# Patient Record
Sex: FEMALE | Race: WHITE | NOT HISPANIC OR LATINO | Employment: OTHER | URBAN - METROPOLITAN AREA
[De-identification: names, ages, dates, MRNs, and addresses within clinical notes are randomized per-mention and may not be internally consistent; named-entity substitution may affect disease eponyms.]

---

## 2017-01-07 ENCOUNTER — APPOINTMENT (EMERGENCY)
Dept: RADIOLOGY | Facility: HOSPITAL | Age: 72
DRG: 149 | End: 2017-01-07
Payer: MEDICARE

## 2017-01-07 ENCOUNTER — HOSPITAL ENCOUNTER (INPATIENT)
Facility: HOSPITAL | Age: 72
LOS: 2 days | Discharge: HOME | DRG: 149 | End: 2017-01-10
Attending: EMERGENCY MEDICINE | Admitting: FAMILY MEDICINE
Payer: MEDICARE

## 2017-01-07 DIAGNOSIS — G43.909 MIGRAINE HEADACHE: ICD-10-CM

## 2017-01-07 DIAGNOSIS — R42 VERTIGO: ICD-10-CM

## 2017-01-07 DIAGNOSIS — R11.0 NAUSEA: ICD-10-CM

## 2017-01-07 DIAGNOSIS — R42 DIZZINESS: Primary | ICD-10-CM

## 2017-01-07 LAB
ALBUMIN SERPL BCP-MCNC: 4 G/DL (ref 3.5–5)
ALP SERPL-CCNC: 82 U/L (ref 46–116)
ALT SERPL W P-5'-P-CCNC: 20 U/L (ref 12–78)
ANION GAP SERPL CALCULATED.3IONS-SCNC: 13 MMOL/L (ref 4–13)
APTT PPP: 28 SECONDS (ref 24–33)
AST SERPL W P-5'-P-CCNC: 16 U/L (ref 5–45)
BACTERIA UR QL AUTO: ABNORMAL /HPF
BASOPHILS # BLD AUTO: 0 THOUSANDS/ΜL (ref 0–0.1)
BASOPHILS NFR BLD AUTO: 1 % (ref 0–1)
BILIRUB SERPL-MCNC: 0.7 MG/DL (ref 0.2–1)
BILIRUB UR QL STRIP: NEGATIVE
BUN SERPL-MCNC: 16 MG/DL (ref 5–25)
CALCIUM SERPL-MCNC: 9.3 MG/DL (ref 8.3–10.1)
CHLORIDE SERPL-SCNC: 105 MMOL/L (ref 100–108)
CK SERPL-CCNC: 101 U/L (ref 26–192)
CLARITY UR: CLEAR
CO2 SERPL-SCNC: 25 MMOL/L (ref 21–32)
COLOR UR: ABNORMAL
CREAT SERPL-MCNC: 1.02 MG/DL (ref 0.6–1.3)
EOSINOPHIL # BLD AUTO: 0 THOUSAND/ΜL (ref 0–0.61)
EOSINOPHIL NFR BLD AUTO: 1 % (ref 0–6)
ERYTHROCYTE [DISTWIDTH] IN BLOOD BY AUTOMATED COUNT: 12.9 % (ref 11.6–15.1)
GFR SERPL CREATININE-BSD FRML MDRD: 53.1 ML/MIN/1.73SQ M
GLUCOSE SERPL-MCNC: 89 MG/DL (ref 65–140)
GLUCOSE SERPL-MCNC: 91 MG/DL (ref 65–140)
GLUCOSE UR STRIP-MCNC: NEGATIVE MG/DL
HCT VFR BLD AUTO: 43.5 % (ref 37–47)
HGB BLD-MCNC: 14.8 G/DL (ref 12–16)
HGB UR QL STRIP.AUTO: ABNORMAL
INR PPP: 1.02 (ref 0.86–1.16)
KETONES UR STRIP-MCNC: ABNORMAL MG/DL
LEUKOCYTE ESTERASE UR QL STRIP: ABNORMAL
LYMPHOCYTES # BLD AUTO: 1.7 THOUSANDS/ΜL (ref 0.6–4.47)
LYMPHOCYTES NFR BLD AUTO: 34 % (ref 14–44)
MCH RBC QN AUTO: 30.2 PG (ref 27–31)
MCHC RBC AUTO-ENTMCNC: 34 G/DL (ref 31.4–37.4)
MCV RBC AUTO: 89 FL (ref 82–98)
MONOCYTES # BLD AUTO: 0.3 THOUSAND/ΜL (ref 0.17–1.22)
MONOCYTES NFR BLD AUTO: 6 % (ref 4–12)
NEUTROPHILS # BLD AUTO: 3 THOUSANDS/ΜL (ref 1.85–7.62)
NEUTS SEG NFR BLD AUTO: 59 % (ref 43–75)
NITRITE UR QL STRIP: NEGATIVE
NON-SQ EPI CELLS URNS QL MICRO: ABNORMAL /HPF
NRBC BLD AUTO-RTO: 0 /100 WBCS
PH UR STRIP.AUTO: 6 [PH] (ref 5–9)
PLATELET # BLD AUTO: 235 THOUSANDS/UL (ref 130–400)
PMV BLD AUTO: 6.9 FL (ref 8.9–12.7)
POTASSIUM SERPL-SCNC: 4 MMOL/L (ref 3.5–5.3)
PROT SERPL-MCNC: 7.5 G/DL (ref 6.4–8.2)
PROT UR STRIP-MCNC: NEGATIVE MG/DL
PROTHROMBIN TIME: 10.7 SECONDS (ref 9.4–11.7)
RBC # BLD AUTO: 4.9 MILLION/UL (ref 4.2–5.4)
RBC #/AREA URNS AUTO: ABNORMAL /HPF
SODIUM SERPL-SCNC: 143 MMOL/L (ref 136–145)
SP GR UR STRIP.AUTO: 1.01 (ref 1–1.03)
TROPONIN I SERPL-MCNC: <0.02 NG/ML
UROBILINOGEN UR QL STRIP.AUTO: 0.2 E.U./DL
WBC # BLD AUTO: 5.2 THOUSAND/UL (ref 4.8–10.8)
WBC #/AREA URNS AUTO: ABNORMAL /HPF

## 2017-01-07 PROCEDURE — 85025 COMPLETE CBC W/AUTO DIFF WBC: CPT | Performed by: EMERGENCY MEDICINE

## 2017-01-07 PROCEDURE — 85610 PROTHROMBIN TIME: CPT | Performed by: EMERGENCY MEDICINE

## 2017-01-07 PROCEDURE — 96361 HYDRATE IV INFUSION ADD-ON: CPT

## 2017-01-07 PROCEDURE — 80053 COMPREHEN METABOLIC PANEL: CPT | Performed by: EMERGENCY MEDICINE

## 2017-01-07 PROCEDURE — 82948 REAGENT STRIP/BLOOD GLUCOSE: CPT

## 2017-01-07 PROCEDURE — 96375 TX/PRO/DX INJ NEW DRUG ADDON: CPT

## 2017-01-07 PROCEDURE — 93005 ELECTROCARDIOGRAM TRACING: CPT | Performed by: EMERGENCY MEDICINE

## 2017-01-07 PROCEDURE — 96374 THER/PROPH/DIAG INJ IV PUSH: CPT

## 2017-01-07 PROCEDURE — 71010 HB CHEST X-RAY 1 VIEW FRONTAL: CPT

## 2017-01-07 PROCEDURE — 99285 EMERGENCY DEPT VISIT HI MDM: CPT

## 2017-01-07 PROCEDURE — 84484 ASSAY OF TROPONIN QUANT: CPT | Performed by: EMERGENCY MEDICINE

## 2017-01-07 PROCEDURE — 87086 URINE CULTURE/COLONY COUNT: CPT | Performed by: EMERGENCY MEDICINE

## 2017-01-07 PROCEDURE — 82550 ASSAY OF CK (CPK): CPT | Performed by: EMERGENCY MEDICINE

## 2017-01-07 PROCEDURE — 36415 COLL VENOUS BLD VENIPUNCTURE: CPT | Performed by: EMERGENCY MEDICINE

## 2017-01-07 PROCEDURE — 70450 CT HEAD/BRAIN W/O DYE: CPT

## 2017-01-07 PROCEDURE — 81001 URINALYSIS AUTO W/SCOPE: CPT | Performed by: EMERGENCY MEDICINE

## 2017-01-07 PROCEDURE — 85730 THROMBOPLASTIN TIME PARTIAL: CPT | Performed by: EMERGENCY MEDICINE

## 2017-01-07 RX ORDER — ONDANSETRON 2 MG/ML
4 INJECTION INTRAMUSCULAR; INTRAVENOUS ONCE
Status: COMPLETED | OUTPATIENT
Start: 2017-01-07 | End: 2017-01-07

## 2017-01-07 RX ORDER — ACETAMINOPHEN 325 MG/1
975 TABLET ORAL ONCE
Status: COMPLETED | OUTPATIENT
Start: 2017-01-07 | End: 2017-01-07

## 2017-01-07 RX ORDER — MECLIZINE HYDROCHLORIDE 25 MG/1
50 TABLET ORAL ONCE
Status: COMPLETED | OUTPATIENT
Start: 2017-01-07 | End: 2017-01-07

## 2017-01-07 RX ADMIN — SODIUM CHLORIDE 1000 ML: 0.9 INJECTION, SOLUTION INTRAVENOUS at 16:36

## 2017-01-07 RX ADMIN — MECLIZINE HYDROCHLORIDE 50 MG: 25 TABLET ORAL at 18:42

## 2017-01-07 RX ADMIN — ACETAMINOPHEN 975 MG: 325 TABLET, FILM COATED ORAL at 18:42

## 2017-01-07 RX ADMIN — ONDANSETRON 4 MG: 2 INJECTION INTRAMUSCULAR; INTRAVENOUS at 16:35

## 2017-01-07 RX ADMIN — FAMOTIDINE 20 MG: 10 INJECTION, SOLUTION INTRAVENOUS at 16:35

## 2017-01-07 RX ADMIN — ONDANSETRON 4 MG: 2 INJECTION INTRAMUSCULAR; INTRAVENOUS at 20:25

## 2017-01-08 PROBLEM — I10 HYPERTENSION: Status: ACTIVE | Noted: 2017-01-08

## 2017-01-08 PROBLEM — R42 VERTIGO: Status: ACTIVE | Noted: 2017-01-08

## 2017-01-08 PROBLEM — R11.10 DRY HEAVES: Status: ACTIVE | Noted: 2017-01-08

## 2017-01-08 LAB — TROPONIN I SERPL-MCNC: <0.02 NG/ML

## 2017-01-08 PROCEDURE — 84484 ASSAY OF TROPONIN QUANT: CPT | Performed by: NURSE PRACTITIONER

## 2017-01-08 PROCEDURE — 87081 CULTURE SCREEN ONLY: CPT | Performed by: INTERNAL MEDICINE

## 2017-01-08 RX ORDER — MAGNESIUM HYDROXIDE/ALUMINUM HYDROXICE/SIMETHICONE 120; 1200; 1200 MG/30ML; MG/30ML; MG/30ML
30 SUSPENSION ORAL EVERY 6 HOURS PRN
Status: DISCONTINUED | OUTPATIENT
Start: 2017-01-08 | End: 2017-01-10 | Stop reason: HOSPADM

## 2017-01-08 RX ORDER — ACETAMINOPHEN 325 MG/1
650 TABLET ORAL EVERY 6 HOURS PRN
Status: DISCONTINUED | OUTPATIENT
Start: 2017-01-08 | End: 2017-01-10 | Stop reason: HOSPADM

## 2017-01-08 RX ORDER — ONDANSETRON 2 MG/ML
4 INJECTION INTRAMUSCULAR; INTRAVENOUS EVERY 6 HOURS PRN
Status: DISCONTINUED | OUTPATIENT
Start: 2017-01-08 | End: 2017-01-10 | Stop reason: HOSPADM

## 2017-01-08 RX ORDER — FAMOTIDINE 40 MG/5ML
20 POWDER, FOR SUSPENSION ORAL 2 TIMES DAILY
Status: DISCONTINUED | OUTPATIENT
Start: 2017-01-08 | End: 2017-01-08 | Stop reason: DRUGHIGH

## 2017-01-08 RX ORDER — ALPRAZOLAM 0.25 MG/1
0.25 TABLET ORAL
Status: DISCONTINUED | OUTPATIENT
Start: 2017-01-08 | End: 2017-01-10 | Stop reason: HOSPADM

## 2017-01-08 RX ORDER — FAMOTIDINE 20 MG/1
20 TABLET, FILM COATED ORAL DAILY
Status: DISCONTINUED | OUTPATIENT
Start: 2017-01-08 | End: 2017-01-10 | Stop reason: HOSPADM

## 2017-01-08 RX ORDER — HYDRALAZINE HYDROCHLORIDE 20 MG/ML
5 INJECTION INTRAMUSCULAR; INTRAVENOUS EVERY 6 HOURS PRN
Status: DISCONTINUED | OUTPATIENT
Start: 2017-01-08 | End: 2017-01-09

## 2017-01-08 RX ORDER — SODIUM CHLORIDE 9 MG/ML
100 INJECTION, SOLUTION INTRAVENOUS CONTINUOUS
Status: DISCONTINUED | OUTPATIENT
Start: 2017-01-08 | End: 2017-01-08

## 2017-01-08 RX ADMIN — ALPRAZOLAM 0.25 MG: 0.25 TABLET ORAL at 22:49

## 2017-01-08 RX ADMIN — ENOXAPARIN SODIUM 40 MG: 40 INJECTION SUBCUTANEOUS at 08:44

## 2017-01-08 RX ADMIN — FAMOTIDINE 20 MG: 20 TABLET ORAL at 08:44

## 2017-01-08 RX ADMIN — SODIUM CHLORIDE 100 ML/HR: 0.9 INJECTION, SOLUTION INTRAVENOUS at 03:04

## 2017-01-08 RX ADMIN — ONDANSETRON 4 MG: 2 INJECTION INTRAMUSCULAR; INTRAVENOUS at 22:10

## 2017-01-09 ENCOUNTER — APPOINTMENT (INPATIENT)
Dept: RADIOLOGY | Facility: HOSPITAL | Age: 72
DRG: 149 | End: 2017-01-09
Payer: MEDICARE

## 2017-01-09 ENCOUNTER — APPOINTMENT (INPATIENT)
Dept: NON INVASIVE DIAGNOSTICS | Facility: HOSPITAL | Age: 72
DRG: 149 | End: 2017-01-09
Payer: MEDICARE

## 2017-01-09 LAB
ATRIAL RATE: 80 BPM
BACTERIA UR CULT: NORMAL
MRSA NOSE QL CULT: NORMAL
P AXIS: 49 DEGREES
PR INTERVAL: 162 MS
QRS AXIS: 4 DEGREES
QRSD INTERVAL: 78 MS
QT INTERVAL: 390 MS
QTC INTERVAL: 449 MS
T WAVE AXIS: 55 DEGREES
VENTRICULAR RATE: 80 BPM

## 2017-01-09 PROCEDURE — G8988 SELF CARE GOAL STATUS: HCPCS

## 2017-01-09 PROCEDURE — G8978 MOBILITY CURRENT STATUS: HCPCS

## 2017-01-09 PROCEDURE — 97162 PT EVAL MOD COMPLEX 30 MIN: CPT

## 2017-01-09 PROCEDURE — 70551 MRI BRAIN STEM W/O DYE: CPT

## 2017-01-09 PROCEDURE — 90686 IIV4 VACC NO PRSV 0.5 ML IM: CPT | Performed by: INTERNAL MEDICINE

## 2017-01-09 PROCEDURE — G8987 SELF CARE CURRENT STATUS: HCPCS

## 2017-01-09 PROCEDURE — G0008 ADMIN INFLUENZA VIRUS VAC: HCPCS | Performed by: INTERNAL MEDICINE

## 2017-01-09 PROCEDURE — 97165 OT EVAL LOW COMPLEX 30 MIN: CPT

## 2017-01-09 PROCEDURE — 93306 TTE W/DOPPLER COMPLETE: CPT

## 2017-01-09 PROCEDURE — G8979 MOBILITY GOAL STATUS: HCPCS

## 2017-01-09 RX ADMIN — ACETAMINOPHEN 650 MG: 325 TABLET, FILM COATED ORAL at 21:51

## 2017-01-09 RX ADMIN — INFLUENZA VIRUS VACCINE 0.5 ML: 15; 15; 15; 15 SUSPENSION INTRAMUSCULAR at 21:52

## 2017-01-09 RX ADMIN — FAMOTIDINE 20 MG: 20 TABLET ORAL at 09:54

## 2017-01-09 RX ADMIN — ENOXAPARIN SODIUM 40 MG: 40 INJECTION SUBCUTANEOUS at 09:54

## 2017-01-10 ENCOUNTER — APPOINTMENT (INPATIENT)
Dept: RADIOLOGY | Facility: HOSPITAL | Age: 72
DRG: 149 | End: 2017-01-10
Payer: MEDICARE

## 2017-01-10 VITALS
TEMPERATURE: 96.7 F | OXYGEN SATURATION: 99 % | SYSTOLIC BLOOD PRESSURE: 171 MMHG | WEIGHT: 157.85 LBS | RESPIRATION RATE: 18 BRPM | DIASTOLIC BLOOD PRESSURE: 77 MMHG | BODY MASS INDEX: 30.99 KG/M2 | HEIGHT: 60 IN | HEART RATE: 85 BPM

## 2017-01-10 PROBLEM — J01.90 ACUTE SINUSITIS: Status: ACTIVE | Noted: 2017-01-10

## 2017-01-10 PROBLEM — I34.0 MODERATE MITRAL REGURGITATION: Chronic | Status: ACTIVE | Noted: 2017-01-10

## 2017-01-10 LAB
ANION GAP SERPL CALCULATED.3IONS-SCNC: 8 MMOL/L (ref 4–13)
BUN SERPL-MCNC: 18 MG/DL (ref 5–25)
CALCIUM SERPL-MCNC: 8.7 MG/DL (ref 8.3–10.1)
CHLORIDE SERPL-SCNC: 108 MMOL/L (ref 100–108)
CO2 SERPL-SCNC: 27 MMOL/L (ref 21–32)
CREAT SERPL-MCNC: 0.97 MG/DL (ref 0.6–1.3)
GFR SERPL CREATININE-BSD FRML MDRD: 56.3 ML/MIN/1.73SQ M
GLUCOSE SERPL-MCNC: 87 MG/DL (ref 65–140)
POTASSIUM SERPL-SCNC: 4 MMOL/L (ref 3.5–5.3)
SODIUM SERPL-SCNC: 143 MMOL/L (ref 136–145)

## 2017-01-10 PROCEDURE — 80048 BASIC METABOLIC PNL TOTAL CA: CPT | Performed by: INTERNAL MEDICINE

## 2017-01-10 PROCEDURE — 93880 EXTRACRANIAL BILAT STUDY: CPT

## 2017-01-10 PROCEDURE — 97535 SELF CARE MNGMENT TRAINING: CPT

## 2017-01-10 RX ORDER — AMOXICILLIN AND CLAVULANATE POTASSIUM 875; 125 MG/1; MG/1
1 TABLET, FILM COATED ORAL EVERY 12 HOURS SCHEDULED
Qty: 20 TABLET | Refills: 0 | Status: SHIPPED | OUTPATIENT
Start: 2017-01-10 | End: 2017-01-20

## 2017-01-10 RX ORDER — AMOXICILLIN AND CLAVULANATE POTASSIUM 875; 125 MG/1; MG/1
1 TABLET, FILM COATED ORAL EVERY 12 HOURS SCHEDULED
Status: DISCONTINUED | OUTPATIENT
Start: 2017-01-10 | End: 2017-01-10 | Stop reason: HOSPADM

## 2017-01-10 RX ORDER — FAMOTIDINE 20 MG/1
20 TABLET, FILM COATED ORAL DAILY
Qty: 30 TABLET | Refills: 0 | Status: SHIPPED | OUTPATIENT
Start: 2017-01-10 | End: 2018-09-19

## 2017-01-10 RX ADMIN — ENOXAPARIN SODIUM 40 MG: 40 INJECTION SUBCUTANEOUS at 08:00

## 2017-01-10 RX ADMIN — FAMOTIDINE 20 MG: 20 TABLET ORAL at 08:00

## 2017-01-20 ENCOUNTER — ALLSCRIPTS OFFICE VISIT (OUTPATIENT)
Dept: OTHER | Facility: OTHER | Age: 72
End: 2017-01-20

## 2017-01-20 DIAGNOSIS — Z12.31 ENCOUNTER FOR SCREENING MAMMOGRAM FOR MALIGNANT NEOPLASM OF BREAST: ICD-10-CM

## 2017-01-20 DIAGNOSIS — H81.10 BENIGN PAROXYSMAL VERTIGO: ICD-10-CM

## 2017-01-20 DIAGNOSIS — Z13.820 ENCOUNTER FOR SCREENING FOR OSTEOPOROSIS: ICD-10-CM

## 2017-01-24 ENCOUNTER — APPOINTMENT (OUTPATIENT)
Dept: PHYSICAL THERAPY | Facility: CLINIC | Age: 72
End: 2017-01-24
Payer: MEDICARE

## 2017-01-24 PROCEDURE — 95992 CANALITH REPOSITIONING PROC: CPT

## 2017-01-24 PROCEDURE — G8978 MOBILITY CURRENT STATUS: HCPCS

## 2017-01-24 PROCEDURE — G8979 MOBILITY GOAL STATUS: HCPCS

## 2017-01-24 PROCEDURE — 97162 PT EVAL MOD COMPLEX 30 MIN: CPT

## 2017-01-27 ENCOUNTER — APPOINTMENT (OUTPATIENT)
Dept: PHYSICAL THERAPY | Facility: CLINIC | Age: 72
End: 2017-01-27
Payer: MEDICARE

## 2017-01-27 PROCEDURE — 95992 CANALITH REPOSITIONING PROC: CPT

## 2017-01-27 PROCEDURE — 97112 NEUROMUSCULAR REEDUCATION: CPT

## 2017-01-31 ENCOUNTER — APPOINTMENT (OUTPATIENT)
Dept: PHYSICAL THERAPY | Facility: CLINIC | Age: 72
End: 2017-01-31
Payer: MEDICARE

## 2017-01-31 PROCEDURE — 97110 THERAPEUTIC EXERCISES: CPT

## 2017-01-31 PROCEDURE — 97112 NEUROMUSCULAR REEDUCATION: CPT

## 2017-02-02 ENCOUNTER — APPOINTMENT (OUTPATIENT)
Dept: PHYSICAL THERAPY | Facility: CLINIC | Age: 72
End: 2017-02-02
Payer: MEDICARE

## 2017-02-02 PROCEDURE — 97112 NEUROMUSCULAR REEDUCATION: CPT

## 2017-02-07 ENCOUNTER — APPOINTMENT (OUTPATIENT)
Dept: PHYSICAL THERAPY | Facility: CLINIC | Age: 72
End: 2017-02-07
Payer: MEDICARE

## 2017-02-07 ENCOUNTER — GENERIC CONVERSION - ENCOUNTER (OUTPATIENT)
Dept: OTHER | Facility: OTHER | Age: 72
End: 2017-02-07

## 2017-02-07 PROCEDURE — G8980 MOBILITY D/C STATUS: HCPCS

## 2017-02-07 PROCEDURE — 97112 NEUROMUSCULAR REEDUCATION: CPT

## 2017-02-07 PROCEDURE — G8979 MOBILITY GOAL STATUS: HCPCS

## 2017-02-10 ENCOUNTER — APPOINTMENT (OUTPATIENT)
Dept: PHYSICAL THERAPY | Facility: CLINIC | Age: 72
End: 2017-02-10
Payer: MEDICARE

## 2017-02-10 ENCOUNTER — APPOINTMENT (OUTPATIENT)
Dept: AUDIOLOGY | Facility: CLINIC | Age: 72
End: 2017-02-10
Payer: MEDICARE

## 2017-02-10 PROCEDURE — 92557 COMPREHENSIVE HEARING TEST: CPT | Performed by: AUDIOLOGIST

## 2017-02-10 PROCEDURE — 92567 TYMPANOMETRY: CPT | Performed by: AUDIOLOGIST

## 2017-02-13 ENCOUNTER — APPOINTMENT (OUTPATIENT)
Dept: PHYSICAL THERAPY | Facility: CLINIC | Age: 72
End: 2017-02-13
Payer: MEDICARE

## 2017-02-15 ENCOUNTER — APPOINTMENT (OUTPATIENT)
Dept: PHYSICAL THERAPY | Facility: CLINIC | Age: 72
End: 2017-02-15
Payer: MEDICARE

## 2017-02-20 ENCOUNTER — APPOINTMENT (OUTPATIENT)
Dept: PHYSICAL THERAPY | Facility: CLINIC | Age: 72
End: 2017-02-20
Payer: MEDICARE

## 2017-02-22 ENCOUNTER — APPOINTMENT (OUTPATIENT)
Dept: PHYSICAL THERAPY | Facility: CLINIC | Age: 72
End: 2017-02-22
Payer: MEDICARE

## 2017-02-27 ENCOUNTER — APPOINTMENT (OUTPATIENT)
Dept: PHYSICAL THERAPY | Facility: CLINIC | Age: 72
End: 2017-02-27
Payer: MEDICARE

## 2017-03-01 ENCOUNTER — OFFICE VISIT (OUTPATIENT)
Dept: PHYSICAL THERAPY | Facility: CLINIC | Age: 72
End: 2017-03-01
Payer: MEDICARE

## 2018-01-11 NOTE — RESULT NOTES
Verified Results  US GALLBLADDER [id number] 09:21AM Lorraine Pettit Order Number: [id number]     Test Name Result Flag Reference   US GALLBLADDER (Report)     RIGHT UPPER QUADRANT ULTRASOUND     INDICATION: Gallstone  COMPARISON: 10/09/2013     TECHNIQUE:  Real-time ultrasound of the right upper quadrant was performed with a curvilinear transducer with both volumetric sweeps and still imaging techniques  FINDINGS:     PANCREAS: Visualized portions of the pancreas are within normal limits  AORTA AND IVC: Visualized portions are normal for patient age  LIVER:   Size: Within normal range  The liver measures 11 4 cm in the midclavicular line  Contour: Surface contour is smooth  Parenchyma: Echogenicity and echotexture are within normal limits  No evidence of suspicious mass  The main portal vein is patent and hepatopetal       BILIARY:   The gallbladder is normal in caliber  No wall thickening or pericholecystic fluid  1 4 cm shadowing gallstone in the gallbladder  No sonographic Ignacio's sign  No intrahepatic biliary dilatation  CBD measures 5 mm  No choledocholithiasis  KIDNEY:    Right kidney measures 10 2 x 4 0 cm  The renal cortex measures 1 1 cm  Within normal limits  ASCITES:  None  IMPRESSION:     Cholelithiasis         Workstation performed: [id number]     Signed by:   Mahesh Mendez MD   6/15/16

## 2018-01-15 NOTE — MISCELLANEOUS
Assessment    1  Former smoker (V15 82) (J07 904)   2  Allergic rhinitis (477 9) (J30 9)   3  Benign paroxysmal positional vertigo, unspecified laterality (386 11) (H81 10)   4  GERD without esophagitis (530 81) (K21 9)    Plan  Allergic rhinitis    · Fluticasone Propionate 50 MCG/ACT Nasal Suspension; USE 2 SPRAYS IN EACH  NOSTRIL ONCE DAILY   Rx By: Severo Garcia; Dispense: 0 Days ; #:1 X 16 GM Bottle; Refill: 2; For: Allergic rhinitis; POWER = N; Verified Transmission to [place] [address] #[id number]; Last Updated By: [employer]; 1/20/2017 12:00:22 PM  Benign paroxysmal positional vertigo, unspecified laterality    · *[address] [place] [address] Physician Referral  Consult  Status: Active   Requested for: 20Jan2017   Ordered; For: Benign paroxysmal positional vertigo, unspecified laterality; Ordered By: Severo Garcia Performed:  Due: 99QSU1411  Care Summary provided  : Yes  Encounter for screening mammogram for breast cancer    · * MAMMO SCREENING BILATERAL W CAD; Status:Hold For - Scheduling,Retrospective  Authorization; Requested GIL:[id number];    Perform:[facility]; MPJ:[id number]; Last Updated [facility]; 1/20/2017 11:29:21 AM;Ordered;  For:Encounter for screening mammogram for breast cancer; Ordered By:Dima Baptiste;  Screening for osteoporosis    · * DXA BONE DENSITY SPINE HIP AND PELVIS; Status:Hold For -  Scheduling,Retrospective Authorization; Requested [facility]:[id number];    Perform:[facility]; ZYX:[id number]; Last Updated [facility]; 1/20/2017 11:29:59 AM;Ordered; For:Screening for osteoporosis; Ordered By:Dima Baptiste;    Discussion/Summary  Discussion Summary:   In summary then this is a 75-year-old woman recently admitted for ataxia and dizziness felt to be most likely related to benign positional vertigo  She's made an appointment for next week to begin canalith repositioning at the [facility]   She is asked to finish Augmentin and start Flonase for allergic rhinitis  She is asked to follow up as needed and to call should her symptoms progress or if unresolved after therapy is complete  She agrees  History of Present Illness  TCM Communication [facility]: The patient is being contacted for follow-up after hospitalization  Hospital records were reviewed  She was hospitalized at [address] [place] [address]  The date of admission: 1/7/17, date of discharge: 1/10/17  Diagnosis: Vertigo, felt to be multifactorial  Neurology suggested that sinusitis may be contributing and she was d/c'ed on Augnentin  She was discharged to home  Medications reviewed and updated today  She scheduled a follow up appointment  Symptoms: dizziness and fatigue, but no fever, no headache, no cough, no anorexia, no nausea and no vomiting  Dizziness much improved  Poor appetite which is much improved  Recurrent by history, last episode in June  Counseling was provided to the patient  Topics counseled included importance of compliance with treatment  Communication performed and completed by Lynette Eric   HPI: The patient is a 69-year-old woman with a history of recurrent vertigo who developed acute symptoms on January 7 of a feeling of being off balance and dizziness  She was admitted to [facility]  She underwent CT scanning of the head as well as MRI of the head  MRI revealed no acute infarction hemorrhage or mass effect  There was trace chronic microangiopathy reported  She had an echocardiogram which revealed ejection fraction of 50-55% no regional wall abnormalities  She did have some mild diastolic dysfunction as well as moderate mitral regurg  No significant aortic or tricuspid valve disease  She had a chest x-ray which was essentially normal  CT scan of the head also revealed microangiopathic changes without other acute process bilateral carotid examination revealed 149% occlusion   She was seen by neurology who suggested that this was probably benign positional vertigo though she does have a history of migraines and he could not rule out sinusitis as a contributing factor and recommended a course of Augmentin which the patient is just finiishing  He also recommended referral to the [facility] for canalith repositioning  He did make this appointment to begin next week      Review of Systems  Complete-Female:   Constitutional: recent 5 lb weight gain, but no recent weight loss  ENT: hearing loss and nasal discharge, but no earache, no nosebleeds and no sore throat  Cardiovascular: no chest pain, no palpitations and no lower extremity edema  Respiratory: no shortness of breath, no cough, no orthopnea, no wheezing, no shortness of breath during exertion and no PND  Neurological: dizziness, but no headache, no numbness, no tingling, no limb weakness, no fainting and no difficulty walking  Active Problems    1  Abdominal pain (789 00) (R10 9)   2  Abnormal CT of the chest (793 2) (R93 8)   3  Asthma exacerbation (493 92) (J45 901)   4  Encounter for screening colonoscopy (V76 51) (Z12 11)   5  Fatigue (780 79) (R53 83)   6  Gallbladder calculus without cholecystitis and no obstruction (574 20) (K80 20)   7  GERD without esophagitis (530 81) (K21 9)   8  Lower back pain (724 2) (M54 5)   9  Pulmonary nodule seen on imaging study (793 11) (R91 1)    Past Medical History    1  History of nausea and vomiting (V12 79) (Z87 898)   2  History of urinary tract infection (V13 02) (Z87 440)    Surgical History    1  Denied: History Of Prior Surgery    Family History  Son    1  No pertinent family history  Family History    2  Denied: Family history of colon cancer   3  Denied: Family history of Crohn's disease   4  Denied: Family history of liver cancer    Social History    · Denied: History of Alcohol use   · Former smoker (V15 82) (E65 418)    Current Meds   1  Famotidine TABS; Therapy: (Recorded:20Jan2017) to Recorded   2   ProAir  (90 Base) MCG/ACT Inhalation Aerosol Solution; USE 2 PUFFS EVERY 6   HOURS AS DIRECTED; Therapy: (Recorded:07Jun2016) to Recorded    Allergies    1  No Known Drug Allergies    Vitals  Signs   Recorded: 20Jan2017 11:20AM   Temperature: 06 0 F  Systolic: 671, LUE, Sitting  Diastolic: 60, LUE, Sitting  Height: 5 ft 1 in  Weight: 156 lb 14 56 oz  BMI Calculated: 29 65  BSA Calculated: 1 7    Physical Exam    Constitutional   General appearance: Abnormal   well developed, overweight and appearance reflects stated age  Eyes   Conjunctiva and lids: No swelling, erythema or discharge  Pupils and irises: Equal, round and reactive to light  Pupils are equal and reactive the light and extraocular movements are full  There is no pathologic nystagmus noted and she reports no diplopia  Ears, Nose, Mouth, and Throat   Otoscopic examination: Tympanic membranes translucent with normal light reflex  Canals patent without erythema  Nasal mucosa, septum, and turbinates: Abnormal   She does have boggy blue nasal turbinates with some watery discharge  Oropharynx: Normal with no erythema, edema, exudate or lesions  No oral pharyngeal lesion  Ears are grossly within normal limits  Pulmonary   Respiratory effort: No increased work of breathing or signs of respiratory distress  Auscultation of lungs: Clear to auscultation  Clear to auscultation  Cardiovascular   Auscultation of heart: Normal rate and rhythm, normal S1 and S2, without murmurs  The heart rate was normal  The rhythm was regular  Heart sounds: no gallop heard  Examination of extremities for edema and/or varicosities: Normal     Carotid pulses: Normal   Normal carotid upstroke and no bruit noted  Musculoskeletal   Gait and station: Normal   Gait is normal though she was initially briefly off balance when beginning ambulation  Digits and nails: Normal without clubbing or cyanosis  Neurologic   Cranial nerves: Cranial nerves 2-12 intact    Neurologic examination of her rapid alternating movements DTRs etc  is all nonfocal    Reflexes: 2+ and symmetric  Sensation: No sensory loss  Psychiatric   Orientation to person, place, and time: Normal     Mood and affect: Normal          Results/Data  PHQ-2 Adult Depression Screening 20Jan2017 11:28AM User, [employer]     Test Name Result Flag Reference   PHQ-2 Adult Depression Score 0     Over the last two weeks, how often have you been bothered by any of the following problems?   Little interest or pleasure in doing things: Not at all - 0  Feeling down, depressed, or hopeless: Not at all - 0   PHQ-2 Adult Depression Screening Negative       Falls Risk Assessment (Dx Z13 89 Screen for Neurologic Disorder) [id number] 11:28AM User, [employer]     Test Name Result Flag Reference   Falls Risk      No falls in the past year       Signatures   Electronically signed by : MICHELE Sr ; Jan 20 2017 12:48PM EST                       (Author)

## 2018-01-16 NOTE — RESULT NOTES
Verified Results  * CT CHEST WO CONTRAST 20Jun2016 09:28AM Ann Richardson Order Number: [id number]     Test Name Result Flag Reference   CT CHEST WO CONTRAST (Report)     CT CHEST WITHOUT IV CONTRAST     INDICATION: Follow-up from prior CT     COMPARISON: 06/12/2016     TECHNIQUE: CT examination of the chest was performed without intravenous contrast  Axial, sagittal and coronal reformatted images were submitted for interpretation  Coronal thick section MIP (maximal intensity projection) images were also created  This examination, like all CT scans performed in the [facility], was performed utilizing techniques to minimize radiation dose exposure, including the use of iterative reconstruction and automated exposure control  FINDINGS:     LUNGS: Vague 8 x 4 mm parenchymal density in the right upper lobe  PLEURA: Unremarkable  HEART/GREAT VESSELS: Unremarkable for patient's age  MEDIASTINUM AND SULTANA: Unremarkable  CHEST WALL AND LOWER NECK: Unremarkable  VISUALIZED STRUCTURES IN THE UPPER ABDOMEN: Small hiatal hernia  OSSEOUS STRUCTURES: No acute fracture  No destructive osseous lesion  IMPRESSION:     Vague 8 x 4 mm residual parenchymal density in the right upper lobe  This could represent residual inflammatory process  Follow-up study in 3 months is recommended         ##sigslh##sigslh     ##fuslh3##fuslh3       Workstation performed: [id number]     Signed by:   Darleen Stubbs MD   6/21/16       Plan  Pulmonary nodule seen on imaging study    · * CT CHEST WO CONTRAST; Status:Hold For - Scheduling; Requested for:21Sep2016;

## 2018-01-18 NOTE — RESULT NOTES
PFT Results v2:     Spirometry: Forced vital capacity: 2 33L and 91% Predicted Values  Forced expiratory volume in one second: 1 87L and 97% Predicted Value  Post Bronchodilator Spirometry:   Lung Volumes:   DLCO:    PFT Interpretation:   Done 08/05/2016 by [facility]  [facility] Appointments    Date/Time Provider Specialty Site   08/11/2016 03:20 PM MICHELE Murrieta  Gastroenterology Adult [facility] GASTROENTEROLOGY [address] [place] [address]      Electronically signed by : Zandra Jordan, ; Aug  5 2016  1:23PM EST                       (Author)    Electronically signed by : Gregary Schaumann, APN;  Aug  8 2016 12:37PM EST                       (Author)    Electronically signed by : RAINA Lin ; Aug  9 2016  4:15PM EST                       (Author)

## 2018-01-22 VITALS
WEIGHT: 156.91 LBS | SYSTOLIC BLOOD PRESSURE: 119 MMHG | TEMPERATURE: 97.6 F | DIASTOLIC BLOOD PRESSURE: 60 MMHG | BODY MASS INDEX: 29.62 KG/M2 | HEIGHT: 61 IN

## 2018-09-19 ENCOUNTER — HOSPITAL ENCOUNTER (EMERGENCY)
Facility: HOSPITAL | Age: 73
Discharge: HOME | End: 2018-09-19
Attending: EMERGENCY MEDICINE | Admitting: EMERGENCY MEDICINE
Payer: MEDICARE

## 2018-09-19 ENCOUNTER — APPOINTMENT (EMERGENCY)
Dept: RADIOLOGY | Facility: HOSPITAL | Age: 73
End: 2018-09-19
Payer: MEDICARE

## 2018-09-19 VITALS
HEART RATE: 99 BPM | TEMPERATURE: 98.4 F | BODY MASS INDEX: 34.01 KG/M2 | SYSTOLIC BLOOD PRESSURE: 181 MMHG | WEIGHT: 180 LBS | OXYGEN SATURATION: 98 % | DIASTOLIC BLOOD PRESSURE: 99 MMHG | RESPIRATION RATE: 20 BRPM

## 2018-09-19 DIAGNOSIS — R10.9 FLANK PAIN: ICD-10-CM

## 2018-09-19 DIAGNOSIS — N39.0 UTI (URINARY TRACT INFECTION): Primary | ICD-10-CM

## 2018-09-19 LAB
ALBUMIN SERPL BCP-MCNC: 3.6 G/DL (ref 3.5–5)
ALP SERPL-CCNC: 64 U/L (ref 46–116)
ALT SERPL W P-5'-P-CCNC: 16 U/L (ref 12–78)
ANION GAP SERPL CALCULATED.3IONS-SCNC: 10 MMOL/L (ref 4–13)
APTT PPP: 28 SECONDS (ref 24–33)
AST SERPL W P-5'-P-CCNC: 13 U/L (ref 5–45)
BACTERIA UR QL AUTO: ABNORMAL /HPF
BASOPHILS # BLD AUTO: 0.06 THOUSANDS/ΜL (ref 0–0.1)
BASOPHILS NFR BLD AUTO: 1 % (ref 0–1)
BILIRUB SERPL-MCNC: 0.3 MG/DL (ref 0.2–1)
BILIRUB UR QL STRIP: NEGATIVE
BUN SERPL-MCNC: 15 MG/DL (ref 5–25)
CALCIUM SERPL-MCNC: 9.2 MG/DL (ref 8.3–10.1)
CHLORIDE SERPL-SCNC: 104 MMOL/L (ref 100–108)
CLARITY UR: CLEAR
CO2 SERPL-SCNC: 24 MMOL/L (ref 21–32)
COLOR UR: YELLOW
CREAT SERPL-MCNC: 1.1 MG/DL (ref 0.6–1.3)
EOSINOPHIL # BLD AUTO: 0.09 THOUSAND/ΜL (ref 0–0.61)
EOSINOPHIL NFR BLD AUTO: 2 % (ref 0–6)
ERYTHROCYTE [DISTWIDTH] IN BLOOD BY AUTOMATED COUNT: 12.8 % (ref 11.6–15.1)
GFR SERPL CREATININE-BSD FRML MDRD: 50 ML/MIN/1.73SQ M
GLUCOSE SERPL-MCNC: 108 MG/DL (ref 65–140)
GLUCOSE UR STRIP-MCNC: NEGATIVE MG/DL
HCT VFR BLD AUTO: 42.7 % (ref 34.8–46.1)
HGB BLD-MCNC: 14.1 G/DL (ref 11.5–15.4)
HGB UR QL STRIP.AUTO: ABNORMAL
IMM GRANULOCYTES # BLD AUTO: 0.02 THOUSAND/UL (ref 0–0.2)
IMM GRANULOCYTES NFR BLD AUTO: 0 % (ref 0–2)
INR PPP: 0.95 (ref 0.86–1.16)
KETONES UR STRIP-MCNC: NEGATIVE MG/DL
LEUKOCYTE ESTERASE UR QL STRIP: ABNORMAL
LYMPHOCYTES # BLD AUTO: 1.65 THOUSANDS/ΜL (ref 0.6–4.47)
LYMPHOCYTES NFR BLD AUTO: 30 % (ref 14–44)
MCH RBC QN AUTO: 30.5 PG (ref 26.8–34.3)
MCHC RBC AUTO-ENTMCNC: 33 G/DL (ref 31.4–37.4)
MCV RBC AUTO: 92 FL (ref 82–98)
MONOCYTES # BLD AUTO: 0.41 THOUSAND/ΜL (ref 0.17–1.22)
MONOCYTES NFR BLD AUTO: 7 % (ref 4–12)
NEUTROPHILS # BLD AUTO: 3.36 THOUSANDS/ΜL (ref 1.85–7.62)
NEUTS SEG NFR BLD AUTO: 60 % (ref 43–75)
NITRITE UR QL STRIP: POSITIVE
NON-SQ EPI CELLS URNS QL MICRO: ABNORMAL /HPF
NRBC BLD AUTO-RTO: 0 /100 WBCS
PH UR STRIP.AUTO: 6 [PH] (ref 5–9)
PLATELET # BLD AUTO: 189 THOUSANDS/UL (ref 149–390)
PMV BLD AUTO: 9.6 FL (ref 8.9–12.7)
POTASSIUM SERPL-SCNC: 4.4 MMOL/L (ref 3.5–5.3)
PROT SERPL-MCNC: 7.1 G/DL (ref 6.4–8.2)
PROT UR STRIP-MCNC: NEGATIVE MG/DL
PROTHROMBIN TIME: 10 SECONDS (ref 9.4–11.7)
RBC # BLD AUTO: 4.63 MILLION/UL (ref 3.81–5.12)
RBC #/AREA URNS AUTO: ABNORMAL /HPF
SODIUM SERPL-SCNC: 138 MMOL/L (ref 136–145)
SP GR UR STRIP.AUTO: 1.02 (ref 1–1.03)
UROBILINOGEN UR QL STRIP.AUTO: 0.2 E.U./DL
WBC # BLD AUTO: 5.59 THOUSAND/UL (ref 4.31–10.16)
WBC #/AREA URNS AUTO: ABNORMAL /HPF

## 2018-09-19 PROCEDURE — 85025 COMPLETE CBC W/AUTO DIFF WBC: CPT | Performed by: EMERGENCY MEDICINE

## 2018-09-19 PROCEDURE — 36415 COLL VENOUS BLD VENIPUNCTURE: CPT | Performed by: EMERGENCY MEDICINE

## 2018-09-19 PROCEDURE — 96374 THER/PROPH/DIAG INJ IV PUSH: CPT

## 2018-09-19 PROCEDURE — 80053 COMPREHEN METABOLIC PANEL: CPT | Performed by: EMERGENCY MEDICINE

## 2018-09-19 PROCEDURE — 81001 URINALYSIS AUTO W/SCOPE: CPT | Performed by: EMERGENCY MEDICINE

## 2018-09-19 PROCEDURE — 99284 EMERGENCY DEPT VISIT MOD MDM: CPT

## 2018-09-19 PROCEDURE — 85610 PROTHROMBIN TIME: CPT | Performed by: EMERGENCY MEDICINE

## 2018-09-19 PROCEDURE — 74176 CT ABD & PELVIS W/O CONTRAST: CPT

## 2018-09-19 PROCEDURE — 85730 THROMBOPLASTIN TIME PARTIAL: CPT | Performed by: EMERGENCY MEDICINE

## 2018-09-19 PROCEDURE — 96361 HYDRATE IV INFUSION ADD-ON: CPT

## 2018-09-19 RX ORDER — PHENAZOPYRIDINE HYDROCHLORIDE 100 MG/1
100 TABLET, FILM COATED ORAL ONCE
Status: COMPLETED | OUTPATIENT
Start: 2018-09-19 | End: 2018-09-19

## 2018-09-19 RX ORDER — ONDANSETRON 2 MG/ML
4 INJECTION INTRAMUSCULAR; INTRAVENOUS ONCE
Status: COMPLETED | OUTPATIENT
Start: 2018-09-19 | End: 2018-09-19

## 2018-09-19 RX ORDER — CEPHALEXIN 500 MG/1
500 CAPSULE ORAL ONCE
Status: COMPLETED | OUTPATIENT
Start: 2018-09-19 | End: 2018-09-19

## 2018-09-19 RX ORDER — CEPHALEXIN 500 MG/1
500 CAPSULE ORAL EVERY 6 HOURS SCHEDULED
Qty: 40 CAPSULE | Refills: 0 | Status: SHIPPED | OUTPATIENT
Start: 2018-09-19 | End: 2018-09-29

## 2018-09-19 RX ORDER — PHENAZOPYRIDINE HYDROCHLORIDE 200 MG/1
200 TABLET, FILM COATED ORAL 3 TIMES DAILY
Qty: 6 TABLET | Refills: 0 | Status: SHIPPED | OUTPATIENT
Start: 2018-09-19 | End: 2019-01-29 | Stop reason: ALTCHOICE

## 2018-09-19 RX ADMIN — ONDANSETRON 4 MG: 2 INJECTION INTRAMUSCULAR; INTRAVENOUS at 13:49

## 2018-09-19 RX ADMIN — SODIUM CHLORIDE 1000 ML: 0.9 INJECTION, SOLUTION INTRAVENOUS at 13:48

## 2018-09-19 RX ADMIN — CEPHALEXIN 500 MG: 500 CAPSULE ORAL at 15:56

## 2018-09-19 RX ADMIN — PHENAZOPYRIDINE HYDROCHLORIDE 100 MG: 100 TABLET ORAL at 15:56

## 2018-09-19 NOTE — DISCHARGE INSTRUCTIONS

## 2018-09-19 NOTE — ED PROVIDER NOTES
History  Chief Complaint   Patient presents with    Abdominal Pain     RLQ pain since about 10 am shoots around to back with nausea, no diarrhea     Patient states she was well in her normal state health until about 10 30 this morning when she got sudden onset of right flank pain radiating to the right lower quadrant, associated with nausea  Patient had no fever no vomiting, she states that her urine looks a little dark  She had a loose bowel movement this morning prior to the pain starting  Patient arrives awake and alert, with pain and nausea  I offered her pain medication which she politely refused            None       Past Medical History:   Diagnosis Date    Asthma     Cholelithiasis     GERD (gastroesophageal reflux disease)     Migraine headache     Pneumonia        History reviewed  No pertinent surgical history  Family History   Problem Relation Age of Onset    Heart disease Mother     No Known Problems Father      I have reviewed and agree with the history as documented  Social History   Substance Use Topics    Smoking status: Former Smoker    Smokeless tobacco: Never Used    Alcohol use No        Review of Systems   Constitutional: Negative for chills and fever  HENT: Negative for sore throat  Respiratory: Negative for cough and shortness of breath  Cardiovascular: Negative for chest pain  Gastrointestinal: Positive for abdominal pain and nausea  Negative for vomiting  Genitourinary: Positive for flank pain  Negative for dysuria  Dark urine, possible hematuria   Musculoskeletal: Positive for arthralgias  Skin: Negative for rash and wound  Neurological: Negative for syncope and weakness  Psychiatric/Behavioral: Negative for confusion  All other systems reviewed and are negative  Physical Exam  Physical Exam   Constitutional: She is oriented to person, place, and time  She appears well-developed and well-nourished     HENT:   Head: Normocephalic and atraumatic  Mouth/Throat: Oropharynx is clear and moist    Eyes: Conjunctivae are normal    Neck: Normal range of motion  Neck supple  Cardiovascular: Normal rate, regular rhythm and normal heart sounds  Pulmonary/Chest: Effort normal and breath sounds normal    Abdominal: Soft  Bowel sounds are normal  There is no tenderness  Patient feels the pain in the right lower quadrant but states there is no pain on palpation   Musculoskeletal: Normal range of motion  She exhibits tenderness  There is mild right-sided CVA tenderness   Neurological: She is alert and oriented to person, place, and time  Skin: Skin is warm and dry  Psychiatric: She has a normal mood and affect  Her behavior is normal    Nursing note and vitals reviewed        Vital Signs  ED Triage Vitals [09/19/18 1306]   Temperature Pulse Respirations Blood Pressure SpO2   98 °F (36 7 °C) 97 (!) 24 (!) 187/96 98 %      Temp Source Heart Rate Source Patient Position - Orthostatic VS BP Location FiO2 (%)   Oral Monitor Lying Right arm --      Pain Score       8           Vitals:    09/19/18 1306   BP: (!) 187/96   Pulse: 97   Patient Position - Orthostatic VS: Lying       Visual Acuity      ED Medications  Medications   cephalexin (KEFLEX) capsule 500 mg (not administered)   phenazopyridine (PYRIDIUM) tablet 100 mg (not administered)   sodium chloride 0 9 % bolus 1,000 mL (0 mL Intravenous Stopped 9/19/18 1452)   ondansetron (ZOFRAN) injection 4 mg (4 mg Intravenous Given 9/19/18 1349)       Diagnostic Studies  Results Reviewed     Procedure Component Value Units Date/Time    Comprehensive metabolic panel [[id number]] Collected:  09/19/18 1344    Lab Status:  Final result Specimen:  Blood from Arm, Left Updated:  09/19/18 1431     Sodium 138 mmol/L      Potassium 4 4 mmol/L      Chloride 104 mmol/L      CO2 24 mmol/L      ANION GAP 10 mmol/L      BUN 15 mg/dL      Creatinine 1 10 mg/dL      Glucose 108 mg/dL      Calcium 9 2 mg/dL      AST 13 U/L ALT 16 U/L      Alkaline Phosphatase 64 U/L      Total Protein 7 1 g/dL      Albumin 3 6 g/dL      Total Bilirubin 0 30 mg/dL      eGFR 50 ml/min/1 73sq m     Narrative:         National Kidney Disease Education Program recommendations are as follows:  GFR calculation is accurate only with a steady state creatinine  Chronic Kidney disease less than 60 ml/min/1 73 sq  meters  Kidney failure less than 15 ml/min/1 73 sq  meters      Urine Microscopic [[id number]]  (Abnormal) Collected:  09/19/18 1345    Lab Status:  Final result Specimen:  Urine from Urine, Clean Catch Updated:  09/19/18 1417     RBC, UA 2-4 (A) /hpf      WBC, UA 10-20 (A) /hpf      Epithelial Cells Occasional /hpf      Bacteria, UA Moderate (A) /hpf     Protime-INR [[id number]]  (Normal) Collected:  09/19/18 1344    Lab Status:  Final result Specimen:  Blood from Arm, Left Updated:  09/19/18 1412     Protime 10 0 seconds      INR 0 95    APTT [[id number]]  (Normal) Collected:  09/19/18 1344    Lab Status:  Final result Specimen:  Blood from Arm, Left Updated:  09/19/18 1412     PTT 28 seconds     UA w Reflex to Microscopic [[id number]]  (Abnormal) Collected:  09/19/18 1345    Lab Status:  Final result Specimen:  Urine from Urine, Clean Catch Updated:  09/19/18 1357     Color, UA Yellow     Clarity, UA Clear     Specific [place], UA 1 020     pH, UA 6 0     Leukocytes, UA Moderate (A)     Nitrite, UA Positive (A)     Protein, UA Negative mg/dl      Glucose, UA Negative mg/dl      Ketones, UA Negative mg/dl      Urobilinogen, UA 0 2 E U /dl      Bilirubin, UA Negative     Blood, UA Trace-Intact (A)    CBC and differential [[id number]] Collected:  09/19/18 1344    Lab Status:  Final result Specimen:  Blood from Arm, Left Updated:  09/19/18 1351     WBC 5 59 Thousand/uL      RBC 4 63 Million/uL      Hemoglobin 14 1 g/dL      Hematocrit 42 7 %      MCV 92 fL      MCH 30 5 pg      MCHC 33 0 g/dL      RDW 12 8 %      MPV 9 6 fL      Platelets 980 Thousands/uL nRBC 0 /100 WBCs      Neutrophils Relative 60 %      Immat GRANS % 0 %      Lymphocytes Relative 30 %      Monocytes Relative 7 %      Eosinophils Relative 2 %      Basophils Relative 1 %      Neutrophils Absolute 3 36 Thousands/µL      Immature Grans Absolute 0 02 Thousand/uL      Lymphocytes Absolute 1 65 Thousands/µL      Monocytes Absolute 0 41 Thousand/µL      Eosinophils Absolute 0 09 Thousand/µL      Basophils Absolute 0 06 Thousands/µL                  CT renal stone study abdomen pelvis without contrast   Final Result by Miguelito Monroe MD (09/19 1448)      No urinary calculi identified  Cholelithiasis  Workstation performed: [id number]                    Procedures  Procedures       Phone Contacts  ED Phone Contact    ED Course                               MDM  Number of Diagnoses or Management Options  Diagnosis management comments: Patient states she has a history of gallstones and kidney stones  I suspect this is a kidney stone  Will hydrate, medicate and CT scan    CritCare Time    Disposition  Final diagnoses:   UTI (urinary tract infection)   Flank pain     Time reflects when diagnosis was documented in both MDM as applicable and the Disposition within this note     Time User Action Codes Description Comment    9/19/2018  3:45 PM Joe FRANCES Add [N39 0] UTI (urinary tract infection)     9/19/2018  3:45 PM Viral Leslie Add [R10 9] Flank pain       ED Disposition     ED Disposition Condition Comment    Discharge  Nicky Lagos discharge to home/self care      Condition at discharge: Stable        Follow-up Information     Follow up With Specialties Details Why Contact Info    Eladio Figueroa MD Family Medicine Schedule an appointment as soon as possible for a visit in 1 day  [facility]  [phone]            Patient's Medications   Discharge Prescriptions    CEPHALEXIN (KEFLEX) 500 MG CAPSULE    Take 1 capsule (500 mg total) by mouth every 6 (six) hours for 10 days       Start Date: 9/19/2018 End Date: 9/29/2018       Order Dose: 500 mg       Quantity: 40 capsule    Refills: 0    PHENAZOPYRIDINE (PYRIDIUM) 200 MG TABLET    Take 1 tablet (200 mg total) by mouth 3 (three) times a day       Start Date: 9/19/2018 End Date: --       Order Dose: 200 mg       Quantity: 6 tablet    Refills: 0     No discharge procedures on file      ED Provider  Electronically Signed by           Stefany Perry MD  09/19/18 9599

## 2018-10-04 ENCOUNTER — OFFICE VISIT (OUTPATIENT)
Dept: FAMILY MEDICINE CLINIC | Facility: CLINIC | Age: 73
End: 2018-10-04
Payer: MEDICARE

## 2018-10-04 VITALS
HEART RATE: 89 BPM | BODY MASS INDEX: 28.34 KG/M2 | OXYGEN SATURATION: 98 % | DIASTOLIC BLOOD PRESSURE: 80 MMHG | TEMPERATURE: 98 F | WEIGHT: 150 LBS | SYSTOLIC BLOOD PRESSURE: 124 MMHG

## 2018-10-04 DIAGNOSIS — R39.9 SYMPTOMS OF URINARY TRACT INFECTION: Primary | ICD-10-CM

## 2018-10-04 DIAGNOSIS — IMO0001 RADICULAR PAIN OF RIGHT LOWER BACK: ICD-10-CM

## 2018-10-04 PROBLEM — J30.9 ALLERGIC RHINITIS: Status: ACTIVE | Noted: 2017-01-20

## 2018-10-04 PROBLEM — H81.10 BENIGN PAROXYSMAL POSITIONAL VERTIGO: Status: ACTIVE | Noted: 2017-01-20

## 2018-10-04 LAB
SL AMB  POCT GLUCOSE, UA: ABNORMAL
SL AMB LEUKOCYTE ESTERASE,UA: ABNORMAL
SL AMB POCT BILIRUBIN,UA: ABNORMAL
SL AMB POCT BLOOD,UA: ABNORMAL
SL AMB POCT CLARITY,UA: CLEAR
SL AMB POCT COLOR,UA: YELLOW
SL AMB POCT KETONES,UA: ABNORMAL
SL AMB POCT NITRITE,UA: ABNORMAL
SL AMB POCT PH,UA: 6.5
SL AMB POCT SPECIFIC GRAVITY,UA: 1.02
SL AMB POCT URINE PROTEIN: ABNORMAL
SL AMB POCT UROBILINOGEN: 0.2

## 2018-10-04 PROCEDURE — 99214 OFFICE O/P EST MOD 30 MIN: CPT | Performed by: FAMILY MEDICINE

## 2018-10-04 PROCEDURE — 81002 URINALYSIS NONAUTO W/O SCOPE: CPT | Performed by: FAMILY MEDICINE

## 2018-10-04 NOTE — PROGRESS NOTES
Assessment/Plan:  No problem-specific Assessment & Plan notes found for this encounter  The patient presents for follow-up of urinary tract infection diagnosed in the emergency room recently  Urinalysis appeared consistent with UTI though culture was not performed  She finished a course of Keflex and within 3 days her symptoms resolved  Urinalysis/dip is negative today  We do note that she had gallstones noted on her CT scan and her symptoms were right-sided but by history I do not believe this represents biliary colic  We are going to continue to observe  Secondly today she complains of low back pain which radiates to her right knee and lower extremity  She states that at night her heels burn  She does feel like her lower extremities are somewhat weak  She has longstanding incontinence of urine but not her bowels  On examination today she has a positive straight leg raise test on the right  She has right sacroiliac tenderness  We are going to send her for x-rays of her LS spine did have some concerns that she may have spinal stenosis  Will follow up with her when x-ray results are available in consideration of possible MRI  She agrees  She will call sooner if she has any significant progression of her symptoms  Diagnoses and all orders for this visit:    Symptoms of urinary tract infection  -     POCT urine dip    Radicular pain of right lower back  -     XR spine lumbar minimum 4 views non injury; Future          Subjective:   Chief Complaint   Patient presents with    Follow-up     pt here for a follow up to the ER from a uti  last colon 07/14  Patient ID: Krissy Clement is a 74 y o  female  I had lower R sided abdominal pain which was colicky  Then developed R flank pain  No N/V  No frequency, dysuria  Symptoms resolved after 3 days on cephalexin  C/o R knee burning and radiates to R anterior LE  Heels burn at night  LE weakness   Incontinence of urine but not bowels  + SLR R  HPI  The patient is 49-year-old female recently seen in the emergency room  Her urinalysis  Consistent with urinary tract infection  She was started on 7 states that her symptoms essentially resolved within 3 days  She has no dysuria frequency flank pain nausea vomiting fever X cetera  She had an additional complaint of her right knee burning which seems to radiate down to her right lower extremity anteriorly  She states that both of her heels burn at night  She feels that her lower extremities are weak though she has had no falls and no incontinence of bowel  She does have incontinence of urine which is relatively longstanding  The following portions of the patient's history were reviewed and updated as appropriate: allergies, current medications, past medical history, past social history, past surgical history and problem list     ROS    Limited review of systems as per HPI  Objective:    Physical Exam   Constitutional: She is oriented to person, place, and time  She appears well-developed  Cardiovascular: Normal rate and regular rhythm  Pulmonary/Chest: Effort normal and breath sounds normal    Abdominal: Soft  Bowel sounds are normal  She exhibits no mass  There is no tenderness  No CVA tenderness   Musculoskeletal: She exhibits no edema  Some tenderness of her lumbosacral region  No step-off or deformity  Neurological: She is alert and oriented to person, place, and time  She displays abnormal reflex  Coordination normal    She does have somewhat diminished DTRs her ankles bilaterally  Left seems worse than right  She has a straight leg raise test is positive on the left  Skin: No rash noted  Psychiatric:   Depressed affect  Nursing note and vitals reviewed

## 2018-10-17 ENCOUNTER — HOSPITAL ENCOUNTER (OUTPATIENT)
Dept: RADIOLOGY | Facility: HOSPITAL | Age: 73
Discharge: HOME | End: 2018-10-17
Payer: MEDICARE

## 2018-10-17 ENCOUNTER — TRANSCRIBE ORDERS (OUTPATIENT)
Dept: ADMINISTRATIVE | Facility: HOSPITAL | Age: 73
End: 2018-10-17

## 2018-10-17 DIAGNOSIS — IMO0001 RADICULAR PAIN OF RIGHT LOWER BACK: ICD-10-CM

## 2018-10-17 PROCEDURE — 72110 X-RAY EXAM L-2 SPINE 4/>VWS: CPT

## 2018-10-22 DIAGNOSIS — M54.16 LUMBAR RADICULOPATHY: Primary | ICD-10-CM

## 2018-11-05 ENCOUNTER — HOSPITAL ENCOUNTER (OUTPATIENT)
Dept: RADIOLOGY | Facility: HOSPITAL | Age: 73
Discharge: HOME | End: 2018-11-05
Payer: MEDICARE

## 2018-11-05 DIAGNOSIS — M54.16 LUMBAR RADICULOPATHY: ICD-10-CM

## 2018-11-05 PROCEDURE — 72148 MRI LUMBAR SPINE W/O DYE: CPT

## 2019-01-29 ENCOUNTER — OFFICE VISIT (OUTPATIENT)
Dept: FAMILY MEDICINE CLINIC | Facility: CLINIC | Age: 74
End: 2019-01-29
Payer: MEDICARE

## 2019-01-29 VITALS
TEMPERATURE: 100.4 F | HEART RATE: 115 BPM | DIASTOLIC BLOOD PRESSURE: 80 MMHG | BODY MASS INDEX: 28.72 KG/M2 | OXYGEN SATURATION: 97 % | SYSTOLIC BLOOD PRESSURE: 130 MMHG | WEIGHT: 152 LBS

## 2019-01-29 DIAGNOSIS — Z23 ENCOUNTER FOR IMMUNIZATION: ICD-10-CM

## 2019-01-29 DIAGNOSIS — J11.1 INFLUENZA: Primary | ICD-10-CM

## 2019-01-29 PROBLEM — R11.10 DRY HEAVES: Status: RESOLVED | Noted: 2017-01-08 | Resolved: 2019-01-29

## 2019-01-29 PROBLEM — R39.9 SYMPTOMS OF URINARY TRACT INFECTION: Status: RESOLVED | Noted: 2018-10-04 | Resolved: 2019-01-29

## 2019-01-29 PROBLEM — J01.90 ACUTE SINUSITIS: Status: RESOLVED | Noted: 2017-01-10 | Resolved: 2019-01-29

## 2019-01-29 PROCEDURE — 99214 OFFICE O/P EST MOD 30 MIN: CPT | Performed by: FAMILY MEDICINE

## 2019-01-29 RX ORDER — DEXTROMETHORPHAN HYDROBROMIDE AND PROMETHAZINE HYDROCHLORIDE 15; 6.25 MG/5ML; MG/5ML
5 SYRUP ORAL 4 TIMES DAILY PRN
Qty: 118 ML | Refills: 0 | Status: SHIPPED | OUTPATIENT
Start: 2019-01-29 | End: 2020-01-20 | Stop reason: ALTCHOICE

## 2019-01-29 RX ORDER — CHLORAL HYDRATE 500 MG
1000 CAPSULE ORAL 2 TIMES DAILY
COMMUNITY
End: 2021-02-05 | Stop reason: ALTCHOICE

## 2019-01-29 RX ORDER — OSELTAMIVIR PHOSPHATE 75 MG/1
75 CAPSULE ORAL EVERY 12 HOURS SCHEDULED
Qty: 10 CAPSULE | Refills: 0 | Status: SHIPPED | OUTPATIENT
Start: 2019-01-29 | End: 2019-02-03

## 2019-01-29 NOTE — ASSESSMENT & PLAN NOTE
Clinically the patient appears to have influenza  Her  is currently hospitalized with influenza a  We discussed possibly testing for influenza but given the circumstances we are going to treat her with Tamiflu 75 b i d  For 5 days  Will give her some promethazine DM for symptom relief  She is going to push fluids and rest   She is asked to call 48 hours with report of her condition  She will call sooner or seek more urgent medical attention as needed  She agrees

## 2019-01-29 NOTE — PROGRESS NOTES
Assessment/Plan:  Abnormal CT of the chest  Noted to be resolved on follow-up exam    Influenza  Clinically the patient appears to have influenza  Her  is currently hospitalized with influenza a  We discussed possibly testing for influenza but given the circumstances we are going to treat her with Tamiflu 75 b i d  For 5 days  Will give her some promethazine DM for symptom relief  She is going to push fluids and rest   She is asked to call 48 hours with report of her condition  She will call sooner or seek more urgent medical attention as needed  She agrees  Hypertension  Blood pressure acceptable today  Diagnoses and all orders for this visit:    Influenza  -     promethazine-dextromethorphan (PHENERGAN-DM) 6 25-15 mg/5 mL oral syrup; Take 5 mL by mouth 4 (four) times a day as needed for cough  -     oseltamivir (TAMIFLU) 75 mg capsule; Take 1 capsule (75 mg total) by mouth every 12 (twelve) hours for 5 days    Encounter for immunization  -     PREFERRED: influenza vaccine, [id number], high-dose, PF 0 5 mL, for patients 65 yr+ (FLUZONE HIGH-DOSE)    Other orders  -     CRANBERRY PO; Take by mouth 2 (two) times a day  -     Omega-3 Fatty Acids (FISH OIL) 1,000 mg; Take 1,000 mg by mouth 2 (two) times a day          Subjective:   Chief Complaint   Patient presents with    Headache     pressure on forehead and face  cough that is productive and clear with some chest tightness  pt declined the flu shot  ( last night started with HA and nausea  Myalgias and cough  Non productive  SOB but no wheeze  I couldn't sleep all night  Last night Tylenol  This am Advil did not helped  Urination OK  Drinking fluids but no appetite  Patient ID: Yolande Kim is a 74 y o  female  HPI  Patient is a 68-year-old female with a history of hypertension, mitral regurgitation, BPV and migraine headaches who states that she began last night approximately 9:00 p m  With a headache and nausea    She had some dry heaves  She also had a fairly intense headache and has developed a dry cough  She has had some mild shortness of breath but no wheezing  She states she could not sleep all night due to her multiple symptoms  She took some Tylenol and Advil which did not help  She has had no dysuria or frequency  She has had no change in her bowels  She has been drinking plenty of fluids and urinating but has no appetite for solid food  Her  is currently hospitalized with influenza A  She did not receive an influenza vaccine  The following portions of the patient's history were reviewed and updated as appropriate: allergies, current medications, past family history, past medical history, past social history, past surgical history and problem list     Review of Systems   Constitution: Positive for decreased appetite and malaise/fatigue  Negative for chills and fever  Respiratory: Positive for cough and shortness of breath  Negative for hemoptysis, sputum production and wheezing  Skin: Negative for rash  Musculoskeletal: Positive for myalgias  Negative for stiffness  Gastrointestinal: Positive for nausea  Negative for constipation, diarrhea and vomiting  Genitourinary: Positive for bladder incontinence  Negative for frequency and urgency  Neurological: Positive for headaches  Negative for dizziness  Objective:    Physical Exam   Constitutional: She is oriented to person, place, and time  She appears well-developed and well-nourished  Appears mildly acutely ill  She is in no distress and she is alert and oriented x3   HENT:   Mouth/Throat: No oropharyngeal exudate  Mucosa of oral cavity oropharynx is moist without erythema and without ulcer exudate or lesion   Eyes: Conjunctivae are normal  No scleral icterus  Neck: Neck supple  No JVD present  No thyromegaly present  Cardiovascular: Regular rhythm  Murmur heard    She is mildly tachycardic   Pulmonary/Chest: Effort normal  No respiratory distress  She has no wheezes  She has no rales  Abdominal: Soft  Bowel sounds are normal  She exhibits no mass  There is no tenderness  Musculoskeletal: She exhibits no edema  Lymphadenopathy:     She has no cervical adenopathy  Neurological: She is alert and oriented to person, place, and time  Skin: No rash noted  No erythema  Psychiatric: Her behavior is normal  Thought content normal    Somewhat dysphoric appearance   Nursing note and vitals reviewed

## 2020-01-20 ENCOUNTER — OFFICE VISIT (OUTPATIENT)
Dept: FAMILY MEDICINE CLINIC | Facility: CLINIC | Age: 75
End: 2020-01-20
Payer: MEDICARE

## 2020-01-20 VITALS
SYSTOLIC BLOOD PRESSURE: 118 MMHG | HEIGHT: 61 IN | HEART RATE: 102 BPM | WEIGHT: 147 LBS | TEMPERATURE: 99.6 F | BODY MASS INDEX: 27.75 KG/M2 | DIASTOLIC BLOOD PRESSURE: 70 MMHG | OXYGEN SATURATION: 98 %

## 2020-01-20 DIAGNOSIS — B34.9 VIRAL SYNDROME: Primary | ICD-10-CM

## 2020-01-20 PROBLEM — J11.1 INFLUENZA: Status: RESOLVED | Noted: 2019-01-29 | Resolved: 2020-01-20

## 2020-01-20 PROCEDURE — 99214 OFFICE O/P EST MOD 30 MIN: CPT | Performed by: FAMILY MEDICINE

## 2020-01-20 RX ORDER — OSELTAMIVIR PHOSPHATE 75 MG/1
75 CAPSULE ORAL EVERY 12 HOURS SCHEDULED
Qty: 10 CAPSULE | Refills: 0 | Status: SHIPPED | OUTPATIENT
Start: 2020-01-20 | End: 2020-01-25

## 2020-01-20 RX ORDER — CEPHALEXIN 500 MG/1
500 CAPSULE ORAL EVERY 8 HOURS SCHEDULED
Qty: 21 CAPSULE | Refills: 0 | Status: SHIPPED | OUTPATIENT
Start: 2020-01-20 | End: 2020-01-27

## 2020-01-20 NOTE — ASSESSMENT & PLAN NOTE
The patient has acute respiratory illness  This may represent influenza  She is on immunized  We are going to obtain an influenza and RSV nasopharyngeal PCR  She will push fluids and rest   She will use ibuprofen  We are going to treat her with Tamiflu 75 b i d  For 5 days  Will also give her cephalexin 500 t i d  For 1 week  She is asked to call for report of her results in 2 days  She is asked to call sooner seek more urgent medical attention should her condition deteriorate  She is also asked to return when she has been feeling well for a few days for influenza and pneumococcal vaccinations  Previously she has been somewhat resistant  She agrees

## 2020-01-20 NOTE — PROGRESS NOTES
BMI Counseling: Body mass index is 27 78 kg/m²  The BMI is above normal  Nutrition recommendations include decreasing portion sizes, encouraging healthy choices of fruits and vegetables and moderation in carbohydrate intake  Exercise recommendations include moderate physical activity 150 minutes/week and exercising 3-5 times per week  No pharmacotherapy was ordered  Assessment/Plan:  Viral syndrome  The patient has acute respiratory illness  This may represent influenza  She is on immunized  We are going to obtain an influenza and RSV nasopharyngeal PCR  She will push fluids and rest   She will use ibuprofen  We are going to treat her with Tamiflu 75 b i d  For 5 days  Will also give her cephalexin 500 t i d  For 1 week  She is asked to call for report of her results in 2 days  She is asked to call sooner seek more urgent medical attention should her condition deteriorate  She is also asked to return when she has been feeling well for a few days for influenza and pneumococcal vaccinations  Previously she has been somewhat resistant  She agrees  Diagnoses and all orders for this visit:    Viral syndrome  -     oseltamivir (TAMIFLU) 75 mg capsule; Take 1 capsule (75 mg total) by mouth every 12 (twelve) hours for 5 days  -     cephalexin (KEFLEX) 500 mg capsule; Take 1 capsule (500 mg total) by mouth every 8 (eight) hours for 7 days          Subjective:   Chief Complaint   Patient presents with    Sinus Problem     runny nose and chest congestion        Patient ID: Iver Shone is a 66 y o  female  HPI  The patient is a 20-year-old female who states that beginning late and a Saturday she developed a severe headache nasal congestion and cough  She now feels she has chest congestion  She has no exertional chest pain  She has no significant shortness of breath  No PND orthopnea edema  She has had myalgias of her back and shoulders  She has had no nausea vomiting diarrhea  No rash    She has felt fevers but no documented fever  No wheezing  's currently hospitalized with respiratory illness  The following portions of the patient's history were reviewed and updated as appropriate: allergies, current medications, past medical history, past social history, past surgical history and problem list     Review of Systems   Constitution: Positive for chills and malaise/fatigue  Negative for decreased appetite, night sweats, weight gain and weight loss  HENT: Positive for congestion  Negative for sore throat  Cardiovascular: Negative for chest pain, dyspnea on exertion, irregular heartbeat, leg swelling, orthopnea and paroxysmal nocturnal dyspnea  Respiratory: Positive for cough  Negative for shortness of breath, sputum production and wheezing  Endocrine: Negative for polydipsia, polyphagia and polyuria  Hematologic/Lymphatic: Negative for adenopathy and bleeding problem  Does not bruise/bleed easily  Objective:    Physical Exam   Constitutional: She is oriented to person, place, and time  She appears well-developed and well-nourished  No distress  HENT:   Mouth/Throat: No oropharyngeal exudate  Neck: Neck supple  No JVD present  No thyromegaly present  Cardiovascular: Normal rate, regular rhythm and normal heart sounds  Pulmonary/Chest: Effort normal and breath sounds normal  No respiratory distress  She has no wheezes  She has no rales  Normal respiratory rate  Occasional nonproductive sounding cough   Lymphadenopathy:     She has no cervical adenopathy  Neurological: She is alert and oriented to person, place, and time  Hard of hearing   Skin: No rash noted  No erythema  Psychiatric: She has a normal mood and affect  Thought content normal    Nursing note and vitals reviewed

## 2020-01-21 LAB
FLUAV RNA SPEC QL NAA+PROBE: NOT DETECTED
FLUBV RNA SPEC QL NAA+PROBE: NOT DETECTED
RSV RNA SPEC QL NAA+PROBE: NOT DETECTED

## 2020-01-27 ENCOUNTER — CLINICAL SUPPORT (OUTPATIENT)
Dept: FAMILY MEDICINE CLINIC | Facility: CLINIC | Age: 75
End: 2020-01-27
Payer: MEDICARE

## 2020-01-27 DIAGNOSIS — Z23 ENCOUNTER FOR IMMUNIZATION: Primary | ICD-10-CM

## 2020-01-27 PROCEDURE — 90662 IIV NO PRSV INCREASED AG IM: CPT

## 2020-01-27 PROCEDURE — G0009 ADMIN PNEUMOCOCCAL VACCINE: HCPCS

## 2020-01-27 PROCEDURE — 90670 PCV13 VACCINE IM: CPT

## 2020-01-27 PROCEDURE — G0008 ADMIN INFLUENZA VIRUS VAC: HCPCS

## 2020-02-17 ENCOUNTER — OFFICE VISIT (OUTPATIENT)
Dept: FAMILY MEDICINE CLINIC | Facility: CLINIC | Age: 75
End: 2020-02-17
Payer: MEDICARE

## 2020-02-17 VITALS
WEIGHT: 149 LBS | SYSTOLIC BLOOD PRESSURE: 115 MMHG | DIASTOLIC BLOOD PRESSURE: 78 MMHG | BODY MASS INDEX: 28.13 KG/M2 | HEART RATE: 90 BPM | OXYGEN SATURATION: 97 % | HEIGHT: 61 IN | TEMPERATURE: 98.6 F

## 2020-02-17 DIAGNOSIS — I10 HYPERTENSION, UNSPECIFIED TYPE: ICD-10-CM

## 2020-02-17 DIAGNOSIS — Z00.00 MEDICARE ANNUAL WELLNESS VISIT, INITIAL: ICD-10-CM

## 2020-02-17 DIAGNOSIS — R29.6 RECURRENT FALLS: ICD-10-CM

## 2020-02-17 DIAGNOSIS — Z78.0 ASYMPTOMATIC POSTMENOPAUSAL STATE: ICD-10-CM

## 2020-02-17 DIAGNOSIS — Z12.31 ENCOUNTER FOR SCREENING MAMMOGRAM FOR BREAST CANCER: ICD-10-CM

## 2020-02-17 DIAGNOSIS — Z13.0 SCREENING FOR IRON DEFICIENCY ANEMIA: ICD-10-CM

## 2020-02-17 DIAGNOSIS — R26.9 GAIT DISTURBANCE: ICD-10-CM

## 2020-02-17 DIAGNOSIS — Z13.220 SCREENING FOR LIPID DISORDERS: Primary | ICD-10-CM

## 2020-02-17 PROCEDURE — 3078F DIAST BP <80 MM HG: CPT | Performed by: FAMILY MEDICINE

## 2020-02-17 PROCEDURE — 3074F SYST BP LT 130 MM HG: CPT | Performed by: FAMILY MEDICINE

## 2020-02-17 PROCEDURE — 1036F TOBACCO NON-USER: CPT | Performed by: FAMILY MEDICINE

## 2020-02-17 PROCEDURE — 36415 COLL VENOUS BLD VENIPUNCTURE: CPT | Performed by: FAMILY MEDICINE

## 2020-02-17 PROCEDURE — 4040F PNEUMOC VAC/ADMIN/RCVD: CPT | Performed by: FAMILY MEDICINE

## 2020-02-17 PROCEDURE — 1160F RVW MEDS BY RX/DR IN RCRD: CPT | Performed by: FAMILY MEDICINE

## 2020-02-17 PROCEDURE — 1123F ACP DISCUSS/DSCN MKR DOCD: CPT | Performed by: FAMILY MEDICINE

## 2020-02-17 PROCEDURE — 1170F FXNL STATUS ASSESSED: CPT | Performed by: FAMILY MEDICINE

## 2020-02-17 PROCEDURE — G0438 PPPS, INITIAL VISIT: HCPCS | Performed by: FAMILY MEDICINE

## 2020-02-17 PROCEDURE — 1125F AMNT PAIN NOTED PAIN PRSNT: CPT | Performed by: FAMILY MEDICINE

## 2020-02-17 PROCEDURE — 3008F BODY MASS INDEX DOCD: CPT | Performed by: FAMILY MEDICINE

## 2020-02-17 NOTE — ASSESSMENT & PLAN NOTE
The patient presents today for Medicare initial annual wellness visit  All components of the examination were addressed in completed  We covered 5 wishes in detail and she appears motivated to complete this  Her immunizations are current  We provided anticipatory guidance  She has had no recent lipid profile nor blood sugar and we are going to obtain blood work today  She has been fasting for several hours  Additionally she has had 3 falls in the past year  It is unclear as well to whether this is due to tripping or balance disorder  She does have some mild weakness of her lower extremities which is not really focal   DTRs are normal   Romberg is negative  We are going to send her to physical therapy for strength training as well as balance therapy  She is in agreement this plan  Additionally she has not had mammography nor DEXA scan recently and we are going to have her have these performed as well  She is in agreement with this plan

## 2020-02-17 NOTE — PATIENT INSTRUCTIONS
Medicare Preventive Visit Patient Instructions  Thank you for completing your Welcome to Medicare Visit or Medicare Annual Wellness Visit today  Your next wellness visit will be due in one year (2/17/2021)  The screening/preventive services that you may require over the next 5-10 years are detailed below  Some tests may not apply to you based off risk factors and/or age  Screening tests ordered at today's visit but not completed yet may show as past due  Also, please note that scanned in results may not display below  Preventive Screenings:  Service Recommendations Previous Testing/Comments   Colorectal Cancer Screening  * Colonoscopy    * Fecal Occult Blood Test (FOBT)/Fecal Immunochemical Test (FIT)  * Fecal DNA/Cologuard Test  * Flexible Sigmoidoscopy Age: 54-65 years old   Colonoscopy: every 10 years (may be performed more frequently if at higher risk)  OR  FOBT/FIT: every 1 year  OR  Cologuard: every 3 years  OR  Sigmoidoscopy: every 5 years  Screening may be recommended earlier than age 48 if at higher risk for colorectal cancer  Also, an individualized decision between you and your healthcare provider will decide whether screening between the ages of 74-80 would be appropriate  Colonoscopy: 07/18/2014  FOBT/FIT: Not on file  Cologuard: Not on file  Sigmoidoscopy: Not on file    Screening Current     Breast Cancer Screening Age: 36 years old  Frequency: every 1-2 years  Not required if history of left and right mastectomy Mammogram: Not on file       Cervical Cancer Screening Between the ages of 21-29, pap smear recommended once every 3 years  Between the ages of 33-67, can perform pap smear with HPV co-testing every 5 years     Recommendations may differ for women with a history of total hysterectomy, cervical cancer, or abnormal pap smears in past  Pap Smear: Not on file    Screening Not Indicated   Hepatitis C Screening Once for adults born between 1945 and 1965  More frequently in patients at high risk for Hepatitis C Hep C Antibody: Not on file       Diabetes Screening 1-2 times per year if you're at risk for diabetes or have pre-diabetes Fasting glucose: No results in last 5 years   A1C: No results in last 5 years       Cholesterol Screening Once every 5 years if you don't have a lipid disorder  May order more often based on risk factors  Lipid panel: Not on file         Other Preventive Screenings Covered by Medicare:  1  Abdominal Aortic Aneurysm (AAA) Screening: covered once if your at risk  You're considered to be at risk if you have a family history of AAA  2  Lung Cancer Screening: covers low dose CT scan once per year if you meet all of the following conditions: (1) Age 50-69; (2) No signs or symptoms of lung cancer; (3) Current smoker or have quit smoking within the last 15 years; (4) You have a tobacco smoking history of at least 30 pack years (packs per day multiplied by number of years you smoked); (5) You get a written order from a healthcare provider  3  Glaucoma Screening: covered annually if you're considered high risk: (1) You have diabetes OR (2) Family history of glaucoma OR (3)  aged 48 and older OR (3)  American aged 72 and older  3  Osteoporosis Screening: covered every 2 years if you meet one of the following conditions: (1) You're estrogen deficient and at risk for osteoporosis based off medical history and other findings; (2) Have a vertebral abnormality; (3) On glucocorticoid therapy for more than 3 months; (4) Have primary hyperparathyroidism; (5) On osteoporosis medications and need to assess response to drug therapy  · Last bone density test (DXA Scan): Not on file  5  HIV Screening: covered annually if you're between the age of 12-76  Also covered annually if you are younger than 13 and older than 72 with risk factors for HIV infection  For pregnant patients, it is covered up to 3 times per pregnancy      Immunizations:  Immunization Recommendations Influenza Vaccine Annual influenza vaccination during flu season is recommended for all persons aged >= 6 months who do not have contraindications   Pneumococcal Vaccine (Prevnar and Pneumovax)  * Prevnar = PCV13  * Pneumovax = PPSV23   Adults 25-60 years old: 1-3 doses may be recommended based on certain risk factors  Adults 72 years old: Prevnar (PCV13) vaccine recommended followed by Pneumovax (PPSV23) vaccine  If already received PPSV23 since turning 65, then PCV13 recommended at least one year after PPSV23 dose  Hepatitis B Vaccine 3 dose series if at intermediate or high risk (ex: diabetes, end stage renal disease, liver disease)   Tetanus (Td) Vaccine - COST NOT COVERED BY MEDICARE PART B Following completion of primary series, a booster dose should be given every 10 years to maintain immunity against tetanus  Td may also be given as tetanus wound prophylaxis  Tdap Vaccine - COST NOT COVERED BY MEDICARE PART B Recommended at least once for all adults  For pregnant patients, recommended with each pregnancy  Shingles Vaccine (Shingrix) - COST NOT COVERED BY MEDICARE PART B  2 shot series recommended in those aged 48 and above     Health Maintenance Due:      Topic Date Due    CRC Screening: Colonoscopy  07/18/2024     Immunizations Due:      Topic Date Due    DTaP,Tdap,and Td Vaccines (1 - Tdap) 11/01/1956     Advance Directives   What are advance directives? Advance directives are legal documents that state your wishes and plans for medical care  These plans are made ahead of time in case you lose your ability to make decisions for yourself  Advance directives can apply to any medical decision, such as the treatments you want, and if you want to donate organs  What are the types of advance directives? There are many types of advance directives, and each state has rules about how to use them  You may choose a combination of any of the following:  · Living will:   This is a written record of the treatment you want  You can also choose which treatments you do not want, which to limit, and which to stop at a certain time  This includes surgery, medicine, IV fluid, and tube feedings  · Durable power of  for healthcare [place] [facility] [facility]): This is a written record that states who you want to make healthcare choices for you when you are unable to make them for yourself  This person, called a proxy, is usually a family member or a friend  You may choose more than 1 proxy  · Do not resuscitate (DNR) order:  A DNR order is used in case your heart stops beating or you stop breathing  It is a request not to have certain forms of treatment, such as CPR  A DNR order may be included in other types of advance directives  · Medical directive: This covers the care that you want if you are in a coma, near death, or unable to make decisions for yourself  You can list the treatments you want for each condition  Treatment may include pain medicine, surgery, blood transfusions, dialysis, IV or tube feedings, and a ventilator (breathing machine)  · Values history: This document has questions about your views, beliefs, and how you feel and think about life  This information can help others choose the care that you would choose  Why are advance directives important? An advance directive helps you control your care  Although spoken wishes may be used, it is better to have your wishes written down  Spoken wishes can be misunderstood, or not followed  Treatments may be given even if you do not want them  An advance directive may make it easier for your family to make difficult choices about your care  Fall Prevention    Fall prevention  includes ways to make your home and other areas safer  It also includes ways you can move more carefully to prevent a fall  Health conditions that cause changes in your blood pressure, vision, or muscle strength and coordination may increase your risk for falls   Medicines may also increase your risk for falls if they make you dizzy, weak, or sleepy  Fall prevention tips:   · Stand or sit up slowly  · Use assistive devices as directed  · Wear shoes that fit well and have soles that   · Wear a personal alarm  · Stay active  · Manage your medical conditions  Home Safety Tips:  · Add items to prevent falls in the bathroom  · Keep paths clear  · Install bright lights in your home  · Keep items you use often on shelves within reach  · Paint or place reflective tape on the edges of your stairs  Urinary Incontinence   Urinary incontinence (UI)  is when you lose control of your bladder  UI develops because your bladder cannot store or empty urine properly  The 3 most common types of UI are stress incontinence, urge incontinence, or both  Medicines:   · May be given to help strengthen your bladder control  Report any side effects of medication to your healthcare provider  Do pelvic muscle exercises often:  Your pelvic muscles help you stop urinating  Squeeze these muscles tight for 5 seconds, then relax for 5 seconds  Gradually work up to squeezing for 10 seconds  Do 3 sets of 15 repetitions a day, or as directed  This will help strengthen your pelvic muscles and improve bladder control  Train your bladder:  Go to the bathroom at set times, such as every 2 hours, even if you do not feel the urge to go  You can also try to hold your urine when you feel the urge to go  For example, hold your urine for 5 minutes when you feel the urge to go  As that becomes easier, hold your urine for 10 minutes  Self-care:   · Keep a UI record  Write down how often you leak urine and how much you leak  Make a note of what you were doing when you leaked urine  · Drink liquids as directed  You may need to limit the amount of liquid you drink to help control your urine leakage  Do not drink any liquid right before you go to bed   Limit or do not have drinks that contain caffeine or alcohol  · Prevent constipation  Eat a variety of high-fiber foods  Good examples are high-fiber cereals, beans, vegetables, and whole-grain breads  Walking is the best way to trigger your intestines to have a bowel movement  · Exercise regularly and maintain a healthy weight  Weight loss and exercise will decrease pressure on your bladder and help you control your leakage  · Use a catheter as directed  to help empty your bladder  A catheter is a tiny, plastic tube that is put into your bladder to drain your urine  · Go to behavior therapy as directed  Behavior therapy may be used to help you learn to control your urge to urinate  Weight Management   Why it is important to manage your weight:  Being overweight increases your risk of health conditions such as heart disease, high blood pressure, type 2 diabetes, and certain types of cancer  It can also increase your risk for osteoarthritis, sleep apnea, and other respiratory problems  Aim for a slow, steady weight loss  Even a small amount of weight loss can lower your risk of health problems  How to lose weight safely:  A safe and healthy way to lose weight is to eat fewer calories and get regular exercise  You can lose up about 1 pound a week by decreasing the number of calories you eat by 500 calories each day  Healthy meal plan for weight management:  A healthy meal plan includes a variety of foods, contains fewer calories, and helps you stay healthy  A healthy meal plan includes the following:  · Eat whole-grain foods more often  A healthy meal plan should contain fiber  Fiber is the part of grains, fruits, and vegetables that is not broken down by your body  Whole-grain foods are healthy and provide extra fiber in your diet  Some examples of whole-grain foods are whole-wheat breads and pastas, oatmeal, brown rice, and bulgur  · Eat a variety of vegetables every day    Include dark, leafy greens such as spinach, kale, eh greens, and mustard greens  Eat yellow and orange vegetables such as carrots, sweet potatoes, and winter squash  · Eat a variety of fruits every day  Choose fresh or canned fruit (canned in its own juice or light syrup) instead of juice  Fruit juice has very little or no fiber  · Eat low-fat dairy foods  Drink fat-free (skim) milk or 1% milk  Eat fat-free yogurt and low-fat cottage cheese  Try low-fat cheeses such as mozzarella and other reduced-fat cheeses  · Choose meat and other protein foods that are low in fat  Choose beans or other legumes such as split peas or lentils  Choose fish, skinless poultry (chicken or turkey), or lean cuts of red meat (beef or pork)  Before you cook meat or poultry, cut off any visible fat  · Use less fat and oil  Try baking foods instead of frying them  Add less fat, such as margarine, sour cream, regular salad dressing and mayonnaise to foods  Eat fewer high-fat foods  Some examples of high-fat foods include french fries, doughnuts, ice cream, and cakes  · Eat fewer sweets  Limit foods and drinks that are high in sugar  This includes candy, cookies, regular soda, and sweetened drinks  Exercise:  Exercise at least 30 minutes per day on most days of the week  Some examples of exercise include walking, biking, dancing, and swimming  You can also fit in more physical activity by taking the stairs instead of the elevator or parking farther away from stores  Ask your healthcare provider about the best exercise plan for you  © Copyright [employer] 2018 Information is for End User's use only and may not be sold, redistributed or otherwise used for commercial purposes  All illustrations and images included in CareNotes® are the copyrighted property of [employer] , [employer]  or [facility] Preventive Visit Patient Instructions  Thank you for completing your Welcome to Medicare Visit or Medicare Annual Wellness Visit today   Your next wellness visit will be due in one year (2/17/2021)  The screening/preventive services that you may require over the next 5-10 years are detailed below  Some tests may not apply to you based off risk factors and/or age  Screening tests ordered at today's visit but not completed yet may show as past due  Also, please note that scanned in results may not display below  Preventive Screenings:  Service Recommendations Previous Testing/Comments   Colorectal Cancer Screening  * Colonoscopy    * Fecal Occult Blood Test (FOBT)/Fecal Immunochemical Test (FIT)  * Fecal DNA/Cologuard Test  * Flexible Sigmoidoscopy Age: 54-65 years old   Colonoscopy: every 10 years (may be performed more frequently if at higher risk)  OR  FOBT/FIT: every 1 year  OR  Cologuard: every 3 years  OR  Sigmoidoscopy: every 5 years  Screening may be recommended earlier than age 48 if at higher risk for colorectal cancer  Also, an individualized decision between you and your healthcare provider will decide whether screening between the ages of 74-80 would be appropriate  Colonoscopy: 07/18/2014  FOBT/FIT: Not on file  Cologuard: Not on file  Sigmoidoscopy: Not on file    Screening Current     Breast Cancer Screening Age: 36 years old  Frequency: every 1-2 years  Not required if history of left and right mastectomy Mammogram: Not on file       Cervical Cancer Screening Between the ages of 21-29, pap smear recommended once every 3 years  Between the ages of 33-67, can perform pap smear with HPV co-testing every 5 years     Recommendations may differ for women with a history of total hysterectomy, cervical cancer, or abnormal pap smears in past  Pap Smear: Not on file    Screening Not Indicated   Hepatitis C Screening Once for adults born between 1945 and 1965  More frequently in patients at high risk for Hepatitis C Hep C Antibody: Not on file       Diabetes Screening 1-2 times per year if you're at risk for diabetes or have pre-diabetes Fasting glucose: No results in last 5 years   A1C: No results in last 5 years       Cholesterol Screening Once every 5 years if you don't have a lipid disorder  May order more often based on risk factors  Lipid panel: Not on file         Other Preventive Screenings Covered by Medicare:  6  Abdominal Aortic Aneurysm (AAA) Screening: covered once if your at risk  You're considered to be at risk if you have a family history of AAA  7  Lung Cancer Screening: covers low dose CT scan once per year if you meet all of the following conditions: (1) Age 50-69; (2) No signs or symptoms of lung cancer; (3) Current smoker or have quit smoking within the last 15 years; (4) You have a tobacco smoking history of at least 30 pack years (packs per day multiplied by number of years you smoked); (5) You get a written order from a healthcare provider  8  Glaucoma Screening: covered annually if you're considered high risk: (1) You have diabetes OR (2) Family history of glaucoma OR (3)  aged 48 and older OR (3)  American aged 72 and older  5  Osteoporosis Screening: covered every 2 years if you meet one of the following conditions: (1) You're estrogen deficient and at risk for osteoporosis based off medical history and other findings; (2) Have a vertebral abnormality; (3) On glucocorticoid therapy for more than 3 months; (4) Have primary hyperparathyroidism; (5) On osteoporosis medications and need to assess response to drug therapy  · Last bone density test (DXA Scan): Not on file  10  HIV Screening: covered annually if you're between the age of 12-76  Also covered annually if you are younger than 13 and older than 72 with risk factors for HIV infection  For pregnant patients, it is covered up to 3 times per pregnancy      Immunizations:  Immunization Recommendations   Influenza Vaccine Annual influenza vaccination during flu season is recommended for all persons aged >= 6 months who do not have contraindications   Pneumococcal Vaccine (Prevnar and Pneumovax)  * Prevnar = PCV13  * Pneumovax = PPSV23   Adults 25-60 years old: 1-3 doses may be recommended based on certain risk factors  Adults 72 years old: Prevnar (PCV13) vaccine recommended followed by Pneumovax (PPSV23) vaccine  If already received PPSV23 since turning 65, then PCV13 recommended at least one year after PPSV23 dose  Hepatitis B Vaccine 3 dose series if at intermediate or high risk (ex: diabetes, end stage renal disease, liver disease)   Tetanus (Td) Vaccine - COST NOT COVERED BY MEDICARE PART B Following completion of primary series, a booster dose should be given every 10 years to maintain immunity against tetanus  Td may also be given as tetanus wound prophylaxis  Tdap Vaccine - COST NOT COVERED BY MEDICARE PART B Recommended at least once for all adults  For pregnant patients, recommended with each pregnancy  Shingles Vaccine (Shingrix) - COST NOT COVERED BY MEDICARE PART B  2 shot series recommended in those aged 48 and above     Health Maintenance Due:      Topic Date Due    CRC Screening: Colonoscopy  07/18/2024     Immunizations Due:      Topic Date Due    DTaP,Tdap,and Td Vaccines (1 - Tdap) 11/01/1956     Advance Directives   What are advance directives? Advance directives are legal documents that state your wishes and plans for medical care  These plans are made ahead of time in case you lose your ability to make decisions for yourself  Advance directives can apply to any medical decision, such as the treatments you want, and if you want to donate organs  What are the types of advance directives? There are many types of advance directives, and each state has rules about how to use them  You may choose a combination of any of the following:  · Living will: This is a written record of the treatment you want  You can also choose which treatments you do not want, which to limit, and which to stop at a certain time  This includes surgery, medicine, IV fluid, and tube feedings     · Durable power of  for healthcare [place] [facility]): This is a written record that states who you want to make healthcare choices for you when you are unable to make them for yourself  This person, called a proxy, is usually a family member or a friend  You may choose more than 1 proxy  · Do not resuscitate (DNR) order:  A DNR order is used in case your heart stops beating or you stop breathing  It is a request not to have certain forms of treatment, such as CPR  A DNR order may be included in other types of advance directives  · Medical directive: This covers the care that you want if you are in a coma, near death, or unable to make decisions for yourself  You can list the treatments you want for each condition  Treatment may include pain medicine, surgery, blood transfusions, dialysis, IV or tube feedings, and a ventilator (breathing machine)  · Values history: This document has questions about your views, beliefs, and how you feel and think about life  This information can help others choose the care that you would choose  Why are advance directives important? An advance directive helps you control your care  Although spoken wishes may be used, it is better to have your wishes written down  Spoken wishes can be misunderstood, or not followed  Treatments may be given even if you do not want them  An advance directive may make it easier for your family to make difficult choices about your care  Fall Prevention    Fall prevention  includes ways to make your home and other areas safer  It also includes ways you can move more carefully to prevent a fall  Health conditions that cause changes in your blood pressure, vision, or muscle strength and coordination may increase your risk for falls  Medicines may also increase your risk for falls if they make you dizzy, weak, or sleepy  Fall prevention tips:   · Stand or sit up slowly  · Use assistive devices as directed      · Wear shoes that fit well and have soles that      · Wear a personal alarm  · Stay active  · Manage your medical conditions  Home Safety Tips:  · Add items to prevent falls in the bathroom  · Keep paths clear  · Install bright lights in your home  · Keep items you use often on shelves within reach  · Paint or place reflective tape on the edges of your stairs  Urinary Incontinence   Urinary incontinence (UI)  is when you lose control of your bladder  UI develops because your bladder cannot store or empty urine properly  The 3 most common types of UI are stress incontinence, urge incontinence, or both  Medicines:   · May be given to help strengthen your bladder control  Report any side effects of medication to your healthcare provider  Do pelvic muscle exercises often:  Your pelvic muscles help you stop urinating  Squeeze these muscles tight for 5 seconds, then relax for 5 seconds  Gradually work up to squeezing for 10 seconds  Do 3 sets of 15 repetitions a day, or as directed  This will help strengthen your pelvic muscles and improve bladder control  Train your bladder:  Go to the bathroom at set times, such as every 2 hours, even if you do not feel the urge to go  You can also try to hold your urine when you feel the urge to go  For example, hold your urine for 5 minutes when you feel the urge to go  As that becomes easier, hold your urine for 10 minutes  Self-care:   · Keep a UI record  Write down how often you leak urine and how much you leak  Make a note of what you were doing when you leaked urine  · Drink liquids as directed  You may need to limit the amount of liquid you drink to help control your urine leakage  Do not drink any liquid right before you go to bed  Limit or do not have drinks that contain caffeine or alcohol  · Prevent constipation  Eat a variety of high-fiber foods  Good examples are high-fiber cereals, beans, vegetables, and whole-grain breads   Walking is the best way to trigger your intestines to have a bowel movement  · Exercise regularly and maintain a healthy weight  Weight loss and exercise will decrease pressure on your bladder and help you control your leakage  · Use a catheter as directed  to help empty your bladder  A catheter is a tiny, plastic tube that is put into your bladder to drain your urine  · Go to behavior therapy as directed  Behavior therapy may be used to help you learn to control your urge to urinate  Weight Management   Why it is important to manage your weight:  Being overweight increases your risk of health conditions such as heart disease, high blood pressure, type 2 diabetes, and certain types of cancer  It can also increase your risk for osteoarthritis, sleep apnea, and other respiratory problems  Aim for a slow, steady weight loss  Even a small amount of weight loss can lower your risk of health problems  How to lose weight safely:  A safe and healthy way to lose weight is to eat fewer calories and get regular exercise  You can lose up about 1 pound a week by decreasing the number of calories you eat by 500 calories each day  Healthy meal plan for weight management:  A healthy meal plan includes a variety of foods, contains fewer calories, and helps you stay healthy  A healthy meal plan includes the following:  · Eat whole-grain foods more often  A healthy meal plan should contain fiber  Fiber is the part of grains, fruits, and vegetables that is not broken down by your body  Whole-grain foods are healthy and provide extra fiber in your diet  Some examples of whole-grain foods are whole-wheat breads and pastas, oatmeal, brown rice, and bulgur  · Eat a variety of vegetables every day  Include dark, leafy greens such as spinach, kale, eh greens, and mustard greens  Eat yellow and orange vegetables such as carrots, sweet potatoes, and winter squash  · Eat a variety of fruits every day    Choose fresh or canned fruit (canned in its own juice or light syrup) instead of juice  Fruit juice has very little or no fiber  · Eat low-fat dairy foods  Drink fat-free (skim) milk or 1% milk  Eat fat-free yogurt and low-fat cottage cheese  Try low-fat cheeses such as mozzarella and other reduced-fat cheeses  · Choose meat and other protein foods that are low in fat  Choose beans or other legumes such as split peas or lentils  Choose fish, skinless poultry (chicken or turkey), or lean cuts of red meat (beef or pork)  Before you cook meat or poultry, cut off any visible fat  · Use less fat and oil  Try baking foods instead of frying them  Add less fat, such as margarine, sour cream, regular salad dressing and mayonnaise to foods  Eat fewer high-fat foods  Some examples of high-fat foods include french fries, doughnuts, ice cream, and cakes  · Eat fewer sweets  Limit foods and drinks that are high in sugar  This includes candy, cookies, regular soda, and sweetened drinks  Exercise:  Exercise at least 30 minutes per day on most days of the week  Some examples of exercise include walking, biking, dancing, and swimming  You can also fit in more physical activity by taking the stairs instead of the elevator or parking farther away from stores  Ask your healthcare provider about the best exercise plan for you  © Copyright [employer] 2018 Information is for End User's use only and may not be sold, redistributed or otherwise used for commercial purposes  All illustrations and images included in CareNotes® are the copyrighted property of [employer]  or [facility] Prevention   AMBULATORY CARE:   Fall prevention  includes ways to make your home and other areas safer  It also includes ways you can move more carefully to prevent a fall  Health conditions that cause changes in your blood pressure, vision, or muscle strength and coordination may increase your risk for falls   Medicines may also increase your risk for falls if they make you dizzy, weak, or sleepy  Call 911 or have someone else call if:   · You have fallen and are unconscious  · You have fallen and cannot move part of your body  Contact your healthcare provider if:   · You have fallen and have pain or a headache  · You have questions or concerns about your condition or care  Fall prevention tips:   · Stand or sit up slowly  This may help you keep your balance and prevent falls  · Use assistive devices as directed  Your healthcare provider may suggest that you use a cane or walker to help you keep your balance  You may need to have grab bars put in your bathroom near the toilet or in the shower  · Wear shoes that fit well and have soles that   Wear shoes both inside and outside  Use slippers with good   Do not wear shoes with high heels  · Wear a personal alarm  This is a device that allows you to call 911 if you fall and need help  Ask your healthcare provider for more information  · Stay active  Exercise can help strengthen your muscles and improve your balance  Your healthcare provider may recommend water aerobics or walking  He or she may also recommend physical therapy to improve your coordination  Never start an exercise program without talking to your healthcare provider first      · Manage your medical conditions  Keep all appointments with your healthcare providers  Visit your eye doctor as directed  Home safety tips:   · Add items to prevent falls in the bathroom  Put nonslip strips on your bath or shower floor to prevent you from slipping  Use a bath mat if you do not have carpet in the bathroom  This will prevent you from falling when you step out of the bath or shower  Use a shower seat so you do not need to stand while you shower  Sit on the toilet or a chair in your bathroom to dry yourself and put on clothing  This will prevent you from losing your balance from drying or dressing yourself while you are standing  · Keep paths clear    Remove books, shoes, and other objects from walkways and stairs  Place cords for telephones and lamps out of the way so that you do not need to walk over them  Tape them down if you cannot move them  Remove small rugs  If you cannot remove a rug, secure it with double-sided tape  This will prevent you from tripping  · Install bright lights in your home  Use night lights to help light paths to the bathroom or kitchen  Always turn on the light before you start walking  · Keep items you use often on shelves within reach  Do not use a step stool to help you reach an item  · Paint or place reflective tape on the edges of your stairs  This will help you see the stairs better  Follow up with your healthcare provider as directed:  Write down your questions so you remember to ask them during your visits  © 2017 [employer] Information is for End User's use only and may not be sold, redistributed or otherwise used for commercial purposes  All illustrations and images included in CareNotes® are the copyrighted property of [employer]  or [employer]  The above information is an  only  It is not intended as medical advice for individual conditions or treatments  Talk to your doctor, nurse or pharmacist before following any medical regimen to see if it is safe and effective for you

## 2020-02-18 LAB
ALBUMIN SERPL-MCNC: 4.4 G/DL (ref 3.6–5.1)
ALBUMIN/GLOB SERPL: 1.6 (CALC) (ref 1–2.5)
ALP SERPL-CCNC: 73 U/L (ref 37–153)
ALT SERPL-CCNC: 17 U/L (ref 6–29)
AST SERPL-CCNC: 18 U/L (ref 10–35)
BILIRUB SERPL-MCNC: 0.4 MG/DL (ref 0.2–1.2)
BUN SERPL-MCNC: 17 MG/DL (ref 7–25)
BUN/CREAT SERPL: 15 (CALC) (ref 6–22)
CALCIUM SERPL-MCNC: 9.6 MG/DL (ref 8.6–10.4)
CHLORIDE SERPL-SCNC: 108 MMOL/L (ref 98–110)
CHOLEST SERPL-MCNC: 284 MG/DL
CHOLEST/HDLC SERPL: 4.3 (CALC)
CO2 SERPL-SCNC: 25 MMOL/L (ref 20–32)
CREAT SERPL-MCNC: 1.16 MG/DL (ref 0.6–0.93)
ERYTHROCYTE [DISTWIDTH] IN BLOOD BY AUTOMATED COUNT: 12.7 % (ref 11–15)
GLOBULIN SER CALC-MCNC: 2.8 G/DL (CALC) (ref 1.9–3.7)
GLUCOSE SERPL-MCNC: 90 MG/DL (ref 65–99)
HCT VFR BLD AUTO: 40.4 % (ref 35–45)
HDLC SERPL-MCNC: 66 MG/DL
HGB BLD-MCNC: 13.6 G/DL (ref 11.7–15.5)
LDLC SERPL CALC-MCNC: 188 MG/DL (CALC)
MCH RBC QN AUTO: 30.6 PG (ref 27–33)
MCHC RBC AUTO-ENTMCNC: 33.7 G/DL (ref 32–36)
MCV RBC AUTO: 90.8 FL (ref 80–100)
NONHDLC SERPL-MCNC: 218 MG/DL (CALC)
PLATELET # BLD AUTO: 177 THOUSAND/UL (ref 140–400)
PMV BLD REES-ECKER: 10.1 FL (ref 7.5–12.5)
POTASSIUM SERPL-SCNC: 4.7 MMOL/L (ref 3.5–5.3)
PROT SERPL-MCNC: 7.2 G/DL (ref 6.1–8.1)
RBC # BLD AUTO: 4.45 MILLION/UL (ref 3.8–5.1)
SL AMB EGFR AFRICAN AMERICAN: 53 ML/MIN/1.73M2
SL AMB EGFR NON AFRICAN AMERICAN: 46 ML/MIN/1.73M2
SODIUM SERPL-SCNC: 140 MMOL/L (ref 135–146)
TRIGL SERPL-MCNC: 153 MG/DL
WBC # BLD AUTO: 4.2 THOUSAND/UL (ref 3.8–10.8)

## 2020-02-24 ENCOUNTER — EVALUATION (OUTPATIENT)
Dept: PHYSICAL THERAPY | Facility: CLINIC | Age: 75
End: 2020-02-24
Payer: MEDICARE

## 2020-02-24 DIAGNOSIS — R29.6 RECURRENT FALLS: ICD-10-CM

## 2020-02-24 DIAGNOSIS — R26.9 GAIT DISTURBANCE: Primary | ICD-10-CM

## 2020-02-24 PROCEDURE — 97162 PT EVAL MOD COMPLEX 30 MIN: CPT

## 2020-02-24 PROCEDURE — 97112 NEUROMUSCULAR REEDUCATION: CPT

## 2020-02-24 NOTE — PROGRESS NOTES
PT Evaluation     Today's date: 2020  Patient name: Ramin Brewer  : 1945  MRN: [id number]  Referring provider: Edwige Hanson MD  Dx:   Encounter Diagnosis     ICD-10-CM    1  Gait disturbance R26 9 Ambulatory referral to Physical Therapy (*VB)   2  Recurrent falls R29 6 Ambulatory referral to Physical Therapy (*VB)       Start Time: 1750  Stop Time: 1840  Total time in clinic (min): 50 minutes    Assessment  Assessment details: Patient presents to skilled PT for balance impairment  Patient reports near falls occurring 3 times a month  Patient displays decreasedmuscle strength with overall grade of 4-/5  Patient balance scores are as follows: 33/56 GONZALES, 14 34 seconds TUG without Assistive Device, 10 Meter walk test 1 23 ft/sec without Assistive Device with overall results noting high risk for falls  Patient endurance scores are as follows; 300 feet with  6 minute walk test without AD, 16 seconds with 5 x sit to stand test with overall results noting decrease functional endurance and cardio capacity per   Patient displays overall reduction in somatosensory/ proprioception awareness secondary to increased LOB associated with mCTSIB  Patient will benefit from skilled PT to address noted impairments and functional limitations they are causing with overall goal to return patient to highest level possible with reduced risk for falls  Please contact me if you have any questions or recommendations  Thank you for the referral and the opportunity to share in Leah's care      Patient verbalized understanding of POC              Impairments: abnormal coordination, abnormal gait, impaired balance, impaired physical strength, lacks appropriate home exercise program and poor posture   Understanding of Dx/Px/POC: good   Prognosis: good    Goals  Goals  STG 30 days    Patient will improve static balance with feet together EC to 20 sec indicating reduction in fall risk  Patient will achieve 190 feet improvement with overall distance achieved of 490 feet with 6 minute walk test which is Minimal Detectable Change pre current research standards with endurance to demonstrate enhance functional capacity   Patient will display 2 3  second improvement with overall score of 12 04 seconds  with TUG test or lower with noted improvement being Minimal Detectable Change pre current research standards with fall risk     Patient will achieve 33/56 GONZALES score with minimal improve by 6 points or more demonstrating Minimal Detectable change per current research standards for this objective test which assess fall risk  Patient will achieve   59 ft/sec improvement with score of  1 82 ft/sec with 10 Meter Walk test, demonstrating Minimal Detectable change per current research standards for this objective test which assess fall risk     Patient will perform  5 x sit to stand test with overall reduction by seconds to 13 score indicating improvement with functional endurance    LT days   Patient will score low risk for falls with 3/4 fall risk measures   Patient will be able to ambulate 1000 feet without AD during 6 minute walk test   Patient will be able to perform floor transfer without physical assistance   Patient will be able to carry objects without loss of balance  Patient will be able to ambulate outdoors without any loss of balance    Cut off score   All date taken from [facility] or Rehab Measures    GONZALES test: 46/56                                              5 x STS Test:  MDC: 6 points                                                  MDC: 2 3 seconds   age norms                                                                 Age Norms   61-76 year old = M: 54, F: 54                        61-76 year old: 11 4 seconds   66-77 year old = M 47,  F: 48                       66-77 year old: 12 6 seconds    80-80 year old = M46,   F: 48                       80-80 year old: 14 8 seconds     TUG test: 10 Meter Walk Test:  MDC: 4 14 seconds       MDC:  59 ft/sec  Cut off score for Falls                                                  Age Norms  > 13 5 seconds community dwelling adults                20-29; M: 4 56 ft/sec F: 4 62 ft/sec  > 32 2 Frail Elderly                                                     30-39: M 4 76 ft/sec  F: 4 68 ft/sec          40-49: M: 4 79 ft/sec  F: 4 62 ft/sec  6 Minute Walk Test      50-59: M: 4 76 ft/sec  F: 4 56 ft/sec  MDC: 190 feet       60-69: M: 4 56 ft/sec  F: 4 26 ft/sec  Age Norms       70-+    M: 4 36 ft/sec  F: 4 16 ft/sec  60-69:    M: 1876 F: 6601  32-85:    M: 1729 F: 1545  80-89 +: M: 80 F; 1286     Plan  Patient would benefit from: PT eval and skilled physical therapy  Planned modality interventions: biofeedback  Planned therapy interventions: manual therapy, motor coordination training, neuromuscular re-education, patient education, postural training, sensory integrative techniques, strengthening, stretching, therapeutic activities, therapeutic exercise, gait training, home exercise program, coordination, balance, abdominal trunk stabilization, flexibility, functional ROM exercises, graded motor and graded exercise  Frequency: 2x week  Duration in weeks: 12  Plan of Care beginning date: 2/24/2020  Plan of Care expiration date: 5/18/2020  Treatment plan discussed with: patient        Subjective Evaluation    History of Present Illness  Mechanism of injury: Patient reports that she has had no dizziness recently but has a history of vertigo  Biggest complaint is feeling "off balance" and has experienced many "near fall" occurances and has to catch herself  Last fall was 2 months ago when she fell in rain and bruised of R UE and head     Pain  No pain reported    Social Support  Steps to enter house: yes  Stairs in house: yes   Lives in: multiple-level home  Lives with: spouse and adult children    Treatments  Current treatment: physical therapy  Patient Goals  Patient goals for therapy: increased strength, independence with ADLs/IADLs, improved balance and increased motion          Objective     Static Posture     Comments  Static Balance Testing:    MCTSIB:  Feet together Eyes open Firm surface: 25 seconds   Feet together Eyes closed Firm surface: 10 seconds     Feet together Eyes open Foam surface: 20 seconds  Feet together Eyes closed Foam surface: unable     GONZALES/56     Strength/Myotome Testing     Left Hip   Planes of Motion   Flexion: 4  Abduction: 4-    Right Hip   Planes of Motion   Flexion: 4-  Abduction: 4-    Left Knee   Flexion: 4  Extension: 4    Right Knee   Flexion: 4-  Extension: 4-    Left Ankle/Foot   Dorsiflexion: 4  Plantar flexion: 4    Right Ankle/Foot   Dorsiflexion: 4-  Plantar flexion: 4-    Additional Strength Details  Sensation:   normal      Coordination  Heel to Shin Test: normal  Alt foot tapping: normal    Ambulation     Comments   Dynamic Endurance testing    6 minute walk test:  2 15 min; 300 ft-patient requested to stop     Dynamic Balance/Fall risk testing    TUG: 10 feet down and back   14 34 seconds    10 meter walk test:  1 23 ft/sec     Gait Deviations: forward trunk lean, tactile cue by grabbing onto wall with hands, reduced push off, reduced dorsiflexion BL, no AD    Functional Assessment        Comments  Endurance   5 time sit to stand test: 16 sec with UE support  30 sec STS: 7 x's with UE support               Precautions:   Diagnosis Date Comment Source   Asthma   Provider   Cholelithiasis   Provider   GERD (gastroesophageal reflux disease)   Provider   Influenza 2019  Provider   Migraine headache   Provider   Pneumonia   Provider         Vital Signs:  After 5x STS: 151/84 mmHg; HR= 92 bpm   Post tx: 147/84 mmHg ; HR = 88 bpm         Visit # 1             20                         Exercise Diary                           TE's NM:             Neuro Testing 36'                                                                                                                                                                            Modalities

## 2020-02-27 ENCOUNTER — OFFICE VISIT (OUTPATIENT)
Dept: PHYSICAL THERAPY | Facility: CLINIC | Age: 75
End: 2020-02-27
Payer: MEDICARE

## 2020-02-27 DIAGNOSIS — R29.6 RECURRENT FALLS: ICD-10-CM

## 2020-02-27 DIAGNOSIS — R26.9 GAIT DISTURBANCE: Primary | ICD-10-CM

## 2020-02-27 PROCEDURE — 97112 NEUROMUSCULAR REEDUCATION: CPT

## 2020-02-27 NOTE — PROGRESS NOTES
Daily Note     Today's date: 2020  Patient name: Sowmya Meehan  : 1945  MRN: [id number]  Referring provider: Kevin Lynn MD  Dx:   Encounter Diagnosis     ICD-10-CM    1  Gait disturbance R26 9    2  Recurrent falls R29 6        Start Time: 1025  Stop Time: 1105  Total time in clinic (min): 40 minutes    Subjective:reports that she was not sore after last session  Objective: See treatment diary below      Assessment: Tolerated treatment well  Patient would benefit from continued PT      Plan: Continue per plan of care        Precautions:   Diagnosis Date Comment Source   Asthma   Provider   Cholelithiasis   Provider   GERD (gastroesophageal reflux disease)   Provider   Influenza 2019  Provider   Migraine headache   Provider   Pneumonia   Provider         Vital Signs:  Post tx: 140/84 mmHg ; HR = 80 bpm , SPO2: 98%        Visit # 1 2            20                        Exercise Diary                           TE's             HR's/TR's  2 x 10 3"            STS's  1 2" foam pad 2 x 10 no UE                                     NM:             Neuro Testing 40'            Seated hip abduction  Green TB 2  X 10           Seated ball squeezes  2  10 5"            Step taps  4" 2 x 10 R/L forward & lateral           BB side stepping:   10' x 6            Tandem walking  10' x 6 1 UE support           Side stepping cone taps  10' x 6 1 UE support                                                                                             Modalities

## 2020-03-02 ENCOUNTER — OFFICE VISIT (OUTPATIENT)
Dept: PHYSICAL THERAPY | Facility: CLINIC | Age: 75
End: 2020-03-02
Payer: MEDICARE

## 2020-03-02 DIAGNOSIS — R26.9 GAIT DISTURBANCE: Primary | ICD-10-CM

## 2020-03-02 DIAGNOSIS — R29.6 RECURRENT FALLS: ICD-10-CM

## 2020-03-02 PROCEDURE — 97112 NEUROMUSCULAR REEDUCATION: CPT

## 2020-03-02 PROCEDURE — 97110 THERAPEUTIC EXERCISES: CPT

## 2020-03-02 NOTE — PROGRESS NOTES
Daily Note     Today's date: 3/2/2020  Patient name: Dewanda Bosworth  : 1945  MRN: [id number]  Referring provider: Heide Hawk MD  Dx:   Encounter Diagnosis     ICD-10-CM    1  Gait disturbance R26 9    2  Recurrent falls R29 6        Start Time: 1730  Stop Time: 181  Total time in clinic (min): 45 minutes    Subjective: states that she was not sore after last session and felt good  Objective: See treatment diary below      Assessment: Tolerated treatment well  Patient demonstrated fatigue post treatment      Plan: Continue per plan of care        Precautions:   Diagnosis Date Comment Source   Asthma   Provider   Cholelithiasis   Provider   GERD (gastroesophageal reflux disease)   Provider   Influenza 2019  Provider   Migraine headache   Provider   Pneumonia   Provider         Vital Signs:  Post tx: 130/78 mmHg ; HR =96 bpm , SPO2: 98%        Visit # 1 2 3           2/24/20 2/27/20 3/2/20                       Exercise Diary                           TE's             HR's/TR's  2 x 10 3"            NuStep   L1 5'          SLR   Flex 1 x 10 R/L          LAQ   1 x 10 R/L          STS's  1 2" foam pad 2 x 10 no UE 1 2" foam pad 1 x 10 no UE; 1 x 10 no foam                                     NM:             Neuro Testing 40'            Seated hip abduction  Green TB 2  X 10           Seated ball squeezes  2  10 5"            Step taps  4" 2 x 10 R/L forward & lateral Step Ups: no UE 4" 1 x 10 R/L          BB side stepping:   10' x 6            Tandem walking  10' x 6 1 UE support 10' x 6 1 UE support          Side stepping cone taps  10' x 6 1 UE support 10' x 6 1 UE support          Side stepping   10' x 6           Standing hip 3 way   2# flex/abd/ext 1 x 10 R/L                                                                  Modalities

## 2020-03-04 PROBLEM — E78.2 MIXED HYPERLIPIDEMIA: Status: ACTIVE | Noted: 2020-03-04

## 2020-03-05 ENCOUNTER — OFFICE VISIT (OUTPATIENT)
Dept: PHYSICAL THERAPY | Facility: CLINIC | Age: 75
End: 2020-03-05
Payer: MEDICARE

## 2020-03-05 DIAGNOSIS — R29.6 RECURRENT FALLS: ICD-10-CM

## 2020-03-05 DIAGNOSIS — R26.9 GAIT DISTURBANCE: Primary | ICD-10-CM

## 2020-03-05 PROCEDURE — 97110 THERAPEUTIC EXERCISES: CPT

## 2020-03-05 PROCEDURE — 97112 NEUROMUSCULAR REEDUCATION: CPT

## 2020-03-05 NOTE — PROGRESS NOTES
Daily Note     Today's date: 3/5/2020  Patient name: Selma Brumfield  : 1945  MRN: [id number]  Referring provider: Esdras Cuevas MD  Dx:   Encounter Diagnosis     ICD-10-CM    1  Gait disturbance R26 9    2  Recurrent falls R29 6        Start Time: 1115  Stop Time: 1200  Total time in clinic (min): 45 minutes    Subjective: patient reports that she was doing exercises at home despite treating PT not advising this  No soreness or pain after last session  Objective: See treatment diary below      Assessment: Tolerated treatment well  Patient would benefit from continued PT      Plan: Continue per plan of care        Precautions:   Diagnosis Date Comment Source   Asthma   Provider   Cholelithiasis   Provider   GERD (gastroesophageal reflux disease)   Provider   Influenza 2019  Provider   Migraine headache   Provider   Pneumonia   Provider         Vital Signs:  Post tx: 122/74 mmHg ; HR =96 bpm , SPO2: 98%        Visit # 1 2 3 4          2/24/20 2/27/20 3/2/20 3/5/20                      Exercise Diary                           TE's             HR's/TR's  2 x 10 3"            NuStep   L1 5' L2 6'         SLR   Flex 1 x 10 R/L          LAQ   1 x 10 R/L 1 x 10 R/L         STS's  1 2" foam pad 2 x 10 no UE 1 2" foam pad 1 x 10 no UE; 1 x 10 no foam           Seated Hip flexion    2 x 10- DC to HEP                       NM:             Neuro Testing 40'            Seated hip abduction  Green TB 2  X 10  Red  TB 2  X 10 DC to HEP         Seated ball squeezes  2  10 5"   2  10 5" DC to HEP          Step taps  4" 2 x 10 R/L forward & lateral Step Ups: no UE 4" 1 x 10 R/L Step Ups: no UE 4" 1 x 10 R/L         BB side stepping:   10' x 6   20' x 4         Tandem walking  10' x 6 1 UE support 10' x 6 1 UE support 10' x 6 1 UE support         Side stepping cone taps  10' x 6 1 UE support 10' x 6 1 UE support          Side stepping   10' x 6           Standing hip 3 way   2# flex/abd/ext 1 x 10 R/L          Lateral step ups    4" 2 x 10 R/L 1 UE support                                                     Modalities

## 2020-03-09 ENCOUNTER — OFFICE VISIT (OUTPATIENT)
Dept: PHYSICAL THERAPY | Facility: CLINIC | Age: 75
End: 2020-03-09
Payer: MEDICARE

## 2020-03-09 DIAGNOSIS — R29.6 RECURRENT FALLS: ICD-10-CM

## 2020-03-09 DIAGNOSIS — R26.9 GAIT DISTURBANCE: Primary | ICD-10-CM

## 2020-03-09 PROCEDURE — 97110 THERAPEUTIC EXERCISES: CPT

## 2020-03-09 PROCEDURE — 97112 NEUROMUSCULAR REEDUCATION: CPT

## 2020-03-09 NOTE — PROGRESS NOTES
Daily Note     Today's date: 3/9/2020  Patient name: Ramin Brewer  : 1945  MRN: [id number]  Referring provider: Edwige Hanson MD  Dx:   Encounter Diagnosis     ICD-10-CM    1  Gait disturbance R26 9    2  Recurrent falls R29 6        Start Time:   Stop Time:   Total time in clinic (min): 40 minutes    Subjective: reports no changes since last visit  Objective: See treatment diary below      Assessment: Tolerated treatment well  Patient would benefit from continued PT      Plan: Continue per plan of care        Precautions:   Diagnosis Date Comment Source   Asthma   Provider   Cholelithiasis   Provider   GERD (gastroesophageal reflux disease)   Provider   Influenza 2019  Provider   Migraine headache   Provider   Pneumonia   Provider         Vital Signs:  Post tx: 140/80 mmHg ; HR =90 bpm , SPO2: 99%        Visit # 1 2 3 4 5         2/24/20 2/27/20 3/2/20 3/5/20 3/9/20             FOTO        Exercise Diary                           TE's             FOTO review     With PT 5'        HR's/TR's  2 x 10 3"            NuStep   L1 5' L2 6' L3 4'/ L2 4'        SLR   Flex 1 x 10 R/L          LAQ   1 x 10 R/L 1 x 10 R/L         STS's  1 2" foam pad 2 x 10 no UE 1 2" foam pad 1 x 10 no UE; 1 x 10 no foam   3 x 10 no UE no foam        Seated Hip flexion    2 x 10- DC to HEP                       NM:             Neuro Testing 40'            Seated hip abduction  Green TB 2  X 10  Red  TB 2  X 10 DC to HEP         Seated ball squeezes  2  10 5"   2  10 5" DC to HEP          Step taps  4" 2 x 10 R/L forward & lateral Step Ups: no UE 4" 1 x 10 R/L Step Ups: no UE 4" 1 x 10 R/L Step ups ascending/descenidng 1 UE support 2 x 10 R/L 6"        BB side stepping:   10' x 6   20' x 4         Tandem walking  10' x 6 1 UE support 10' x 6 1 UE support 10' x 6 1 UE support         Side stepping cone taps  10' x 6 1 UE support 10' x 6 1 UE support          Side stepping   10' x 6   With TB red 20' x 2         Standing hip 3 way   2# flex/abd/ext 1 x 10 R/L          Lateral step ups    4" 2 x 10 R/L 1 UE support          TB Monster walks     Red 1 UE support 20' x 4         Tandem Balance     3 x 15" on foam R; unable on L 4 x 10" firm R/L                          Modalities

## 2020-03-10 DIAGNOSIS — E78.2 MIXED HYPERLIPIDEMIA: Primary | ICD-10-CM

## 2020-03-11 RX ORDER — ATORVASTATIN CALCIUM 20 MG/1
20 TABLET, FILM COATED ORAL DAILY
Qty: 30 TABLET | Refills: 1 | Status: SHIPPED | OUTPATIENT
Start: 2020-03-11 | End: 2020-05-05

## 2020-03-12 ENCOUNTER — OFFICE VISIT (OUTPATIENT)
Dept: PHYSICAL THERAPY | Facility: CLINIC | Age: 75
End: 2020-03-12
Payer: MEDICARE

## 2020-03-12 DIAGNOSIS — R29.6 RECURRENT FALLS: ICD-10-CM

## 2020-03-12 DIAGNOSIS — R26.9 GAIT DISTURBANCE: Primary | ICD-10-CM

## 2020-03-12 PROCEDURE — 97110 THERAPEUTIC EXERCISES: CPT

## 2020-03-12 PROCEDURE — 97112 NEUROMUSCULAR REEDUCATION: CPT

## 2020-03-12 NOTE — PROGRESS NOTES
Daily Note     Today's date: 3/12/2020  Patient name: Qing Yates  : 1945  MRN: [id number]  Referring provider: Ana Jin MD  Dx:   Encounter Diagnosis     ICD-10-CM    1  Gait disturbance R26 9    2  Recurrent falls R29 6        Start Time: 1130  Stop Time: 1215  Total time in clinic (min): 45 minutes    Subjective: reports her low back has been bothering her since last week after she quickly changed position and felt it pull  Has R sided low back pain from low back to R buttock  Objective: See treatment diary below      Assessment: Tolerated treatment well  Added low back exercises to address low back pain affecting her ability to complete function balance tasks and LE strengthening for stabilization  Increase in low back pain with R step ups  Plan: Continue per plan of care        Precautions:   Diagnosis Date Comment Source   Asthma   Provider   Cholelithiasis   Provider   GERD (gastroesophageal reflux disease)   Provider   Influenza 2019  Provider   Migraine headache   Provider   Pneumonia   Provider         Vital Signs:  Post tx: 122/81 mmHg ; HR =70 bpm , SPO2: 99%        Visit # 1 2 3 4 5 6        2/24/20 2/27/20 3/2/20 3/5/20 3/9/20 3/12/20            FOTO        Exercise Diary                           TE's             FOTO review     With PT 5'        HR's/TR's  2 x 10 3"     2 x 10 each       NuStep   L1 5' L2 6' L3 4'/ L2 4' L1 8' no resistance due to c/o back and R knee pain        SLR   Flex 1 x 10 R/L          LAQ   1 x 10 R/L 1 x 10 R/L  2#  2 x 8 R/L no pain       STS's  1 2" foam pad 2 x 10 no UE 1 2" foam pad 1 x 10 no UE; 1 x 10 no foam   3 x 10 no UE no foam        Seated Hip flexion    2 x 10- DC to HEP                       NM:             Neuro Testing 40'            Seated hip abduction  Green TB 2  X 10  Red  TB 2  X 10 DC to HEP         Seated ball squeezes  2  10 5"   2  10 5" DC to HEP          Step taps  4" 2 x 10 R/L forward & lateral Step Ups: no UE 4" 1 x 10 R/L Step Ups: no UE 4" 1 x 10 R/L Step ups ascending/descenidng 1 UE support 2 x 10 R/L 6" Step Ups: no UE 6" 2 x 10 L; 1 x8 on R stopped due to R knee pian        BB side stepping:   10' x 6   20' x 4         Tandem walking  10' x 6 1 UE support 10' x 6 1 UE support 10' x 6 1 UE support         Side stepping cone taps  10' x 6 1 UE support 10' x 6 1 UE support          Side stepping   10' x 6   With TB red 20' x 2  With TB red 20' x 2        Standing hip 3 way   2# flex/abd/ext 1 x 10 R/L   2# 2 x10 each        Lateral step ups    4" 2 x 10 R/L 1 UE support          TB Monster walks     Red 1 UE support 20' x 4  Red 1 UE support 20' x 4        Tandem Balance     3 x 15" on foam R; unable on L 4 x 10" firm R/L         Clamshells:      1 x 15 R/L pain free           Modalities

## 2020-03-16 ENCOUNTER — APPOINTMENT (OUTPATIENT)
Dept: PHYSICAL THERAPY | Facility: CLINIC | Age: 75
End: 2020-03-16
Payer: MEDICARE

## 2020-03-16 ENCOUNTER — TELEPHONE (OUTPATIENT)
Dept: PHYSICAL THERAPY | Facility: CLINIC | Age: 75
End: 2020-03-16

## 2020-03-18 ENCOUNTER — TELEPHONE (OUTPATIENT)
Dept: PHYSICAL THERAPY | Facility: CLINIC | Age: 75
End: 2020-03-18

## 2020-03-19 ENCOUNTER — APPOINTMENT (OUTPATIENT)
Dept: PHYSICAL THERAPY | Facility: CLINIC | Age: 75
End: 2020-03-19
Payer: MEDICARE

## 2020-03-23 ENCOUNTER — APPOINTMENT (OUTPATIENT)
Dept: PHYSICAL THERAPY | Facility: CLINIC | Age: 75
End: 2020-03-23
Payer: MEDICARE

## 2020-03-25 ENCOUNTER — APPOINTMENT (OUTPATIENT)
Dept: PHYSICAL THERAPY | Facility: CLINIC | Age: 75
End: 2020-03-25
Payer: MEDICARE

## 2020-03-26 ENCOUNTER — APPOINTMENT (OUTPATIENT)
Dept: PHYSICAL THERAPY | Facility: CLINIC | Age: 75
End: 2020-03-26
Payer: MEDICARE

## 2020-03-30 ENCOUNTER — APPOINTMENT (OUTPATIENT)
Dept: PHYSICAL THERAPY | Facility: CLINIC | Age: 75
End: 2020-03-30
Payer: MEDICARE

## 2020-04-27 ENCOUNTER — TELEPHONE (OUTPATIENT)
Dept: PHYSICAL THERAPY | Facility: CLINIC | Age: 75
End: 2020-04-27

## 2020-05-05 DIAGNOSIS — E78.2 MIXED HYPERLIPIDEMIA: ICD-10-CM

## 2020-05-05 RX ORDER — ATORVASTATIN CALCIUM 20 MG/1
TABLET, FILM COATED ORAL
Qty: 30 TABLET | Refills: 1 | Status: SHIPPED | OUTPATIENT
Start: 2020-05-05 | End: 2020-05-13 | Stop reason: SDUPTHER

## 2020-05-11 ENCOUNTER — TELEPHONE (OUTPATIENT)
Dept: PHYSICAL THERAPY | Facility: CLINIC | Age: 75
End: 2020-05-11

## 2020-05-11 NOTE — PROGRESS NOTES
5/11/20: patient does not feel comfortable coming to PT due to fear of COVID 19  Does not want to do virtual visits  Considered self DC at this time

## 2020-05-13 DIAGNOSIS — E78.2 MIXED HYPERLIPIDEMIA: ICD-10-CM

## 2020-05-13 RX ORDER — ATORVASTATIN CALCIUM 20 MG/1
20 TABLET, FILM COATED ORAL DAILY
Qty: 30 TABLET | Refills: 1 | Status: SHIPPED | OUTPATIENT
Start: 2020-05-13 | End: 2020-07-13 | Stop reason: SDUPTHER

## 2020-05-19 ENCOUNTER — OFFICE VISIT (OUTPATIENT)
Dept: FAMILY MEDICINE CLINIC | Facility: CLINIC | Age: 75
End: 2020-05-19
Payer: MEDICARE

## 2020-05-19 VITALS
HEART RATE: 98 BPM | OXYGEN SATURATION: 99 % | TEMPERATURE: 99 F | DIASTOLIC BLOOD PRESSURE: 80 MMHG | SYSTOLIC BLOOD PRESSURE: 118 MMHG | BODY MASS INDEX: 28.13 KG/M2 | HEIGHT: 61 IN | WEIGHT: 149 LBS

## 2020-05-19 DIAGNOSIS — N39.0 URINARY TRACT INFECTION WITHOUT HEMATURIA, SITE UNSPECIFIED: ICD-10-CM

## 2020-05-19 DIAGNOSIS — N39.0 URINARY TRACT INFECTION WITH HEMATURIA, SITE UNSPECIFIED: Primary | ICD-10-CM

## 2020-05-19 DIAGNOSIS — K21.9 GASTROESOPHAGEAL REFLUX DISEASE, ESOPHAGITIS PRESENCE NOT SPECIFIED: ICD-10-CM

## 2020-05-19 DIAGNOSIS — K80.20 CALCULUS OF GALLBLADDER WITHOUT CHOLECYSTITIS WITHOUT OBSTRUCTION: ICD-10-CM

## 2020-05-19 DIAGNOSIS — R31.9 URINARY TRACT INFECTION WITH HEMATURIA, SITE UNSPECIFIED: Primary | ICD-10-CM

## 2020-05-19 LAB
SL AMB  POCT GLUCOSE, UA: ABNORMAL
SL AMB LEUKOCYTE ESTERASE,UA: ABNORMAL
SL AMB POCT BILIRUBIN,UA: ABNORMAL
SL AMB POCT BLOOD,UA: ABNORMAL
SL AMB POCT KETONES,UA: ABNORMAL
SL AMB POCT NITRITE,UA: POSITIVE
SL AMB POCT PH,UA: 5
SL AMB POCT SPECIFIC GRAVITY,UA: 1.02
SL AMB POCT URINE PROTEIN: ABNORMAL
SL AMB POCT UROBILINOGEN: 0.2

## 2020-05-19 PROCEDURE — 1036F TOBACCO NON-USER: CPT | Performed by: FAMILY MEDICINE

## 2020-05-19 PROCEDURE — 99214 OFFICE O/P EST MOD 30 MIN: CPT | Performed by: FAMILY MEDICINE

## 2020-05-19 PROCEDURE — 4040F PNEUMOC VAC/ADMIN/RCVD: CPT | Performed by: FAMILY MEDICINE

## 2020-05-19 PROCEDURE — 3079F DIAST BP 80-89 MM HG: CPT | Performed by: FAMILY MEDICINE

## 2020-05-19 PROCEDURE — 81002 URINALYSIS NONAUTO W/O SCOPE: CPT | Performed by: FAMILY MEDICINE

## 2020-05-19 PROCEDURE — 1160F RVW MEDS BY RX/DR IN RCRD: CPT | Performed by: FAMILY MEDICINE

## 2020-05-19 PROCEDURE — 3008F BODY MASS INDEX DOCD: CPT | Performed by: FAMILY MEDICINE

## 2020-05-19 PROCEDURE — 3074F SYST BP LT 130 MM HG: CPT | Performed by: FAMILY MEDICINE

## 2020-05-19 RX ORDER — SULFAMETHOXAZOLE AND TRIMETHOPRIM 800; 160 MG/1; MG/1
1 TABLET ORAL EVERY 12 HOURS SCHEDULED
Qty: 20 TABLET | Refills: 0 | Status: SHIPPED | OUTPATIENT
Start: 2020-05-19 | End: 2020-05-29

## 2020-05-21 DIAGNOSIS — N39.0 URINARY TRACT INFECTION WITHOUT HEMATURIA, SITE UNSPECIFIED: Primary | ICD-10-CM

## 2020-05-21 RX ORDER — NITROFURANTOIN 25; 75 MG/1; MG/1
100 CAPSULE ORAL 2 TIMES DAILY
Qty: 20 CAPSULE | Refills: 0 | Status: SHIPPED | OUTPATIENT
Start: 2020-05-21 | End: 2020-06-01

## 2020-06-01 ENCOUNTER — OFFICE VISIT (OUTPATIENT)
Dept: URGENT CARE | Facility: CLINIC | Age: 75
End: 2020-06-01
Payer: MEDICARE

## 2020-06-01 ENCOUNTER — APPOINTMENT (EMERGENCY)
Dept: RADIOLOGY | Facility: HOSPITAL | Age: 75
DRG: 204 | End: 2020-06-01
Payer: MEDICARE

## 2020-06-01 ENCOUNTER — HOSPITAL ENCOUNTER (INPATIENT)
Facility: HOSPITAL | Age: 75
LOS: 2 days | Discharge: HOME | DRG: 204 | End: 2020-06-05
Attending: EMERGENCY MEDICINE | Admitting: INTERNAL MEDICINE
Payer: MEDICARE

## 2020-06-01 VITALS
SYSTOLIC BLOOD PRESSURE: 120 MMHG | OXYGEN SATURATION: 100 % | BODY MASS INDEX: 28.13 KG/M2 | RESPIRATION RATE: 22 BRPM | TEMPERATURE: 99.6 F | HEART RATE: 120 BPM | HEIGHT: 61 IN | WEIGHT: 149 LBS | DIASTOLIC BLOOD PRESSURE: 70 MMHG

## 2020-06-01 DIAGNOSIS — R07.89 CHEST DISCOMFORT: ICD-10-CM

## 2020-06-01 DIAGNOSIS — R79.89 ELEVATED SERUM CREATININE: ICD-10-CM

## 2020-06-01 DIAGNOSIS — R07.9 CHEST PAIN: ICD-10-CM

## 2020-06-01 DIAGNOSIS — K59.00 CONSTIPATION: ICD-10-CM

## 2020-06-01 DIAGNOSIS — H65.193 ACUTE EFFUSION OF BOTH MIDDLE EARS: ICD-10-CM

## 2020-06-01 DIAGNOSIS — R42 LIGHTHEADEDNESS: ICD-10-CM

## 2020-06-01 DIAGNOSIS — I95.1 ORTHOSTATIC HYPOTENSION: Primary | ICD-10-CM

## 2020-06-01 DIAGNOSIS — R42 DIZZINESS: ICD-10-CM

## 2020-06-01 DIAGNOSIS — R79.89 ELEVATED BRAIN NATRIURETIC PEPTIDE (BNP) LEVEL: ICD-10-CM

## 2020-06-01 DIAGNOSIS — K21.9 GASTROESOPHAGEAL REFLUX DISEASE, ESOPHAGITIS PRESENCE NOT SPECIFIED: ICD-10-CM

## 2020-06-01 DIAGNOSIS — R53.1 GENERALIZED WEAKNESS: ICD-10-CM

## 2020-06-01 DIAGNOSIS — R53.83 FATIGUE, UNSPECIFIED TYPE: ICD-10-CM

## 2020-06-01 DIAGNOSIS — R06.02 SHORTNESS OF BREATH: ICD-10-CM

## 2020-06-01 DIAGNOSIS — E86.0 DEHYDRATION: ICD-10-CM

## 2020-06-01 DIAGNOSIS — L25.9 CONTACT DERMATITIS, UNSPECIFIED CONTACT DERMATITIS TYPE, UNSPECIFIED TRIGGER: Primary | ICD-10-CM

## 2020-06-01 DIAGNOSIS — R26.9 GAIT DISTURBANCE: ICD-10-CM

## 2020-06-01 DIAGNOSIS — N39.0 ACUTE UTI: ICD-10-CM

## 2020-06-01 DIAGNOSIS — D69.6 THROMBOCYTOPENIA (HCC): ICD-10-CM

## 2020-06-01 PROBLEM — B34.9 VIRAL SYNDROME: Status: RESOLVED | Noted: 2020-01-20 | Resolved: 2020-06-01

## 2020-06-01 PROBLEM — R06.09 DYSPNEA ON EXERTION: Status: ACTIVE | Noted: 2020-06-01

## 2020-06-01 PROBLEM — R06.00 DYSPNEA ON EXERTION: Status: ACTIVE | Noted: 2020-06-01

## 2020-06-01 PROBLEM — N17.9 AKI (ACUTE KIDNEY INJURY) (HCC): Status: ACTIVE | Noted: 2020-06-01

## 2020-06-01 LAB
ALBUMIN SERPL BCP-MCNC: 3.6 G/DL (ref 3.5–5)
ALP SERPL-CCNC: 93 U/L (ref 46–116)
ALT SERPL W P-5'-P-CCNC: 36 U/L (ref 12–78)
ANION GAP SERPL CALCULATED.3IONS-SCNC: 12 MMOL/L (ref 4–13)
APTT PPP: 29 SECONDS (ref 23–37)
AST SERPL W P-5'-P-CCNC: 25 U/L (ref 5–45)
BACTERIA UR QL AUTO: ABNORMAL /HPF
BASOPHILS # BLD MANUAL: 0 THOUSAND/UL (ref 0–0.1)
BASOPHILS NFR MAR MANUAL: 0 % (ref 0–1)
BILIRUB SERPL-MCNC: 0.5 MG/DL (ref 0.2–1)
BILIRUB UR QL STRIP: ABNORMAL
BUN SERPL-MCNC: 20 MG/DL (ref 5–25)
CALCIUM SERPL-MCNC: 9.3 MG/DL (ref 8.3–10.1)
CHLORIDE SERPL-SCNC: 102 MMOL/L (ref 100–108)
CLARITY UR: ABNORMAL
CO2 SERPL-SCNC: 22 MMOL/L (ref 21–32)
COLOR UR: YELLOW
CREAT SERPL-MCNC: 1.31 MG/DL (ref 0.6–1.3)
EOSINOPHIL # BLD MANUAL: 0.26 THOUSAND/UL (ref 0–0.4)
EOSINOPHIL NFR BLD MANUAL: 4 % (ref 0–6)
ERYTHROCYTE [DISTWIDTH] IN BLOOD BY AUTOMATED COUNT: 13 % (ref 11.6–15.1)
GFR SERPL CREATININE-BSD FRML MDRD: 40 ML/MIN/1.73SQ M
GLUCOSE SERPL-MCNC: 118 MG/DL (ref 65–140)
GLUCOSE UR STRIP-MCNC: NEGATIVE MG/DL
HCT VFR BLD AUTO: 43.5 % (ref 34.8–46.1)
HGB BLD-MCNC: 15 G/DL (ref 11.5–15.4)
HGB UR QL STRIP.AUTO: NEGATIVE
HYALINE CASTS #/AREA URNS LPF: ABNORMAL /LPF
INR PPP: 0.93 (ref 0.84–1.19)
KETONES UR STRIP-MCNC: ABNORMAL MG/DL
LACTATE SERPL-SCNC: 1.7 MMOL/L (ref 0.5–2)
LEUKOCYTE ESTERASE UR QL STRIP: NEGATIVE
LYMPHOCYTES # BLD AUTO: 1.04 THOUSAND/UL (ref 0.6–4.47)
LYMPHOCYTES # BLD AUTO: 16 % (ref 14–44)
MCH RBC QN AUTO: 31.1 PG (ref 26.8–34.3)
MCHC RBC AUTO-ENTMCNC: 34.5 G/DL (ref 31.4–37.4)
MCV RBC AUTO: 90 FL (ref 82–98)
MONOCYTES # BLD AUTO: 0.45 THOUSAND/UL (ref 0–1.22)
MONOCYTES NFR BLD: 7 % (ref 4–12)
MUCOUS THREADS UR QL AUTO: ABNORMAL
MYELOCYTES NFR BLD MANUAL: 1 % (ref 0–1)
NEUTROPHILS # BLD MANUAL: 4.46 THOUSAND/UL (ref 1.85–7.62)
NEUTS BAND NFR BLD MANUAL: 12 % (ref 0–8)
NEUTS SEG NFR BLD AUTO: 57 % (ref 43–75)
NITRITE UR QL STRIP: NEGATIVE
NON-SQ EPI CELLS URNS QL MICRO: ABNORMAL /HPF
NT-PROBNP SERPL-MCNC: 1615 PG/ML
PH UR STRIP.AUTO: 6 [PH]
PLATELET # BLD AUTO: 127 THOUSANDS/UL (ref 149–390)
PLATELET BLD QL SMEAR: ABNORMAL
PMV BLD AUTO: 9.5 FL (ref 8.9–12.7)
POLYCHROMASIA BLD QL SMEAR: PRESENT
POTASSIUM SERPL-SCNC: 3.9 MMOL/L (ref 3.5–5.3)
PROCALCITONIN SERPL-MCNC: 0.17 NG/ML
PROT SERPL-MCNC: 7.4 G/DL (ref 6.4–8.2)
PROT UR STRIP-MCNC: ABNORMAL MG/DL
PROTHROMBIN TIME: 12.7 SECONDS (ref 11.6–14.5)
RBC # BLD AUTO: 4.83 MILLION/UL (ref 3.81–5.12)
RBC #/AREA URNS AUTO: ABNORMAL /HPF
RBC MORPH BLD: PRESENT
SARS-COV-2 RNA RESP QL NAA+PROBE: NEGATIVE
SODIUM SERPL-SCNC: 136 MMOL/L (ref 136–145)
SP GR UR STRIP.AUTO: <=1.005 (ref 1–1.03)
TOTAL CELLS COUNTED SPEC: 100
TROPONIN I SERPL-MCNC: <0.02 NG/ML
UROBILINOGEN UR QL STRIP.AUTO: 0.2 E.U./DL
VARIANT LYMPHS # BLD AUTO: 3 %
WBC # BLD AUTO: 6.47 THOUSAND/UL (ref 4.31–10.16)
WBC #/AREA URNS AUTO: ABNORMAL /HPF

## 2020-06-01 PROCEDURE — 81001 URINALYSIS AUTO W/SCOPE: CPT | Performed by: PHYSICIAN ASSISTANT

## 2020-06-01 PROCEDURE — 71275 CT ANGIOGRAPHY CHEST: CPT

## 2020-06-01 PROCEDURE — 85730 THROMBOPLASTIN TIME PARTIAL: CPT | Performed by: PHYSICIAN ASSISTANT

## 2020-06-01 PROCEDURE — 74177 CT ABD & PELVIS W/CONTRAST: CPT

## 2020-06-01 PROCEDURE — 36415 COLL VENOUS BLD VENIPUNCTURE: CPT | Performed by: PHYSICIAN ASSISTANT

## 2020-06-01 PROCEDURE — 85027 COMPLETE CBC AUTOMATED: CPT | Performed by: PHYSICIAN ASSISTANT

## 2020-06-01 PROCEDURE — 4040F PNEUMOC VAC/ADMIN/RCVD: CPT | Performed by: PHYSICIAN ASSISTANT

## 2020-06-01 PROCEDURE — 85610 PROTHROMBIN TIME: CPT | Performed by: PHYSICIAN ASSISTANT

## 2020-06-01 PROCEDURE — 99285 EMERGENCY DEPT VISIT HI MDM: CPT | Performed by: PHYSICIAN ASSISTANT

## 2020-06-01 PROCEDURE — 96360 HYDRATION IV INFUSION INIT: CPT

## 2020-06-01 PROCEDURE — 83605 ASSAY OF LACTIC ACID: CPT | Performed by: PHYSICIAN ASSISTANT

## 2020-06-01 PROCEDURE — 99285 EMERGENCY DEPT VISIT HI MDM: CPT

## 2020-06-01 PROCEDURE — 83880 ASSAY OF NATRIURETIC PEPTIDE: CPT | Performed by: PHYSICIAN ASSISTANT

## 2020-06-01 PROCEDURE — 87086 URINE CULTURE/COLONY COUNT: CPT | Performed by: PHYSICIAN ASSISTANT

## 2020-06-01 PROCEDURE — 70450 CT HEAD/BRAIN W/O DYE: CPT

## 2020-06-01 PROCEDURE — 80053 COMPREHEN METABOLIC PANEL: CPT | Performed by: PHYSICIAN ASSISTANT

## 2020-06-01 PROCEDURE — 1160F RVW MEDS BY RX/DR IN RCRD: CPT | Performed by: PHYSICIAN ASSISTANT

## 2020-06-01 PROCEDURE — 3077F SYST BP >= 140 MM HG: CPT | Performed by: PHYSICIAN ASSISTANT

## 2020-06-01 PROCEDURE — 87635 SARS-COV-2 COVID-19 AMP PRB: CPT | Performed by: PHYSICIAN ASSISTANT

## 2020-06-01 PROCEDURE — 3079F DIAST BP 80-89 MM HG: CPT | Performed by: PHYSICIAN ASSISTANT

## 2020-06-01 PROCEDURE — 84145 PROCALCITONIN (PCT): CPT | Performed by: PHYSICIAN ASSISTANT

## 2020-06-01 PROCEDURE — 1036F TOBACCO NON-USER: CPT | Performed by: PHYSICIAN ASSISTANT

## 2020-06-01 PROCEDURE — 99214 OFFICE O/P EST MOD 30 MIN: CPT | Performed by: PHYSICIAN ASSISTANT

## 2020-06-01 PROCEDURE — 84484 ASSAY OF TROPONIN QUANT: CPT | Performed by: PHYSICIAN ASSISTANT

## 2020-06-01 PROCEDURE — 87040 BLOOD CULTURE FOR BACTERIA: CPT | Performed by: PHYSICIAN ASSISTANT

## 2020-06-01 PROCEDURE — 93005 ELECTROCARDIOGRAM TRACING: CPT

## 2020-06-01 PROCEDURE — 85007 BL SMEAR W/DIFF WBC COUNT: CPT | Performed by: PHYSICIAN ASSISTANT

## 2020-06-01 RX ORDER — ACETAMINOPHEN 325 MG/1
975 TABLET ORAL EVERY 8 HOURS SCHEDULED
Status: DISCONTINUED | OUTPATIENT
Start: 2020-06-01 | End: 2020-06-03

## 2020-06-01 RX ORDER — ATORVASTATIN CALCIUM 20 MG/1
20 TABLET, FILM COATED ORAL DAILY
Status: DISCONTINUED | OUTPATIENT
Start: 2020-06-02 | End: 2020-06-05 | Stop reason: HOSPADM

## 2020-06-01 RX ORDER — SODIUM CHLORIDE 9 MG/ML
75 INJECTION, SOLUTION INTRAVENOUS CONTINUOUS
Status: DISCONTINUED | OUTPATIENT
Start: 2020-06-01 | End: 2020-06-02

## 2020-06-01 RX ORDER — TRIAMCINOLONE ACETONIDE 1 MG/G
CREAM TOPICAL 2 TIMES DAILY
Qty: 30 G | Refills: 0 | Status: SHIPPED | OUTPATIENT
Start: 2020-06-01 | End: 2020-10-13 | Stop reason: ALTCHOICE

## 2020-06-01 RX ORDER — MECLIZINE HCL 12.5 MG/1
12.5 TABLET ORAL EVERY 8 HOURS PRN
Status: DISCONTINUED | OUTPATIENT
Start: 2020-06-01 | End: 2020-06-03

## 2020-06-01 RX ORDER — ONDANSETRON 2 MG/ML
4 INJECTION INTRAMUSCULAR; INTRAVENOUS EVERY 6 HOURS PRN
Status: DISCONTINUED | OUTPATIENT
Start: 2020-06-01 | End: 2020-06-05 | Stop reason: HOSPADM

## 2020-06-01 RX ORDER — HEPARIN SODIUM 5000 [USP'U]/ML
5000 INJECTION, SOLUTION INTRAVENOUS; SUBCUTANEOUS EVERY 8 HOURS SCHEDULED
Status: DISCONTINUED | OUTPATIENT
Start: 2020-06-01 | End: 2020-06-05 | Stop reason: HOSPADM

## 2020-06-01 RX ADMIN — ACETAMINOPHEN 975 MG: 325 TABLET ORAL at 22:16

## 2020-06-01 RX ADMIN — SODIUM CHLORIDE 75 ML/HR: 0.9 INJECTION, SOLUTION INTRAVENOUS at 22:13

## 2020-06-01 RX ADMIN — IOHEXOL 100 ML: 350 INJECTION, SOLUTION INTRAVENOUS at 15:03

## 2020-06-01 RX ADMIN — HEPARIN SODIUM 5000 UNITS: 5000 INJECTION INTRAVENOUS; SUBCUTANEOUS at 22:16

## 2020-06-01 RX ADMIN — SODIUM CHLORIDE 500 ML: 0.9 INJECTION, SOLUTION INTRAVENOUS at 15:22

## 2020-06-02 ENCOUNTER — APPOINTMENT (OUTPATIENT)
Dept: RADIOLOGY | Facility: HOSPITAL | Age: 75
DRG: 204 | End: 2020-06-02
Payer: MEDICARE

## 2020-06-02 ENCOUNTER — APPOINTMENT (OUTPATIENT)
Dept: NON INVASIVE DIAGNOSTICS | Facility: HOSPITAL | Age: 75
DRG: 204 | End: 2020-06-02
Payer: MEDICARE

## 2020-06-02 PROBLEM — N17.9 AKI (ACUTE KIDNEY INJURY) (HCC): Status: RESOLVED | Noted: 2020-06-01 | Resolved: 2020-06-02

## 2020-06-02 LAB
ANION GAP SERPL CALCULATED.3IONS-SCNC: 8 MMOL/L (ref 4–13)
ATRIAL RATE: 110 BPM
ATRIAL RATE: 111 BPM
ATRIAL RATE: 121 BPM
BACTERIA UR CULT: NORMAL
BUN SERPL-MCNC: 22 MG/DL (ref 5–25)
CALCIUM SERPL-MCNC: 8.4 MG/DL (ref 8.3–10.1)
CHLORIDE SERPL-SCNC: 105 MMOL/L (ref 100–108)
CO2 SERPL-SCNC: 24 MMOL/L (ref 21–32)
CREAT SERPL-MCNC: 1.26 MG/DL (ref 0.6–1.3)
ERYTHROCYTE [DISTWIDTH] IN BLOOD BY AUTOMATED COUNT: 12.6 % (ref 11.6–15.1)
GFR SERPL CREATININE-BSD FRML MDRD: 41 ML/MIN/1.73SQ M
GLUCOSE SERPL-MCNC: 94 MG/DL (ref 65–140)
HCT VFR BLD AUTO: 37.8 % (ref 34.8–46.1)
HGB BLD-MCNC: 12.6 G/DL (ref 11.5–15.4)
MCH RBC QN AUTO: 31.2 PG (ref 26.8–34.3)
MCHC RBC AUTO-ENTMCNC: 33.3 G/DL (ref 31.4–37.4)
MCV RBC AUTO: 94 FL (ref 82–98)
P AXIS: 71 DEGREES
P AXIS: 79 DEGREES
P AXIS: 79 DEGREES
PLATELET # BLD AUTO: 100 THOUSANDS/UL (ref 149–390)
PMV BLD AUTO: 9.8 FL (ref 8.9–12.7)
POTASSIUM SERPL-SCNC: 3.5 MMOL/L (ref 3.5–5.3)
PR INTERVAL: 146 MS
PR INTERVAL: 154 MS
PR INTERVAL: 194 MS
QRS AXIS: -25 DEGREES
QRS AXIS: -26 DEGREES
QRS AXIS: -6 DEGREES
QRSD INTERVAL: 70 MS
QRSD INTERVAL: 76 MS
QRSD INTERVAL: 78 MS
QT INTERVAL: 316 MS
QT INTERVAL: 318 MS
QT INTERVAL: 330 MS
QTC INTERVAL: 432 MS
QTC INTERVAL: 446 MS
QTC INTERVAL: 448 MS
RBC # BLD AUTO: 4.04 MILLION/UL (ref 3.81–5.12)
SODIUM SERPL-SCNC: 137 MMOL/L (ref 136–145)
T WAVE AXIS: 55 DEGREES
T WAVE AXIS: 60 DEGREES
T WAVE AXIS: 61 DEGREES
VENTRICULAR RATE: 110 BPM
VENTRICULAR RATE: 111 BPM
VENTRICULAR RATE: 121 BPM
WBC # BLD AUTO: 4.17 THOUSAND/UL (ref 4.31–10.16)

## 2020-06-02 PROCEDURE — 97163 PT EVAL HIGH COMPLEX 45 MIN: CPT

## 2020-06-02 PROCEDURE — 99225 PR SBSQ OBSERVATION CARE/DAY 25 MINUTES: CPT | Performed by: STUDENT IN AN ORGANIZED HEALTH CARE EDUCATION/TRAINING PROGRAM

## 2020-06-02 PROCEDURE — 97167 OT EVAL HIGH COMPLEX 60 MIN: CPT

## 2020-06-02 PROCEDURE — 85027 COMPLETE CBC AUTOMATED: CPT | Performed by: STUDENT IN AN ORGANIZED HEALTH CARE EDUCATION/TRAINING PROGRAM

## 2020-06-02 PROCEDURE — 93306 TTE W/DOPPLER COMPLETE: CPT | Performed by: INTERNAL MEDICINE

## 2020-06-02 PROCEDURE — 94664 DEMO&/EVAL PT USE INHALER: CPT

## 2020-06-02 PROCEDURE — 80048 BASIC METABOLIC PNL TOTAL CA: CPT | Performed by: STUDENT IN AN ORGANIZED HEALTH CARE EDUCATION/TRAINING PROGRAM

## 2020-06-02 PROCEDURE — 97110 THERAPEUTIC EXERCISES: CPT

## 2020-06-02 PROCEDURE — 93010 ELECTROCARDIOGRAM REPORT: CPT | Performed by: PHYSICIAN ASSISTANT

## 2020-06-02 PROCEDURE — 94640 AIRWAY INHALATION TREATMENT: CPT

## 2020-06-02 PROCEDURE — 94760 N-INVAS EAR/PLS OXIMETRY 1: CPT

## 2020-06-02 PROCEDURE — 93010 ELECTROCARDIOGRAM REPORT: CPT | Performed by: INTERNAL MEDICINE

## 2020-06-02 PROCEDURE — 93306 TTE W/DOPPLER COMPLETE: CPT

## 2020-06-02 PROCEDURE — 70551 MRI BRAIN STEM W/O DYE: CPT

## 2020-06-02 PROCEDURE — 99220 PR INITIAL OBSERVATION CARE/DAY 70 MINUTES: CPT | Performed by: STUDENT IN AN ORGANIZED HEALTH CARE EDUCATION/TRAINING PROGRAM

## 2020-06-02 RX ORDER — ALBUTEROL SULFATE 2.5 MG/3ML
2.5 SOLUTION RESPIRATORY (INHALATION)
Status: DISCONTINUED | OUTPATIENT
Start: 2020-06-02 | End: 2020-06-04

## 2020-06-02 RX ADMIN — ONDANSETRON 4 MG: 2 INJECTION INTRAMUSCULAR; INTRAVENOUS at 17:04

## 2020-06-02 RX ADMIN — HEPARIN SODIUM 5000 UNITS: 5000 INJECTION INTRAVENOUS; SUBCUTANEOUS at 06:15

## 2020-06-02 RX ADMIN — HEPARIN SODIUM 5000 UNITS: 5000 INJECTION INTRAVENOUS; SUBCUTANEOUS at 21:52

## 2020-06-02 RX ADMIN — ACETAMINOPHEN 975 MG: 325 TABLET ORAL at 21:52

## 2020-06-02 RX ADMIN — ALBUTEROL SULFATE 2.5 MG: 2.5 SOLUTION RESPIRATORY (INHALATION) at 19:57

## 2020-06-02 RX ADMIN — ACETAMINOPHEN 975 MG: 325 TABLET ORAL at 06:15

## 2020-06-02 RX ADMIN — MECLIZINE HYDROCHLORIDE 12.5 MG: 12.5 TABLET, FILM COATED ORAL at 20:32

## 2020-06-02 RX ADMIN — ONDANSETRON 4 MG: 2 INJECTION INTRAMUSCULAR; INTRAVENOUS at 06:20

## 2020-06-02 RX ADMIN — ACETAMINOPHEN 975 MG: 325 TABLET ORAL at 14:10

## 2020-06-02 RX ADMIN — ATORVASTATIN CALCIUM 20 MG: 20 TABLET, FILM COATED ORAL at 09:15

## 2020-06-02 RX ADMIN — HEPARIN SODIUM 5000 UNITS: 5000 INJECTION INTRAVENOUS; SUBCUTANEOUS at 14:10

## 2020-06-02 RX ADMIN — SODIUM CHLORIDE 75 ML/HR: 0.9 INJECTION, SOLUTION INTRAVENOUS at 11:54

## 2020-06-02 RX ADMIN — ALBUTEROL SULFATE 2.5 MG: 2.5 SOLUTION RESPIRATORY (INHALATION) at 11:07

## 2020-06-02 RX ADMIN — MECLIZINE HYDROCHLORIDE 12.5 MG: 12.5 TABLET, FILM COATED ORAL at 11:14

## 2020-06-02 RX ADMIN — ALBUTEROL SULFATE 2.5 MG: 2.5 SOLUTION RESPIRATORY (INHALATION) at 15:11

## 2020-06-03 ENCOUNTER — APPOINTMENT (INPATIENT)
Dept: NON INVASIVE DIAGNOSTICS | Facility: HOSPITAL | Age: 75
DRG: 204 | End: 2020-06-03
Payer: MEDICARE

## 2020-06-03 ENCOUNTER — APPOINTMENT (INPATIENT)
Dept: RADIOLOGY | Facility: HOSPITAL | Age: 75
DRG: 204 | End: 2020-06-03
Payer: MEDICARE

## 2020-06-03 ENCOUNTER — APPOINTMENT (INPATIENT)
Dept: PULMONOLOGY | Facility: HOSPITAL | Age: 75
DRG: 204 | End: 2020-06-03
Attending: INTERNAL MEDICINE
Payer: MEDICARE

## 2020-06-03 PROBLEM — K59.09 OTHER CONSTIPATION: Status: ACTIVE | Noted: 2020-06-03

## 2020-06-03 LAB
ATRIAL RATE: 113 BPM
CHEST PAIN STATEMENT: NORMAL
MAX DIASTOLIC BP: 79 MMHG
MAX HEART RATE: 125 BPM
MAX PREDICTED HEART RATE: 144 BPM
MAX. SYSTOLIC BP: 166 MMHG
PROTOCOL NAME: NORMAL
QRS AXIS: -5 DEGREES
QRSD INTERVAL: 82 MS
QT INTERVAL: 460 MS
QTC INTERVAL: 630 MS
REASON FOR TERMINATION: NORMAL
T WAVE AXIS: 60 DEGREES
TARGET HR FORMULA: NORMAL
TEST INDICATION: NORMAL
TIME IN EXERCISE PHASE: NORMAL
VENTRICULAR RATE: 113 BPM

## 2020-06-03 PROCEDURE — 94760 N-INVAS EAR/PLS OXIMETRY 1: CPT

## 2020-06-03 PROCEDURE — 94060 EVALUATION OF WHEEZING: CPT

## 2020-06-03 PROCEDURE — 93016 CV STRESS TEST SUPVJ ONLY: CPT | Performed by: INTERNAL MEDICINE

## 2020-06-03 PROCEDURE — 93017 CV STRESS TEST TRACING ONLY: CPT

## 2020-06-03 PROCEDURE — 93005 ELECTROCARDIOGRAM TRACING: CPT

## 2020-06-03 PROCEDURE — 93010 ELECTROCARDIOGRAM REPORT: CPT | Performed by: INTERNAL MEDICINE

## 2020-06-03 PROCEDURE — 97110 THERAPEUTIC EXERCISES: CPT

## 2020-06-03 PROCEDURE — 78452 HT MUSCLE IMAGE SPECT MULT: CPT

## 2020-06-03 PROCEDURE — A9502 TC99M TETROFOSMIN: HCPCS

## 2020-06-03 PROCEDURE — 78452 HT MUSCLE IMAGE SPECT MULT: CPT | Performed by: INTERNAL MEDICINE

## 2020-06-03 PROCEDURE — 94060 EVALUATION OF WHEEZING: CPT | Performed by: INTERNAL MEDICINE

## 2020-06-03 PROCEDURE — 99232 SBSQ HOSP IP/OBS MODERATE 35: CPT | Performed by: INTERNAL MEDICINE

## 2020-06-03 PROCEDURE — 94640 AIRWAY INHALATION TREATMENT: CPT

## 2020-06-03 PROCEDURE — 93018 CV STRESS TEST I&R ONLY: CPT | Performed by: INTERNAL MEDICINE

## 2020-06-03 RX ORDER — ACETAMINOPHEN 325 MG/1
650 TABLET ORAL EVERY 6 HOURS PRN
Status: DISCONTINUED | OUTPATIENT
Start: 2020-06-03 | End: 2020-06-05 | Stop reason: HOSPADM

## 2020-06-03 RX ORDER — DOCUSATE SODIUM 100 MG/1
100 CAPSULE, LIQUID FILLED ORAL 2 TIMES DAILY
Status: DISCONTINUED | OUTPATIENT
Start: 2020-06-03 | End: 2020-06-05 | Stop reason: HOSPADM

## 2020-06-03 RX ORDER — PANTOPRAZOLE SODIUM 40 MG/1
40 TABLET, DELAYED RELEASE ORAL
Status: DISCONTINUED | OUTPATIENT
Start: 2020-06-04 | End: 2020-06-05 | Stop reason: HOSPADM

## 2020-06-03 RX ORDER — MECLIZINE HCL 12.5 MG/1
12.5 TABLET ORAL EVERY 8 HOURS SCHEDULED
Status: DISCONTINUED | OUTPATIENT
Start: 2020-06-03 | End: 2020-06-05 | Stop reason: HOSPADM

## 2020-06-03 RX ADMIN — ALBUTEROL SULFATE 2.5 MG: 2.5 SOLUTION RESPIRATORY (INHALATION) at 11:21

## 2020-06-03 RX ADMIN — ACETAMINOPHEN 975 MG: 325 TABLET ORAL at 21:30

## 2020-06-03 RX ADMIN — MECLIZINE HYDROCHLORIDE 12.5 MG: 12.5 TABLET, FILM COATED ORAL at 21:30

## 2020-06-03 RX ADMIN — DOCUSATE SODIUM 100 MG: 100 CAPSULE, LIQUID FILLED ORAL at 17:22

## 2020-06-03 RX ADMIN — ALBUTEROL SULFATE 2.5 MG: 2.5 SOLUTION RESPIRATORY (INHALATION) at 20:56

## 2020-06-03 RX ADMIN — ONDANSETRON 4 MG: 2 INJECTION INTRAMUSCULAR; INTRAVENOUS at 02:23

## 2020-06-03 RX ADMIN — ALBUTEROL SULFATE 2.5 MG: 2.5 SOLUTION RESPIRATORY (INHALATION) at 07:34

## 2020-06-03 RX ADMIN — ATORVASTATIN CALCIUM 20 MG: 20 TABLET, FILM COATED ORAL at 10:39

## 2020-06-03 RX ADMIN — HEPARIN SODIUM 5000 UNITS: 5000 INJECTION INTRAVENOUS; SUBCUTANEOUS at 14:07

## 2020-06-03 RX ADMIN — ACETAMINOPHEN 975 MG: 325 TABLET ORAL at 05:22

## 2020-06-03 RX ADMIN — HEPARIN SODIUM 5000 UNITS: 5000 INJECTION INTRAVENOUS; SUBCUTANEOUS at 05:22

## 2020-06-03 RX ADMIN — REGADENOSON 0.4 MG: 0.08 INJECTION, SOLUTION INTRAVENOUS at 09:09

## 2020-06-03 RX ADMIN — ACETAMINOPHEN 975 MG: 325 TABLET ORAL at 14:07

## 2020-06-03 RX ADMIN — HEPARIN SODIUM 5000 UNITS: 5000 INJECTION INTRAVENOUS; SUBCUTANEOUS at 21:30

## 2020-06-03 RX ADMIN — ALBUTEROL SULFATE 2.5 MG: 2.5 SOLUTION RESPIRATORY (INHALATION) at 15:24

## 2020-06-04 ENCOUNTER — APPOINTMENT (INPATIENT)
Dept: RADIOLOGY | Facility: HOSPITAL | Age: 75
DRG: 204 | End: 2020-06-04
Payer: MEDICARE

## 2020-06-04 LAB
ALBUMIN SERPL BCP-MCNC: 2.8 G/DL (ref 3.5–5)
ALP SERPL-CCNC: 85 U/L (ref 46–116)
ALT SERPL W P-5'-P-CCNC: 164 U/L (ref 12–78)
ANION GAP SERPL CALCULATED.3IONS-SCNC: 9 MMOL/L (ref 4–13)
AST SERPL W P-5'-P-CCNC: 72 U/L (ref 5–45)
ATRIAL RATE: 112 BPM
BASOPHILS # BLD AUTO: 0.05 THOUSANDS/ΜL (ref 0–0.1)
BASOPHILS NFR BLD AUTO: 1 % (ref 0–1)
BILIRUB SERPL-MCNC: 0.4 MG/DL (ref 0.2–1)
BUN SERPL-MCNC: 12 MG/DL (ref 5–25)
CALCIUM SERPL-MCNC: 8.8 MG/DL (ref 8.3–10.1)
CHLORIDE SERPL-SCNC: 110 MMOL/L (ref 100–108)
CHOLEST SERPL-MCNC: 106 MG/DL (ref 50–200)
CO2 SERPL-SCNC: 24 MMOL/L (ref 21–32)
CREAT SERPL-MCNC: 1.1 MG/DL (ref 0.6–1.3)
EOSINOPHIL # BLD AUTO: 0.43 THOUSAND/ΜL (ref 0–0.61)
EOSINOPHIL NFR BLD AUTO: 11 % (ref 0–6)
ERYTHROCYTE [DISTWIDTH] IN BLOOD BY AUTOMATED COUNT: 13.2 % (ref 11.6–15.1)
GFR SERPL CREATININE-BSD FRML MDRD: 49 ML/MIN/1.73SQ M
GLUCOSE SERPL-MCNC: 91 MG/DL (ref 65–140)
HCT VFR BLD AUTO: 33.9 % (ref 34.8–46.1)
HDLC SERPL-MCNC: 41 MG/DL
HGB BLD-MCNC: 11.3 G/DL (ref 11.5–15.4)
IMM GRANULOCYTES # BLD AUTO: 0.01 THOUSAND/UL (ref 0–0.2)
IMM GRANULOCYTES NFR BLD AUTO: 0 % (ref 0–2)
LDLC SERPL CALC-MCNC: 48 MG/DL (ref 0–100)
LYMPHOCYTES # BLD AUTO: 1.54 THOUSANDS/ΜL (ref 0.6–4.47)
LYMPHOCYTES NFR BLD AUTO: 38 % (ref 14–44)
MAGNESIUM SERPL-MCNC: 2.2 MG/DL (ref 1.6–2.6)
MCH RBC QN AUTO: 31 PG (ref 26.8–34.3)
MCHC RBC AUTO-ENTMCNC: 33.3 G/DL (ref 31.4–37.4)
MCV RBC AUTO: 93 FL (ref 82–98)
MONOCYTES # BLD AUTO: 0.46 THOUSAND/ΜL (ref 0.17–1.22)
MONOCYTES NFR BLD AUTO: 11 % (ref 4–12)
NEUTROPHILS # BLD AUTO: 1.56 THOUSANDS/ΜL (ref 1.85–7.62)
NEUTS SEG NFR BLD AUTO: 39 % (ref 43–75)
NRBC BLD AUTO-RTO: 0 /100 WBCS
P AXIS: 89 DEGREES
PLATELET # BLD AUTO: 122 THOUSANDS/UL (ref 149–390)
PMV BLD AUTO: 9.7 FL (ref 8.9–12.7)
POTASSIUM SERPL-SCNC: 3.6 MMOL/L (ref 3.5–5.3)
PR INTERVAL: 154 MS
PROT SERPL-MCNC: 5.8 G/DL (ref 6.4–8.2)
QRS AXIS: -10 DEGREES
QRSD INTERVAL: 76 MS
QT INTERVAL: 336 MS
QTC INTERVAL: 458 MS
RBC # BLD AUTO: 3.65 MILLION/UL (ref 3.81–5.12)
SODIUM SERPL-SCNC: 143 MMOL/L (ref 136–145)
T WAVE AXIS: 64 DEGREES
TRIGL SERPL-MCNC: 87 MG/DL
TSH SERPL DL<=0.05 MIU/L-ACNC: 2.24 UIU/ML (ref 0.36–3.74)
VENTRICULAR RATE: 112 BPM
VIT B12 SERPL-MCNC: 272 PG/ML (ref 100–900)
WBC # BLD AUTO: 4.05 THOUSAND/UL (ref 4.31–10.16)

## 2020-06-04 PROCEDURE — 93010 ELECTROCARDIOGRAM REPORT: CPT | Performed by: INTERNAL MEDICINE

## 2020-06-04 PROCEDURE — 97110 THERAPEUTIC EXERCISES: CPT

## 2020-06-04 PROCEDURE — 85025 COMPLETE CBC W/AUTO DIFF WBC: CPT | Performed by: INTERNAL MEDICINE

## 2020-06-04 PROCEDURE — 84443 ASSAY THYROID STIM HORMONE: CPT | Performed by: INTERNAL MEDICINE

## 2020-06-04 PROCEDURE — 99232 SBSQ HOSP IP/OBS MODERATE 35: CPT | Performed by: INTERNAL MEDICINE

## 2020-06-04 PROCEDURE — 83735 ASSAY OF MAGNESIUM: CPT | Performed by: INTERNAL MEDICINE

## 2020-06-04 PROCEDURE — 80053 COMPREHEN METABOLIC PANEL: CPT | Performed by: INTERNAL MEDICINE

## 2020-06-04 PROCEDURE — 94760 N-INVAS EAR/PLS OXIMETRY 1: CPT

## 2020-06-04 PROCEDURE — 94640 AIRWAY INHALATION TREATMENT: CPT

## 2020-06-04 PROCEDURE — 97535 SELF CARE MNGMENT TRAINING: CPT

## 2020-06-04 PROCEDURE — 74022 RADEX COMPL AQT ABD SERIES: CPT

## 2020-06-04 PROCEDURE — 80061 LIPID PANEL: CPT | Performed by: INTERNAL MEDICINE

## 2020-06-04 PROCEDURE — 82607 VITAMIN B-12: CPT | Performed by: INTERNAL MEDICINE

## 2020-06-04 RX ORDER — BISACODYL 10 MG
10 SUPPOSITORY, RECTAL RECTAL ONCE
Status: COMPLETED | OUTPATIENT
Start: 2020-06-04 | End: 2020-06-04

## 2020-06-04 RX ORDER — POLYETHYLENE GLYCOL 3350 17 G/17G
17 POWDER, FOR SOLUTION ORAL DAILY
Status: DISCONTINUED | OUTPATIENT
Start: 2020-06-04 | End: 2020-06-05 | Stop reason: HOSPADM

## 2020-06-04 RX ORDER — POLYETHYLENE GLYCOL 3350 17 G/17G
17 POWDER, FOR SOLUTION ORAL DAILY PRN
Status: DISCONTINUED | OUTPATIENT
Start: 2020-06-04 | End: 2020-06-04

## 2020-06-04 RX ORDER — SODIUM CHLORIDE, SODIUM GLUCONATE, SODIUM ACETATE, POTASSIUM CHLORIDE, MAGNESIUM CHLORIDE, SODIUM PHOSPHATE, DIBASIC, AND POTASSIUM PHOSPHATE .53; .5; .37; .037; .03; .012; .00082 G/100ML; G/100ML; G/100ML; G/100ML; G/100ML; G/100ML; G/100ML
75 INJECTION, SOLUTION INTRAVENOUS CONTINUOUS
Status: DISCONTINUED | OUTPATIENT
Start: 2020-06-04 | End: 2020-06-05

## 2020-06-04 RX ORDER — ALBUTEROL SULFATE 2.5 MG/3ML
2.5 SOLUTION RESPIRATORY (INHALATION)
Status: DISCONTINUED | OUTPATIENT
Start: 2020-06-04 | End: 2020-06-04

## 2020-06-04 RX ADMIN — DOCUSATE SODIUM 100 MG: 100 CAPSULE, LIQUID FILLED ORAL at 10:10

## 2020-06-04 RX ADMIN — ATORVASTATIN CALCIUM 20 MG: 20 TABLET, FILM COATED ORAL at 10:10

## 2020-06-04 RX ADMIN — MECLIZINE HYDROCHLORIDE 12.5 MG: 12.5 TABLET, FILM COATED ORAL at 06:10

## 2020-06-04 RX ADMIN — ALBUTEROL SULFATE 2.5 MG: 2.5 SOLUTION RESPIRATORY (INHALATION) at 07:58

## 2020-06-04 RX ADMIN — POLYETHYLENE GLYCOL 3350 17 G: 17 POWDER, FOR SOLUTION ORAL at 16:27

## 2020-06-04 RX ADMIN — BISACODYL 10 MG: 10 SUPPOSITORY RECTAL at 16:27

## 2020-06-04 RX ADMIN — HEPARIN SODIUM 5000 UNITS: 5000 INJECTION INTRAVENOUS; SUBCUTANEOUS at 06:10

## 2020-06-04 RX ADMIN — MECLIZINE HYDROCHLORIDE 12.5 MG: 12.5 TABLET, FILM COATED ORAL at 21:37

## 2020-06-04 RX ADMIN — DOCUSATE SODIUM 100 MG: 100 CAPSULE, LIQUID FILLED ORAL at 17:57

## 2020-06-04 RX ADMIN — HEPARIN SODIUM 5000 UNITS: 5000 INJECTION INTRAVENOUS; SUBCUTANEOUS at 21:37

## 2020-06-04 RX ADMIN — MECLIZINE HYDROCHLORIDE 12.5 MG: 12.5 TABLET, FILM COATED ORAL at 13:13

## 2020-06-04 RX ADMIN — HEPARIN SODIUM 5000 UNITS: 5000 INJECTION INTRAVENOUS; SUBCUTANEOUS at 13:13

## 2020-06-04 RX ADMIN — SODIUM CHLORIDE, SODIUM GLUCONATE, SODIUM ACETATE, POTASSIUM CHLORIDE, MAGNESIUM CHLORIDE, SODIUM PHOSPHATE, DIBASIC, AND POTASSIUM PHOSPHATE 75 ML/HR: .53; .5; .37; .037; .03; .012; .00082 INJECTION, SOLUTION INTRAVENOUS at 16:27

## 2020-06-04 RX ADMIN — PANTOPRAZOLE SODIUM 40 MG: 40 TABLET, DELAYED RELEASE ORAL at 06:10

## 2020-06-04 RX ADMIN — ACETAMINOPHEN 650 MG: 325 TABLET, FILM COATED ORAL at 10:10

## 2020-06-05 VITALS
BODY MASS INDEX: 28.13 KG/M2 | DIASTOLIC BLOOD PRESSURE: 84 MMHG | SYSTOLIC BLOOD PRESSURE: 155 MMHG | HEART RATE: 90 BPM | TEMPERATURE: 98 F | HEIGHT: 61 IN | RESPIRATION RATE: 20 BRPM | WEIGHT: 149 LBS | OXYGEN SATURATION: 99 %

## 2020-06-05 PROBLEM — K59.09 OTHER CONSTIPATION: Status: RESOLVED | Noted: 2020-06-03 | Resolved: 2020-06-05

## 2020-06-05 PROBLEM — F41.9 ANXIETY: Status: ACTIVE | Noted: 2020-06-05

## 2020-06-05 PROBLEM — R42 DIZZINESS: Status: RESOLVED | Noted: 2017-01-08 | Resolved: 2020-06-05

## 2020-06-05 PROBLEM — R79.89 ABNORMAL LFTS: Status: ACTIVE | Noted: 2020-06-05

## 2020-06-05 PROBLEM — R06.09 DYSPNEA ON EXERTION: Status: RESOLVED | Noted: 2020-06-01 | Resolved: 2020-06-05

## 2020-06-05 PROBLEM — R06.00 DYSPNEA ON EXERTION: Status: RESOLVED | Noted: 2020-06-01 | Resolved: 2020-06-05

## 2020-06-05 LAB
ALBUMIN SERPL BCP-MCNC: 2.7 G/DL (ref 3.5–5)
ALP SERPL-CCNC: 104 U/L (ref 46–116)
ALT SERPL W P-5'-P-CCNC: 162 U/L (ref 12–78)
ANION GAP SERPL CALCULATED.3IONS-SCNC: 8 MMOL/L (ref 4–13)
AST SERPL W P-5'-P-CCNC: 73 U/L (ref 5–45)
BASOPHILS # BLD AUTO: 0.05 THOUSANDS/ΜL (ref 0–0.1)
BASOPHILS NFR BLD AUTO: 1 % (ref 0–1)
BILIRUB SERPL-MCNC: 0.4 MG/DL (ref 0.2–1)
BUN SERPL-MCNC: 13 MG/DL (ref 5–25)
CALCIUM SERPL-MCNC: 8.7 MG/DL (ref 8.3–10.1)
CHLORIDE SERPL-SCNC: 110 MMOL/L (ref 100–108)
CO2 SERPL-SCNC: 24 MMOL/L (ref 21–32)
CREAT SERPL-MCNC: 0.84 MG/DL (ref 0.6–1.3)
EOSINOPHIL # BLD AUTO: 0.44 THOUSAND/ΜL (ref 0–0.61)
EOSINOPHIL NFR BLD AUTO: 10 % (ref 0–6)
ERYTHROCYTE [DISTWIDTH] IN BLOOD BY AUTOMATED COUNT: 13.3 % (ref 11.6–15.1)
GFR SERPL CREATININE-BSD FRML MDRD: 68 ML/MIN/1.73SQ M
GLUCOSE SERPL-MCNC: 90 MG/DL (ref 65–140)
HCT VFR BLD AUTO: 35.2 % (ref 34.8–46.1)
HGB BLD-MCNC: 11.7 G/DL (ref 11.5–15.4)
IMM GRANULOCYTES # BLD AUTO: 0.01 THOUSAND/UL (ref 0–0.2)
IMM GRANULOCYTES NFR BLD AUTO: 0 % (ref 0–2)
LYMPHOCYTES # BLD AUTO: 1.84 THOUSANDS/ΜL (ref 0.6–4.47)
LYMPHOCYTES NFR BLD AUTO: 41 % (ref 14–44)
MCH RBC QN AUTO: 30.9 PG (ref 26.8–34.3)
MCHC RBC AUTO-ENTMCNC: 33.2 G/DL (ref 31.4–37.4)
MCV RBC AUTO: 93 FL (ref 82–98)
MONOCYTES # BLD AUTO: 0.44 THOUSAND/ΜL (ref 0.17–1.22)
MONOCYTES NFR BLD AUTO: 10 % (ref 4–12)
NEUTROPHILS # BLD AUTO: 1.73 THOUSANDS/ΜL (ref 1.85–7.62)
NEUTS SEG NFR BLD AUTO: 38 % (ref 43–75)
NRBC BLD AUTO-RTO: 0 /100 WBCS
PLATELET # BLD AUTO: 128 THOUSANDS/UL (ref 149–390)
PMV BLD AUTO: 9.4 FL (ref 8.9–12.7)
POTASSIUM SERPL-SCNC: 3.6 MMOL/L (ref 3.5–5.3)
PROT SERPL-MCNC: 5.8 G/DL (ref 6.4–8.2)
RBC # BLD AUTO: 3.79 MILLION/UL (ref 3.81–5.12)
SODIUM SERPL-SCNC: 142 MMOL/L (ref 136–145)
WBC # BLD AUTO: 4.51 THOUSAND/UL (ref 4.31–10.16)

## 2020-06-05 PROCEDURE — 97535 SELF CARE MNGMENT TRAINING: CPT

## 2020-06-05 PROCEDURE — 99239 HOSP IP/OBS DSCHRG MGMT >30: CPT | Performed by: INTERNAL MEDICINE

## 2020-06-05 PROCEDURE — 80053 COMPREHEN METABOLIC PANEL: CPT | Performed by: INTERNAL MEDICINE

## 2020-06-05 PROCEDURE — 85025 COMPLETE CBC W/AUTO DIFF WBC: CPT | Performed by: INTERNAL MEDICINE

## 2020-06-05 RX ORDER — VIT B COMP NO.3/FOLIC/C/BIOTIN 1 MG-60 MG
1 TABLET ORAL
Qty: 30 TABLET | Refills: 0 | Status: SHIPPED | OUTPATIENT
Start: 2020-06-05 | End: 2020-07-13 | Stop reason: SDUPTHER

## 2020-06-05 RX ORDER — POLYETHYLENE GLYCOL 3350 17 G/17G
17 POWDER, FOR SOLUTION ORAL DAILY PRN
Qty: 14 EACH | Refills: 0 | Status: SHIPPED | OUTPATIENT
Start: 2020-06-05 | End: 2021-02-05 | Stop reason: ALTCHOICE

## 2020-06-05 RX ORDER — DOCUSATE SODIUM 100 MG/1
100 CAPSULE, LIQUID FILLED ORAL 2 TIMES DAILY
Qty: 10 CAPSULE | Refills: 0 | Status: ON HOLD | OUTPATIENT
Start: 2020-06-05 | End: 2021-08-12 | Stop reason: CLARIF

## 2020-06-05 RX ORDER — PANTOPRAZOLE SODIUM 40 MG/1
40 TABLET, DELAYED RELEASE ORAL
Qty: 14 TABLET | Refills: 0 | Status: SHIPPED | OUTPATIENT
Start: 2020-06-06 | End: 2020-06-18 | Stop reason: SDUPTHER

## 2020-06-05 RX ORDER — MECLIZINE HCL 12.5 MG/1
12.5 TABLET ORAL EVERY 8 HOURS SCHEDULED
Qty: 30 TABLET | Refills: 0 | Status: SHIPPED | OUTPATIENT
Start: 2020-06-05 | End: 2020-06-15 | Stop reason: SDUPTHER

## 2020-06-05 RX ORDER — CYANOCOBALAMIN 1000 UG/ML
1000 INJECTION INTRAMUSCULAR; SUBCUTANEOUS DAILY
Status: DISCONTINUED | OUTPATIENT
Start: 2020-06-05 | End: 2020-06-05 | Stop reason: HOSPADM

## 2020-06-05 RX ADMIN — PANTOPRAZOLE SODIUM 40 MG: 40 TABLET, DELAYED RELEASE ORAL at 05:19

## 2020-06-05 RX ADMIN — CYANOCOBALAMIN 1000 MCG: 1000 INJECTION, SOLUTION INTRAMUSCULAR; SUBCUTANEOUS at 09:56

## 2020-06-05 RX ADMIN — HEPARIN SODIUM 5000 UNITS: 5000 INJECTION INTRAVENOUS; SUBCUTANEOUS at 05:20

## 2020-06-05 RX ADMIN — HEPARIN SODIUM 5000 UNITS: 5000 INJECTION INTRAVENOUS; SUBCUTANEOUS at 14:33

## 2020-06-05 RX ADMIN — ATORVASTATIN CALCIUM 20 MG: 20 TABLET, FILM COATED ORAL at 09:56

## 2020-06-05 RX ADMIN — DOCUSATE SODIUM 100 MG: 100 CAPSULE, LIQUID FILLED ORAL at 09:56

## 2020-06-05 RX ADMIN — MECLIZINE HYDROCHLORIDE 12.5 MG: 12.5 TABLET, FILM COATED ORAL at 05:20

## 2020-06-05 RX ADMIN — MECLIZINE HYDROCHLORIDE 12.5 MG: 12.5 TABLET, FILM COATED ORAL at 14:33

## 2020-06-06 LAB
BACTERIA BLD CULT: NORMAL
BACTERIA BLD CULT: NORMAL

## 2020-06-08 ENCOUNTER — TRANSITIONAL CARE MANAGEMENT (OUTPATIENT)
Dept: FAMILY MEDICINE CLINIC | Facility: CLINIC | Age: 75
End: 2020-06-08

## 2020-06-08 ENCOUNTER — TELEPHONE (OUTPATIENT)
Dept: FAMILY MEDICINE CLINIC | Facility: CLINIC | Age: 75
End: 2020-06-08

## 2020-06-15 ENCOUNTER — OFFICE VISIT (OUTPATIENT)
Dept: FAMILY MEDICINE CLINIC | Facility: CLINIC | Age: 75
End: 2020-06-15
Payer: MEDICARE

## 2020-06-15 VITALS
OXYGEN SATURATION: 95 % | HEIGHT: 61 IN | HEART RATE: 93 BPM | TEMPERATURE: 98.4 F | DIASTOLIC BLOOD PRESSURE: 80 MMHG | SYSTOLIC BLOOD PRESSURE: 124 MMHG | BODY MASS INDEX: 26.62 KG/M2 | WEIGHT: 141 LBS

## 2020-06-15 DIAGNOSIS — I10 HYPERTENSION, UNSPECIFIED TYPE: ICD-10-CM

## 2020-06-15 DIAGNOSIS — R42 DIZZINESS: ICD-10-CM

## 2020-06-15 DIAGNOSIS — R79.89 ABNORMAL LFTS: Primary | ICD-10-CM

## 2020-06-15 DIAGNOSIS — K21.9 GASTROESOPHAGEAL REFLUX DISEASE, ESOPHAGITIS PRESENCE NOT SPECIFIED: ICD-10-CM

## 2020-06-15 PROCEDURE — 99495 TRANSJ CARE MGMT MOD F2F 14D: CPT | Performed by: FAMILY MEDICINE

## 2020-06-15 RX ORDER — MECLIZINE HCL 12.5 MG/1
12.5 TABLET ORAL EVERY 8 HOURS SCHEDULED
Qty: 60 TABLET | Refills: 0 | Status: SHIPPED | OUTPATIENT
Start: 2020-06-15 | End: 2020-07-06 | Stop reason: SDUPTHER

## 2020-06-16 LAB
ALBUMIN SERPL-MCNC: 4.2 G/DL (ref 3.6–5.1)
ALBUMIN/GLOB SERPL: 1.5 (CALC) (ref 1–2.5)
ALP SERPL-CCNC: 94 U/L (ref 37–153)
ALT SERPL-CCNC: 36 U/L (ref 6–29)
AST SERPL-CCNC: 24 U/L (ref 10–35)
BILIRUB SERPL-MCNC: 0.6 MG/DL (ref 0.2–1.2)
BUN SERPL-MCNC: 16 MG/DL (ref 7–25)
BUN/CREAT SERPL: 16 (CALC) (ref 6–22)
CALCIUM SERPL-MCNC: 9.8 MG/DL (ref 8.6–10.4)
CHLORIDE SERPL-SCNC: 109 MMOL/L (ref 98–110)
CO2 SERPL-SCNC: 26 MMOL/L (ref 20–32)
CREAT SERPL-MCNC: 1.01 MG/DL (ref 0.6–0.93)
GLOBULIN SER CALC-MCNC: 2.8 G/DL (CALC) (ref 1.9–3.7)
GLUCOSE SERPL-MCNC: 95 MG/DL (ref 65–99)
POTASSIUM SERPL-SCNC: 4.5 MMOL/L (ref 3.5–5.3)
PROT SERPL-MCNC: 7 G/DL (ref 6.1–8.1)
SL AMB EGFR AFRICAN AMERICAN: 63 ML/MIN/1.73M2
SL AMB EGFR NON AFRICAN AMERICAN: 54 ML/MIN/1.73M2
SODIUM SERPL-SCNC: 143 MMOL/L (ref 135–146)

## 2020-06-18 DIAGNOSIS — K21.9 GASTROESOPHAGEAL REFLUX DISEASE, ESOPHAGITIS PRESENCE NOT SPECIFIED: ICD-10-CM

## 2020-06-18 RX ORDER — PANTOPRAZOLE SODIUM 40 MG/1
40 TABLET, DELAYED RELEASE ORAL
Qty: 30 TABLET | Refills: 2 | Status: SHIPPED | OUTPATIENT
Start: 2020-06-18 | End: 2020-09-10

## 2020-07-06 DIAGNOSIS — R42 DIZZINESS: ICD-10-CM

## 2020-07-06 RX ORDER — MECLIZINE HCL 12.5 MG/1
12.5 TABLET ORAL EVERY 8 HOURS PRN
Qty: 90 TABLET | Refills: 0 | Status: SHIPPED | OUTPATIENT
Start: 2020-07-06 | End: 2020-10-21 | Stop reason: SDUPTHER

## 2020-07-09 ENCOUNTER — HOSPITAL ENCOUNTER (OUTPATIENT)
Dept: RADIOLOGY | Facility: HOSPITAL | Age: 75
Discharge: HOME | End: 2020-07-09
Payer: MEDICARE

## 2020-07-09 VITALS — WEIGHT: 141 LBS | BODY MASS INDEX: 26.62 KG/M2 | HEIGHT: 61 IN

## 2020-07-09 DIAGNOSIS — K80.20 CALCULUS OF GALLBLADDER WITHOUT CHOLECYSTITIS WITHOUT OBSTRUCTION: ICD-10-CM

## 2020-07-09 DIAGNOSIS — Z78.0 ASYMPTOMATIC POSTMENOPAUSAL STATE: ICD-10-CM

## 2020-07-09 DIAGNOSIS — Z12.31 ENCOUNTER FOR SCREENING MAMMOGRAM FOR BREAST CANCER: ICD-10-CM

## 2020-07-09 PROCEDURE — 77067 SCR MAMMO BI INCL CAD: CPT

## 2020-07-09 PROCEDURE — 77063 BREAST TOMOSYNTHESIS BI: CPT

## 2020-07-09 PROCEDURE — 76705 ECHO EXAM OF ABDOMEN: CPT

## 2020-07-09 PROCEDURE — 77080 DXA BONE DENSITY AXIAL: CPT

## 2020-07-13 DIAGNOSIS — R53.1 GENERALIZED WEAKNESS: ICD-10-CM

## 2020-07-13 DIAGNOSIS — E78.2 MIXED HYPERLIPIDEMIA: ICD-10-CM

## 2020-07-13 RX ORDER — VIT B COMP NO.3/FOLIC/C/BIOTIN 1 MG-60 MG
1 TABLET ORAL
Qty: 30 TABLET | Refills: 2 | Status: SHIPPED | OUTPATIENT
Start: 2020-07-13 | End: 2020-10-12

## 2020-07-13 RX ORDER — ATORVASTATIN CALCIUM 20 MG/1
20 TABLET, FILM COATED ORAL DAILY
Qty: 30 TABLET | Refills: 2 | Status: SHIPPED | OUTPATIENT
Start: 2020-07-13 | End: 2020-10-12

## 2020-07-20 ENCOUNTER — OFFICE VISIT (OUTPATIENT)
Dept: URGENT CARE | Facility: CLINIC | Age: 75
End: 2020-07-20
Payer: MEDICARE

## 2020-07-20 VITALS
HEIGHT: 61 IN | SYSTOLIC BLOOD PRESSURE: 130 MMHG | WEIGHT: 141.4 LBS | BODY MASS INDEX: 26.7 KG/M2 | OXYGEN SATURATION: 100 % | TEMPERATURE: 98.2 F | RESPIRATION RATE: 18 BRPM | DIASTOLIC BLOOD PRESSURE: 80 MMHG | HEART RATE: 104 BPM

## 2020-07-20 DIAGNOSIS — R10.30 LOWER ABDOMINAL PAIN: Primary | ICD-10-CM

## 2020-07-20 LAB
SL AMB  POCT GLUCOSE, UA: NEGATIVE
SL AMB LEUKOCYTE ESTERASE,UA: ABNORMAL
SL AMB POCT BILIRUBIN,UA: NEGATIVE
SL AMB POCT BLOOD,UA: NEGATIVE
SL AMB POCT CLARITY,UA: ABNORMAL
SL AMB POCT COLOR,UA: YELLOW
SL AMB POCT KETONES,UA: NEGATIVE
SL AMB POCT NITRITE,UA: NEGATIVE
SL AMB POCT PH,UA: 6.5
SL AMB POCT SPECIFIC GRAVITY,UA: 1
SL AMB POCT URINE PROTEIN: NEGATIVE
SL AMB POCT UROBILINOGEN: 0.2

## 2020-07-20 PROCEDURE — 4040F PNEUMOC VAC/ADMIN/RCVD: CPT | Performed by: PREVENTIVE MEDICINE

## 2020-07-20 PROCEDURE — 3075F SYST BP GE 130 - 139MM HG: CPT | Performed by: PREVENTIVE MEDICINE

## 2020-07-20 PROCEDURE — 87086 URINE CULTURE/COLONY COUNT: CPT | Performed by: PREVENTIVE MEDICINE

## 2020-07-20 PROCEDURE — 87077 CULTURE AEROBIC IDENTIFY: CPT | Performed by: PREVENTIVE MEDICINE

## 2020-07-20 PROCEDURE — 81002 URINALYSIS NONAUTO W/O SCOPE: CPT | Performed by: PREVENTIVE MEDICINE

## 2020-07-20 PROCEDURE — 99213 OFFICE O/P EST LOW 20 MIN: CPT | Performed by: PREVENTIVE MEDICINE

## 2020-07-20 PROCEDURE — 1111F DSCHRG MED/CURRENT MED MERGE: CPT | Performed by: PREVENTIVE MEDICINE

## 2020-07-20 PROCEDURE — 1036F TOBACCO NON-USER: CPT | Performed by: PREVENTIVE MEDICINE

## 2020-07-20 PROCEDURE — 1160F RVW MEDS BY RX/DR IN RCRD: CPT | Performed by: PREVENTIVE MEDICINE

## 2020-07-20 PROCEDURE — 87186 SC STD MICRODIL/AGAR DIL: CPT | Performed by: PREVENTIVE MEDICINE

## 2020-07-20 PROCEDURE — 3079F DIAST BP 80-89 MM HG: CPT | Performed by: PREVENTIVE MEDICINE

## 2020-07-20 RX ORDER — CIPROFLOXACIN 500 MG/1
500 TABLET, FILM COATED ORAL EVERY 12 HOURS SCHEDULED
Qty: 10 TABLET | Refills: 0 | Status: SHIPPED | OUTPATIENT
Start: 2020-07-20 | End: 2020-07-25

## 2020-07-20 RX ORDER — SULFAMETHOXAZOLE AND TRIMETHOPRIM 800; 160 MG/1; MG/1
1 TABLET ORAL EVERY 12 HOURS SCHEDULED
Qty: 10 TABLET | Refills: 0 | Status: SHIPPED | OUTPATIENT
Start: 2020-07-20 | End: 2020-07-20 | Stop reason: CLARIF

## 2020-07-20 NOTE — PATIENT INSTRUCTIONS
Urinary Traction Infection in Older Adults   WHAT YOU NEED TO KNOW:   A urinary tract infection (UTI) is caused by bacteria that get inside your urinary tract  Your urinary tract includes your kidneys, ureters, bladder, and urethra  Urine is made in your kidneys, and it flows from the ureters to the bladder  Urine leaves the bladder through the urethra  A UTI is more common in your lower urinary tract, which includes your bladder and urethra  DISCHARGE INSTRUCTIONS:   Return to the emergency department if:   · You are urinating very little or not at all  · You are vomiting  · You have a high fever with shaking chills  · You have side or back pain that gets worse  Contact your healthcare provider if:   · You have a fever  · You are a woman and you have increased white or yellow discharge from your vagina  · You do not feel better after 2 days of taking antibiotics  · You have questions or concerns about your condition or care  Medicines:   · Medicines  help treat the bacterial infection or decrease pain and burning when you urinate  You may also need medicines to decrease the urge to urinate often  Your healthcare provider may recommend cranberry juice or cranberry supplements to help decrease your symptoms  · Take your medicine as directed  Contact your healthcare provider if you think your medicine is not helping or if you have side effects  Tell him or her if you are allergic to any medicine  Keep a list of the medicines, vitamins, and herbs you take  Include the amounts, and when and why you take them  Bring the list or the pill bottles to follow-up visits  Carry your medicine list with you in case of an emergency  Self-care:   · Urinate when you feel the urge  Do not hold your urine because bacteria can grow in the bladder if urine stays in the bladder too long  It may be helpful to urinate at least every 3 to 4 hours  · Drink liquids as directed    Liquids can help flush bacteria from your urinary tract  Ask how much liquid to drink each day and which liquids are best for you  You may need to drink more liquids than usual to help flush out the bacteria  Do not drink alcohol, caffeine, and citrus juices  These can irritate your bladder and increase your symptoms  · Apply heat  on your abdomen for 20 to 30 minutes every 2 hours for as many days as directed  Heat helps decrease discomfort and pressure in your bladder  Prevent a UTI:   · Women should wipe front to back  after urinating or having a bowel movement  This may prevent germs from getting into the urinary tract  · Urinate after you have sex  to flush away bacteria that can enter your urinary tract during sex  · Wear cotton underwear and clothes that fit loose  Tight pants and nylon underwear can trap moisture and cause bacteria to grow  Follow up with your healthcare provider as directed:  Write down your questions so you remember to ask them during your visits  © 2017 [employer] Information is for End User's use only and may not be sold, redistributed or otherwise used for commercial purposes  All illustrations and images included in CareNotes® are the copyrighted property of A D [employer] M [employer]  or [employer]  The above information is an  only  It is not intended as medical advice for individual conditions or treatments  Talk to your doctor, nurse or pharmacist before following any medical regimen to see if it is safe and effective for you

## 2020-07-21 ENCOUNTER — TELEPHONE (OUTPATIENT)
Dept: URGENT CARE | Facility: CLINIC | Age: 75
End: 2020-07-21

## 2020-07-21 NOTE — TELEPHONE ENCOUNTER
Daughter, Shayy Rangel called with medication question  States they received 2 antibiotics from [facility] today - Cipro 550 mg BID and Bactrim DS BID  Wondering why both medications were ordered  Per SHANIA Jordan - appears that Bactrim was cancelled in Epic medication history and Cipro ordered instead  Shayy Rangel notified of such - to start Cipro (take one dose now and second later before bed - 10 pm then space ir q 12 hours starting tomorrow  Advised to discard Bactrim - pharmacy will not accept return once medication leaves the store  Shayy Rangel verbalizes understanding and in agreement with plan

## 2020-07-22 ENCOUNTER — TELEPHONE (OUTPATIENT)
Dept: URGENT CARE | Facility: CLINIC | Age: 75
End: 2020-07-22

## 2020-07-22 LAB
BACTERIA UR CULT: ABNORMAL
BACTERIA UR CULT: ABNORMAL

## 2020-07-22 NOTE — PROGRESS NOTES
[facility]        NAME: Abel Pollard is a 66 y o  female  : 1945    MRN: [id number]  DATE: 2020  TIME: 4:16 PM    Assessment and Plan   Lower abdominal pain [R10 30]  1  Lower abdominal pain  POCT urine dip    Urine culture    ciprofloxacin (CIPRO) 500 mg tablet    DISCONTINUED: sulfamethoxazole-trimethoprim (BACTRIM DS) 800-160 mg per tablet         Patient Instructions       Follow up with PCP in 3-5 days  Proceed to  ER if symptoms worsen  Chief Complaint     Chief Complaint   Patient presents with    Possible UTI     Started last night with RLQ abdominal pain that radiates to L side and to fR flank area  Having urinary frequency and urgency  Had chills  Is occ  incontinent  Took Tylenol at 9:30 am           History of Present Illness       Urinary Tract Infection    This is a new problem  The current episode started yesterday  The problem occurs every urination  The problem has been unchanged  The quality of the pain is described as aching  The pain is at a severity of 4/10  The pain is moderate  There has been no fever  The fever has been present for less than 1 day  She is not sexually active  There is no history of pyelonephritis  Associated symptoms include chills, nausea and urgency  She has tried nothing for the symptoms  The treatment provided no relief  Review of Systems   Review of Systems   Constitutional: Positive for chills  Respiratory: Negative  Cardiovascular: Negative  Gastrointestinal: Positive for nausea  Genitourinary: Positive for urgency  All other systems reviewed and are negative          Current Medications       Current Outpatient Medications:     atorvastatin (LIPITOR) 20 mg tablet, Take 1 tablet (20 mg total) by mouth daily, Disp: 30 tablet, Rfl: 2    B Complex-C-Folic Acid (B COMPLEX-VITAMIN C-FOLIC ACID) 1 MG tablet, Take 1 tablet by mouth daily with breakfast, Disp: 30 tablet, Rfl: 2    CRANBERRY PO, Take by mouth 2 (two) times a day, Disp: , Rfl:     docusate sodium (COLACE) 100 mg capsule, Take 1 capsule (100 mg total) by mouth 2 (two) times a day, Disp: 10 capsule, Rfl: 0    meclizine (ANTIVERT) 12 5 MG tablet, Take 1 tablet (12 5 mg total) by mouth every 8 (eight) hours as needed for dizziness, Disp: 90 tablet, Rfl: 0    Omega-3 Fatty Acids (FISH OIL) 1,000 mg, Take 1,000 mg by mouth 2 (two) times a day, Disp: , Rfl:     pantoprazole (PROTONIX) 40 mg tablet, Take 1 tablet (40 mg total) by mouth daily in the early morning, Disp: 30 tablet, Rfl: 2    polyethylene glycol (MIRALAX) 17 g packet, Take 17 g by mouth daily as needed (Constipation), Disp: 14 each, Rfl: 0    triamcinolone (KENALOG) 0 1 % cream, Apply topically 2 (two) times a day, Disp: 30 g, Rfl: 0    ciprofloxacin (CIPRO) 500 mg tablet, Take 1 tablet (500 mg total) by mouth every 12 (twelve) hours for 5 days, Disp: 10 tablet, Rfl: 0    Current Allergies     Allergies as of 07/20/2020 - Reviewed 07/20/2020   Allergen Reaction Noted    Bee venom Anaphylaxis 05/31/2016            The following portions of the patient's history were reviewed and updated as appropriate: allergies, current medications, past family history, past medical history, past social history, past surgical history and problem list      Past Medical History:   Diagnosis Date    Asthma     Cholelithiasis     GERD (gastroesophageal reflux disease)     Influenza 1/29/2019    Migraine headache     Pneumonia     Urinary tract infection        Past Surgical History:   Procedure Laterality Date    NO PAST SURGERIES         Family History   Problem Relation Age of Onset    Heart disease Mother     No Known Problems Father     No Known Problems Son          Medications have been verified          Objective   /80   Pulse 104   Temp 98 2 °F (36 8 °C)   Resp 18   Ht 5' 1" (1 549 m)   Wt 64 1 kg (141 lb 6 4 oz)   SpO2 100%   BMI 26 72 kg/m²        Physical Exam     Physical Exam Constitutional: She appears well-developed and well-nourished  HENT:   Head: Normocephalic  Eyes: Pupils are equal, round, and reactive to light  Cardiovascular: Normal rate, regular rhythm and normal heart sounds  Pulmonary/Chest: Effort normal and breath sounds normal    Abdominal: Soft  Normal appearance and bowel sounds are normal  There is tenderness in the suprapubic area  Nursing note and vitals reviewed

## 2020-07-28 DIAGNOSIS — R26.9 GAIT DISTURBANCE: Primary | ICD-10-CM

## 2020-08-06 ENCOUNTER — EVALUATION (OUTPATIENT)
Dept: PHYSICAL THERAPY | Facility: CLINIC | Age: 75
End: 2020-08-06
Payer: MEDICARE

## 2020-08-06 DIAGNOSIS — R26.89 BALANCE DISORDER: Primary | ICD-10-CM

## 2020-08-06 DIAGNOSIS — R26.9 GAIT ABNORMALITY: ICD-10-CM

## 2020-08-06 DIAGNOSIS — M62.81 GENERALIZED MUSCLE WEAKNESS: ICD-10-CM

## 2020-08-06 PROCEDURE — 97162 PT EVAL MOD COMPLEX 30 MIN: CPT

## 2020-08-06 NOTE — PROGRESS NOTES
PT Evaluation     Today's date: 2020  Patient name: Joie Pryor  : 1945  MRN: [id number]  Referring provider: Evelin Urbano MD  Dx:   Encounter Diagnosis     ICD-10-CM    1  Balance disorder  R26 89    2  Gait abnormality  R26 9    3  Generalized muscle weakness  M62 81        Start Time: 1115  Stop Time: 1215  Total time in clinic (min): 60 minutes    Assessment  Assessment details: Patient presents to skilled PT for balance impairment and chronic LBP  Patient reports near falls occurring atleast 3 times a month especially while descending stairs  Patient displays decreasedmuscle strength with overall grade of 4-/5 but severe weakness of hip flexors/abductors with 3-/5  Patient balance scores are as follows: 30/56 GONZALES, 14 seconds TUG without Assistive Device, 6 sec FTEC on foam with overall results noting high risk for falls  Patient endurance scores are as follows; 200 feet with  6 minute walk test without AD and request to stop at 2:30 sec with overall results noting decrease functional endurance and cardio capacit  Patient went over gait training with treating PT on how to safely use a SPC, because her R LE was weaker she was educated to use in L UE which she demonstrated correctly and safely  Patient displays overall reduction in somatosensory/ proprioception awareness secondary to increased LOB associated with mCTSIB  Patient will benefit from skilled PT to address noted impairments and functional limitations they are causing with overall goal to return patient to highest level possible with reduced risk for falls  Please contact me if you have any questions or recommendations  Thank you for the referral and the opportunity to share in [facility]'s care      Patient verbalized understanding of POC              Impairments: abnormal coordination, abnormal gait, impaired balance, impaired physical strength, lacks appropriate home exercise program and poor posture   Understanding of Dx/Px/POC: good   Prognosis: good    Goals  Goals  STG 30 days    Patient will improve static balance with feet together EC on foam to 10 sec indicating reduction in fall risk  Patient will achieve 190 feet improvement with overall distance achieved of 490 feet with 6 minute walk test which is Minimal Detectable Change pre current research standards with endurance to demonstrate enhance functional capacity   Patient will display 2 3  second improvement with overall score of  TUG test or lower with noted improvement being Minimal Detectable Change pre current research standards with fall risk     Patient will achieve 36/56 GONZALES score with minimal improve by 6 points or more demonstrating Minimal Detectable change per current research standards for this objective test which assess fall risk  Patient will achieve   59 ft/sec improvement of 10 Meter Walk test, demonstrating Minimal Detectable change per current research standards for this objective test which assess fall risk     Patient will perform  5 x sit to stand test with overall reduction by seconds to 13 score indicating improvement with functional endurance    LT days   Patient will score low risk for falls with 3/4 fall risk measures   Patient will be able to ambulate 1000 feet without AD during 6 minute walk test   Patient will be able to perform floor transfer without physical assistance   Patient will be able to carry objects without loss of balance  Patient will be able to ambulate outdoors without any loss of balance    Cut off score   All date taken from [facility] Neuro Section or Rehab Measures    GONZALES test: 46/56                                              5 x STS Test:  MDC: 6 points                                                  MDC: 2 3 seconds   age norms                                                                 Age Norms   61-76 year old = M: 54, F: 54                        61-76 year old: 11 4 seconds   66-77 year old = M 47,  F: 48 66-77 year old: 12 6 seconds    80-80 year old = M46,   F: 53                       80-80 year old: 14 8 seconds     TUG test:                                                                     10 Meter Walk Test:  MDC: 4 14 seconds       MDC:  59 ft/sec  Cut off score for Falls                                                  Age Norms  > 13 5 seconds community dwelling adults                20-29; M: 4 56 ft/sec F: 4 62 ft/sec  > 32 2 Frail Elderly                                                     30-39: M 4 76 ft/sec  F: 4 68 ft/sec          40-49: M: 4 79 ft/sec  F: 4 62 ft/sec  6 Minute Walk Test      50-59: M: 4 76 ft/sec  F: 4 56 ft/sec  MDC: 190 feet       60-69: M: 4 56 ft/sec  F: 4 26 ft/sec  Age Norms       70-+    M: 4 36 ft/sec  F: 4 16 ft/sec  60-69:    M: 1876 F: 9374  41-98:    M: 1729 F: 8111  06-80 +: M: 80 F; 1286     Plan  Patient would benefit from: PT eval and skilled physical therapy  Planned modality interventions: biofeedback  Planned therapy interventions: manual therapy, motor coordination training, neuromuscular re-education, patient education, postural training, sensory integrative techniques, strengthening, stretching, therapeutic activities, therapeutic exercise, gait training, home exercise program, coordination, balance, abdominal trunk stabilization, flexibility, functional ROM exercises, graded motor and graded exercise  Frequency: 2x week  Duration in weeks: 12  Plan of Care beginning date: 8/6/2020  Plan of Care expiration date: 10/29/2020  Treatment plan discussed with: patient        Subjective Evaluation    History of Present Illness  Mechanism of injury: 8/6/20  Patient reports that in June she was admitted to hospital for vertigo and dehydration  After a few days she was released and went to stay at her daughters where she had PT and OT   She ambulates with a SPC which she needs for long distances , but her son reports that she can ambulate shorter distances with no AD  She reports that she gets more tired easier since being hospitalized  Ambulates stairs with BL UE's and completes "step to" pattern  Issues with balance while ambulating on uneven terrain  Reports back pain that is 8/10 at worst while standing upright for long durations  States that this is chronic pain and located on L side of back  Pain  Current pain ratin  At best pain ratin  At worst pain ratin  Location: L low back   Quality: dull ache  Relieving factors: relaxation and rest  Aggravating factors: standing    Social Support  Steps to enter house: yes  Stairs in house: yes   Lives in: multiple-level home  Lives with: spouse and adult children    Treatments  Previous treatment: physical therapy and home therapy  Current treatment: physical therapy  Patient Goals  Patient goals for therapy: increased strength, independence with ADLs/IADLs, improved balance and increased motion          Objective     Static Posture     Comments  Static Balance Testing:    MCTSIB:  Feet together Eyes open Firm surface: 30 seconds   Feet together Eyes closed Firm surface: 8seconds     Feet together Eyes open Foam surface: 11 seconds  Feet together Eyes closed Foam surface: 6 sec       GONZALES/56     Strength/Myotome Testing     Left Hip   Planes of Motion   Flexion: 4-  Abduction: 3-  External rotation: 4-  Internal rotation: 4-    Right Hip   Planes of Motion   Flexion: 3-  Abduction: 3-  External rotation: 4-  Internal rotation: 4-    Left Knee   Flexion: 4-  Extension: 4-    Right Knee   Flexion: 4-  Extension: 4-    Left Ankle/Foot   Dorsiflexion: 4-  Plantar flexion: 4-    Right Ankle/Foot   Dorsiflexion: 4-  Plantar flexion: 4-    Additional Strength Details  Sensation:   normal      Coordination  Heel to Shin Test: normal  Alt foot tapping: normal    Ambulation     Comments   Dynamic Endurance testing    6 minute walk test:  2 5 min; 200 ft-patient requested to stop     Dynamic Balance/Fall risk testing    TUG: 10 feet down and back   Around R side: 14 sec  Around L side: 11 sec knocked down cone    10 meter walk test: PLAN     Gait Deviations: forward trunk lean, use of SPC , reduced push off, reduced dorsiflexion BL, no AD    Functional Assessment        Comments  Endurance   5 time sit to stand test: 14sec with UE support  30 sec STS: 11 x's with UE     Vital Signs:    BP: 130/77 mmHg  SpO2: 99%  HR: 81 bpm        Flowsheet Rows      Most Recent Value   PT/OT G-Codes   Current Score  37   Projected Score  48             Precautions:   Diagnosis Date Comment Source   Asthma   Provider   Cholelithiasis   Provider   GERD (gastroesophageal reflux disease)   Provider   Influenza 1/29/2019  Provider   Migraine headache   Provider   Pneumonia   Provider         Vital Signs:  After 5x STS: 151/84 mmHg; HR= 92 bpm   Post tx: 147/84 mmHg ; HR = 88 bpm         Visit # 1             8/6/20                         Exercise Diary                           TE's                                                                 NM:             Neuro Testing 40'                                      Gait Training 20'                                                                                                                                     Modalities

## 2020-08-11 ENCOUNTER — OFFICE VISIT (OUTPATIENT)
Dept: PHYSICAL THERAPY | Facility: CLINIC | Age: 75
End: 2020-08-11
Payer: MEDICARE

## 2020-08-11 DIAGNOSIS — R26.9 GAIT ABNORMALITY: ICD-10-CM

## 2020-08-11 DIAGNOSIS — M62.81 GENERALIZED MUSCLE WEAKNESS: ICD-10-CM

## 2020-08-11 DIAGNOSIS — R26.89 BALANCE DISORDER: Primary | ICD-10-CM

## 2020-08-11 PROCEDURE — 97116 GAIT TRAINING THERAPY: CPT

## 2020-08-11 PROCEDURE — 97110 THERAPEUTIC EXERCISES: CPT

## 2020-08-11 NOTE — PROGRESS NOTES
Daily Note     Today's date: 2020  Patient name: Kelli Vega  : 1945  MRN: [id number]  Referring provider: Miranda Langley MD  Dx:   Encounter Diagnosis     ICD-10-CM    1  Balance disorder  R26 89    2  Gait abnormality  R26 9    3  Generalized muscle weakness  M62 81        Start Time: 935  Stop Time: 477  Total time in clinic (min): 40 minutes    Subjective:  Patient reports that her low back bothers her across her back  Prior to coming into session she was 5' late because she said she couldn't get bathroom door open  Was educated to inform PT or nearby staff to assist her  Objective: See treatment diary below      Assessment: Tolerated treatment well  To prevent LBP from limiting ability to complete TE's for balance and LE strength added seated flexion which improved pain from 7/10 in low back to 4/10 in low back  Increase in LBP while laying supine which reduced with PB DKTC  In order to address endurance impairments added ambulation with PT which patient requiring frequent rest breaks and demonstrates hip ER when becoming fatigued  Patient would benefit from continued PT      Plan: Continue per plan of care  Precautions:   Diagnosis Date Comment Source   Asthma   Provider   Cholelithiasis   Provider   GERD (gastroesophageal reflux disease)   Provider   Influenza 2019  Provider   Migraine headache   Provider   Pneumonia   Provider                 Visit # 1 2            20                        Exercise Diary                           TE's             Seated lumbar flex  2 x 10 6" hold reduce pain           Seated ball roll outs  2 x 10 6" hold reduce pain           SLR flex  2 x 6 R/L           DKTC with SOS on PB   2 x 10 5" reduce pain           LAQ  2# 2  X 6 R/L           Rec   Bike   6' L2-L3                                     NM:             Neuro Testing 36'                                      Gait Training 20'            Ambulation with PT  200'; 100' ; 100' requiring cane when fatigued                                                                                                                        Modalities

## 2020-08-13 ENCOUNTER — OFFICE VISIT (OUTPATIENT)
Dept: PHYSICAL THERAPY | Facility: CLINIC | Age: 75
End: 2020-08-13
Payer: MEDICARE

## 2020-08-13 DIAGNOSIS — R26.9 GAIT ABNORMALITY: ICD-10-CM

## 2020-08-13 DIAGNOSIS — R26.89 BALANCE DISORDER: Primary | ICD-10-CM

## 2020-08-13 DIAGNOSIS — M62.81 GENERALIZED MUSCLE WEAKNESS: ICD-10-CM

## 2020-08-13 PROCEDURE — 97110 THERAPEUTIC EXERCISES: CPT

## 2020-08-13 PROCEDURE — 97116 GAIT TRAINING THERAPY: CPT

## 2020-08-13 PROCEDURE — 97112 NEUROMUSCULAR REEDUCATION: CPT

## 2020-08-13 NOTE — PROGRESS NOTES
Daily Note     Today's date: 2020  Patient name: Rob Viera  : 1945  MRN: [id number]  Referring provider: Ngozi Macario MD  Dx:   Encounter Diagnosis     ICD-10-CM    1  Balance disorder  R26 89    2  Gait abnormality  R26 9    3  Generalized muscle weakness  M62 81        Start Time: 1100  Stop Time: 1145  Total time in clinic (min): 45 minutes    Subjective: patient reports improvement in LBP since last visit that is now 1/10 upon arrival        Objective: See treatment diary below    Vital Signs:   BP: 127/75 mmHg  HR: 85 bpm  SpO2: 99 %  Assessment: Tolerated treatment well  Increased duration on rec  Bike to address endurance deficits  Improvements in endurance indicated by patient ability to ambulate 100' more feet today  Patient would benefit from continued PT      Plan: Continue per plan of care  Precautions:   Diagnosis Date Comment Source   Asthma   Provider   Cholelithiasis   Provider   GERD (gastroesophageal reflux disease)   Provider   Influenza 2019  Provider   Migraine headache   Provider   Pneumonia   Provider                 Visit # 1 2 3           20                       Exercise Diary                           TE's             Seated lumbar flex  2 x 10 6" hold reduce pain 2 x 10 6" hold reduce pain          Seated ball roll outs  2 x 10 6" hold reduce pain 2 x 10 6" hold reduce pain          SLR flex  2 x 6 R/L 2 x 8 R/L           DKTC with SOS on PB   2 x 10 5" reduce pain 2 x 10 5" reduce pain          LAQ  2# 2  X 6 R/L           Rec   Bike   6' L2-L3 10' L2                                    NM:             Neuro Testing 36'            glute sets   2 x 10 3"          Supine hip abduction   On board with towel 1 x 10 R/L            20'            Ambulation with PT  200'; 100' ; 100' requiring cane when fatigued  300':  3:35 min  requiring cane when fatigued Modalities

## 2020-08-18 ENCOUNTER — OFFICE VISIT (OUTPATIENT)
Dept: PHYSICAL THERAPY | Facility: CLINIC | Age: 75
End: 2020-08-18
Payer: MEDICARE

## 2020-08-18 DIAGNOSIS — R26.9 GAIT ABNORMALITY: ICD-10-CM

## 2020-08-18 DIAGNOSIS — R26.89 BALANCE DISORDER: Primary | ICD-10-CM

## 2020-08-18 DIAGNOSIS — M62.81 GENERALIZED MUSCLE WEAKNESS: ICD-10-CM

## 2020-08-18 PROCEDURE — 97110 THERAPEUTIC EXERCISES: CPT

## 2020-08-18 PROCEDURE — 97112 NEUROMUSCULAR REEDUCATION: CPT

## 2020-08-18 PROCEDURE — 97116 GAIT TRAINING THERAPY: CPT
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
## 2020-08-19 PROBLEM — M81.0 AGE RELATED OSTEOPOROSIS: Status: ACTIVE | Noted: 2020-08-19

## 2020-08-20 ENCOUNTER — OFFICE VISIT (OUTPATIENT)
Dept: PHYSICAL THERAPY | Facility: CLINIC | Age: 75
End: 2020-08-20
Payer: MEDICARE

## 2020-08-20 DIAGNOSIS — M62.81 GENERALIZED MUSCLE WEAKNESS: ICD-10-CM

## 2020-08-20 DIAGNOSIS — R26.9 GAIT ABNORMALITY: ICD-10-CM

## 2020-08-20 DIAGNOSIS — R26.89 BALANCE DISORDER: Primary | ICD-10-CM

## 2020-08-20 PROCEDURE — 97112 NEUROMUSCULAR REEDUCATION: CPT

## 2020-08-20 PROCEDURE — 97110 THERAPEUTIC EXERCISES: CPT

## 2020-08-20 NOTE — PROGRESS NOTES
Daily Note     Today's date: 2020  Patient name: Shahbaz Miller  : 1945  MRN: [id number]  Referring provider: Sally Reynolds MD  Dx:   Encounter Diagnosis     ICD-10-CM    1  Balance disorder  R26 89    2  Gait abnormality  R26 9    3  Generalized muscle weakness  M62 81                   Subjective: Patient arrives with [address] and noted increased LBP today  No new falls or complaints reported  Objective: See treatment diary below      Assessment: Patient able to tolerate treatment session well today  Initial LBP reduced with forward flexion and PBall rollouts  Mild increase in LBP with step taps, however reduced with seated rest break  Discussed performing steps at home leading with the L when going up and leading with the R when coming down and she demonstrated good proficiency and verbal understanding  Challenged with exercises on foam resulting in increased postural sway  She will continue to benefit from skilled outpatient PT in order to maximize her function and reduce her risk for falls  Plan: Continue per plan of care        Precautions:   Diagnosis Date Comment Source   Asthma   Provider   Cholelithiasis   Provider   GERD (gastroesophageal reflux disease)   Provider   Influenza 2019  Provider   Migraine headache   Provider   Pneumonia   Provider       Vital Signs:   BP: 136/78 mmHg  HR: 86 bpm   SpO2:99 %        Visit # 1 2 3 4 5         20                     Exercise Diary                           TE's             Seated lumbar flex  2 x 10 6" hold reduce pain 2 x 10 6" hold reduce pain 2 x 10 6" hold reduce pain 3 x 10 6" hold reduce pain        Seated ball roll outs  2 x 10 6" hold reduce pain 2 x 10 6" hold reduce pain 2 x 10 6" hold reduce pain 2 x 10 6" hold reduce pain        SLR flex  2 x 6 R/L 2 x 8 R/L  2#-unable  1#-unable  2 x 8 R/L          DKTC with SOS on PB   2 x 10 5" reduce pain 2 x 10 5" reduce pain          LAQ  2# 2  X 6 R/L  2# 2 x 10 each         Rec   Bike   6' L2-L3 10' L2 10' L2 10' L2        Hooklying hip marches    2 x 10 R/L 3" hold                      NM:             Step taps     Fwd, 8" 20 reps B/L, 0 UE        Step ups     Fwd, 8" 20 reps B/L, 1 UE        Step up/over     10 reps, up w/ L down w/ R, 1 UE        FAEO on foam     4 reps, 30 sec        FTEO on firm     2 reps, 30 sec                                               Neuro Testing 40'            glute sets   2 x 10 3" 2 x 19 3" hold 2 x 20 3" hold        Supine hip abduction   On board with towel 1 x 10 R/L            20'            Ambulation with PT  200'; 100' ; 100' requiring cane when fatigued  300':  3:35 min  requiring cane when fatigued  300'requiring cane when fatigued         bridges    1  X 8 - stopped due to poor muscle activation                                                                                                        Modalities

## 2020-08-25 ENCOUNTER — APPOINTMENT (OUTPATIENT)
Dept: PHYSICAL THERAPY | Facility: CLINIC | Age: 75
End: 2020-08-25
Payer: MEDICARE

## 2020-08-25 DIAGNOSIS — F41.9 ANXIETY: Primary | ICD-10-CM

## 2020-08-25 RX ORDER — ALPRAZOLAM 0.25 MG/1
0.25 TABLET ORAL DAILY PRN
Qty: 30 TABLET | Refills: 0 | Status: SHIPPED | OUTPATIENT
Start: 2020-08-25 | End: 2021-02-04 | Stop reason: SDUPTHER

## 2020-08-26 ENCOUNTER — APPOINTMENT (OUTPATIENT)
Dept: PHYSICAL THERAPY | Facility: CLINIC | Age: 75
End: 2020-08-26
Payer: MEDICARE

## 2020-08-27 ENCOUNTER — APPOINTMENT (OUTPATIENT)
Dept: PHYSICAL THERAPY | Facility: CLINIC | Age: 75
End: 2020-08-27
Payer: MEDICARE

## 2020-08-28 ENCOUNTER — APPOINTMENT (OUTPATIENT)
Dept: PHYSICAL THERAPY | Facility: CLINIC | Age: 75
End: 2020-08-28
Payer: MEDICARE

## 2020-08-31 ENCOUNTER — APPOINTMENT (OUTPATIENT)
Dept: PHYSICAL THERAPY | Facility: CLINIC | Age: 75
End: 2020-08-31
Payer: MEDICARE

## 2020-09-01 ENCOUNTER — APPOINTMENT (OUTPATIENT)
Dept: PHYSICAL THERAPY | Facility: CLINIC | Age: 75
End: 2020-09-01
Payer: MEDICARE

## 2020-09-02 ENCOUNTER — APPOINTMENT (OUTPATIENT)
Dept: PHYSICAL THERAPY | Facility: CLINIC | Age: 75
End: 2020-09-02
Payer: MEDICARE

## 2020-09-03 ENCOUNTER — APPOINTMENT (OUTPATIENT)
Dept: PHYSICAL THERAPY | Facility: CLINIC | Age: 75
End: 2020-09-03
Payer: MEDICARE

## 2020-09-08 ENCOUNTER — APPOINTMENT (OUTPATIENT)
Dept: PHYSICAL THERAPY | Facility: CLINIC | Age: 75
End: 2020-09-08
Payer: MEDICARE

## 2020-09-10 ENCOUNTER — APPOINTMENT (OUTPATIENT)
Dept: PHYSICAL THERAPY | Facility: CLINIC | Age: 75
End: 2020-09-10
Payer: MEDICARE

## 2020-09-10 DIAGNOSIS — K21.9 GASTROESOPHAGEAL REFLUX DISEASE, ESOPHAGITIS PRESENCE NOT SPECIFIED: ICD-10-CM

## 2020-09-10 RX ORDER — PANTOPRAZOLE SODIUM 40 MG/1
TABLET, DELAYED RELEASE ORAL
Qty: 30 TABLET | Refills: 2 | Status: SHIPPED | OUTPATIENT
Start: 2020-09-10 | End: 2020-12-12

## 2020-09-11 ENCOUNTER — APPOINTMENT (OUTPATIENT)
Dept: PHYSICAL THERAPY | Facility: CLINIC | Age: 75
End: 2020-09-11
Payer: MEDICARE

## 2020-09-14 ENCOUNTER — APPOINTMENT (OUTPATIENT)
Dept: PHYSICAL THERAPY | Facility: CLINIC | Age: 75
End: 2020-09-14
Payer: MEDICARE

## 2020-09-15 ENCOUNTER — APPOINTMENT (OUTPATIENT)
Dept: PHYSICAL THERAPY | Facility: CLINIC | Age: 75
End: 2020-09-15
Payer: MEDICARE

## 2020-09-16 ENCOUNTER — OFFICE VISIT (OUTPATIENT)
Dept: PHYSICAL THERAPY | Facility: CLINIC | Age: 75
End: 2020-09-16
Payer: MEDICARE

## 2020-09-16 DIAGNOSIS — R26.9 GAIT ABNORMALITY: ICD-10-CM

## 2020-09-16 DIAGNOSIS — M62.81 GENERALIZED MUSCLE WEAKNESS: ICD-10-CM

## 2020-09-16 DIAGNOSIS — R26.89 BALANCE DISORDER: Primary | ICD-10-CM

## 2020-09-16 PROCEDURE — 97112 NEUROMUSCULAR REEDUCATION: CPT | Performed by: PHYSICAL THERAPIST

## 2020-09-16 PROCEDURE — 97530 THERAPEUTIC ACTIVITIES: CPT | Performed by: PHYSICAL THERAPIST
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
## 2020-09-17 ENCOUNTER — APPOINTMENT (OUTPATIENT)
Dept: PHYSICAL THERAPY | Facility: CLINIC | Age: 75
End: 2020-09-17
Payer: MEDICARE

## 2020-09-21 ENCOUNTER — APPOINTMENT (OUTPATIENT)
Dept: PHYSICAL THERAPY | Facility: CLINIC | Age: 75
End: 2020-09-21
Payer: MEDICARE

## 2020-09-22 ENCOUNTER — OFFICE VISIT (OUTPATIENT)
Dept: PHYSICAL THERAPY | Facility: CLINIC | Age: 75
End: 2020-09-22
Payer: MEDICARE

## 2020-09-22 ENCOUNTER — APPOINTMENT (OUTPATIENT)
Dept: PHYSICAL THERAPY | Facility: CLINIC | Age: 75
End: 2020-09-22
Payer: MEDICARE

## 2020-09-22 DIAGNOSIS — R26.89 BALANCE DISORDER: Primary | ICD-10-CM

## 2020-09-22 DIAGNOSIS — M62.81 GENERALIZED MUSCLE WEAKNESS: ICD-10-CM

## 2020-09-22 DIAGNOSIS — R26.9 GAIT ABNORMALITY: ICD-10-CM

## 2020-09-22 PROCEDURE — 97112 NEUROMUSCULAR REEDUCATION: CPT

## 2020-09-22 PROCEDURE — 97110 THERAPEUTIC EXERCISES: CPT

## 2020-09-22 NOTE — PROGRESS NOTES
Daily Note  Last PN 2020 (POC: 10-)    Today's date: 2020  Patient name: Manolo Pitt  : 1945  MRN: [id number]  Referring provider: Olive Fernandez MD  Dx:   Encounter Diagnosis     ICD-10-CM    1  Balance disorder  R26 89    2  Generalized muscle weakness  M62 81    3  Gait abnormality  R26 9                   Subjective: Patient arrives with [facility] and noted slight LBP today  No new falls or complaints noted  Objective: See treatment diary below    TE:  - PBall Rollouts: 20 reps, 5 sec holds  - Seated lumbar flexion: 20 reps  - Glute sets: 25 reps, 3 sec holds    NMR:  - Step taps: 8", 20 reps, 0 UE  - Step ups: 8", 10 reps fwd/lat, 1 UE  - FTEO foam: 4 reps, 30 sec, increased postural sway  - 90 degree turns on foam (at HR): 10 reps B/L, PT CG    Assessment: Patient able to tolerate treatment session well today  She reported brief lightheadedness during step taps that resolved with seated rest/water break with no recurrence throughout session  Challenged with exercises on foam today and demonstrated increased postural sway  Most difficulty with 90 degree turns on foam requiring CG and verbal cues to increase step height and slow movement down with fair carryover  She will continue to benefit from skilled outpatient PT in order to maximize her function and reduce her risk for falls  Plan: Continue per plan of care        Precautions:   Diagnosis Date Comment Source   Asthma   Provider   Cholelithiasis   Provider   GERD (gastroesophageal reflux disease)   Provider   Influenza 2019  Provider   Migraine headache   Provider   Pneumonia   Provider       Vital Signs:   BP: 136/78 mmHg  HR: 86 bpm   SpO2:99 %        Visit # 1 2 3 4 5         20                     Exercise Diary                           TE's             Seated lumbar flex  2 x 10 6" hold reduce pain 2 x 10 6" hold reduce pain 2 x 10 6" hold reduce pain 3 x 10 6" hold reduce pain Seated ball roll outs  2 x 10 6" hold reduce pain 2 x 10 6" hold reduce pain 2 x 10 6" hold reduce pain 2 x 10 6" hold reduce pain        SLR flex  2 x 6 R/L 2 x 8 R/L  2#-unable  1#-unable  2 x 8 R/L          DKTC with SOS on PB   2 x 10 5" reduce pain 2 x 10 5" reduce pain          LAQ  2# 2  X 6 R/L  2# 2 x 10 each         Rec   Bike   6' L2-L3 10' L2 10' L2 10' L2        Hooklying hip marches    2 x 10 R/L 3" hold                      NM:             Step taps     Fwd, 8" 20 reps B/L, 0 UE        Step ups     Fwd, 8" 20 reps B/L, 1 UE        Step up/over     10 reps, up w/ L down w/ R, 1 UE        FAEO on foam     4 reps, 30 sec        FTEO on firm     2 reps, 30 sec                                               Neuro Testing 40'            glute sets   2 x 10 3" 2 x 19 3" hold 2 x 20 3" hold        Supine hip abduction   On board with towel 1 x 10 R/L            20'            Ambulation with PT  200'; 100' ; 100' requiring cane when fatigued  300':  3:35 min  requiring cane when fatigued  300'requiring cane when fatigued         bridges    1  X 8 - stopped due to poor muscle activation                                                                                                        Modalities

## 2020-09-23 ENCOUNTER — OFFICE VISIT (OUTPATIENT)
Dept: PHYSICAL THERAPY | Facility: CLINIC | Age: 75
End: 2020-09-23
Payer: MEDICARE

## 2020-09-23 DIAGNOSIS — M62.81 GENERALIZED MUSCLE WEAKNESS: ICD-10-CM

## 2020-09-23 DIAGNOSIS — R26.89 BALANCE DISORDER: Primary | ICD-10-CM

## 2020-09-23 PROCEDURE — 97110 THERAPEUTIC EXERCISES: CPT

## 2020-09-23 PROCEDURE — 97112 NEUROMUSCULAR REEDUCATION: CPT

## 2020-09-23 NOTE — PROGRESS NOTES
Daily Note  Last PN 2020 (POC: 10-)    Today's date: 2020  Patient name: Veronica Norton  : 1945  MRN: [id number]  Referring provider: Korin Buenrostro MD  Dx:   Encounter Diagnosis     ICD-10-CM    1  Balance disorder  R26 89    2  Generalized muscle weakness  M62 81                   Subjective: Patient reports 5/10 LBP with weakness in her R LE  Objective: See treatment diary below    TE:  -- PBall Rollouts: 20 reps, 5 sec holds  -- Seated lumbar flexion: 20 reps  - Glute sets: 25 reps, 3 sec holds    NMR:  - Step taps: 8", 20 reps, 0 UE  - Step ups: 8", 10 reps fwd/lat, 1 UE  - FTEO foam: 4 reps, 30 sec, increased postural sway  - 90 degree turns on foam (at HR): 15 reps B/L, PT CG  Hurdles FW no hands x 4/ Lateral 1 hand hold X 4    Assessment: Patient able to tolerate treatment session well today  Tended to show anteriorly LOB with activities  May benefit with extension activities  Ambulates with SPC incorrectly sometimes dragging the device sometimes not placing on ground  She will continue to benefit from skilled outpatient PT in order to maximize her function and reduce her risk for falls  Plan: Continue per plan of care        Precautions:   Diagnosis Date Comment Source   Asthma   Provider   Cholelithiasis   Provider   GERD (gastroesophageal reflux disease)   Provider   Influenza 2019  Provider   Migraine headache   Provider   Pneumonia   Provider       Vital Signs:   BP: 136/78 mmHg  HR: 86 bpm   SpO2:99 %        Visit # 1 2 3 4 5         20                     Exercise Diary                           TE's             Seated lumbar flex  2 x 10 6" hold reduce pain 2 x 10 6" hold reduce pain 2 x 10 6" hold reduce pain 3 x 10 6" hold reduce pain        Seated ball roll outs  2 x 10 6" hold reduce pain 2 x 10 6" hold reduce pain 2 x 10 6" hold reduce pain 2 x 10 6" hold reduce pain        SLR flex  2 x 6 R/L 2 x 8 R/L 2#-unable  1#-unable  2 x 8 R/L          DKTC with SOS on PB   2 x 10 5" reduce pain 2 x 10 5" reduce pain          LAQ  2# 2  X 6 R/L  2# 2 x 10 each         Rec   Bike   6' L2-L3 10' L2 10' L2 10' L2        Hooklying hip marches    2 x 10 R/L 3" hold                      NM:             Step taps     Fwd, 8" 20 reps B/L, 0 UE        Step ups     Fwd, 8" 20 reps B/L, 1 UE        Step up/over     10 reps, up w/ L down w/ R, 1 UE        FAEO on foam     4 reps, 30 sec        FTEO on firm     2 reps, 30 sec                                               Neuro Testing 40'            glute sets   2 x 10 3" 2 x 19 3" hold 2 x 20 3" hold        Supine hip abduction   On board with towel 1 x 10 R/L            20'            Ambulation with PT  200'; 100' ; 100' requiring cane when fatigued  300':  3:35 min  requiring cane when fatigued  300'requiring cane when fatigued         bridges    1  X 8 - stopped due to poor muscle activation                                                                                                        Modalities

## 2020-09-24 ENCOUNTER — APPOINTMENT (OUTPATIENT)
Dept: PHYSICAL THERAPY | Facility: CLINIC | Age: 75
End: 2020-09-24
Payer: MEDICARE

## 2020-09-28 ENCOUNTER — OFFICE VISIT (OUTPATIENT)
Dept: PHYSICAL THERAPY | Facility: CLINIC | Age: 75
End: 2020-09-28
Payer: MEDICARE

## 2020-09-28 DIAGNOSIS — R26.9 GAIT ABNORMALITY: ICD-10-CM

## 2020-09-28 DIAGNOSIS — M62.81 GENERALIZED MUSCLE WEAKNESS: ICD-10-CM

## 2020-09-28 DIAGNOSIS — R26.89 BALANCE DISORDER: Primary | ICD-10-CM

## 2020-09-28 PROCEDURE — 97110 THERAPEUTIC EXERCISES: CPT | Performed by: PHYSICAL THERAPIST

## 2020-09-28 PROCEDURE — 97112 NEUROMUSCULAR REEDUCATION: CPT | Performed by: PHYSICAL THERAPIST

## 2020-09-28 NOTE — PROGRESS NOTES
Daily Note  Last PN 2020  Visit 3/10     Today's date: 2020  Patient name: Chlely Nunez  : 1945  MRN: [id number]  Referring provider: Severo Garcia MD  Dx:   Encounter Diagnosis     ICD-10-CM    1  Balance disorder  R26 89    2  Generalized muscle weakness  M62 81    3  Gait abnormality  R26 9                   Subjective: Patient reports she feels tired, but back pain is ok today  Objective: See treatment diary below    TE:  -- Standing hip 3 way, 2# ankle wts: 20 reps, 3 sec hold  -- Step ups,2# ankle wts: 6", 10 reps fwd/lat, 1 UE    NMR:  - Step taps: 6", 20 reps fwd, 0 UE  - FTEO foam: 30 sec x 3  - Hurdles: 6", 1 UE support: fwd and lateral 6x each  - Sidestepping, no UE support: 6x      Assessment: Patient able to tolerate treatment session well today  Increased LBP with beatriz negotiation, otherwise tolerates session painfree  Forward flexed posture noted during standing TE  She will continue to benefit from skilled outpatient PT in order to maximize her function and reduce her risk for falls  Plan: Continue per plan of care        Precautions:   Diagnosis Date Comment Source   Asthma   Provider   Cholelithiasis   Provider   GERD (gastroesophageal reflux disease)   Provider   Influenza 2019  Provider   Migraine headache   Provider   Pneumonia   Provider

## 2020-09-29 ENCOUNTER — APPOINTMENT (OUTPATIENT)
Dept: PHYSICAL THERAPY | Facility: CLINIC | Age: 75
End: 2020-09-29
Payer: MEDICARE

## 2020-09-30 ENCOUNTER — OFFICE VISIT (OUTPATIENT)
Dept: PHYSICAL THERAPY | Facility: CLINIC | Age: 75
End: 2020-09-30
Payer: MEDICARE

## 2020-09-30 DIAGNOSIS — R26.9 GAIT ABNORMALITY: ICD-10-CM

## 2020-09-30 DIAGNOSIS — M62.81 GENERALIZED MUSCLE WEAKNESS: ICD-10-CM

## 2020-09-30 DIAGNOSIS — R26.89 BALANCE DISORDER: Primary | ICD-10-CM

## 2020-09-30 PROCEDURE — 97112 NEUROMUSCULAR REEDUCATION: CPT | Performed by: PHYSICAL THERAPIST

## 2020-09-30 PROCEDURE — 97110 THERAPEUTIC EXERCISES: CPT | Performed by: PHYSICAL THERAPIST

## 2020-09-30 NOTE — PROGRESS NOTES
Daily Note  Last PN 2020  Visit 4/10     Today's date: 2020  Patient name: Ester Mcdermott  : 1945  MRN: [id number]  Referring provider: Layo Orourke MD  Dx:   Encounter Diagnosis     ICD-10-CM    1  Balance disorder  R26 89    2  Generalized muscle weakness  M62 81    3  Gait abnormality  R26 9                   Subjective: Patient reports "my back is a lot better today "     Objective: See treatment diary below    TE:  -- Standing hip 3 way, 2# ankle wts: 20 reps, 3 sec hold  -- Step ups,2# ankle wts: 6", 20 reps fwd/lat, 1 UE  -- STS no UE support 2 x 12 reps    NMR:  - Step taps: 6", 20 reps fwd, 2# ankle wts 0 UE  - FTEO foam: 60 sec x 2   - Hurdles: 6", 0 UE support: fwd and lateral 6x each    Assessment: Patient able to tolerate treatment session well today  Progressed to no UE support for beatriz negotiation, only stepping on 1 beatriz  No c/o back pain today  She will continue to benefit from skilled outpatient PT in order to maximize her function and reduce her risk for falls  Plan: Continue per plan of care        Precautions:   Diagnosis Date Comment Source   Asthma   Provider   Cholelithiasis   Provider   GERD (gastroesophageal reflux disease)   Provider   Influenza 2019  Provider   Migraine headache   Provider   Pneumonia   Provider

## 2020-10-01 ENCOUNTER — APPOINTMENT (OUTPATIENT)
Dept: PHYSICAL THERAPY | Facility: CLINIC | Age: 75
End: 2020-10-01
Payer: MEDICARE

## 2020-10-05 ENCOUNTER — OFFICE VISIT (OUTPATIENT)
Dept: PHYSICAL THERAPY | Facility: CLINIC | Age: 75
End: 2020-10-05
Payer: MEDICARE

## 2020-10-05 DIAGNOSIS — R26.89 BALANCE DISORDER: Primary | ICD-10-CM

## 2020-10-05 DIAGNOSIS — R26.9 GAIT ABNORMALITY: ICD-10-CM

## 2020-10-05 DIAGNOSIS — M62.81 GENERALIZED MUSCLE WEAKNESS: ICD-10-CM

## 2020-10-05 PROCEDURE — 97110 THERAPEUTIC EXERCISES: CPT | Performed by: PHYSICAL THERAPIST

## 2020-10-05 PROCEDURE — 97112 NEUROMUSCULAR REEDUCATION: CPT | Performed by: PHYSICAL THERAPIST

## 2020-10-07 ENCOUNTER — OFFICE VISIT (OUTPATIENT)
Dept: PHYSICAL THERAPY | Facility: CLINIC | Age: 75
End: 2020-10-07
Payer: MEDICARE

## 2020-10-07 DIAGNOSIS — R26.89 BALANCE DISORDER: Primary | ICD-10-CM

## 2020-10-07 DIAGNOSIS — M62.81 GENERALIZED MUSCLE WEAKNESS: ICD-10-CM

## 2020-10-07 PROCEDURE — 97112 NEUROMUSCULAR REEDUCATION: CPT

## 2020-10-07 PROCEDURE — 97110 THERAPEUTIC EXERCISES: CPT

## 2020-10-12 ENCOUNTER — OFFICE VISIT (OUTPATIENT)
Dept: PHYSICAL THERAPY | Facility: CLINIC | Age: 75
End: 2020-10-12
Payer: MEDICARE

## 2020-10-12 DIAGNOSIS — E78.2 MIXED HYPERLIPIDEMIA: ICD-10-CM

## 2020-10-12 DIAGNOSIS — R26.9 GAIT ABNORMALITY: ICD-10-CM

## 2020-10-12 DIAGNOSIS — M62.81 GENERALIZED MUSCLE WEAKNESS: ICD-10-CM

## 2020-10-12 DIAGNOSIS — R53.1 GENERALIZED WEAKNESS: ICD-10-CM

## 2020-10-12 DIAGNOSIS — R26.89 BALANCE DISORDER: Primary | ICD-10-CM

## 2020-10-12 PROCEDURE — 97112 NEUROMUSCULAR REEDUCATION: CPT

## 2020-10-12 PROCEDURE — 97110 THERAPEUTIC EXERCISES: CPT

## 2020-10-12 RX ORDER — ATORVASTATIN CALCIUM 20 MG/1
TABLET, FILM COATED ORAL
Qty: 30 TABLET | Refills: 2 | Status: SHIPPED | OUTPATIENT
Start: 2020-10-12 | End: 2020-10-15

## 2020-10-12 RX ORDER — VIT B COMP NO.3/FOLIC/C/BIOTIN 1 MG-60 MG
TABLET ORAL
Qty: 30 TABLET | Refills: 2 | Status: SHIPPED | OUTPATIENT
Start: 2020-10-12 | End: 2020-10-15

## 2020-10-13 ENCOUNTER — OFFICE VISIT (OUTPATIENT)
Dept: FAMILY MEDICINE CLINIC | Facility: CLINIC | Age: 75
End: 2020-10-13
Payer: MEDICARE

## 2020-10-13 VITALS
BODY MASS INDEX: 27 KG/M2 | HEART RATE: 90 BPM | DIASTOLIC BLOOD PRESSURE: 80 MMHG | TEMPERATURE: 97.3 F | HEIGHT: 61 IN | SYSTOLIC BLOOD PRESSURE: 138 MMHG | WEIGHT: 143 LBS | OXYGEN SATURATION: 98 %

## 2020-10-13 DIAGNOSIS — R79.89 ABNORMAL LFTS: ICD-10-CM

## 2020-10-13 DIAGNOSIS — Z13.0 SCREENING FOR IRON DEFICIENCY ANEMIA: ICD-10-CM

## 2020-10-13 DIAGNOSIS — R29.6 RECURRENT FALLS: ICD-10-CM

## 2020-10-13 DIAGNOSIS — Z23 ENCOUNTER FOR IMMUNIZATION: Primary | ICD-10-CM

## 2020-10-13 DIAGNOSIS — K80.20 CALCULUS OF GALLBLADDER WITHOUT CHOLECYSTITIS WITHOUT OBSTRUCTION: ICD-10-CM

## 2020-10-13 DIAGNOSIS — F41.9 ANXIETY: ICD-10-CM

## 2020-10-13 DIAGNOSIS — K21.9 GASTROESOPHAGEAL REFLUX DISEASE, UNSPECIFIED WHETHER ESOPHAGITIS PRESENT: ICD-10-CM

## 2020-10-13 PROBLEM — IMO0001 RADICULAR PAIN OF RIGHT LOWER BACK: Status: RESOLVED | Noted: 2018-10-04 | Resolved: 2020-10-13

## 2020-10-13 PROCEDURE — 90662 IIV NO PRSV INCREASED AG IM: CPT

## 2020-10-13 PROCEDURE — 99214 OFFICE O/P EST MOD 30 MIN: CPT | Performed by: FAMILY MEDICINE

## 2020-10-13 PROCEDURE — G0008 ADMIN INFLUENZA VIRUS VAC: HCPCS

## 2020-10-13 RX ORDER — ONDANSETRON 4 MG/1
4 TABLET, FILM COATED ORAL EVERY 8 HOURS PRN
Qty: 30 TABLET | Refills: 1 | Status: ON HOLD | OUTPATIENT
Start: 2020-10-13 | End: 2021-08-12 | Stop reason: CLARIF

## 2020-10-14 ENCOUNTER — OFFICE VISIT (OUTPATIENT)
Dept: PHYSICAL THERAPY | Facility: CLINIC | Age: 75
End: 2020-10-14
Payer: MEDICARE

## 2020-10-14 DIAGNOSIS — R26.89 BALANCE DISORDER: Primary | ICD-10-CM

## 2020-10-14 DIAGNOSIS — R26.9 GAIT ABNORMALITY: ICD-10-CM

## 2020-10-14 LAB
ALBUMIN SERPL-MCNC: 4.3 G/DL (ref 3.6–5.1)
ALBUMIN/GLOB SERPL: 1.5 (CALC) (ref 1–2.5)
ALP SERPL-CCNC: 82 U/L (ref 37–153)
ALT SERPL-CCNC: 21 U/L (ref 6–29)
AST SERPL-CCNC: 23 U/L (ref 10–35)
BILIRUB SERPL-MCNC: 0.7 MG/DL (ref 0.2–1.2)
BUN SERPL-MCNC: 18 MG/DL (ref 7–25)
BUN/CREAT SERPL: 17 (CALC) (ref 6–22)
CALCIUM SERPL-MCNC: 9.7 MG/DL (ref 8.6–10.4)
CHLORIDE SERPL-SCNC: 108 MMOL/L (ref 98–110)
CO2 SERPL-SCNC: 23 MMOL/L (ref 20–32)
CREAT SERPL-MCNC: 1.07 MG/DL (ref 0.6–0.93)
ERYTHROCYTE [DISTWIDTH] IN BLOOD BY AUTOMATED COUNT: 12.5 % (ref 11–15)
GLOBULIN SER CALC-MCNC: 2.8 G/DL (CALC) (ref 1.9–3.7)
GLUCOSE SERPL-MCNC: 88 MG/DL (ref 65–99)
HCT VFR BLD AUTO: 42 % (ref 35–45)
HGB BLD-MCNC: 13.8 G/DL (ref 11.7–15.5)
MCH RBC QN AUTO: 30.7 PG (ref 27–33)
MCHC RBC AUTO-ENTMCNC: 32.9 G/DL (ref 32–36)
MCV RBC AUTO: 93.5 FL (ref 80–100)
PLATELET # BLD AUTO: 200 THOUSAND/UL (ref 140–400)
PMV BLD REES-ECKER: 9.9 FL (ref 7.5–12.5)
POTASSIUM SERPL-SCNC: 4.2 MMOL/L (ref 3.5–5.3)
PROT SERPL-MCNC: 7.1 G/DL (ref 6.1–8.1)
RBC # BLD AUTO: 4.49 MILLION/UL (ref 3.8–5.1)
SL AMB EGFR AFRICAN AMERICAN: 58 ML/MIN/1.73M2
SL AMB EGFR NON AFRICAN AMERICAN: 50 ML/MIN/1.73M2
SODIUM SERPL-SCNC: 141 MMOL/L (ref 135–146)
WBC # BLD AUTO: 4.6 THOUSAND/UL (ref 3.8–10.8)

## 2020-10-14 PROCEDURE — 97110 THERAPEUTIC EXERCISES: CPT

## 2020-10-15 DIAGNOSIS — R53.1 GENERALIZED WEAKNESS: ICD-10-CM

## 2020-10-15 DIAGNOSIS — E78.2 MIXED HYPERLIPIDEMIA: ICD-10-CM

## 2020-10-15 RX ORDER — VIT B COMP NO.3/FOLIC/C/BIOTIN 1 MG-60 MG
TABLET ORAL
Qty: 30 TABLET | Refills: 2 | Status: SHIPPED | OUTPATIENT
Start: 2020-10-15 | End: 2021-02-05 | Stop reason: ALTCHOICE

## 2020-10-15 RX ORDER — ATORVASTATIN CALCIUM 20 MG/1
TABLET, FILM COATED ORAL
Qty: 30 TABLET | Refills: 2 | Status: SHIPPED | OUTPATIENT
Start: 2020-10-15 | End: 2021-05-03 | Stop reason: SDUPTHER

## 2020-10-19 ENCOUNTER — OFFICE VISIT (OUTPATIENT)
Dept: PHYSICAL THERAPY | Facility: CLINIC | Age: 75
End: 2020-10-19
Payer: MEDICARE

## 2020-10-19 DIAGNOSIS — R26.9 GAIT ABNORMALITY: ICD-10-CM

## 2020-10-19 DIAGNOSIS — R26.89 BALANCE DISORDER: Primary | ICD-10-CM

## 2020-10-19 DIAGNOSIS — M62.81 GENERALIZED MUSCLE WEAKNESS: ICD-10-CM

## 2020-10-19 PROCEDURE — 97112 NEUROMUSCULAR REEDUCATION: CPT

## 2020-10-19 PROCEDURE — 97110 THERAPEUTIC EXERCISES: CPT

## 2020-10-21 ENCOUNTER — OFFICE VISIT (OUTPATIENT)
Dept: PHYSICAL THERAPY | Facility: CLINIC | Age: 75
End: 2020-10-21
Payer: MEDICARE

## 2020-10-21 DIAGNOSIS — R26.9 GAIT ABNORMALITY: ICD-10-CM

## 2020-10-21 DIAGNOSIS — R42 DIZZINESS: ICD-10-CM

## 2020-10-21 DIAGNOSIS — R26.89 BALANCE DISORDER: Primary | ICD-10-CM

## 2020-10-21 DIAGNOSIS — M62.81 GENERALIZED MUSCLE WEAKNESS: ICD-10-CM

## 2020-10-21 PROCEDURE — 97112 NEUROMUSCULAR REEDUCATION: CPT | Performed by: PHYSICAL THERAPIST

## 2020-10-21 PROCEDURE — 97164 PT RE-EVAL EST PLAN CARE: CPT | Performed by: PHYSICAL THERAPIST

## 2020-10-21 RX ORDER — MECLIZINE HCL 12.5 MG/1
12.5 TABLET ORAL EVERY 8 HOURS PRN
Qty: 90 TABLET | Refills: 0 | Status: ON HOLD | OUTPATIENT
Start: 2020-10-21 | End: 2021-08-12 | Stop reason: CLARIF

## 2020-10-26 ENCOUNTER — OFFICE VISIT (OUTPATIENT)
Dept: PHYSICAL THERAPY | Facility: CLINIC | Age: 75
End: 2020-10-26
Payer: MEDICARE

## 2020-10-26 DIAGNOSIS — M62.81 GENERALIZED MUSCLE WEAKNESS: ICD-10-CM

## 2020-10-26 DIAGNOSIS — R26.9 GAIT ABNORMALITY: ICD-10-CM

## 2020-10-26 DIAGNOSIS — R26.89 BALANCE DISORDER: Primary | ICD-10-CM

## 2020-10-26 PROCEDURE — 97112 NEUROMUSCULAR REEDUCATION: CPT

## 2020-10-28 ENCOUNTER — OFFICE VISIT (OUTPATIENT)
Dept: PHYSICAL THERAPY | Facility: CLINIC | Age: 75
End: 2020-10-28
Payer: MEDICARE

## 2020-10-28 DIAGNOSIS — R26.9 GAIT ABNORMALITY: ICD-10-CM

## 2020-10-28 DIAGNOSIS — M62.81 GENERALIZED MUSCLE WEAKNESS: ICD-10-CM

## 2020-10-28 DIAGNOSIS — R26.89 BALANCE DISORDER: Primary | ICD-10-CM

## 2020-10-28 PROCEDURE — 97530 THERAPEUTIC ACTIVITIES: CPT

## 2020-12-04 ENCOUNTER — CLINICAL SUPPORT (OUTPATIENT)
Dept: FAMILY MEDICINE CLINIC | Facility: CLINIC | Age: 75
End: 2020-12-04
Payer: MEDICARE

## 2020-12-04 DIAGNOSIS — N39.0 URINARY TRACT INFECTION WITH HEMATURIA, SITE UNSPECIFIED: Primary | ICD-10-CM

## 2020-12-04 DIAGNOSIS — N39.0 ACUTE UTI: Primary | ICD-10-CM

## 2020-12-04 DIAGNOSIS — R31.9 URINARY TRACT INFECTION WITH HEMATURIA, SITE UNSPECIFIED: Primary | ICD-10-CM

## 2020-12-04 LAB
SL AMB  POCT GLUCOSE, UA: ABNORMAL
SL AMB LEUKOCYTE ESTERASE,UA: ABNORMAL
SL AMB POCT BILIRUBIN,UA: ABNORMAL
SL AMB POCT BLOOD,UA: ABNORMAL
SL AMB POCT KETONES,UA: ABNORMAL
SL AMB POCT NITRITE,UA: ABNORMAL
SL AMB POCT PH,UA: 6
SL AMB POCT SPECIFIC GRAVITY,UA: 1.02
SL AMB POCT URINE PROTEIN: ABNORMAL
SL AMB POCT UROBILINOGEN: 0.2

## 2020-12-04 PROCEDURE — 81002 URINALYSIS NONAUTO W/O SCOPE: CPT

## 2020-12-04 RX ORDER — CIPROFLOXACIN 250 MG/1
250 TABLET, FILM COATED ORAL EVERY 12 HOURS SCHEDULED
Qty: 14 TABLET | Refills: 0 | Status: SHIPPED | OUTPATIENT
Start: 2020-12-04 | End: 2020-12-11

## 2020-12-12 DIAGNOSIS — K21.9 GASTROESOPHAGEAL REFLUX DISEASE: ICD-10-CM

## 2020-12-12 RX ORDER — PANTOPRAZOLE SODIUM 40 MG/1
TABLET, DELAYED RELEASE ORAL
Qty: 30 TABLET | Refills: 2 | Status: SHIPPED | OUTPATIENT
Start: 2020-12-12 | End: 2021-03-16 | Stop reason: SDUPTHER

## 2021-01-04 ENCOUNTER — CLINICAL SUPPORT (OUTPATIENT)
Dept: FAMILY MEDICINE CLINIC | Facility: CLINIC | Age: 76
End: 2021-01-04
Payer: MEDICARE

## 2021-01-04 DIAGNOSIS — R39.9 SYMPTOMS OF URINARY TRACT INFECTION: Primary | ICD-10-CM

## 2021-01-04 LAB
SL AMB  POCT GLUCOSE, UA: ABNORMAL
SL AMB LEUKOCYTE ESTERASE,UA: ABNORMAL
SL AMB POCT BILIRUBIN,UA: ABNORMAL
SL AMB POCT BLOOD,UA: ABNORMAL
SL AMB POCT CLARITY,UA: CLEAR
SL AMB POCT COLOR,UA: YELLOW
SL AMB POCT KETONES,UA: ABNORMAL
SL AMB POCT NITRITE,UA: ABNORMAL
SL AMB POCT PH,UA: 7
SL AMB POCT SPECIFIC GRAVITY,UA: 1.02
SL AMB POCT URINE PROTEIN: ABNORMAL
SL AMB POCT UROBILINOGEN: 0.2

## 2021-01-04 PROCEDURE — 81003 URINALYSIS AUTO W/O SCOPE: CPT

## 2021-01-06 DIAGNOSIS — R31.9 URINARY TRACT INFECTION WITH HEMATURIA, SITE UNSPECIFIED: Primary | ICD-10-CM

## 2021-01-06 DIAGNOSIS — N39.0 URINARY TRACT INFECTION WITH HEMATURIA, SITE UNSPECIFIED: Primary | ICD-10-CM

## 2021-01-06 RX ORDER — CIPROFLOXACIN 250 MG/1
250 TABLET, FILM COATED ORAL EVERY 12 HOURS SCHEDULED
Qty: 14 TABLET | Refills: 0 | Status: SHIPPED | OUTPATIENT
Start: 2021-01-06 | End: 2021-01-13

## 2021-01-11 DIAGNOSIS — N39.0 RECURRENT UTI: Primary | ICD-10-CM

## 2021-01-27 ENCOUNTER — TELEMEDICINE (OUTPATIENT)
Dept: FAMILY MEDICINE CLINIC | Facility: CLINIC | Age: 76
End: 2021-01-27
Payer: MEDICARE

## 2021-01-27 DIAGNOSIS — J01.00 ACUTE MAXILLARY SINUSITIS, RECURRENCE NOT SPECIFIED: ICD-10-CM

## 2021-01-27 DIAGNOSIS — B34.9 VIRAL ILLNESS: Primary | ICD-10-CM

## 2021-01-27 PROCEDURE — 99214 OFFICE O/P EST MOD 30 MIN: CPT | Performed by: FAMILY MEDICINE

## 2021-01-27 RX ORDER — AZITHROMYCIN 250 MG/1
TABLET, FILM COATED ORAL
Qty: 6 TABLET | Refills: 0 | Status: SHIPPED | OUTPATIENT
Start: 2021-01-27 | End: 2021-01-31

## 2021-01-27 NOTE — PROGRESS NOTES
COVID-19 Virtual Visit     Assessment/Plan:    Problem List Items Addressed This Visit        Respiratory    Acute maxillary sinusitis    Relevant Medications    azithromycin (ZITHROMAX) 250 mg tablet       Other    Viral illness - Primary     The patient is a 64-year-old female with less than 24 hours of headache, fatigue, upper respiratory symptoms  She does have some left maxillary pressure and this may represent a left maxillary sinusitis  Her daughter visits daily and works in a hotel setting  We could not rule out COVID-19 illness  Will have her push fluids rest and remain in isolation  We are going to have her go to 1 of the ambulatory testing sites as soon as possible  We are going to start her on some azithromycin for the possibility that this represents bacterial infection  Will follow up with her when results are available  She is asked call or seek more urgent medical attention should she develop progressive shortness of breath, chest pain, overall worsening of her condition  She and daughter agree  Relevant Orders    Novel Coronavirus (Covid-19),PCR [facility] - Collected at Mobile [facility] or [facility]         Disposition:     I have spent 15 minutes directly with the patient  Greater than 50% of this time was spent in counseling/coordination of care regarding: risks and benefits of treatment options, instructions for management and impressions  Encounter provider Dorma Mohs, MD    Provider located at [address]  [address] [facility] [id number]    Recent Visits  No visits were found meeting these conditions  Showing recent visits within past 7 days and meeting all other requirements     Today's Visits  Date Type Provider Dept   01/27/21 Telemedicine Dorma Mohs, MD [facility]   Showing today's visits and meeting all other requirements     Future Appointments  No visits were found meeting these conditions     Showing future appointments within next 150 days and meeting all other requirements      This virtual check-in was done via [employer] and patient was informed that this is not a secure, HIPAA-compliant platform  She agrees to proceed  Patient agrees to participate in a virtual check in via telephone or video visit instead of presenting to the office to address urgent/immediate medical needs  Patient is aware this is a billable service  After connecting through [facility], the patient was identified by name and date of birth  Nathalia Maldonado was informed that this was a telemedicine visit and that the exam was being conducted confidentially over secure lines  My office door was closed  No one else was in the room  Nathalia Maldonado acknowledged consent and understanding of privacy and security of the telemedicine visit  I informed the patient that I have reviewed her record in Epic and presented the opportunity for her to ask any questions regarding the visit today  The patient agreed to participate  Subjective:   Nathalia Maldonado is a 66 y o  female who is concerned about COVID-19  Patient's symptoms include fatigue, nasal congestion, shortness of breath and headache  Patient denies fever, chills, rhinorrhea, sore throat, anosmia, loss of taste, cough, chest tightness, nausea, vomiting, diarrhea and myalgias       Exposure:   Contact with a person who is under investigation (PUI) for or who is positive for COVID-19 within the last 14 days?: No    Hospitalized recently for fever and/or lower respiratory symptoms?: No      Currently a healthcare worker that is involved in direct patient care?: No      Works in a special setting where the risk of COVID-19 transmission may be high? (this may include long-term care, correctional and intermediate facilities; homeless shelters; assisted-living facilities and group homes ): No      Resident in a special setting where the risk of COVID-19 transmission may be high? (this may include long-term care, correctional and [facility] facilities; homeless shelters; assisted-living facilities and group homes ): No      HPI,  The patient is a 55-year-old female who states I am miserable she states Thursday head feels twice the size and she indicates left maxillary pressure  She has also had an earache and some eustachian tube dysfunction symptoms  She has had some dizziness  She has not been out how since Christmas but her daughter Kelly Childress works at a [employer] and sees her daily  Daughter has not been ill but with last tested around Christmas  The patient has some mild shortness of breath though no chest tightness  She has no fevers chills or cough  She has some nasal congestion but no sore throat  She has no nausea vomiting or diarrhea  She does have a headache but she has no muscle aches or fatigue  She has chronic anosmia      Lab Results   Component Value Date    SARSCOV2 Negative 06/01/2020     Past Medical History:   Diagnosis Date    Asthma     Cholelithiasis     GERD (gastroesophageal reflux disease)     Influenza 1/29/2019    Migraine headache     Pneumonia     Urinary tract infection      Past Surgical History:   Procedure Laterality Date    NO PAST SURGERIES       Current Outpatient Medications   Medication Sig Dispense Refill    ALPRAZolam (XANAX) 0 25 mg tablet Take 1 tablet (0 25 mg total) by mouth daily as needed for anxiety 30 tablet 0    atorvastatin (LIPITOR) 20 mg tablet TAKE ONE TABLET BY MOUTH EVERY DAY 30 tablet 2    azithromycin (ZITHROMAX) 250 mg tablet 2 stat and then 1 QD 6 tablet 0    B Complex-C-Folic Acid (B complex-vitamin C-folic acid) 1 MG tablet TAKE ONE TABLET BY MOUTH EVERY DAY WITH BREAKFAST 30 tablet 2    CRANBERRY PO Take by mouth 2 (two) times a day      docusate sodium (COLACE) 100 mg capsule Take 1 capsule (100 mg total) by mouth 2 (two) times a day 10 capsule 0    meclizine (ANTIVERT) 12 5 MG tablet Take 1 tablet (12 5 mg total) by mouth every 8 (eight) hours as needed for dizziness 90 tablet 0    Omega-3 Fatty Acids (FISH OIL) 1,000 mg Take 1,000 mg by mouth 2 (two) times a day      ondansetron (ZOFRAN) 4 mg tablet Take 1 tablet (4 mg total) by mouth every 8 (eight) hours as needed for nausea or vomiting 30 tablet 1    pantoprazole (PROTONIX) 40 mg tablet TAKE ONE TABLET BY MOUTH EVERY DAY IN THE MORNING 30 tablet 2    polyethylene glycol (MIRALAX) 17 g packet Take 17 g by mouth daily as needed (Constipation) (Patient not taking: Reported on 10/13/2020) 14 each 0     No current facility-administered medications for this visit  Allergies   Allergen Reactions    Bee Venom Anaphylaxis       Review of Systems   Constitutional: Positive for fatigue  Negative for chills and fever  HENT: Positive for congestion  Negative for rhinorrhea and sore throat  Respiratory: Positive for shortness of breath  Negative for cough and chest tightness  Gastrointestinal: Negative for diarrhea, nausea and vomiting  Musculoskeletal: Negative for myalgias  Neurological: Positive for headaches  Objective: There were no vitals filed for this visit  Physical Exam  Constitutional:       General: She is not in acute distress  Appearance: She is ill-appearing  She is not toxic-appearing  Eyes:      Conjunctiva/sclera: Conjunctivae normal    Pulmonary:      Effort: Pulmonary effort is normal  No respiratory distress  Neurological:      Mental Status: She is alert and oriented to person, place, and time  Psychiatric:         Thought Content: Thought content normal          Judgment: Judgment normal        VIRTUAL VISIT DISCLAIMER    Ramin Brewer acknowledges that she has consented to an online visit or consultation   She understands that the online visit is based solely on information provided by her, and that, in the absence of a face-to-face physical evaluation by the physician, the diagnosis she receives is both limited and provisional in terms of accuracy and completeness  This is not intended to replace a full medical face-to-face evaluation by the physician  Amy Taylor understands and accepts these terms

## 2021-01-27 NOTE — ASSESSMENT & PLAN NOTE
The patient is a 69-year-old female with less than 24 hours of headache, fatigue, upper respiratory symptoms  She does have some left maxillary pressure and this may represent a left maxillary sinusitis  Her daughter visits daily and works in a hotel setting  We could not rule out COVID-19 illness  Will have her push fluids rest and remain in isolation  We are going to have her go to 1 of the ambulatory testing sites as soon as possible  We are going to start her on some azithromycin for the possibility that this represents bacterial infection  Will follow up with her when results are available  She is asked call or seek more urgent medical attention should she develop progressive shortness of breath, chest pain, overall worsening of her condition  She and daughter agree

## 2021-02-04 ENCOUNTER — CLINICAL SUPPORT (OUTPATIENT)
Dept: FAMILY MEDICINE CLINIC | Facility: CLINIC | Age: 76
End: 2021-02-04
Payer: MEDICARE

## 2021-02-04 DIAGNOSIS — N39.0 RECURRENT UTI: Primary | ICD-10-CM

## 2021-02-04 DIAGNOSIS — F41.9 ANXIETY: ICD-10-CM

## 2021-02-04 LAB
SL AMB  POCT GLUCOSE, UA: ABNORMAL
SL AMB LEUKOCYTE ESTERASE,UA: ABNORMAL
SL AMB POCT BILIRUBIN,UA: ABNORMAL
SL AMB POCT BLOOD,UA: ABNORMAL
SL AMB POCT KETONES,UA: ABNORMAL
SL AMB POCT NITRITE,UA: ABNORMAL
SL AMB POCT PH,UA: 7
SL AMB POCT SPECIFIC GRAVITY,UA: 1.02
SL AMB POCT URINE PROTEIN: ABNORMAL
SL AMB POCT UROBILINOGEN: 0.2

## 2021-02-04 PROCEDURE — 81002 URINALYSIS NONAUTO W/O SCOPE: CPT

## 2021-02-04 RX ORDER — ALPRAZOLAM 0.25 MG/1
0.25 TABLET ORAL DAILY PRN
Qty: 30 TABLET | Refills: 0 | Status: SHIPPED | OUTPATIENT
Start: 2021-02-04 | End: 2021-03-08

## 2021-02-05 ENCOUNTER — TELEMEDICINE (OUTPATIENT)
Dept: FAMILY MEDICINE CLINIC | Facility: CLINIC | Age: 76
End: 2021-02-05
Payer: MEDICARE

## 2021-02-05 VITALS — TEMPERATURE: 97.4 F | WEIGHT: 150 LBS | BODY MASS INDEX: 28.32 KG/M2 | HEIGHT: 61 IN

## 2021-02-05 DIAGNOSIS — J00 ACUTE RHINITIS: Primary | ICD-10-CM

## 2021-02-05 DIAGNOSIS — F41.9 ANXIETY: ICD-10-CM

## 2021-02-05 PROCEDURE — 99214 OFFICE O/P EST MOD 30 MIN: CPT | Performed by: FAMILY MEDICINE

## 2021-02-05 RX ORDER — FLUTICASONE PROPIONATE 50 MCG
1 SPRAY, SUSPENSION (ML) NASAL DAILY
Qty: 1 BOTTLE | Refills: 2 | Status: ON HOLD | OUTPATIENT
Start: 2021-02-05 | End: 2021-08-12 | Stop reason: CLARIF

## 2021-02-05 RX ORDER — LORATADINE 10 MG/1
10 TABLET ORAL DAILY
Qty: 30 TABLET | Refills: 2 | Status: SHIPPED | OUTPATIENT
Start: 2021-02-05 | End: 2021-05-03 | Stop reason: SDUPTHER

## 2021-02-05 NOTE — ASSESSMENT & PLAN NOTE
We refilled her alprazolam yesterday  She is going to use this sparingly though presently she appears to needed  She also needs to avoid decongestants going forward which we discussed

## 2021-02-05 NOTE — PROGRESS NOTES
Virtual Regular Visit      Assessment/Plan:    Problem List Items Addressed This Visit        Respiratory    Acute rhinitis - Primary       The patient has some persistent nasal congestion/ rhinitis like symptoms  Overnight she appeared to have a reaction to loratadine with decongestant  I advised the daughter and the patient that she should avoid decongestants going forward  This probably  early in the tremulousness, anxiety insomnia she had  It was a 12 hour preparation  We are going to have her try some plain loratadine as well as some Flonase  She is asked push fluids and rest   She is asked call should she develop fever any cardiopulmonary symptomatology X cetera  In the meantime will follow up with her tomorrow and she is asked call with report of her condition  We are also waiting her urine culture from yesterday though she does not have any irritative symptoms presently  Relevant Medications    fluticasone (FLONASE) 50 mcg/act nasal spray    loratadine (CLARITIN) 10 mg tablet       Other    Anxiety      We refilled her alprazolam yesterday  She is going to use this sparingly though presently she appears to needed  She also needs to avoid decongestants going forward which we discussed  Reason for visit is   Chief Complaint   Patient presents with    Earache     L ear and scratchy throat   Virtual Regular Visit        Encounter provider Mortimer Boards, MD    Provider located at [address]  [address] [place] [id number]      Recent Visits  No visits were found meeting these conditions  Showing recent visits within past 7 days and meeting all other requirements     Today's Visits  Date Type Provider Dept   21 Telemedicine Mortimer Boards, MD [facility]   Showing today's visits and meeting all other requirements     Future Appointments  No visits were found meeting these conditions     Showing future appointments within next 150 days and meeting all other requirements        The patient was identified by name and date of birth  Selma Brumfield was informed that this is a telemedicine visit and that the visit is being conducted through [facility] and patient was informed that this is a secure, HIPAA-compliant platform  She agrees to proceed     My office door was closed  No one else was in the room  She acknowledged consent and understanding of privacy and security of the video platform  The patient has agreed to participate and understands they can discontinue the visit at any time  Patient is aware this is a billable service  Antonieta Moore is a 66 y o  female   Who presents today with concerns over congestion   Last night daughter got her some Claritin-D and she had some issues  She was up much of the night  She felt jittery and agitated  Today she feels somewhat better  She states that she felt like she was tingling all over from 1:00 a m  to 7:00 a m  Amos Pac She has had no fever  She has had no chest pain or shortness of breath  HPI     Daughter states that the azithromycin that was given to her recently seemed to help her respiratory symptoms but they began to return over the last day or 2  That is why she tried the Claritin-D  The patient has also had recurrent UTI recently  She has a culture pending presently  She does not complain of dysuria frequency incontinence X cetera  She has an appointment scheduled with Dr Jose M Valverde on February 17th  She has no GI complaint presently  She has no headache or diplopia  This is a continuation from subjective    Past Medical History:   Diagnosis Date    Asthma     Cholelithiasis     GERD (gastroesophageal reflux disease)     Influenza 1/29/2019    Migraine headache     Pneumonia     Urinary tract infection        Past Surgical History:   Procedure Laterality Date    NO PAST SURGERIES         Current Outpatient Medications Medication Sig Dispense Refill    ALPRAZolam (XANAX) 0 25 mg tablet Take 1 tablet (0 25 mg total) by mouth daily as needed for anxiety 30 tablet 0    atorvastatin (LIPITOR) 20 mg tablet TAKE ONE TABLET BY MOUTH EVERY DAY 30 tablet 2    CRANBERRY PO Take by mouth 2 (two) times a day      docusate sodium (COLACE) 100 mg capsule Take 1 capsule (100 mg total) by mouth 2 (two) times a day 10 capsule 0    meclizine (ANTIVERT) 12 5 MG tablet Take 1 tablet (12 5 mg total) by mouth every 8 (eight) hours as needed for dizziness 90 tablet 0    ondansetron (ZOFRAN) 4 mg tablet Take 1 tablet (4 mg total) by mouth every 8 (eight) hours as needed for nausea or vomiting 30 tablet 1    pantoprazole (PROTONIX) 40 mg tablet TAKE ONE TABLET BY MOUTH EVERY DAY IN THE MORNING 30 tablet 2    fluticasone (FLONASE) 50 mcg/act nasal spray 1 spray into each nostril daily 1 Bottle 2    loratadine (CLARITIN) 10 mg tablet Take 1 tablet (10 mg total) by mouth daily 30 tablet 2     No current facility-administered medications for this visit  Allergies   Allergen Reactions    Bee Venom Anaphylaxis       Review of Systems   Constitutional: Positive for fatigue  Negative for appetite change and fever  HENT: Positive for congestion and postnasal drip  Negative for rhinorrhea  Respiratory: Negative for cough, chest tightness, shortness of breath and wheezing  Cardiovascular: Negative for chest pain, palpitations and leg swelling  Genitourinary: Negative  Neurological:        Tremulousness   Psychiatric/Behavioral: Positive for sleep disturbance  Negative for decreased concentration  The patient is nervous/anxious  Video Exam    Vitals:    02/05/21 1555   Temp: (!) 97 4 °F (36 3 °C)   Weight: 68 kg (150 lb)   Height: 5' 1" (1 549 m)       Physical Exam  Vitals signs reviewed  Constitutional:       Comments: Elderly female, appearing mildly anxious but in no distress  HENT:      Nose: Congestion present  Comments: Sounds mildly congested though this is chronic  Eyes:      General:         Right eye: No discharge  Left eye: No discharge  Pulmonary:      Effort: Pulmonary effort is normal  No respiratory distress  Neurological:      Mental Status: She is oriented to person, place, and time  Psychiatric:         Thought Content: Thought content normal          Judgment: Judgment normal           I spent 20 minutes directly with the patient during this visit      [address]orga acknowledges that she has consented to an online visit or consultation  She understands that the online visit is based solely on information provided by her, and that, in the absence of a face-to-face physical evaluation by the physician, the diagnosis she receives is both limited and provisional in terms of accuracy and completeness  This is not intended to replace a full medical face-to-face evaluation by the physician  Rachelle Cifuentes understands and accepts these terms

## 2021-02-05 NOTE — ASSESSMENT & PLAN NOTE
The patient has some persistent nasal congestion/ rhinitis like symptoms  Overnight she appeared to have a reaction to loratadine with decongestant  I advised the daughter and the patient that she should avoid decongestants going forward  This probably  early in the tremulousness, anxiety insomnia she had  It was a 12 hour preparation  We are going to have her try some plain loratadine as well as some Flonase  She is asked push fluids and rest   She is asked call should she develop fever any cardiopulmonary symptomatology X cetera  In the meantime will follow up with her tomorrow and she is asked call with report of her condition  We are also waiting her urine culture from yesterday though she does not have any irritative symptoms presently

## 2021-02-13 ENCOUNTER — IMMUNIZATIONS (OUTPATIENT)
Dept: FAMILY MEDICINE CLINIC | Facility: HOSPITAL | Age: 76
End: 2021-02-13

## 2021-02-13 DIAGNOSIS — Z23 ENCOUNTER FOR IMMUNIZATION: Primary | ICD-10-CM

## 2021-02-13 PROCEDURE — 91300 SARS-COV-2 / COVID-19 MRNA VACCINE (PFIZER-BIONTECH) 30 MCG: CPT

## 2021-02-13 PROCEDURE — 0001A SARS-COV-2 / COVID-19 MRNA VACCINE (PFIZER-BIONTECH) 30 MCG: CPT

## 2021-02-18 ENCOUNTER — TRANSCRIBE ORDERS (OUTPATIENT)
Dept: ADMINISTRATIVE | Facility: HOSPITAL | Age: 76
End: 2021-02-18

## 2021-02-18 DIAGNOSIS — N30.20 BLADDER INFECTION, CHRONIC: Primary | ICD-10-CM

## 2021-02-22 DIAGNOSIS — F41.9 ANXIETY: ICD-10-CM

## 2021-02-22 RX ORDER — ALPRAZOLAM 0.25 MG/1
TABLET ORAL
Qty: 30 TABLET | Refills: 0 | OUTPATIENT
Start: 2021-02-22

## 2021-02-24 ENCOUNTER — HOSPITAL ENCOUNTER (OUTPATIENT)
Dept: RADIOLOGY | Facility: HOSPITAL | Age: 76
Discharge: HOME | End: 2021-02-24
Attending: SPECIALIST
Payer: MEDICARE

## 2021-02-24 DIAGNOSIS — N30.20 BLADDER INFECTION, CHRONIC: ICD-10-CM

## 2021-02-24 PROCEDURE — 76770 US EXAM ABDO BACK WALL COMP: CPT

## 2021-03-02 ENCOUNTER — OFFICE VISIT (OUTPATIENT)
Dept: FAMILY MEDICINE CLINIC | Facility: CLINIC | Age: 76
End: 2021-03-02
Payer: MEDICARE

## 2021-03-02 VITALS
HEIGHT: 61 IN | BODY MASS INDEX: 26.06 KG/M2 | SYSTOLIC BLOOD PRESSURE: 122 MMHG | DIASTOLIC BLOOD PRESSURE: 80 MMHG | WEIGHT: 138 LBS | HEART RATE: 101 BPM | TEMPERATURE: 97.3 F | OXYGEN SATURATION: 98 %

## 2021-03-02 DIAGNOSIS — J00 ACUTE RHINITIS: ICD-10-CM

## 2021-03-02 DIAGNOSIS — D69.6 THROMBOCYTOPENIA (HCC): ICD-10-CM

## 2021-03-02 DIAGNOSIS — R42 DIZZINESS: ICD-10-CM

## 2021-03-02 DIAGNOSIS — H10.13 ALLERGIC CONJUNCTIVITIS OF BOTH EYES: Primary | ICD-10-CM

## 2021-03-02 DIAGNOSIS — N39.0 URINARY TRACT INFECTION WITHOUT HEMATURIA, SITE UNSPECIFIED: ICD-10-CM

## 2021-03-02 PROBLEM — J01.00 ACUTE MAXILLARY SINUSITIS: Status: RESOLVED | Noted: 2017-01-10 | Resolved: 2021-03-02

## 2021-03-02 PROCEDURE — 99214 OFFICE O/P EST MOD 30 MIN: CPT | Performed by: FAMILY MEDICINE

## 2021-03-02 RX ORDER — OLOPATADINE HYDROCHLORIDE 1 MG/ML
1 SOLUTION/ DROPS OPHTHALMIC 2 TIMES DAILY
Qty: 5 ML | Refills: 1 | Status: ON HOLD | OUTPATIENT
Start: 2021-03-02 | End: 2021-08-12 | Stop reason: CLARIF

## 2021-03-02 NOTE — ASSESSMENT & PLAN NOTE
The patient has acute conjunctivitis  I believe this is allergic in nature  We are going to add Patanol drops and she is asked to resume taking loratadine as well as Nasonex

## 2021-03-02 NOTE — ASSESSMENT & PLAN NOTE
Resume loratadine  Continue intra nasal corticosteroid  I do not believe she has an acute sinusitis presently but if her symptoms not improve over the next several days she and daughter are asked to call for further evaluation  They agree with this plan

## 2021-03-02 NOTE — PROGRESS NOTES
Assessment/Plan:  Allergic conjunctivitis of both eyes    The patient has acute conjunctivitis  I believe this is allergic in nature  We are going to add Patanol drops and she is asked to resume taking loratadine as well as Nasonex  Acute rhinitis    Resume loratadine  Continue intra nasal corticosteroid  I do not believe she has an acute sinusitis presently but if her symptoms not improve over the next several days she and daughter are asked to call for further evaluation  They agree with this plan  Urinary tract infection without hematuria    We reviewed her recent ultrasound which shows no urinary tract abnormality  She is going to follow-up with urology next week  Diagnoses and all orders for this visit:    Allergic conjunctivitis of both eyes  -     olopatadine (PATANOL) 0 1 % ophthalmic solution; Administer 1 drop to both eyes 2 (two) times a day    Acute rhinitis    Thrombocytopenia (HCC)    Dizziness    Urinary tract infection without hematuria, site unspecified          Subjective:   Chief Complaint   Patient presents with    Eye Problem     blurry, itchy and irritated        Patient ID: Dewanda Bosworth is a 66 y o  female  HPI    Patient is a 17-year-old female who presents today with concerns over itchy watery eyes which developed yesterday  She has had some nasal congestion and left-sided otalgia  She has had some left-sided facial pressure as well  She has had no fevers or chills  No chest pain or shortness of breath  No coughing or wheezing  We started her on Flonase and loratadine for acute rhinitis about 4 weeks ago  She continues with the Flonase but she has finished the loratadine in this may have coincided with her worsening symptoms    The following portions of the patient's history were reviewed and updated as appropriate: allergies, current medications, past family history, past medical history, past social history, past surgical history and problem list     Review of Systems   Constitution: Negative  HENT: Positive for congestion and ear pain  Negative for ear discharge and sore throat  Eyes: Positive for redness  Negative for blurred vision and discharge  Cardiovascular: Negative  Respiratory: Negative  Skin: Negative for itching and rash  Musculoskeletal: Negative  Gastrointestinal: Negative  Genitourinary:        Recurrent UTI  We reviewed her ultrasound which was normal today she has follow-up with Urology scheduled for next week  Neurological: Positive for headaches  Objective:    Physical Exam   Constitutional: She is oriented to person, place, and time  She appears well-developed and well-nourished  No distress  Eyes: Right eye exhibits no discharge  Left eye exhibits no discharge  She has bilateral conjunctival injection which is bulbar as well as conjunctival   There is no drainage  There is no blepharitis  Neck: No thyromegaly present  Cardiovascular: Normal rate and regular rhythm  Pulmonary/Chest: Effort normal and breath sounds normal  No respiratory distress  She has no wheezes  Musculoskeletal:         General: No edema  Lymphadenopathy:     She has no cervical adenopathy  Neurological: She is alert and oriented to person, place, and time  Skin: No rash noted  No erythema  Psychiatric:   Depressed affect which is chronic   Nursing note and vitals reviewed

## 2021-03-02 NOTE — ASSESSMENT & PLAN NOTE
We reviewed her recent ultrasound which shows no urinary tract abnormality  She is going to follow-up with urology next week

## 2021-03-06 ENCOUNTER — IMMUNIZATIONS (OUTPATIENT)
Dept: FAMILY MEDICINE CLINIC | Facility: HOSPITAL | Age: 76
End: 2021-03-06

## 2021-03-06 DIAGNOSIS — Z23 ENCOUNTER FOR IMMUNIZATION: Primary | ICD-10-CM

## 2021-03-06 PROCEDURE — 91300 SARS-COV-2 / COVID-19 MRNA VACCINE (PFIZER-BIONTECH) 30 MCG: CPT

## 2021-03-06 PROCEDURE — 0002A SARS-COV-2 / COVID-19 MRNA VACCINE (PFIZER-BIONTECH) 30 MCG: CPT

## 2021-03-08 DIAGNOSIS — F41.9 ANXIETY: ICD-10-CM

## 2021-03-08 DIAGNOSIS — M81.0 AGE RELATED OSTEOPOROSIS, UNSPECIFIED PATHOLOGICAL FRACTURE PRESENCE: Primary | ICD-10-CM

## 2021-03-08 RX ORDER — VIT B COMP NO.3/FOLIC/C/BIOTIN 1 MG-60 MG
TABLET ORAL
Qty: 30 EACH | Refills: 0 | Status: SHIPPED | OUTPATIENT
Start: 2021-03-08 | End: 2021-04-05 | Stop reason: SDUPTHER

## 2021-03-08 RX ORDER — ALPRAZOLAM 0.25 MG/1
TABLET ORAL
Qty: 30 TABLET | Refills: 0 | Status: SHIPPED | OUTPATIENT
Start: 2021-03-08 | End: 2021-05-24 | Stop reason: SDUPTHER

## 2021-03-15 ENCOUNTER — TELEPHONE (OUTPATIENT)
Dept: UROLOGY | Facility: AMBULATORY SURGERY CENTER | Age: 76
End: 2021-03-15

## 2021-03-16 ENCOUNTER — OFFICE VISIT (OUTPATIENT)
Dept: FAMILY MEDICINE CLINIC | Facility: CLINIC | Age: 76
End: 2021-03-16
Payer: MEDICARE

## 2021-03-16 VITALS
HEIGHT: 61 IN | WEIGHT: 140 LBS | DIASTOLIC BLOOD PRESSURE: 76 MMHG | TEMPERATURE: 97.7 F | HEART RATE: 104 BPM | BODY MASS INDEX: 26.43 KG/M2 | SYSTOLIC BLOOD PRESSURE: 122 MMHG | OXYGEN SATURATION: 98 %

## 2021-03-16 DIAGNOSIS — K21.9 GASTROESOPHAGEAL REFLUX DISEASE: ICD-10-CM

## 2021-03-16 DIAGNOSIS — Z00.00 MEDICARE ANNUAL WELLNESS VISIT, SUBSEQUENT: Primary | ICD-10-CM

## 2021-03-16 PROCEDURE — G0439 PPPS, SUBSEQ VISIT: HCPCS | Performed by: FAMILY MEDICINE

## 2021-03-16 PROCEDURE — 1123F ACP DISCUSS/DSCN MKR DOCD: CPT | Performed by: FAMILY MEDICINE

## 2021-03-16 RX ORDER — FLUCONAZOLE 200 MG/1
200 TABLET ORAL DAILY
Status: ON HOLD | COMMUNITY
Start: 2021-03-12 | End: 2021-08-12 | Stop reason: CLARIF

## 2021-03-16 RX ORDER — PANTOPRAZOLE SODIUM 40 MG/1
40 TABLET, DELAYED RELEASE ORAL EVERY MORNING
Qty: 30 TABLET | Refills: 5 | Status: SHIPPED | OUTPATIENT
Start: 2021-03-16 | End: 2021-10-04

## 2021-03-16 RX ORDER — CEPHALEXIN 250 MG/1
250 CAPSULE ORAL DAILY
Status: ON HOLD | COMMUNITY
Start: 2021-03-12 | End: 2021-08-12 | Stop reason: CLARIF

## 2021-03-16 NOTE — PROGRESS NOTES
Assessment and Plan:     Problem List Items Addressed This Visit        Other    Medicare annual wellness visit, subsequent - Primary      The patient is a 68-year-old female who presents today for Medicare subsequent wellness visit  All components of the visit were addressed with the patient  She has no gaps in care presently other than dpt vaccine and we are going to hold off on this presently  Reviewed that her colonoscopy was performed in 2014 and showed no polyp or other lesion  She has had her COVID-19 vaccination  Other vaccinations are current  Anticipatory guidance provided  Finally she is going to get a [place] handicap placard application and return it  Will follow up with her when available  Other Visit Diagnoses     Gastroesophageal reflux disease        Relevant Medications    pantoprazole (PROTONIX) 40 mg tablet        BMI Counseling: Body mass index is 26 45 kg/m²  The BMI is above normal  Nutrition recommendations include decreasing portion sizes, encouraging healthy choices of fruits and vegetables, consuming healthier snacks, limiting drinks that contain sugar and moderation in carbohydrate intake  Exercise recommendations include moderate physical activity 150 minutes/week and exercising 3-5 times per week  No pharmacotherapy was ordered  Preventive health issues were discussed with patient, and age appropriate screening tests were ordered as noted in patient's After Visit Summary  Personalized health advice and appropriate referrals for health education or preventive services given if needed, as noted in patient's After Visit Summary       History of Present Illness:     Patient presents for Medicare Annual Wellness visit    Patient Care Team:  Karo Wood MD as PCP - General  MD Aria Jeronimo DO     Problem List:     Patient Active Problem List   Diagnosis    Acid reflux    History of asthma    Migraine headache    Hypertension    Cholelithiasis    Moderate mitral regurgitation    Acute rhinitis    Benign paroxysmal positional vertigo    Viral illness    Medicare annual wellness visit, subsequent    Recurrent falls    Gait disturbance    Mixed hyperlipidemia    Generalized weakness    Abnormal LFTs    Anxiety    Age related osteoporosis    Thrombocytopenia (HCC)    Allergic conjunctivitis of both eyes      Past Medical and Surgical History:     Past Medical History:   Diagnosis Date    Asthma     Cholelithiasis     GERD (gastroesophageal reflux disease)     Influenza 1/29/2019    Migraine headache     Pneumonia     Urinary tract infection      Past Surgical History:   Procedure Laterality Date    NO PAST SURGERIES        Family History:     Family History   Problem Relation Age of Onset    Heart disease Mother     No Known Problems Father     No Known Problems Son       Social History:        Social History     Socioeconomic History    Marital status: /Civil Union     Spouse name: None    Number of children: None    Years of education: None    Highest education level: None   Occupational History    None   Social Needs    Financial resource strain: None    Food insecurity     Worry: None     Inability: None    Transportation needs     Medical: None     Non-medical: None   Tobacco Use    Smoking status: Former Smoker     Packs/day: 1 50     Years: 10 00     Pack years: 15 00     Types: Cigarettes    Smokeless tobacco: Never Used    Tobacco comment: quit age 20   Substance and Sexual Activity    Alcohol use: Never     Alcohol/week: 0 0 standard drinks    Drug use: Never    Sexual activity: None   Lifestyle    Physical activity     Days per week: None     Minutes per session: None    Stress: None   Relationships    Social connections     Talks on phone: None     Gets together: None     Attends Worship service: None     Active member of club or organization: None     Attends meetings of clubs or organizations: None     Relationship status: None    Intimate partner violence     Fear of current or ex partner: None     Emotionally abused: None     Physically abused: None     Forced sexual activity: None   Other Topics Concern    None   Social History Narrative    Lives with       Medications and Allergies:     Current Outpatient Medications   Medication Sig Dispense Refill    ALPRAZolam (XANAX) 0 25 mg tablet TAKE ONE TABLET BY MOUTH EVERY DAY AS NEEDED FOR ANXIETY 30 tablet 0    atorvastatin (LIPITOR) 20 mg tablet TAKE ONE TABLET BY MOUTH EVERY DAY 30 tablet 2    B Complex-C-Folic Acid (Ayanna-Dylan Rx) 1 MG TABS TAKE ONE TABLET BY MOUTH EVERY DAY WITH BREAKFAST 30 each 0    cephalexin (KEFLEX) 250 mg capsule Take 250 mg by mouth daily      CRANBERRY PO Take by mouth 2 (two) times a day      docusate sodium (COLACE) 100 mg capsule Take 1 capsule (100 mg total) by mouth 2 (two) times a day 10 capsule 0    fluconazole (DIFLUCAN) 200 mg tablet Take 200 mg by mouth daily      fluticasone (FLONASE) 50 mcg/act nasal spray 1 spray into each nostril daily 1 Bottle 2    loratadine (CLARITIN) 10 mg tablet Take 1 tablet (10 mg total) by mouth daily 30 tablet 2    meclizine (ANTIVERT) 12 5 MG tablet Take 1 tablet (12 5 mg total) by mouth every 8 (eight) hours as needed for dizziness 90 tablet 0    olopatadine (PATANOL) 0 1 % ophthalmic solution Administer 1 drop to both eyes 2 (two) times a day 5 mL 1    ondansetron (ZOFRAN) 4 mg tablet Take 1 tablet (4 mg total) by mouth every 8 (eight) hours as needed for nausea or vomiting 30 tablet 1    pantoprazole (PROTONIX) 40 mg tablet Take 1 tablet (40 mg total) by mouth every morning 30 tablet 5     No current facility-administered medications for this visit        Allergies   Allergen Reactions    Bee Venom Anaphylaxis      Immunizations:     Immunization History   Administered Date(s) Administered    Influenza, Quadrivalent (nasal) 01/09/2017    Influenza, high dose seasonal 0 7 mL 01/27/2020, 10/13/2020    Influenza, seasonal, injectable 01/10/2017    Pneumococcal Conjugate 13-Valent 01/27/2020    Pneumococcal Polysaccharide PPV23 06/01/2016    SARS-CoV-2 / COVID-19 mRNA IM ([facility]) 02/13/2021, 03/06/2021      Health Maintenance: There are no preventive care reminders to display for this patient  Topic Date Due    DTaP,Tdap,and Td Vaccines (1 - Tdap) 11/01/1966      Medicare Health Risk Assessment:     /76   Pulse 104   Temp 97 7 °F (36 5 °C)   Ht 5' 1" (1 549 m)   Wt 63 5 kg (140 lb)   SpO2 98%   BMI 26 45 kg/m²      Ashley Deleon is here for her Subsequent Wellness visit  Health Risk Assessment:   Patient rates overall health as fair  Patient feels that their physical health rating is slightly worse  Patient is very satisfied with their life  Eyesight was rated as slightly worse  Hearing was rated as slightly worse  Patient feels that their emotional and mental health rating is same  Patients states they are never, rarely angry  Patient states they are never, rarely unusually tired/fatigued  Pain experienced in the last 7 days has been some  Patient's pain rating has been 5/10  Patient states that she has experienced weight loss or gain in last 6 months  Lumbago  No LE weakness or incontinence  Depression Screening:   PHQ-2 Score: 1      Fall Risk Screening: In the past year, patient has experienced: no history of falling in past year      Urinary Incontinence Screening:   Patient has not leaked urine accidently in the last six months  Home Safety:  Patient has trouble with stairs inside or outside of their home  Patient has working smoke alarms and has working carbon monoxide detector  Home safety hazards include: none  Nutrition:   Current diet is Regular  Medications:   Patient is currently taking over-the-counter supplements   OTC medications include: see medication list  Patient is able to manage medications  Activities of Daily Living (ADLs)/Instrumental Activities of Daily Living (IADLs):   Walk and transfer into and out of bed and chair?: Yes  Dress and groom yourself?: Yes    Bathe or shower yourself?: Yes    Feed yourself? Yes  Do your laundry/housekeeping?: Yes  Manage your money, pay your bills and track your expenses?: No  Make your own meals?: Yes    Do your own shopping?: Yes    Durable Medical Equipment Suppliers  None    Previous Hospitalizations:   Any hospitalizations or ED visits within the last 12 months?: No      Advance Care Planning:   Living will: Yes    Durable POA for healthcare:  Yes    Advanced directive: Yes      Comments: Has an active living will    Cognitive Screening:   Provider or family/friend/caregiver concerned regarding cognition?: No    PREVENTIVE SCREENINGS      Cardiovascular Screening:    General: Screening Not Indicated and History Lipid Disorder      Diabetes Screening:     General: Screening Current      Colorectal Cancer Screening:     General: Screening Current      Breast Cancer Screening:     General: Screening Current      Cervical Cancer Screening:    General: Screening Not Indicated      Osteoporosis Screening:    General: Screening Not Indicated and History Osteoporosis      Abdominal Aortic Aneurysm (AAA) Screening:        General: Screening Not Indicated      Lung Cancer Screening:     General: Screening Not Indicated      Hepatitis C Screening:    General: Screening Not Indicated    Screening, Brief Intervention, and Referral to Treatment (SBIRT)    Screening  Typical number of drinks in a day: 0    Single Item Drug Screening:  How often have you used an illegal drug (including marijuana) or a prescription medication for non-medical reasons in the past year? never    Single Item Drug Screen Score: 0  Interpretation: Negative screen for possible drug use disorder    Wt Readings from Last 12 Encounters:   03/16/21 63 5 kg (140 lb)   03/02/21 62 6 kg (138 lb)   02/05/21 68 kg (150 lb)   10/13/20 64 9 kg (143 lb)   07/20/20 64 1 kg (141 lb 6 4 oz)   07/09/20 64 kg (141 lb)   06/15/20 64 kg (141 lb)   06/01/20 67 6 kg (149 lb)   06/01/20 67 6 kg (149 lb)   05/19/20 67 6 kg (149 lb)   02/17/20 67 6 kg (149 lb)   01/20/20 66 7 kg (147 lb)   ]    Emma Jones MD

## 2021-03-16 NOTE — ASSESSMENT & PLAN NOTE
The patient is a 75-year-old female who presents today for Medicare subsequent wellness visit  All components of the visit were addressed with the patient  She has no gaps in care presently other than dpt vaccine and we are going to hold off on this presently  Reviewed that her colonoscopy was performed in 2014 and showed no polyp or other lesion  She has had her COVID-19 vaccination  Other vaccinations are current  Anticipatory guidance provided  Finally she is going to get a [place] handicap placard application and return it  Will follow up with her when available

## 2021-03-16 NOTE — PATIENT INSTRUCTIONS
Medicare Preventive Visit Patient Instructions  Thank you for completing your Welcome to Medicare Visit or Medicare Annual Wellness Visit today  Your next wellness visit will be due in one year (3/17/2022)  The screening/preventive services that you may require over the next 5-10 years are detailed below  Some tests may not apply to you based off risk factors and/or age  Screening tests ordered at today's visit but not completed yet may show as past due  Also, please note that scanned in results may not display below  Preventive Screenings:  Service Recommendations Previous Testing/Comments   Colorectal Cancer Screening  * Colonoscopy    * Fecal Occult Blood Test (FOBT)/Fecal Immunochemical Test (FIT)  * Fecal DNA/Cologuard Test  * Flexible Sigmoidoscopy Age: 54-65 years old   Colonoscopy: every 10 years (may be performed more frequently if at higher risk)  OR  FOBT/FIT: every 1 year  OR  Cologuard: every 3 years  OR  Sigmoidoscopy: every 5 years  Screening may be recommended earlier than age 48 if at higher risk for colorectal cancer  Also, an individualized decision between you and your healthcare provider will decide whether screening between the ages of 74-80 would be appropriate  Colonoscopy: 07/18/2014  FOBT/FIT: Not on file  Cologuard: Not on file  Sigmoidoscopy: Not on file          Breast Cancer Screening Age: 36 years old  Frequency: every 1-2 years  Not required if history of left and right mastectomy Mammogram: 07/09/2020    Screening Current   Cervical Cancer Screening Between the ages of 21-29, pap smear recommended once every 3 years  Between the ages of 33-67, can perform pap smear with HPV co-testing every 5 years     Recommendations may differ for women with a history of total hysterectomy, cervical cancer, or abnormal pap smears in past  Pap Smear: Not on file    Screening Not Indicated   Hepatitis C Screening Once for adults born between 1945 and 1965  More frequently in patients at high risk for Hepatitis C Hep C Antibody: Not on file        Diabetes Screening 1-2 times per year if you're at risk for diabetes or have pre-diabetes Fasting glucose: No results in last 5 years   A1C: No results in last 5 years    Screening Current   Cholesterol Screening Once every 5 years if you don't have a lipid disorder  May order more often based on risk factors  Lipid panel: 06/04/2020    Screening Not Indicated  History Lipid Disorder     Other Preventive Screenings Covered by Medicare:  1  Abdominal Aortic Aneurysm (AAA) Screening: covered once if your at risk  You're considered to be at risk if you have a family history of AAA  2  Lung Cancer Screening: covers low dose CT scan once per year if you meet all of the following conditions: (1) Age 50-69; (2) No signs or symptoms of lung cancer; (3) Current smoker or have quit smoking within the last 15 years; (4) You have a tobacco smoking history of at least 30 pack years (packs per day multiplied by number of years you smoked); (5) You get a written order from a healthcare provider  3  Glaucoma Screening: covered annually if you're considered high risk: (1) You have diabetes OR (2) Family history of glaucoma OR (3)  aged 48 and older OR (3)  American aged 72 and older  3  Osteoporosis Screening: covered every 2 years if you meet one of the following conditions: (1) You're estrogen deficient and at risk for osteoporosis based off medical history and other findings; (2) Have a vertebral abnormality; (3) On glucocorticoid therapy for more than 3 months; (4) Have primary hyperparathyroidism; (5) On osteoporosis medications and need to assess response to drug therapy  · Last bone density test (DXA Scan): 07/09/2020   5  HIV Screening: covered annually if you're between the age of 15-65  Also covered annually if you are younger than 13 and older than 72 with risk factors for HIV infection   For pregnant patients, it is covered up to 3 times per pregnancy  Immunizations:  Immunization Recommendations   Influenza Vaccine Annual influenza vaccination during flu season is recommended for all persons aged >= 6 months who do not have contraindications   Pneumococcal Vaccine (Prevnar and Pneumovax)  * Prevnar = PCV13  * Pneumovax = PPSV23   Adults 25-60 years old: 1-3 doses may be recommended based on certain risk factors  Adults 72 years old: Prevnar (PCV13) vaccine recommended followed by Pneumovax (PPSV23) vaccine  If already received PPSV23 since turning 65, then PCV13 recommended at least one year after PPSV23 dose  Hepatitis B Vaccine 3 dose series if at intermediate or high risk (ex: diabetes, end stage renal disease, liver disease)   Tetanus (Td) Vaccine - COST NOT COVERED BY MEDICARE PART B Following completion of primary series, a booster dose should be given every 10 years to maintain immunity against tetanus  Td may also be given as tetanus wound prophylaxis  Tdap Vaccine - COST NOT COVERED BY MEDICARE PART B Recommended at least once for all adults  For pregnant patients, recommended with each pregnancy  Shingles Vaccine (Shingrix) - COST NOT COVERED BY MEDICARE PART B  2 shot series recommended in those aged 48 and above     Health Maintenance Due:  There are no preventive care reminders to display for this patient  Immunizations Due:      Topic Date Due    DTaP,Tdap,and Td Vaccines (1 - Tdap) 11/01/1966     Advance Directives   What are advance directives? Advance directives are legal documents that state your wishes and plans for medical care  These plans are made ahead of time in case you lose your ability to make decisions for yourself  Advance directives can apply to any medical decision, such as the treatments you want, and if you want to donate organs  What are the types of advance directives? There are many types of advance directives, and each state has rules about how to use them   You may choose a combination of any of the following:  · Living will: This is a written record of the treatment you want  You can also choose which treatments you do not want, which to limit, and which to stop at a certain time  This includes surgery, medicine, IV fluid, and tube feedings  · Durable power of  for healthcare [place]): This is a written record that states who you want to make healthcare choices for you when you are unable to make them for yourself  This person, called a proxy, is usually a family member or a friend  You may choose more than 1 proxy  · Do not resuscitate (DNR) order:  A DNR order is used in case your heart stops beating or you stop breathing  It is a request not to have certain forms of treatment, such as CPR  A DNR order may be included in other types of advance directives  · Medical directive: This covers the care that you want if you are in a coma, near death, or unable to make decisions for yourself  You can list the treatments you want for each condition  Treatment may include pain medicine, surgery, blood transfusions, dialysis, IV or tube feedings, and a ventilator (breathing machine)  · Values history: This document has questions about your views, beliefs, and how you feel and think about life  This information can help others choose the care that you would choose  Why are advance directives important? An advance directive helps you control your care  Although spoken wishes may be used, it is better to have your wishes written down  Spoken wishes can be misunderstood, or not followed  Treatments may be given even if you do not want them  An advance directive may make it easier for your family to make difficult choices about your care  Weight Management   Why it is important to manage your weight:  Being overweight increases your risk of health conditions such as heart disease, high blood pressure, type 2 diabetes, and certain types of cancer   It can also increase your risk for osteoarthritis, sleep apnea, and other respiratory problems  Aim for a slow, steady weight loss  Even a small amount of weight loss can lower your risk of health problems  How to lose weight safely:  A safe and healthy way to lose weight is to eat fewer calories and get regular exercise  You can lose up about 1 pound a week by decreasing the number of calories you eat by 500 calories each day  Healthy meal plan for weight management:  A healthy meal plan includes a variety of foods, contains fewer calories, and helps you stay healthy  A healthy meal plan includes the following:  · Eat whole-grain foods more often  A healthy meal plan should contain fiber  Fiber is the part of grains, fruits, and vegetables that is not broken down by your body  Whole-grain foods are healthy and provide extra fiber in your diet  Some examples of whole-grain foods are whole-wheat breads and pastas, oatmeal, brown rice, and bulgur  · Eat a variety of vegetables every day  Include dark, leafy greens such as spinach, kale, eh greens, and mustard greens  Eat yellow and orange vegetables such as carrots, sweet potatoes, and winter squash  · Eat a variety of fruits every day  Choose fresh or canned fruit (canned in its own juice or light syrup) instead of juice  Fruit juice has very little or no fiber  · Eat low-fat dairy foods  Drink fat-free (skim) milk or 1% milk  Eat fat-free yogurt and low-fat cottage cheese  Try low-fat cheeses such as mozzarella and other reduced-fat cheeses  · Choose meat and other protein foods that are low in fat  Choose beans or other legumes such as split peas or lentils  Choose fish, skinless poultry (chicken or turkey), or lean cuts of red meat (beef or pork)  Before you cook meat or poultry, cut off any visible fat  · Use less fat and oil  Try baking foods instead of frying them  Add less fat, such as margarine, sour cream, regular salad dressing and mayonnaise to foods  Eat fewer high-fat foods   Some examples of high-fat foods include french fries, doughnuts, ice cream, and cakes  · Eat fewer sweets  Limit foods and drinks that are high in sugar  This includes candy, cookies, regular soda, and sweetened drinks  Exercise:  Exercise at least 30 minutes per day on most days of the week  Some examples of exercise include walking, biking, dancing, and swimming  You can also fit in more physical activity by taking the stairs instead of the elevator or parking farther away from stores  Ask your healthcare provider about the best exercise plan for you  © Copyright [employer] 2018 Information is for End User's use only and may not be sold, redistributed or otherwise used for commercial purposes  All illustrations and images included in CareNotes® are the copyrighted property of [employer]  or [facility] Preventive Visit Patient Instructions  Thank you for completing your Welcome to Medicare Visit or Medicare Annual Wellness Visit today  Your next wellness visit will be due in one year (3/17/2022)  The screening/preventive services that you may require over the next 5-10 years are detailed below  Some tests may not apply to you based off risk factors and/or age  Screening tests ordered at today's visit but not completed yet may show as past due  Also, please note that scanned in results may not display below  Preventive Screenings:  Service Recommendations Previous Testing/Comments   Colorectal Cancer Screening  * Colonoscopy    * Fecal Occult Blood Test (FOBT)/Fecal Immunochemical Test (FIT)  * Fecal DNA/Cologuard Test  * Flexible Sigmoidoscopy Age: 54-65 years old   Colonoscopy: every 10 years (may be performed more frequently if at higher risk)  OR  FOBT/FIT: every 1 year  OR  Cologuard: every 3 years  OR  Sigmoidoscopy: every 5 years  Screening may be recommended earlier than age 48 if at higher risk for colorectal cancer   Also, an individualized decision between you and your healthcare provider will decide whether screening between the ages of 74-80 would be appropriate  Colonoscopy: 07/18/2014  FOBT/FIT: Not on file  Cologuard: Not on file  Sigmoidoscopy: Not on file    Screening Current     Breast Cancer Screening Age: 36 years old  Frequency: every 1-2 years  Not required if history of left and right mastectomy Mammogram: 07/09/2020    Screening Current   Cervical Cancer Screening Between the ages of 21-29, pap smear recommended once every 3 years  Between the ages of 33-67, can perform pap smear with HPV co-testing every 5 years  Recommendations may differ for women with a history of total hysterectomy, cervical cancer, or abnormal pap smears in past  Pap Smear: Not on file    Screening Not Indicated   Hepatitis C Screening Once for adults born between 1945 and 1965  More frequently in patients at high risk for Hepatitis C Hep C Antibody: Not on file    Screening Not Indicated   Diabetes Screening 1-2 times per year if you're at risk for diabetes or have pre-diabetes Fasting glucose: No results in last 5 years   A1C: No results in last 5 years    Screening Current   Cholesterol Screening Once every 5 years if you don't have a lipid disorder  May order more often based on risk factors  Lipid panel: 06/04/2020    Screening Not Indicated  History Lipid Disorder     Other Preventive Screenings Covered by Medicare:  6  Abdominal Aortic Aneurysm (AAA) Screening: covered once if your at risk  You're considered to be at risk if you have a family history of AAA  7  Lung Cancer Screening: covers low dose CT scan once per year if you meet all of the following conditions: (1) Age 50-69; (2) No signs or symptoms of lung cancer; (3) Current smoker or have quit smoking within the last 15 years; (4) You have a tobacco smoking history of at least 30 pack years (packs per day multiplied by number of years you smoked); (5) You get a written order from a healthcare provider    8  Glaucoma Screening: covered annually if you're considered high risk: (1) You have diabetes OR (2) Family history of glaucoma OR (3)  aged 48 and older OR (3)  American aged 72 and older  5  Osteoporosis Screening: covered every 2 years if you meet one of the following conditions: (1) You're estrogen deficient and at risk for osteoporosis based off medical history and other findings; (2) Have a vertebral abnormality; (3) On glucocorticoid therapy for more than 3 months; (4) Have primary hyperparathyroidism; (5) On osteoporosis medications and need to assess response to drug therapy  · Last bone density test (DXA Scan): 07/09/2020  10  HIV Screening: covered annually if you're between the age of 12-76  Also covered annually if you are younger than 13 and older than 72 with risk factors for HIV infection  For pregnant patients, it is covered up to 3 times per pregnancy  Immunizations:  Immunization Recommendations   Influenza Vaccine Annual influenza vaccination during flu season is recommended for all persons aged >= 6 months who do not have contraindications   Pneumococcal Vaccine (Prevnar and Pneumovax)  * Prevnar = PCV13  * Pneumovax = PPSV23   Adults 25-60 years old: 1-3 doses may be recommended based on certain risk factors  Adults 72 years old: Prevnar (PCV13) vaccine recommended followed by Pneumovax (PPSV23) vaccine  If already received PPSV23 since turning 65, then PCV13 recommended at least one year after PPSV23 dose  Hepatitis B Vaccine 3 dose series if at intermediate or high risk (ex: diabetes, end stage renal disease, liver disease)   Tetanus (Td) Vaccine - COST NOT COVERED BY MEDICARE PART B Following completion of primary series, a booster dose should be given every 10 years to maintain immunity against tetanus  Td may also be given as tetanus wound prophylaxis  Tdap Vaccine - COST NOT COVERED BY MEDICARE PART B Recommended at least once for all adults   For pregnant patients, recommended with each pregnancy  Shingles Vaccine (Shingrix) - COST NOT COVERED BY MEDICARE PART B  2 shot series recommended in those aged 48 and above     Health Maintenance Due:  There are no preventive care reminders to display for this patient  Immunizations Due:      Topic Date Due    DTaP,Tdap,and Td Vaccines (1 - Tdap) 11/01/1966     Advance Directives   What are advance directives? Advance directives are legal documents that state your wishes and plans for medical care  These plans are made ahead of time in case you lose your ability to make decisions for yourself  Advance directives can apply to any medical decision, such as the treatments you want, and if you want to donate organs  What are the types of advance directives? There are many types of advance directives, and each state has rules about how to use them  You may choose a combination of any of the following:  · Living will: This is a written record of the treatment you want  You can also choose which treatments you do not want, which to limit, and which to stop at a certain time  This includes surgery, medicine, IV fluid, and tube feedings  · Durable power of  for healthcare [place] [facility]): This is a written record that states who you want to make healthcare choices for you when you are unable to make them for yourself  This person, called a proxy, is usually a family member or a friend  You may choose more than 1 proxy  · Do not resuscitate (DNR) order:  A DNR order is used in case your heart stops beating or you stop breathing  It is a request not to have certain forms of treatment, such as CPR  A DNR order may be included in other types of advance directives  · Medical directive: This covers the care that you want if you are in a coma, near death, or unable to make decisions for yourself  You can list the treatments you want for each condition   Treatment may include pain medicine, surgery, blood transfusions, dialysis, IV or tube feedings, and a ventilator (breathing machine)  · Values history: This document has questions about your views, beliefs, and how you feel and think about life  This information can help others choose the care that you would choose  Why are advance directives important? An advance directive helps you control your care  Although spoken wishes may be used, it is better to have your wishes written down  Spoken wishes can be misunderstood, or not followed  Treatments may be given even if you do not want them  An advance directive may make it easier for your family to make difficult choices about your care  Weight Management   Why it is important to manage your weight:  Being overweight increases your risk of health conditions such as heart disease, high blood pressure, type 2 diabetes, and certain types of cancer  It can also increase your risk for osteoarthritis, sleep apnea, and other respiratory problems  Aim for a slow, steady weight loss  Even a small amount of weight loss can lower your risk of health problems  How to lose weight safely:  A safe and healthy way to lose weight is to eat fewer calories and get regular exercise  You can lose up about 1 pound a week by decreasing the number of calories you eat by 500 calories each day  Healthy meal plan for weight management:  A healthy meal plan includes a variety of foods, contains fewer calories, and helps you stay healthy  A healthy meal plan includes the following:  · Eat whole-grain foods more often  A healthy meal plan should contain fiber  Fiber is the part of grains, fruits, and vegetables that is not broken down by your body  Whole-grain foods are healthy and provide extra fiber in your diet  Some examples of whole-grain foods are whole-wheat breads and pastas, oatmeal, brown rice, and bulgur  · Eat a variety of vegetables every day  Include dark, leafy greens such as spinach, kale, eh greens, and mustard greens   Eat yellow and orange vegetables such as carrots, sweet potatoes, and winter squash  · Eat a variety of fruits every day  Choose fresh or canned fruit (canned in its own juice or light syrup) instead of juice  Fruit juice has very little or no fiber  · Eat low-fat dairy foods  Drink fat-free (skim) milk or 1% milk  Eat fat-free yogurt and low-fat cottage cheese  Try low-fat cheeses such as mozzarella and other reduced-fat cheeses  · Choose meat and other protein foods that are low in fat  Choose beans or other legumes such as split peas or lentils  Choose fish, skinless poultry (chicken or turkey), or lean cuts of red meat (beef or pork)  Before you cook meat or poultry, cut off any visible fat  · Use less fat and oil  Try baking foods instead of frying them  Add less fat, such as margarine, sour cream, regular salad dressing and mayonnaise to foods  Eat fewer high-fat foods  Some examples of high-fat foods include french fries, doughnuts, ice cream, and cakes  · Eat fewer sweets  Limit foods and drinks that are high in sugar  This includes candy, cookies, regular soda, and sweetened drinks  Exercise:  Exercise at least 30 minutes per day on most days of the week  Some examples of exercise include walking, biking, dancing, and swimming  You can also fit in more physical activity by taking the stairs instead of the elevator or parking farther away from stores  Ask your healthcare provider about the best exercise plan for you  © Copyright [employer] 2018 Information is for End User's use only and may not be sold, redistributed or otherwise used for commercial purposes   All illustrations and images included in CareNotes® are the copyrighted property of [employer]  or [address] [place] [address] [facility] [place]

## 2021-04-05 DIAGNOSIS — M81.0 AGE RELATED OSTEOPOROSIS, UNSPECIFIED PATHOLOGICAL FRACTURE PRESENCE: ICD-10-CM

## 2021-04-05 RX ORDER — VIT B COMP NO.3/FOLIC/C/BIOTIN 1 MG-60 MG
1 TABLET ORAL
Qty: 30 EACH | Refills: 2 | Status: ON HOLD | OUTPATIENT
Start: 2021-04-05 | End: 2021-08-12 | Stop reason: CLARIF

## 2021-05-03 DIAGNOSIS — J00 ACUTE RHINITIS: ICD-10-CM

## 2021-05-03 DIAGNOSIS — E78.2 MIXED HYPERLIPIDEMIA: ICD-10-CM

## 2021-05-03 RX ORDER — ATORVASTATIN CALCIUM 20 MG/1
20 TABLET, FILM COATED ORAL DAILY
Qty: 30 TABLET | Refills: 2 | Status: ON HOLD | OUTPATIENT
Start: 2021-05-03 | End: 2021-08-12 | Stop reason: CLARIF

## 2021-05-03 RX ORDER — LORATADINE 10 MG/1
10 TABLET ORAL DAILY
Qty: 30 TABLET | Refills: 2 | Status: ON HOLD | OUTPATIENT
Start: 2021-05-03 | End: 2021-08-12 | Stop reason: CLARIF

## 2021-05-24 DIAGNOSIS — F41.9 ANXIETY: ICD-10-CM

## 2021-05-24 RX ORDER — ALPRAZOLAM 0.25 MG/1
0.25 TABLET ORAL
Qty: 30 TABLET | Refills: 0 | Status: ON HOLD | OUTPATIENT
Start: 2021-05-24 | End: 2021-08-12 | Stop reason: CLARIF

## 2021-07-18 ENCOUNTER — APPOINTMENT (EMERGENCY)
Dept: RADIOLOGY | Facility: HOSPITAL | Age: 76
End: 2021-07-18
Payer: MEDICARE

## 2021-07-18 ENCOUNTER — APPOINTMENT (INPATIENT)
Dept: RADIOLOGY | Facility: HOSPITAL | Age: 76
DRG: 029 | End: 2021-07-18
Payer: MEDICARE

## 2021-07-18 ENCOUNTER — HOSPITAL ENCOUNTER (INPATIENT)
Facility: HOSPITAL | Age: 76
LOS: 8 days | DRG: 029 | End: 2021-07-26
Attending: SURGERY | Admitting: SURGERY
Payer: MEDICARE

## 2021-07-18 ENCOUNTER — HOSPITAL ENCOUNTER (EMERGENCY)
Facility: HOSPITAL | Age: 76
End: 2021-07-18
Attending: EMERGENCY MEDICINE | Admitting: EMERGENCY MEDICINE
Payer: MEDICARE

## 2021-07-18 VITALS
BODY MASS INDEX: 27.87 KG/M2 | HEART RATE: 84 BPM | TEMPERATURE: 97.4 F | WEIGHT: 147.49 LBS | DIASTOLIC BLOOD PRESSURE: 70 MMHG | RESPIRATION RATE: 20 BRPM | SYSTOLIC BLOOD PRESSURE: 154 MMHG | OXYGEN SATURATION: 99 %

## 2021-07-18 DIAGNOSIS — S14.129D CENTRAL CORD SYNDROME, SUBSEQUENT ENCOUNTER (HCC): ICD-10-CM

## 2021-07-18 DIAGNOSIS — S19.9XXA NECK INJURY, INITIAL ENCOUNTER: Primary | ICD-10-CM

## 2021-07-18 DIAGNOSIS — S14.129A CENTRAL CORD SYNDROME, INITIAL ENCOUNTER (HCC): Primary | ICD-10-CM

## 2021-07-18 DIAGNOSIS — R29.6 RECURRENT FALLS: ICD-10-CM

## 2021-07-18 DIAGNOSIS — H10.13 ALLERGIC CONJUNCTIVITIS OF BOTH EYES: ICD-10-CM

## 2021-07-18 DIAGNOSIS — R20.0 ARM NUMBNESS: ICD-10-CM
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
## 2021-07-18 PROBLEM — M54.2 NECK PAIN: Status: ACTIVE | Noted: 2021-07-18

## 2021-07-18 PROBLEM — R29.898 BILATERAL ARM WEAKNESS: Status: ACTIVE | Noted: 2021-07-18

## 2021-07-18 LAB
ALBUMIN SERPL BCP-MCNC: 3.4 G/DL (ref 3.5–5)
ALP SERPL-CCNC: 73 U/L (ref 46–116)
ALT SERPL W P-5'-P-CCNC: 26 U/L (ref 12–78)
ANION GAP SERPL CALCULATED.3IONS-SCNC: 13 MMOL/L (ref 4–13)
APTT PPP: 29 SECONDS (ref 23–37)
AST SERPL W P-5'-P-CCNC: 22 U/L (ref 5–45)
BASOPHILS # BLD AUTO: 0.03 THOUSANDS/ΜL (ref 0–0.1)
BASOPHILS NFR BLD AUTO: 0 % (ref 0–1)
BILIRUB SERPL-MCNC: 0.5 MG/DL (ref 0.2–1)
BUN SERPL-MCNC: 13 MG/DL (ref 5–25)
CALCIUM ALBUM COR SERPL-MCNC: 9.2 MG/DL (ref 8.3–10.1)
CALCIUM SERPL-MCNC: 8.7 MG/DL (ref 8.3–10.1)
CHLORIDE SERPL-SCNC: 105 MMOL/L (ref 100–108)
CO2 SERPL-SCNC: 24 MMOL/L (ref 21–32)
CREAT SERPL-MCNC: 0.87 MG/DL (ref 0.6–1.3)
EOSINOPHIL # BLD AUTO: 0.05 THOUSAND/ΜL (ref 0–0.61)
EOSINOPHIL NFR BLD AUTO: 1 % (ref 0–6)
ERYTHROCYTE [DISTWIDTH] IN BLOOD BY AUTOMATED COUNT: 12.7 % (ref 11.6–15.1)
GFR SERPL CREATININE-BSD FRML MDRD: 64 ML/MIN/1.73SQ M
GLUCOSE SERPL-MCNC: 94 MG/DL (ref 65–140)
HCT VFR BLD AUTO: 37.5 % (ref 34.8–46.1)
HGB BLD-MCNC: 12.7 G/DL (ref 11.5–15.4)
IMM GRANULOCYTES # BLD AUTO: 0.03 THOUSAND/UL (ref 0–0.2)
IMM GRANULOCYTES NFR BLD AUTO: 0 % (ref 0–2)
INR PPP: 1 (ref 0.84–1.19)
LYMPHOCYTES # BLD AUTO: 1.14 THOUSANDS/ΜL (ref 0.6–4.47)
LYMPHOCYTES NFR BLD AUTO: 13 % (ref 14–44)
MCH RBC QN AUTO: 31.6 PG (ref 26.8–34.3)
MCHC RBC AUTO-ENTMCNC: 33.9 G/DL (ref 31.4–37.4)
MCV RBC AUTO: 93 FL (ref 82–98)
MONOCYTES # BLD AUTO: 0.47 THOUSAND/ΜL (ref 0.17–1.22)
MONOCYTES NFR BLD AUTO: 6 % (ref 4–12)
NEUTROPHILS # BLD AUTO: 6.86 THOUSANDS/ΜL (ref 1.85–7.62)
NEUTS SEG NFR BLD AUTO: 80 % (ref 43–75)
NRBC BLD AUTO-RTO: 0 /100 WBCS
PLATELET # BLD AUTO: 128 THOUSANDS/UL (ref 149–390)
PMV BLD AUTO: 8.9 FL (ref 8.9–12.7)
POTASSIUM SERPL-SCNC: 3.6 MMOL/L (ref 3.5–5.3)
PROT SERPL-MCNC: 6.7 G/DL (ref 6.4–8.2)
PROTHROMBIN TIME: 13.1 SECONDS (ref 11.6–14.5)
RBC # BLD AUTO: 4.02 MILLION/UL (ref 3.81–5.12)
SODIUM SERPL-SCNC: 142 MMOL/L (ref 136–145)
WBC # BLD AUTO: 8.58 THOUSAND/UL (ref 4.31–10.16)

## 2021-07-18 PROCEDURE — 72141 MRI NECK SPINE W/O DYE: CPT

## 2021-07-18 PROCEDURE — 36415 COLL VENOUS BLD VENIPUNCTURE: CPT | Performed by: EMERGENCY MEDICINE

## 2021-07-18 PROCEDURE — NC001 PR NO CHARGE: Performed by: SURGERY

## 2021-07-18 PROCEDURE — G1004 CDSM NDSC: HCPCS

## 2021-07-18 PROCEDURE — 80053 COMPREHEN METABOLIC PANEL: CPT | Performed by: EMERGENCY MEDICINE

## 2021-07-18 PROCEDURE — 72125 CT NECK SPINE W/O DYE: CPT

## 2021-07-18 PROCEDURE — 71045 X-RAY EXAM CHEST 1 VIEW: CPT

## 2021-07-18 PROCEDURE — 70486 CT MAXILLOFACIAL W/O DYE: CPT

## 2021-07-18 PROCEDURE — 99285 EMERGENCY DEPT VISIT HI MDM: CPT | Performed by: EMERGENCY MEDICINE

## 2021-07-18 PROCEDURE — 70450 CT HEAD/BRAIN W/O DYE: CPT

## 2021-07-18 PROCEDURE — 90715 TDAP VACCINE 7 YRS/> IM: CPT | Performed by: SURGERY

## 2021-07-18 PROCEDURE — 85025 COMPLETE CBC W/AUTO DIFF WBC: CPT | Performed by: EMERGENCY MEDICINE

## 2021-07-18 PROCEDURE — 85610 PROTHROMBIN TIME: CPT | Performed by: EMERGENCY MEDICINE

## 2021-07-18 PROCEDURE — 99285 EMERGENCY DEPT VISIT HI MDM: CPT

## 2021-07-18 PROCEDURE — 99222 1ST HOSP IP/OBS MODERATE 55: CPT | Performed by: SURGERY

## 2021-07-18 PROCEDURE — 85730 THROMBOPLASTIN TIME PARTIAL: CPT | Performed by: EMERGENCY MEDICINE

## 2021-07-18 PROCEDURE — 72170 X-RAY EXAM OF PELVIS: CPT

## 2021-07-18 PROCEDURE — NC001 PR NO CHARGE: Performed by: NEUROLOGICAL SURGERY

## 2021-07-18 RX ORDER — ALPRAZOLAM 0.25 MG/1
0.25 TABLET ORAL
Status: DISCONTINUED | OUTPATIENT
Start: 2021-07-18 | End: 2021-07-26 | Stop reason: HOSPADM

## 2021-07-18 RX ORDER — OXYCODONE HYDROCHLORIDE 5 MG/1
2.5 TABLET ORAL EVERY 4 HOURS PRN
Status: DISCONTINUED | OUTPATIENT
Start: 2021-07-18 | End: 2021-07-26 | Stop reason: HOSPADM

## 2021-07-18 RX ORDER — CEPHALEXIN 250 MG/1
250 CAPSULE ORAL EVERY 24 HOURS
Status: DISCONTINUED | OUTPATIENT
Start: 2021-07-18 | End: 2021-07-18

## 2021-07-18 RX ORDER — DOCUSATE SODIUM 100 MG/1
100 CAPSULE, LIQUID FILLED ORAL 2 TIMES DAILY
Status: DISCONTINUED | OUTPATIENT
Start: 2021-07-18 | End: 2021-07-26 | Stop reason: HOSPADM

## 2021-07-18 RX ORDER — ATORVASTATIN CALCIUM 20 MG/1
20 TABLET, FILM COATED ORAL DAILY
Status: DISCONTINUED | OUTPATIENT
Start: 2021-07-18 | End: 2021-07-26 | Stop reason: HOSPADM

## 2021-07-18 RX ORDER — MECLIZINE HCL 12.5 MG/1
12.5 TABLET ORAL EVERY 8 HOURS PRN
Status: DISCONTINUED | OUTPATIENT
Start: 2021-07-18 | End: 2021-07-26 | Stop reason: HOSPADM

## 2021-07-18 RX ORDER — LABETALOL 20 MG/4 ML (5 MG/ML) INTRAVENOUS SYRINGE
10 EVERY 4 HOURS PRN
Status: DISCONTINUED | OUTPATIENT
Start: 2021-07-18 | End: 2021-07-18

## 2021-07-18 RX ORDER — POTASSIUM CHLORIDE 20 MEQ/1
40 TABLET, EXTENDED RELEASE ORAL ONCE
Status: COMPLETED | OUTPATIENT
Start: 2021-07-18 | End: 2021-07-18

## 2021-07-18 RX ORDER — ACETAMINOPHEN 325 MG/1
975 TABLET ORAL EVERY 8 HOURS SCHEDULED
Status: DISCONTINUED | OUTPATIENT
Start: 2021-07-18 | End: 2021-07-26 | Stop reason: HOSPADM

## 2021-07-18 RX ORDER — LORAZEPAM 2 MG/ML
0.5 INJECTION INTRAMUSCULAR EVERY 2 HOUR PRN
Status: COMPLETED | OUTPATIENT
Start: 2021-07-18 | End: 2021-07-18

## 2021-07-18 RX ORDER — ONDANSETRON 2 MG/ML
4 INJECTION INTRAMUSCULAR; INTRAVENOUS EVERY 6 HOURS PRN
Status: DISCONTINUED | OUTPATIENT
Start: 2021-07-18 | End: 2021-07-26 | Stop reason: HOSPADM

## 2021-07-18 RX ORDER — OXYCODONE HYDROCHLORIDE 5 MG/1
5 TABLET ORAL EVERY 4 HOURS PRN
Status: DISCONTINUED | OUTPATIENT
Start: 2021-07-18 | End: 2021-07-26 | Stop reason: HOSPADM

## 2021-07-18 RX ORDER — HYDROMORPHONE HCL IN WATER/PF 6 MG/30 ML
0.2 PATIENT CONTROLLED ANALGESIA SYRINGE INTRAVENOUS EVERY 4 HOURS PRN
Status: DISCONTINUED | OUTPATIENT
Start: 2021-07-18 | End: 2021-07-26 | Stop reason: HOSPADM

## 2021-07-18 RX ORDER — LORATADINE 10 MG/1
10 TABLET ORAL DAILY
Status: DISCONTINUED | OUTPATIENT
Start: 2021-07-18 | End: 2021-07-26 | Stop reason: HOSPADM

## 2021-07-18 RX ORDER — PANTOPRAZOLE SODIUM 40 MG/1
40 TABLET, DELAYED RELEASE ORAL EVERY MORNING
Status: DISCONTINUED | OUTPATIENT
Start: 2021-07-19 | End: 2021-07-26 | Stop reason: HOSPADM

## 2021-07-18 RX ORDER — SODIUM CHLORIDE, SODIUM GLUCONATE, SODIUM ACETATE, POTASSIUM CHLORIDE, MAGNESIUM CHLORIDE, SODIUM PHOSPHATE, DIBASIC, AND POTASSIUM PHOSPHATE .53; .5; .37; .037; .03; .012; .00082 G/100ML; G/100ML; G/100ML; G/100ML; G/100ML; G/100ML; G/100ML
75 INJECTION, SOLUTION INTRAVENOUS CONTINUOUS
Status: DISCONTINUED | OUTPATIENT
Start: 2021-07-18 | End: 2021-07-19

## 2021-07-18 RX ORDER — LORAZEPAM 2 MG/ML
0.5 INJECTION INTRAMUSCULAR
Status: DISCONTINUED | OUTPATIENT
Start: 2021-07-18 | End: 2021-07-18

## 2021-07-18 RX ADMIN — OXYCODONE HYDROCHLORIDE 5 MG: 5 TABLET ORAL at 18:38

## 2021-07-18 RX ADMIN — ALPRAZOLAM 0.25 MG: 0.5 TABLET ORAL at 20:59

## 2021-07-18 RX ADMIN — DOCUSATE SODIUM 100 MG: 100 CAPSULE ORAL at 18:06

## 2021-07-18 RX ADMIN — ATORVASTATIN CALCIUM 20 MG: 20 TABLET, FILM COATED ORAL at 18:06

## 2021-07-18 RX ADMIN — TETANUS TOXOID, REDUCED DIPHTHERIA TOXOID AND ACELLULAR PERTUSSIS VACCINE, ADSORBED 0.5 ML: 5; 2.5; 8; 8; 2.5 SUSPENSION INTRAMUSCULAR at 15:39

## 2021-07-18 RX ADMIN — SODIUM CHLORIDE 3 G: 9 INJECTION, SOLUTION INTRAVENOUS at 23:32

## 2021-07-18 RX ADMIN — LORAZEPAM 0.5 MG: 2 INJECTION INTRAMUSCULAR; INTRAVENOUS at 16:18

## 2021-07-18 RX ADMIN — POTASSIUM CHLORIDE 40 MEQ: 1500 TABLET, EXTENDED RELEASE ORAL at 20:59

## 2021-07-18 RX ADMIN — ACETAMINOPHEN 975 MG: 325 TABLET, FILM COATED ORAL at 21:04

## 2021-07-18 RX ADMIN — SODIUM CHLORIDE 3 G: 9 INJECTION, SOLUTION INTRAVENOUS at 18:28

## 2021-07-18 RX ADMIN — SODIUM CHLORIDE, SODIUM GLUCONATE, SODIUM ACETATE, POTASSIUM CHLORIDE, MAGNESIUM CHLORIDE, SODIUM PHOSPHATE, DIBASIC, AND POTASSIUM PHOSPHATE 75 ML/HR: .53; .5; .37; .037; .03; .012; .00082 INJECTION, SOLUTION INTRAVENOUS at 18:22

## 2021-07-18 RX ADMIN — ACETAMINOPHEN 975 MG: 325 TABLET, FILM COATED ORAL at 18:06

## 2021-07-18 NOTE — EMTALA/ACUTE CARE TRANSFER
[address] [address]  Dept: [phone]      EMTALA TRANSFER CONSENT    NAME Dulce Ball                                         1945                              MRN [id number]    I have been informed of my rights regarding examination, treatment, and transfer   by Dr Elida Gallegos DO    Benefits: Specialized equipment and/or services available at the receiving facility (Include comment)________________________    Risks: Potential deterioration of medical condition      Consent for Transfer:  I acknowledge that my medical condition has been evaluated and explained to me by the emergency department physician or other qualified medical person and/or my attending physician, who has recommended that I be transferred to the service of  Accepting Physician: Velasquez Ledbetter at [address] Name, [address] : Fritz  The above potential benefits of such transfer, the potential risks associated with such transfer, and the probable risks of not being transferred have been explained to me, and I fully understand them  The doctor has explained that, in my case, the benefits of transfer outweigh the risks  I agree to be transferred  I authorize the performance of emergency medical procedures and treatments upon me in both transit and upon arrival at the receiving facility  Additionally, I authorize the release of any and all medical records to the receiving facility and request they be transported with me, if possible  I understand that the safest mode of transportation during a medical emergency is an ambulance and that the Hospital advocates the use of this mode of transport  Risks of traveling to the receiving facility by car, including absence of medical control, life sustaining equipment, such as oxygen, and medical personnel has been explained to me and I fully understand them      (NICOLÁS CORRECT BOX BELOW)  [  ]  I consent to the stated transfer and to be transported by ambulance/helicopter  [  ]  I consent to the stated transfer, but refuse transportation by ambulance and accept full responsibility for my transportation by car  I understand the risks of non-ambulance transfers and I exonerate the Hospital and its staff from any deterioration in my condition that results from this refusal     X___________________________________________    DATE  21  TIME________  Signature of patient or legally responsible individual signing on patient behalf           RELATIONSHIP TO PATIENT_________________________          Provider Certification    NAME Nani Law                                         1945                              MRN [id number]    A medical screening exam was performed on the above named patient  Based on the examination:    Condition Necessitating Transfer The primary encounter diagnosis was Neck injury, initial encounter  A diagnosis of Arm numbness was also pertinent to this visit  Patient Condition: The patient has been stabilized such that within reasonable medical probability, no material deterioration of the patient condition or the condition of the unborn child(miesha) is likely to result from the transfer    Reason for Transfer: Level of Care needed not available at this facility    Transfer Requirements: Facility [facility] available and qualified personnel available for treatment as acknowledged by    · Agreed to accept transfer and to provide appropriate medical treatment as acknowledged by       Janiya Powers  · Appropriate medical records of the examination and treatment of the patient are provided at the time of transfer   [address] [facility], [address] _______  · Transfer will be performed by qualified personnel from    and appropriate transfer equipment as required, including the use of necessary and appropriate life support measures      Provider Certification: I have examined the patient and explained the following risks and benefits of being transferred/refusing transfer to the patient/family:  General risk, such as traffic hazards, adverse weather conditions, rough terrain or turbulence, possible failure of equipment (including vehicle or aircraft), or consequences of actions of persons outside the control of the transport personnel      Based on these reasonable risks and benefits to the patient and/or the unborn child(miesha), and based upon the information available at the time of the patients examination, I certify that the medical benefits reasonably to be expected from the provision of appropriate medical treatments at another medical facility outweigh the increasing risks, if any, to the individuals medical condition, and in the case of labor to the unborn child, from effecting the transfer      X____________________________________________ DATE 07/18/21        TIME_______      ORIGINAL - SEND TO MEDICAL RECORDS   COPY - SEND WITH PATIENT DURING TRANSFER

## 2021-07-18 NOTE — H&P
H&P Exam - Trauma   Nathan Bernstein 66 y o  female MRN: [id number]  Unit/Bed#: ED 17 Encounter: [id number]    Assessment/Plan   Trauma Alert: Evaluation  Model of Arrival: Ambulance  Trauma Team: Attending Dr Omayra Vaughn and [facility]  Consultants: Neurosurgery: yes  Returned call: Yes   and Oral Maxillofacial: yes  Returned call: Yes   Trauma Active Problems:   Fall with head strike, no LOC  Acute displaced b/l nasal bone fractures  Acute b/l upper extremity weakness and paresthesias    Trauma Plan:   Trauma surgery admission  MRI- cspine stat  Neuro surgery consult- MAP goals > 85 and SBP less than 160  SICU consult for BP management  OMS consult  NPO    Chief Complaint: b/l upper extremity weakness/ paresthesias    History of Present Illness   HPI:  Nathan Bernstein is a 66 y o  female medical history of asthma, GERD, UTI, who presents after a fall striking her head on the coffee table complicated by nasal bone fractures, and bilateral upper extremity weakness and paresthesias  Initial CT brain and CT C-spine were negative for traumatic injury however C-spine was not cleared this patient has persistent midline C-spine tenderness  Patient explained that she has numbness and tingling worse in her hands but extending from her shoulders to her hands bilaterally  Also associated with tremors  Patient is not on any anticoagulation or anti-platelet  Denied any current chest pain shortness of breath  Mechanism:Fall    Review of Systems   Constitutional: Negative for chills and fever  Eyes: Negative  Respiratory: Negative  Cardiovascular: Negative  Gastrointestinal: Negative  Endocrine: Negative  Genitourinary: Negative  Musculoskeletal: Positive for neck pain  Skin: Positive for wound  Skin abrasion right wrist   Allergic/Immunologic: Negative  Neurological: Positive for numbness  B/l arms   Hematological: Negative  Psychiatric/Behavioral: Negative          12-point, complete review of systems was reviewed and negative except as stated above  Historical Information   History is unobtainable from the patient due to none  Efforts to obtain history included the following sources: family member    Past Medical History:   Diagnosis Date    Asthma     Cholelithiasis     GERD (gastroesophageal reflux disease)     Influenza 1/29/2019    Migraine headache     Pneumonia     Urinary tract infection      Past Surgical History:   Procedure Laterality Date    NO PAST SURGERIES       Social History   Social History     Substance and Sexual Activity   Alcohol Use Never    Alcohol/week: 0 0 standard drinks     Social History     Substance and Sexual Activity   Drug Use Never     Social History     Tobacco Use   Smoking Status Former Smoker    Packs/day: 1 50    Years: 10 00    Pack years: 15 00    Types: Cigarettes   Smokeless Tobacco Never Used   Tobacco Comment    quit age 20     E-Cigarette/Vaping   Vena Rahel E-Cigarette Use Never User      E-Cigarette/Vaping Substances     Immunization History   Administered Date(s) Administered    Influenza, Quadrivalent (nasal) 01/09/2017    Influenza, high dose seasonal 0 7 mL 01/27/2020, 10/13/2020    Influenza, seasonal, injectable 01/10/2017    Pneumococcal Conjugate 13-Valent 01/27/2020    Pneumococcal Polysaccharide PPV23 06/01/2016    SARS-CoV-2 / COVID-19 mRNA IM ([employer]) 02/13/2021, 03/06/2021     Last Tetanus: today  Family History: Non-contributory  Unable to obtain/limited by none      Meds/Allergies   all current active meds have been reviewed    Allergies   Allergen Reactions    Bee Venom Anaphylaxis         PHYSICAL EXAM    PE limited by: none    Objective   Vitals:   First set: Pulse: 92 (07/18/21 1414)  Respirations: 18 (07/18/21 1414)  Blood Pressure: 168/77 (07/18/21 1414)    Primary Survey:   (A) Airway: intact  (B) Breathing: equal breath sounds b/l     (C) Circulation: Pulses:   normal  (D) Disabliity: GCS Total:  15  (E) Expose:  Completed    Secondary Survey: (Click on Physical Exam tab above)  Physical Exam  Vitals reviewed  Constitutional:       Appearance: Normal appearance  HENT:      Head: Normocephalic  Nose: Nose normal       Mouth/Throat:      Mouth: Mucous membranes are moist    Eyes:      Pupils: Pupils are equal, round, and reactive to light  Neck:      Comments: Cervical spine tenderness  Cardiovascular:      Rate and Rhythm: Normal rate  Pulses: Normal pulses  Pulmonary:      Effort: Pulmonary effort is normal    Abdominal:      General: There is no distension  Palpations: Abdomen is soft  Tenderness: There is no abdominal tenderness  Genitourinary:     Comments: deferred  Musculoskeletal:         General: Tenderness present  Cervical back: Normal range of motion  Tenderness present  Comments: c spine tenderness  Paresthesias and weakness and tremor of b/l upper extremities  Skin:     General: Skin is warm  Capillary Refill: Capillary refill takes 2 to 3 seconds  Neurological:      General: No focal deficit present  Mental Status: She is alert     Psychiatric:         Mood and Affect: Mood normal          Invasive Devices     Peripheral Intravenous Line            Peripheral IV 07/18/21 Distal;Left;Ventral (anterior) Wrist <1 day                Lab Results:   Results: I have personally reviewed pertinent reports   , BMP/CMP:   Lab Results   Component Value Date    SODIUM 142 07/18/2021    K 3 6 07/18/2021     07/18/2021    CO2 24 07/18/2021    BUN 13 07/18/2021    CREATININE 0 87 07/18/2021    CALCIUM 8 7 07/18/2021    AST 22 07/18/2021    ALT 26 07/18/2021    ALKPHOS 73 07/18/2021    EGFR 64 07/18/2021   , CBC:   Lab Results   Component Value Date    WBC 8 58 07/18/2021    HGB 12 7 07/18/2021    HCT 37 5 07/18/2021    MCV 93 07/18/2021     (L) 07/18/2021    MCH 31 6 07/18/2021    MCHC 33 9 07/18/2021    RDW 12 7 07/18/2021    MPV 8 9 07/18/2021    NRBC 0 07/18/2021    and Coagulation:   Lab Results   Component Value Date    INR 1 00 07/18/2021     Imaging/EKG Studies: Results: I have personally reviewed pertinent reports  Other Studies:   7/18 CTH: negative  7/18: CT cspine: negative  7/18 CT face: Mildly displaced acute bilateral nasal bone fractures   Small amount of left maxillary sinus hemorrhage    Code Status: Prior  Advance Directive and Living Will:      Power of :    POLST:

## 2021-07-18 NOTE — ED NOTES
Report called to [facility] @ [facility]  PT and family aware of transfer to trauma        Lavon Benitez RN  07/18/21 [id number]

## 2021-07-18 NOTE — ASSESSMENT & PLAN NOTE
· S/p ACDF C4-5 and PCDF C2-T2  · Completed 5 days of MAP pushes  · Neurosurgery recommending additional day, patient maintaining MAP>85 without supplementation  · Frequent neurologic and vital checks

## 2021-07-18 NOTE — PLAN OF CARE
Problem: Potential for Falls  Goal: Patient will remain free of falls  Description: INTERVENTIONS:  - Educate patient/family on patient safety including physical limitations  - Instruct patient to call for assistance with activity   - Consult OT/PT to assist with strengthening/mobility   - Keep Call bell within reach  - Keep bed low and locked with side rails adjusted as appropriate  - Keep care items and personal belongings within reach  - Initiate and maintain comfort rounds  - Make Fall Risk Sign visible to staff  - Offer Toileting every 2Hours, in advance of need  - Initiate/Maintain alarm  - Obtain necessary fall risk management equipment:   - Apply yellow socks and bracelet for high fall risk patients  - Consider moving patient to room near nurses station  Outcome: Progressing

## 2021-07-18 NOTE — ED PROVIDER NOTES
History  Chief Complaint   Patient presents with    Fall     Evaluated by Dr Rafy Dimas on arrival who manages neck immobilization to stretcher   Patient tangled in blankets and fell with face striking edge of coffee table  Epistaxis stopped, no LOC  Pain neck , radha collar in place  C/O tingling and burning both arms and thumbs ache  72yoF lives home with family, got tangled in her blanket when standing up, fell forward and hit face on coffee table  No LOC  C/o neck pain and burning pain in both arms / thumbs  No weakness  Prior to Admission Medications   Prescriptions Last Dose Informant Patient Reported? Taking?    ALPRAZolam (XANAX) 0 25 mg tablet 2021 at 1000  No Yes   Sig: Take 1 tablet (0 25 mg total) by mouth daily at bedtime as needed for anxiety   B Complex-C-Folic Acid ([facility]) 1 MG TABS 2021 at Unknown time  No Yes   Sig: Take 1 tablet (1 mg total) by mouth daily with breakfast   CRANBERRY PO 2021 at Unknown time  Yes Yes   Sig: Take by mouth 2 (two) times a day   atorvastatin (LIPITOR) 20 mg tablet 2021 at Unknown time  No Yes   Sig: Take 1 tablet (20 mg total) by mouth daily   cephalexin (KEFLEX) 250 mg capsule 2021 at Unknown time  Yes Yes   Sig: Take 250 mg by mouth daily   docusate sodium (COLACE) 100 mg capsule 2021 at Unknown time  No Yes   Sig: Take 1 capsule (100 mg total) by mouth 2 (two) times a day   fluconazole (DIFLUCAN) 200 mg tablet Unknown at Unknown time  Yes No   Sig: Take 200 mg by mouth daily   fluticasone (FLONASE) 50 mcg/act nasal spray Unknown at Unknown time  No No   Si spray into each nostril daily   loratadine (CLARITIN) 10 mg tablet 2021 at Unknown time  No Yes   Sig: Take 1 tablet (10 mg total) by mouth daily   meclizine (ANTIVERT) 12 5 MG tablet Unknown at Unknown time  No No   Sig: Take 1 tablet (12 5 mg total) by mouth every 8 (eight) hours as needed for dizziness   olopatadine (PATANOL) 0 1 % ophthalmic solution Unknown at Unknown time  No No   Sig: Administer 1 drop to both eyes 2 (two) times a day   ondansetron (ZOFRAN) 4 mg tablet Unknown at Unknown time  No No   Sig: Take 1 tablet (4 mg total) by mouth every 8 (eight) hours as needed for nausea or vomiting   pantoprazole (PROTONIX) 40 mg tablet 7/17/2021 at Unknown time  No Yes   Sig: Take 1 tablet (40 mg total) by mouth every morning      Facility-Administered Medications: None       Past Medical History:   Diagnosis Date    Asthma     Cholelithiasis     GERD (gastroesophageal reflux disease)     Influenza 1/29/2019    Migraine headache     Pneumonia     Urinary tract infection        Past Surgical History:   Procedure Laterality Date    NO PAST SURGERIES         Family History   Problem Relation Age of Onset    Heart disease Mother     No Known Problems Father     No Known Problems Son      I have reviewed and agree with the history as documented  E-Cigarette/Vaping    E-Cigarette Use Never User      E-Cigarette/Vaping Substances     Social History     Tobacco Use    Smoking status: Former Smoker     Packs/day: 1 50     Years: 10 00     Pack years: 15 00     Types: Cigarettes    Smokeless tobacco: Never Used    Tobacco comment: quit age 20   Vaping Use    Vaping Use: Never used   Substance Use Topics    Alcohol use: Never     Alcohol/week: 0 0 standard drinks    Drug use: Never       Review of Systems   Cardiovascular: Negative for chest pain  Musculoskeletal: Negative for back pain  Neurological: Positive for numbness  Negative for weakness and headaches  Hematological: Does not bruise/bleed easily  All other systems reviewed and are negative  Physical Exam  Physical Exam  Vitals reviewed  Constitutional:       Appearance: She is well-developed  HENT:      Head: Normocephalic and atraumatic  Right Ear: External ear normal       Left Ear: External ear normal       Nose: Nose normal  No rhinorrhea        Mouth/Throat: Mouth: Mucous membranes are moist    Eyes:      Conjunctiva/sclera: Conjunctivae normal    Cardiovascular:      Rate and Rhythm: Normal rate and regular rhythm  Pulmonary:      Effort: Pulmonary effort is normal       Breath sounds: Normal breath sounds  No wheezing or rales  Abdominal:      Palpations: Abdomen is soft  Tenderness: There is no abdominal tenderness  Musculoskeletal:      Cervical back: Neck supple  Right lower leg: No edema  Left lower leg: No edema  Skin:     General: Skin is warm and dry  Neurological:      Mental Status: She is alert and oriented to person, place, and time  Cranial Nerves: No cranial nerve deficit  Sensory: Sensory deficit (+hyperaesthesia to bilat thumbs / dorsal hands) present  Motor: No weakness     Psychiatric:         Mood and Affect: Mood normal          Vital Signs  ED Triage Vitals [07/18/21 1056]   Temperature Pulse Respirations Blood Pressure SpO2   (!) 97 4 °F (36 3 °C) 93 18 (!) 188/84 98 %      Temp Source Heart Rate Source Patient Position - Orthostatic VS BP Location FiO2 (%)   Tympanic Monitor Lying Right arm --      Pain Score       8           Vitals:    07/18/21 1056 07/18/21 1212   BP: (!) 188/84 158/75   Pulse: 93 90   Patient Position - Orthostatic VS: Lying          Visual Acuity      ED Medications  Medications - No data to display    Diagnostic Studies  Results Reviewed     Procedure Component Value Units Date/Time    Comprehensive metabolic panel [[id number]]  (Abnormal) Collected: 07/18/21 1111    Lab Status: Final result Specimen: Blood from Arm, Left Updated: 07/18/21 1133     Sodium 142 mmol/L      Potassium 3 6 mmol/L      Chloride 105 mmol/L      CO2 24 mmol/L      ANION GAP 13 mmol/L      BUN 13 mg/dL      Creatinine 0 87 mg/dL      Glucose 94 mg/dL      Calcium 8 7 mg/dL      Corrected Calcium 9 2 mg/dL      AST 22 U/L      ALT 26 U/L      Alkaline Phosphatase 73 U/L      Total Protein 6 7 g/dL      Albumin 3 4 g/dL      Total Bilirubin 0 50 mg/dL      eGFR 64 ml/min/1 73sq m     Narrative:      [place] guidelines for Chronic Kidney Disease (CKD):     Stage 1 with normal or high GFR (GFR > 90 mL/min/1 73 square meters)    Stage 2 Mild CKD (GFR = 60-89 mL/min/1 73 square meters)    Stage 3A Moderate CKD (GFR = 45-59 mL/min/1 73 square meters)    Stage 3B Moderate CKD (GFR = 30-44 mL/min/1 73 square meters)    Stage 4 Severe CKD (GFR = 15-29 mL/min/1 73 square meters)    Stage 5 End Stage CKD (GFR <15 mL/min/1 73 square meters)  Note: GFR calculation is accurate only with a steady state creatinine    Protime-INR [[id number]]  (Normal) Collected: 07/18/21 1111    Lab Status: Final result Specimen: Blood from Arm, Left Updated: 07/18/21 1129     Protime 13 1 seconds      INR 1 00    APTT [[id number]]  (Normal) Collected: 07/18/21 1111    Lab Status: Final result Specimen: Blood from Arm, Left Updated: 07/18/21 1129     PTT 29 seconds     CBC and differential [[id number]]  (Abnormal) Collected: 07/18/21 1111    Lab Status: Final result Specimen: Blood from Arm, Left Updated: 07/18/21 1116     WBC 8 58 Thousand/uL      RBC 4 02 Million/uL      Hemoglobin 12 7 g/dL      Hematocrit 37 5 %      MCV 93 fL      MCH 31 6 pg      MCHC 33 9 g/dL      RDW 12 7 %      MPV 8 9 fL      Platelets 626 Thousands/uL      nRBC 0 /100 WBCs      Neutrophils Relative 80 %      Immat GRANS % 0 %      Lymphocytes Relative 13 %      Monocytes Relative 6 %      Eosinophils Relative 1 %      Basophils Relative 0 %      Neutrophils Absolute 6 86 Thousands/µL      Immature Grans Absolute 0 03 Thousand/uL      Lymphocytes Absolute 1 14 Thousands/µL      Monocytes Absolute 0 47 Thousand/µL      Eosinophils Absolute 0 05 Thousand/µL      Basophils Absolute 0 03 Thousands/µL                  CT head without contrast   Final Result by Remy Baumann MD (07/18 1137)      No acute intracranial hemorrhage        Small amount of left maxillary sinus fluid  Workstation performed: [id number]         CT spine cervical without contrast   Final Result by Lg Campos MD (07/18 1139)      No cervical spine fracture or traumatic malalignment  Workstation performed: [id number]         CT facial bones without contrast   Final Result by Lg Campos MD (07/18 1141)      Mildly displaced acute bilateral nasal bone fractures  Small amount of left maxillary sinus hemorrhage  The study was marked in [facility] for immediate notification  Workstation performed: [id number]         XR chest 1 view portable    (Results Pending)   XR pelvis ap only 1 or 2 vw    (Results Pending)              Procedures  Procedures         ED Course                             SBIRT 22yo+      Most Recent Value   SBIRT (24 yo +)   In order to provide better care to our patients, we are screening all of our patients for alcohol and drug use  Would it be okay to ask you these screening questions? Yes Filed at: 07/18/2021 1211   Initial Alcohol Screen: US AUDIT-C    1  How often do you have a drink containing alcohol?  0 Filed at: 07/18/2021 1211   2  How many drinks containing alcohol do you have on a typical day you are drinking? 0 Filed at: 07/18/2021 1211   3a  Male UNDER 65: How often do you have five or more drinks on one occasion? 0 Filed at: 07/18/2021 1211   3b  FEMALE Any Age, or MALE 65+: How often do you have 4 or more drinks on one occassion? 0 Filed at: 07/18/2021 1211   Audit-C Score  0 Filed at: 07/18/2021 1211   RAJAT: How many times in the past year have you    Used an illegal drug or used a prescription medication for non-medical reasons? Never Filed at: 07/18/2021 1211                    MDM  Number of Diagnoses or Management Options  Arm numbness  Neck injury, initial encounter  Diagnosis management comments: CT head/neck negative for fx  Pt has persistent hyperaesthesia to both arms  +numbness  No weakness  Accepted to [facility] trauma service for MRI  Disposition  Final diagnoses:   Neck injury, initial encounter   Arm numbness     Time reflects when diagnosis was documented in both MDM as applicable and the Disposition within this note     Time User Action Codes Description Comment    7/18/2021 12:34 PM Pearletha Lob Add [S19  9XXA] Neck injury, initial encounter     7/18/2021 12:35 PM Pearletha Lob Add [R20 0] Arm numbness       ED Disposition     ED Disposition Condition Date/Time Comment    Transfer to Another Facility-In Network  [place] Jul 18, 2021 12:34 PM Vidhya George should be transferred out to [facility]  MD Documentation      Most Recent Value   Patient Condition  The patient has been stabilized such that within reasonable medical probability, no material deterioration of the patient condition or the condition of the unborn child(miesha) is likely to result from the transfer   Reason for Transfer  Level of Care needed not available at this facility   Benefits of Transfer  Specialized equipment and/or services available at the receiving facility (Include comment)________________________   Risks of Transfer  Potential deterioration of medical condition   Accepting Physician  [address]  Name, [address] [facility] [address]   Sending MD Leticia Ashraf   Provider Certification  General risk, such as traffic hazards, adverse weather conditions, rough terrain or turbulence, possible failure of equipment (including vehicle or aircraft), or consequences of actions of persons outside the control of the transport personnel      RN Documentation      Most [facility] Name, [address] [facility]   Transport Mode  Ambulance      Follow-up Information    None         Patient's Medications   Discharge Prescriptions    No medications on file     No discharge procedures on file      PDMP Review       Value Time User    PDMP Reviewed  Yes 5/24/2021 3:52 PM Mihir Wang MD          ED Provider  Electronically Signed by           Jer Fajardo DO  07/18/21 7459

## 2021-07-18 NOTE — CONSULTS
[address] [place] [address] 66 y o  female MRN: [id number]  Unit/Bed#: DENIS Encounter: [id number]      -------------------------------------------------------------------------------------------------------------  Chief Complaint: Neck and arm pain    History of Present Illness     Sarthak Burgess is a 66 y o  female PMHx of asthma, GERD, anxiety on ativan, chronic UTIs on keflex presented today after a fall at home  Patient reports she was standing up when her foot got caught in her blanket causing her to fall forward and hit her face on the end of the coffee table  She is complaining of neck pain, b/l UE paraesthesias and weakness, and right hand pain  Initial evaluation of CT head and c-spine were negative, CT facial bones with mildly displaced acute b/l nasal bone fx with small left maxillary sinus hemorrhage  Neurosurgery consulted by trauma team where they recommended STAT MRI of c-spine and MAP goals of >85  Patient to be admitted to critical care for strict MAP goals  History obtained from chart review and the patient   -------------------------------------------------------------------------------------------------------------  Assessment and Plan:    Neuro:    Diagnosis: B/l UE paraesthesias and weakness  o Plan:   - CT c spine negative  - Neurosurgery consultation  - Recommendation for STAT MRI of c-spine  - Maintain MAP goals >85  - Monitor neurovascular exam  - May require a-line and trini to maintain goal MAPs  Patient MAPs currently >90   Diagnosis: Analgesia  o Plan:   - Tylenol 975 q8 scheduled  - Oxy 2 5/5 mg q4 prn for mod/severe pain  - Dilaudid 0 2mg q4 IV prn   Diagnosis:  Anxiety  o Plan  - Continue home ativan 0 25mg prn at bedtime   Diagnosis: Dizziness  o Plan  - Continue home meclizine      CV:    Diagnosis: Permissive HTN in the setting of potential spinal cord injury  o Plan:   - Maintain MAP goals >85  - Consider trini and a-line if having trouble meeting MAP goals   - Closely monitor hemodynamics  - Tele   Diagnosis: HLD  o Plan:  - Continue home lipitor      Pulm:   Diagnosis: No acute issues  o Plan:   - Maintain spo2>92%  - PRN NC  - Encourage deep breathing and coughing      GI:    Diagnosis: GERD  o Plan:   - Continue home protonix   Diagnosis:  o Plan:  - Bowel regimen with colace      :    Diagnosis: No active issues  o Plan:   - Monitor Urine output  - I/Os      F/E/N:    Plan:   o F:None  o E:Replete electrolytes PRN K>4, Mg>2, P>3  o N:NPO until clarified non op per Neurosurgery      Heme/Onc:    Diagnosis: No active issues      Endo:    Diagnosis: No active issues  o Plan:   - Monitor blood glucose per ICU protocol    ID:    Diagnosis: Chronic UTIs  o Plan:   - Hold keflex since being treated with Unasyn    MSK/Skin:    Diagnosis: Nasal bone fx  o CT facial bones: mildly displace acute b/l nasal bone fx  Small amount of left maxillary sinus hemorrhage  o Plan:   - OMFS consultation-appreciate recs  - Continue Unasyn, eventual transition to Augmentin  - Nonoperative management  - Sinus precautions for 4 weeks:  no nose blowing, avoid putting pressure on sinus area, avoid strenuous activity/straining, try to sneeze with mouth open  Use OTC Afrin BID 2 sprays/nostril 3 days maximum as needed, OTC decongestant (e g  Sudafed) or Antihistamine (e g  Claritin-D) as needed, and saline nasal spray as needed  - Head of bed elevated  o PT/OT  o CM    Disposition: Admit to Critical Care   Code Status: Level 1 - Full Code  --------------------------------------------------------------------------------------------------------------  Review of Systems   Genitourinary:        Chronic UTIs   Musculoskeletal: Positive for neck pain  Neurological: Positive for weakness  B/l parasethesias    All other systems reviewed and are negative        A 12-point, complete review of systems was reviewed and negative except as stated above     Physical Exam  Vitals and nursing note reviewed  Constitutional:       General: She is not in acute distress  Appearance: She is not ill-appearing  HENT:      Head: Normocephalic  Right Ear: External ear normal       Left Ear: External ear normal       Nose:      Comments: Abrasion noted over nose  Mild nasal bone tenderness     Mouth/Throat:      Mouth: Mucous membranes are moist    Eyes:      Extraocular Movements: Extraocular movements intact  Pupils: Pupils are equal, round, and reactive to light  Cardiovascular:      Rate and Rhythm: Normal rate and regular rhythm  Pulses: Normal pulses  Pulmonary:      Effort: Pulmonary effort is normal       Breath sounds: Normal breath sounds  Abdominal:      General: There is no distension  Palpations: Abdomen is soft  Tenderness: There is no abdominal tenderness  There is no guarding or rebound  Genitourinary:     Comments: deferred  Musculoskeletal:      Cervical back: Neck supple  Tenderness (Midline tenderness around C5) present  Right lower leg: Edema present  Left lower leg: Edema present  Skin:     General: Skin is warm and dry  Neurological:      General: No focal deficit present  Mental Status: She is alert and oriented to person, place, and time  Cranial Nerves: No cranial nerve deficit  Sensory: Sensory deficit (b/l UE worse over C5 distrubtion) present  Motor: Weakness (Weakness in b/l UE) present  Comments: 3/5 weakness in b/l UE extremities  Sensory deficit in the UE and b/l thumbs  CN2-12 intact   Motor/sensory intact in lower extremities  +Tremors   Psychiatric:      Comments: Anxious         --------------------------------------------------------------------------------------------------------------  Vitals:   Vitals:    07/18/21 1414 07/18/21 1538 07/18/21 1545 07/18/21 1601   BP: 168/77 160/74 150/68    BP Location:  Right arm     Pulse: 92 94 86    Resp: 18 18     Temp:    98 6 °F (37 °C)   TempSrc:    Tympanic SpO2: 99% 99% 98%      Temp  Min: 97 4 °F (36 3 °C)  Max: 98 6 °F (37 °C)        There is no height or weight on file to calculate BMI    N/A    Laboratory and Diagnostics:  Results from last 7 days   Lab Units 07/18/21  1111   WBC Thousand/uL 8 58   HEMOGLOBIN g/dL 12 7   HEMATOCRIT % 37 5   PLATELETS Thousands/uL 128*   NEUTROS PCT % 80*   MONOS PCT % 6     Results from last 7 days   Lab Units 07/18/21  1111   SODIUM mmol/L 142   POTASSIUM mmol/L 3 6   CHLORIDE mmol/L 105   CO2 mmol/L 24   ANION GAP mmol/L 13   BUN mg/dL 13   CREATININE mg/dL 0 87   CALCIUM mg/dL 8 7   GLUCOSE RANDOM mg/dL 94   ALT U/L 26   AST U/L 22   ALK PHOS U/L 73   ALBUMIN g/dL 3 4*   TOTAL BILIRUBIN mg/dL 0 50          Results from last 7 days   Lab Units 07/18/21  1111   INR  1 00   PTT seconds 29              ABG:    VBG:          Micro:        EKG:   Imaging: I have personally reviewed pertinent films in PACS    Historical Information   Past Medical History:   Diagnosis Date    Asthma     Cholelithiasis     GERD (gastroesophageal reflux disease)     Influenza 1/29/2019    Migraine headache     Pneumonia     Urinary tract infection      Past Surgical History:   Procedure Laterality Date    NO PAST SURGERIES       Social History   Social History     Substance and Sexual Activity   Alcohol Use Never    Alcohol/week: 0 0 standard drinks     Social History     Substance and Sexual Activity   Drug Use Never     Social History     Tobacco Use   Smoking Status Former Smoker    Packs/day: 1 50    Years: 10 00    Pack years: 15 00    Types: Cigarettes   Smokeless Tobacco Never Used   Tobacco Comment    quit age 20       Family History:   Family History   Problem Relation Age of Onset    Heart disease Mother     No Known Problems Father     No Known Problems Son      I have reviewed this patient's family history and commented on sigificant items within the HPI      Medications:  Current Facility-Administered Medications Medication Dose Route Frequency    ALPRAZolam (XANAX) tablet 0 25 mg  0 25 mg Oral HS PRN    ampicillin-sulbactam (UNASYN) 3 g in sodium chloride 0 9 % 100 mL IVPB  3 g Intravenous Q6H    atorvastatin (LIPITOR) tablet 20 mg  20 mg Oral Daily    docusate sodium (COLACE) capsule 100 mg  100 mg Oral BID    loratadine (CLARITIN) tablet 10 mg  10 mg Oral Daily    meclizine (ANTIVERT) tablet 12 5 mg  12 5 mg Oral Q8H PRN    [START ON 2021] pantoprazole (PROTONIX) EC tablet 40 mg  40 mg Oral QAM     Home medications:  Prior to Admission Medications   Prescriptions Last Dose Informant Patient Reported? Taking?    ALPRAZolam (XANAX) 0 25 mg tablet   No No   Sig: Take 1 tablet (0 25 mg total) by mouth daily at bedtime as needed for anxiety   B Complex-C-Folic Acid ([facility]anna[facility] Rx) 1 MG TABS   No No   Sig: Take 1 tablet (1 mg total) by mouth daily with breakfast   CRANBERRY PO   Yes No   Sig: Take by mouth 2 (two) times a day   atorvastatin (LIPITOR) 20 mg tablet   No No   Sig: Take 1 tablet (20 mg total) by mouth daily   cephalexin (KEFLEX) 250 mg capsule   Yes No   Sig: Take 250 mg by mouth daily   docusate sodium (COLACE) 100 mg capsule   No No   Sig: Take 1 capsule (100 mg total) by mouth 2 (two) times a day   fluconazole (DIFLUCAN) 200 mg tablet   Yes No   Sig: Take 200 mg by mouth daily   fluticasone (FLONASE) 50 mcg/act nasal spray   No No   Si spray into each nostril daily   loratadine (CLARITIN) 10 mg tablet   No No   Sig: Take 1 tablet (10 mg total) by mouth daily   meclizine (ANTIVERT) 12 5 MG tablet   No No   Sig: Take 1 tablet (12 5 mg total) by mouth every 8 (eight) hours as needed for dizziness   olopatadine (PATANOL) 0 1 % ophthalmic solution   No No   Sig: Administer 1 drop to both eyes 2 (two) times a day   ondansetron (ZOFRAN) 4 mg tablet   No No   Sig: Take 1 tablet (4 mg total) by mouth every 8 (eight) hours as needed for nausea or vomiting   pantoprazole (PROTONIX) 40 mg tablet   No No Sig: Take 1 tablet (40 mg total) by mouth every morning      Facility-Administered Medications: None     Allergies: Allergies   Allergen Reactions    Bee Venom Anaphylaxis     ------------------------------------------------------------------------------------------------------------  Advance Directive and Living Will:      Power of :    POLST:    ------------------------------------------------------------------------------------------------------------  Anticipated Length of Stay is > 2 midnights    Care Time Delivered:         Caryl Enamorado,         Portions of the record may have been created with voice recognition software  Occasional wrong word or "sound a like" substitutions may have occurred due to the inherent limitations of voice recognition software    Read the chart carefully and recognize, using context, where substitutions have occurred

## 2021-07-18 NOTE — ED NOTES
Per surgery/critical care level of care will be determined after MRI  No RN staff in ER to support taking patient to MRI, charge RN notified  House critical care coordinator ban to assume care of patient in MRI  Pt transport to MRI on monitor with this RN  Report given to ban benitez at bedside        Keri Mendez RN  07/18/21 7472

## 2021-07-18 NOTE — CONSULTS
Oral and Maxillofacial Surgery Consult    Pt seen 07/18/21 4:14 PM     HPI: 66 y o  female female medical history of asthma, GERD, UTI, who presents after a fall striking her head on the coffee table complicated by nasal bone fractures, and bilateral upper extremity weakness and paresthesias  Consult requested by ED for nasal bone fracture managment  PMH:   Past Medical History:   Diagnosis Date    Asthma     Cholelithiasis     GERD (gastroesophageal reflux disease)     Influenza 1/29/2019    Migraine headache     Pneumonia     Urinary tract infection         Allergies:    Allergies   Allergen Reactions    Bee Venom Anaphylaxis       Meds:     Current Facility-Administered Medications:     ALPRAZolam (XANAX) tablet 0 25 mg, 0 25 mg, Oral, HS PRN, Desirae Medel MD    ampicillin-sulbactam (UNASYN) 3 g in sodium chloride 0 9 % 100 mL IVPB, 3 g, Intravenous, Q6H, Desirae Medel MD    atorvastatin (LIPITOR) tablet 20 mg, 20 mg, Oral, Daily, Desirae Medel MD    docusate sodium (COLACE) capsule 100 mg, 100 mg, Oral, BID, Desirae Medel MD    loratadine (CLARITIN) tablet 10 mg, 10 mg, Oral, Daily, Desirae Medel MD    LORazepam (ATIVAN) injection 0 5 mg, 0 5 mg, Intravenous, Q2H PRN, Desirae Medel MD    meclizine (ANTIVERT) tablet 12 5 mg, 12 5 mg, Oral, Q8H PRN, Desirae Medel MD  [facility] ON 7/19/2021] pantoprazole (PROTONIX) EC tablet 40 mg, 40 mg, Oral, QAM, Desirae Medel MD    Current Outpatient Medications:     ALPRAZolam (XANAX) 0 25 mg tablet, Take 1 tablet (0 25 mg total) by mouth daily at bedtime as needed for anxiety, Disp: 30 tablet, Rfl: 0    atorvastatin (LIPITOR) 20 mg tablet, Take 1 tablet (20 mg total) by mouth daily, Disp: 30 tablet, Rfl: 2    B Complex-C-Folic Acid (Ayanna-Dylan Rx) 1 MG TABS, Take 1 tablet (1 mg total) by mouth daily with breakfast, Disp: 30 each, Rfl: 2    cephalexin (KEFLEX) 250 mg capsule, Take 250 mg by mouth daily, Disp: , Rfl:     CRANBERRY PO, Take by mouth 2 (two) times a day, Disp: , Rfl:     docusate sodium (COLACE) 100 mg capsule, Take 1 capsule (100 mg total) by mouth 2 (two) times a day, Disp: 10 capsule, Rfl: 0    fluconazole (DIFLUCAN) 200 mg tablet, Take 200 mg by mouth daily, Disp: , Rfl:     fluticasone (FLONASE) 50 mcg/act nasal spray, 1 spray into each nostril daily, Disp: 1 Bottle, Rfl: 2    loratadine (CLARITIN) 10 mg tablet, Take 1 tablet (10 mg total) by mouth daily, Disp: 30 tablet, Rfl: 2    meclizine (ANTIVERT) 12 5 MG tablet, Take 1 tablet (12 5 mg total) by mouth every 8 (eight) hours as needed for dizziness, Disp: 90 tablet, Rfl: 0    olopatadine (PATANOL) 0 1 % ophthalmic solution, Administer 1 drop to both eyes 2 (two) times a day, Disp: 5 mL, Rfl: 1    ondansetron (ZOFRAN) 4 mg tablet, Take 1 tablet (4 mg total) by mouth every 8 (eight) hours as needed for nausea or vomiting, Disp: 30 tablet, Rfl: 1    pantoprazole (PROTONIX) 40 mg tablet, Take 1 tablet (40 mg total) by mouth every morning, Disp: 30 tablet, Rfl: 5    PSH:   Past Surgical History:   Procedure Laterality Date    NO PAST SURGERIES        Family History   Problem Relation Age of Onset    Heart disease Mother     No Known Problems Father     No Known Problems Son         Review of Systems   Constitutional: Negative  HENT: Positive for facial swelling (nasal bridge), mouth sores (cut to upper lip) and nosebleeds  Negative for dental problem, ear discharge, rhinorrhea, sore throat and trouble swallowing  Eyes: Negative  Negative for photophobia and visual disturbance  Respiratory: Negative  Negative for shortness of breath  Cardiovascular: Negative  Musculoskeletal: Positive for neck pain  Skin: Negative  Neurological: Negative for dizziness, syncope, facial asymmetry, light-headedness and headaches  Psychiatric/Behavioral: Negative  All other systems reviewed and are negative         Temp:  [97 4 °F (36 3 °C)-98 6 °F (37 °C)] 98 6 °F (37 °C)  HR:  [84-94] 86  Resp:  [18-20] 18  BP: (150-188)/(66-84) 150/68  SpO2:  [97 %-99 %] 98 %     No intake or output data in the 24 hours ending 07/18/21 1614     Physical Exam:  Gen: AAOx3  NAD  Resp: Unlabored on RA  Neuro:  B/l CN V2-V3 intact  b/l CN VII grossly intact  HEENT:   Eye: EOMI w/ no signs of muscle entrapment  PERRLA  no subconjunctival hemorrhage  no periorbital ecchymosis/edema  No changes to vision, diplopia, exophthalmos, enophthalmos  Ears: no blood visualized in the EAC  no changes in hearing  Nose: no nasal dorsum deviation  no septal hematoma  Extraoral:  mild facial swelling associated with nasal bridge and consistent with the injury  minor ecchymosis  no bony step-off palpated  no laceration present  Minimally TTP over bridge of nose  No LAD  noTrismus  No Guarding  Inferior border of the mandible is palpable  Intraoral: NILO normal; c spine in way  Patient fully edentulous, reports that only wears dentures when going out  1cm laceration present near midline of upper lip  no  Ecchymosis in vestibule/FOM  TTP along upper lip near laceartion  No vestibular swelling, erythema, purulence, or draining fistula  FOM soft, non-elevated, non-tender  Uvula midline  Imaging: I have personally reviewed pertinent reports  and I have personally reviewed pertinent films in PACS    Assessment  66 y o  female  evaluated for nasal bone fractures  No acute surgical intervention needed  Follow up outpatient  Plan:  - Antibiotics (unasyn 3g IV q6h then transition to augmentin 500-125mg BID PO when able)  - Analgesia as per ED  - Head of bed elevated  - Ice to face as needed  - sinus precautions: 4 weeks: no nose blowing, avoid putting pressure on sinus area, avoid strenuous activity/straining, try to sneeze with mouth open   Use OTC Afrin BID 2 sprays/nostril 3 days maximum as needed, OTC decongestant (e g  Sudafed) or Antihistamine (e g  Claritin-D) as needed, and saline nasal spray as needed  D/w OMFS attg on call    Consults     Counseling / Coordination of Care  Total floor / unit time spent today 30 minutes  Greater than 50% of total time was spent with the patient and / or family counseling and / or coordination of care  A description of the counseling / coordination of care: description of injury with proposed plan of care  Discussed risks, benefits, and alternatives to treatment plan  All questions were answered  Patient in agreement with plan

## 2021-07-19 LAB
ANION GAP SERPL CALCULATED.3IONS-SCNC: 6 MMOL/L (ref 4–13)
BUN SERPL-MCNC: 11 MG/DL (ref 5–25)
CALCIUM SERPL-MCNC: 9.2 MG/DL (ref 8.3–10.1)
CHLORIDE SERPL-SCNC: 108 MMOL/L (ref 100–108)
CO2 SERPL-SCNC: 25 MMOL/L (ref 21–32)
CREAT SERPL-MCNC: 0.73 MG/DL (ref 0.6–1.3)
ERYTHROCYTE [DISTWIDTH] IN BLOOD BY AUTOMATED COUNT: 12.8 % (ref 11.6–15.1)
GFR SERPL CREATININE-BSD FRML MDRD: 80 ML/MIN/1.73SQ M
GLUCOSE SERPL-MCNC: 80 MG/DL (ref 65–140)
HCT VFR BLD AUTO: 37.2 % (ref 34.8–46.1)
HGB BLD-MCNC: 12.5 G/DL (ref 11.5–15.4)
MAGNESIUM SERPL-MCNC: 2.5 MG/DL (ref 1.6–2.6)
MCH RBC QN AUTO: 31.6 PG (ref 26.8–34.3)
MCHC RBC AUTO-ENTMCNC: 33.6 G/DL (ref 31.4–37.4)
MCV RBC AUTO: 94 FL (ref 82–98)
PHOSPHATE SERPL-MCNC: 3.1 MG/DL (ref 2.3–4.1)
PLATELET # BLD AUTO: 145 THOUSANDS/UL (ref 149–390)
PMV BLD AUTO: 10.1 FL (ref 8.9–12.7)
POTASSIUM SERPL-SCNC: 4.4 MMOL/L (ref 3.5–5.3)
RBC # BLD AUTO: 3.96 MILLION/UL (ref 3.81–5.12)
SODIUM SERPL-SCNC: 139 MMOL/L (ref 136–145)
WBC # BLD AUTO: 6.06 THOUSAND/UL (ref 4.31–10.16)

## 2021-07-19 PROCEDURE — 83735 ASSAY OF MAGNESIUM: CPT | Performed by: SURGERY

## 2021-07-19 PROCEDURE — 99222 1ST HOSP IP/OBS MODERATE 55: CPT | Performed by: NURSE PRACTITIONER

## 2021-07-19 PROCEDURE — 84100 ASSAY OF PHOSPHORUS: CPT | Performed by: SURGERY

## 2021-07-19 PROCEDURE — 85027 COMPLETE CBC AUTOMATED: CPT | Performed by: SURGERY

## 2021-07-19 PROCEDURE — 80048 BASIC METABOLIC PNL TOTAL CA: CPT | Performed by: SURGERY

## 2021-07-19 PROCEDURE — 99291 CRITICAL CARE FIRST HOUR: CPT | Performed by: EMERGENCY MEDICINE

## 2021-07-19 RX ORDER — CHLORHEXIDINE GLUCONATE 0.12 MG/ML
15 RINSE ORAL ONCE
Status: COMPLETED | OUTPATIENT
Start: 2021-07-19 | End: 2021-07-20

## 2021-07-19 RX ORDER — CEFAZOLIN SODIUM 2 G/50ML
2000 SOLUTION INTRAVENOUS ONCE
Status: DISCONTINUED | OUTPATIENT
Start: 2021-07-19 | End: 2021-07-20 | Stop reason: HOSPADM

## 2021-07-19 RX ORDER — GABAPENTIN 100 MG/1
100 CAPSULE ORAL 3 TIMES DAILY
Status: DISCONTINUED | OUTPATIENT
Start: 2021-07-19 | End: 2021-07-26 | Stop reason: HOSPADM

## 2021-07-19 RX ORDER — LANOLIN ALCOHOL/MO/W.PET/CERES
3 CREAM (GRAM) TOPICAL
Status: DISCONTINUED | OUTPATIENT
Start: 2021-07-19 | End: 2021-07-26 | Stop reason: HOSPADM

## 2021-07-19 RX ORDER — CEPHALEXIN 250 MG/1
250 CAPSULE ORAL DAILY
Status: DISCONTINUED | OUTPATIENT
Start: 2021-07-19 | End: 2021-07-26 | Stop reason: HOSPADM

## 2021-07-19 RX ADMIN — ATORVASTATIN CALCIUM 20 MG: 20 TABLET, FILM COATED ORAL at 09:48

## 2021-07-19 RX ADMIN — ENOXAPARIN SODIUM 30 MG: 30 INJECTION, SOLUTION INTRAVENOUS; SUBCUTANEOUS at 11:27

## 2021-07-19 RX ADMIN — ONDANSETRON 4 MG: 2 INJECTION INTRAMUSCULAR; INTRAVENOUS at 08:13

## 2021-07-19 RX ADMIN — PANTOPRAZOLE SODIUM 40 MG: 40 TABLET, DELAYED RELEASE ORAL at 09:49

## 2021-07-19 RX ADMIN — SODIUM CHLORIDE, SODIUM GLUCONATE, SODIUM ACETATE, POTASSIUM CHLORIDE, MAGNESIUM CHLORIDE, SODIUM PHOSPHATE, DIBASIC, AND POTASSIUM PHOSPHATE 75 ML/HR: .53; .5; .37; .037; .03; .012; .00082 INJECTION, SOLUTION INTRAVENOUS at 06:02

## 2021-07-19 RX ADMIN — OXYCODONE HYDROCHLORIDE 5 MG: 5 TABLET ORAL at 08:13

## 2021-07-19 RX ADMIN — DOCUSATE SODIUM 100 MG: 100 CAPSULE ORAL at 18:31

## 2021-07-19 RX ADMIN — GABAPENTIN 100 MG: 100 CAPSULE ORAL at 15:39

## 2021-07-19 RX ADMIN — GABAPENTIN 100 MG: 100 CAPSULE ORAL at 21:47

## 2021-07-19 RX ADMIN — SODIUM CHLORIDE 3 G: 9 INJECTION, SOLUTION INTRAVENOUS at 05:07

## 2021-07-19 RX ADMIN — CEPHALEXIN 250 MG: 250 CAPSULE ORAL at 11:40

## 2021-07-19 RX ADMIN — ALPRAZOLAM 0.25 MG: 0.5 TABLET ORAL at 21:28

## 2021-07-19 RX ADMIN — GABAPENTIN 100 MG: 100 CAPSULE ORAL at 11:27

## 2021-07-19 RX ADMIN — ACETAMINOPHEN 975 MG: 325 TABLET, FILM COATED ORAL at 13:19

## 2021-07-19 RX ADMIN — ONDANSETRON 4 MG: 2 INJECTION INTRAMUSCULAR; INTRAVENOUS at 21:28

## 2021-07-19 RX ADMIN — MELATONIN TAB 3 MG 3 MG: 3 TAB at 21:47

## 2021-07-19 RX ADMIN — ACETAMINOPHEN 975 MG: 325 TABLET, FILM COATED ORAL at 05:07

## 2021-07-19 RX ADMIN — ENOXAPARIN SODIUM 30 MG: 30 INJECTION, SOLUTION INTRAVENOUS; SUBCUTANEOUS at 21:47

## 2021-07-19 RX ADMIN — ACETAMINOPHEN 975 MG: 325 TABLET, FILM COATED ORAL at 21:47

## 2021-07-19 RX ADMIN — OXYCODONE HYDROCHLORIDE 5 MG: 5 TABLET ORAL at 00:53

## 2021-07-19 RX ADMIN — DOCUSATE SODIUM 100 MG: 100 CAPSULE ORAL at 09:48

## 2021-07-19 RX ADMIN — LORATADINE 10 MG: 10 TABLET ORAL at 09:48

## 2021-07-19 RX ADMIN — OXYCODONE HYDROCHLORIDE 5 MG: 5 TABLET ORAL at 13:20

## 2021-07-19 NOTE — PHYSICAL THERAPY NOTE
Physical Therapy Cancellation Note    PT orders received chart review completed  Pt is currently pending OR with Nsx and not appropriate to participate in skilled PT at this time   PT will follow and eval as medically appropriate      07/19/21 1300   PT Last Visit   PT Visit Date 07/19/21   Note Type   Cancel Reasons Patient to operating room     Ganesh Gibson, PT

## 2021-07-19 NOTE — CONSULTS
Alexi Puckett 1945, 66 y o  female MRN: [id number]  Unit/Bed#: ICU 10 Encounter: [id number]  Primary Care Provider: Satnam Valdez MD   Date and time admitted to hospital: 7/18/2021  2:04 PM    Inpatient consult to Neurosurgery  Consult performed by: ROYA Vela  Consult ordered by: Red Miranda MD          Bilateral arm weakness  Assessment & Plan  See above  Neck pain  Assessment & Plan  See above  * Central cord syndrome [facility])  Assessment & Plan  Degenerative spinal stenosis with cord signal change C4/5 s/p fall forwards onto face  · Presented to [place] [id number] 7/18/21 with complaints of neck pain and bilateral arm pain/numbness  · Also sustained facial bone fractures  · Current exam with midline cervical tenderness  RUE deltoid 4/5, biceps +3/5, triceps 4/5, wrists/IO 4/5  LUE 4/5 throughout  Dysesthetic pain/burning bilateral UE from mid biceps to wrists  Bilateral Varela's and hyper-reflexia distal UE  Imaging:  · MRI cervical spine 7/18/21: Multilevel degenerative changes causing multilevel neural foraminal and spinal canal stenosis most severe at C4-C5 where there could be small amount of left-sided cord edema  Edema within the left C2-C3 facet could be posttraumatic contusion without fracture  · CT cervical spine 7/18/21: No cervical spine fracture or traumatic malalignment  Plan:  · Frequent neuro checks  · VISTA cervical brace at all times  Rachele collar for showers  · Maintain on bed rest currently  · Maintain MAPs > 85 mmHg x 5 days, currently auto-mapping  · Hold on PT/OT evaluation for now in setting of spinal cord injury  · OR tomorrow 7/20   · DVT ppx: SCDs, Lovenox  Plan of care discussed with Amee Gibson RN  Neurosurgery will continue to follow, please call with any questions or concerns      History of Present Illness     HPI: Yara Valadez is a 66 y o  female with PMH including with past medical history of GERD, migraine, chronic UTI, who tripped on a blanket at home and fell forwards hitting her nose/face against the corner of a coffee table  She had trouble getting up from the floor  She was initially evaluated at [address] [place] [address] and began complaining of neck pain and bilateral UE weakness/numbness and pain  She was transferred to [facility] for further management  An MRI of her cervical spine was significant for central cord syndrome at C4/5  She continues to endorse some neck pain as well as burning and numbness in her upper extremities, worse on the right  She denies any bowel or bladder incontinence  She denies any ambulatory dysfunction or lower extremity weakness  Review of Systems   Constitutional: Positive for activity change  HENT: Positive for facial swelling  Respiratory: Negative  Cardiovascular: Negative  Gastrointestinal: Negative  Musculoskeletal: Positive for back pain, neck pain and neck stiffness  Neurological: Positive for weakness and numbness  Negative for dizziness, tremors, seizures, syncope, facial asymmetry, speech difficulty, light-headedness and headaches         Historical Information   Past Medical History:   Diagnosis Date    Cholelithiasis     GERD (gastroesophageal reflux disease)     Influenza 1/29/2019    Migraine headache     Pneumonia     Urinary tract infection      Past Surgical History:   Procedure Laterality Date    NO PAST SURGERIES       Social History     Substance and Sexual Activity   Alcohol Use Never    Alcohol/week: 0 0 standard drinks     Social History     Substance and Sexual Activity   Drug Use Never     Social History     Tobacco Use   Smoking Status Former Smoker    Packs/day: 1 50    Years: 10 00    Pack years: 15 00    Types: Cigarettes   Smokeless Tobacco Never Used   Tobacco Comment    quit age 20     Family History   Problem Relation Age of Onset    Heart disease Mother     No Known Problems Father     No Known Problems Son        Meds/Allergies   all current active meds have been reviewed and current meds:   Current Facility-Administered Medications   Medication Dose Route Frequency    acetaminophen (TYLENOL) tablet 975 mg  975 mg Oral Q8H [facility]    ALPRAZolam (XANAX) tablet 0 25 mg  0 25 mg Oral HS PRN    atorvastatin (LIPITOR) tablet 20 mg  20 mg Oral Daily    cephalexin (KEFLEX) capsule 250 mg  250 mg Oral Daily    docusate sodium (COLACE) capsule 100 mg  100 mg Oral BID    enoxaparin (LOVENOX) subcutaneous injection 30 mg  30 mg Subcutaneous Q12H [facility]    gabapentin (NEURONTIN) capsule 100 mg  100 mg Oral TID    HYDROmorphone HCl (DILAUDID) injection 0 2 mg  0 2 mg Intravenous Q4H PRN    loratadine (CLARITIN) tablet 10 mg  10 mg Oral Daily    meclizine (ANTIVERT) tablet 12 5 mg  12 5 mg Oral Q8H PRN    ondansetron (ZOFRAN) injection 4 mg  4 mg Intravenous Q6H PRN    oxyCODONE (ROXICODONE) IR tablet 2 5 mg  2 5 mg Oral Q4H PRN    oxyCODONE (ROXICODONE) IR tablet 5 mg  5 mg Oral Q4H PRN    pantoprazole (PROTONIX) EC tablet 40 mg  40 mg Oral QAM     Allergies   Allergen Reactions    Bee Venom Anaphylaxis       Objective   I/O       07/17 0701 - 07/18 0700 07/18 0701 - 07/19 0700 07/19 0701 - 07/20 0700    P  O   360     I V  (mL/kg)  872 5 (13 6) 288 8 (4 5)    IV Piggyback  300     Total Intake(mL/kg)  1532 5 (23 9) 288 8 (4 5)    Urine (mL/kg/hr)  720     Total Output  720     Net  +812 5 +288 8                 Physical Exam  Constitutional:       Appearance: She is well-developed  HENT:      Head: Normocephalic and atraumatic  Eyes:      Extraocular Movements: EOM normal       Pupils: Pupils are equal, round, and reactive to light  Cardiovascular:      Rate and Rhythm: Normal rate and regular rhythm  Pulmonary:      Effort: Pulmonary effort is normal    Abdominal:      Palpations: Abdomen is soft  Musculoskeletal:         General: Tenderness present   Normal range of motion  Cervical back: Normal range of motion and neck supple  Skin:     General: Skin is warm and dry  Neurological:      Mental Status: She is alert and oriented to person, place, and time  Mental status is at baseline  Cranial Nerves: No cranial nerve deficit  Sensory: Sensory deficit present  Motor: Weakness present  Coordination: Coordination abnormal  Finger-Nose-Finger Test normal       Deep Tendon Reflexes: Reflexes abnormal       Reflex Scores:       Tricep reflexes are 2+ on the right side and 2+ on the left side  Bicep reflexes are 2+ on the right side and 2+ on the left side  Brachioradialis reflexes are 3+ on the right side and 3+ on the left side  Patellar reflexes are 1+ on the right side and 1+ on the left side  Achilles reflexes are 1+ on the right side and 1+ on the left side  Psychiatric:         Speech: Speech normal        Neurologic Exam     Mental Status   Oriented to person, place, and time  Oriented to person  Oriented to place  Oriented to time  Oriented to year, month and date  Registration: recalls 3 of 3 objects  Attention: normal  Concentration: normal    Speech: speech is normal   Level of consciousness: alert  Knowledge: good and consistent with education  Able to name object  Cranial Nerves     CN III, IV, VI   Pupils are equal, round, and reactive to light  Extraocular motions are normal    Right pupil: Size: 3 mm  Shape: regular  Reactivity: brisk  Consensual response: intact  Accommodation: intact  Left pupil: Size: 3 mm  Shape: regular  Reactivity: brisk  Consensual response: intact  Accommodation: intact  Nystagmus: none   Diplopia: none  Conjugate gaze: present    CN V   Right facial sensation deficit: none  Left facial sensation deficit: none    CN VII   Facial expression full, symmetric       CN VIII   Hearing: intact    CN IX, X   Palate: symmetric    CN XI   Right sternocleidomastoid strength: normal  Left sternocleidomastoid strength: normal  Right trapezius strength: normal  Left trapezius strength: normal    CN XII   Tongue: not atrophic  Fasciculations: absent  Tongue deviation: none    Motor Exam   Muscle bulk: normal  Overall muscle tone: normal  Right arm pronator drift: absent  Left arm pronator drift: absent    Strength   Right deltoid: 4/5  Left deltoid: 4/5  Right biceps: 3/5  Left biceps: 4/5  Right triceps: 4/5  Left triceps: 4/5  Right wrist flexion: 4/5  Left wrist flexion: 4/5  Right wrist extension: 4/5  Left wrist extension: 4/5  Right interossei: 4/5  Left interossei: 4/5  Right quadriceps: 5/5  Left quadriceps: 5/5  Right hamstrin/5  Left hamstrin/5  Right anterior tibial: 5/5  Left anterior tibial: 5/5  Right posterior tibial: 5/5  Left posterior tibial: 5/5  Right peroneal: 5/5  Left peroneal: 5/5  Right gastroc: 5/5  Left gastroc: 5/5    Sensory Exam   Light touch normal    Proprioception normal    Dysesthetic pain bilateral UE with associated numbness from mid-biceps to wrists  Gait, Coordination, and Reflexes     Coordination   Finger to nose coordination: normal    Tremor   Resting tremor: absent  Intention tremor: absent  Action tremor: absent    Reflexes   Right brachioradialis: 3+  Left brachioradialis: 3+  Right biceps: 2+  Left biceps: 2+  Right triceps: 2+  Left triceps: 2+  Right patellar: 1+  Left patellar: 1+  Right achilles: 1+  Left achilles: 1+  Right : 2+  Left : 2+  Right Varela: present  Left Varela: present  Right ankle clonus: absent  Left ankle clonus: present      Vitals:Blood pressure 166/71, pulse 70, temperature 97 9 °F (36 6 °C), temperature source Oral, resp  rate 13, height 5' 2" (1 575 m), weight 64 1 kg (141 lb 5 oz), SpO2 99 %, not currently breastfeeding  ,Body mass index is 25 85 kg/m²       Lab Results:   Results from last 7 days   Lab Units 21  0503 21  1111   WBC Thousand/uL 6 06 8 58   HEMOGLOBIN g/dL 12 5 12 7 HEMATOCRIT % 37 2 37 5   PLATELETS Thousands/uL 145* 128*   NEUTROS PCT %  --  80*   MONOS PCT %  --  6     Results from last 7 days   Lab Units 07/19/21  0503 07/18/21  1111   POTASSIUM mmol/L 4 4 3 6   CHLORIDE mmol/L 108 105   CO2 mmol/L 25 24   BUN mg/dL 11 13   CREATININE mg/dL 0 73 0 87   CALCIUM mg/dL 9 2 8 7   ALK PHOS U/L  --  73   ALT U/L  --  26   AST U/L  --  22     Results from last 7 days   Lab Units 07/19/21  0503   MAGNESIUM mg/dL 2 5     Results from last 7 days   Lab Units 07/19/21  0503   PHOSPHORUS mg/dL 3 1     Results from last 7 days   Lab Units 07/18/21  1111   INR  1 00   PTT seconds 29     No results found for: TROPONINT  ABG:No results found for: PHART, LXH2OFE, PO2ART, DKD8KIL, B3DHYBQZ, BEART, SOURCE    Imaging Studies: I have personally reviewed pertinent reports  and I have personally reviewed pertinent films in PACS    XR chest 1 view portable    Result Date: 7/19/2021  Impression: No acute cardiopulmonary disease  Workstation performed: [id number]     CT head without contrast    Result Date: 7/18/2021  Impression: No acute intracranial hemorrhage  Small amount of left maxillary sinus fluid  Workstation performed: [id number]     CT facial bones without contrast    Result Date: 7/18/2021  Impression: Mildly displaced acute bilateral nasal bone fractures  Small amount of left maxillary sinus hemorrhage  The study was marked in [facility] for immediate notification  Workstation performed: [id number]     CT spine cervical without contrast    Result Date: 7/18/2021  Impression: No cervical spine fracture or traumatic malalignment  Workstation performed: [id number]     MRI cervical spine wo contrast    Result Date: 7/18/2021  Impression: Multilevel degenerative changes causing multilevel neural foraminal and spinal canal stenosis most severe at C4-C5 where there could be small amount of left-sided cord edema  Edema within the left C2-C3 facet could be posttraumatic contusion without fracture  Workstation performed: [id number]     XR pelvis ap only 1 or 2 vw    Result Date: 7/19/2021  Impression: No acute osseous abnormality  Workstation performed: [id number]       EKG, Pathology, and Other Studies: I have personally reviewed pertinent reports  and I have personally reviewed pertinent films in PACS    VTE Prophylaxis: Sequential compression device (Venodyne)  and Enoxaparin (Lovenox)    Code Status: Level 1 - Full Code  Advance Directive and Living Will:      Power of :    POLST:      Counseling / Coordination of Care  I spent 20 minutes with the patient

## 2021-07-19 NOTE — OCCUPATIONAL THERAPY NOTE
Occupational Therapy Cancel Note        Patient Name: Rodolfo Huntley  [id number] Date: 7/19/2021 07/19/21 1645   OT Last Visit   OT Visit Date 07/19/21   Note Type   Cancel Reasons Patient to operating room       ORDERS RECEIVED  CHART REVIEW COMPLETED  PT PLANNED FOR SURGICAL INTERVENTION WITH NERUOSX TOMORROW  WILL FOLLOW POST-OP       Cna Pimentel, RAYMOND, OTR/L

## 2021-07-19 NOTE — PROGRESS NOTES
66 yr old fell struck nose no LOC    Acute bilate UE paresis and numbness    CT suspicious for spinal stenosis    MR demonstratesd degenterative spinal stenosis with cord signal change at C4/5      REC  MAP greater than 85  euvolemia  Elective surgery unless exam worsens  frequenr neuro checks

## 2021-07-19 NOTE — PROGRESS NOTES
Daily Progress Note - Critical Care   Sarthak Burgess 66 y o  female MRN: [id number]  Unit/Bed#: ICU 10 Encounter: [id number]        ----------------------------------------------------------------------------------------  HPI/24hr events: 66 y o  female PMHx of asthma, GERD, anxiety on ativan, chronic UTIs on keflex presented today after a fall at home  Patient reports she was standing up when her foot got caught in her blanket causing her to fall forward and hit her face on the end of the coffee table  She is complaining of neck pain, b/l UE paraesthesias and weakness, and right hand pain  Initial evaluation of CT head and c-spine were negative, CT facial bones with mildly displaced acute b/l nasal bone fx with small left maxillary sinus hemorrhage  MRI of the cervical spine was completed and showed multilevel degenerative changes causing multilevel neural foraminal and spinal canal stenosis most severe at C4-C5 where there could be small amount of left-sided cord edema  There was also edema within the left C2-C3 facet could be posttraumatic contusion without fracture  NS consulted and recommended MAP >85       ---------------------------------------------------------------------------------------  SUBJECTIVE  Patient complains of pain in BL UE  She states that weakness in her UE is slightly worse compared to yesterday     Review of Systems  Review of systems was reviewed and negative unless stated above in HPI/24-hour events   ---------------------------------------------------------------------------------------  Assessment and Plan:    Neuro:      Diagnosis: Traumatic SCI secondary to multilevel degenerative changes causing multilevel neural foraminal and spinal canal stenosis most severe at C4-C5 where there could be small amount of left-sided cord edema  Edema within the left C2-C3 facet could be posttraumatic contusion without fracture    o Plan: NS following   o Goal MAP>85- Patient has been maintain goal without intervention  o May need surgical decompression   o Monitor neuro checks    Diagnosis: Post traumatic pain   o Plan: Continue tylenol, oxycodone and dilaudid    Diagnosis: Chronic anxiety   o Plan: Continue home ativan at HS     CV:    Diagnosis: Permissive HTN  o Plan: Goal MAP >85  o Continue on telemetry while NPO   Diagnosis: HLD  o Plan: Continue home lipitor       Pulm:   Diagnosis: No acute issues  o Plan: Encourage IS and OOB      GI:    Diagnosis: GERD  o Plan: Continue home PPI     :    Diagnosis: Chronic UTI   o Plan: Patient was on Keflex as an outpatient   o Currently holding home Keflex while on Unasyn but can likely DC Unasyn and restart home Keflex   o Trend I/O- monitor for urinary retention       F/E/N:    Plan: NPO for possible surgery today- will discuss with NS   Continue Isolyte at 75cc/hr for now- can DC when starting diet    Trend electrolytes and replete prn       Heme/Onc:    Diagnosis: No acute issues      Endo:    Diagnosis: No acute issues      ID:    Diagnosis: Chronic UTI  o Plan: Patient on Keflex as an outpatient for prophylaxis  Can restart if DC Unasyn     MSK/Skin:    Diagnosis: Nasal bone fracture with minimal deviation   o Plan: OMS consulted- No surgical intervention   o Stop Unasyn    Diagnosis: Fall  o Plan: PT/OT      Disposition: Continue Critical Care   Code Status: Level 1 - Full Code  ---------------------------------------------------------------------------------------  ICU CORE MEASURES    Prophylaxis   VTE Pharmacologic Prophylaxis: Currently holding secondary to possibility of going to the OR with NS  Will discuss timing of restarting with NS  VTE Mechanical Prophylaxis: sequential compression device  Stress Ulcer Prophylaxis: Pantoprazole PO    ABCDE Protocol (if indicated)  Plan to perform spontaneous awakening trial today? Not applicable  Plan to perform spontaneous breathing trial today? Not applicable  Obvious barriers to extubation? Not applicable  CAM-ICU: Negative    Invasive Devices Review  Invasive Devices     Peripheral Intravenous Line            Peripheral IV 21 Dorsal (posterior); Right Forearm <1 day    Peripheral IV 21 Right Antecubital <1 day          Drain            External Urinary Catheter <1 day              Can any invasive devices be discontinued today? No  ---------------------------------------------------------------------------------------  OBJECTIVE    Vitals   Vitals:    21 0300 21 0400 21 0500 21 0600   BP: 127/68 148/81 160/77 147/77   Pulse: 66 72 82 76   Resp: (!) 10 14 15 14   Temp:  97 9 °F (36 6 °C)     TempSrc:  Oral     SpO2: 99% 99% 100% 99%   Weight:       Height:         Temp (24hrs), Av 2 °F (36 8 °C), Min:97 4 °F (36 3 °C), Max:98 8 °F (37 1 °C)  Current: Temperature: 97 9 °F (36 6 °C)    Respiratory:  SpO2: SpO2: 99 %, SpO2 Activity: SpO2 Activity: At Rest       Invasive/non-invasive ventilation settings   Respiratory    Lab Data (Last 4 hours)    None         O2/Vent Data (Last 4 hours)    None                Physical Exam  Vitals reviewed  Constitutional:       General: She is in acute distress  HENT:      Head:      Comments: Dried blood in BL nares  Minimal facial ecchymosis     Mouth/Throat:      Mouth: Mucous membranes are moist    Eyes:      Pupils: Pupils are equal, round, and reactive to light  Neck:      Comments: Cervical collar in place  Cardiovascular:      Rate and Rhythm: Normal rate  Pulmonary:      Effort: Pulmonary effort is normal    Abdominal:      Palpations: Abdomen is soft  Musculoskeletal:         General: Normal range of motion  Skin:     General: Skin is warm and dry  Capillary Refill: Capillary refill takes less than 2 seconds     Neurological:      Comments: Patient awake and alert  She is able to elevate BL UE  4/5 strength in BL shoulder  5/5 with flexion and extension at the elbow- L slightly weaker   strength intact BL UE paresthesia    Psychiatric:         Mood and Affect: Mood normal          Laboratory and Diagnostics:  Results from last 7 days   Lab Units 07/19/21  0503 07/18/21  1111   WBC Thousand/uL 6 06 8 58   HEMOGLOBIN g/dL 12 5 12 7   HEMATOCRIT % 37 2 37 5   PLATELETS Thousands/uL 145* 128*   NEUTROS PCT %  --  80*   MONOS PCT %  --  6     Results from last 7 days   Lab Units 07/19/21  0503 07/18/21  1111   SODIUM mmol/L 139 142   POTASSIUM mmol/L 4 4 3 6   CHLORIDE mmol/L 108 105   CO2 mmol/L 25 24   ANION GAP mmol/L 6 13   BUN mg/dL 11 13   CREATININE mg/dL 0 73 0 87   CALCIUM mg/dL 9 2 8 7   GLUCOSE RANDOM mg/dL 80 94   ALT U/L  --  26   AST U/L  --  22   ALK PHOS U/L  --  73   ALBUMIN g/dL  --  3 4*   TOTAL BILIRUBIN mg/dL  --  0 50     Results from last 7 days   Lab Units 07/19/21  0503   MAGNESIUM mg/dL 2 5   PHOSPHORUS mg/dL 3 1      Results from last 7 days   Lab Units 07/18/21  1111   INR  1 00   PTT seconds 29              ABG:    VBG:          Micro        EKG: SR  Imaging:  I have personally reviewed pertinent reports  and I have personally reviewed pertinent films in PACS    Intake and Output  I/O       07/17 0701 - 07/18 0700 07/18 0701 - 07/19 0700    P  O   360    I V  (mL/kg)  872 5 (13 6)    IV Piggyback  300    Total Intake(mL/kg)  1532 5 (23 9)    Urine (mL/kg/hr)  720    Total Output  720    Net  +812 5                Height and Weights   Height: 5' 2" (157 5 cm)  IBW (Ideal Body Weight): 50 1 kg  Body mass index is 25 85 kg/m²  Weight (last 2 days)     Date/Time   Weight    07/18/21 1729   64 1 (141 32)                Nutrition       Diet Orders   (From admission, onward)             Start     Ordered    07/18/21 1551  Diet NPO; Sips with meds  Diet effective now     Question Answer Comment   Diet Type NPO    NPO Except: Sips with meds    RD to adjust diet per protocol?  No        07/18/21 1550                Active Medications  Scheduled Meds:  Current Facility-Administered Medications Medication Dose Route Frequency Provider Last Rate    acetaminophen  975 mg Oral Q8H [address] Helga Guerrero,       ALPRAZolam  0 25 mg Oral HS PRN Yeimi Mullen MD      ampicillin-sulbactam  3 g Intravenous Q6H Helga Guerrero DO 3 g (07/19/21 0507)    atorvastatin  20 mg Oral Daily [facility]      docusate sodium  100 mg Oral BID [facility]      HYDROmorphone  0 2 mg Intravenous Q4H PRN Lieutenant Simpers, DO      loratadine  10 mg Oral Daily [facility]      meclizine  12 5 mg Oral Q8H PRN Yeimi Mullen, MD      multi-electrolyte  75 mL/hr Intravenous Continuous Lieutenant Simpers, DO 75 mL/hr (07/19/21 0602)    ondansetron  4 mg Intravenous Q6H PRN Lieutenant Simpers, DO      oxyCODONE  2 5 mg Oral Q4H PRN Lieutenant Simpers, DO      oxyCODONE  5 mg Oral Q4H PRN Lieutenant Simpers, DO      pantoprazole  40 mg Oral QAM Yeimi Mullen MD       Continuous Infusions:  multi-electrolyte, 75 mL/hr, Last Rate: 75 mL/hr (07/19/21 0602)      PRN Meds:   ALPRAZolam, 0 25 mg, HS PRN  HYDROmorphone, 0 2 mg, Q4H PRN  meclizine, 12 5 mg, Q8H PRN  ondansetron, 4 mg, Q6H PRN  oxyCODONE, 2 5 mg, Q4H PRN  oxyCODONE, 5 mg, Q4H PRN        Allergies   Allergies   Allergen Reactions    Bee Venom Anaphylaxis     ---------------------------------------------------------------------------------------  Advance Directive and Living Will:      Power of :    POLST:    ---------------------------------------------------------------------------------------  Care Time Delivered:   No Critical Care time spent     Alisha York PA-C      Portions of the record may have been created with voice recognition software  Occasional wrong word or "sound a like" substitutions may have occurred due to the inherent limitations of voice recognition software    Read the chart carefully and recognize, using context, where substitutions have occurred

## 2021-07-19 NOTE — ASSESSMENT & PLAN NOTE
Degenerative spinal stenosis with cord signal change C4/5 s/p fall forwards onto face  · Presented to [facility] 7/18/21 with complaints of neck pain and bilateral arm pain/numbness  · Also sustained facial bone fractures  · Current exam with midline cervical tenderness  RUE deltoid 4/5, biceps +3/5, triceps 4/5, wrists/IO 4/5  LUE 4/5 throughout  Dysesthetic pain/burning bilateral UE from mid biceps to wrists  Bilateral Varela's and hyper-reflexia distal UE  Imaging:  · MRI cervical spine 7/18/21: Multilevel degenerative changes causing multilevel neural foraminal and spinal canal stenosis most severe at C4-C5 where there could be small amount of left-sided cord edema  Edema within the left C2-C3 facet could be posttraumatic contusion without fracture  · CT cervical spine 7/18/21: No cervical spine fracture or traumatic malalignment  Plan:  · Frequent neuro checks  · VISTA cervical brace at all times  Rachele collar for showers  · Maintain on bed rest currently  · Maintain MAPs > 85 mmHg x 5 days, currently auto-mapping  · Hold on PT/OT evaluation for now in setting of spinal cord injury  · OR tomorrow 7/20   · DVT ppx: SCDs, Lovenox  Plan of care discussed with Jenelle Jiménez RN  Neurosurgery will continue to follow, please call with any questions or concerns

## 2021-07-19 NOTE — CASE MANAGEMENT
Pt is NOT A 30 Day Readmission  Pt is NOT A Bundle  CM met with pt and her family (pt's POA Marc Noyola and dtr Jarne Wilson) to discuss the role of CM   Pt lives with Jarenjesus Marhector and Chelsey's son, in an "[address] [place] [address]" which has 3STE to the porch, and then another 10 Steps to get to their living space (all on one level)  Pt's bathroom is a tub/shower with grab bars and a standard toilet  Pt also has a 1/2 bath (standard toilet)  Pt doesn't drive, is a retired , and reports being independent for all her ADL/IADLs PTA  Pt reports 2 falls in the last 6 months  Pt enjoys watching tv, listening to music, reading, and crossword puzzles  Pt owns, and is MOD/I for all ambulation with, a 3 point cane and rolling walker  Pt has a living will  Pt's pharmacy is [facility]  Pt has no hx of mental health, substance abuse, or IP rehab  Pt's used [facility] in the past bu unsure of agency  CM will appreciate therapy recommendations when appropriate  CM reviewed d/c planning process including the following: identifying help at home, patient preference for d/c planning needs, Discharge Lounge, [facility] Meds to Bed program, availability of treatment team to discuss questions or concerns patient and/or family may have regarding understanding medications and recognizing signs and symptoms once discharged  CM also encouraged patient to follow up with all recommended appointments after discharge  Patient advised of importance for patient and family to participate in managing patients medical well being

## 2021-07-20 ENCOUNTER — ANESTHESIA (INPATIENT)
Dept: PERIOP | Facility: HOSPITAL | Age: 76
DRG: 029 | End: 2021-07-20
Payer: MEDICARE

## 2021-07-20 ENCOUNTER — ANESTHESIA EVENT (INPATIENT)
Dept: PERIOP | Facility: HOSPITAL | Age: 76
DRG: 029 | End: 2021-07-20
Payer: MEDICARE

## 2021-07-20 ENCOUNTER — APPOINTMENT (INPATIENT)
Dept: RADIOLOGY | Facility: HOSPITAL | Age: 76
DRG: 029 | End: 2021-07-20
Payer: MEDICARE

## 2021-07-20 LAB
ABO GROUP BLD: NORMAL
ABO GROUP BLD: NORMAL
ANION GAP SERPL CALCULATED.3IONS-SCNC: 3 MMOL/L (ref 4–13)
ANION GAP SERPL CALCULATED.3IONS-SCNC: 7 MMOL/L (ref 4–13)
ANISOCYTOSIS BLD QL SMEAR: PRESENT
BASE EXCESS BLDA CALC-SCNC: -2 MMOL/L (ref -2–3)
BASE EXCESS BLDA CALC-SCNC: -4 MMOL/L (ref -2–3)
BASE EXCESS BLDA CALC-SCNC: -4 MMOL/L (ref -2–3)
BASE EXCESS BLDA CALC-SCNC: -5 MMOL/L (ref -2–3)
BASOPHILS # BLD AUTO: 0.02 THOUSANDS/ΜL (ref 0–0.1)
BASOPHILS # BLD MANUAL: 0 THOUSAND/UL (ref 0–0.1)
BASOPHILS NFR BLD AUTO: 0 % (ref 0–1)
BASOPHILS NFR MAR MANUAL: 0 % (ref 0–1)
BLD GP AB SCN SERPL QL: NEGATIVE
BUN SERPL-MCNC: 11 MG/DL (ref 5–25)
BUN SERPL-MCNC: 13 MG/DL (ref 5–25)
CA-I BLD-SCNC: 1.16 MMOL/L (ref 1.12–1.32)
CA-I BLD-SCNC: 1.2 MMOL/L (ref 1.12–1.32)
CA-I BLD-SCNC: 1.22 MMOL/L (ref 1.12–1.32)
CA-I BLD-SCNC: 1.25 MMOL/L (ref 1.12–1.32)
CA-I BLD-SCNC: 1.26 MMOL/L (ref 1.12–1.32)
CALCIUM SERPL-MCNC: 8.4 MG/DL (ref 8.3–10.1)
CALCIUM SERPL-MCNC: 8.8 MG/DL (ref 8.3–10.1)
CHLORIDE SERPL-SCNC: 111 MMOL/L (ref 100–108)
CHLORIDE SERPL-SCNC: 114 MMOL/L (ref 100–108)
CO2 SERPL-SCNC: 21 MMOL/L (ref 21–32)
CO2 SERPL-SCNC: 24 MMOL/L (ref 21–32)
CREAT SERPL-MCNC: 0.89 MG/DL (ref 0.6–1.3)
CREAT SERPL-MCNC: 0.9 MG/DL (ref 0.6–1.3)
EOSINOPHIL # BLD AUTO: 0.13 THOUSAND/ΜL (ref 0–0.61)
EOSINOPHIL # BLD MANUAL: 0 THOUSAND/UL (ref 0–0.4)
EOSINOPHIL NFR BLD AUTO: 3 % (ref 0–6)
EOSINOPHIL NFR BLD MANUAL: 0 % (ref 0–6)
ERYTHROCYTE [DISTWIDTH] IN BLOOD BY AUTOMATED COUNT: 12.9 % (ref 11.6–15.1)
ERYTHROCYTE [DISTWIDTH] IN BLOOD BY AUTOMATED COUNT: 13.1 % (ref 11.6–15.1)
FIO2 GAS DIL.REBREATH: 0.3 L
FIO2 GAS DIL.REBREATH: 0.3 L
FIO2 GAS DIL.REBREATH: 0.35 L
GFR SERPL CREATININE-BSD FRML MDRD: 62 ML/MIN/1.73SQ M
GFR SERPL CREATININE-BSD FRML MDRD: 63 ML/MIN/1.73SQ M
GLUCOSE SERPL-MCNC: 118 MG/DL (ref 65–140)
GLUCOSE SERPL-MCNC: 136 MG/DL (ref 65–140)
GLUCOSE SERPL-MCNC: 140 MG/DL (ref 65–140)
GLUCOSE SERPL-MCNC: 145 MG/DL (ref 65–140)
GLUCOSE SERPL-MCNC: 96 MG/DL (ref 65–140)
GLUCOSE SERPL-MCNC: 96 MG/DL (ref 65–140)
HCO3 BLDA-SCNC: 20.5 MMOL/L (ref 22–28)
HCO3 BLDA-SCNC: 20.8 MMOL/L (ref 22–28)
HCO3 BLDA-SCNC: 21 MMOL/L (ref 22–28)
HCO3 BLDA-SCNC: 22.5 MMOL/L (ref 22–28)
HCT VFR BLD AUTO: 28.1 % (ref 34.8–46.1)
HCT VFR BLD AUTO: 29.7 % (ref 34.8–46.1)
HCT VFR BLD CALC: 28 % (ref 34.8–46.1)
HCT VFR BLD CALC: 29 % (ref 34.8–46.1)
HCT VFR BLD CALC: 29 % (ref 34.8–46.1)
HCT VFR BLD CALC: 31 % (ref 34.8–46.1)
HGB BLD-MCNC: 9.3 G/DL (ref 11.5–15.4)
HGB BLD-MCNC: 9.8 G/DL (ref 11.5–15.4)
HGB BLDA-MCNC: 10.5 G/DL (ref 11.5–15.4)
HGB BLDA-MCNC: 9.5 G/DL (ref 11.5–15.4)
HGB BLDA-MCNC: 9.9 G/DL (ref 11.5–15.4)
HGB BLDA-MCNC: 9.9 G/DL (ref 11.5–15.4)
IMM GRANULOCYTES # BLD AUTO: 0.01 THOUSAND/UL (ref 0–0.2)
IMM GRANULOCYTES NFR BLD AUTO: 0 % (ref 0–2)
INR PPP: 1.07 (ref 0.84–1.19)
LYMPHOCYTES # BLD AUTO: 0.33 THOUSAND/UL (ref 0.6–4.47)
LYMPHOCYTES # BLD AUTO: 0.98 THOUSANDS/ΜL (ref 0.6–4.47)
LYMPHOCYTES # BLD AUTO: 4 % (ref 14–44)
LYMPHOCYTES NFR BLD AUTO: 22 % (ref 14–44)
MAGNESIUM SERPL-MCNC: 2.2 MG/DL (ref 1.6–2.6)
MAGNESIUM SERPL-MCNC: 2.3 MG/DL (ref 1.6–2.6)
MCH RBC QN AUTO: 32 PG (ref 26.8–34.3)
MCH RBC QN AUTO: 32.3 PG (ref 26.8–34.3)
MCHC RBC AUTO-ENTMCNC: 33 G/DL (ref 31.4–37.4)
MCHC RBC AUTO-ENTMCNC: 33.1 G/DL (ref 31.4–37.4)
MCV RBC AUTO: 97 FL (ref 82–98)
MCV RBC AUTO: 98 FL (ref 82–98)
MONOCYTES # BLD AUTO: 0.16 THOUSAND/UL (ref 0–1.22)
MONOCYTES # BLD AUTO: 0.39 THOUSAND/ΜL (ref 0.17–1.22)
MONOCYTES NFR BLD AUTO: 9 % (ref 4–12)
MONOCYTES NFR BLD: 2 % (ref 4–12)
NEUTROPHILS # BLD AUTO: 3.03 THOUSANDS/ΜL (ref 1.85–7.62)
NEUTROPHILS # BLD MANUAL: 7.69 THOUSAND/UL (ref 1.85–7.62)
NEUTS BAND NFR BLD MANUAL: 17 % (ref 0–8)
NEUTS SEG NFR BLD AUTO: 66 % (ref 43–75)
NEUTS SEG NFR BLD AUTO: 77 % (ref 43–75)
NRBC BLD AUTO-RTO: 0 /100 WBCS
NRBC BLD AUTO-RTO: 0 /100 WBCS
PCO2 BLD: 145 MM HG
PCO2 BLD: 148 MM HG
PCO2 BLD: 203 MM HG
PCO2 BLD: 22 MMOL/L (ref 21–32)
PCO2 BLD: 24 MMOL/L (ref 21–32)
PCO2 BLD: 36.7 MM HG (ref 36–44)
PCO2 BLD: 37 MM HG (ref 36–44)
PCO2 BLD: 38.8 MM HG (ref 36–44)
PCO2 BLD: 39.9 MM HG (ref 36–44)
PCO2 BLDA: 32.7 MM HG
PCO2 BLDA: 34.6 MM HG
PCO2 BLDA: 35.7 MM HG
PH BLD: 7.32 [PH] (ref 7.35–7.45)
PH BLD: 7.34 [PH] (ref 7.35–7.45)
PH BLD: 7.35 [PH]
PH BLD: 7.36 [PH] (ref 7.35–7.45)
PH BLD: 7.39 [PH] (ref 7.35–7.45)
PH BLD: 7.4 [PH]
PH BLD: 7.42 [PH]
PHOSPHATE SERPL-MCNC: 3.3 MG/DL (ref 2.3–4.1)
PLATELET # BLD AUTO: 89 THOUSANDS/UL (ref 149–390)
PLATELET # BLD AUTO: 99 THOUSANDS/UL (ref 149–390)
PLATELET BLD QL SMEAR: ABNORMAL
PMV BLD AUTO: 9.2 FL (ref 8.9–12.7)
PMV BLD AUTO: 9.3 FL (ref 8.9–12.7)
PO2 BLD: 153 MM HG (ref 75–129)
PO2 BLD: 159 MM HG (ref 75–129)
PO2 BLD: 162 MM HG (ref 75–129)
PO2 BLD: 211 MM HG (ref 75–129)
POLYCHROMASIA BLD QL SMEAR: PRESENT
POTASSIUM BLD-SCNC: 3.5 MMOL/L (ref 3.5–5.3)
POTASSIUM BLD-SCNC: 3.8 MMOL/L (ref 3.5–5.3)
POTASSIUM BLD-SCNC: 3.9 MMOL/L (ref 3.5–5.3)
POTASSIUM BLD-SCNC: 3.9 MMOL/L (ref 3.5–5.3)
POTASSIUM SERPL-SCNC: 3.9 MMOL/L (ref 3.5–5.3)
POTASSIUM SERPL-SCNC: 4.3 MMOL/L (ref 3.5–5.3)
PROTHROMBIN TIME: 13.9 SECONDS (ref 11.6–14.5)
RBC # BLD AUTO: 2.91 MILLION/UL (ref 3.81–5.12)
RBC # BLD AUTO: 3.03 MILLION/UL (ref 3.81–5.12)
RBC MORPH BLD: PRESENT
RH BLD: POSITIVE
RH BLD: POSITIVE
SAO2 % BLD FROM PO2: 100 % (ref 60–85)
SAO2 % BLD FROM PO2: 99 % (ref 60–85)
SODIUM BLD-SCNC: 143 MMOL/L (ref 136–145)
SODIUM BLD-SCNC: 144 MMOL/L (ref 136–145)
SODIUM SERPL-SCNC: 138 MMOL/L (ref 136–145)
SODIUM SERPL-SCNC: 142 MMOL/L (ref 136–145)
SPECIMEN EXPIRATION DATE: NORMAL
SPECIMEN SOURCE: ABNORMAL
TARGETS BLD QL SMEAR: PRESENT
WBC # BLD AUTO: 4.56 THOUSAND/UL (ref 4.31–10.16)
WBC # BLD AUTO: 8.18 THOUSAND/UL (ref 4.31–10.16)

## 2021-07-20 PROCEDURE — 84295 ASSAY OF SERUM SODIUM: CPT

## 2021-07-20 PROCEDURE — C1713 ANCHOR/SCREW BN/BN,TIS/BN: HCPCS | Performed by: NEUROLOGICAL SURGERY

## 2021-07-20 PROCEDURE — 85014 HEMATOCRIT: CPT

## 2021-07-20 PROCEDURE — 99291 CRITICAL CARE FIRST HOUR: CPT | Performed by: EMERGENCY MEDICINE

## 2021-07-20 PROCEDURE — 86850 RBC ANTIBODY SCREEN: CPT | Performed by: NURSE ANESTHETIST, CERTIFIED REGISTERED

## 2021-07-20 PROCEDURE — 22614 ARTHRD PST TQ 1NTRSPC EA ADD: CPT | Performed by: NEUROLOGICAL SURGERY

## 2021-07-20 PROCEDURE — 0RT30ZZ RESECTION OF CERVICAL VERTEBRAL DISC, OPEN APPROACH: ICD-10-PCS | Performed by: NEUROLOGICAL SURGERY

## 2021-07-20 PROCEDURE — 20936 SP BONE AGRFT LOCAL ADD-ON: CPT | Performed by: NEUROLOGICAL SURGERY

## 2021-07-20 PROCEDURE — 0RG10K1 FUSION OF CERVICAL VERTEBRAL JOINT WITH NONAUTOLOGOUS TISSUE SUBSTITUTE, POSTERIOR APPROACH, POSTERIOR COLUMN, OPEN APPROACH: ICD-10-PCS | Performed by: NEUROLOGICAL SURGERY

## 2021-07-20 PROCEDURE — 85610 PROTHROMBIN TIME: CPT | Performed by: REGISTERED NURSE

## 2021-07-20 PROCEDURE — 0RG4071 FUSION OF CERVICOTHORACIC VERTEBRAL JOINT WITH AUTOLOGOUS TISSUE SUBSTITUTE, POSTERIOR APPROACH, POSTERIOR COLUMN, OPEN APPROACH: ICD-10-PCS | Performed by: NEUROLOGICAL SURGERY

## 2021-07-20 PROCEDURE — 85027 COMPLETE CBC AUTOMATED: CPT | Performed by: SURGERY

## 2021-07-20 PROCEDURE — 0RG20A0 FUSION OF 2 OR MORE CERVICAL VERTEBRAL JOINTS WITH INTERBODY FUSION DEVICE, ANTERIOR APPROACH, ANTERIOR COLUMN, OPEN APPROACH: ICD-10-PCS | Performed by: NEUROLOGICAL SURGERY

## 2021-07-20 PROCEDURE — 85025 COMPLETE CBC W/AUTO DIFF WBC: CPT | Performed by: REGISTERED NURSE

## 2021-07-20 PROCEDURE — 22843 INSERT SPINE FIXATION DEVICE: CPT | Performed by: NEUROLOGICAL SURGERY

## 2021-07-20 PROCEDURE — 22845 INSERT SPINE FIXATION DEVICE: CPT | Performed by: NEUROLOGICAL SURGERY

## 2021-07-20 PROCEDURE — 22600 ARTHRD PST TQ 1NTRSPC CRV: CPT | Performed by: NEUROLOGICAL SURGERY

## 2021-07-20 PROCEDURE — 84100 ASSAY OF PHOSPHORUS: CPT | Performed by: REGISTERED NURSE

## 2021-07-20 PROCEDURE — 86901 BLOOD TYPING SEROLOGIC RH(D): CPT | Performed by: NURSE ANESTHETIST, CERTIFIED REGISTERED

## 2021-07-20 PROCEDURE — 80048 BASIC METABOLIC PNL TOTAL CA: CPT | Performed by: REGISTERED NURSE

## 2021-07-20 PROCEDURE — 86900 BLOOD TYPING SEROLOGIC ABO: CPT | Performed by: NURSE ANESTHETIST, CERTIFIED REGISTERED

## 2021-07-20 PROCEDURE — 83735 ASSAY OF MAGNESIUM: CPT | Performed by: REGISTERED NURSE

## 2021-07-20 PROCEDURE — 84132 ASSAY OF SERUM POTASSIUM: CPT

## 2021-07-20 PROCEDURE — 82330 ASSAY OF CALCIUM: CPT | Performed by: REGISTERED NURSE

## 2021-07-20 PROCEDURE — 80048 BASIC METABOLIC PNL TOTAL CA: CPT | Performed by: SURGERY

## 2021-07-20 PROCEDURE — 82947 ASSAY GLUCOSE BLOOD QUANT: CPT

## 2021-07-20 PROCEDURE — 83735 ASSAY OF MAGNESIUM: CPT | Performed by: SURGERY

## 2021-07-20 PROCEDURE — 86920 COMPATIBILITY TEST SPIN: CPT

## 2021-07-20 PROCEDURE — 0RG2071 FUSION OF 2 OR MORE CERVICAL VERTEBRAL JOINTS WITH AUTOLOGOUS TISSUE SUBSTITUTE, POSTERIOR APPROACH, POSTERIOR COLUMN, OPEN APPROACH: ICD-10-PCS | Performed by: NEUROLOGICAL SURGERY

## 2021-07-20 PROCEDURE — 72040 X-RAY EXAM NECK SPINE 2-3 VW: CPT

## 2021-07-20 PROCEDURE — 20930 SP BONE ALGRFT MORSEL ADD-ON: CPT | Performed by: NEUROLOGICAL SURGERY

## 2021-07-20 PROCEDURE — 22853 INSJ BIOMECHANICAL DEVICE: CPT | Performed by: NEUROLOGICAL SURGERY

## 2021-07-20 PROCEDURE — 85007 BL SMEAR W/DIFF WBC COUNT: CPT | Performed by: SURGERY

## 2021-07-20 PROCEDURE — 82330 ASSAY OF CALCIUM: CPT

## 2021-07-20 PROCEDURE — 82803 BLOOD GASES ANY COMBINATION: CPT

## 2021-07-20 PROCEDURE — NC001 PR NO CHARGE: Performed by: NURSE PRACTITIONER

## 2021-07-20 PROCEDURE — 30233N1 TRANSFUSION OF NONAUTOLOGOUS RED BLOOD CELLS INTO PERIPHERAL VEIN, PERCUTANEOUS APPROACH: ICD-10-PCS | Performed by: NEUROLOGICAL SURGERY

## 2021-07-20 PROCEDURE — 63015 REMOVE SPINE LAMINA >2 CRVCL: CPT | Performed by: NEUROLOGICAL SURGERY

## 2021-07-20 PROCEDURE — 22551 ARTHRD ANT NTRBDY CERVICAL: CPT | Performed by: NEUROLOGICAL SURGERY

## 2021-07-20 DEVICE — SELF-DRILLING SCREW
Type: IMPLANTABLE DEVICE | Site: SPINE CERVICAL | Status: FUNCTIONAL
Brand: AVS ANCHOR-C

## 2021-07-20 DEVICE — SCREW SET YUKON: Type: IMPLANTABLE DEVICE | Site: POSTERIOR CERVICAL | Status: FUNCTIONAL

## 2021-07-20 DEVICE — IMPLANTABLE DEVICE: Type: IMPLANTABLE DEVICE | Site: POSTERIOR CERVICAL | Status: FUNCTIONAL

## 2021-07-20 DEVICE — BIOACTIVE BONE GRAFT SUBSTITUTE, FOAM PACK; BETA-TRICALCIUM PHOSPHATE, TYPE I BOVINE COLLAGEN, AND BIOACTIVE GLASS
Type: IMPLANTABLE DEVICE | Site: POSTERIOR CERVICAL | Status: FUNCTIONAL
Brand: VITOSS BA

## 2021-07-20 DEVICE — CERVICAL CAGE
Type: IMPLANTABLE DEVICE | Site: SPINE CERVICAL | Status: FUNCTIONAL
Brand: AVS ANCHOR-C

## 2021-07-20 RX ORDER — LIDOCAINE HYDROCHLORIDE 10 MG/ML
INJECTION, SOLUTION EPIDURAL; INFILTRATION; INTRACAUDAL; PERINEURAL AS NEEDED
Status: DISCONTINUED | OUTPATIENT
Start: 2021-07-20 | End: 2021-07-20

## 2021-07-20 RX ORDER — SODIUM CHLORIDE 9 MG/ML
INJECTION, SOLUTION INTRAVENOUS CONTINUOUS PRN
Status: DISCONTINUED | OUTPATIENT
Start: 2021-07-20 | End: 2021-07-20

## 2021-07-20 RX ORDER — SODIUM CHLORIDE, SODIUM LACTATE, POTASSIUM CHLORIDE, CALCIUM CHLORIDE 600; 310; 30; 20 MG/100ML; MG/100ML; MG/100ML; MG/100ML
INJECTION, SOLUTION INTRAVENOUS CONTINUOUS PRN
Status: DISCONTINUED | OUTPATIENT
Start: 2021-07-20 | End: 2021-07-20

## 2021-07-20 RX ORDER — SUCCINYLCHOLINE/SOD CL,ISO/PF 100 MG/5ML
SYRINGE (ML) INTRAVENOUS AS NEEDED
Status: DISCONTINUED | OUTPATIENT
Start: 2021-07-20 | End: 2021-07-20

## 2021-07-20 RX ORDER — LIDOCAINE HYDROCHLORIDE AND EPINEPHRINE 10; 10 MG/ML; UG/ML
INJECTION, SOLUTION INFILTRATION; PERINEURAL AS NEEDED
Status: DISCONTINUED | OUTPATIENT
Start: 2021-07-20 | End: 2021-07-20 | Stop reason: HOSPADM

## 2021-07-20 RX ORDER — ONDANSETRON 2 MG/ML
INJECTION INTRAMUSCULAR; INTRAVENOUS AS NEEDED
Status: DISCONTINUED | OUTPATIENT
Start: 2021-07-20 | End: 2021-07-20

## 2021-07-20 RX ORDER — SODIUM CHLORIDE 9 MG/ML
75 INJECTION, SOLUTION INTRAVENOUS CONTINUOUS
Status: DISCONTINUED | OUTPATIENT
Start: 2021-07-20 | End: 2021-07-21

## 2021-07-20 RX ORDER — HYDROMORPHONE HCL/PF 1 MG/ML
SYRINGE (ML) INJECTION AS NEEDED
Status: DISCONTINUED | OUTPATIENT
Start: 2021-07-20 | End: 2021-07-20

## 2021-07-20 RX ORDER — HYDROMORPHONE HCL IN WATER/PF 6 MG/30 ML
0.2 PATIENT CONTROLLED ANALGESIA SYRINGE INTRAVENOUS
Status: DISCONTINUED | OUTPATIENT
Start: 2021-07-20 | End: 2021-07-20 | Stop reason: HOSPADM

## 2021-07-20 RX ORDER — LIDOCAINE 50 MG/G
2 PATCH TOPICAL DAILY
Status: DISCONTINUED | OUTPATIENT
Start: 2021-07-21 | End: 2021-07-26 | Stop reason: HOSPADM

## 2021-07-20 RX ORDER — DEXAMETHASONE SODIUM PHOSPHATE 10 MG/ML
INJECTION, SOLUTION INTRAMUSCULAR; INTRAVENOUS AS NEEDED
Status: DISCONTINUED | OUTPATIENT
Start: 2021-07-20 | End: 2021-07-20

## 2021-07-20 RX ORDER — VANCOMYCIN HYDROCHLORIDE 1 G/20ML
INJECTION, POWDER, LYOPHILIZED, FOR SOLUTION INTRAVENOUS AS NEEDED
Status: DISCONTINUED | OUTPATIENT
Start: 2021-07-20 | End: 2021-07-20 | Stop reason: HOSPADM

## 2021-07-20 RX ORDER — AMOXICILLIN 250 MG
1 CAPSULE ORAL
Status: DISCONTINUED | OUTPATIENT
Start: 2021-07-20 | End: 2021-07-22

## 2021-07-20 RX ORDER — METHOCARBAMOL 500 MG/1
500 TABLET, FILM COATED ORAL EVERY 6 HOURS SCHEDULED
Status: DISCONTINUED | OUTPATIENT
Start: 2021-07-20 | End: 2021-07-26 | Stop reason: HOSPADM

## 2021-07-20 RX ORDER — MAGNESIUM HYDROXIDE 1200 MG/15ML
LIQUID ORAL AS NEEDED
Status: DISCONTINUED | OUTPATIENT
Start: 2021-07-20 | End: 2021-07-20 | Stop reason: HOSPADM

## 2021-07-20 RX ORDER — FENTANYL CITRATE/PF 50 MCG/ML
25 SYRINGE (ML) INJECTION
Status: DISCONTINUED | OUTPATIENT
Start: 2021-07-20 | End: 2021-07-20 | Stop reason: HOSPADM

## 2021-07-20 RX ORDER — ALBUMIN, HUMAN INJ 5% 5 %
SOLUTION INTRAVENOUS CONTINUOUS PRN
Status: DISCONTINUED | OUTPATIENT
Start: 2021-07-20 | End: 2021-07-20

## 2021-07-20 RX ORDER — CEFAZOLIN SODIUM 2 G/50ML
2000 SOLUTION INTRAVENOUS EVERY 8 HOURS
Status: COMPLETED | OUTPATIENT
Start: 2021-07-20 | End: 2021-07-21

## 2021-07-20 RX ORDER — FENTANYL CITRATE 50 UG/ML
INJECTION, SOLUTION INTRAMUSCULAR; INTRAVENOUS AS NEEDED
Status: DISCONTINUED | OUTPATIENT
Start: 2021-07-20 | End: 2021-07-20

## 2021-07-20 RX ORDER — PROPOFOL 10 MG/ML
INJECTION, EMULSION INTRAVENOUS CONTINUOUS PRN
Status: DISCONTINUED | OUTPATIENT
Start: 2021-07-20 | End: 2021-07-20

## 2021-07-20 RX ORDER — CEFAZOLIN SODIUM 1 G/3ML
INJECTION, POWDER, FOR SOLUTION INTRAMUSCULAR; INTRAVENOUS AS NEEDED
Status: DISCONTINUED | OUTPATIENT
Start: 2021-07-20 | End: 2021-07-20

## 2021-07-20 RX ORDER — ALBUMIN, HUMAN INJ 5% 5 %
12.5 SOLUTION INTRAVENOUS ONCE
Status: COMPLETED | OUTPATIENT
Start: 2021-07-20 | End: 2021-07-20

## 2021-07-20 RX ADMIN — FENTANYL CITRATE 50 MCG: 50 INJECTION INTRAMUSCULAR; INTRAVENOUS at 10:13

## 2021-07-20 RX ADMIN — SODIUM CHLORIDE 75 ML/HR: 0.9 INJECTION, SOLUTION INTRAVENOUS at 15:25

## 2021-07-20 RX ADMIN — SODIUM CHLORIDE: 0.9 INJECTION, SOLUTION INTRAVENOUS at 13:14

## 2021-07-20 RX ADMIN — SODIUM CHLORIDE, SODIUM LACTATE, POTASSIUM CHLORIDE, AND CALCIUM CHLORIDE: .6; .31; .03; .02 INJECTION, SOLUTION INTRAVENOUS at 09:57

## 2021-07-20 RX ADMIN — GABAPENTIN 100 MG: 100 CAPSULE ORAL at 21:21

## 2021-07-20 RX ADMIN — ONDANSETRON 4 MG: 2 INJECTION INTRAMUSCULAR; INTRAVENOUS at 10:45

## 2021-07-20 RX ADMIN — SODIUM CHLORIDE: 0.9 INJECTION, SOLUTION INTRAVENOUS at 10:07

## 2021-07-20 RX ADMIN — ATORVASTATIN CALCIUM 20 MG: 20 TABLET, FILM COATED ORAL at 08:11

## 2021-07-20 RX ADMIN — FENTANYL CITRATE 25 MCG: 50 INJECTION INTRAMUSCULAR; INTRAVENOUS at 10:41

## 2021-07-20 RX ADMIN — CEPHALEXIN 250 MG: 250 CAPSULE ORAL at 08:11

## 2021-07-20 RX ADMIN — GABAPENTIN 100 MG: 100 CAPSULE ORAL at 08:10

## 2021-07-20 RX ADMIN — PROPOFOL 130 MCG/KG/MIN: 10 INJECTION, EMULSION INTRAVENOUS at 10:12

## 2021-07-20 RX ADMIN — CHLORHEXIDINE GLUCONATE 15 ML: 1.2 SOLUTION ORAL at 09:38

## 2021-07-20 RX ADMIN — HYDROMORPHONE HYDROCHLORIDE 0.2 MG: 0.2 INJECTION, SOLUTION INTRAMUSCULAR; INTRAVENOUS; SUBCUTANEOUS at 16:54

## 2021-07-20 RX ADMIN — CEFAZOLIN 1000 MG: 1 INJECTION, POWDER, FOR SOLUTION INTRAMUSCULAR; INTRAVENOUS at 14:01

## 2021-07-20 RX ADMIN — LIDOCAINE HYDROCHLORIDE 10 MG: 10 INJECTION, SOLUTION EPIDURAL; INFILTRATION; INTRACAUDAL; PERINEURAL at 10:02

## 2021-07-20 RX ADMIN — ACETAMINOPHEN 975 MG: 325 TABLET, FILM COATED ORAL at 21:21

## 2021-07-20 RX ADMIN — REMIFENTANIL HYDROCHLORIDE 0.15 MCG/KG/MIN: 1 INJECTION, POWDER, LYOPHILIZED, FOR SOLUTION INTRAVENOUS at 10:12

## 2021-07-20 RX ADMIN — HYDROMORPHONE HYDROCHLORIDE 0.25 MG: 1 INJECTION, SOLUTION INTRAMUSCULAR; INTRAVENOUS; SUBCUTANEOUS at 12:17

## 2021-07-20 RX ADMIN — FENTANYL CITRATE 25 MCG: 50 INJECTION INTRAMUSCULAR; INTRAVENOUS at 10:02

## 2021-07-20 RX ADMIN — ALBUMIN (HUMAN): 12.5 INJECTION, SOLUTION INTRAVENOUS at 12:03

## 2021-07-20 RX ADMIN — OXYCODONE HYDROCHLORIDE 5 MG: 5 TABLET ORAL at 21:24

## 2021-07-20 RX ADMIN — CEFAZOLIN SODIUM 2000 MG: 2 SOLUTION INTRAVENOUS at 21:21

## 2021-07-20 RX ADMIN — Medication 25 MCG: at 15:56

## 2021-07-20 RX ADMIN — DEXAMETHASONE SODIUM PHOSPHATE 10 MG: 10 INJECTION, SOLUTION INTRAMUSCULAR; INTRAVENOUS at 10:45

## 2021-07-20 RX ADMIN — PHENYLEPHRINE HYDROCHLORIDE 50 MCG/MIN: 10 INJECTION INTRAVENOUS at 10:13

## 2021-07-20 RX ADMIN — PANTOPRAZOLE SODIUM 40 MG: 40 TABLET, DELAYED RELEASE ORAL at 08:10

## 2021-07-20 RX ADMIN — PHENYLEPHRINE HYDROCHLORIDE 25 MCG/MIN: 10 INJECTION INTRAVENOUS at 03:42

## 2021-07-20 RX ADMIN — ALBUMIN (HUMAN): 12.5 INJECTION, SOLUTION INTRAVENOUS at 10:44

## 2021-07-20 RX ADMIN — ACETAMINOPHEN 975 MG: 325 TABLET, FILM COATED ORAL at 05:50

## 2021-07-20 RX ADMIN — DOCUSATE SODIUM AND SENNOSIDES 1 TABLET: 8.6; 5 TABLET ORAL at 21:21

## 2021-07-20 RX ADMIN — MELATONIN TAB 3 MG 3 MG: 3 TAB at 21:21

## 2021-07-20 RX ADMIN — Medication 100 MG: at 10:02

## 2021-07-20 RX ADMIN — CEFAZOLIN 1000 MG: 1 INJECTION, POWDER, FOR SOLUTION INTRAMUSCULAR; INTRAVENOUS at 10:36

## 2021-07-20 RX ADMIN — ALBUMIN (HUMAN) 12.5 G: 12.5 INJECTION, SOLUTION INTRAVENOUS at 02:21

## 2021-07-20 RX ADMIN — ALPRAZOLAM 0.25 MG: 0.5 TABLET ORAL at 23:17

## 2021-07-20 RX ADMIN — LORATADINE 10 MG: 10 TABLET ORAL at 08:10

## 2021-07-20 RX ADMIN — DOCUSATE SODIUM 100 MG: 100 CAPSULE ORAL at 08:10

## 2021-07-20 RX ADMIN — METHOCARBAMOL 500 MG: 500 TABLET, FILM COATED ORAL at 23:25

## 2021-07-20 NOTE — PROGRESS NOTES
Daily Progress Note - Critical Care   Rodolfo Huntley 66 y o  female MRN: [id number]  Unit/Bed#: ICU 10 Encounter: [id number]        ----------------------------------------------------------------------------------------  HPI/24hr events: Patient's MAPs dropped overnight to low 70s  She was started on low dose trini  Otherwise, there were no acute events overnight      ---------------------------------------------------------------------------------------  SUBJECTIVE  Patient awake, alert, and in NAD  She complains of constipation and upper extremity pain (8/10)  She is scheduled for neurosurgery later this morning  Review of Systems   Gastrointestinal: Positive for constipation  Musculoskeletal:        Upper extremity pain   All other systems reviewed and are negative  Review of systems was reviewed and negative unless stated above in HPI/24-hour events   ---------------------------------------------------------------------------------------  Assessment and Plan:    Neuro:    Diagnosis: Traumatic spinal cord injury  o MRI shows "Multilevel degenerative changes causing multilevel neural foraminal and spinal canal stenosis most severe at C4-C5 where there could be small amount of left-sided cord edema  Edema within the left C2-C3 facet could be posttraumatic contusion without fracture "  o Plan:   - Goal MAP >85, trini PRN  - Continue frequent neuro checks  - Maintain cervical collar  - Plan for OR later today for decompression  - NSGY following, appreciate recs   Diagnosis: Anxiety  o Plan[facility]   [facility]  Diagnosis: ICU delirium prevention  Plan:   Monitor CAM-ICU  Regulate sleep-wake cycle  Frequent reorientation    CV:    Diagnosis: Permissive HTN  o Plan:   - Goal MAP >85, trini PRN  - Continue telemetry     Diagnosis: HLD  o Plan:   - Continue home lipitor      Pulm:   Diagnosis: No active issues  o Plan:   - Encourage IS and OOB as tolerated      GI:    Diagnosis: GERD  o Plan:   - Continue home PPI    :    Diagnosis: Chronic UTI  o Plan:   - Continue home suppressive keflex   - I/O  - Continue to trend BUN/Cr      F/E/N:    F: None   E: Replete Electrolytes prn goal K> 4, Mg >2, Phos >3 0   N: NPO      Heme/Onc:    Diagnosis: No acute issues      Endo:    Diagnosis: No acute issues      ID:    Diagnosis: Chronic UTI  o Plan:   - Continue keflex      MSK/Skin:    Diagnosis: Nasal bone fx, minimal deviation  o Plan:   - No surgical intervention per OMFS   Diagnosis: Fall  o Plan:   - PT/OT      Disposition: Continue Critical Care   Code Status: Level 1 - Full Code  ---------------------------------------------------------------------------------------  ICU CORE MEASURES    Prophylaxis   VTE Pharmacologic Prophylaxis: Pharmacologic VTE Prophylaxis contraindicated due to OR  VTE Mechanical Prophylaxis: sequential compression device  Stress Ulcer Prophylaxis: Pantoprazole PO    ABCDE Protocol (if indicated)  Plan to perform spontaneous awakening trial today? Not applicable  Plan to perform spontaneous breathing trial today? Not applicable  Obvious barriers to extubation? Not applicable  CAM-ICU: Negative    Invasive Devices Review  Invasive Devices     Peripheral Intravenous Line            Peripheral IV 21 Dorsal (posterior); Right Forearm 1 day    Peripheral IV 21 Right Antecubital 1 day          Drain            External Urinary Catheter <1 day              Can any invasive devices be discontinued today?  No  ---------------------------------------------------------------------------------------  OBJECTIVE    Vitals   Vitals:    21 0400 21 0430 21 0500 21 0600   BP:  125/70 154/77 146/79   BP Location:       Pulse:  80 86 86   Resp:  14 14 17   Temp: 97 8 °F (36 6 °C)      TempSrc:       SpO2:  99% 99% 100%   Weight:       Height:         Temp (24hrs), Av 4 °F (36 9 °C), Min:97 8 °F (36 6 °C), Max:99 4 °F (37 4 °C)  Current: Temperature: 97 8 °F (36 6 °C)  HR: 82  BP: 102/59  RR: 15  SpO2: 98    Respiratory:  SpO2: SpO2: 100 %       Invasive/non-invasive ventilation settings   Respiratory    Lab Data (Last 4 hours)    None         O2/Vent Data (Last 4 hours)    None                Physical Exam  Vitals and nursing note reviewed  Constitutional:       General: She is not in acute distress  Appearance: She is well-developed  She is not diaphoretic  HENT:      Head: Normocephalic and atraumatic  Right Ear: External ear normal       Left Ear: External ear normal       Nose: Nose normal    Eyes:      General: Lids are normal  No scleral icterus  Cardiovascular:      Rate and Rhythm: Normal rate and regular rhythm  Heart sounds: Normal heart sounds  No murmur heard  No friction rub  No gallop  Pulmonary:      Effort: Pulmonary effort is normal  No respiratory distress  Breath sounds: Normal breath sounds  No wheezing or rales  Abdominal:      Palpations: Abdomen is soft  Tenderness: There is no abdominal tenderness  There is no guarding or rebound  Musculoskeletal:         General: No deformity  Cervical back: Normal range of motion and neck supple  Comments: There is tenderness to palpation over the bilateral upper shoulders and extremities  Strength 4/5 bilaterally  Sensation intact ("it comes and goes")   Skin:     General: Skin is warm and dry  Neurological:      Mental Status: She is alert  Cranial Nerves: No facial asymmetry  Sensory: Sensation is intact  No sensory deficit     Psychiatric:         Mood and Affect: Mood normal          Behavior: Behavior normal          Laboratory and Diagnostics:  Results from last 7 days   Lab Units 07/19/21  0503 07/18/21  1111   WBC Thousand/uL 6 06 8 58   HEMOGLOBIN g/dL 12 5 12 7   HEMATOCRIT % 37 2 37 5   PLATELETS Thousands/uL 145* 128*   NEUTROS PCT %  --  80*   MONOS PCT %  --  6     Results from last 7 days   Lab Units 07/20/21  0428 07/19/21  0503 07/18/21  1111   SODIUM mmol/L 138 139 142   POTASSIUM mmol/L 4 3 4 4 3 6   CHLORIDE mmol/L 111* 108 105   CO2 mmol/L 24 25 24   ANION GAP mmol/L 3* 6 13   BUN mg/dL 13 11 13   CREATININE mg/dL 0 89 0 73 0 87   CALCIUM mg/dL 8 8 9 2 8 7   GLUCOSE RANDOM mg/dL 96 80 94   ALT U/L  --   --  26   AST U/L  --   --  22   ALK PHOS U/L  --   --  73   ALBUMIN g/dL  --   --  3 4*   TOTAL BILIRUBIN mg/dL  --   --  0 50     Results from last 7 days   Lab Units 07/20/21  0428 07/19/21  0503   MAGNESIUM mg/dL 2 3 2 5   PHOSPHORUS mg/dL 3 3 3 1      Results from last 7 days   Lab Units 07/20/21  0428 07/18/21  1111   INR  1 07 1 00   PTT seconds  --  29              ABG:    VBG:          Micro          Imaging:  I have personally reviewed pertinent reports  Intake and Output  I/O       07/18 0701 - 07/19 0700 07/19 0701 - 07/20 0700    P  O  360 560    I V  (mL/kg) 872 5 (13 6) 327 5 (5 1)    IV Piggyback 300     Total Intake(mL/kg) 1532 5 (23 9) 887 5 (13 8)    Urine (mL/kg/hr) 720 700 (0 5)    Total Output 720 700    Net +812 5 +187 5              UOP: 37 5 ml/hr     Height and Weights   Height: 5' 2" (157 5 cm)  IBW (Ideal Body Weight): 50 1 kg  Body mass index is 25 85 kg/m²  Weight (last 2 days)     Date/Time   Weight    07/19/21 1457   64 1 (141 32)    07/18/21 1729   64 1 (141 32)                Nutrition       Diet Orders   (From admission, onward)             Start     Ordered    07/20/21 0001  Diet NPO; Sips with meds  Diet effective midnight     Question Answer Comment   Diet Type NPO    NPO Except:  Sips with meds        07/19/21 1112    07/19/21 1513  Dietary nutrition supplements  Once     Question Answer Comment   Select Supplement: Ensure Enlive-Strawberry    Frequency Lunch, Dinner        07/19/21 1512                  Active Medications  Scheduled Meds:  Current Facility-Administered Medications   Medication Dose Route Frequency Provider Last Rate    acetaminophen  975 mg Oral Q8H Rico Guerrero, DO  ALPRAZolam  0 25 mg Oral HS PRN Emily Velez MD      atorvastatin  20 mg Oral Daily Emily Velez MD      cefazolin  2,000 mg Intravenous Once David Rollins MD      cephalexin  250 mg Oral Daily Sirihti Harada, DO      chlorhexidine  15 mL Swish & Spit Once David Rollins MD      docusate sodium  100 mg Oral BID Emily Velez MD      gabapentin  100 mg Oral TID Siri Harada, DO      HYDROmorphone  0 2 mg Intravenous Q4H PRN Siri Harada, DO      loratadine  10 mg Oral Daily Emily Velez MD      meclizine  12 5 mg Oral Q8H PRN Emily Velez MD      melatonin  3 mg Oral HS ROYA Steinberg      ondansetron  4 mg Intravenous Q6H PRN Siri Harada, DO      oxyCODONE  2 5 mg Oral Q4H PRN Siri Harada, DO      oxyCODONE  5 mg Oral Q4H PRN Siri Harada, DO      pantoprazole  40 mg Oral QAM Emily Velez MD      phenylephine   mcg/min Intravenous Titrated ROYA Steinberg 25 mcg/min (07/20/21 0545)    senna-docusate sodium  1 tablet Oral HS Mahesh Martinez DO       Continuous Infusions:  phenylephine,  mcg/min, Last Rate: 25 mcg/min (07/20/21 0545)      PRN Meds:   ALPRAZolam, 0 25 mg, HS PRN  HYDROmorphone, 0 2 mg, Q4H PRN  meclizine, 12 5 mg, Q8H PRN  ondansetron, 4 mg, Q6H PRN  oxyCODONE, 2 5 mg, Q4H PRN  oxyCODONE, 5 mg, Q4H PRN        Allergies   Allergies   Allergen Reactions    Bee Venom Anaphylaxis     ---------------------------------------------------------------------------------------  Advance Directive and Living Will:      Power of :    POLST:    ---------------------------------------------------------------------------------------  Care Time Delivered:       Mahesh Martinez,       Portions of the record may have been created with voice recognition software  Occasional wrong word or "sound a like" substitutions may have occurred due to the inherent limitations of voice recognition software    Read the chart carefully and recognize, using context, where substitutions have occurred

## 2021-07-20 NOTE — PROGRESS NOTES
Patient with cervical stenosis and central cord syndrome  She has symptoms after a fall, and I recommended early decompression to facilitate neurologic protection and possible improvement and recovery  Risks and benefits discussed with patient      Recommend OR for ACDF and PCDF C2-T2 for diffuse spondylosis and symptomatic central cord syndrome    Marylee Amsterdam, MD

## 2021-07-20 NOTE — ANESTHESIA PREPROCEDURE EVALUATION
Procedure:  Anterior cervical discectomy and fusion C4-5, posterior cervical decompresion and fusion C2-T2 additional levels possible for either stage (Bilateral Spine Cervical)    Relevant Problems   CARDIO   (+) Hypertension   (+) Mixed hyperlipidemia   (+) Moderate mitral regurgitation      GI/HEPATIC   (+) Acid reflux      HEMATOLOGY   (+) Thrombocytopenia (HCC)      NEURO/PSYCH   (+) Anxiety   (+) Central cord syndrome (HCC)   (+) History of asthma        Physical Exam    Airway    Mallampati score: III  TM Distance: >3 FB  Neck ROM: limited     Dental       Cardiovascular      Pulmonary      Other Findings        Anesthesia Plan  ASA Score- 4     Anesthesia Type- general with ASA Monitors  Additional Monitors: arterial line  Airway Plan: ETT  Comment: Patient seen and examined  History reviewed  Patient to be done under general anesthesia with ETT and routine monitors  Risks discussed with the patient  Consent obtained          Plan Factors-    Chart reviewed  EKG reviewed  Existing labs reviewed  Patient summary reviewed  Induction- intravenous  Postoperative Plan- Plan for postoperative opioid use  Planned trial extubation    Informed Consent- Anesthetic plan and risks discussed with patient  I personally reviewed this patient with the CRNA  Discussed and agreed on the Anesthesia Plan with the CRNA  Chrissy Regalado

## 2021-07-20 NOTE — OP NOTE
OPERATIVE REPORT  PATIENT NAME: Dulce Ball    :  1945  MRN: [id number]  Pt Location:  OR ROOM 15    SURGERY DATE: 2021    Surgeon(s) and Role:     * Esdras Cantor MD - Primary     * Emma Yeung PA-C - Assisting    Preop Diagnosis:  Central cord syndrome, initial encounter [facility]) [E27 136W]    Post-Op Diagnosis Codes:     * Central [facility] syndrome, [facility] ) [Z83 158T]    Procedure(s) (LRB): Anterior cervical discectomy and fusion C4-5, posterior cervical decompresion and fusion C2-T2 (Bilateral)    Specimen(s):  * No specimens in log *    Estimated Blood Loss:   250 mL    Drains:  Closed/Suction Drain Left;Medial Back Accordion 10 Fr  (Active)   Number of days: 0       Urethral Catheter Latex 16 Fr  (Active)   Number of days: 0       Anesthesia Type:   General    Operative Indications:  Central cord syndrome, initial encounter ([facility] ) [O76 625P]  Cervical spondylosis    Operative Findings:  See dictated note  Keila  Albion C cage, C4-5 7mm 8 degree, 12 up 10 down    Posterior cervial  L, R  C2 3 5x12 x2  C3,4,5,6 3 5 x12 x8  T1 4 0x26 x2  T2 4 0x26 x2  x2 rods  Vitoss     Complications:   None     Procedure and Technique:  The patient was brought to the operating room and transferred onto the OR table  After being placed under general anesthetic, and all appropriate lines were in position, a roll was placed between scapula and head was placed in a gel javed  Based on lateral fluoroscopy, an incision was planned from the medial border of the right sternocleidomastoid muscle to midline  The skin was then prepped and draped in usual sterile fashion       10 blade was used to incise the skin  Monopolar cautery was used to dissect down and through the platysma  The platysma was undermined with monopolar cautery  Monopolar cautery was used to expose the medial border of the sternocleidomastoid muscle   A combination of bipolar cautery, Metzenbaum scissors and blunt dissection was used to further expose to the right at the medial border of the sternocleidomastoid muscle  I could then palpate the carotid artery and swept medially to identify the anterior cervical spine  With hand-held retractors in place, the prevertebral fascia was identified, cauterized with bipolar cautery and incised with Metzenbaum scissors  This plane was further developed with combination of blunt and sharp dissection  Bent spinal needles were placed at the disc space of the appropriate surgical level which was also confirmed on lateral fluoroscopy and with a neuroradiology  With hand-held retractors in place, and the longus coli and anterior vertebral bodies were exposed and the longus coli muscles undermined with monopolar cautery  The basestone retraction system was then applied       Starting Graematter distraction pins were placed in C4 and C5  I removed disc material using the drill, kerrisons and curettes sequentially  There was no remaining disc and basically it was bone on bone  I therefore ended up removing approximately 50% of the volume of C5 as a hemicorpectomy  I was able to appreciate the PLL but given that I was planning the posterior decompression I opted not to cut the PLL  I then sized the appropriate graft and placed a standalone cage with allograft inside  I removed the distraction and removed the caspar pins  I placed one screw up into C4 and down to C5  Surgiflo was used for [employer] appeared adequate  We then irrigated the wound and obtained hemostasis  Neuromonitoring was noted to be stable, We closed in layers with 2-0 vicryl for the deeper tissues and monocryl for the skin  Skin glue was used as a dressing      After the drapes were removed  The patients head was placed in Johnson pins  The patient was carefully turned in the prone position  Care was taken to insure that all pressure points were padded and the neck was in a  tuck position   The skin was then prepped and draped in usual sterile fashion      10 blade was used to incise the skin  Monopolar cautery was used to dissect down and through the muscle fascia  The spinous processes, lamina and lateral masses were exposed using monopolar cautery and Aquamantis  Lateral fluoroscopy was used to confirm exposure of the appropriate levels, and these were also confirmed with neuroradiology  Starting at [facility] ball bur was used to drill  holes 1 mm medial and inferior to the midpoint of the lateral mass under lateral fluoroscopy  A drill with Guarded was then used to drill bilateral holes In a superior lateral trajectory from C2-C6  The holes were then probed to ensure there was a floor and 4 walls and then a tap was used to prepare the holes  Starting at C2 two pars screws were placed then at C3 lateral mass screws were placed under lateral fluoroscopy To complete instrumentation from C3-6  At T1, a  hole was drilled just lateral to the pars at the base of the transverse process  A pedicle finder was advanced under lateral fluoroscopy  The hole was then probed to ensure there before and 4 walls  A tap was used to prepare the hole  Bilateral pedicle screws were placed at T1 I repeated this process for T2 and so 4 screws were placed at T1 andT2  AP and lateral fluoroscopy demonstrated good alignment a posterior lateral instrumentation      A matchstick drew was used to drill troughs along the lamina and medial lateral mass from C3 to C6  Leksell rongeur was used to remove intraspinous ligament  Posterior longitudinal ligament was undermined with 2 mm Kerrison punch bilaterally  Spinous processes and lamina were then removed with Leksell rongeur  Bipolar cautery were used for hemostasis along the edges of the decompression  Elective physiological monitoring remained stable  2 rods were measured and bent  These were placed bilaterally and set screws were applied   There was adequate brought above and below the construct under direct visualization  Final AP and lateral fluoroscopy demonstrated good alignment of the instrumentation and of the cervical spine  The set screws were finally tightened      The surgical site was irrigated copiously with antibiotic impregnated irrigation  Locally harvested autograft and bone substitute were prepared in a bone mill and placed along the decorticated surface to promote bony fusion  A gram of vancomycin powder was placed in the epidural space  An epidural 10F hemovac drain  tunnelled to left of the incision  Vicryl suture was used to approximate the muscle,  fascia and subcutaneous tissue  Staples were used to approximate the skin edges  Miplex was applied      The count was correct at the end of the case and there were no complications  Electrophysiological monitoring was stable  The patient was carefully extubated and carefully transferred onto the operating room gurney  The Johnson head javed was removed and the holes were inspected for bleeding or CSF leak  The patient was transferred to the recovery room where they were noted to be hemodynamically stable and neurologically unchanged   The results of surgery were discussed with family      I was present for the entire procedure, A qualified resident physician was not available and A physician assistant was required during the procedure for retraction tissue handling,dissection and suturing     Patient Disposition:  Critical Care Unit    SIGNATURE: Syd Felipe MD  DATE: July 20, 2021  TIME: 2:37 PM

## 2021-07-20 NOTE — ANESTHESIA PROCEDURE NOTES
Arterial Line Insertion  Performed by: Marv Moore MD  Authorized by: Marv Moore MD   Consent: Verbal consent obtained  Risks and benefits: risks, benefits and alternatives were discussed  Consent given by: patient  Patient understanding: patient states understanding of the procedure being performed  Patient consent: the patient's understanding of the procedure matches consent given  Procedure consent: procedure consent matches procedure scheduled  Relevant documents: relevant documents present and verified  Test results: test results available and properly labeled  Site marked: the operative site was marked  Radiology Images: Radiology Images displayed and confirmed  If images not available, report reviewed  Required items: required blood products, implants, devices, and special equipment available  Patient identity confirmed: arm band  Time out: Immediately prior to procedure a "time out" was called to verify the correct patient, procedure, equipment, support staff and site/side marked as required  Preparation: Patient was prepped and draped in the usual sterile fashion    Indications: multiple ABGs and hemodynamic monitoring  Orientation:  Right  Location: radial artery  Procedure Details:  Needle gauge: 18  Number of attempts: 1    Post-procedure:  Post-procedure: dressing applied  Waveform: good waveform  Post-procedure CNS: normal  Patient tolerance: patient tolerated the procedure well with no immediate complications

## 2021-07-20 NOTE — ANESTHESIA POSTPROCEDURE EVALUATION
Post-Op Assessment Note    CV Status:  Stable  Pain Score: 0    Pain management: adequate     Mental Status:  Arousable   Hydration Status:  Euvolemic   PONV Controlled:  Controlled   Airway Patency:  Patent      Post Op Vitals Reviewed: Yes      Staff: Anesthesiologist, CRNA   Comments: tolerating Oral airway        No complications documented      /70 (07/20/21 1513)    Temp (!) 96 9 °F (36 1 °C) (07/20/21 1513)    Pulse 85 (07/20/21 1513)   Resp 17 (07/20/21 1515)    SpO2 100 % (07/20/21 1513) face mask and oral airway

## 2021-07-20 NOTE — QUICK NOTE
Post Op Check Note  Rosie Mcmullen 66 y o  female MRN: [id number]  Unit/Bed#: ICU 10 Encounter: [id number]    ASSESSMENT:  Rosie Mcmullen is a 66 y o  female with PMHx of asthma, GERD, UTI, and central cord syndrome who is status post anterior cervical discectomy and fusion 4-5 with cervical decompression  Vitals signs stable  Currently auto-mapping >85  Off pressors and extubated  Satting well on room air  Plan  · Regular diet  · Continue PRN analgesia  · Will order post-op labs  · MAP goals>85 for 5days post-op per neurosurg  · PT/OT      Subjective: Patient reports feeling a little dizzy and sleepy  Otherwise, denies any significant pain and has no complaints  Physical Exam:  GEN: Elderly female lying on bed in NAD  CV: Regular rate and rhyhtm  Lung: Normal effort  Ab: Soft, NT/ND  Neuro: A+Ox3  Incisions: Clean, dry, and intact  Cranial drain in place  Blood pressure 111/69, pulse 90, temperature (!) 97 4 °F (36 3 °C), temperature source Oral, resp  rate 17, height 5' 2" (1 575 m), weight 64 1 kg (141 lb 5 oz), SpO2 100 %, not currently breastfeeding  ,Body mass index is 25 85 kg/m²  Intake/Output Summary (Last 24 hours) at 7/20/2021 1738  Last data filed at 7/20/2021 1629  Gross per 24 hour   Intake 3126 8 ml   Output 2200 ml   Net 926 8 ml       Invasive Devices     Peripheral Intravenous Line            Peripheral IV 07/18/21 Dorsal (posterior); Right Forearm 1 day    Peripheral IV 07/19/21 Right Antecubital 1 day    Peripheral IV 07/20/21 Left Hand <1 day          Drain            Closed/Suction Drain Left;Medial Back Accordion 10 Fr  <1 day    Urethral Catheter Latex 16 Fr  <1 day                VTE Pharmacologic Prophylaxis: Enoxaparin (Lovenox)

## 2021-07-20 NOTE — PHYSICAL THERAPY NOTE
Physical Therapy Cancellation Note    PT orders received chart review completed  Pt is currently off the floor in OR and not appropriate to participate in skilled PT at this time   PT will follow and eval as medically appropriate      07/20/21 0900   PT Last Visit   PT Visit Date 07/20/21   Note Type   Cancel Reasons Patient to operating room     Inessa Amado, JUAN

## 2021-07-20 NOTE — OCCUPATIONAL THERAPY NOTE
Occupational Therapy Cancel Note        Patient Name: Priscila Wright  JLACD'T Date: 7/20/2021 07/20/21 0830   OT Last Visit   OT Visit Date 07/20/21   Note Type   Cancel Reasons Patient to operating room       ORDERS RECEIVED  CHART REVIEW COMPLETED  PT PLANNED FOR OR WITH [facility]; WILL FOLLOW POST-OP      RAYMOND Dennis, OTR/L

## 2021-07-21 ENCOUNTER — APPOINTMENT (INPATIENT)
Dept: RADIOLOGY | Facility: HOSPITAL | Age: 76
DRG: 029 | End: 2021-07-21
Payer: MEDICARE

## 2021-07-21 LAB
ABO GROUP BLD BPU: NORMAL
ABO GROUP BLD BPU: NORMAL
ANION GAP SERPL CALCULATED.3IONS-SCNC: 4 MMOL/L (ref 4–13)
BASOPHILS # BLD AUTO: 0.01 THOUSANDS/ΜL (ref 0–0.1)
BASOPHILS NFR BLD AUTO: 0 % (ref 0–1)
BPU ID: NORMAL
BPU ID: NORMAL
BUN SERPL-MCNC: 16 MG/DL (ref 5–25)
CA-I BLD-SCNC: 1.13 MMOL/L (ref 1.12–1.32)
CALCIUM SERPL-MCNC: 8.6 MG/DL (ref 8.3–10.1)
CHLORIDE SERPL-SCNC: 113 MMOL/L (ref 100–108)
CO2 SERPL-SCNC: 23 MMOL/L (ref 21–32)
CREAT SERPL-MCNC: 0.86 MG/DL (ref 0.6–1.3)
CROSSMATCH: NORMAL
CROSSMATCH: NORMAL
EOSINOPHIL # BLD AUTO: 0.02 THOUSAND/ΜL (ref 0–0.61)
EOSINOPHIL NFR BLD AUTO: 0 % (ref 0–6)
ERYTHROCYTE [DISTWIDTH] IN BLOOD BY AUTOMATED COUNT: 13.1 % (ref 11.6–15.1)
GFR SERPL CREATININE-BSD FRML MDRD: 65 ML/MIN/1.73SQ M
GLUCOSE SERPL-MCNC: 106 MG/DL (ref 65–140)
HCT VFR BLD AUTO: 29 % (ref 34.8–46.1)
HGB BLD-MCNC: 9.7 G/DL (ref 11.5–15.4)
IMM GRANULOCYTES # BLD AUTO: 0.05 THOUSAND/UL (ref 0–0.2)
IMM GRANULOCYTES NFR BLD AUTO: 0 % (ref 0–2)
LYMPHOCYTES # BLD AUTO: 1.74 THOUSANDS/ΜL (ref 0.6–4.47)
LYMPHOCYTES NFR BLD AUTO: 14 % (ref 14–44)
MAGNESIUM SERPL-MCNC: 2.3 MG/DL (ref 1.6–2.6)
MCH RBC QN AUTO: 32.4 PG (ref 26.8–34.3)
MCHC RBC AUTO-ENTMCNC: 33.4 G/DL (ref 31.4–37.4)
MCV RBC AUTO: 97 FL (ref 82–98)
MONOCYTES # BLD AUTO: 1.32 THOUSAND/ΜL (ref 0.17–1.22)
MONOCYTES NFR BLD AUTO: 10 % (ref 4–12)
NEUTROPHILS # BLD AUTO: 9.66 THOUSANDS/ΜL (ref 1.85–7.62)
NEUTS SEG NFR BLD AUTO: 76 % (ref 43–75)
NRBC BLD AUTO-RTO: 0 /100 WBCS
PLATELET # BLD AUTO: 137 THOUSANDS/UL (ref 149–390)
PMV BLD AUTO: 10 FL (ref 8.9–12.7)
POTASSIUM SERPL-SCNC: 4.2 MMOL/L (ref 3.5–5.3)
RBC # BLD AUTO: 2.99 MILLION/UL (ref 3.81–5.12)
SODIUM SERPL-SCNC: 140 MMOL/L (ref 136–145)
UNIT DISPENSE STATUS: NORMAL
UNIT DISPENSE STATUS: NORMAL
UNIT PRODUCT CODE: NORMAL
UNIT PRODUCT CODE: NORMAL
UNIT RH: NORMAL
UNIT RH: NORMAL
WBC # BLD AUTO: 12.8 THOUSAND/UL (ref 4.31–10.16)

## 2021-07-21 PROCEDURE — 80048 BASIC METABOLIC PNL TOTAL CA: CPT | Performed by: REGISTERED NURSE

## 2021-07-21 PROCEDURE — 99291 CRITICAL CARE FIRST HOUR: CPT | Performed by: EMERGENCY MEDICINE

## 2021-07-21 PROCEDURE — 85025 COMPLETE CBC W/AUTO DIFF WBC: CPT | Performed by: REGISTERED NURSE

## 2021-07-21 PROCEDURE — 72125 CT NECK SPINE W/O DYE: CPT

## 2021-07-21 PROCEDURE — 72040 X-RAY EXAM NECK SPINE 2-3 VW: CPT

## 2021-07-21 PROCEDURE — G1004 CDSM NDSC: HCPCS

## 2021-07-21 PROCEDURE — 97163 PT EVAL HIGH COMPLEX 45 MIN: CPT

## 2021-07-21 PROCEDURE — 83735 ASSAY OF MAGNESIUM: CPT | Performed by: REGISTERED NURSE

## 2021-07-21 PROCEDURE — 82330 ASSAY OF CALCIUM: CPT | Performed by: REGISTERED NURSE

## 2021-07-21 PROCEDURE — 97167 OT EVAL HIGH COMPLEX 60 MIN: CPT

## 2021-07-21 PROCEDURE — 99222 1ST HOSP IP/OBS MODERATE 55: CPT | Performed by: PHYSICAL MEDICINE & REHABILITATION

## 2021-07-21 PROCEDURE — 99024 POSTOP FOLLOW-UP VISIT: CPT | Performed by: NURSE PRACTITIONER

## 2021-07-21 RX ADMIN — METHOCARBAMOL 500 MG: 500 TABLET, FILM COATED ORAL at 11:57

## 2021-07-21 RX ADMIN — HYDROMORPHONE HYDROCHLORIDE 0.2 MG: 0.2 INJECTION, SOLUTION INTRAMUSCULAR; INTRAVENOUS; SUBCUTANEOUS at 23:46

## 2021-07-21 RX ADMIN — DOCUSATE SODIUM AND SENNOSIDES 1 TABLET: 8.6; 5 TABLET ORAL at 21:06

## 2021-07-21 RX ADMIN — OXYCODONE HYDROCHLORIDE 5 MG: 5 TABLET ORAL at 16:15

## 2021-07-21 RX ADMIN — PHENYLEPHRINE HYDROCHLORIDE 70 MCG/MIN: 10 INJECTION INTRAVENOUS at 10:28

## 2021-07-21 RX ADMIN — PANTOPRAZOLE SODIUM 40 MG: 40 TABLET, DELAYED RELEASE ORAL at 08:40

## 2021-07-21 RX ADMIN — MELATONIN TAB 3 MG 3 MG: 3 TAB at 21:06

## 2021-07-21 RX ADMIN — OXYCODONE HYDROCHLORIDE 5 MG: 5 TABLET ORAL at 11:59

## 2021-07-21 RX ADMIN — METHOCARBAMOL 500 MG: 500 TABLET, FILM COATED ORAL at 18:35

## 2021-07-21 RX ADMIN — OXYCODONE HYDROCHLORIDE 5 MG: 5 TABLET ORAL at 21:06

## 2021-07-21 RX ADMIN — OXYCODONE HYDROCHLORIDE 5 MG: 5 TABLET ORAL at 03:17

## 2021-07-21 RX ADMIN — ATORVASTATIN CALCIUM 20 MG: 20 TABLET, FILM COATED ORAL at 08:40

## 2021-07-21 RX ADMIN — ACETAMINOPHEN 975 MG: 325 TABLET, FILM COATED ORAL at 14:38

## 2021-07-21 RX ADMIN — HYDROMORPHONE HYDROCHLORIDE 0.2 MG: 0.2 INJECTION, SOLUTION INTRAMUSCULAR; INTRAVENOUS; SUBCUTANEOUS at 16:16

## 2021-07-21 RX ADMIN — LIDOCAINE 2 PATCH: 50 PATCH TOPICAL at 08:39

## 2021-07-21 RX ADMIN — GABAPENTIN 100 MG: 100 CAPSULE ORAL at 15:35

## 2021-07-21 RX ADMIN — GABAPENTIN 100 MG: 100 CAPSULE ORAL at 08:40

## 2021-07-21 RX ADMIN — ENOXAPARIN SODIUM 30 MG: 30 INJECTION, SOLUTION INTRAVENOUS; SUBCUTANEOUS at 21:06

## 2021-07-21 RX ADMIN — SODIUM CHLORIDE 75 ML/HR: 0.9 INJECTION, SOLUTION INTRAVENOUS at 05:28

## 2021-07-21 RX ADMIN — CEFAZOLIN SODIUM 2000 MG: 2 SOLUTION INTRAVENOUS at 05:31

## 2021-07-21 RX ADMIN — CEFAZOLIN SODIUM 2000 MG: 2 SOLUTION INTRAVENOUS at 14:39

## 2021-07-21 RX ADMIN — CEPHALEXIN 250 MG: 250 CAPSULE ORAL at 08:40

## 2021-07-21 RX ADMIN — LORATADINE 10 MG: 10 TABLET ORAL at 08:40

## 2021-07-21 RX ADMIN — METHOCARBAMOL 500 MG: 500 TABLET, FILM COATED ORAL at 05:27

## 2021-07-21 RX ADMIN — GABAPENTIN 100 MG: 100 CAPSULE ORAL at 21:06

## 2021-07-21 RX ADMIN — METHOCARBAMOL 500 MG: 500 TABLET, FILM COATED ORAL at 23:46

## 2021-07-21 RX ADMIN — DOCUSATE SODIUM 100 MG: 100 CAPSULE ORAL at 08:40

## 2021-07-21 RX ADMIN — ACETAMINOPHEN 975 MG: 325 TABLET, FILM COATED ORAL at 05:27

## 2021-07-21 RX ADMIN — DOCUSATE SODIUM 100 MG: 100 CAPSULE ORAL at 18:35

## 2021-07-21 RX ADMIN — OXYCODONE HYDROCHLORIDE 5 MG: 5 TABLET ORAL at 07:33

## 2021-07-21 RX ADMIN — ACETAMINOPHEN 975 MG: 325 TABLET, FILM COATED ORAL at 21:06

## 2021-07-21 NOTE — PROGRESS NOTES
Daily Progress Note - Critical Care   Rommel Sheriff 66 y o  female MRN: [id number]  Unit/Bed#: ICU 10 Encounter: [id number]        ----------------------------------------------------------------------------------------  HPI/24hr events:  OR with Neurosurgery yesterday for anterior cervical discectomy and fusion C4-5 and posterior cervical decompression and fusion C2-T2    ---------------------------------------------------------------------------------------  SUBJECTIVE  C/o neck pain this AM     Review of Systems   Musculoskeletal: Positive for neck pain  Review of systems was reviewed and negative unless stated above in HPI/24-hour events   ---------------------------------------------------------------------------------------  Assessment and Plan:    Neuro:    Diagnosis:  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  o Plan:  Status post anterior cervical discectomy and fusion C4-5, posterior cervical decompression and fusion C2-T2 7/20  o Neurosurgery following  o MAP > 85 for 5 days postop  o Continue neuro checks   o Ancef for surgical prophylaxis to be completed today  o Follow-up a m  CT cervical spine  o Upright films to be completed today   Diagnosis: Anxiety  o Plan:  Home dose p r n  Ativan   Diagnosis:  Acute pain  o Plan:  Scheduled Tylenol, gabapentin, Robaxin  o P r n   Oxycodone and Dilaudid      CV:    Diagnosis:  Permissive hypertension  o Plan: MAP > 85  o Amilcar drip to maintain BP goals   Diagnosis:  Hyperlipidemia  o Plan:  Continue statin      Pulm:   Diagnosis:  No acute issues  o Plan:  Encourage IS    GI:    Diagnosis:  GERD  o Plan:  Continue home dose PPI   Diagnosis:  Constipation  o Plan:  Maintain bowel regimen      :    Diagnosis:  Chronic UTI  o Plan:  Continue home dose Keflex  o Gotti in place, consider removing when able  o Strict I&Os  o Trend BUN and creatinine      F/E/N:    Plan:  NS @ 75, d/c when tolerating PO    Replete lytes as needed   Regular diet      Heme/Onc:    Diagnosis:  DVT prophylaxis  o Plan:  Hold chemical prophylaxis until cleared by Neurosurgery  o Maintain SCDs      Endo:    Diagnosis:  No acute issues      ID:    Diagnosis:  Chronic UTI  o Plan:  Continue Keflex      MSK/Skin:    Diagnosis:  Nasal bone fracture  o Plan:  No surgical intervention per OMFS   Diagnosis:  Ambulatory dysfunction  o Plan:  PT/OT  o Maintain fall precautions      Disposition: Continue Critical Care   Code Status: Level 1 - Full Code  ---------------------------------------------------------------------------------------  ICU CORE MEASURES    Prophylaxis   VTE Pharmacologic Prophylaxis: Pharmacologic VTE Prophylaxis contraindicated due to neurosurg   VTE Mechanical Prophylaxis: sequential compression device  Stress Ulcer Prophylaxis: Pantoprazole PO    ABCDE Protocol (if indicated)  Plan to perform spontaneous awakening trial today? Not applicable  Plan to perform spontaneous breathing trial today? Not applicable  Obvious barriers to extubation? Not applicable  CAM-ICU: Negative    Invasive Devices Review  Invasive Devices     Peripheral Intravenous Line            Peripheral IV 21 Dorsal (posterior); Right Forearm 2 days    Peripheral IV 21 Right Antecubital 1 day    Peripheral IV 21 Left Hand <1 day          Drain            Closed/Suction Drain Left;Medial Back Accordion 10 Fr  <1 day    Urethral Catheter Latex 16 Fr  <1 day              Can any invasive devices be discontinued today?  No  ---------------------------------------------------------------------------------------  OBJECTIVE    Vitals   Vitals:    21 2330 21 0030 21 0100 21 0200   BP: 126/62 118/62 116/62 123/64   BP Location: Left arm      Pulse: 98 92 90 86   Resp: 18 15 15 17   Temp:       TempSrc:       SpO2: 99% 99% 99% 100%   Weight:       Height:         Temp (24hrs), Av 3 °F (36 8 °C), Min:96 9 °F (36 1 °C), Max:100 9 °F (38 3 °C)  Current: Temperature: 99 °F (37 2 °C)  HR: 86  BP: 123/64  RR: 17  SpO2: 100    Respiratory:  SpO2: SpO2: 100 %       Invasive/non-invasive ventilation settings   Respiratory    Lab Data (Last 4 hours)    None         O2/Vent Data (Last 4 hours)    None                Physical Exam  Vitals and nursing note reviewed  HENT:      Head: Normocephalic  Right Ear: External ear normal       Left Ear: External ear normal       Nose: Nose normal       Mouth/Throat:      Mouth: Mucous membranes are moist    Eyes:      Pupils: Pupils are equal, round, and reactive to light  Neck:      Comments: Incision CDI   Cardiovascular:      Rate and Rhythm: Normal rate and regular rhythm  Pulses: Normal pulses  Pulmonary:      Effort: Pulmonary effort is normal       Breath sounds: Normal breath sounds  Abdominal:      General: There is no distension  Palpations: Abdomen is soft  Tenderness: There is no abdominal tenderness  Genitourinary:     Comments: Gotti in place  Musculoskeletal:      Right lower leg: No edema  Left lower leg: No edema  Skin:     General: Skin is warm and dry  Capillary Refill: Capillary refill takes less than 2 seconds  Neurological:      Mental Status: She is alert and oriented to person, place, and time  Sensory: No sensory deficit        Comments: Sensation intact    Psychiatric:         Mood and Affect: Mood normal          Behavior: Behavior normal          Laboratory and Diagnostics:  Results from last 7 days   Lab Units 07/20/21  1716 07/20/21  1350 07/20/21  1306 07/20/21  1213 07/20/21  1041 07/20/21  0428 07/19/21  0503 07/18/21  1111   WBC Thousand/uL 8 18  --   --   --   --  4 56 6 06 8 58   HEMOGLOBIN g/dL 9 8*  --   --   --   --  9 3* 12 5 12 7   I STAT HEMOGLOBIN g/dl  --  9 5* 9 9* 9 9* 10 5*  --   --   --    HEMATOCRIT % 29 7*  --   --   --   --  28 1* 37 2 37 5   HEMATOCRIT, ISTAT %  --  28* 29* 29* 31*  --   --   --    PLATELETS Thousands/uL 89*  --   --   --   --  99* 145* 128*   NEUTROS PCT %  --   --   --   --   --  66  --  80*   BANDS PCT % 17*  --   --   --   --   --   --   --    MONOS PCT %  --   --   --   --   --  9  --  6   MONO PCT % 2*  --   --   --   --   --   --   --      Results from last 7 days   Lab Units 07/20/21  1716 07/20/21  1350 07/20/21  1306 07/20/21  1213 07/20/21  1041 07/20/21  0428 07/19/21  0503 07/18/21  1111   SODIUM mmol/L 142  --   --   --   --  138 139 142   POTASSIUM mmol/L 3 9  --   --   --   --  4 3 4 4 3 6   CHLORIDE mmol/L 114*  --   --   --   --  111* 108 105   CO2 mmol/L 21  --   --   --   --  24 25 24   CO2, I-STAT mmol/L  --  22 22 22 24  --   --   --    ANION GAP mmol/L 7  --   --   --   --  3* 6 13   BUN mg/dL 11  --   --   --   --  13 11 13   CREATININE mg/dL 0 90  --   --   --   --  0 89 0 73 0 87   CALCIUM mg/dL 8 4  --   --   --   --  8 8 9 2 8 7   GLUCOSE RANDOM mg/dL 136  --   --   --   --  96 80 94   ALT U/L  --   --   --   --   --   --   --  26   AST U/L  --   --   --   --   --   --   --  22   ALK PHOS U/L  --   --   --   --   --   --   --  73   ALBUMIN g/dL  --   --   --   --   --   --   --  3 4*   TOTAL BILIRUBIN mg/dL  --   --   --   --   --   --   --  0 50     Results from last 7 days   Lab Units 07/20/21  1716 07/20/21  0428 07/19/21  0503   MAGNESIUM mg/dL 2 2 2 3 2 5   PHOSPHORUS mg/dL  --  3 3 3 1      Results from last 7 days   Lab Units 07/20/21  0428 07/18/21  1111   INR  1 07 1 00   PTT seconds  --  29              ABG:    VBG:          Micro        EKG:   Imaging:  I have personally reviewed pertinent reports  Intake and Output  I/O       07/19 0701 - 07/20 0700 07/20 0701 - 07/21 0700    P  O  560 240    I V  (mL/kg) 349 3 (5 4) 3099 6 (48 4)    IV Piggyback 100 580    Total Intake(mL/kg) 1009 3 (15 7) 3919 6 (61 1)    Urine (mL/kg/hr) 900 (0 6) 1915 (1 2)    Drains  170    Blood  250    Total Output 900 2335    Net +109 3 +1584 6          Unmeasured Urine Occurrence 1 x Height and Weights   Height: 5' 2" (157 5 cm)  IBW (Ideal Body Weight): 50 1 kg  Body mass index is 25 85 kg/m²  Weight (last 2 days)     Date/Time   Weight    07/19/21 1457   64 1 (141 32)                Nutrition       Diet Orders   (From admission, onward)             Start     Ordered    07/20/21 1625  Diet Regular; Regular House  Diet effective now     Question Answer Comment   Diet Type Regular    Regular Regular House    RD to adjust diet per protocol?  Yes        07/20/21 1624    07/19/21 1513  Dietary nutrition supplements  Once     Question Answer Comment   Select Supplement: Ensure Enlive-Strawberry    Frequency Lunch, Dinner        07/19/21 1512                  Active Medications  Scheduled Meds:  Current Facility-Administered Medications   Medication Dose Route Frequency Provider Last Rate    acetaminophen  975 mg Oral Q8H [facility] SHANIA Drake-C      ALPRAZolam  0 25 mg Oral HS PRN Bridgett Amrain Lin, PA-C      atorvastatin  20 mg Oral Daily Haydee N Delia PA-C      cefazolin  2,000 mg Intravenous Q8H Haydee SHANIA Millan-C Stopped (07/20/21 2301)    cephalexin  250 mg Oral Daily Haydee N Delia PA-C      docusate sodium  100 mg Oral BID Haydee N Delia PA-C      gabapentin  100 mg Oral TID Bridgett Amrain Lin PA-C      HYDROmorphone  0 2 mg Intravenous Q4H PRN Bridgett Amrain Lin, PA-C      lidocaine  2 patch Topical Daily Haydee N Delia PA-C      loratadine  10 mg Oral Daily Haydee N Delia PA-C      meclizine  12 5 mg Oral Q8H PRN Haydee N Delia, PA-C      melatonin  3 mg Oral HS Haydee N Delia PA-C      methocarbamol  500 mg Oral Q6H [facility] Haydee N Delia PA-C      ondansetron  4 mg Intravenous Q6H PRN Bridgett Amas Delia, PA-C      oxyCODONE  2 5 mg Oral Q4H PRN Bridgett Amas Delia, PA-C      oxyCODONE  5 mg Oral Q4H PRN Haydee N Ireifej, PA-C      pantoprazole  40 mg Oral SARAH Lin PA-C      phenylephine   mcg/min Intravenous Titrated Lafrances Nai Lin PA-C 50 mcg/min (07/21/21 0300)    senna-docusate sodium  1 tablet Oral HS Haydee Lin PA-C      sodium chloride  75 mL/hr Intravenous Continuous Haydee Lin PA-C 75 mL/hr (07/20/21 1525)     Continuous Infusions:  phenylephine,  mcg/min, Last Rate: 50 mcg/min (07/21/21 0300)  sodium chloride, 75 mL/hr, Last Rate: 75 mL/hr (07/20/21 1525)      PRN Meds:   ALPRAZolam, 0 25 mg, HS PRN  HYDROmorphone, 0 2 mg, Q4H PRN  meclizine, 12 5 mg, Q8H PRN  ondansetron, 4 mg, Q6H PRN  oxyCODONE, 2 5 mg, Q4H PRN  oxyCODONE, 5 mg, Q4H PRN        Allergies   Allergies   Allergen Reactions    Bee Venom Anaphylaxis     ---------------------------------------------------------------------------------------  Advance Directive and Living Will:      Power of :    POLST:    ---------------------------------------------------------------------------------------  Care Time Delivered:   No Critical Care time spent     [employer]      Portions of the record may have been created with voice recognition software  Occasional wrong word or "sound a like" substitutions may have occurred due to the inherent limitations of voice recognition software    Read the chart carefully and recognize, using context, where substitutions have occurred

## 2021-07-21 NOTE — PLAN OF CARE
Problem: OCCUPATIONAL THERAPY ADULT  Goal: Performs self-care activities at highest level of function for planned discharge setting  See evaluation for individualized goals  Description: Treatment Interventions: ADL retraining, Functional transfer training, Endurance training, Patient/family training, UE strengthening/ROM, Compensatory technique education, Continued evaluation          See flowsheet documentation for full assessment, interventions and recommendations  Note: Limitation: Decreased ADL status, Decreased UE strength, Decreased Safe judgement during ADL, Decreased endurance, Decreased self-care trans, Decreased high-level ADLs, Decreased fine motor control  Prognosis: Good  Assessment: Pt is a 66 y o  YO  female admitted to [facility] on 2021 w/ central cord syndrome and facial bone fxs due to fall on face now  "POD1 ACDF C4-5 and PCDF C2-T2"  Pt  has a past medical history of Cholelithiasis, GERD (gastroesophageal reflux disease), Influenza, Migraine headache, Pneumonia, and Urinary tract infection    Pt with active OT orders and no brace required orders   Pt resides in an apt with her dtr and grandson  Pt was I w/  ADLS (dtr supervised bathing) and IADLS, , & required use of SPC PTA  Currently pt is Min for grooming, Mod A for Ub ADls and Max A for Lb ADls  Pt is mod A for5 transfers  Pt is limited at this time 2*: pain, endurance, activity tolerance, forward functional reach, decreased I w/ ADLS/IADLS, decreased safety awareness and impaired fine motor skills  The following Occupational Performance Areas to address include: grooming, bathing/shower, toilet hygiene, dressing and functional mobility  Based on the aforementioned OT evaluation, functional performance deficits, and assessments, pt has been identified as a high complexity evaluation  From OT standpoint, anticipate d/c STR   Pt to continue to benefit from acute immediate OT services to address the following goals 3-5x/week to  w/in 10-14 days:   The patient's raw score on the AM-PAC Daily Activity inpatient short form is 15, standardized score is 34 69, less than 39 4  Patients at this level are likely to benefit from discharge to post-acute rehabilitation services  Please refer to the recommendation of the Occupational Therapist for safe discharge planning  OT Discharge Recommendation: Post acute rehabilitation services  OT - OK to Discharge:  Yes

## 2021-07-21 NOTE — OCCUPATIONAL THERAPY NOTE
Occupational Therapy Evaluation     Patient Name: Nani Lwa  WUDLX'P Date: 7/21/2021  Problem List  Principal Problem:    Central cord syndrome [facility])  Active Problems:    Fall against object    Neck pain    Bilateral arm weakness    Past Medical History  Past Medical History:   Diagnosis Date    Cholelithiasis     GERD (gastroesophageal reflux disease)     Influenza 1/29/2019    Migraine headache     Pneumonia     Urinary tract infection      Past Surgical History  Past Surgical History:   Procedure Laterality Date    NO PAST SURGERIES           07/21/21 0840   OT Last Visit   OT Visit Date 07/21/21   Note Type   Note type Evaluation   Restrictions/Precautions   Weight Bearing Precautions Per Order No   Other Precautions Chair Alarm; Bed Alarm;Multiple lines;Telemetry; Fall Risk  (chair alarm active at end of session)   Pain Assessment   Pain Assessment Tool 0-10   Pain Score 6   Pain Location/Orientation Orientation: Bilateral;Location: Shoulder   Hospital Pain Intervention(s) Repositioned; Ambulation/increased activity; Emotional support   Home Living   Type of [address] [place] [address] One level;Stairs to enter with rails   Bathroom Shower/Tub Tub/shower unit   Bathroom Toilet Standard   Bathroom Equipment Grab bars in [address]   Prior Function   Level of [place] Independent with ADLs and functional mobility   Lives With Family;Daughter  (grandson)   Receives Help From [facility] [address] in the last 6 months 0   Vocational Retired   Lifestyle   Autonomy pta pt reports I in ADLs/IADLs/functional mobility   Reciprocal Relationships supportive family- lives w/ dtr and grandson   Service to Others retired   Intrinsic Gratification watching Jeopardy and Wheel of [address]  ([address]) [address] "I feel fine except my neck hurts"   ADL   Where Assessed Chair   Eating Assistance 4  Minimal Assistance Grooming Assistance 4  Minimal Assistance   UB Bathing Assistance 3  Moderate Assistance   LB Bathing Assistance 2  Maximal Assistance   UB Dressing Assistance 3  Moderate Assistance   LB Dressing Assistance 2  Maximal Assistance   Toileting Assistance  2  Maximal Assistance   Bed Mobility   Supine to Sit 3  Moderate assistance   Additional items Assist x 2; Increased time required;Verbal cues   Transfers   Sit to Stand 3  Moderate assistance   Additional items Assist x 1; Increased time required   Stand to Sit 3  Moderate assistance   Additional items Assist x 1; Increased time required   Functional Mobility   Functional Mobility 3  Moderate assistance   Additional items Hand hold assistance   Balance   Static Sitting Fair -   Dynamic Sitting Poor +   Static Standing Poor   Dynamic Standing Poor   Ambulatory Poor   Activity Tolerance   Activity Tolerance Patient tolerated treatment well   Medical Staff Made Aware Pt seen as a co-eval w/ PT Joleen due to the patient's co-morbidities, clinically unstable presentation, and present impairments which are a regression from the patient's baseline  Nurse Made Aware okay to see per RN   RUE Assessment   RUE Assessment WFL  (dec in thumb)   LUE Assessment   LUE Assessment WFL  (slightly dec compared to left)   Hand Function   Gross Motor Coordination Functional   Fine Motor Coordination Impaired  (due to difficulty using B/L thumbs)   Cognition   Overall Cognitive Status WFL   Arousal/Participation Cooperative   Attention Within functional limits   Orientation Level Oriented X4   Memory Within functional limits   Following Commands Follows all commands and directions without difficulty   Comments pt pleasant and cooperative; slightly [place]   Assessment   Limitation Decreased ADL status; Decreased UE strength;Decreased Safe judgement during ADL;Decreased endurance;Decreased self-care trans;Decreased high-level ADLs; Decreased fine motor control   Prognosis Good   Assessment Pt is a 66 y o  YO  female admitted to [facility] on 2021 w/ central cord syndrome and facial bone fxs due to fall on face now  "POD1 ACDF C4-5 and PCDF C2-T2"  Pt  has a past medical history of Cholelithiasis, GERD (gastroesophageal reflux disease), Influenza, Migraine headache, Pneumonia, and Urinary tract infection    Pt with active OT orders and no brace required orders   Pt resides in an apt with her dtr and grandson  Pt was I w/  ADLS (dtr supervised bathing) and IADLS, , & required use of SPC PTA  Currently pt is Min for grooming, Mod A for Ub ADls and Max A for Lb ADls  Pt is mod A for5 transfers  Pt is limited at this time 2*: pain, endurance, activity tolerance, forward functional reach, decreased I w/ ADLS/IADLS, decreased safety awareness and impaired fine motor skills  The following Occupational Performance Areas to address include: grooming, bathing/shower, toilet hygiene, dressing and functional mobility  Based on the aforementioned OT evaluation, functional performance deficits, and assessments, pt has been identified as a high complexity evaluation  From OT standpoint, anticipate d/c STR  Pt to continue to benefit from acute immediate OT services to address the following goals 3-5x/week to  w/in 10-14 days:   The patient's raw score on the AM-PAC Daily Activity inpatient short form is 15, standardized score is 34 69, less than 39 4  Patients at this level are likely to benefit from discharge to post-acute rehabilitation services  Please refer to the recommendation of the Occupational Therapist for safe discharge planning  Goals   Patient Goals go home   LTG Time Frame 10-14   Plan   Treatment Interventions ADL retraining;Functional transfer training; Endurance training;Patient/family training;UE strengthening/ROM; Compensatory technique education;Continued evaluation   Goal Expiration Date 21   OT Frequency 3-5x/wk   Recommendation   OT Discharge Recommendation Post acute rehabilitation services   OT - OK to Discharge Yes   AM-PAC Daily Activity Inpatient   Lower Body Dressing 2   Bathing 2   Toileting 2   Upper Body Dressing 3   Grooming 3   Eating 3   Daily Activity Raw Score 15   Daily Activity Standardized Score (Calc for Raw Score >=11) 34 69   AM-PAC Applied Cognition Inpatient   Following a Speech/Presentation 3   Understanding Ordinary Conversation 4   Taking Medications 4   Remembering Where Things Are Placed or Put Away 3   Remembering List of 4-5 Errands 3   Taking Care of Complicated Tasks 3   Applied Cognition Raw Score 20   Applied Cognition Standardized Score 41 76     GOALS    1) Pt will increase activity tolerance to G for 30 min txment sessions    2) Pt will complete UB/LB dressing/self care w/ mod I using adaptive device and DME as needed    3) Pt will complete bathing w/ Mod I w/ use of AE and DME as needed    4) Pt will complete toileting w/ mod I w/ G hygiene/thoroughness using DME as needed    5) Pt will improve functional transfers to Mod I on/off all surfaces using DME as needed w/ G balance/safety     6) Pt will improve functional mobility during ADL/IADL/leisure tasks to Mod I using DME as needed w/ G balance/safety     7) Pt will participate in simulated IADL management task to increase independence to  w/ G safety and endurance    8) Pt will demonstrate G carryover of pt/caregiver education and training as appropriate  9) Pt will demonstrate 100% carryover of energy conservation techniques t/o functional I/ADL/leisure tasks w/o cues s/p skilled education    10) Pt will independently identify and utilize 2-3 coping strategies to increase positive affect and promote overall well-being  11) Pt will engage in ongoing cognitive assessment w/ G participation to assist w/ safe d/c planning/recommendations( ase needeD)    12) Pt will improve [address] to G w/ the use of therapeutic activity and skilled intervention      Jose Luis Dove, MS, OTR/L

## 2021-07-21 NOTE — PLAN OF CARE
Problem: PHYSICAL THERAPY ADULT  Goal: Performs mobility at highest level of function for planned discharge setting  See evaluation for individualized goals  Description: Treatment/Interventions: Functional transfer training, LE strengthening/ROM, Elevations, Therapeutic exercise, Endurance training, Bed mobility, Gait training, Equipment eval/education  Equipment Recommended: Fiona King       See flowsheet documentation for full assessment, interventions and recommendations  Note: Prognosis: Good  Problem List: Decreased strength, Decreased endurance, Impaired balance, Decreased mobility, Pain  Assessment: Pt is a 65 yo female admitted to [place] [address] on 7/20/2021 s/p fall  Pt had surgery on 7/20 for anterior  Cervical discectomy and fusion C4-5, posterior cervical decompression and fusion C2-T2 B  DX: traumatic central cord syndrome, hypertension, chronic UTI, nasal bone fracture, B UE weakness, neck pain  Two patient identifiers were used to confirm  Pt lives in a one story home with 3+10 ANA  Pt was I for ADL's and mobility prior  Pt is retired and does not drive  Pt lives with her daughter and her grandson  Pt's impairments include reduced mobility, high risk of falling, poor activity tolerance, gait abnormalities, pain  These impairments limit the ability of the patient to perform mobility without increased assistance, return to PLOF and participate in everyday life activities  Pt would benefit from continued skilled therapy while in the hospital to improve overall mobility and work towards a safe d/c  Recommend discharge to rehab  At the end of the session the patient was left in seated position with call bell and phone within reach  The patient's AM-PAC Basic Mobility Inpatient Short Form Low Function Raw Score 17 , Standardized Score is 27 46  A standardized score less 42 9 suggests the patient may benefit from discharge to post-acute rehab services   Please also refer to the recommendation of the Physical Therapist for safe discharge planning  Barriers to Discharge: Inaccessible home environment, Decreased caregiver support        PT Discharge Recommendation: Post acute rehabilitation services     PT - OK to Discharge: Yes    See flowsheet documentation for full assessment

## 2021-07-21 NOTE — ASSESSMENT & PLAN NOTE
POD1 ACDF C4-5 and PCDF C2-T2  · Degenerative spinal stenosis with cord signal change C4/5 s/p fall forwards onto face  · [facility]ed [facility] with complaints of neck pain and bilateral arm pain/numbness  · Also sustained facial bone fractures  · Current exam with midline posterior incisional pain, LUE 4/5, RUE 5/5  Bilateral dysdiadochokinesia  Continues to endorse burning pain to R thumb  Left Varela's  Imaging:  · CT cervical spine 7/21/21: read pending  Hardware in good position  Alignment maintained  · MRI cervical spine 7/18/21: Multilevel degenerative changes causing multilevel neural foraminal and spinal canal stenosis most severe at C4-C5 where there could be small amount of left-sided cord edema  Edema within the left C2-C3 facet could be posttraumatic contusion without fracture  Plan:  · Frequent neuro checks  · No brace  · 1 posterior hemovac drain, 330 mL/since OR  · Maintain MAPs > 85 mmHg x 5 days, currently on Amilcar  · Mobilize with PT/OT  · DVT ppx: SCDs, ok to begin chemical DVT ppx this evening  Plan of care discussed with Adal Diehl, DAPHNE  Neurosurgery will continue to follow, please call with any questions or concerns

## 2021-07-21 NOTE — PHYSICAL THERAPY NOTE
Physical Therapy evaluation note     Patient Name: Priscila Wright    VLIBULuz MarinaP Date: 7/21/2021     Problem List  Principal Problem:    Central cord syndrome [facility])  Active Problems:    Fall against object    Neck pain    Bilateral arm weakness       Past Medical History  Past Medical History:   Diagnosis Date    Cholelithiasis     GERD (gastroesophageal reflux disease)     Influenza 1/29/2019    Migraine headache     Pneumonia     Urinary tract infection         Past Surgical History  Past Surgical History:   Procedure Laterality Date    NO PAST SURGERIES      ME ARTHRODESIS ANT INTERBODY MIN DISCECTOMY, CERVICAL BELOW C2 Bilateral 7/20/2021    Procedure: Anterior cervical discectomy and fusion C4-5, posterior cervical decompresion and fusion C2-T2;  Surgeon: Corrie Naranjo MD;  [facility];  Service: Neurosurgery         07/21/21 0841   PT Last Visit   PT Visit Date 07/21/21   Note Type   Note type Evaluation   Pain Assessment   Pain Assessment Tool 0-10   Pain Score 6   Pain Location/Orientation Orientation: Bilateral;Location: Shoulder   Home Living   Type of [address] One level   Home Equipment [place]   Additional Comments Pt lives in a one story home with 3+10 ANA  Pt does not drive and is retired  Pt was I for ADL's and IADL's prior  Pt lives with her daughter and her grandson  Pt reports 2 falls in the past 6 months  Prior Function   Level of [place] Independent with ADLs and functional mobility   Lives With Family;Daughter  (grandson)   Receives Help From Family   ADL Assistance Independent   IADLs Independent   Falls in the last 6 months 0   Vocational Retired   Restrictions/Precautions   [place] Bearing Precautions Per Order No   Other Precautions Chair Alarm; Bed Alarm;Multiple lines;Telemetry; Fall Risk;Pain   General   Family/Caregiver Present No   Cognition   Overall Cognitive Status [facility]   Arousal/Participation Alert Attention Within functional limits   Orientation Level Oriented X4   Memory Within functional limits   Following Commands Follows all commands and directions without difficulty   RLE Assessment   RLE Assessment   (grossly 3/5)   LLE Assessment   LLE Assessment   (grossly 3/5)   Bed Mobility   Supine to Sit 3  Moderate assistance   Additional items Assist x 2   Additional Comments sitting EOB at a min A level   Transfers   Sit to Stand 3  Moderate assistance   Additional items Assist x 1   Stand to Sit 3  Moderate assistance   Additional items Assist x 1   Ambulation/Elevation   Gait pattern Excessively slow; Step to;Shuffling; Antalgic   Gait Assistance 3  Moderate assist   Additional items Assist x 1   Assistive Device Other (Comment)  (B UE HHA)   Distance 3ft to chair   Balance   Static Sitting Fair -   Dynamic Sitting Poor +   Static Standing Poor   Dynamic Standing Poor   Ambulatory Poor -   Endurance Deficit   Endurance Deficit Yes   Activity Tolerance   Activity Tolerance Patient limited by fatigue;Patient limited by pain   Medical Staff Made Aware OT   Nurse Made Aware nurse approved therapy session   Assessment   Prognosis Good   Problem List Decreased strength;Decreased endurance; Impaired balance;Decreased mobility;Pain   Assessment Pt is a 67 yo female admitted to [place] [address] on 7/20/2021 s/p fall  Pt had surgery on 7/20 for anterior  Cervical discectomy and fusion C4-5, posterior cervical decompression and fusion C2-T2 B  DX: traumatic central cord syndrome, hypertension, chronic UTI, nasal bone fracture, B UE weakness, neck pain  Two patient identifiers were used to confirm  Pt lives in a one story home with 3+10 ANA  Pt was I for ADL's and mobility prior  Pt is retired and does not drive  Pt lives with her daughter and her grandson  Pt's impairments include reduced mobility, high risk of falling, poor activity tolerance, gait abnormalities, pain   These impairments limit the ability of the patient to perform mobility without increased assistance, return to PLOF and participate in everyday life activities  Pt would benefit from continued skilled therapy while in the hospital to improve overall mobility and work towards a safe d/c  Recommend discharge to rehab  At the end of the session the patient was left in seated position with call bell and phone within reach  The patient's AM-PAC Basic Mobility Inpatient Short Form Low Function Raw Score 17 , Standardized Score is 27 46  A standardized score less 42 9 suggests the patient may benefit from discharge to post-acute rehab services  Please also refer to the recommendation of the Physical Therapist for safe discharge planning  Per neurosurgery patient does not require a brace at this time  Barriers to Discharge Inaccessible home environment;Decreased caregiver support   Goals   STG Expiration Date 08/04/21   Short Term Goal #1 STG 1: Pt will perform transfers at a MI level to return to baseline of function  STG 2: Pt will ambulate 300ft with RW at a MI level to reduce the level of assistance needed upon d/c home  STG 3: Pt will negotiate 13 steps with HR at a MI level to ensure safety with activity when able to ambulate 50ft audra S level  STG 4: Pt will perform bed mobility at a min A to safety return to PLOF  PT Treatment Day 0   Plan   Treatment/Interventions Functional transfer training;LE strengthening/ROM; Elevations; Therapeutic exercise; Endurance training;Bed mobility;Gait training;Equipment eval/education   PT Frequency Other (Comment)  (3-5xwk)   Recommendation   PT Discharge Recommendation Post acute rehabilitation services   Equipment Recommended [facility] Recommended Wheeled walker   Change/add to Construct?  No   PT - OK to Discharge Yes   Additional Comments if to rehab, no if home   Kase[place] 8 in Bed Without Bedrails 1   Lying on Back to Sitting on Edge of Flat Bed 1   Moving Bed to Chair 2 Standing Up From Chair 2   Walk in Room 2   Climb 3-5 Stairs 1   Basic Mobility Inpatient Raw Score 9   Turning Head Towards Sound 4   Follow Simple Instructions 4   Low Function Basic Mobility Raw Score 17   Low Function Basic Mobility Standardized Score 27 46   Nikunj Jauregui, PT, DPT

## 2021-07-21 NOTE — CONSULTS
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) with mild L > R sided weakness and some mildly impaired sensory  o Multifactorial: chronic constipation +/- component of neurogenic bowel  o Recurrent UTIs, incontinence (+/- component of neurogenic bladder)  o Neuropathic pain  o Nociceptive pain   Impaired mobility and self care    Recommendations:  Rehabilitation Plan:   Continue PT/OT/SLP while on acute care   Due to the patient's recent surgery ACDF C4-5 and PCDF C2-T2 and concerns of post-operative wound care, musculoskeletal and neuropathic pain, spasticity and potential swallowing difficulties given commonly observed prevertebral edema, they will require daily physician oversight for possible dysphagia, and titration of medications for the above post-operative conditions  Moreover, although opiates are commonly needed for post-operative pain, daily rehabilitation physician intervention is essential for management of malignant effects of opiates, in the setting of potential neurogenic bowel and cord bladder  This patient is best served in an acute rehabilitation program that offers speech, physical and occupational therapy in combination with rehabilitation physicians  She could go to a general rehab program  Re-eval closer to discharge date, will depend on her progress with therapies  If she gets to a supervision level of function, may be able to d/c home, but if she is still requiring assistance, would advocate for her to go to acute inpatient rehab       Medical Co-morbidities Plan:  · Traumatic incomplete tetrapegia  · MAP pushes for 5 days post-op on trini gtt  · No collar, spine stable  · F//u CT C-spine post-op and upright films  · Pain control as per primary (tylenol, gabapentin, robaxin), prn oxycodone, dilaudid  · At risk for polypharmacy  · DVT PPx: Lovenox 30mg Q12hr  As per Consortium guidelines, would plan to treat at least 8 weeks if not ambulating well at discharge  Severe Constipation  Multifactorial  Has chronic constipation, now potential component of neurogenic bowel  On colace BID, would switch Senokot to Senna 2 tablets at night time, and please add miralax daily  May require bisacodyl suppository in the AM 30 min after breakfast to help start a bowel regimen  Has a BC, so dig stim would also work  Has been over 10 days since she has had a BM, per patient  Urinary Incontinence, possible cord bladder  - Not at baseline  But does get recurrent UTIs  - Timed voids preferable to purewick, with PVRs to ensure no retention    - If<150cc x3 could discontinue bladder scans  - Cath for > 400cc and record volume    Thumb pain with bruising  - Not just hypersensitive, painful to movement, states she did brace herself with that hand when she fell  - Would recommend XR given bruising and tenderness to rule out injury  Thank you for this consultation  Do not hesitate to contact service with further questions  Ariella Heard MD  Physical Medicine and Rehabilitation    History of Present Illness:  Eric Alexander is a 66 y o  female with a PMH of asthma, GERD, UTI who presented to the [facility] on 7/18/21 after falling striking her head on the table  She was found to have nasal bone fractures and MRI showed multilevel degenerative changes causing multilevel neural foraminal and spinal canal stenosis most severe at C4-C5 where there could be small amount of left sided cord edema  Edema within the left C2-C3 facet could be post-traumatic contusion without fracture    She underwent an anterior cervical discectomy and fusion C4-5, posterior cervical decompression and fusion C2-T2 on 7/20/21  She is currently POD #1 with plan for MAP pushes for 5 more dose  Requiring pressors at this time (low dose)  PM&R are consulted for rehabilitation recommendations  She is feeling quite well, and is hopeful to be able to go home  She has chronic constipation and goes sometimes every 4-5 days  She states she hasn't had a bowel movement in 10 days  No issues with urination, but also reports hasn't urinated - although she has recorded episodes of incontinence in the chart  She denies any abdominal pain, fevers, chills, N/V  She is eating, denies any coughing and choking  She has some neck pain, but the LUE tingling and discomfort has largely improved  Review of Systems: 10 point ROS negative except for what is noted in HPI    Function:  Prior level of function and living situation:  The patient lives in a [address] home with stair inside and to enter  Lives with her daughter and grandson  She was independent for most ADLs, daughter assisted with washing her hair and supervised her going up the stairs  She used a SPC  Current level of function:  Physical Therapy: Moderate assist for bed mobility, transfers    Occupational Therapy:  Minimal assist for eating, grooming, moderate assist for UB bathing/dressing, maximal assist for LB bathing/dressing and toileting    Physical Exam:  /69   Pulse 88   Temp 98 9 °F (37 2 °C) (Oral)   Resp 16   Ht 5' 2" (1 575 m)   Wt 64 1 kg (141 lb 5 oz)   SpO2 99%   BMI 25 85 kg/m²        Intake/Output Summary (Last 24 hours) at 7/21/2021 1333  Last data filed at 7/21/2021 1117  Gross per 24 hour   Intake 3521 6 ml   Output 2015 ml   Net 1506 6 ml       Body mass index is 25 85 kg/m²  Gen: No acute distress, Well-nourished, well-appearing  HEENT: Moist mucus membranes,  injury to face from fall  Cardiovascular: Regular rate, rhythm, S1/S2   Distal pulses palpable  Heme/Extr: No edema  Pulmonary: Non-labored breathing  Lungs CTAB  : No morrow  GI: Soft, non-tender, non-distended  BS+  MSK: Decreased AROM in bilateral shoulder premorbidly  Pain with any movement to R thumb although improved  Also quite bruised  Integumentary: Skin is warm, dry  Anterior/posterior incision sites look great  Neuro: AAOx3, Speech is intact, appropriate to questioning, tone is normal  Proprioception good in great toes bilaterally  Expedited ISNCSCI (International Standards for Neurological Classification of Spinal Cord Injury)    ISNCSCI Exam:  Sensory intact to C4 on the R with impairment in light touch starting from C5  Intact to light touch on the L to T2, and intact to pinprick bilaterally to T2      ISNCSCI Motor Scores    Right  Left   Biceps (C5) 5 5   Wrist Extensors (C6) 5 4   Triceps (C7) 5 5   Finger Flexors (C8) 5 4   Finger Abductors (T1) 5 4   Totals 25 22                 Right Left   Hip Flexors (L2) 5 4   Knee Extensors (L3) 5 4   Dorsiflexors (L4) 5 5   EHL (L5) 5 4   Plantar Flexors (S1) 5 4   Totals 25 21     Rectal: Pin and touch are present at S3-5 dermatome  Positive voluntary anal contraction and Positive deep anal pressure  No rectal bleeding observed  LEANDRO Impairment Scale Grade (AIS):  C4 AIS D    DTR:     Bic BR  Tri  Knee  Ankle     Right 2 2 2 2 0   Left  2 3 2 2 0   Reflexes    Valeriy Clonus Plantar Response DPR ACR BCR   Right + - flexor - + +   Left  + - extensor - + +     Psych: Normal mood and affect           Social History:    Social History     Socioeconomic History    Marital status: /Civil Union     Spouse name: None    Number of children: None    Years of education: None    Highest education level: None   Occupational History    None   Tobacco Use    Smoking status: Former Smoker     Packs/day: 1 50     Years: 10 00     Pack years: 15 00     Types: Cigarettes    Smokeless tobacco: Never Used    Tobacco comment: quit age 20   Vaping Use    Vaping Use: Never used Substance and Sexual Activity    Alcohol use: Never     Alcohol/week: 0 0 standard drinks    Drug use: Never    Sexual activity: None   Other Topics Concern    None   Social History Narrative    Lives with      Social Determinants of Health     Financial Resource Strain:     Difficulty of Paying Living Expenses:    Food Insecurity:     Worried About Running Out of Food in the Last Year:     [address] in the Last Year:    Transportation Needs:     Lack of Transportation (Medical):      Lack of Transportation (Non-Medical):    Physical Activity:     Days of Exercise per Week:     Minutes of Exercise per Session:    Stress:     Feeling of Stress :    Social Connections:     Frequency of Communication with Friends and Family:     Frequency of Social Gatherings with Friends and Family:     Attends [employer]oration[employer] Services:     Active Member of Clubs or Organizations:     Attends Club or Organization Meetings:     Marital Status:    Intimate Partner Violence:     Fear of Current or Ex-Partner:     Emotionally Abused:     Physically Abused:     Sexually Abused:         Family History:    Family History   Problem Relation Age of Onset    Heart disease Mother     No Known Problems Father     No Known Problems Son          Medications:     Current Facility-Administered Medications:     acetaminophen (TYLENOL) tablet 975 mg, 975 mg, Oral, Q8H [address], Haydee Lin PA-C, 975 mg at 07/21/21 0527    ALPRAZolam (XANAX) tablet 0 25 mg, 0 25 mg, Oral, HS PRN, Haydee Lin PA-C, 0 25 mg at 07/20/21 2317    atorvastatin (LIPITOR) tablet 20 mg, 20 mg, Oral, Daily, Haydee Lin PA-C, 20 mg at 07/21/21 0840    ceFAZolin (ANCEF) IVPB (premix in dextrose) 2,000 mg 50 mL, 2,000 mg, Intravenous, Q8H, SHANIA Drake-C, Stopped at 07/21/21 0617    cephalexin (KEFLEX) capsule 250 mg, 250 mg, Oral, Daily, SHANIA Drake-C, 250 mg at 07/21/21 0840    docusate sodium (COLACE) capsule 100 mg, 100 mg, Oral, BID, Haydee Lukeiferonit, PA-C, 100 mg at 07/21/21 0840    enoxaparin (LOVENOX) subcutaneous injection 30 mg, 30 mg, Subcutaneous, Q12H [facility] Leon[facility]AB[facility]ELLE barrera    gabapentin (NEURONTIN) capsule 100 mg, 100 mg, Oral, TID, Haydee Lukeiferonit, PA-C, 100 mg at 07/21/21 0840    HYDROmorphone HCl (DILAUDID) injection 0 2 mg, 0 2 mg, Intravenous, Q4H PRN, Haydee Lukeiferonit, PA-C, 0 2 mg at 07/20/21 1654    lidocaine (LIDODERM) 5 % patch 2 patch, 2 patch, Topical, Daily, Haydee Lin, PA-C, 2 patch at 07/21/21 0839    loratadine (CLARITIN) tablet 10 mg, 10 mg, Oral, Daily, Haydee Lin, PA-C, 10 mg at 07/21/21 0840    meclizine (ANTIVERT) tablet 12 5 mg, 12 5 mg, Oral, Q8H PRN, Haydee Lin, PA-C    melatonin tablet 3 mg, 3 mg, Oral, HS, Haydee Lukeiferonit, PA-C, 3 mg at 07/20/21 2121    methocarbamol (ROBAXIN) tablet 500 mg, 500 mg, Oral, Q6H [facility], Haydee Lin, PA-C, 500 mg at 07/21/21 1157    ondansetron (ZOFRAN) injection 4 mg, 4 mg, Intravenous, Q6H PRN, Haydee Lin, PA-C, 4 mg at 07/19/21 2128    oxyCODONE (ROXICODONE) IR tablet 2 5 mg, 2 5 mg, Oral, Q4H PRN, Haydee Lukeiferonit, PA-C    oxyCODONE (ROXICODONE) IR tablet 5 mg, 5 mg, Oral, Q4H PRN, Haydee Lukeiferonit, PA-C, 5 mg at 07/21/21 1159    pantoprazole (PROTONIX) EC tablet 40 mg, 40 mg, Oral, QAM, Haydee Lin PA-C, 40 mg at 07/21/21 0840    phenylephrine (LASHA-SYNEPHRINE) 50 mg (STANDARD CONCENTRATION) in sodium chloride 0 9% 250 mL,  mcg/min, Intravenous, Titrated, Haydee Lin PA-C, Last Rate: 15 mL/hr at 07/21/21 1330, 50 mcg/min at 07/21/21 1330    senna-docusate sodium (SENOKOT S) 8 6-50 mg per tablet 1 tablet, 1 tablet, Oral, HS, Haydee Lin PA-C, 1 tablet at 07/20/21 2121    Past Medical History:     Past Medical History:   Diagnosis Date    Cholelithiasis     GERD (gastroesophageal reflux disease)     Influenza 1/29/2019    Migraine headache     Pneumonia     Urinary tract infection         Past Surgical History:     Past Surgical History:   Procedure Laterality Date    NO PAST SURGERIES      SD ARTHRODESIS ANT INTERBODY MIN DISCECTOMY, CERVICAL BELOW C2 Bilateral 7/20/2021    Procedure: Anterior cervical discectomy and fusion C4-5, posterior cervical decompresion and fusion C2-T2;  Surgeon: Ramandeep Merino MD;  Location[facility];  Service: Neurosurgery         Allergies: Allergies   Allergen Reactions    Bee Venom Anaphylaxis           LABORATORY RESULTS:      Lab Results   Component Value Date    HGB 9 7 (L) 07/21/2021    HCT 29 0 (L) 07/21/2021    WBC 12 80 (H) 07/21/2021     Lab Results   Component Value Date    BUN 16 07/21/2021    BUN 18 10/13/2020    K 4 2 07/21/2021    K 4 2 10/13/2020     (H) 07/21/2021     10/13/2020    GLUCOSE 145 (H) 07/20/2021    CREATININE 0 86 07/21/2021     Lab Results   Component Value Date    PROTIME 13 9 07/20/2021    INR 1 07 07/20/2021        DIAGNOSTIC STUDIES: Reviewed  XR chest 1 view portable    Result Date: 7/19/2021  Impression: No acute cardiopulmonary disease  Workstation performed: [id number]     XR spine cervical 2 or 3 vw injury    Result Date: 7/21/2021  Impression: Fluoroscopic guidance provided for procedure guidance  Please refer to the separate procedure notes for additional details  Workstation performed: [id number]     CT head without contrast    Result Date: 7/18/2021  Impression: No acute intracranial hemorrhage  Small amount of left maxillary sinus fluid  Workstation performed: [id number]     CT facial bones without contrast    Result Date: 7/18/2021  Impression: Mildly displaced acute bilateral nasal bone fractures  Small amount of left maxillary sinus hemorrhage  The study was marked in [facility] for immediate notification  Workstation performed: [id number]     CT spine cervical wo contrast    Result Date: 7/21/2021  Impression: 1  Interval displacement of C6 anterior-inferior corner fracture  2   New postoperative change status post C3-C7 decompressive laminectomy and posterior instrumented fusion spanning C2-T2  ACDF at C4-5 with interbody graft  Hardware is intact and appropriately aligned  3   Cannot reliably assess canal content due to distorted image quality by streak artifact from surgical hardware  I personally discussed this study with SHANIA Hayden on 7/21/2021 at 10:25 AM   Workstation performed: [id number]     CT spine cervical without contrast    Result Date: 7/18/2021  Impression: No cervical spine fracture or traumatic malalignment  Workstation performed: [id number]     MRI cervical spine wo contrast    Result Date: 7/18/2021  Impression: Multilevel degenerative changes causing multilevel neural foraminal and spinal canal stenosis most severe at C4-C5 where there could be small amount of left-sided cord edema  Edema within the left C2-C3 facet could be posttraumatic contusion without fracture  Workstation performed: [id number]     XR pelvis ap only 1 or 2 vw    Result Date: 7/19/2021  Impression: No acute osseous abnormality    Workstation performed: [id number]

## 2021-07-21 NOTE — CASE MANAGEMENT
Pt recommended for IP rehab  CM discussed with both pt and her Jeyson 89  All parties involved are in agreement with IP rehab  A post acute care recommendation was made by your care team for Acute Rehab  Discussed [place] of Choice with caregiver  List of agencies given to caregiver via in person  patient aware the list is custom filtered for them by preference  and that [facility] post acute providers are designated  Preference is for CM to place referral to [facility]   CM placed and will follow up

## 2021-07-21 NOTE — APP STUDENT NOTE
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:   --POD 1 anterior cervical discectomy and fusion of C4-5/ posterior cervical decompression and fusion of C2-T2   - Maintain MAP >85 for 5 days    - Continue neuro checks routine    - Neurosurgery following    - Last dose of Ancef today for surgical prophylaxis    - CT cervical spine completed in AM  - Anxiety:   -- given Ativan PO PRN HS   (Home regimen)   - Acute Pain:  -- Tylenol 975mg q 8 hours  -- Gabapentin/Robaxin/ Lidocaine   -- Dilaudid 0 2mg  IV q 4 hours [ 1 dose in 24 hours]    -- Oxycodone (Roxicodone) 2 5 [0 doses in 24] or 5mg [one dose administered]  depending on pain score  CV:   - Hypertension:   -- Maintain MAP > 85 for 5 days post op for increased cord perfusion  - Amilcar gtt if needed to maintain MAPS   - HLD:   -- Continue Statin     Pulmonary:   - No oxygen requirements   - Encourage IS     Seasonal Allergies:   - Continue Claritin     GI:   GERD:  - Continue home Protonix   Constipation:   - Colace 100mg BID/ Senna    :   Chronic UTI   - Continue home Keflex   - Gotti in place  - remove today   - I/Os   - Daily weights   - Trend BUN/CR     FEN:  Fluids:  NSS at 75mL- consider d/c    Electrolytes: Replete as needed   Nutrition: House Diet     PPX: if okay with NSX - will start Lovonox for DVT     Subjective:    " I am feeling okay "       Objective:     Vitals: Blood pressure 130/67, pulse 78, temperature 99 3 °F (37 4 °C), temperature source Oral, resp  rate 14, height 5' 2" (1 575 m), weight 64 1 kg (141 lb 5 oz), SpO2 99 %, not currently breastfeeding  ,Body mass index is 25 85 kg/m²  Intake/Output Summary (Last 24 hours) at 7/21/2021 6064  Last data filed at 7/21/2021 0539  Gross per 24 hour   Intake 4180 4 ml   Output 2760 ml   Net 1420 4 ml       Physical Exam: /59   Pulse 88   Temp 98 9 °F (37 2 °C) (Oral)   Resp 16   Ht 5' 2" (1 575 m)   Wt 64 1 kg (141 lb 5 oz)   SpO2 100%   BMI 25 85 kg/m²   General appearance: alert and oriented, in no acute distress  Head: Normocephalic, without obvious abnormality, atraumatic  Neck: no adenopathy, no carotid bruit, no JVD, supple, symmetrical, trachea midline and thyroid not enlarged, symmetric, no tenderness/mass/nodules  Lungs: clear to auscultation bilaterally  Heart: regular rate and rhythm, S1, S2 normal, no murmur, click, rub or gallop  Abdomen: soft, non-tender; bowel sounds normal; no masses,  no organomegaly  Extremities: extremities normal, warm and well-perfused; no cyanosis, clubbing, or edema  Pulses: 2+ and symmetric  Neurologic: Mental status: Alert, oriented, thought content appropriate, upper extremity weakness persists bilaterally  denies paraesthia at this time      Invasive Devices     Peripheral Intravenous Line            Peripheral IV 07/18/21 Dorsal (posterior); Right Forearm 2 days    Peripheral IV 07/19/21 Right Antecubital 2 days    Peripheral IV 07/20/21 Left Hand <1 day          Drain            Closed/Suction Drain Left;Medial Back Accordion 10 Fr  <1 day    Urethral Catheter Latex 16 Fr  <1 day                Lab, Imaging and other studies: I have personally reviewed pertinent reports      VTE Pharmacologic Prophylaxis: Reason for no pharmacologic prophylaxis Recent NSX   VTE Mechanical Prophylaxis: sequential compression device

## 2021-07-21 NOTE — PROGRESS NOTES
[address] [facility]  Progress Note - Brennen Chan 1945, 66 y o  female MRN: [id number]  Unit/Bed#: ICU 10 Encounter: [id number]  Primary Care Provider: Buddy Russo MD   Date and time admitted to hospital: 7/18/2021  2:04 PM    Bilateral arm weakness  Assessment & Plan  See above  Neck pain  Assessment & Plan  See above  * Central cord syndrome [facility])  Assessment & Plan  POD1 ACDF C4-5 and PCDF C2-T2  · Degenerative spinal stenosis with cord signal change C4/5 s/p fall forwards onto face  · Presented to [facility] 7/18/21 with complaints of neck pain and bilateral arm pain/numbness  · Also sustained facial bone fractures  · Current exam with midline posterior incisional pain, LUE 4/5, RUE 5/5  Bilateral dysdiadochokinesia  Continues to endorse burning pain to R thumb  Left Varela's  Imaging:  · CT cervical spine 7/21/21: read pending  Hardware in good position  Alignment maintained  · MRI cervical spine 7/18/21: Multilevel degenerative changes causing multilevel neural foraminal and spinal canal stenosis most severe at C4-C5 where there could be small amount of left-sided cord edema  Edema within the left C2-C3 facet could be posttraumatic contusion without fracture  Plan:  · Frequent neuro checks  · No brace  · 1 posterior hemovac drain, 330 mL/since OR  · Maintain MAPs > 85 mmHg x 5 days, currently on Amilcar  · Mobilize with PT/OT  · DVT ppx: SCDs, ok to begin chemical DVT ppx this evening  Plan of care discussed with Lalo Hughes RN  Neurosurgery will continue to follow, please call with any questions or concerns  Subjective/Objective   Chief Complaint: "I'm doing ok, my neck hurts "    Subjective: Complaining of incisional pain  Continues to endorse burning/shooting pain in right thumb but otherwise arm pain has resolved  Tolerating PO well  Passing flatus  Denies any dysphagia  Objective: NAD   Anterior and posterior cervical incisions clean and dry  Well-approximated  LUE strength 4/5  Left Varela's  Bilateral dysdiadochokinesia  I/O       07/19 0701 - 07/20 0700 07/20 0701 - 07/21 0700 07/21 0701 - 07/22 0700    P  O  560 240 240    I V  (mL/kg) 349 3 (5 4) 3310 4 (51 6) 508 7 (7 9)    IV Piggyback 100 630     Total Intake(mL/kg) 1009 3 (15 7) 4180 4 (65 2) 748 7 (11 7)    Urine (mL/kg/hr) 900 (0 6) 2180 (1 4) 125 (0 6)    Drains  330 80    Blood  250     Total Output 900 2760 205    Net +109 3 +1420 4 +543 7           Unmeasured Urine Occurrence 1 x            Invasive Devices     Peripheral Intravenous Line            Peripheral IV 07/18/21 Dorsal (posterior); Right Forearm 2 days    Peripheral IV 07/19/21 Right Antecubital 2 days    Peripheral IV 07/20/21 Left Hand 1 day          Drain            Closed/Suction Drain Left;Medial Back Accordion 10 Fr  <1 day                Physical Exam:  Vitals: Blood pressure 134/59, pulse 90, temperature 98 9 °F (37 2 °C), temperature source Oral, resp  rate 17, height 5' 2" (1 575 m), weight 64 1 kg (141 lb 5 oz), SpO2 99 %, not currently breastfeeding  ,Body mass index is 25 85 kg/m²  Hemodynamic Monitoring: MAP:      General appearance: alert, appears stated age, cooperative and no distress  Head: Normocephalic, without obvious abnormality, atraumatic  Eyes: EOMI, PERRL  Neck: supple, anterior and posterior cervical incisions  Back: no kyphosis present, no tenderness to percussion or palpation  Lungs: non labored breathing  Heart: regular heart rate  Neurologic:   Mental status: Alert, oriented x3, thought content appropriate  Cranial nerves: grossly intact (Cranial nerves II-XII)  Sensory: normal to LT,DST/JPS intact  Motor: LUE 4/5, otherwise 5/5  Reflexes: 3+ bilateral BR, left Varela's  Coordination: finger to nose normal bilaterally, dysdiadochokinesia        Lab Results:  Results from last 7 days   Lab Units 07/21/21  0529 07/20/21  1716 07/20/21  1350 07/20/21  0428 07/19/21  0503 07/18/21  1111   WBC Thousand/uL 12 80* 8 18  --  4 56  --  8 58   HEMOGLOBIN g/dL 9 7* 9 8*  --  9 3*  --  12 7   I STAT HEMOGLOBIN g/dl  --   --  9 5*  --    < >  --    HEMATOCRIT % 29 0* 29 7*  --  28 1*  --  37 5   HEMATOCRIT, ISTAT %  --   --  28*  --    < >  --    PLATELETS Thousands/uL 137* 89*  --  99*  --  128*   NEUTROS PCT % 76*  --   --  66  --  80*   MONOS PCT % 10  --   --  9  --  6   MONO PCT %  --  2*  --   --   --   --     < > = values in this interval not displayed  Results from last 7 days   Lab Units 07/21/21 0529 07/20/21 1716 07/20/21  1350 07/20/21  1306 07/20/21  1213 07/20/21  0428 07/19/21  0503 07/18/21  1111   POTASSIUM mmol/L 4 2 3 9  --   --   --  4 3  --  3 6   CHLORIDE mmol/L 113* 114*  --   --   --  111*  --  105   CO2 mmol/L 23 21  --   --   --  24  --  24   CO2, I-STAT mmol/L  --   --  22 22 22  --    < >  --    BUN mg/dL 16 11  --   --   --  13  --  13   CREATININE mg/dL 0 86 0 90  --   --   --  0 89  --  0 87   CALCIUM mg/dL 8 6 8 4  --   --   --  8 8  --  8 7   ALK PHOS U/L  --   --   --   --   --   --   --  73   ALT U/L  --   --   --   --   --   --   --  26   AST U/L  --   --   --   --   --   --   --  22   GLUCOSE, ISTAT mg/dl  --   --  145* 140 118  --    < >  --     < > = values in this interval not displayed  Results from last 7 days   Lab Units 07/21/21  0529 07/20/21 1716 07/20/21  0428   MAGNESIUM mg/dL 2 3 2 2 2 3     Results from last 7 days   Lab Units 07/20/21 0428 07/19/21  0503   PHOSPHORUS mg/dL 3 3 3 1     Results from last 7 days   Lab Units 07/20/21  0428 07/18/21  1111   INR  1 07 1 00   PTT seconds  --  29     No results found for: TROPONINT  ABG:No results found for: PHART, GUK3PAL, PO2ART, UHL8WBH, Q2GVXLUK, BEART, SOURCE    Imaging Studies: I have personally reviewed pertinent reports  and I have personally reviewed pertinent films in PACS    XR chest 1 view portable    Result Date: 7/19/2021  Impression: No acute cardiopulmonary disease  Workstation performed: [id number]     CT head without contrast    Result Date: 7/18/2021  Impression: No acute intracranial hemorrhage  Small amount of left maxillary sinus fluid  Workstation performed: [id number]     CT facial bones without contrast    Result Date: 7/18/2021  Impression: Mildly displaced acute bilateral nasal bone fractures  Small amount of left maxillary sinus hemorrhage  The [facility] was [facility] for immediate notification  Workstation performed: [id number]     CT spine cervical without contrast    Result Date: 7/18/2021  Impression: No cervical spine fracture or traumatic malalignment  Workstation performed: [id number]     MRI cervical spine wo contrast    Result Date: 7/18/2021  Impression: Multilevel degenerative changes causing multilevel neural foraminal and spinal canal stenosis most severe at C4-C5 where there could be small amount of left-sided cord edema  Edema within the left C2-C3 facet could be posttraumatic contusion without fracture  Workstation performed: [id number]     XR pelvis ap only 1 or 2 vw    Result Date: 7/19/2021  Impression: No acute osseous abnormality  Workstation performed: [id number]       EKG, Pathology, and Other Studies: I have personally reviewed pertinent reports        VTE Pharmacologic Prophylaxis: Sequential compression device (Venodyne)     VTE Mechanical Prophylaxis: sequential compression device

## 2021-07-22 LAB
ANION GAP SERPL CALCULATED.3IONS-SCNC: 6 MMOL/L (ref 4–13)
BASOPHILS # BLD AUTO: 0.05 THOUSANDS/ΜL (ref 0–0.1)
BASOPHILS NFR BLD AUTO: 1 % (ref 0–1)
BUN SERPL-MCNC: 13 MG/DL (ref 5–25)
CA-I BLD-SCNC: 1.12 MMOL/L (ref 1.12–1.32)
CALCIUM SERPL-MCNC: 8.1 MG/DL (ref 8.3–10.1)
CHLORIDE SERPL-SCNC: 115 MMOL/L (ref 100–108)
CO2 SERPL-SCNC: 23 MMOL/L (ref 21–32)
CREAT SERPL-MCNC: 0.62 MG/DL (ref 0.6–1.3)
EOSINOPHIL # BLD AUTO: 0.25 THOUSAND/ΜL (ref 0–0.61)
EOSINOPHIL NFR BLD AUTO: 3 % (ref 0–6)
ERYTHROCYTE [DISTWIDTH] IN BLOOD BY AUTOMATED COUNT: 13.6 % (ref 11.6–15.1)
GFR SERPL CREATININE-BSD FRML MDRD: 87 ML/MIN/1.73SQ M
GLUCOSE SERPL-MCNC: 98 MG/DL (ref 65–140)
HCT VFR BLD AUTO: 27 % (ref 34.8–46.1)
HGB BLD-MCNC: 8.9 G/DL (ref 11.5–15.4)
IMM GRANULOCYTES # BLD AUTO: 0.04 THOUSAND/UL (ref 0–0.2)
IMM GRANULOCYTES NFR BLD AUTO: 0 % (ref 0–2)
LYMPHOCYTES # BLD AUTO: 2.1 THOUSANDS/ΜL (ref 0.6–4.47)
LYMPHOCYTES NFR BLD AUTO: 21 % (ref 14–44)
MAGNESIUM SERPL-MCNC: 2 MG/DL (ref 1.6–2.6)
MCH RBC QN AUTO: 32.4 PG (ref 26.8–34.3)
MCHC RBC AUTO-ENTMCNC: 33 G/DL (ref 31.4–37.4)
MCV RBC AUTO: 98 FL (ref 82–98)
MONOCYTES # BLD AUTO: 1.05 THOUSAND/ΜL (ref 0.17–1.22)
MONOCYTES NFR BLD AUTO: 11 % (ref 4–12)
NEUTROPHILS # BLD AUTO: 6.37 THOUSANDS/ΜL (ref 1.85–7.62)
NEUTS SEG NFR BLD AUTO: 64 % (ref 43–75)
NRBC BLD AUTO-RTO: 0 /100 WBCS
PHOSPHATE SERPL-MCNC: 1.8 MG/DL (ref 2.3–4.1)
PLATELET # BLD AUTO: 114 THOUSANDS/UL (ref 149–390)
PMV BLD AUTO: 10 FL (ref 8.9–12.7)
POTASSIUM SERPL-SCNC: 3.8 MMOL/L (ref 3.5–5.3)
RBC # BLD AUTO: 2.75 MILLION/UL (ref 3.81–5.12)
SODIUM SERPL-SCNC: 144 MMOL/L (ref 136–145)
WBC # BLD AUTO: 9.86 THOUSAND/UL (ref 4.31–10.16)

## 2021-07-22 PROCEDURE — 83735 ASSAY OF MAGNESIUM: CPT | Performed by: PHYSICIAN ASSISTANT

## 2021-07-22 PROCEDURE — 99291 CRITICAL CARE FIRST HOUR: CPT | Performed by: EMERGENCY MEDICINE

## 2021-07-22 PROCEDURE — 85025 COMPLETE CBC W/AUTO DIFF WBC: CPT | Performed by: PHYSICIAN ASSISTANT

## 2021-07-22 PROCEDURE — 84100 ASSAY OF PHOSPHORUS: CPT | Performed by: PHYSICIAN ASSISTANT

## 2021-07-22 PROCEDURE — 99024 POSTOP FOLLOW-UP VISIT: CPT | Performed by: PHYSICIAN ASSISTANT

## 2021-07-22 PROCEDURE — 80048 BASIC METABOLIC PNL TOTAL CA: CPT | Performed by: PHYSICIAN ASSISTANT

## 2021-07-22 PROCEDURE — 82330 ASSAY OF CALCIUM: CPT | Performed by: PHYSICIAN ASSISTANT

## 2021-07-22 RX ORDER — POLYETHYLENE GLYCOL 3350 17 G/17G
17 POWDER, FOR SOLUTION ORAL DAILY
Status: DISCONTINUED | OUTPATIENT
Start: 2021-07-22 | End: 2021-07-26 | Stop reason: HOSPADM

## 2021-07-22 RX ORDER — BISACODYL 10 MG
10 SUPPOSITORY, RECTAL RECTAL DAILY PRN
Status: DISCONTINUED | OUTPATIENT
Start: 2021-07-22 | End: 2021-07-26 | Stop reason: HOSPADM

## 2021-07-22 RX ORDER — SENNOSIDES 8.6 MG
2 TABLET ORAL 2 TIMES DAILY
Status: DISCONTINUED | OUTPATIENT
Start: 2021-07-22 | End: 2021-07-26 | Stop reason: HOSPADM

## 2021-07-22 RX ADMIN — ACETAMINOPHEN 975 MG: 325 TABLET, FILM COATED ORAL at 06:01

## 2021-07-22 RX ADMIN — LORATADINE 10 MG: 10 TABLET ORAL at 08:01

## 2021-07-22 RX ADMIN — METHOCARBAMOL 500 MG: 500 TABLET, FILM COATED ORAL at 11:53

## 2021-07-22 RX ADMIN — GABAPENTIN 100 MG: 100 CAPSULE ORAL at 16:43

## 2021-07-22 RX ADMIN — ENOXAPARIN SODIUM 30 MG: 30 INJECTION, SOLUTION INTRAVENOUS; SUBCUTANEOUS at 08:01

## 2021-07-22 RX ADMIN — METHOCARBAMOL 500 MG: 500 TABLET, FILM COATED ORAL at 06:01

## 2021-07-22 RX ADMIN — ALPRAZOLAM 0.25 MG: 0.5 TABLET ORAL at 20:41

## 2021-07-22 RX ADMIN — CEPHALEXIN 250 MG: 250 CAPSULE ORAL at 08:02

## 2021-07-22 RX ADMIN — OXYCODONE HYDROCHLORIDE 5 MG: 5 TABLET ORAL at 07:46

## 2021-07-22 RX ADMIN — POTASSIUM PHOSPHATE, MONOBASIC AND POTASSIUM PHOSPHATE, DIBASIC 30 MMOL: 224; 236 INJECTION, SOLUTION, CONCENTRATE INTRAVENOUS at 09:06

## 2021-07-22 RX ADMIN — MELATONIN TAB 3 MG 3 MG: 3 TAB at 21:02

## 2021-07-22 RX ADMIN — PHENYLEPHRINE HYDROCHLORIDE 40 MCG/MIN: 10 INJECTION INTRAVENOUS at 04:53

## 2021-07-22 RX ADMIN — ATORVASTATIN CALCIUM 20 MG: 20 TABLET, FILM COATED ORAL at 08:01

## 2021-07-22 RX ADMIN — POLYETHYLENE GLYCOL 3350 17 G: 17 POWDER, FOR SOLUTION ORAL at 11:53

## 2021-07-22 RX ADMIN — SENNOSIDES 17.2 MG: 8.6 TABLET ORAL at 17:32

## 2021-07-22 RX ADMIN — ENOXAPARIN SODIUM 30 MG: 30 INJECTION, SOLUTION INTRAVENOUS; SUBCUTANEOUS at 20:41

## 2021-07-22 RX ADMIN — SENNOSIDES 17.2 MG: 8.6 TABLET ORAL at 11:53

## 2021-07-22 RX ADMIN — OXYCODONE HYDROCHLORIDE 5 MG: 5 TABLET ORAL at 13:38

## 2021-07-22 RX ADMIN — PANTOPRAZOLE SODIUM 40 MG: 40 TABLET, DELAYED RELEASE ORAL at 08:01

## 2021-07-22 RX ADMIN — DOCUSATE SODIUM 100 MG: 100 CAPSULE ORAL at 17:32

## 2021-07-22 RX ADMIN — GABAPENTIN 100 MG: 100 CAPSULE ORAL at 20:41

## 2021-07-22 RX ADMIN — DOCUSATE SODIUM 100 MG: 100 CAPSULE ORAL at 08:00

## 2021-07-22 RX ADMIN — LIDOCAINE 2 PATCH: 50 PATCH TOPICAL at 08:01

## 2021-07-22 RX ADMIN — OXYCODONE HYDROCHLORIDE 5 MG: 5 TABLET ORAL at 02:51

## 2021-07-22 RX ADMIN — ACETAMINOPHEN 975 MG: 325 TABLET, FILM COATED ORAL at 21:02

## 2021-07-22 RX ADMIN — ACETAMINOPHEN 975 MG: 325 TABLET, FILM COATED ORAL at 13:39

## 2021-07-22 RX ADMIN — METHOCARBAMOL 500 MG: 500 TABLET, FILM COATED ORAL at 17:32

## 2021-07-22 RX ADMIN — GABAPENTIN 100 MG: 100 CAPSULE ORAL at 08:01

## 2021-07-22 NOTE — ASSESSMENT & PLAN NOTE
2 Days Post-Op Procedure(s): Anterior cervical discectomy and fusion C4-5, posterior cervical decompresion and fusion C2-T2    · Degenerative spinal stenosis with cord signal change C4/5 s/p fall forwards onto face  · Presented to [facility]18[facility]21 with complaints of neck pain and bilateral arm pain/numbness  · Also sustained facial bone fractures  · Current exam with midline posterior incisional pain, LUE 4/5, RUE 5/5  Bilateral dysdiadochokinesia  Continues to endorse burning pain to R thumb  Left Varela's  Imaging:  · CT cervical spine 7/21/21: Interval displacement of C6 anterior-inferior corner fracture  New postoperative change status post C3-C7 decompressive laminectomy and posterior instrumented fusion spanning C2-T2  ACDF at C4-5 with interbody graft  Hardware is intact and appropriately aligned  · MRI cervical spine 7/18/21: Multilevel degenerative changes causing multilevel neural foraminal and spinal canal stenosis most severe at C4-C5 where there could be small amount of left-sided cord edema  Edema within the left C2-C3 facet could be posttraumatic contusion without fracture  · Cervical post op x-rays: Postsurgical changes of posterior spinal decompression and instrumented fusion spanning C2-T2 and anterior cervical discectomy and fusion at C4-C5  Plan:  · Frequent neuro checks  · No brace needed  · 1 posterior hemovac drain, 330 mL/since OR  · 295/24hrs  · Remains another 24 hours  · Maintain MAPs > 85 mmHg x 5 days, currently on Amilcar  · Mobilize with PT/OT  · May consider ice for affected areas of pain  · DVT ppx: SCDs, ok to begin chemical DVT ppx this evening  Plan of care discussed with DAPHNE Garnett  Neurosurgery will continue to follow, please call with any questions or concerns

## 2021-07-22 NOTE — PROGRESS NOTES
[facility]  Progress Note - Rheba Socks 1945, 66 y o  female MRN: [id number]  Unit/Bed#: ICU 10 Encounter: [id number]  Primary Care Provider: Cirilo Mccarty MD   Date and time admitted to hospital: 7/18/2021  2:04 PM    * Central cord syndrome [facility])  Assessment & Plan  2 Days Post-Op Procedure(s): Anterior cervical discectomy and fusion C4-5, posterior cervical decompresion and fusion C2-T2    · Degenerative spinal stenosis with cord signal change C4/5 s/p fall forwards onto face  · Presented to [place] [id number] 7/18/21 with complaints of neck pain and bilateral arm pain/numbness  · Also sustained facial bone fractures  · Current exam with midline posterior incisional pain, LUE 4/5, RUE 5/5  Bilateral dysdiadochokinesia  Continues to endorse burning pain to R thumb  Left Varela's  Imaging:  · CT cervical spine 7/21/21: Interval displacement of C6 anterior-inferior corner fracture  New postoperative change status post C3-C7 decompressive laminectomy and posterior instrumented fusion spanning C2-T2  ACDF at C4-5 with interbody graft  Hardware is intact and appropriately aligned  · MRI cervical spine 7/18/21: Multilevel degenerative changes causing multilevel neural foraminal and spinal canal stenosis most severe at C4-C5 where there could be small amount of left-sided cord edema  Edema within the left C2-C3 facet could be posttraumatic contusion without fracture  · Cervical post op x-rays: Postsurgical changes of posterior spinal decompression and instrumented fusion spanning C2-T2 and anterior cervical discectomy and fusion at C4-C5  Plan:  · Frequent neuro checks  · No brace needed  · 1 posterior hemovac drain, 330 mL/since OR  · 295/24hrs  · Remains another 24 hours  · Maintain MAPs > 85 mmHg x 5 days, currently on Amilcar  · Mobilize with PT/OT  · May consider ice for affected areas of pain  · DVT ppx: SCDs, ok to begin chemical DVT ppx this evening  Plan of care discussed with DAPHNE Garnett  Neurosurgery will continue to follow, please call with any questions or concerns  Subjective/Objective     Patient seen and examined sitting in her [place] chair  Breakfast is in front of her  This morning explains that her right shoulder is painful with pain also noted at the base of her skull  Also reports that her right shooting thumb pain is intermittent in nature along with some shooting gonzales pain of her right hand  She explains her swallowing ability is fine  According to nursing staff, this morning will have a straight catherization due to bladder scan of 500ccs    I/O       07/20 0701 - 07/21 0700 07/21 0701 - 07/22 0700 07/22 0701 - 07/23 0700    P  O  240 840     I V  (mL/kg) 3310 4 (51 6) 882 4 (13 7)     IV Piggyback 630 80     Total Intake(mL/kg) 4180 4 (65 2) 1802 4 (28)     Urine (mL/kg/hr) 2180 (1 4) 775 (0 5)     Drains 330 295     Blood 250      Total Output 2760 1070     Net +1420 4 +732 4                  Invasive Devices     Peripheral Intravenous Line            Peripheral IV 07/18/21 Dorsal (posterior); Right Forearm 3 days    Peripheral IV 07/19/21 Right Antecubital 3 days    Peripheral IV 07/20/21 Left Hand 1 day          Drain            Closed/Suction Drain Left;Medial Back Accordion 10 Fr  1 day                Physical Exam:  Vitals: Blood pressure 134/69, pulse 96, temperature 98 7 °F (37 1 °C), temperature source Oral, resp  rate 22, height 5' 2" (1 575 m), weight 64 4 kg (141 lb 15 6 oz), SpO2 97 %, not currently breastfeeding  ,Body mass index is 25 97 kg/m²      General appearance: alert, appears stated age, cooperative and no distress  Head: Normocephalic, without obvious abnormality, atraumatic  Eyes: EOMI, PERRL  Neck: supple, symmetrical, trachea midline and NT  Back: no kyphosis present, no tenderness to percussion or palpation  Lungs: non labored breathing  Heart: regular heart rate  Neurologic:   Mental status: Alert, awake, oriented x 3, thought content appropriate  Cranial nerves: grossly intact (Cranial nerves II-XII)  Sensory: normal to LT  Motor: Bilateral lower extremities 5/5; LUE 4-/5 pain limited with 4/5 ; RUE:4+/5 pain limited with 4+    Reflexes: 2+ and symmetric  Coordination: finger to nose normal bilaterally, no drift bilaterally  Incisions: C/D/I      Lab Results:  Results from last 7 days   Lab Units 07/22/21  0606 07/21/21 0529 07/20/21  1716 07/20/21  0428   WBC Thousand/uL 9 86 12 80* 8 18 4 56   HEMOGLOBIN g/dL 8 9* 9 7* 9 8* 9 3*   HEMATOCRIT % 27 0* 29 0* 29 7* 28 1*   PLATELETS Thousands/uL 114* 137* 89* 99*   NEUTROS PCT % 64 76*  --  66   MONOS PCT % 11 10  --  9   MONO PCT %  --   --  2*  --      Results from last 7 days   Lab Units 07/22/21  0606 07/21/21 0529 07/20/21  1716 07/20/21  1350 07/20/21  1306 07/20/21  1213 07/19/21  0503 07/18/21  1111   POTASSIUM mmol/L 3 8 4 2 3 9  --   --   --   --  3 6   CHLORIDE mmol/L 115* 113* 114*  --   --   --   --  105   CO2 mmol/L 23 23 21  --   --   --   --  24   CO2, I-STAT mmol/L  --   --   --  22 22 22   < >  --    BUN mg/dL 13 16 11  --   --   --   --  13   CREATININE mg/dL 0 62 0 86 0 90  --   --   --   --  0 87   CALCIUM mg/dL 8 1* 8 6 8 4  --   --   --   --  8 7   ALK PHOS U/L  --   --   --   --   --   --   --  73   ALT U/L  --   --   --   --   --   --   --  26   AST U/L  --   --   --   --   --   --   --  22   GLUCOSE, ISTAT mg/dl  --   --   --  145* 140 118   < >  --     < > = values in this interval not displayed       Results from last 7 days   Lab Units 07/22/21  0606 07/21/21  0529 07/20/21  1716   MAGNESIUM mg/dL 2 0 2 3 2 2     Results from last 7 days   Lab Units 07/22/21  0606 07/20/21  0428 07/19/21  0503   PHOSPHORUS mg/dL 1 8* 3 3 3 1     Results from last 7 days   Lab Units 07/20/21  0428 07/18/21  1111   INR  1 07 1 00   PTT seconds  --  29     No results found for: TROPONINT  ABG:No results found for: PHART, WUO9AMG, PO2ART, CDN4FHL, E6SOSGIK, BEART, SOURCE    Imaging Studies: I have personally reviewed pertinent reports  and I have personally reviewed pertinent films in PACS    XR cervical spine 2 or 3 views    Result Date: 7/21/2021  Impression: Postsurgical changes of posterior spinal decompression and instrumented fusion spanning C2-T2 and anterior cervical discectomy and fusion at C4-C5  Workstation performed: [id number]     XR spine cervical 2 or 3 vw injury    Result Date: 7/21/2021  Impression: Fluoroscopic guidance provided for procedure guidance  Please refer to the separate procedure notes for additional details  Workstation performed: [id number]     CT spine cervical wo contrast    Result Date: 7/21/2021  Impression: 1  Interval displacement of C6 anterior-inferior corner fracture  2   New postoperative change status post C3-C7 decompressive laminectomy and posterior instrumented fusion spanning C2-T2  ACDF at C4-5 with interbody graft  Hardware is intact and appropriately aligned  3   Cannot reliably assess canal content due to distorted image quality by streak artifact from surgical hardware  I personally discussed this study with SHANIA Jaimes on 7/21/2021 at 10:25 AM   Workstation performed: [id number]       EKG, Pathology, and Other Studies: I have personally reviewed pertinent reports        VTE Pharmacologic Prophylaxis: Sequential compression device (Venodyne)  and Enoxaparin (Lovenox)    VTE Mechanical Prophylaxis: sequential compression device

## 2021-07-22 NOTE — PROGRESS NOTES
Progress Note - Critical Care   Priscila Wright 66 y o  female MRN: [id number]  Unit/Bed#: ICU 10 Encounter: [id number]    Assessment:   Principal Problem:    Central cord syndrome [facility])  Active Problems:    Fall against object    Neck pain    Bilateral arm weakness  Resolved Problems:    * No resolved hospital problems  *    Plan:   · Neuro: Traumatic central cord syndrome 2/2 to multilevel degenerative changes  · POD#2 s/p anterior cervical discectomy and fusion C4-C5; posterior cervical decompression and fusion C2-T2  · Delirium ppx- CAM-ICU, sleep hygeine  · Analgesia - Tylenol 975mg q8h; oxycodone 2 5/5mg, dilaudid 0 2mg breakthrough pain  · Q1h neuro checks  · CT scan and plain film shows adequate hardware placement  · Posterior hemovac drained 295 sanguinous fluid over 24h  · Continue home Xanax for anxiety  · OOB to chair as tolerated  · CV:   · Hemodynamic infusions - phenylephrine 40mcg/min  · Maintain MAP >85 x5 days postop  · Continue home Lipitor 20mg qd  · Lung:   · Respiratory protocol  · Maintain SpO2 >90%  · GI:   · Continue home Protonix 40mg qd for GERD  · Patient reports no BM >10 days  · Consider adjusting bowel regimen, currently receiving Colace b i d  And Senokot q d  · Consider changing Senokot to senna 2 tablets q h s    · Consider adding MiraLax daily  · Consider bisacodyl suppository if no bowel movement in 24 hours  · FEN:   · Fluids-none  · Electrolytes-trend and replete as indicated  · Nutrition-regular diet  · :   · Monitor I/Os  · Continue home Keflex 2/2 chronic UTIs  · ID:   · Trend WBC and fever curve  · Afebrile  · WBC 12 80->9 80  · Heme:   · Transfuse for Hgb <7  · Hgb 8 9  · Endo:   · Monitor for hypoglycemia  · Glucose 98  · Msk/Skin:   · PT/OT when able  · Turn/reposition frequently  · Disposition: Continue critical care    ______________________________________________________________________  VTE Pharmacologic Prophylaxis: Enoxaparin (Lovenox)    VTE Mechanical Prophylaxis: sequential compression device    Invasive lines and devices: Invasive Devices     Peripheral Intravenous Line            Peripheral IV 07/18/21 Dorsal (posterior); Right Forearm 3 days    Peripheral IV 07/19/21 Right Antecubital 3 days    Peripheral IV 07/20/21 Left Hand 1 day          Drain            Closed/Suction Drain Left;Medial Back Accordion 10 Fr  1 day                   Code Status: Level 1 - Full Code    ______________________________________________________________________    HPI/24hr events: No acute events overnight  Patient is doing well this am but complains of stiff shoulders  She is regaining strength in her arms but mentions increased spasms in her right hand  She requests to sit in her recliner today  Lines  Peripheral IV    Infusions  Amilcar 40mcg/min    ROS otherwise negative unless noted in HPI    ______________________________________________________________________  Physical Exam:     Physical Exam  Vitals and nursing note reviewed  Exam conducted with a chaperone present  Constitutional:       General: She is not in acute distress  Appearance: She is normal weight  HENT:      Head: Normocephalic  Right Ear: External ear normal       Left Ear: External ear normal       Nose: Nose normal       Mouth/Throat:      Mouth: Mucous membranes are moist       Pharynx: Oropharynx is clear  Eyes:      General:         Right eye: No discharge  Left eye: No discharge  Conjunctiva/sclera: Conjunctivae normal    Cardiovascular:      Rate and Rhythm: Normal rate and regular rhythm  Pulses: Normal pulses  Heart sounds: Normal heart sounds  Pulmonary:      Effort: Pulmonary effort is normal  No respiratory distress  Breath sounds: Normal breath sounds  Abdominal:      General: Bowel sounds are normal       Palpations: Abdomen is soft  Genitourinary:     General: Normal vulva  Musculoskeletal:         General: No swelling or tenderness  Normal range of motion  Cervical back: Normal range of motion and neck supple  Skin:     General: Skin is warm and dry  Capillary Refill: Capillary refill takes less than 2 seconds  Neurological:      Mental Status: She is alert  Sensory: No sensory deficit  Motor: Weakness present  Comments: Strength 4/5 RUE, 3/5 LUE; 3/5 b/l LE  Sensation intact       ______________________________________________________________________  Temperature:   Temp (24hrs), Av °F (37 2 °C), Min:98 3 °F (36 8 °C), Max:100 1 °F (37 8 °C)    Current Temperature: 98 7 °F (37 1 °C)    Vitals:    21 0300 21 0400 21 0500 21 0600   BP: 120/60 119/62 119/59 128/63   BP Location:  Left arm     Pulse: 92 88 78 102   Resp: 16 15 (!) 11 19   Temp:  98 7 °F (37 1 °C)     TempSrc:  Oral     SpO2: 99% 97% 98% 98%   Weight:    64 4 kg (141 lb 15 6 oz)   Height:                 Weights:   IBW (Ideal Body Weight): 50 1 kg    Body mass index is 25 97 kg/m²  Weight (last 2 days)     Date/Time   Weight    21 0600   64 4 (141 98)            Height: 5' 2" (157 5 cm)  Hemodynamic Monitoring:  N/A       Ventilator Settings:                         No results found for: PHART, BPY5QMA, PO2ART, ASX5LKT, V7FZGVVT, BEART, SOURCE    Intake and Outputs:  I/O       701 -  07 -  07 -  07    P  O  240 840     I V  (mL/kg) 3310 4 (51 6) 882 4 (13 7)     IV Piggyback 630 80     Total Intake(mL/kg) 4180 4 (65 2) 1802 4 (28)     Urine (mL/kg/hr) 2180 (1 4) 775 (0 5)     Drains 330 295     Blood 250      Total Output 2760 1070     Net +1420 4 +732 4                  Nutrition:        Diet Orders   (From admission, onward)             Start     Ordered    21 1625  Diet Regular; Regular House  Diet effective now     Question Answer Comment   Diet Type Regular    Regular Regular House    RD to adjust diet per protocol?  Yes        21 1624    21 1513  Dietary nutrition supplements  Once     Question Answer Comment   Select Supplement: Ensure Enlive-Strawberry    Frequency Lunch, Dinner        07/19/21 1512                Labs:   Results from last 7 days   Lab Units 07/22/21  0606 07/21/21  0529 07/20/21 1716 07/20/21  0428   WBC Thousand/uL 9 86 12 80* 8 18 4 56   HEMOGLOBIN g/dL 8 9* 9 7* 9 8* 9 3*   HEMATOCRIT % 27 0* 29 0* 29 7* 28 1*   PLATELETS Thousands/uL 114* 137* 89* 99*   NEUTROS PCT % 64 76*  --  66   MONOS PCT % 11 10  --  9   MONO PCT %  --   --  2*  --       Results from last 7 days   Lab Units 07/21/21  0529 07/20/21 1716 07/20/21  1350 07/20/21  1306 07/20/21  1213 07/20/21  0428 07/19/21  0503 07/18/21  1111   POTASSIUM mmol/L 4 2 3 9  --   --   --  4 3  --  3 6   CHLORIDE mmol/L 113* 114*  --   --   --  111*  --  105   CO2 mmol/L 23 21  --   --   --  24  --  24   CO2, I-STAT mmol/L  --   --  22 22 22  --    < >  --    BUN mg/dL 16 11  --   --   --  13  --  13   CREATININE mg/dL 0 86 0 90  --   --   --  0 89  --  0 87   CALCIUM mg/dL 8 6 8 4  --   --   --  8 8  --  8 7   ALK PHOS U/L  --   --   --   --   --   --   --  73   ALT U/L  --   --   --   --   --   --   --  26   AST U/L  --   --   --   --   --   --   --  22   GLUCOSE, ISTAT mg/dl  --   --  145* 140 118  --    < >  --     < > = values in this interval not displayed       Results from last 7 days   Lab Units 07/21/21  0529 07/20/21 1716 07/20/21  0428   MAGNESIUM mg/dL 2 3 2 2 2 3     Results from last 7 days   Lab Units 07/20/21  0428 07/19/21  0503   PHOSPHORUS mg/dL 3 3 3 1      Results from last 7 days   Lab Units 07/20/21  0428 07/18/21  1111   INR  1 07 1 00   PTT seconds  --  29         0   Lab Value Date/Time    TROPONINI <0 02 06/01/2020 1419    TROPONINI <0 02 01/08/2017 0349    TROPONINI <0 02 01/07/2017 1636    TROPONINI 0 02 06/12/2016 0549    TROPONINI <0 02 05/31/2016 1715    TROPONINI <0 02 05/31/2016 1315    TROPONINI <0 02 05/31/2016 0136     Imaging:  I have personally reviewed pertinent reports  Micro:  Blood Culture:   Lab Results   Component Value Date    BLOODCX No Growth After 5 Days  06/01/2020    BLOODCX No Growth After 5 Days  06/01/2020    BLOODCX No Growth After 5 Days  05/31/2016    BLOODCX No Growth After 5 Days  05/31/2016     Urine Culture:   Lab Results   Component Value Date    URINECX 40,000-49,000 cfu/ml Klebsiella pneumoniae (A) 07/20/2020    URINECX (A) 07/20/2020     >100,000 cfu/ml - ALLOSCARDOVIA OMNICOLENS- Gram-positive gonzalez    URINECX No Growth <1000 cfu/mL 06/01/2020    URINECX 60,000-69,000 cfu/ml Mixed Contaminants X5 01/07/2017    URINECX >100,000 cfu/ml Klebsiella pneumoniae 06/12/2016     Sputum Culture: No components found for: SPUTUMCX  Wound Culure: No results found for: WOUNDCULT  CSF Culure: No components found for: CSFCULT    Lab Results   Component Value Date    BLOODCX No Growth After 5 Days  06/01/2020    BLOODCX No Growth After 5 Days  06/01/2020    BLOODCX No Growth After 5 Days  05/31/2016    BLOODCX No Growth After 5 Days  05/31/2016    URINECX 40,000-49,000 cfu/ml Klebsiella pneumoniae (A) 07/20/2020    URINECX (A) 07/20/2020     >100,000 cfu/ml - ALLOSCARDOVIA OMNICOLENS- Gram-positive gonzalez    URINECX No Growth <1000 cfu/mL 06/01/2020     Allergies:    Allergies   Allergen Reactions    Bee Venom Anaphylaxis     Medications:   Scheduled Meds:  Current Facility-Administered Medications   Medication Dose Route Frequency Provider Last Rate    acetaminophen  975 mg Oral Q8H [facility] Haydeeesdras Lin PA-C      ALPRAZolam  0 25 mg Oral HS PRN Neelam Lin PA-C      atorvastatin  20 mg Oral Daily Haydee Lin PA-C      cephalexin  250 mg Oral Daily Haydee Lin PA-C      docusate sodium  100 mg Oral BID Neelam Lin PA-C      enoxaparin  30 mg Subcutaneous Q12H [facility] Gonzalez Hanley PA-C      gabapentin  100 mg Oral TID Neelam Lin PA-C      HYDROmorphone  0 2 mg Intravenous Q4H PRN Haydee Lin PA-C      lidocaine  2 patch Topical Daily Haydee N Ireifej, PA-C      loratadine  10 mg Oral Daily Julissa Albion, PA-C      meclizine  12 5 mg Oral Q8H PRN Haydee N Ireifej, PA-C      melatonin  3 mg Oral HS Haydee N Ireifej, PA-C      methocarbamol  500 mg Oral Q6H [address] Haydee N Ireifej, PA-C      ondansetron  4 mg Intravenous Q6H PRN Julissa Samson, PA-C      oxyCODONE  2 5 mg Oral Q4H PRN Julissa Albion, PA-C      oxyCODONE  5 mg Oral Q4H PRN Haydee N Ireifej, PA-C      pantoprazole  40 mg Oral QAM Haydee N Ireifej, PA-C      phenylephine   mcg/min Intravenous Titrated Haydee N Ireifej, PA-C 40 mcg/min (07/22/21 0453)    senna-docusate sodium  1 tablet Oral HS Haydee N Ireifej, PA-C       Continuous Infusions:phenylephine,  mcg/min, Last Rate: 40 mcg/min (07/22/21 0453)      PRN Meds:  ALPRAZolam, 0 25 mg, HS PRN  HYDROmorphone, 0 2 mg, Q4H PRN  meclizine, 12 5 mg, Q8H PRN  ondansetron, 4 mg, Q6H PRN  oxyCODONE, 2 5 mg, Q4H PRN  oxyCODONE, 5 mg, Q4H PRN        Portions of the record may have been created with voice recognition software  Occasional wrong word or "sound a like" substitutions may have occurred due to the inherent limitations of voice recognition software  Read the chart carefully and recognize, using context, where substitutions have occurred      Kassie Goodpasture, MD

## 2021-07-23 LAB
ANION GAP SERPL CALCULATED.3IONS-SCNC: 6 MMOL/L (ref 4–13)
BASOPHILS # BLD AUTO: 0.04 THOUSANDS/ΜL (ref 0–0.1)
BASOPHILS NFR BLD AUTO: 1 % (ref 0–1)
BUN SERPL-MCNC: 11 MG/DL (ref 5–25)
CA-I BLD-SCNC: 1.12 MMOL/L (ref 1.12–1.32)
CALCIUM SERPL-MCNC: 9.1 MG/DL (ref 8.3–10.1)
CHLORIDE SERPL-SCNC: 109 MMOL/L (ref 100–108)
CO2 SERPL-SCNC: 24 MMOL/L (ref 21–32)
CREAT SERPL-MCNC: 0.64 MG/DL (ref 0.6–1.3)
EOSINOPHIL # BLD AUTO: 0.2 THOUSAND/ΜL (ref 0–0.61)
EOSINOPHIL NFR BLD AUTO: 3 % (ref 0–6)
ERYTHROCYTE [DISTWIDTH] IN BLOOD BY AUTOMATED COUNT: 13.3 % (ref 11.6–15.1)
GFR SERPL CREATININE-BSD FRML MDRD: 86 ML/MIN/1.73SQ M
GLUCOSE SERPL-MCNC: 97 MG/DL (ref 65–140)
HCT VFR BLD AUTO: 23.1 % (ref 34.8–46.1)
HGB BLD-MCNC: 7.6 G/DL (ref 11.5–15.4)
IMM GRANULOCYTES # BLD AUTO: 0.02 THOUSAND/UL (ref 0–0.2)
IMM GRANULOCYTES NFR BLD AUTO: 0 % (ref 0–2)
LYMPHOCYTES # BLD AUTO: 1.51 THOUSANDS/ΜL (ref 0.6–4.47)
LYMPHOCYTES NFR BLD AUTO: 22 % (ref 14–44)
MAGNESIUM SERPL-MCNC: 2.2 MG/DL (ref 1.6–2.6)
MCH RBC QN AUTO: 32.1 PG (ref 26.8–34.3)
MCHC RBC AUTO-ENTMCNC: 32.9 G/DL (ref 31.4–37.4)
MCV RBC AUTO: 98 FL (ref 82–98)
MONOCYTES # BLD AUTO: 0.61 THOUSAND/ΜL (ref 0.17–1.22)
MONOCYTES NFR BLD AUTO: 9 % (ref 4–12)
NEUTROPHILS # BLD AUTO: 4.39 THOUSANDS/ΜL (ref 1.85–7.62)
NEUTS SEG NFR BLD AUTO: 65 % (ref 43–75)
NRBC BLD AUTO-RTO: 0 /100 WBCS
PHOSPHATE SERPL-MCNC: 2.6 MG/DL (ref 2.3–4.1)
PLATELET # BLD AUTO: 110 THOUSANDS/UL (ref 149–390)
PMV BLD AUTO: 9.9 FL (ref 8.9–12.7)
POTASSIUM SERPL-SCNC: 3.9 MMOL/L (ref 3.5–5.3)
RBC # BLD AUTO: 2.37 MILLION/UL (ref 3.81–5.12)
SODIUM SERPL-SCNC: 139 MMOL/L (ref 136–145)
WBC # BLD AUTO: 6.77 THOUSAND/UL (ref 4.31–10.16)

## 2021-07-23 PROCEDURE — 80048 BASIC METABOLIC PNL TOTAL CA: CPT | Performed by: PHYSICIAN ASSISTANT

## 2021-07-23 PROCEDURE — 99291 CRITICAL CARE FIRST HOUR: CPT | Performed by: EMERGENCY MEDICINE

## 2021-07-23 PROCEDURE — 83735 ASSAY OF MAGNESIUM: CPT | Performed by: PHYSICIAN ASSISTANT

## 2021-07-23 PROCEDURE — 85025 COMPLETE CBC W/AUTO DIFF WBC: CPT | Performed by: PHYSICIAN ASSISTANT

## 2021-07-23 PROCEDURE — 97110 THERAPEUTIC EXERCISES: CPT

## 2021-07-23 PROCEDURE — 97535 SELF CARE MNGMENT TRAINING: CPT

## 2021-07-23 PROCEDURE — 82330 ASSAY OF CALCIUM: CPT | Performed by: PHYSICIAN ASSISTANT

## 2021-07-23 PROCEDURE — 84100 ASSAY OF PHOSPHORUS: CPT | Performed by: PHYSICIAN ASSISTANT

## 2021-07-23 PROCEDURE — 97530 THERAPEUTIC ACTIVITIES: CPT

## 2021-07-23 PROCEDURE — 99024 POSTOP FOLLOW-UP VISIT: CPT | Performed by: PHYSICIAN ASSISTANT

## 2021-07-23 PROCEDURE — 97116 GAIT TRAINING THERAPY: CPT

## 2021-07-23 RX ADMIN — METHOCARBAMOL 500 MG: 500 TABLET, FILM COATED ORAL at 06:02

## 2021-07-23 RX ADMIN — METHOCARBAMOL 500 MG: 500 TABLET, FILM COATED ORAL at 11:28

## 2021-07-23 RX ADMIN — GABAPENTIN 100 MG: 100 CAPSULE ORAL at 16:25

## 2021-07-23 RX ADMIN — MELATONIN TAB 3 MG 3 MG: 3 TAB at 21:03

## 2021-07-23 RX ADMIN — CEPHALEXIN 250 MG: 250 CAPSULE ORAL at 08:35

## 2021-07-23 RX ADMIN — LIDOCAINE 2 PATCH: 50 PATCH TOPICAL at 08:33

## 2021-07-23 RX ADMIN — SENNOSIDES 17.2 MG: 8.6 TABLET ORAL at 17:50

## 2021-07-23 RX ADMIN — ACETAMINOPHEN 975 MG: 325 TABLET, FILM COATED ORAL at 06:02

## 2021-07-23 RX ADMIN — ATORVASTATIN CALCIUM 20 MG: 20 TABLET, FILM COATED ORAL at 08:35

## 2021-07-23 RX ADMIN — POLYETHYLENE GLYCOL 3350 17 G: 17 POWDER, FOR SOLUTION ORAL at 08:35

## 2021-07-23 RX ADMIN — METHYLNALTREXONE BROMIDE 12 MG: 12 INJECTION, SOLUTION SUBCUTANEOUS at 12:40

## 2021-07-23 RX ADMIN — GABAPENTIN 100 MG: 100 CAPSULE ORAL at 08:35

## 2021-07-23 RX ADMIN — DOCUSATE SODIUM 100 MG: 100 CAPSULE ORAL at 08:35

## 2021-07-23 RX ADMIN — BISACODYL 10 MG: 10 SUPPOSITORY RECTAL at 11:25

## 2021-07-23 RX ADMIN — PHENYLEPHRINE HYDROCHLORIDE 55 MCG/MIN: 10 INJECTION INTRAVENOUS at 16:29

## 2021-07-23 RX ADMIN — DOCUSATE SODIUM 100 MG: 100 CAPSULE ORAL at 17:50

## 2021-07-23 RX ADMIN — LORATADINE 10 MG: 10 TABLET ORAL at 08:35

## 2021-07-23 RX ADMIN — METHOCARBAMOL 500 MG: 500 TABLET, FILM COATED ORAL at 00:53

## 2021-07-23 RX ADMIN — ENOXAPARIN SODIUM 30 MG: 30 INJECTION, SOLUTION INTRAVENOUS; SUBCUTANEOUS at 20:58

## 2021-07-23 RX ADMIN — GABAPENTIN 100 MG: 100 CAPSULE ORAL at 20:58

## 2021-07-23 RX ADMIN — ACETAMINOPHEN 975 MG: 325 TABLET, FILM COATED ORAL at 21:03

## 2021-07-23 RX ADMIN — OXYCODONE HYDROCHLORIDE 5 MG: 5 TABLET ORAL at 08:19

## 2021-07-23 RX ADMIN — SENNOSIDES 17.2 MG: 8.6 TABLET ORAL at 08:35

## 2021-07-23 RX ADMIN — ACETAMINOPHEN 975 MG: 325 TABLET, FILM COATED ORAL at 14:59

## 2021-07-23 RX ADMIN — METHOCARBAMOL 500 MG: 500 TABLET, FILM COATED ORAL at 17:50

## 2021-07-23 RX ADMIN — PANTOPRAZOLE SODIUM 40 MG: 40 TABLET, DELAYED RELEASE ORAL at 08:35

## 2021-07-23 NOTE — PLAN OF CARE
Problem: OCCUPATIONAL THERAPY ADULT  Goal: Performs self-care activities at highest level of function for planned discharge setting  See evaluation for individualized goals  Description: Treatment Interventions: ADL retraining, Functional transfer training, Endurance training, Patient/family training, UE strengthening/ROM, Compensatory technique education, Continued evaluation          See flowsheet documentation for full assessment, interventions and recommendations  Note: Limitation: Decreased ADL status, Decreased UE strength, Decreased Safe judgement during ADL, Decreased endurance, Decreased self-care trans, Decreased high-level ADLs, Decreased fine motor control  Prognosis: Good  Assessment: pt seen for OT treatment session w focus on functional mobility, simple ADL's and UE ther ex  Pt reports L side feeling weak and uncoordinated  Pt able to do AROM UE exercises such as forward flexion and reaching/touching nose  R UE WFL, L somewhat slower and uncoordinated  Pt able to comb front of  hair herself, assist for back side  able to wash own face  Max assist needed for LB self care  Mod assist to get to EOB, Max assist of 2 for transfer to chair, c/o feeling dizzy  Pt left sitting in chair, all lines intact and all needs attended to  OT will continue to follow as per POC  OT Discharge Recommendation: Post acute rehabilitation services  OT - OK to Discharge:  Yes

## 2021-07-23 NOTE — PHYSICAL THERAPY NOTE
PHYSICAL THERAPY NOTE          Patient Name: Leo Adams  BXOPQ'L Date: 7/23/2021 07/23/21 1420   PT Last Visit   PT Visit Date 07/23/21   Note Type   Note Type Treatment   Pain Assessment   Pain Assessment Tool 0-10   Pain Score 7   Patient's Stated Pain Goal No pain   Hospital Pain Intervention(s) Repositioned   Restrictions/Precautions   Weight Bearing Precautions Per Order No   Other Precautions Chair Alarm; Bed Alarm; Fall Risk;Pain;Multiple lines;Spinal precautions   General   Chart Reviewed Yes   Family/Caregiver Present No  (son left during [de-identified] of session)   Cognition   Orientation Level Oriented X4   Subjective   Subjective Pt willing and agreeable to PT session with increased encouragement   Bed Mobility   Supine to Sit 3  Moderate assistance   Additional items Assist x 1; Increased time required   Transfers   Sit to Stand 3  Moderate assistance   Additional items Assist x 1   Stand to Sit 4  Minimal assistance   Additional items Assist x 1;Verbal cues   Stand pivot 3  Moderate assistance   Additional items Assist x 2   Ambulation/Elevation   Gait pattern Excessively slow; Shuffling;Decreased foot clearance; Inconsistent david   Gait Assistance 3  Moderate assist   Additional items Assist x 2   Assistive Device Other (Comment)  (HHA)   Distance 3'   Balance   Static Sitting Fair -   Dynamic Sitting Poor +   Static Standing Poor   Ambulatory Zero   Endurance Deficit   Endurance Deficit Yes   Endurance Deficit Description limited by fatigue, dizziness, orthostatic hypotension   Activity Tolerance   Activity Tolerance Patient limited by fatigue;Patient limited by pain;Treatment limited secondary to medical complications (Comment)   Medical Staff Made Aware OT   Nurse Made Aware yes, nsg gave clearance to work with pt   Exercises   Knee AROM Long Arc Quad 15 reps; Sitting;AROM; Bilateral   Ankle Pumps 15 reps;Sitting;AROM; Bilateral   Marching 15 reps; Sitting;AROM; Bilateral   Balance training  sitting unsupported   Assessment   Prognosis Good   Problem List Decreased strength;Decreased range of motion;Decreased endurance; Impaired balance;Decreased mobility; Decreased coordination; Impaired judgement;Decreased safety awareness;Orthopedic restrictions;Pain; Impaired sensation   Assessment Pt re-educated in log roll technique for bed mobility  Required increased time for all mobility with increased dizziness  Tolerated LE exercises with rest breaks 2* to fatigue  Required increased A in ambulation this session with tactile instruction at pelvis and difficutly advancing LLE  UE stength and paraesthias improving and may benefit from trial with AD to improve independence  Noted orthostatic hypotension post mobility requiring LE elevation and ankle pumps to improve  Pt will benefit from continued inpt skilled PT and rehab to maximize functional mobility & safety  Goals   Patient Goals To go home   STG Expiration Date 08/04/21   PT Treatment Day 1   Plan   Treatment/Interventions OT; Spoke to case management;Spoke to nursing;Gait training;Bed mobility; Patient/family training; Endurance training;LE strengthening/ROM; Functional transfer training   PT Frequency Other (Comment)  (3-5x/wk)   Recommendation   PT Discharge Recommendation Post acute rehabilitation services   Equipment Recommended [facility] Recommended Wheeled walker   Change/add to Wiper? No   PT - OK to Discharge Yes  (to rehab when medically stabl  e)   AM-PAC Basic Mobility Inpatient   Turning in Bed Without Bedrails 2   Lying on Back to Sitting on Edge of Flat Bed 2   Moving Bed to Chair 2   Standing Up From Chair 2   Walk in Room 2   Climb 3-5 Stairs 1   Basic Mobility Inpatient Raw Score 11   Basic Mobility Standardized Score 30 25   Turning Head Towards Sound 4   Follow Simple Instructions 4   Low Function Basic Mobility Raw Score 19   Low Function Basic Mobility Standardized Score 31 06     Melissa Eraoz, PT

## 2021-07-23 NOTE — CASE MANAGEMENT
Pt being followed by [facility] for rehab  Pt requires MAP goals through 7/25     If approved,m plan to d/c to [facility] the following day

## 2021-07-23 NOTE — ARC ADMISSION
Patient is approved for [facility] pending medical stability  Admissions team will continue to follow patient's case for medical stability and function  CM aware

## 2021-07-23 NOTE — ASSESSMENT & PLAN NOTE
3 Days Post-Op Procedure(s): Anterior cervical discectomy and fusion C4-5, posterior cervical decompresion and fusion C2-T2    · Degenerative spinal stenosis with cord signal change C4/5 s/p fall forwards onto face  · [facility]ed to [facility] with complaints of neck pain and bilateral arm pain/numbness  · Also sustained facial bone fractures  · Current exam with midline posterior incisional pain, LUE 4/5, RUE 5/5  Bilateral dysdiadochokinesia  Continues to endorse burning pain to R thumb  Left Varela's  Imaging:  · CT cervical spine 7/21/21: Interval displacement of C6 anterior-inferior corner fracture  New postoperative change status post C3-C7 decompressive laminectomy and posterior instrumented fusion spanning C2-T2  ACDF at C4-5 with interbody graft  Hardware is intact and appropriately aligned  · MRI cervical spine 7/18/21: Multilevel degenerative changes causing multilevel neural foraminal and spinal canal stenosis most severe at C4-C5 where there could be small amount of left-sided cord edema  Edema within the left C2-C3 facet could be posttraumatic contusion without fracture  · Cervical post op x-rays: Postsurgical changes of posterior spinal decompression and instrumented fusion spanning C2-T2 and anterior cervical discectomy and fusion at C4-C5  Plan:  · Frequent neuro checks  · No brace needed  · Drain removed safely and without difficulty/23  · Maintain MAPs > 85 mmHg x 5 days  · Mobilize with PT/OT  · May consider ice for affected areas of pain  · DVT ppx: SCDs, ok to begin chemical DVT ppx this evening  Plan of care discussed with Roberto Carlos Garcia RN  Neurosurgery will continue to follow, please call with any questions or concerns

## 2021-07-23 NOTE — H&P
H&P Exam - Stephanie De La Cruz 66 y o  female MRN: [id number]    Unit/Bed#: ICU 10 Encounter: [id number]    Assessment:     Central cord syndrome: Adele Gamboa is a 66 y o  female with a PMH significant for migraines, asthma, and GERD who was transferred on 7/18 from [facility] after a fall that resulted in the development of central cord syndrome (status postsurgery)  Today the patient no longer experienced numbness or tingling in her upper extremities  She complained of a sharp pain in her thumbs and expressed that her arms felt sore and extremely heavy  Additionally, the patient is still experiencing nausea, constipation, and is unable to urinate  She states that she has the urge to go but is unable to do so  LE burning pain: The patient also stated that she had poor sleep last night due to a hot, burning pain in her feet that was only relieved through movement  This is consistent with what we might expect to see with RLS  The most recent Hct and RBC count were low  Consider checking iron levels  Plan:  -Patient should continue to work with PT/OT to assess/improve motor function   -Follow up with iron labs to assess for iron deficiency that may contribute to potential RLS  -       History of Present Illness :  Adele Gamboa is a 66 y o  female with a PMH significant for migraines, asthma, and GERD who presented on 7/18/21 to [facility] after falling forward and hitting her head on a coffee table  LOC was absent, however, the patient experienced a burning pain in both arms that extended from her shoulder to her thumbs  On 7/18 at 12:34pm she was transferred to [facility] ICU due to her need for a higher level of care  She experienced C-spine tenderness despite initial imaging being negative for trauma findings  In addition to the numbness and tinging felt in her upper extremities, tremors were also noted       MRI C-spine revealed multilevel degenerative changes causing foraminal and spinal canal stenosis most severe at C4-C5, consistent with central cord syndrome  The patient underwent a C3-C7 decompressive laminectomy and and posterior instrumented fusion spanning C2-T2  Anterior cervical discectomy and fusion was performed at C4-5 with interbody graft  Review of Systems    ROS was positive for fatigue and burning sensation in her feet       Historical Information   Past Medical History:   Diagnosis Date    Cholelithiasis     GERD (gastroesophageal reflux disease)     Influenza 1/29/2019    Migraine headache     Pneumonia     Urinary tract infection      Past Surgical History:   Procedure Laterality Date    NO PAST SURGERIES      WI ARTHRODESIS ANT INTERBODY MIN DISCECTOMY, CERVICAL BELOW C2 Bilateral 7/20/2021    Procedure: Anterior cervical discectomy and fusion C4-5, posterior cervical decompresion and fusion C2-T2;  Surgeon: Lupe Guadarrama MD;  Location: [facility];  Service: Neurosurgery     Social History   Social History     Substance and Sexual Activity   Alcohol Use Never    Alcohol/week: 0 0 standard drinks     Social History     Substance and Sexual Activity   Drug Use Never     Social History     Tobacco Use   Smoking Status Former Smoker    Packs/day: 1 50    Years: 10 00    Pack years: 15 00    Types: Cigarettes   Smokeless Tobacco Never Used   Tobacco Comment    quit age 20     E-Cigarette Use: Never User     E-Cigarette/Vaping Substances           Meds/Allergies     Allergies   Allergen Reactions    Bee Venom Anaphylaxis       Objective   First Vitals:   Blood Pressure: 168/77 (07/18/21 1414)  Pulse: 92 (07/18/21 1414)  Temperature: 98 6 °F (37 °C) (07/18/21 1601)  Temp Source: Tympanic (07/18/21 1601)  Respirations: 18 (07/18/21 1414)  Height: 5' 2" (157 5 cm) (07/18/21 1729)  Weight - Scale: 64 1 kg (141 lb 5 oz) (07/18/21 1729)  SpO2: 99 % (07/18/21 1414)    Current Vitals:   Blood Pressure: 137/68 (07/23/21 0800)  Pulse: 96 (07/23/21 0800)  Temperature: 98 4 °F (36 9 °C) (07/23/21 0400)  Temp Source: Oral (07/23/21 0800)  Respirations: 19 (07/23/21 0800)  Height: 5' 2" (157 5 cm) (07/19/21 1459)  Weight - Scale: 64 4 kg (141 lb 15 6 oz) (07/22/21 0600)  SpO2: 97 % (07/23/21 0800)      Intake/Output Summary (Last 24 hours) at 7/23/2021 0827  Last data filed at 7/23/2021 0600  Gross per 24 hour   Intake 1701 83 ml   Output 2210 ml   Net -508 17 ml       Invasive Devices       Peripheral Intravenous Line              Peripheral IV 07/18/21 Dorsal (posterior); Right Forearm 4 days    Peripheral IV 07/20/21 Left Hand 2 days              Drain              Closed/Suction Drain Left;Medial Back Accordion 10 Fr  2 days    Urethral Catheter Latex 16 Fr  <1 day                    Physical Exam:    On physical examination, the patient was AAO to person, place, and time  Cranial nerves 2-12 were intact  Strength was symmetrical bilaterally, measuring at 2/5 for UE abduction, 3/5 for UE flexion and extension, and 3/5 for all movements of the LE  Sensation to light touch and vibration was intact and symmetrical in all four extremities  Myoclonus and tremors were absent       Code Status: Level 1 - Full Code

## 2021-07-23 NOTE — PROGRESS NOTES
Daily Progress Note - Critical Care   Rommel Sheriff 66 y o  female MRN: [id number]  Unit/Bed#: ICU 10 Encounter: [id number]        ----------------------------------------------------------------------------------------  HPI/24hr events: pt continues to maintain MAP goals > 85 on amilcar gtt     ---------------------------------------------------------------------------------------  SUBJECTIVE      Review of Systems  Review of systems was reviewed and negative unless stated above in HPI/24-hour events   ---------------------------------------------------------------------------------------  Assessment and Plan:    Neuro:   · Diagnosis:  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 Plan:  Status post anterior cervical discectomy and fusion C4-5, posterior cervical decompression and fusion C2-T2 7/20  ? Neurosurgery following  ? MAP > 85 for 5 days postop  ? Monitor drain output  ? Continue neuro checks   ? Upright films completed  · Diagnosis: Anxiety  ? Plan:  Home dose p r n  Ativan  · Diagnosis:  Acute pain  ? Plan:  Scheduled Tylenol, gabapentin, Robaxin  ? P r n  Oxycodone and Dilaudid        CV:   · Diagnosis:  Permissive hypertension  ? Plan: MAP > 85  ? Amilcar drip to maintain BP goals  · Diagnosis:  Hyperlipidemia  ? Plan:  Continue statin        Pulm:  · Diagnosis:  No acute issues  ? Plan:  Encourage IS     GI:   · Diagnosis:  GERD  ? Plan:  Continue home dose PPI  · Diagnosis:  Constipation  ? Plan:  Maintain bowel regimen        :   · Diagnosis:  Chronic UTI  ? Plan:  Continue home dose Keflex  ? Gotti in place, consider removing today  ? Strict I&Os  ? Trend BUN and creatinine        F/E/N:   · Plan:   · Replete lytes as needed  · Regular diet        Heme/Onc:   · Diagnosis:  DVT prophylaxis  ? Plan:  Hold chemical prophylaxis until cleared by Neurosurgery  ? Maintain SCDs        Endo:   · Diagnosis:  No acute issues        ID:   · Diagnosis:  Chronic UTI  ?  Plan:  Continue Keflex        MSK/Skin:   · Diagnosis:  Nasal bone fracture  ? Plan:  No surgical intervention per OMFS  · Diagnosis:  Ambulatory dysfunction  ? Plan:  PT/OT  ? Maintain fall precautions      Disposition: Continue Critical Care   Code Status: Level 1 - Full Code  ---------------------------------------------------------------------------------------  ICU CORE MEASURES    Prophylaxis   VTE Pharmacologic Prophylaxis: Enoxaparin (Lovenox)  VTE Mechanical Prophylaxis: sequential compression device  Stress Ulcer Prophylaxis: Pantoprazole PO    ABCDE Protocol (if indicated)  Plan to perform spontaneous awakening trial today? Not applicable  Plan to perform spontaneous breathing trial today? Not applicable  Obvious barriers to extubation? Not applicable  CAM-ICU: Negative    Invasive Devices Review  Invasive Devices     Peripheral Intravenous Line            Peripheral IV 21 Dorsal (posterior); Right Forearm 4 days    Peripheral IV 21 Left Hand 2 days          Drain            Closed/Suction Drain Left;Medial Back Accordion 10 Fr  2 days    Urethral Catheter Latex 16 Fr  <1 day              Can any invasive devices be discontinued today? No  ---------------------------------------------------------------------------------------  OBJECTIVE    Vitals   Vitals:    21 0000 21 0100 21 0200 21 0300   BP: 137/69 143/66 133/73 139/69   Pulse: 88 92 96 92   Resp: (!) 7 19 18 18   Temp: 98 1 °F (36 7 °C)      TempSrc: Oral      SpO2: 98% 97% 98% 98%   Weight:       Height:         Temp (24hrs), Av 5 °F (36 9 °C), Min:98 1 °F (36 7 °C), Max:98 6 °F (37 °C)  Current: Temperature: 98 1 °F (36 7 °C)  HR: 92  BP: 143/66  RR: 19  SpO2: 97    Respiratory:  SpO2: SpO2: 98 %       Invasive/non-invasive ventilation settings   Respiratory    Lab Data (Last 4 hours)    None         O2/Vent Data (Last 4 hours)    None                Physical Exam  Vitals and nursing note reviewed     HENT:      Head: Normocephalic  Right Ear: External ear normal       Left Ear: External ear normal       Nose: Nose normal       Mouth/Throat:      Mouth: Mucous membranes are moist    Eyes:      Pupils: Pupils are equal, round, and reactive to light  Neck:      Comments: Incision CDI   Cardiovascular:      Rate and Rhythm: Tachycardia present  Pulses: Normal pulses  Pulmonary:      Effort: Pulmonary effort is normal    Abdominal:      General: Bowel sounds are normal  There is no distension  Palpations: Abdomen is soft  Genitourinary:     Comments: Gotti in place  Musculoskeletal:      Right lower leg: No edema  Left lower leg: No edema  Skin:     General: Skin is warm and dry  Capillary Refill: Capillary refill takes less than 2 seconds  Neurological:      Mental Status: She is alert and oriented to person, place, and time  Motor: Weakness present     Psychiatric:         Mood and Affect: Mood normal          Laboratory and Diagnostics:  Results from last 7 days   Lab Units 07/22/21  0606 07/21/21  0529 07/20/21  1716 07/20/21  1350 07/20/21  1306 07/20/21  1213 07/20/21  1041 07/20/21  0428 07/19/21  0503 07/18/21  1111   WBC Thousand/uL 9 86 12 80* 8 18  --   --   --   --  4 56 6 06 8 58   HEMOGLOBIN g/dL 8 9* 9 7* 9 8*  --   --   --   --  9 3* 12 5 12 7   I STAT HEMOGLOBIN g/dl  --   --   --  9 5* 9 9* 9 9* 10 5*  --   --   --    HEMATOCRIT % 27 0* 29 0* 29 7*  --   --   --   --  28 1* 37 2 37 5   HEMATOCRIT, ISTAT %  --   --   --  28* 29* 29* 31*  --   --   --    PLATELETS Thousands/uL 114* 137* 89*  --   --   --   --  99* 145* 128*   NEUTROS PCT % 64 76*  --   --   --   --   --  66  --  80*   BANDS PCT %  --   --  17*  --   --   --   --   --   --   --    MONOS PCT % 11 10  --   --   --   --   --  9  --  6   MONO PCT %  --   --  2*  --   --   --   --   --   --   --      Results from last 7 days   Lab Units 07/22/21  0606 07/21/21  0529 07/20/21  1716 07/20/21  1350 07/20/21  1306 07/20/21  1213 07/20/21  1041 07/20/21  0428 07/19/21  0503 07/18/21  1111   SODIUM mmol/L 144 140 142  --   --   --   --  138 139 142   POTASSIUM mmol/L 3 8 4 2 3 9  --   --   --   --  4 3 4 4 3 6   CHLORIDE mmol/L 115* 113* 114*  --   --   --   --  111* 108 105   CO2 mmol/L 23 23 21  --   --   --   --  24 25 24   CO2, I-STAT mmol/L  --   --   --  22 22 22 24  --   --   --    ANION GAP mmol/L 6 4 7  --   --   --   --  3* 6 13   BUN mg/dL 13 16 11  --   --   --   --  13 11 13   CREATININE mg/dL 0 62 0 86 0 90  --   --   --   --  0 89 0 73 0 87   CALCIUM mg/dL 8 1* 8 6 8 4  --   --   --   --  8 8 9 2 8 7   GLUCOSE RANDOM mg/dL 98 106 136  --   --   --   --  96 80 94   ALT U/L  --   --   --   --   --   --   --   --   --  26   AST U/L  --   --   --   --   --   --   --   --   --  22   ALK PHOS U/L  --   --   --   --   --   --   --   --   --  73   ALBUMIN g/dL  --   --   --   --   --   --   --   --   --  3 4*   TOTAL BILIRUBIN mg/dL  --   --   --   --   --   --   --   --   --  0 50     Results from last 7 days   Lab Units 07/22/21  0606 07/21/21  0529 07/20/21  1716 07/20/21  0428 07/19/21  0503   MAGNESIUM mg/dL 2 0 2 3 2 2 2 3 2 5   PHOSPHORUS mg/dL 1 8*  --   --  3 3 3 1      Results from last 7 days   Lab Units 07/20/21  0428 07/18/21  1111   INR  1 07 1 00   PTT seconds  --  29              ABG:    VBG:          Micro        EKG:   Imaging:  I have personally reviewed pertinent reports  Intake and Output  I/O       07/21 0701 - 07/22 0700 07/22 0701 - 07/23 0700    P  O  840 840    I V  (mL/kg) 882 4 (13 7) 98 8 (1 5)    IV Piggyback 80 250    Total Intake(mL/kg) 1802 4 (28) 1188 8 (18 5)    Urine (mL/kg/hr) 775 (0 5) 1820 (1 2)    Drains 295 60    Total Output 1070 1880    Net +732 4 -691 3                Height and Weights   Height: 5' 2" (157 5 cm)  IBW (Ideal Body Weight): 50 1 kg  Body mass index is 25 97 kg/m²    Weight (last 2 days)     Date/Time   Weight    07/22/21 0600   64 4 (141 98) Nutrition       Diet Orders   (From admission, onward)             Start     Ordered    07/20/21 1625  Diet Regular; Regular House  Diet effective now     Question Answer Comment   Diet Type Regular    Regular Regular House    RD to adjust diet per protocol?  Yes        07/20/21 1624    07/19/21 1513  Dietary nutrition supplements  Once     Question Answer Comment   Select Supplement: Ensure Enlive-Strawberry    Frequency Lunch, Dinner        07/19/21 1512                  Active Medications  Scheduled Meds:  Current Facility-Administered Medications   Medication Dose Route Frequency Provider Last Rate    acetaminophen  975 mg Oral Q8H [address] Haydee Lin PA-C      ALPRAZolam  0 25 mg Oral HS PRN [facility] Delia, PA-JETHRO      atorvastatin  20 mg Oral Daily Haydee Lin PA-C      bisacodyl  10 mg Rectal Daily PRN Adrien Memory, PA-JETHRO      cephalexin  250 mg Oral Daily SHANIA Drake-JETHRO      docusate sodium  100 mg Oral BID Ardien MemoryGABRIELLE      enoxaparin  30 mg Subcutaneous Q12H [address] [place], [place]      gabapentin  100 mg Oral TID DailySHANIA Dominique-JETHRO      HYDROmorphone  0 2 mg Intravenous Q4H PRN [place] BimSHANIA moore-JETHRO      lidocaine  2 patch Topical Daily Haydee Lin PA-C      loratadine  10 mg Oral Daily SHANIA Drake-JETHRO      meclizine  12 5 mg Oral Q8H PRN SHANIA Drake-JETHRO      melatonin  3 mg Oral HS SHANIA Drake-JETHRO      methocarbamol  500 mg Oral Q6H [address] Haydee Lin PA-C      ondansetron  4 mg Intravenous Q6H PRN [facility] Delia, PA-C      oxyCODONE  2 5 mg Oral Q4H PRN [facility] Delia, PA-JETHRO      oxyCODONE  5 mg Oral Q4H PRN Haydee Lin, PA-C      pantoprazole  40 mg Oral QAM Haydee Lin, PA-C      phenylephine   mcg/min Intravenous Titrated Haydee Lin PA-C 30 mcg/min (07/23/21 0127)    polyethylene glycol  17 g Oral Daily Adrien Glover PA-C      senna  2 tablet Oral BID Adrien Glover PA-C Continuous Infusions:  phenylephine,  mcg/min, Last Rate: 30 mcg/min (07/23/21 0127)      PRN Meds:   ALPRAZolam, 0 25 mg, HS PRN  bisacodyl, 10 mg, Daily PRN  HYDROmorphone, 0 2 mg, Q4H PRN  meclizine, 12 5 mg, Q8H PRN  ondansetron, 4 mg, Q6H PRN  oxyCODONE, 2 5 mg, Q4H PRN  oxyCODONE, 5 mg, Q4H PRN        Allergies   Allergies   Allergen Reactions    Bee Venom Anaphylaxis     ---------------------------------------------------------------------------------------  Advance Directive and Living Will:      Power of :    POLST:    ---------------------------------------------------------------------------------------  Care Time Delivered:   No Critical Care time spent     [employer]      Portions of the record may have been created with voice recognition software  Occasional wrong word or "sound a like" substitutions may have occurred due to the inherent limitations of voice recognition software    Read the chart carefully and recognize, using context, where substitutions have occurred

## 2021-07-23 NOTE — QUICK NOTE
PMR Consult Quick Note    - Will let liaisons know re: [facility]C  Once MAP pushes done, patient can likely be transferred as long as Hgb remains stable (dropped today) and no other new issues  Would not require auth, would just be pending bed availability and repeat PT/OT  Would have them see within 2 days of planned discharge       Noy Joe MD  Physical Medicine and Rehabilitation

## 2021-07-23 NOTE — PROGRESS NOTES
[facility]  Progress Note - Dulce Link 1945, 66 y o  female MRN: [id number]  Unit/Bed#: ICU 10 Encounter: [id number]  Primary Care Provider: Darleen Davis MD   Date and time admitted to hospital: 7/18/2021  2:04 PM    * Central cord syndrome [facility])  Assessment & Plan  3 Days Post-Op Procedure(s): Anterior cervical discectomy and fusion C4-5, posterior cervical decompresion and fusion C2-T2    · Degenerative spinal stenosis with cord signal change C4/5 s/p fall forwards onto face  · [facility]18[facility] with complaints of neck pain and bilateral arm pain/numbness  · Also sustained facial bone fractures  · Current exam with midline posterior incisional pain, LUE 4/5, RUE 5/5  Bilateral dysdiadochokinesia  Continues to endorse burning pain to R thumb  Left Varela's  Imaging:  · CT cervical spine 7/21/21: Interval displacement of C6 anterior-inferior corner fracture  New postoperative change status post C3-C7 decompressive laminectomy and posterior instrumented fusion spanning C2-T2  ACDF at C4-5 with interbody graft  Hardware is intact and appropriately aligned  · MRI cervical spine 7/18/21: Multilevel degenerative changes causing multilevel neural foraminal and spinal canal stenosis most severe at C4-C5 where there could be small amount of left-sided cord edema  Edema within the left C2-C3 facet could be posttraumatic contusion without fracture  · Cervical post op x-rays: Postsurgical changes of posterior spinal decompression and instrumented fusion spanning C2-T2 and anterior cervical discectomy and fusion at C4-C5  Plan:  · Frequent neuro checks  · No brace needed  · Drain removed safely and without difficulty/23  · Maintain MAPs > 85 mmHg x 5 days  · Mobilize with PT/OT  · May consider ice for affected areas of pain  · DVT ppx: SCDs, ok to begin chemical DVT ppx this evening  Plan of care discussed with Sim Kennedy RN      Neurosurgery will continue to follow, please call with any questions or concerns  Subjective/Objective     Patient seen and examined resting comfortably in her bed watching her favorite television show, "Leave it to Lucero" Overall, reports that she is doing better today  She feels stronger in legs and notes improved strength in her arms; although she continues to experience the intermittent shooting right thumb pain  I/O       07/21 0701 - 07/22 0700 07/22 0701 - 07/23 0700 07/23 0701 - 07/24 0700    P  O  840 1320     I V  (mL/kg) 882 4 (13 7) 154 9 (2 4)     IV Piggyback 80 250     Total Intake(mL/kg) 1802 4 (28) 1724 9 (26 8)     Urine (mL/kg/hr) 775 (0 5) 2495 (1 6)     Drains 295 115     Blood       Total Output 1070 2610     Net +732 4 -885  1                  Invasive Devices     Peripheral Intravenous Line            Peripheral IV 07/18/21 Dorsal (posterior); Right Forearm 4 days    Peripheral IV 07/20/21 Left Hand 2 days          Drain            Closed/Suction Drain Left;Medial Back Accordion 10 Fr  2 days    Urethral Catheter Latex 16 Fr  <1 day                Physical Exam:  Vitals: Blood pressure 137/68, pulse 96, temperature 98 4 °F (36 9 °C), temperature source Oral, resp  rate 19, height 5' 2" (1 575 m), weight 64 4 kg (141 lb 15 6 oz), SpO2 97 %, not currently breastfeeding  ,Body mass index is 25 97 kg/m²      General appearance: alert, appears stated age, cooperative and no distress  Head: Normocephalic, without obvious abnormality, atraumatic  Eyes: EOMI, PERRL  Neck: supple, symmetrical, trachea midline and NT  Back: no kyphosis present, no tenderness to percussion or palpation  Lungs: non labored breathing  Heart: regular heart rate  Neurologic:   Mental status: Alert, awake, oriented , thought content appropriate  Cranial nerves: grossly intact (Cranial nerves II-XII)  Sensory: normal to LT  Motor: moving all extremities without focal weakness except in her left  3+-4-/5  Reflexes: 2+ and symmetric  Coordination: finger to nose normal bilaterally, no drift bilaterally      Lab Results:  Results from last 7 days   Lab Units 07/23/21  0446 07/22/21  0606 07/21/21  0529   WBC Thousand/uL 6 77 9 86 12 80*   HEMOGLOBIN g/dL 7 6* 8 9* 9 7*   HEMATOCRIT % 23 1* 27 0* 29 0*   PLATELETS Thousands/uL 110* 114* 137*   NEUTROS PCT % 65 64 76*   MONOS PCT % 9 11 10     Results from last 7 days   Lab Units 07/23/21  0729 07/22/21  0606 07/21/21  0529 07/20/21  1350 07/20/21  1306 07/20/21  1306 07/20/21  1213 07/19/21  0503 07/18/21  1111   POTASSIUM mmol/L 3 9 3 8 4 2  --    < >  --   --   --  3 6   CHLORIDE mmol/L 109* 115* 113*  --    < >  --   --   --  105   CO2 mmol/L 24 23 23  --    < >  --   --   --  24   CO2, I-STAT mmol/L  --   --   --  22  --  22 22   < >  --    BUN mg/dL 11 13 16  --    < >  --   --   --  13   CREATININE mg/dL 0 64 0 62 0 86  --    < >  --   --   --  0 87   CALCIUM mg/dL 9 1 8 1* 8 6  --    < >  --   --   --  8 7   ALK PHOS U/L  --   --   --   --   --   --   --   --  73   ALT U/L  --   --   --   --   --   --   --   --  26   AST U/L  --   --   --   --   --   --   --   --  22   GLUCOSE, ISTAT mg/dl  --   --   --  145*  --  140 118   < >  --     < > = values in this interval not displayed  Results from last 7 days   Lab Units 07/23/21  0729 07/22/21  0606 07/21/21  0529   MAGNESIUM mg/dL 2 2 2 0 2 3     Results from last 7 days   Lab Units 07/23/21  0729 07/22/21  0606 07/20/21  0428   PHOSPHORUS mg/dL 2 6 1 8* 3 3     Results from last 7 days   Lab Units 07/20/21  0428 07/18/21  1111   INR  1 07 1 00   PTT seconds  --  29     No results found for: TROPONINT  ABG:No results found for: PHART, ALO7IIU, PO2ART, IEQ1MCB, J2ULKSIA, BEART, SOURCE    Imaging Studies: I have personally reviewed pertinent reports     and I have personally reviewed pertinent films in PACS    XR cervical spine 2 or 3 views    Result Date: 7/21/2021  Impression: Postsurgical changes of posterior spinal decompression and instrumented fusion spanning C2-T2 and anterior cervical discectomy and fusion at C4-C5  Workstation performed: [id number]     XR spine cervical 2 or 3 vw injury    Result Date: 7/21/2021  Impression: Fluoroscopic guidance provided for procedure guidance  Please refer to the separate procedure notes for additional details  Workstation performed: [id number]     CT spine cervical wo contrast    Result Date: 7/21/2021  Impression: 1  Interval displacement of C6 anterior-inferior corner fracture  2   New postoperative change status post C3-C7 decompressive laminectomy and posterior instrumented fusion spanning C2-T2  ACDF at C4-5 with interbody graft  Hardware is intact and appropriately aligned  3   Cannot reliably assess canal content due to distorted image quality by streak artifact from surgical hardware  I personally discussed this study with SHANIA Dueñas on 7/21/2021 at 10:25 AM   Workstation performed: [id number]       EKG, Pathology, and Other Studies: I have personally reviewed pertinent reports        VTE Pharmacologic Prophylaxis: Sequential compression device (Venodyne)  and Enoxaparin (Lovenox)    VTE Mechanical Prophylaxis: sequential compression device

## 2021-07-23 NOTE — PLAN OF CARE
Problem: PHYSICAL THERAPY ADULT  Goal: Performs mobility at highest level of function for planned discharge setting  See evaluation for individualized goals  Description: Treatment/Interventions: Functional transfer training, LE strengthening/ROM, Elevations, Therapeutic exercise, Endurance training, Bed mobility, Gait training, Equipment eval/education  Equipment Recommended: [place]       See flowsheet documentation for full assessment, interventions and recommendations  Note: Prognosis: Good  Problem List: Decreased strength, Decreased range of motion, Decreased endurance, Impaired balance, Decreased mobility, Decreased coordination, Impaired judgement, Decreased safety awareness, Orthopedic restrictions, Pain, Impaired sensation  Assessment: Pt re-educated in log roll technique for bed mobility  Required increased time for all mobility with increased dizziness  Tolerated LE exercises with rest breaks 2* to fatigue  Required increased A in ambulation this session with tactile instruction at pelvis and difficutly advancing LLE  UE stength and paraesthias improving and may benefit from trial with AD to improve independence  Noted orthostatic hypotension post mobility requiring LE elevation and ankle pumps to improve  Pt will benefit from continued inpt skilled PT and rehab to maximize functional mobility & safety  Barriers to Discharge: Inaccessible home environment, Decreased caregiver support        PT Discharge Recommendation: Post acute rehabilitation services     PT - OK to Discharge: Yes (to rehab when medically stabl  e)    See flowsheet documentation for full assessment

## 2021-07-23 NOTE — OCCUPATIONAL THERAPY NOTE
Occupational Therapy Treatment Note      Rashaad Grandchild    7/23/2021    Principal Problem:    Central cord syndrome [facility])  Active Problems:    Fall against object    Neck pain    Bilateral arm weakness      Past Medical History:   Diagnosis Date    Cholelithiasis     GERD (gastroesophageal reflux disease)     Influenza 1/29/2019    Migraine headache     Pneumonia     Urinary tract infection        Past Surgical History:   Procedure Laterality Date    NO PAST SURGERIES      OR ARTHRODESIS ANT INTERBODY MIN DISCECTOMY, CERVICAL BELOW C2 Bilateral 7/20/2021    Procedure: Anterior cervical discectomy and fusion C4-5, posterior cervical decompresion and fusion C2-T2;  Surgeon: Marylee Amsterdam, MD;  Location:  MAIN OR;  Service: Neurosurgery        07/23/21 1418   OT Last Visit   OT Visit Date 07/23/21   Note Type   Note Type Treatment   Restrictions/Precautions   Weight Bearing Precautions Per Order No   Other Precautions Chair Alarm; Bed Alarm; Fall Risk;Telemetry;Multiple lines;Pain   General   Response to Previous Treatment Patient with no complaints from previous session   Lifestyle   Autonomy pta pt reports I in ADLs/IADLs/functional mobility   Reciprocal Relationships supportive family- lives w/ dtr and grandson   Service to Others retired   Intrinsic Gratification watching nprogress and [employer]   Pain Assessment   Pain Assessment Tool 0-10   Pain Score   (generalized soreness, no specific c/o pain)   ADL   Where Assessed Edge of bed   Grooming Assistance 5  Supervision/Setup   Grooming Deficit Increased time to complete;Wash/dry hands; Wash/dry face   UB Bathing Assistance 4  Minimal Assistance   UB Bathing Deficit Increased time to complete   LB Bathing Assistance 2  Maximal Assistance   LB Bathing Deficit Increased time to complete   UB Dressing Assistance 4  Minimal Assistance   UB Dressing Deficit Increased time to complete;Pull over head;Pull around back   LB Dressing Assistance 2  Maximal Assistance   LB Dressing Deficit Increased time to complete; Don/doff R sock; Don/doff L sock   Toileting Assistance  2  Maximal Assistance   Functional Standing Tolerance   Time 1 min    Activity standing for transfer to chair   Comments assist of 1 for stance   Bed Mobility   Supine to Sit 3  Moderate assistance   Additional items Assist x 1; Increased time required;LE management   Transfers   Sit to Stand 3  Moderate assistance   Additional items Assist x 1   Stand to Sit 4  Minimal assistance   Additional items Assist x 1;Verbal cues   Stand pivot 3  Moderate assistance   Additional items Assist x 2; Increased time required;Verbal cues   Cognition   Overall Cognitive Status [facility]   Arousal/Participation Alert; Cooperative   Attention Within functional limits   Orientation Level Oriented X4   Memory Within functional limits   Following Commands Follows all commands and directions without difficulty   Comments pt cooperative w therapy session  Son present for most of session   Additional Activities   Additional Activities Comments pt with BP of 140/75 in supine, dropped to 116/78 in stance  Pt co feeling dizzy and nauseus  pt in chair w legs elevated  /76, pt reports feeling better   Activity Tolerance   Activity Tolerance Patient limited by fatigue   Assessment   Assessment pt seen for OT treatment session w focus on functional mobility, simple ADL's and UE ther ex  Pt reports L side feeling weak and uncoordinated  Pt able to do AROM UE exercises such as forward flexion and reaching/touching nose  R UE WFL, L somewhat slower and uncoordinated  Pt able to comb front of  hair herself, assist for back side  able to wash own face  Max assist needed for LB self care  Mod assist to get to EOB, Max assist of 2 for transfer to chair, c/o feeling dizzy  Pt left sitting in chair, all lines intact and all needs attended to  OT will continue to follow as per POC      Plan   Treatment Interventions ADL retraining;Functional transfer training;UE strengthening/ROM; Endurance training;Cognitive reorientation;Patient/family training; Compensatory technique education; Energy conservation; Activityengagement   Goal Expiration Date 08/04/21   OT Treatment Day 1   OT Frequency 3-5x/wk   Recommendation   OT Discharge Recommendation Post acute rehabilitation services   OT - OK to Discharge Yes   AM-PAC Daily Activity Inpatient   Lower Body Dressing 2   Bathing 2   Toileting 2   Upper Body Dressing 3   Grooming 3   Eating 3   Daily Activity Raw Score 15   Daily Activity Standardized Score (Calc for Raw Score >=11) 34 70

## 2021-07-24 PROBLEM — M54.2 NECK PAIN: Status: RESOLVED | Noted: 2021-07-18 | Resolved: 2021-07-24

## 2021-07-24 PROBLEM — E78.5 HYPERLIPIDEMIA: Status: ACTIVE | Noted: 2020-03-04

## 2021-07-24 PROBLEM — R29.898 BILATERAL ARM WEAKNESS: Status: RESOLVED | Noted: 2021-07-18 | Resolved: 2021-07-24

## 2021-07-24 LAB
ANION GAP SERPL CALCULATED.3IONS-SCNC: 5 MMOL/L (ref 4–13)
BASOPHILS # BLD AUTO: 0.05 THOUSANDS/ΜL (ref 0–0.1)
BASOPHILS NFR BLD AUTO: 1 % (ref 0–1)
BUN SERPL-MCNC: 13 MG/DL (ref 5–25)
CALCIUM SERPL-MCNC: 8.7 MG/DL (ref 8.3–10.1)
CHLORIDE SERPL-SCNC: 113 MMOL/L (ref 100–108)
CO2 SERPL-SCNC: 23 MMOL/L (ref 21–32)
CREAT SERPL-MCNC: 0.63 MG/DL (ref 0.6–1.3)
EOSINOPHIL # BLD AUTO: 0.18 THOUSAND/ΜL (ref 0–0.61)
EOSINOPHIL NFR BLD AUTO: 2 % (ref 0–6)
ERYTHROCYTE [DISTWIDTH] IN BLOOD BY AUTOMATED COUNT: 13.3 % (ref 11.6–15.1)
GFR SERPL CREATININE-BSD FRML MDRD: 87 ML/MIN/1.73SQ M
GLUCOSE SERPL-MCNC: 96 MG/DL (ref 65–140)
HCT VFR BLD AUTO: 29.4 % (ref 34.8–46.1)
HGB BLD-MCNC: 9.5 G/DL (ref 11.5–15.4)
IMM GRANULOCYTES # BLD AUTO: 0.03 THOUSAND/UL (ref 0–0.2)
IMM GRANULOCYTES NFR BLD AUTO: 0 % (ref 0–2)
LYMPHOCYTES # BLD AUTO: 2.21 THOUSANDS/ΜL (ref 0.6–4.47)
LYMPHOCYTES NFR BLD AUTO: 30 % (ref 14–44)
MCH RBC QN AUTO: 31.7 PG (ref 26.8–34.3)
MCHC RBC AUTO-ENTMCNC: 32.3 G/DL (ref 31.4–37.4)
MCV RBC AUTO: 98 FL (ref 82–98)
MONOCYTES # BLD AUTO: 0.7 THOUSAND/ΜL (ref 0.17–1.22)
MONOCYTES NFR BLD AUTO: 9 % (ref 4–12)
NEUTROPHILS # BLD AUTO: 4.3 THOUSANDS/ΜL (ref 1.85–7.62)
NEUTS SEG NFR BLD AUTO: 58 % (ref 43–75)
NRBC BLD AUTO-RTO: 0 /100 WBCS
PLATELET # BLD AUTO: 196 THOUSANDS/UL (ref 149–390)
PMV BLD AUTO: 9.8 FL (ref 8.9–12.7)
POTASSIUM SERPL-SCNC: 3.8 MMOL/L (ref 3.5–5.3)
RBC # BLD AUTO: 3 MILLION/UL (ref 3.81–5.12)
SODIUM SERPL-SCNC: 141 MMOL/L (ref 136–145)
WBC # BLD AUTO: 7.47 THOUSAND/UL (ref 4.31–10.16)

## 2021-07-24 PROCEDURE — 80048 BASIC METABOLIC PNL TOTAL CA: CPT | Performed by: PHYSICIAN ASSISTANT

## 2021-07-24 PROCEDURE — NC001 PR NO CHARGE: Performed by: NURSE PRACTITIONER

## 2021-07-24 PROCEDURE — 99024 POSTOP FOLLOW-UP VISIT: CPT | Performed by: NEUROLOGICAL SURGERY

## 2021-07-24 PROCEDURE — 85025 COMPLETE CBC W/AUTO DIFF WBC: CPT | Performed by: PHYSICIAN ASSISTANT

## 2021-07-24 PROCEDURE — 99291 CRITICAL CARE FIRST HOUR: CPT | Performed by: EMERGENCY MEDICINE

## 2021-07-24 RX ORDER — ECHINACEA PURPUREA EXTRACT 125 MG
1 TABLET ORAL
Status: DISCONTINUED | OUTPATIENT
Start: 2021-07-24 | End: 2021-07-26 | Stop reason: HOSPADM

## 2021-07-24 RX ORDER — POTASSIUM CHLORIDE 20 MEQ/1
20 TABLET, EXTENDED RELEASE ORAL ONCE
Status: COMPLETED | OUTPATIENT
Start: 2021-07-24 | End: 2021-07-24

## 2021-07-24 RX ORDER — ALBUMIN (HUMAN) 12.5 G/50ML
50 SOLUTION INTRAVENOUS ONCE
Status: COMPLETED | OUTPATIENT
Start: 2021-07-24 | End: 2021-07-24

## 2021-07-24 RX ADMIN — METHOCARBAMOL 500 MG: 500 TABLET, FILM COATED ORAL at 00:51

## 2021-07-24 RX ADMIN — ALBUMIN (HUMAN) 50 G: 0.25 INJECTION, SOLUTION INTRAVENOUS at 15:03

## 2021-07-24 RX ADMIN — GABAPENTIN 100 MG: 100 CAPSULE ORAL at 17:00

## 2021-07-24 RX ADMIN — POTASSIUM CHLORIDE 20 MEQ: 1500 TABLET, EXTENDED RELEASE ORAL at 10:00

## 2021-07-24 RX ADMIN — CEPHALEXIN 250 MG: 250 CAPSULE ORAL at 08:15

## 2021-07-24 RX ADMIN — SENNOSIDES 17.2 MG: 8.6 TABLET ORAL at 17:06

## 2021-07-24 RX ADMIN — METHOCARBAMOL 500 MG: 500 TABLET, FILM COATED ORAL at 17:06

## 2021-07-24 RX ADMIN — DOCUSATE SODIUM 100 MG: 100 CAPSULE ORAL at 17:06

## 2021-07-24 RX ADMIN — ENOXAPARIN SODIUM 30 MG: 30 INJECTION, SOLUTION INTRAVENOUS; SUBCUTANEOUS at 08:14

## 2021-07-24 RX ADMIN — ENOXAPARIN SODIUM 30 MG: 30 INJECTION, SOLUTION INTRAVENOUS; SUBCUTANEOUS at 22:12

## 2021-07-24 RX ADMIN — ACETAMINOPHEN 975 MG: 325 TABLET, FILM COATED ORAL at 05:06

## 2021-07-24 RX ADMIN — ACETAMINOPHEN 975 MG: 325 TABLET, FILM COATED ORAL at 22:12

## 2021-07-24 RX ADMIN — LIDOCAINE 2 PATCH: 50 PATCH TOPICAL at 08:14

## 2021-07-24 RX ADMIN — OXYCODONE HYDROCHLORIDE 5 MG: 5 TABLET ORAL at 22:12

## 2021-07-24 RX ADMIN — MELATONIN TAB 3 MG 3 MG: 3 TAB at 22:12

## 2021-07-24 RX ADMIN — GABAPENTIN 100 MG: 100 CAPSULE ORAL at 22:12

## 2021-07-24 RX ADMIN — PANTOPRAZOLE SODIUM 40 MG: 40 TABLET, DELAYED RELEASE ORAL at 08:14

## 2021-07-24 RX ADMIN — SENNOSIDES 17.2 MG: 8.6 TABLET ORAL at 08:14

## 2021-07-24 RX ADMIN — METHOCARBAMOL 500 MG: 500 TABLET, FILM COATED ORAL at 05:06

## 2021-07-24 RX ADMIN — ACETAMINOPHEN 975 MG: 325 TABLET, FILM COATED ORAL at 14:13

## 2021-07-24 RX ADMIN — ATORVASTATIN CALCIUM 20 MG: 20 TABLET, FILM COATED ORAL at 08:14

## 2021-07-24 RX ADMIN — DOCUSATE SODIUM 100 MG: 100 CAPSULE ORAL at 08:14

## 2021-07-24 RX ADMIN — PHENYLEPHRINE HYDROCHLORIDE 55 MCG/MIN: 10 INJECTION INTRAVENOUS at 05:11

## 2021-07-24 RX ADMIN — LORATADINE 10 MG: 10 TABLET ORAL at 08:14

## 2021-07-24 RX ADMIN — METHOCARBAMOL 500 MG: 500 TABLET, FILM COATED ORAL at 12:29

## 2021-07-24 RX ADMIN — POLYETHYLENE GLYCOL 3350 17 G: 17 POWDER, FOR SOLUTION ORAL at 08:14

## 2021-07-24 RX ADMIN — GABAPENTIN 100 MG: 100 CAPSULE ORAL at 08:14

## 2021-07-24 NOTE — ARC ADMISSION
PM&R continues to follow patient's case at this time, monitoring for medical stability and functional progress  Will continue to follow and update as able

## 2021-07-24 NOTE — PROGRESS NOTES
Progress Note - Neurosurgery   Leo Adams 66 y o  female MRN: [id number]  Unit/Bed#: ICU 10 Encounter: [id number]    Assessment:  POD4 360 cervical for central cord    Plan:  -Neuro stable  -Pain control  -MAP push can wean down and stop tomorrow  -PT/OT, dispo pending      Subjective/Objective   Chief Complaint: 360 cervical fusion    Subjective: no acute events    Objective:   Awake  Alert  PEERL  4/5 BUE  4/5 BLE  Patch sensation loss      Invasive Devices     Peripheral Intravenous Line            Peripheral IV 07/23/21 Right Wrist <1 day    Peripheral IV 07/24/21 Distal;Dorsal (posterior); Right Forearm <1 day          Drain            Urethral Catheter Latex 16 Fr  1 day                Physical Exam:     Vitals: Blood pressure 125/66, pulse 84, temperature 98 °F (36 7 °C), temperature source Oral, resp  rate 12, height 5' 2" (1 575 m), weight 64 4 kg (141 lb 15 6 oz), SpO2 97 %, not currently breastfeeding  ,Body mass index is 25 97 kg/m²  Hemodynamic Monitoring: MAP:      Lab Results: I have personally reviewed pertinent results  Imaging Studies: I have personally reviewed pertinent reports  EKG, Pathology, and Other Studies: I have personally reviewed pertinent reports

## 2021-07-24 NOTE — PROGRESS NOTES
Daily Progress Note - Critical Care   Earle Infante 66 y o  female MRN: [id number]  Unit/Bed#: ICU 10 Encounter: [id number]        ----------------------------------------------------------------------------------------  HPI/24hr events: There were no acute events overnight    ---------------------------------------------------------------------------------------  SUBJECTIVE  Patient awake, alert, and in NAD  She states she still has some upper arm pain, but otherwise feels good  She was able to sleep overnight  Review of Systems   Musculoskeletal:        Upper arm pain   All other systems reviewed and are negative  Review of systems was reviewed and negative unless stated above in HPI/24-hour events   ---------------------------------------------------------------------------------------  Assessment and Plan:    Neuro:    Diagnosis: 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  o S/P anterior cervical disectomy and fusion C4-5, posterior cervical decompression and fusion C2-T2 7/20  o Plan:   - MAP >85 for 5 days post op, trini PRN  - Continue frequent neuro checks  - Nsgy following, appreciate recs   Diagnosis:  Anxiety  o Plan:  - Home ativan PRN   Diagnosis: Pain/sedation  o Plan:   - Tylenol 975 mg PO q8hr  - Dilaudid 0 2m q4hr PRN  - Oxycodone 2 5mg q4hr PRN  - Oxycodone 5mg q4hr PRN      CV:    Diagnosis: Permissive HTN  o Plan:   - Goal MAP >85, trini PRN   Diagnosis: HLD  o Plan:   - Continue statin      Pulm:   Diagnosis: No acute issues  o Plan:   - Encourage IS      GI:    Diagnosis: GERD  o Plan:   - Continue home dose PPI   Diagnosis: Constipation  o Plan:   - Maintain Bowel Regimen      :    Diagnosis: Chronic UTI  o Plan:   - Continue home dose keflex  - Continue to monitor I/O  - Continue to trend BUN/Cr      F/E/N:    F: None   E: Replete Electrolytes prn goal K> 4, Mg >2, Phos >3 0   N: Regular diet      Heme/Onc:    Diagnosis: No active issues      Endo:    Diagnosis: No active issues      ID:    Diagnosis: Chronic UTI  o Plan:   - Continue home keflex      MSK/Skin:    Diagnosis: Nasal bone fx  o Plan:   - No surgical intervention per OMFS  - Pain control as above   Diagnosis: Ambulatory dysfunction  o Plan:   - PT/OT  - Maintain fall precautions      Disposition: Continue Critical Care   Code Status: Level 1 - Full Code  ---------------------------------------------------------------------------------------  ICU CORE MEASURES    Prophylaxis   VTE Pharmacologic Prophylaxis: Enoxaparin (Lovenox)  VTE Mechanical Prophylaxis: sequential compression device  Stress Ulcer Prophylaxis: Pantoprazole PO    ABCDE Protocol (if indicated)  Plan to perform spontaneous awakening trial today? Not applicable  Plan to perform spontaneous breathing trial today? Not applicable  Obvious barriers to extubation? Not applicable  CAM-ICU: Negative    Invasive Devices Review  Invasive Devices     Peripheral Intravenous Line            Peripheral IV 21 Left Hand 3 days    Peripheral IV 21 Right Wrist <1 day          Drain            Urethral Catheter Latex 16 Fr  1 day              Can any invasive devices be discontinued today?  No  ---------------------------------------------------------------------------------------  OBJECTIVE    Vitals   Vitals:    21 0100 21 0200 21 0300 21 0400   BP: 128/66 111/64 134/71 137/62   BP Location:       Pulse: 88 86 88 92   Resp: 16 15 15 14   Temp:    98 °F (36 7 °C)   TempSrc:    Oral   SpO2: 99% 98% 98% 98%   Weight:       Height:         Temp (24hrs), Av 1 °F (36 7 °C), Min:97 9 °F (36 6 °C), Max:98 5 °F (36 9 °C)  Current: Temperature: 98 °F (36 7 °C)  HR: 91  BP: 109/64  RR: 17  SpO2: 98    Respiratory:  SpO2: SpO2: 98 %       Invasive/non-invasive ventilation settings   Respiratory    Lab Data (Last 4 hours)    None         O2/Vent Data (Last 4 hours)    None                Physical Exam  Vitals and nursing note reviewed  Constitutional:       General: She is not in acute distress  Appearance: She is well-developed  She is not diaphoretic  HENT:      Head: Normocephalic and atraumatic  Right Ear: External ear normal       Left Ear: External ear normal       Nose: Nose normal    Eyes:      General: Lids are normal  No scleral icterus  Cardiovascular:      Rate and Rhythm: Normal rate and regular rhythm  Heart sounds: Normal heart sounds  No murmur heard  No friction rub  No gallop  Pulmonary:      Effort: Pulmonary effort is normal  No respiratory distress  Breath sounds: Normal breath sounds  No wheezing or rales  Abdominal:      Palpations: Abdomen is soft  Tenderness: There is no abdominal tenderness  There is no guarding or rebound  Musculoskeletal:         General: No deformity  Normal range of motion  Right shoulder: Tenderness present  Left shoulder: Tenderness present  Right upper arm: Tenderness present  Left upper arm: Tenderness present  Cervical back: Normal range of motion and neck supple  Comments: Mild tenderness to palpation over the bilateral shoulders and upper arms   Skin:     General: Skin is warm and dry  Neurological:      General: No focal deficit present  Mental Status: She is alert and oriented to person, place, and time  GCS: GCS eye subscore is 4  GCS verbal subscore is 5  GCS motor subscore is 6  Sensory: Sensation is intact  Motor: Motor function is intact     Psychiatric:         Mood and Affect: Mood normal          Behavior: Behavior normal          Laboratory and Diagnostics:  Results from last 7 days   Lab Units 07/23/21  0446 07/22/21  0606 07/21/21  0529 07/20/21  1716 07/20/21  1350 07/20/21  1306 07/20/21  1213 07/20/21  0428 07/19/21  0503 07/19/21  0503 07/18/21  1111   WBC Thousand/uL 6 77 9 86 12 80* 8 18  --   --   --  4 56  --  6 06 8 58   HEMOGLOBIN g/dL 7 6* 8 9* 9  7* 9 8*  --   --   --  9 3*  --  12 5 12 7   I STAT HEMOGLOBIN g/dl  --   --   --   --  9 5* 9 9* 9 9*  --    < >  --   --    HEMATOCRIT % 23 1* 27 0* 29 0* 29 7*  --   --   --  28 1*  --  37 2 37 5   HEMATOCRIT, ISTAT %  --   --   --   --  28* 29* 29*  --    < >  --   --    PLATELETS Thousands/uL 110* 114* 137* 89*  --   --   --  99*  --  145* 128*   NEUTROS PCT % 65 64 76*  --   --   --   --  66  --   --  80*   BANDS PCT %  --   --   --  17*  --   --   --   --   --   --   --    MONOS PCT % 9 11 10  --   --   --   --  9  --   --  6   MONO PCT %  --   --   --  2*  --   --   --   --   --   --   --     < > = values in this interval not displayed  Results from last 7 days   Lab Units 07/23/21  0729 07/22/21  0606 07/21/21  0529 07/20/21  1716 07/20/21  1350 07/20/21  1306 07/20/21  1213 07/20/21  0428 07/19/21  0503 07/19/21  0503 07/18/21  1111   SODIUM mmol/L 139 144 140 142  --   --   --  138  --  139 142   POTASSIUM mmol/L 3 9 3 8 4 2 3 9  --   --   --  4 3  --  4 4 3 6   CHLORIDE mmol/L 109* 115* 113* 114*  --   --   --  111*  --  108 105   CO2 mmol/L 24 23 23 21  --   --   --  24  --  25 24   CO2, I-STAT mmol/L  --   --   --   --  22 22 22  --    < >  --   --    ANION GAP mmol/L 6 6 4 7  --   --   --  3*  --  6 13   BUN mg/dL 11 13 16 11  --   --   --  13  --  11 13   CREATININE mg/dL 0 64 0 62 0 86 0 90  --   --   --  0 89  --  0 73 0 87   CALCIUM mg/dL 9 1 8 1* 8 6 8 4  --   --   --  8 8  --  9 2 8 7   GLUCOSE RANDOM mg/dL 97 98 106 136  --   --   --  96  --  80 94   ALT U/L  --   --   --   --   --   --   --   --   --   --  26   AST U/L  --   --   --   --   --   --   --   --   --   --  22   ALK PHOS U/L  --   --   --   --   --   --   --   --   --   --  73   ALBUMIN g/dL  --   --   --   --   --   --   --   --   --   --  3 4*   TOTAL BILIRUBIN mg/dL  --   --   --   --   --   --   --   --   --   --  0 50    < > = values in this interval not displayed       Results from last 7 days   Lab Units 07/23/21  0760 07/22/21  0606 07/21/21  0529 07/20/21  1716 07/20/21  0428 07/19/21  0503   MAGNESIUM mg/dL 2 2 2 0 2 3 2 2 2 3 2 5   PHOSPHORUS mg/dL 2 6 1 8*  --   --  3 3 3 1      Results from last 7 days   Lab Units 07/20/21  0428 07/18/21  1111   INR  1 07 1 00   PTT seconds  --  29              ABG:    VBG:          Micro          Imaging:  I have personally reviewed pertinent reports  and I have personally reviewed pertinent films in PACS    Intake and Output  I/O       07/22 0701 - 07/23 0700 07/23 0701 - 07/24 0700    P  O  1320 400    I V  (mL/kg) 154 9 (2 4) 130 6 (2)    IV Piggyback 250     Total Intake(mL/kg) 1724 9 (26 8) 530 6 (8 2)    Urine (mL/kg/hr) 2495 (1 6) 875 (0 6)    Drains 115     Stool  0    Total Output 2610 875    Net -885 1 -344  4          Unmeasured Stool Occurrence  1 x        UOP: 50 ml/hr     Height and Weights   Height: 5' 2" (157 5 cm)  IBW (Ideal Body Weight): 50 1 kg  Body mass index is 25 97 kg/m²  Weight (last 2 days)     Date/Time   Weight    07/22/21 0600   64 4 (141 98)                Nutrition       Diet Orders   (From admission, onward)             Start     Ordered    07/20/21 1625  Diet Regular; Regular House  Diet effective now     Question Answer Comment   Diet Type Regular    Regular Regular House    RD to adjust diet per protocol?  Yes        07/20/21 1624    07/19/21 1513  Dietary nutrition supplements  Once     Question Answer Comment   Select Supplement: Ensure Enlive-Strawberry    Frequency Lunch, Dinner        07/19/21 1512                  Active Medications  Scheduled Meds:  Current Facility-Administered Medications   Medication Dose Route Frequency Provider Last Rate    acetaminophen  975 mg Oral Q8H [address] Haydee Lin PA-C      ALPRAZolam  0 25 mg Oral HS PRN Agueda Lin PA-C      atorvastatin  20 mg Oral Daily Haydee Lin PA-C      bisacodyl  10 mg Rectal Daily PRN Monique Mays PA-C      cephalexin  250 mg Oral Daily Michael Caruso PA-C      docusate sodium  100 mg Oral BID Adrien Memory, GABRIELLE      enoxaparin  30 mg Subcutaneous Q12H [place]      gabapentin  100 mg Oral TID Pinckney Bimoscar, GABRIELLE      HYDROmorphone  0 2 mg Intravenous Q4H PRN Daily Bimler, GABRIELLE      lidocaine  2 patch Topical Daily Haydee N GABRIELLE Lin      loratadine  10 mg Oral Daily Pinckney Bimler, GABRIELLE      meclizine  12 5 mg Oral Q8H PRN [facility], GABRIELLE      melatonin  3 mg Oral HS [facility] Hospitaliferonit, GABRIELLE      methocarbamol  500 mg Oral Q6H [facility]oks Hospitaliferonit, GABRIELLE      ondansetron  4 mg Intravenous Q6H PRN Pinckney Bimler, GABRIELLE      oxyCODONE  2 5 mg Oral Q4H PRN Pin[place]ney Bimler, GABRIELLE      oxyCODONE  5 mg Oral Q4H PRN [facility], GABRIELLE      pantoprazole  40 mg Oral QAM [facility], GABRIELLE      phenylephine   mcg/min Intravenous Titrated [facility], GABRIELLE 55 mcg/min (07/24/21 0511)    polyethylene glycol  17 g Oral Daily [facility]      senna  2 tablet Oral BID Adrien MemoryGABRIELLE       Continuous Infusions:  phenylephine,  mcg/min, Last Rate: 55 mcg/min (07/24/21 0511)      PRN Meds:   ALPRAZolam, 0 25 mg, HS PRN  bisacodyl, 10 mg, Daily PRN  HYDROmorphone, 0 2 mg, Q4H PRN  meclizine, 12 5 mg, Q8H PRN  ondansetron, 4 mg, Q6H PRN  oxyCODONE, 2 5 mg, Q4H PRN  oxyCODONE, 5 mg, Q4H PRN        Allergies   Allergies   Allergen Reactions    Bee Venom Anaphylaxis     ---------------------------------------------------------------------------------------  Advance Directive and Living Will:      Power of Attorney:    POLST:    ---------------------------------------------------------------------------------------  Care Time Delivered:       Pranay Galeana, DO      Portions of the record may have been created with voice recognition software  Occasional wrong word or "sound a like" substitutions may have occurred due to the inherent limitations of voice recognition software    Read the chart carefully and recognize, using context, where substitutions have occurred

## 2021-07-24 NOTE — PROGRESS NOTES
[facility]  Transfer Note - Slim Malik 1945, 66 y o  female MRN: [id number]  Unit/Bed#: ICU 10 Encounter: [id number]  Primary Care Provider: Eliud Aguilar MD   Date and time admitted to hospital: 7/18/2021  2:04 PM    Fall against object  Assessment & Plan  · PT/OT  · Appreciate PMR consult    Hyperlipidemia  Assessment & Plan  · Continue statin    GERD (gastroesophageal reflux disease)  Assessment & Plan  · Continue home PPI    * Central cord syndrome [facility])  Assessment & Plan  · S/p ACDF C4-5 and PCDF C2-T2  · Completed 5 days of MAP pushes  · Neurosurgery recommending additional day, patient maintaining MAP>85 without supplementation  · Frequent neurologic and vital checks      Code Status: Level 1 - Full Code  POA:    POLST:      Reason for ICU admission:   Central cord syndrome    Active problems:   Principal Problem:    Central cord syndrome ([address] )  Active Problems:    GERD (gastroesophageal reflux disease)    Hyperlipidemia    Fall against object  Resolved Problems:    Neck pain    Bilateral arm weakness      Consultants:   Neurosurgery, PMR    History of Present Illness:   A 49-year-old female presenting as a trauma transfer on 07/18 after a fall head strike  She has a past medical history of asthma, GERD, and current treatment for UTI  Patient had nasal bone fractures and reported subjective bilateral upper extremity weakness and paresthesias with continued cervical spine tenderness  She was referred to the critical care unit for close neurologic monitoring as well as mean arterial pressure supplementation  Summary of clinical course:   She underwent an anterior C cage C4-C5 and posterior cervical decompression C2-T2 with Neurosurgery on 07/20 due to continuing paresthesias and bilateral upper extremity weakness  She has remained in the critical care unit for blood pressure supplementation with phenylephrine    Her weakness has largely resolved, however she continues to report paresthesias of her bilateral shoulders intermittently extending to her biceps  She was successfully weaned off phenylephrine on 07/24 is maintaining a mean arterial pressure above 85 without supplementation  At this point she is felt to be stable for transition to a medical-surgical floor  Recent or scheduled procedures:   7/21 Cervical xray- postsurgical changes of posterior spinal decompression instrumented fusion spanning C2-T2 anterior cervical diskectomy and fusion at C4-C5  7/21 CT Cervical spine- interval displacement of C6 anterior inferior corner fracture, new postoperative changes status post C3-C7 decompressive laminectomy and posterior instrumented fusion spanning C2-T2, ACDF at C4-5 with interbody graft, hardware is intact appropriate line, cannot reliably assess canal content due to distorted image quality by streak artifact from surgical hardware  7/20 anterior cervical diskectomy and fusion C4-C5, posterior cervical decompression fusion C2-T2  7/18 MRI Cervical spine- multilevel degenerative changes, multilevel neural foraminal and spinal some canal stenosis most severe at C4-C5 with a could be small amount left-sided cord edema, edema within the left C2-3 see facet could be posttraumatic contusion without fracture  7/19 CXR- no acute cardiopulmonary disease   7/19 Pelvic xray- no acute osseous abnormality  7/18 CT Head- no acute intracranial hemorrhage, small amount of left maxillary sinus fluid  7/18 CT Facial bones- mildly displaced acute bilateral nasal bone fractures, small amount left maxillary sinus hemorrhage    Outstanding/pending diagnostics:   Placement    Cultures:   None       Mobilization Plan: With assistance    Nutrition Plan:   Oral diet    Invasive Devices Review  Invasive Devices     Peripheral Intravenous Line            Peripheral IV 07/23/21 Right Wrist <1 day    Peripheral IV 07/24/21 Distal;Dorsal (posterior); Right Forearm <1 day          Drain Urethral Catheter Latex 16 Fr  2 days                Rationale for remaining devices: IV access    VTE Pharmacologic Prophylaxis: Enoxaparin (Lovenox)  VTE Mechanical Prophylaxis: sequential compression device    Discharge Plan:   Patient should be ready for discharge to rehab after map push    Initial Physical Therapy Recommendations:  Rehab  Initial Occupational Therapy Recommendations:  Rehab  Initial /Plan:  Acute rehab    Home medications that are not reordered and reason why:   Xanax- concern for altered mental status    Spoke with Tyson Ch PA-C regarding transfer  Please contact critical care via [employer] with any questions or concerns  Portions of the record may have been created with voice recognition software  Occasional wrong word or "sound a like" substitutions may have occurred due to the inherent limitations of voice recognition software  Read the chart carefully and recognize, using context, where substitutions have occurred  W Plasty Text: The lesion was extirpated to the level of the fat with a #15 scalpel blade.  Given the location of the defect, shape of the defect and the proximity to free margins a W-plasty was deemed most appropriate for repair.  Using a sterile surgical marker, the appropriate transposition arms of the W-plasty were drawn incorporating the defect and placing the expected incisions within the relaxed skin tension lines where possible.    The area thus outlined was incised deep to adipose tissue with a #15 scalpel blade.  The skin margins were undermined to an appropriate distance in all directions utilizing iris scissors.  The opposing transposition arms were then transposed into place in opposite direction and anchored with interrupted buried subcutaneous sutures.

## 2021-07-25 PROBLEM — S02.2XXA NASAL BONE FRACTURES: Status: ACTIVE | Noted: 2021-07-25

## 2021-07-25 PROBLEM — Z87.440 HISTORY OF RECURRENT UTIS: Status: ACTIVE | Noted: 2021-07-25

## 2021-07-25 PROBLEM — I95.1 ORTHOSTATIC HYPOTENSION: Status: ACTIVE | Noted: 2021-07-25

## 2021-07-25 PROCEDURE — 97530 THERAPEUTIC ACTIVITIES: CPT

## 2021-07-25 PROCEDURE — 97116 GAIT TRAINING THERAPY: CPT

## 2021-07-25 PROCEDURE — 97535 SELF CARE MNGMENT TRAINING: CPT

## 2021-07-25 PROCEDURE — 97110 THERAPEUTIC EXERCISES: CPT

## 2021-07-25 PROCEDURE — 99024 POSTOP FOLLOW-UP VISIT: CPT | Performed by: PHYSICIAN ASSISTANT

## 2021-07-25 PROCEDURE — 99232 SBSQ HOSP IP/OBS MODERATE 35: CPT | Performed by: SURGERY

## 2021-07-25 RX ADMIN — OXYCODONE HYDROCHLORIDE 2.5 MG: 5 TABLET ORAL at 10:23

## 2021-07-25 RX ADMIN — SODIUM CHLORIDE 500 ML: 0.9 INJECTION, SOLUTION INTRAVENOUS at 15:40

## 2021-07-25 RX ADMIN — POLYETHYLENE GLYCOL 3350 17 G: 17 POWDER, FOR SOLUTION ORAL at 10:23

## 2021-07-25 RX ADMIN — MELATONIN TAB 3 MG 3 MG: 3 TAB at 21:59

## 2021-07-25 RX ADMIN — ENOXAPARIN SODIUM 30 MG: 30 INJECTION, SOLUTION INTRAVENOUS; SUBCUTANEOUS at 10:23

## 2021-07-25 RX ADMIN — SENNOSIDES 17.2 MG: 8.6 TABLET ORAL at 10:24

## 2021-07-25 RX ADMIN — ACETAMINOPHEN 975 MG: 325 TABLET, FILM COATED ORAL at 21:59

## 2021-07-25 RX ADMIN — LORATADINE 10 MG: 10 TABLET ORAL at 10:23

## 2021-07-25 RX ADMIN — GABAPENTIN 100 MG: 100 CAPSULE ORAL at 10:23

## 2021-07-25 RX ADMIN — DOCUSATE SODIUM 100 MG: 100 CAPSULE ORAL at 17:17

## 2021-07-25 RX ADMIN — LIDOCAINE 2 PATCH: 50 PATCH TOPICAL at 10:24

## 2021-07-25 RX ADMIN — SENNOSIDES 17.2 MG: 8.6 TABLET ORAL at 17:17

## 2021-07-25 RX ADMIN — GABAPENTIN 100 MG: 100 CAPSULE ORAL at 17:17

## 2021-07-25 RX ADMIN — ATORVASTATIN CALCIUM 20 MG: 20 TABLET, FILM COATED ORAL at 10:23

## 2021-07-25 RX ADMIN — PANTOPRAZOLE SODIUM 40 MG: 40 TABLET, DELAYED RELEASE ORAL at 10:24

## 2021-07-25 RX ADMIN — METHOCARBAMOL 500 MG: 500 TABLET, FILM COATED ORAL at 12:36

## 2021-07-25 RX ADMIN — CEPHALEXIN 250 MG: 250 CAPSULE ORAL at 10:27

## 2021-07-25 RX ADMIN — ENOXAPARIN SODIUM 30 MG: 30 INJECTION, SOLUTION INTRAVENOUS; SUBCUTANEOUS at 21:59

## 2021-07-25 RX ADMIN — METHOCARBAMOL 500 MG: 500 TABLET, FILM COATED ORAL at 05:48

## 2021-07-25 RX ADMIN — SODIUM CHLORIDE 500 ML: 0.9 INJECTION, SOLUTION INTRAVENOUS at 10:33

## 2021-07-25 RX ADMIN — ACETAMINOPHEN 975 MG: 325 TABLET, FILM COATED ORAL at 05:48

## 2021-07-25 RX ADMIN — DOCUSATE SODIUM 100 MG: 100 CAPSULE ORAL at 10:24

## 2021-07-25 RX ADMIN — ACETAMINOPHEN 975 MG: 325 TABLET, FILM COATED ORAL at 13:12

## 2021-07-25 RX ADMIN — METHOCARBAMOL 500 MG: 500 TABLET, FILM COATED ORAL at 17:17

## 2021-07-25 RX ADMIN — GABAPENTIN 100 MG: 100 CAPSULE ORAL at 21:59

## 2021-07-25 RX ADMIN — OXYCODONE HYDROCHLORIDE 5 MG: 5 TABLET ORAL at 21:59

## 2021-07-25 RX ADMIN — METHOCARBAMOL 500 MG: 500 TABLET, FILM COATED ORAL at 23:01

## 2021-07-25 NOTE — ASSESSMENT & PLAN NOTE
- Orthostatic hypotension noted today on exam  - Normal saline 500cc bolus given on 07/25/2021  - will continue follow orthostatic blood pressures  - likely condition of deconditioning in the setting of multiple days in the ICU and on pressors  - she will need continued therapy  - currently out of bed to chair

## 2021-07-25 NOTE — ASSESSMENT & PLAN NOTE
- patient chronically on Keflex  - with outpatient follow-up with PCP  - no further workup at this juncture

## 2021-07-25 NOTE — PROGRESS NOTES
[address] [facility]  Progress Note - Inderjit Pa 1945, 66 y o  female MRN: [id number]  Unit/Bed#: [facility] 045-46 Encounter: [id number]  Primary Care Provider: Michael Rodgers MD   Date and time admitted to hospital: 7/18/2021  2:04 PM    Orthostatic hypotension  Assessment & Plan  - Orthostatic hypotension noted today on exam  - Normal saline 500cc bolus given on 07/25/2021  - will continue follow orthostatic blood pressures  - likely condition of deconditioning in the setting of multiple days in the ICU and on pressors  - she will need continued therapy  - currently out of bed to chair    Nasal bone fractures  Assessment & Plan  - OMFS consulted, appreciate input  - initially recommended for antibiotics; they were discontinued  - will need outpatient follow-up  - no further workup at this juncture    History of recurrent UTIs  Assessment & Plan  - patient chronically on Keflex  - with outpatient follow-up with PCP  - no further workup at this juncture    Fall against object  Assessment & Plan  · PT/OT  · Appreciate PMR consult    Hyperlipidemia  Assessment & Plan  · Continue statin    GERD (gastroesophageal reflux disease)  Assessment & Plan  · Continue home PPI    * Central cord syndrome [facility])  Assessment & Plan  · S/p ACDF C4-5 and PCDF C2-T2  · Completed 5 days of MAP pushes, patient maintaining MAP>85 without supplementation  · Frequent neurologic and vital checks  · Neurosurgery continuing to follow at this time  · Will need outpatient follow-up  DVT prophylaxis: SCDs and Lovenox  PT and OT: eval and treat    Disposition:  Continue frequent neurovascular checks  Patient continues to clinically do well  Will monitor at this time  Case management consultation for placement  SUBJECTIVE:  Chief Complaint: "No new complaints "    Subjective:  Patient is resting comfortably without any complaints  She is sitting out of bed to chair  States that she had her best night    Reports her pain is well controlled  Reports she continues to have no worsening numbness or tingling on her upper lower extremities        OBJECTIVE:     Meds/Allergies     Current Facility-Administered Medications:     acetaminophen (TYLENOL) tablet 975 mg, 975 mg, Oral, Q8H [facility], Jimmie Breana, CRNP, 975 mg at 07/25/21 1312    ALPRAZolam (XANAX) tablet 0 25 mg, 0 25 mg, Oral, HS PRN, Jimmie Nassareste, CRNP, 0 25 mg at 07/22/21 2041    atorvastatin (LIPITOR) tablet 20 mg, 20 mg, Oral, Daily, Jimmie Breana, CRNP, 20 mg at 07/25/21 1023    bisacodyl (DULCOLAX) rectal suppository 10 mg, 10 mg, Rectal, Daily PRN, Jimmie Breana, CRNP, 10 mg at 07/23/21 1125    cephalexin (KEFLEX) capsule 250 mg, 250 mg, Oral, Daily, Jimmie Breana, CRNP, 250 mg at 07/25/21 1027    docusate sodium (COLACE) capsule 100 mg, 100 mg, Oral, BID, Jimmie Breana, CRNP, 100 mg at 07/25/21 1024    enoxaparin (LOVENOX) subcutaneous injection 30 mg, 30 mg, Subcutaneous, Q12H [facility], Jimmie Nassareste, CRNP, 30 mg at 07/25/21 1023    gabapentin (NEURONTIN) capsule 100 mg, 100 mg, Oral, TID, Jimmie Breana, CRNP, 100 mg at 07/25/21 1023    HYDROmorphone HCl (DILAUDID) injection 0 2 mg, 0 2 mg, Intravenous, Q4H PRN, Jimmie Breana, CRNP, 0 2 mg at 07/21/21 2346    lidocaine (LIDODERM) 5 % patch 2 patch, 2 patch, Topical, Daily, Jimmie Breana, CRNP, 2 patch at 07/25/21 1024    loratadine (CLARITIN) tablet 10 mg, 10 mg, Oral, Daily, Jimmie Breana, CRNP, 10 mg at 07/25/21 1023    meclizine (ANTIVERT) tablet 12 5 mg, 12 5 mg, Oral, Q8H PRN, ROYA Snider    melatonin tablet 3 mg, 3 mg, Oral, HS, ROYA Lawrence, 3 mg at 07/24/21 2212    methocarbamol (ROBAXIN) tablet 500 mg, 500 mg, Oral, Q6H [facility], ROYA Snider, 500 mg at 07/25/21 1236    ondansetron (ZOFRAN) injection 4 mg, 4 mg, Intravenous, Q6H PRN, ROYA Snider, 4 mg at 07/19/21 2128    oxyCODONE (ROXICODONE) IR tablet 2 5 mg, 2 5 mg, Oral, Q4H PRN, Elmer Dubuque, CRNP, 2 5 mg at 07/25/21 1023    oxyCODONE (ROXICODONE) IR tablet 5 mg, 5 mg, Oral, Q4H PRN, Elmer Dubuque, CRNP, 5 mg at 07/24/21 2212    pantoprazole (PROTONIX) EC tablet 40 mg, 40 mg, Oral, QAM, Elmer Dubuque, CRNP, 40 mg at 07/25/21 1024    polyethylene glycol (MIRALAX) packet 17 g, 17 g, Oral, Daily, Elmer Dubuque, CRNP, 17 g at 07/25/21 1023    senna (SENOKOT) tablet 17 2 mg, 2 tablet, Oral, BID, Mohinder England, CRNP, 17 2 mg at 07/25/21 1024    sodium chloride (OCEAN) 0 65 % nasal spray 1 spray, 1 spray, Each Nare, Q1H PRN, Elmer Dubuque, CRNP     Vitals:   Vitals:    07/25/21 1019   BP: 126/63   Pulse: 96   Resp:    Temp:    SpO2: 98%       Intake/Output:  I/O       07/23 0701 - 07/24 0700 07/24 0701 - 07/25 0700 07/25 0701 - 07/26 0700    P  O  400 460 360    I V  (mL/kg) 264 1 (4 1) 187 6 (2 9)     IV Piggyback  200     Total Intake(mL/kg) 664 1 (10 3) 847 6 (13 2) 360 (5 6)    Urine (mL/kg/hr) 1485 (1) 1325 (0 9)     Drains       Stool 0      Total Output 1485 1325     Net -820 9 -477 4 +360           Unmeasured Stool Occurrence 2 x             Nutrition/GI Proph/Bowel Reg:  Continue current diet    Physical Exam:   GENERAL APPEARANCE:  No acute distress  NEURO:  GCS 15  HEENT:  Normocephalic; anterior neck incision is clean, dry, intact  CV:  Regular rate and rhythm  LUNGS:  CTA bilaterally  GI:  Nontender, nondistended  :  No Gotti  MSK:  Moving all extremities;  strength intact; slight decreased sensation in the anterior shoulders; reports that this is not new and improved  SKIN:  Warm, dry, intact    Invasive Devices     Peripheral Intravenous Line            Peripheral IV 07/23/21 Right Wrist 1 day    Peripheral IV 07/24/21 Distal;Dorsal (posterior); Right Forearm 1 day                 Lab Results: Results: I have personally reviewed pertinent reports   , BMP/CMP: No results found for: SODIUM, K, CL, CO2, ANIONGAP, BUN, CREATININE, GLUCOSE, CALCIUM, AST, ALT, ALKPHOS, PROT, BILITOT, EGFR and CBC: No results found for: WBC, HGB, HCT, MCV, PLT, ADJUSTEDWBC, MCH, MCHC, RDW, MPV, NRBC  Imaging/EKG Studies: Results: I have personally reviewed pertinent reports      Other Studies:  No other studies  VTE Prophylaxis:  SCDs and Lovenox

## 2021-07-25 NOTE — PROGRESS NOTES
Progress Note - Neurosurgery   Eric Alexander 66 y o  female MRN: [id number]  Unit/Bed#: [facility] 613-01 Encounter: [id number]    Assessment:  1  S/P # 5 days C4-5 ACDF and C2-T2 PCDF  2  Central cord syndrome  3  History of bilateral arm pain and numbness    Plan:  · Exam: A&OX3, EOMI 2 mm conj bilat, SF, Alfonso, finger to nose test normal and wo drift,  and elbow F/E 5/5 Bilat  Strenmgth in the LE normal  Slightly decreased LT on both dorsa thumbs stable  DTR 2+ no clonus bilaterally  Varela's positive on the left side  Dressing clean and dry  · Imaging reviewed personally and by attending  Final results as below  · Postop upright cervical spine x-rays in brace demonstrates postsurgical changes of posterior spinal decompression and instrumental fusion spanning C2-T2 and anterior cervical diskectomy and fusion at C4-5  · Pain control-per primary team  · DVT -bilateral legs  Lovenox  · Mobilize-as tolerated  · Continue PT/OT  · No brace required  · Patient is doing fine, 7/10 incisional pain  Complaining of dorsal thumb paresthesia  Continue pain control, PT/OT  Patient needs post acute rehab, per PT  Consider signing off tomorrow  Call with question or concern  Subjective/Objective   Chief Complaint: " Neck pain, some paresthesia"/5th 360 degree procedure    Subjective:  Patient complains of 7/10 incisional pain, reports paresthesia on bilateral dorsal thumb and upper lateral arm on the left  She also complains of leading to the side when walking to the bathroom  Denies any weakness in the extremities  Denies any drainage discharge from the incision site  Denies fever, chills, rigors, cough or chest pain  Objective:  Patient is in the recliner, communicates well and in no pain distress  No bracing is required    I/O       07/23 0701 - 07/24 0700 07/24 0701 - 07/25 0700 07/25 0701 - 07/26 0700    P  O  400 460 360    I V  (mL/kg) 264 1 (4 1) 187 6 (2 9)     IV Piggyback  200 500    Total Intake(mL/kg) 664 1 (10 3) 847 6 (13 2) 860 (13 4)    Urine (mL/kg/hr) 1485 (1) 1325 (0 9)     Drains       Stool 0      Total Output 1485 1325     Net -820 9 -477 4 +860           Unmeasured Stool Occurrence 2 x            Invasive Devices     Peripheral Intravenous Line            Peripheral IV 07/23/21 Right Wrist 1 day    Peripheral IV 07/24/21 Distal;Dorsal (posterior); Right Forearm 1 day                Physical Exam:  Vitals: Blood pressure 139/78, pulse 105, temperature 98 2 °F (36 8 °C), resp  rate 18, height 5' 2" (1 575 m), weight 64 4 kg (141 lb 15 6 oz), SpO2 99 %, not currently breastfeeding  ,Body mass index is 25 97 kg/m²  General appearance: alert, appears stated age, cooperative and no distress  Head: Normocephalic, without obvious abnormality, atraumatic  Eyes: EOMI, 2 mm conjugate bilaterally  Neck:  Both anterior and posterior cervical spine surgical wound clean and without drainage or discharge  Lungs: non labored breathing  Heart: regular heart rate  Neurologic:   Mental status: Alert, oriented x3, thought content appropriate  Cranial nerves: grossly intact (Cranial nerves II-XII)  Sensory: normal to light touch throughout, slight anesthesia on the dorsal bilateral thumbs    Motor: moving all extremities without focal weakness, strength is 5/5 bilateral  Reflexes: 2+ and symmetric  Coordination: finger to nose normal bilaterally, no drift bilaterally      Lab Results:  Results from last 7 days   Lab Units 07/24/21  0505 07/23/21  0446 07/22/21  0606   WBC Thousand/uL 7 47 6 77 9 86   HEMOGLOBIN g/dL 9 5* 7 6* 8 9*   HEMATOCRIT % 29 4* 23 1* 27 0*   PLATELETS Thousands/uL 196 110* 114*   NEUTROS PCT % 58 65 64   MONOS PCT % 9 9 11     Results from last 7 days   Lab Units 07/24/21  0505 07/23/21  0729 07/22/21  0606 07/20/21  1350 07/20/21  1306 07/20/21  1306 07/20/21  1213   POTASSIUM mmol/L 3 8 3 9 3 8  --    < >  --   --    CHLORIDE mmol/L 113* 109* 115*  --    < >  --   --    CO2 mmol/L 23 24 23  --    < >  --   --    CO2, I-STAT mmol/L  --   --   --  22  --  22 22   BUN mg/dL 13 11 13  --    < >  --   --    CREATININE mg/dL 0 63 0 64 0 62  --    < >  --   --    CALCIUM mg/dL 8 7 9 1 8 1*  --    < >  --   --    GLUCOSE, ISTAT mg/dl  --   --   --  145*  --  140 118    < > = values in this interval not displayed  Results from last 7 days   Lab Units 07/23/21  0729 07/22/21  0606 07/21/21  0529   MAGNESIUM mg/dL 2 2 2 0 2 3     Results from last 7 days   Lab Units 07/23/21  0729 07/22/21  0606 07/20/21  0428   PHOSPHORUS mg/dL 2 6 1 8* 3 3     Results from last 7 days   Lab Units 07/20/21  0428   INR  1 07     No results found for: TROPONINT  ABG:No results found for: PHART, FHH4ZWH, PO2ART, ADT1DGA, X3LWTPRG, BEART, SOURCE    Imaging Studies: I have personally reviewed pertinent reports  and I have personally reviewed pertinent films in PACS    EKG, Pathology, and Other Studies: I have personally reviewed pertinent reports        VTE Pharmacologic Prophylaxis: Enoxaparin (Lovenox)    VTE Mechanical Prophylaxis: sequential compression device

## 2021-07-25 NOTE — PLAN OF CARE
Problem: OCCUPATIONAL THERAPY ADULT  Goal: Performs self-care activities at highest level of function for planned discharge setting  See evaluation for individualized goals  Description: Treatment Interventions: ADL retraining, Functional transfer training, Endurance training, Patient/family training, UE strengthening/ROM, Compensatory technique education, Continued evaluation          See flowsheet documentation for full assessment, interventions and recommendations  Outcome: Progressing  Note: Limitation: Decreased ADL status, Decreased UE strength, Decreased Safe judgement during ADL, Decreased endurance, Decreased self-care trans, Decreased high-level ADLs, Decreased fine motor control  Prognosis: Good  Assessment: pt participated in am ot session and was seen focusing on adls seated in bedside chair post p t  session  pt was able to tolerate sitting upriught in chair with le's on ground with only c/o fatigue and discomfort b shoulders  pt was able to use b thumbs functoinally  she was motivated despite becoming dizziny with nsg and p t  earlier  pt reportedly has + orthostatic b/p's  pt remained seated this session  will follow focusing on goals from ie  OT Discharge Recommendation: Post acute rehabilitation services  OT - OK to Discharge:  Yes     CARLOTA Savage

## 2021-07-25 NOTE — PLAN OF CARE
Problem: MOBILITY - ADULT  Goal: Maintain or return to baseline ADL function  Description: INTERVENTIONS:  -  Assess patient's ability to carry out ADLs; assess patient's baseline for ADL function and identify physical deficits which impact ability to perform ADLs (bathing, care of mouth/teeth, toileting, grooming, dressing, etc )  - Assess/evaluate cause of self-care deficits   - Assess range of motion  - Assess patient's mobility; develop plan if impaired  - Assess patient's need for assistive devices and provide as appropriate  - Encourage maximum independence but intervene and supervise when necessary  - Involve family in performance of ADLs  - Assess for home care needs following discharge   - Consider OT consult to assist with ADL evaluation and planning for discharge  - Provide patient education as appropriate  Outcome: Progressing  Goal: Maintains/Returns to pre admission functional level  Description: INTERVENTIONS:  - Perform BMAT or MOVE assessment daily    - Set and communicate daily mobility goal to care team and patient/family/caregiver  - Collaborate with rehabilitation services on mobility goals if consulted  - Perform Range of Motion 3 times a day  - Reposition patient every 2 hours    - Dangle patient 3 times a day  - Stand patient 3 times a day  - Ambulate patient 3 times a day  - Out of bed to chair 3 times a day   - Out of bed for meals 3 times a day  - Out of bed for toileting  - Record patient progress and toleration of activity level   Outcome: Progressing     Problem: Prexisting or High Potential for Compromised Skin Integrity  Goal: Skin integrity is maintained or improved  Description: INTERVENTIONS:  - Identify patients at risk for skin breakdown  - Assess and monitor skin integrity  - Assess and monitor nutrition and hydration status  - Monitor labs   - Assess for incontinence   - Turn and reposition patient  - Assist with mobility/ambulation  - Relieve pressure over bony prominences  - Avoid friction and shearing  - Provide appropriate hygiene as needed including keeping skin clean and dry  - Evaluate need for skin moisturizer/barrier cream  - Collaborate with interdisciplinary team   - Patient/family teaching  - Consider wound care consult   Outcome: Progressing     Problem: Potential for Falls  Goal: Patient will remain free of falls  Description: INTERVENTIONS:  - Educate patient/family on patient safety including physical limitations  - Instruct patient to call for assistance with activity   - Consult OT/PT to assist with strengthening/mobility   - Keep Call bell within reach  - Keep bed low and locked with side rails adjusted as appropriate  - Keep care items and personal belongings within reach  - Initiate and maintain comfort rounds  - Make Fall Risk Sign visible to staff  - Offer Toileting every 2 Hours, in advance of need  - Initiate/Maintain CHAIR alarm  - Apply yellow socks and bracelet for high fall risk patients  - Consider moving patient to room near nurses station  Outcome: Progressing     Problem: PAIN - ADULT  Goal: Verbalizes/displays adequate comfort level or baseline comfort level  Description: Interventions:  - Encourage patient to monitor pain and request assistance  - Assess pain using appropriate pain scale  - Administer analgesics based on type and severity of pain and evaluate response  - Implement non-pharmacological measures as appropriate and evaluate response  - Consider cultural and social influences on pain and pain management  - Notify physician/advanced practitioner if interventions unsuccessful or patient reports new pain  Outcome: Progressing     Problem: INFECTION - ADULT  Goal: Absence or prevention of progression during hospitalization  Description: INTERVENTIONS:  - Assess and monitor for signs and symptoms of infection  - Monitor lab/diagnostic results  - Monitor all insertion sites, i e  indwelling lines, tubes, and drains  - Monitor endotracheal if appropriate and nasal secretions for changes in amount and color  - [place] appropriate cooling/warming therapies per order  - Administer medications as ordered  - Instruct and encourage patient and family to use good hand hygiene technique  - Identify and instruct in appropriate isolation precautions for identified infection/condition  Outcome: Progressing     Problem: SAFETY ADULT  Goal: Maintain or return to baseline ADL function  Description: INTERVENTIONS:  -  Assess patient's ability to carry out ADLs; assess patient's baseline for ADL function and identify physical deficits which impact ability to perform ADLs (bathing, care of mouth/teeth, toileting, grooming, dressing, etc )  - Assess/evaluate cause of self-care deficits   - Assess range of motion  - Assess patient's mobility; develop plan if impaired  - Assess patient's need for assistive devices and provide as appropriate  - Encourage maximum independence but intervene and supervise when necessary  - Involve family in performance of ADLs  - Assess for home care needs following discharge   - Consider OT consult to assist with ADL evaluation and planning for discharge  - Provide patient education as appropriate  Outcome: Progressing  Goal: Maintains/Returns to pre admission functional level  Description: INTERVENTIONS:  - Perform BMAT or MOVE assessment daily    - Set and communicate daily mobility goal to care team and patient/family/caregiver  - Collaborate with rehabilitation services on mobility goals if consulted  - Perform Range of Motion 3 times a day  - Reposition patient every 2 hours    - Dangle patient 3 times a day  - Stand patient 3 times a day  - Ambulate patient 3 times a day  - Out of bed to chair 3 times a day   - Out of bed for meals 3 times a day  - Out of bed for toileting  - Record patient progress and toleration of activity level   Outcome: Progressing  Goal: Patient will remain free of falls  Description: INTERVENTIONS:  - Educate patient/family on patient safety including physical limitations  - Instruct patient to call for assistance with activity   - Consult OT/PT to assist with strengthening/mobility   - Keep Call bell within reach  - Keep bed low and locked with side rails adjusted as appropriate  - Keep care items and personal belongings within reach  - Initiate and maintain comfort rounds  - Make Fall Risk Sign visible to staff  - Offer Toileting every 2 Hours, in advance of need  - Initiate/Maintain CHAIR alarm  - Apply yellow socks and bracelet for high fall risk patients  - Consider moving patient to room near nurses station  Outcome: Progressing     Problem: DISCHARGE PLANNING  Goal: Discharge to home or other facility with appropriate resources  Description: INTERVENTIONS:  - Identify barriers to discharge w/patient and caregiver  - Arrange for needed discharge resources and transportation as appropriate  - Identify discharge learning needs (meds, wound care, etc )  - Arrange for interpretive services to assist at discharge as needed  - Refer to Case Management Department for coordinating discharge planning if the patient needs post-hospital services based on physician/advanced practitioner order or complex needs related to functional status, cognitive ability, or social support system  Outcome: Progressing     Problem: Knowledge Deficit  Goal: Patient/family/caregiver demonstrates understanding of disease process, treatment plan, medications, and discharge instructions  Description: Complete learning assessment and assess knowledge base    Interventions:  - Provide teaching at level of understanding  - Provide teaching via preferred learning methods  Outcome: Progressing     Problem: NEUROSENSORY - ADULT  Goal: Achieves stable or improved neurological status  Description: INTERVENTIONS  - Monitor and report changes in neurological status  - Monitor vital signs such as temperature, blood pressure, glucose, and any other labs ordered   - Initiate measures to prevent increased intracranial pressure  - Monitor for seizure activity and implement precautions if appropriate      Outcome: Progressing  Goal: Remains free of injury related to seizures activity  Description: INTERVENTIONS  - Maintain airway, patient safety  and administer oxygen as ordered  - Monitor patient for seizure activity, document and report duration and description of seizure to physician/advanced practitioner  - If seizure occurs,  ensure patient safety during seizure  - Reorient patient post seizure  - Seizure pads on all 4 side rails  - Instruct patient/family to notify RN of any seizure activity including if an aura is experienced  - Instruct patient/family to call for assistance with activity based on nursing assessment  - Administer anti-seizure medications if ordered    Outcome: Progressing  Goal: Achieves maximal functionality and self care  Description: INTERVENTIONS  - Monitor swallowing and airway patency with patient fatigue and changes in neurological status  - Encourage and assist patient to increase activity and self care  - Encourage visually impaired, hearing impaired and aphasic patients to use assistive/communication devices  Outcome: Progressing     Problem: Nutrition/Hydration-ADULT  Goal: Nutrient/Hydration intake appropriate for improving, restoring or maintaining nutritional needs  Description: Monitor and assess patient's nutrition/hydration status for malnutrition  Collaborate with interdisciplinary team and initiate plan and interventions as ordered  Monitor patient's weight and dietary intake as ordered or per policy  Utilize nutrition screening tool and intervene as necessary  Determine patient's food preferences and provide high-protein, high-caloric foods as appropriate       INTERVENTIONS:  - Monitor oral intake, urinary output, labs, and treatment plans  - Assess nutrition and hydration status and recommend course of action  - Evaluate amount of meals eaten  - Assist patient with eating if necessary   - Allow adequate time for meals  - Recommend/ encourage appropriate diets, oral nutritional supplements, and vitamin/mineral supplements  - Order, calculate, and assess calorie counts as needed  - Recommend, monitor, and adjust tube feedings and TPN/PPN based on assessed needs  - Assess need for intravenous fluids  - Provide specific nutrition/hydration education as appropriate  - Include patient/family/caregiver in decisions related to nutrition  Outcome: Progressing

## 2021-07-25 NOTE — ASSESSMENT & PLAN NOTE
· S/p ACDF C4-5 and PCDF C2-T2  · Completed 5 days of MAP pushes, patient maintaining MAP>85 without supplementation  · Frequent neurologic and vital checks  · Neurosurgery continuing to follow at this time  · Will need outpatient follow-up

## 2021-07-25 NOTE — PHYSICAL THERAPY NOTE
PHYSICAL THERAPY TREATMENT  NAME:  Slim Malik  DATE: 07/25/21    AGE:   66 y o  Mrn:   [id number]  ADMIT DX:  Neck pain [M54 2]  Allergic conjunctivitis of both eyes [H10 13]  Recurrent falls [R29 6]  Central cord syndrome, initial encounter [facility]) [T76 [id number]]    Past Medical History:   Diagnosis Date    Cholelithiasis     GERD (gastroesophageal reflux disease)     Influenza 1/29/2019    Migraine headache     Pneumonia     Urinary tract infection      Past Surgical History:   Procedure Laterality Date    NO PAST SURGERIES      OK ARTHRODESIS ANT INTERBODY MIN DISCECTOMY, CERVICAL BELOW C2 Bilateral 7/20/2021    Procedure: Anterior cervical discectomy and fusion C4-5, posterior cervical decompresion and fusion C2-T2;  Surgeon: Chris Chandler MD;  Location: [facility];  Service: Neurosurgery       Length Of Stay: 7     07/25/21 0938   PT Last Visit   PT Visit Date 07/25/21   Note Type   Note Type Treatment   Pain Assessment   Pain Assessment Tool 0-10   Pain Score 6   Pain Location/Orientation Orientation: Bilateral;Location: Shoulder   Pain Radiating Towards non radiating    Restrictions/Precautions   Weight Bearing Precautions Per Order No   Braces or Orthoses   (none )   Other Precautions Chair Alarm; Bed Alarm;Multiple lines; Fall Risk;Pain;Spinal precautions   General   Chart Reviewed Yes   Family/Caregiver Present No   Cognition   Orientation Level Oriented X4   Comments pt motivated for PT- would like to get up and try to take some steps    Bed Mobility   Additional Comments was OOB on arrival in recliner    Transfers   Sit to Stand 3  Moderate assistance   Additional items Assist x 1  (progressing to Juanjo w/ cues and repetition )   Stand to Sit 4  Minimal assistance   Additional items Assist x 1; Increased time required;Verbal cues   Ambulation/Elevation   Gait pattern Excessively slow;Decreased foot clearance   Gait Assistance 3  Moderate assist   Additional items Assist x 1;Verbal cues; Tactile cues  (for upright posture/ forward head position)   Assistive Device Rolling walker   Distance 5'x2 forward then backward- modA w/ tactile input on pelvis/ trunk for upright/ Juanjo for manual weight shift to initiate swing on L  pt unable to tolerate further 2* c/o "severe weakness"    Curbs FOLLOWING AMBUALTION/ GAIT TRAINING W/ RW- PT C/O "SEVERE WEAKNESS"- BP TAKEN UPON SITTING W/ LE ELEVATED AND NOTEE TO / 67   REPEAT TAKEN IN STANDING  W/ ASSIST OF PCA 96/58- PT RETURNED TO [facility] AND SEATED THEREX COMPLETED/ TOLERATED FAIR- RN AWARE (WOULD BENEFIT FROM TEDS AND CONSIDERATION OF BINDER)   Balance   Static Sitting Fair -   Dynamic Sitting Poor +   Static Standing Poor   Dynamic Standing Poor   Ambulatory Poor   Endurance Deficit   Endurance Deficit Yes   Endurance Deficit Description fatigue and hypotension limiting    Activity Tolerance   Activity Tolerance Patient limited by fatigue;Patient limited by pain; Other (Comment)  (hypotension )   Medical Staff Made Aware RN [facility]    Nurse Made Aware yes- cleared for session    Exercises   Knee AROM Long Arc Quad 15 reps;Right;Left;Sitting  (x2)   Ankle Pumps 25 reps;Bilateral  (x2)   Marching Standing;Sitting;10 reps;Right;Left   Assessment   Prognosis Good   Problem List Decreased strength;Decreased range of motion; Impaired balance;Decreased endurance;Decreased mobility; Decreased coordination; Impaired sensation;Pain   Assessment Pt w/ improvement in ambulation / posture today using RW  Continues to benefit from verbal and tactile cues for posture/ weight shift to advance LLE  Pt did not c/o "dizziness" today- but following 2nd gait trial reported "severe" weakness" w/ noted decreased in BP  pt quick to recover in seated position w/ LE's elevated and AP  RN and team made aware  PT will continue to follow  inpt rehab on d/c recommended      Goals   Patient Goals to get to rehab and go home    STG Expiration Date 08/04/21   PT Treatment Day 2   Plan Treatment/Interventions OT; Spoke to nursing   Progress Progressing toward goals   PT Frequency   (3-5x/wk )   Recommendation   PT Discharge Recommendation Post acute rehabilitation services   Equipment Recommended [facility] Recommended Wheeled walker   Change/add to [employer]? No   PT - OK to Discharge Yes  (rehab )        07/25/21 0938   PT Last Visit   PT Visit Date 07/25/21   Note Type   Note Type Treatment   Pain Assessment   Pain Assessment Tool 0-10   Pain Score 6   Pain Location/Orientation Orientation: Bilateral;Location: Shoulder   Pain Radiating Towards non radiating    Restrictions/Precautions   Weight Bearing Precautions Per Order No   Braces or Orthoses   (none )   Other Precautions Chair Alarm; Bed Alarm;Multiple lines; Fall Risk;Pain;Spinal precautions   General   Chart Reviewed Yes   Family/Caregiver Present No   Cognition   Orientation Level Oriented X4   Comments pt motivated for PT- would like to get up and try to take some steps    Bed Mobility   Additional Comments was OOB on arrival in recliner    Transfers   Sit to Stand 3  Moderate assistance   Additional items Assist x 1  (progressing to Juanjo w/ cues and repetition )   Stand to Sit 4  Minimal assistance   Additional items Assist x 1; Increased time required;Verbal cues   Ambulation/Elevation   Gait pattern Excessively slow;Decreased foot clearance   Gait Assistance 3  Moderate assist   Additional items Assist x 1;Verbal cues; Tactile cues  (for upright posture/ forward head position)   Assistive Device Rolling walker   Distance 5'x2 forward then backward- modA w/ tactile input on pelvis/ trunk for upright/ Juanjo for manual weight shift to initiate swing on L  pt unable to tolerate further 2* c/o "severe weakness"    Curbs FOLLOWING AMBUALTION/ GAIT TRAINING W/ RW- PT C/O "SEVERE WEAKNESS"- BP TAKEN UPON SITTING W/ LE ELEVATED AND NOTEE TO / 67   REPEAT TAKEN IN STANDING  W/ ASSIST OF PCA 96/58- PT RETURNED TO [facility] AND SEATED THEREX COMPLETED/ TOLERATED FAIR- RN AWARE (WOULD BENEFIT FROM TEDS AND CONSIDERATION OF BINDER)   Balance   Static Sitting Fair -   Dynamic Sitting Poor +   Static Standing Poor   Dynamic Standing Poor   Ambulatory Poor   Endurance Deficit   Endurance Deficit Yes   Endurance Deficit Description fatigue and hypotension limiting    Activity Tolerance   Activity Tolerance Patient limited by fatigue;Patient limited by pain; Other (Comment)  (hypotension )   Medical Staff Made Aware RN Daniel Gandara    Nurse Made Aware yes- cleared for session    Exercises   Knee AROM Long Arc Quad 15 reps;Right;Left;Sitting  (x2)   Ankle Pumps 25 reps;Bilateral  (x2)   Marching Standing;Sitting;10 reps;Right;Left   Assessment   Prognosis Good   Problem List Decreased strength;Decreased range of motion; Impaired balance;Decreased endurance;Decreased mobility; Decreased coordination; Impaired sensation;Pain   Assessment Pt w/ improvement in ambulation / posture today using RW  Continues to benefit from verbal and tactile cues for posture/ weight shift to advance LLE  Pt did not c/o "dizziness" today- but following 2nd gait trial reported "severe" weakness" w/ noted decreased in BP  pt quick to recover in seated position w/ LE's elevated and AP  RN and team made aware  PT will continue to follow  inpt rehab on d/c recommended  Goals   Patient Goals to get to rehab and go home    STG Expiration Date 08/04/21   PT Treatment Day 2   Plan   Treatment/Interventions OT; Spoke to nursing   Progress Progressing toward goals   PT Frequency   (3-5x/wk )   Recommendation   PT Discharge Recommendation Post acute rehabilitation services   Equipment Recommended [address] Recommended Wheeled walker   Change/add to [employer]?  No   PT - OK to Discharge Yes  (rehab )            Amy Varela, PT

## 2021-07-25 NOTE — PLAN OF CARE
Problem: PHYSICAL THERAPY ADULT  Goal: Performs mobility at highest level of function for planned discharge setting  See evaluation for individualized goals  Description: Treatment/Interventions: Functional transfer training, LE strengthening/ROM, Elevations, Therapeutic exercise, Endurance training, Bed mobility, Gait training, Equipment eval/education  Equipment Recommended: Yung Salguero       See flowsheet documentation for full assessment, interventions and recommendations  Outcome: Progressing  Note: Prognosis: Good  Problem List: Decreased strength, Decreased range of motion, Impaired balance, Decreased endurance, Decreased mobility, Decreased coordination, Impaired sensation, Pain  Assessment: Pt w/ improvement in ambulation / posture today using RW  Continues to benefit from verbal and tactile cues for posture/ weight shift to advance LLE  Pt did not c/o "dizziness" today- but following 2nd gait trial reported "severe" weakness" w/ noted decreased in BP  pt quick to recover in seated position w/ LE's elevated and AP  RN and team made aware  PT will continue to follow  inpt rehab on d/c recommended  Barriers to Discharge: Inaccessible home environment, Decreased caregiver support        PT Discharge Recommendation: Post acute rehabilitation services     PT - OK to Discharge: Yes (rehab )    See flowsheet documentation for full assessment

## 2021-07-25 NOTE — QUICK NOTE
Family called today over the phone  Spoke with daughter Al Gutierrez  Updated her on the current care plan  Stated that she would be in the visit today  Will update with any further questions when they come in

## 2021-07-25 NOTE — ASSESSMENT & PLAN NOTE
- OMFS consulted, appreciate input  - initially recommended for antibiotics; they were discontinued  - will need outpatient follow-up  - no further workup at this juncture

## 2021-07-25 NOTE — OCCUPATIONAL THERAPY NOTE
Occupational Therapy Treatment Note:       07/25/21 1050   OT Last Visit   OT Visit Date 07/25/21   Note Type   Note Type Treatment   Restrictions/Precautions   Other Precautions Cognitive; Chair Alarm; Bed Alarm; Fall Risk;Pain;Spinal precautions   Pain Assessment   Pain Assessment Tool 0-10   Pain Score 8   Pain Location/Orientation Orientation: Bilateral  (shoulders p session)   ADL   Where Assessed Chair   Grooming Assistance 5  Supervision/Setup   UB Bathing Assistance 4  Minimal Assistance   LB Bathing Assistance 2  Maximal Assistance   UB Dressing Assistance 3  Moderate Assistance   LB Dressing Assistance 2  Maximal [address]  2  Maximal Li[address] 22 one step commands without difficulty   Comments pt was able to initaite and sequence routine adls without difficulty this session  Activity Tolerance   Activity Tolerance Patient tolerated treatment well   Assessment   Assessment pt participated in am ot session and was seen focusing on adls seated in bedside chair post p t  session  pt was able to tolerate sitting upriught in chair with le's on ground with only c/o fatigue and discomfort b shoulders  pt was able to use b thumbs functoinally  she was motivated despite becoming dizziny with nsg and p t  earlier  pt reportedly has + orthostatic b/p's  pt remained seated this session  will follow focusing on goals from ie  Plan   Treatment Interventions ADL retraining;Functional transfer training; Endurance training;Patient/family training;Equipment evaluation/education; Activityengagement   Goal Expiration Date 08/04/21   OT Treatment Day 2   OT Frequency 3-5x/wk   Recommendation   OT Discharge Recommendation Post acute rehabilitation services   OT - OK to Discharge Yes   AM-PAC Daily Activity Inpatient   Lower Body Dressing 2   Bathing 2   Toileting 2   Upper Body Dressing 2   Grooming 3   Eating 3   Daily Activity Raw Score 14   Daily Activity Standardized Score (Calc for Raw Score >=11) 33 39   AM-PAC Applied Cognition Inpatient   Following a Speech/Presentation 3   Understanding Ordinary Conversation 4   Taking Medications 3   Remembering Where Things Are Placed or Put Away 3   Remembering List of 4-5 Errands 3   Taking Care of Complicated Tasks 3   Applied Cognition Raw Score 19   Applied Cognition Standardized Score 39 77   April A CARLOTA [place]

## 2021-07-26 ENCOUNTER — HOSPITAL ENCOUNTER (INPATIENT)
Facility: HOSPITAL | Age: 76
LOS: 18 days | Discharge: HOME HEALTH | DRG: 949 | End: 2021-08-13
Attending: PHYSICAL MEDICINE & REHABILITATION | Admitting: PHYSICAL MEDICINE & REHABILITATION
Payer: MEDICARE

## 2021-07-26 ENCOUNTER — TELEPHONE (OUTPATIENT)
Dept: NEUROSURGERY | Facility: CLINIC | Age: 76
End: 2021-07-26

## 2021-07-26 VITALS
RESPIRATION RATE: 17 BRPM | OXYGEN SATURATION: 97 % | DIASTOLIC BLOOD PRESSURE: 69 MMHG | BODY MASS INDEX: 26.37 KG/M2 | TEMPERATURE: 98.5 F | SYSTOLIC BLOOD PRESSURE: 121 MMHG | HEIGHT: 62 IN | HEART RATE: 100 BPM | WEIGHT: 143.3 LBS

## 2021-07-26 DIAGNOSIS — L60.2 OVERGROWN TOENAILS: Primary | ICD-10-CM

## 2021-07-26 DIAGNOSIS — M48.02 CERVICAL STENOSIS OF SPINE: ICD-10-CM

## 2021-07-26 DIAGNOSIS — I95.1 ORTHOSTATIC HYPOTENSION: ICD-10-CM

## 2021-07-26 DIAGNOSIS — D64.9 ANEMIA: ICD-10-CM

## 2021-07-26 PROBLEM — E78.5 DYSLIPIDEMIA: Status: ACTIVE | Noted: 2021-07-26

## 2021-07-26 PROBLEM — I50.9 CHF (CONGESTIVE HEART FAILURE) (HCC): Status: ACTIVE | Noted: 2021-07-26

## 2021-07-26 PROBLEM — R33.9 URINARY RETENTION: Status: ACTIVE | Noted: 2021-07-26

## 2021-07-26 PROBLEM — R42 VERTIGO: Status: ACTIVE | Noted: 2017-01-20

## 2021-07-26 PROBLEM — N18.9 CKD (CHRONIC KIDNEY DISEASE): Status: ACTIVE | Noted: 2021-07-26

## 2021-07-26 LAB
ANION GAP SERPL CALCULATED.3IONS-SCNC: 5 MMOL/L (ref 4–13)
BASOPHILS # BLD AUTO: 0.06 THOUSANDS/ΜL (ref 0–0.1)
BASOPHILS NFR BLD AUTO: 1 % (ref 0–1)
BUN SERPL-MCNC: 18 MG/DL (ref 5–25)
CALCIUM SERPL-MCNC: 9.1 MG/DL (ref 8.3–10.1)
CHLORIDE SERPL-SCNC: 112 MMOL/L (ref 100–108)
CO2 SERPL-SCNC: 23 MMOL/L (ref 21–32)
CREAT SERPL-MCNC: 0.72 MG/DL (ref 0.6–1.3)
EOSINOPHIL # BLD AUTO: 0.22 THOUSAND/ΜL (ref 0–0.61)
EOSINOPHIL NFR BLD AUTO: 5 % (ref 0–6)
ERYTHROCYTE [DISTWIDTH] IN BLOOD BY AUTOMATED COUNT: 13.9 % (ref 11.6–15.1)
GFR SERPL CREATININE-BSD FRML MDRD: 81 ML/MIN/1.73SQ M
GLUCOSE SERPL-MCNC: 97 MG/DL (ref 65–140)
HCT VFR BLD AUTO: 25.5 % (ref 34.8–46.1)
HGB BLD-MCNC: 8.2 G/DL (ref 11.5–15.4)
IMM GRANULOCYTES # BLD AUTO: 0.02 THOUSAND/UL (ref 0–0.2)
IMM GRANULOCYTES NFR BLD AUTO: 0 % (ref 0–2)
LYMPHOCYTES # BLD AUTO: 1.45 THOUSANDS/ΜL (ref 0.6–4.47)
LYMPHOCYTES NFR BLD AUTO: 31 % (ref 14–44)
MCH RBC QN AUTO: 32.2 PG (ref 26.8–34.3)
MCHC RBC AUTO-ENTMCNC: 32.2 G/DL (ref 31.4–37.4)
MCV RBC AUTO: 100 FL (ref 82–98)
MONOCYTES # BLD AUTO: 0.55 THOUSAND/ΜL (ref 0.17–1.22)
MONOCYTES NFR BLD AUTO: 12 % (ref 4–12)
NEUTROPHILS # BLD AUTO: 2.44 THOUSANDS/ΜL (ref 1.85–7.62)
NEUTS SEG NFR BLD AUTO: 51 % (ref 43–75)
NRBC BLD AUTO-RTO: 0 /100 WBCS
PLATELET # BLD AUTO: 195 THOUSANDS/UL (ref 149–390)
PMV BLD AUTO: 9.4 FL (ref 8.9–12.7)
POTASSIUM SERPL-SCNC: 4.1 MMOL/L (ref 3.5–5.3)
RBC # BLD AUTO: 2.55 MILLION/UL (ref 3.81–5.12)
SODIUM SERPL-SCNC: 140 MMOL/L (ref 136–145)
WBC # BLD AUTO: 4.74 THOUSAND/UL (ref 4.31–10.16)

## 2021-07-26 PROCEDURE — 99223 1ST HOSP IP/OBS HIGH 75: CPT | Performed by: PHYSICAL MEDICINE & REHABILITATION

## 2021-07-26 PROCEDURE — NC001 PR NO CHARGE: Performed by: EMERGENCY MEDICINE

## 2021-07-26 PROCEDURE — 99024 POSTOP FOLLOW-UP VISIT: CPT | Performed by: PHYSICIAN ASSISTANT

## 2021-07-26 PROCEDURE — 99238 HOSP IP/OBS DSCHRG MGMT 30/<: CPT | Performed by: EMERGENCY MEDICINE

## 2021-07-26 PROCEDURE — NC001 PR NO CHARGE

## 2021-07-26 PROCEDURE — 80048 BASIC METABOLIC PNL TOTAL CA: CPT | Performed by: PHYSICIAN ASSISTANT

## 2021-07-26 PROCEDURE — 99223 1ST HOSP IP/OBS HIGH 75: CPT | Performed by: NURSE PRACTITIONER

## 2021-07-26 PROCEDURE — 85025 COMPLETE CBC W/AUTO DIFF WBC: CPT | Performed by: PHYSICIAN ASSISTANT

## 2021-07-26 PROCEDURE — 1123F ACP DISCUSS/DSCN MKR DOCD: CPT | Performed by: PHYSICIAN ASSISTANT

## 2021-07-26 RX ORDER — DOCUSATE SODIUM 100 MG/1
100 CAPSULE, LIQUID FILLED ORAL 2 TIMES DAILY
Status: DISCONTINUED | OUTPATIENT
Start: 2021-07-26 | End: 2021-07-30

## 2021-07-26 RX ORDER — GABAPENTIN 100 MG/1
100 CAPSULE ORAL 3 TIMES DAILY
Status: DISCONTINUED | OUTPATIENT
Start: 2021-07-26 | End: 2021-07-26

## 2021-07-26 RX ORDER — SENNOSIDES 8.6 MG
2 TABLET ORAL 2 TIMES DAILY
Status: DISCONTINUED | OUTPATIENT
Start: 2021-07-26 | End: 2021-07-26

## 2021-07-26 RX ORDER — HEPARIN SODIUM 5000 [USP'U]/ML
5000 INJECTION, SOLUTION INTRAVENOUS; SUBCUTANEOUS EVERY 8 HOURS SCHEDULED
Status: DISCONTINUED | OUTPATIENT
Start: 2021-07-27 | End: 2021-08-13 | Stop reason: HOSPADM

## 2021-07-26 RX ORDER — ACETAMINOPHEN 325 MG/1
975 TABLET ORAL EVERY 8 HOURS SCHEDULED
Status: DISCONTINUED | OUTPATIENT
Start: 2021-07-26 | End: 2021-08-13 | Stop reason: HOSPADM

## 2021-07-26 RX ORDER — POLYETHYLENE GLYCOL 3350 17 G/17G
17 POWDER, FOR SOLUTION ORAL DAILY
Status: DISCONTINUED | OUTPATIENT
Start: 2021-07-27 | End: 2021-07-30

## 2021-07-26 RX ORDER — PANTOPRAZOLE SODIUM 40 MG/1
40 TABLET, DELAYED RELEASE ORAL EVERY MORNING
Status: CANCELLED | OUTPATIENT
Start: 2021-07-27

## 2021-07-26 RX ORDER — LIDOCAINE 50 MG/G
2 PATCH TOPICAL DAILY
Status: CANCELLED | OUTPATIENT
Start: 2021-07-27

## 2021-07-26 RX ORDER — ATORVASTATIN CALCIUM 20 MG/1
20 TABLET, FILM COATED ORAL
Status: DISCONTINUED | OUTPATIENT
Start: 2021-07-27 | End: 2021-08-13 | Stop reason: HOSPADM

## 2021-07-26 RX ORDER — LANOLIN ALCOHOL/MO/W.PET/CERES
3 CREAM (GRAM) TOPICAL
Status: DISCONTINUED | OUTPATIENT
Start: 2021-07-26 | End: 2021-08-13 | Stop reason: HOSPADM

## 2021-07-26 RX ORDER — ACETAMINOPHEN 325 MG/1
975 TABLET ORAL EVERY 8 HOURS SCHEDULED
Status: CANCELLED | OUTPATIENT
Start: 2021-07-26

## 2021-07-26 RX ORDER — DOCUSATE SODIUM 100 MG/1
100 CAPSULE, LIQUID FILLED ORAL 2 TIMES DAILY
Status: CANCELLED | OUTPATIENT
Start: 2021-07-26

## 2021-07-26 RX ORDER — LANOLIN ALCOHOL/MO/W.PET/CERES
3 CREAM (GRAM) TOPICAL
Status: CANCELLED | OUTPATIENT
Start: 2021-07-26

## 2021-07-26 RX ORDER — OXYCODONE HYDROCHLORIDE 5 MG/1
2.5 TABLET ORAL EVERY 4 HOURS PRN
Status: CANCELLED | OUTPATIENT
Start: 2021-07-26

## 2021-07-26 RX ORDER — BISACODYL 10 MG
10 SUPPOSITORY, RECTAL RECTAL DAILY PRN
Status: CANCELLED | OUTPATIENT
Start: 2021-07-26

## 2021-07-26 RX ORDER — GABAPENTIN 100 MG/1
100 CAPSULE ORAL 3 TIMES DAILY
Status: CANCELLED | OUTPATIENT
Start: 2021-07-26

## 2021-07-26 RX ORDER — MECLIZINE HCL 12.5 MG/1
12.5 TABLET ORAL EVERY 8 HOURS PRN
Status: CANCELLED | OUTPATIENT
Start: 2021-07-26

## 2021-07-26 RX ORDER — SENNOSIDES 8.6 MG
1 TABLET ORAL 2 TIMES DAILY
Status: DISCONTINUED | OUTPATIENT
Start: 2021-07-26 | End: 2021-07-30

## 2021-07-26 RX ORDER — OXYCODONE HYDROCHLORIDE 5 MG/1
2.5 TABLET ORAL EVERY 4 HOURS PRN
Status: DISCONTINUED | OUTPATIENT
Start: 2021-07-26 | End: 2021-08-13 | Stop reason: HOSPADM

## 2021-07-26 RX ORDER — PANTOPRAZOLE SODIUM 40 MG/1
40 TABLET, DELAYED RELEASE ORAL
Status: DISCONTINUED | OUTPATIENT
Start: 2021-07-27 | End: 2021-08-13 | Stop reason: HOSPADM

## 2021-07-26 RX ORDER — OXYCODONE HYDROCHLORIDE 5 MG/1
5 TABLET ORAL EVERY 4 HOURS PRN
Status: DISCONTINUED | OUTPATIENT
Start: 2021-07-26 | End: 2021-08-13 | Stop reason: HOSPADM

## 2021-07-26 RX ORDER — SENNOSIDES 8.6 MG
2 TABLET ORAL 2 TIMES DAILY
Status: CANCELLED | OUTPATIENT
Start: 2021-07-26

## 2021-07-26 RX ORDER — CEPHALEXIN 250 MG/1
250 CAPSULE ORAL DAILY
Status: CANCELLED | OUTPATIENT
Start: 2021-07-27

## 2021-07-26 RX ORDER — ECHINACEA PURPUREA EXTRACT 125 MG
1 TABLET ORAL 4 TIMES DAILY PRN
Status: CANCELLED | OUTPATIENT
Start: 2021-07-26

## 2021-07-26 RX ORDER — CEPHALEXIN 250 MG/1
250 CAPSULE ORAL DAILY
Status: DISCONTINUED | OUTPATIENT
Start: 2021-07-27 | End: 2021-08-07

## 2021-07-26 RX ORDER — MAGNESIUM CARB/ALUMINUM HYDROX 105-160MG
296 TABLET,CHEWABLE ORAL ONCE
Status: COMPLETED | OUTPATIENT
Start: 2021-07-26 | End: 2021-07-26

## 2021-07-26 RX ORDER — ECHINACEA PURPUREA EXTRACT 125 MG
1 TABLET ORAL 4 TIMES DAILY PRN
Status: DISCONTINUED | OUTPATIENT
Start: 2021-07-26 | End: 2021-08-12

## 2021-07-26 RX ORDER — LIDOCAINE 50 MG/G
2 PATCH TOPICAL DAILY
Status: DISCONTINUED | OUTPATIENT
Start: 2021-07-27 | End: 2021-08-13 | Stop reason: HOSPADM

## 2021-07-26 RX ORDER — MECLIZINE HCL 12.5 MG/1
12.5 TABLET ORAL EVERY 8 HOURS PRN
Status: DISCONTINUED | OUTPATIENT
Start: 2021-07-26 | End: 2021-08-12

## 2021-07-26 RX ORDER — ALPRAZOLAM 0.25 MG/1
0.25 TABLET ORAL
Status: CANCELLED | OUTPATIENT
Start: 2021-07-26

## 2021-07-26 RX ORDER — ATORVASTATIN CALCIUM 20 MG/1
20 TABLET, FILM COATED ORAL DAILY
Status: CANCELLED | OUTPATIENT
Start: 2021-07-27

## 2021-07-26 RX ORDER — LORATADINE 10 MG/1
10 TABLET ORAL DAILY
Status: CANCELLED | OUTPATIENT
Start: 2021-07-27

## 2021-07-26 RX ORDER — SODIUM PHOSPHATE, DIBASIC AND SODIUM PHOSPHATE, MONOBASIC 7; 19 G/133ML; G/133ML
1 ENEMA RECTAL ONCE
Status: COMPLETED | OUTPATIENT
Start: 2021-07-26 | End: 2021-07-26

## 2021-07-26 RX ORDER — POLYETHYLENE GLYCOL 3350 17 G/17G
17 POWDER, FOR SOLUTION ORAL DAILY
Status: CANCELLED | OUTPATIENT
Start: 2021-07-27

## 2021-07-26 RX ORDER — BISACODYL 10 MG
10 SUPPOSITORY, RECTAL RECTAL DAILY PRN
Status: DISCONTINUED | OUTPATIENT
Start: 2021-07-26 | End: 2021-07-30

## 2021-07-26 RX ORDER — ALPRAZOLAM 0.25 MG/1
0.25 TABLET ORAL
Status: DISCONTINUED | OUTPATIENT
Start: 2021-07-26 | End: 2021-08-12

## 2021-07-26 RX ORDER — METHOCARBAMOL 500 MG/1
500 TABLET, FILM COATED ORAL EVERY 6 HOURS PRN
Status: DISCONTINUED | OUTPATIENT
Start: 2021-07-26 | End: 2021-08-12

## 2021-07-26 RX ORDER — OXYCODONE HYDROCHLORIDE 5 MG/1
5 TABLET ORAL EVERY 4 HOURS PRN
Status: CANCELLED | OUTPATIENT
Start: 2021-07-26

## 2021-07-26 RX ORDER — METHOCARBAMOL 500 MG/1
500 TABLET, FILM COATED ORAL EVERY 6 HOURS SCHEDULED
Status: CANCELLED | OUTPATIENT
Start: 2021-07-26

## 2021-07-26 RX ADMIN — ENOXAPARIN SODIUM 30 MG: 30 INJECTION, SOLUTION INTRAVENOUS; SUBCUTANEOUS at 08:33

## 2021-07-26 RX ADMIN — POLYETHYLENE GLYCOL 3350 17 G: 17 POWDER, FOR SOLUTION ORAL at 08:35

## 2021-07-26 RX ADMIN — GABAPENTIN 100 MG: 100 CAPSULE ORAL at 08:33

## 2021-07-26 RX ADMIN — ATORVASTATIN CALCIUM 20 MG: 20 TABLET, FILM COATED ORAL at 08:32

## 2021-07-26 RX ADMIN — OXYCODONE HYDROCHLORIDE 5 MG: 5 TABLET ORAL at 02:30

## 2021-07-26 RX ADMIN — METHOCARBAMOL 500 MG: 500 TABLET, FILM COATED ORAL at 11:19

## 2021-07-26 RX ADMIN — LORATADINE 10 MG: 10 TABLET ORAL at 08:32

## 2021-07-26 RX ADMIN — SENNOSIDES 8.6 MG: 8.6 TABLET ORAL at 17:14

## 2021-07-26 RX ADMIN — SENNOSIDES 17.2 MG: 8.6 TABLET ORAL at 08:33

## 2021-07-26 RX ADMIN — Medication 296 ML: at 12:27

## 2021-07-26 RX ADMIN — LIDOCAINE 2 PATCH: 50 PATCH TOPICAL at 08:34

## 2021-07-26 RX ADMIN — MELATONIN TAB 3 MG 3 MG: 3 TAB at 22:05

## 2021-07-26 RX ADMIN — ACETAMINOPHEN 975 MG: 325 TABLET, FILM COATED ORAL at 06:09

## 2021-07-26 RX ADMIN — METHOCARBAMOL 500 MG: 500 TABLET, FILM COATED ORAL at 06:09

## 2021-07-26 RX ADMIN — CEPHALEXIN 250 MG: 250 CAPSULE ORAL at 08:35

## 2021-07-26 RX ADMIN — SODIUM CHLORIDE 500 ML: 0.9 INJECTION, SOLUTION INTRAVENOUS at 01:22

## 2021-07-26 RX ADMIN — DOCUSATE SODIUM 100 MG: 100 CAPSULE ORAL at 08:33

## 2021-07-26 RX ADMIN — PANTOPRAZOLE SODIUM 40 MG: 40 TABLET, DELAYED RELEASE ORAL at 08:32

## 2021-07-26 RX ADMIN — SODIUM PHOSPHATE, DIBASIC AND SODIUM PHOSPHATE, MONOBASIC 1 ENEMA: 7; 19 ENEMA RECTAL at 18:47

## 2021-07-26 RX ADMIN — DOCUSATE SODIUM 100 MG: 100 CAPSULE ORAL at 17:14

## 2021-07-26 RX ADMIN — ACETAMINOPHEN 975 MG: 325 TABLET, FILM COATED ORAL at 14:14

## 2021-07-26 RX ADMIN — OXYCODONE HYDROCHLORIDE 2.5 MG: 5 TABLET ORAL at 14:14

## 2021-07-26 RX ADMIN — ACETAMINOPHEN 975 MG: 325 TABLET, FILM COATED ORAL at 22:05

## 2021-07-26 NOTE — ASSESSMENT & PLAN NOTE
- at home patient was chronically on keflex 250 mg qd for suppression of recurrent UTIs (confrimed with patient's OP pharmacy that patient receives monthly prescriptions for this, last provided by Dr Viviana Us of urology) however patient with UA indicative of possible active UTI, UCx confirmed this and patient is on abx appropriate to susceptibilities; started on 8/8 by Dr Lionel Castro and IM recommend a 5 day course through 8/12 (completed); d/w Dr Saji Lobo if patient should resume home keflex 250 mg qd for suppression given that patient had a UTI while on keflex 250 mg qd however Dr Saji Lobo recommended keflex 250 mg qd be resumed upon completion of cipro course as he feels that breakthrough UTIs are uncommon with this patient while on keflex 250 mg qd and therefore should be continued    - OP FU with Dr Saji Lobo

## 2021-07-26 NOTE — ASSESSMENT & PLAN NOTE
- at home on xanax 0 25 mg qhs prn however has not required any doses while in rehab unit therefore dc'd

## 2021-07-26 NOTE — PROGRESS NOTES
PHYSICAL MEDICINE AND REHABILITATION   PREADMISSION ASSESSMENT     Projected [facility] Diagnoses:  Impairment of mobility, safety and Activities of Daily Living (ADLs) due to Spinal Cord Dysfunction:  Traumatic:  04 230  Other Traumatic    Etiologic: s/p fall - Central cord syndrome ; Traumatic incomplete tetraplegia   Date of Onset: 7/18/21  Date of surgery: 7/20/21 Anterior cervical discectomy and fusion C4-5, posterior cervical decompresion and fusion C2-T2 (Bilateral)    PATIENT INFORMATION  Name: Inderjit Pa Phone #: [phone] (home)   Address: [address] [place] [address] [address] [place] [address] Street: 1945 Age: 66 y o  SS#   Marital Status: /Civil Union  Ethnicity: White ; not  or    Employment Status: retired  Extended Emergency Contact Information  Primary Emergency Contact: [address]  Mobile Phone: [phone]  Relation: Daughter  Secondary Emergency Contact: Alex Pelletier  Mobile Phone: [phone]  Relation: Grandchild  Advance Directive: Level 1 - Full code ( no advance directive on file )     INSURANCE/COVERAGE:     Primary Payor: MEDICARE / Plan: MEDICARE A AND B / Product Type: Medicare A & B Fee for Service /   Secondary Payer: [facility]   Payer Contact: n/a Payer Contact:n/a   Contact Phone: n/a Contact Phone:n/a     Authorization #: n/a  Coverage Dates:n/a  LCD: n/a  MEDICARE #: [id number]  Medicare Days: 60/30/60  Medical Record #: [id number]    REFERRAL SOURCE:   Referring provider: Mohit Fernandes MD  Referring facility: [address] [place] [address]  Room: [place] [address]/[place] [address]  PCP: Michael Rodgers MD PCP phone number: [phone]    MEDICAL INFORMATION  HPI: Aminah Parrish  is a 66 y o  female with a medical history of but not limited to - asthma, GERD, UTI who presented to the [address] [place] [address]  on 7/18/21 after falling and striking her head on a table    She was found to have nasal bone fractures and MRI showed multilevel degenerative changes causing multilevel neural foraminal and spinal canal stenosis most severe at C4-C5 with small amount of left sided cord edema  Edema within the left C2-C3 facet - post-traumatic contusion without fracture  She was transferred to [place] [address] for admission / further eval / further assessment / surgical consultation  General surgery / neurosurgery / dental were consulted  Per dental - no surgical intervention was needed for nasal bone fractures  On 7/20/21 she underwent an anterior cervical discectomy and fusion C4-5, posterior cervical decompression and fusion C2-T2  S/p surgery patient noted with ABLA and hypotention, but per IM both issues / concerns are well controlled / stable at this time  The patient has a history of chronic UTI's, continued on her keflex  PT/ OT therapies were consulted and continue to follow patient at this time  PM&R was consulted and they recommended inpatient acute rehabilitation when medically stable  All medical disciplines feel/agree patient is ready for discharge at this time  Inpatient acute rehabilitation physician was consulted  Upon review of patients case and correspondence with PT/OT therapies and PM&R, [facility] physician feels Oswald Rojo is a good candidate / appropriate for inpatient acute rehab at this time  Oswald Rojo has demonstrated the willingness/desire and the tolerance to participate in the required 3 hours of therapies  Past Medical History:   Past Surgical History:    Allergies:     Past Medical History:   Diagnosis Date    Cholelithiasis     GERD (gastroesophageal reflux disease)     Influenza 1/29/2019    Migraine headache     Pneumonia     Urinary tract infection     Past Surgical History:   Procedure Laterality Date    NO PAST SURGERIES      SC ARTHRODESIS ANT INTERBODY MIN DISCECTOMY, CERVICAL BELOW C2 Bilateral 7/20/2021    Procedure: Anterior cervical discectomy and fusion C4-5, posterior cervical decompresion and fusion C2-T2; Surgeon: Esdras Cantor MD;  Location: [facility];  Service: Neurosurgery     Allergies   Allergen Reactions    Bee Venom Anaphylaxis         Comorbidities/Surgeries in the last 100 days: Chronic UTI ; chronic constipation ; traumatic incomplete tetraplegia ; urinary incontinence with cath placement ; pain    CURRENT VITAL SIGNS:   Temp:  [97 5 °F (36 4 °C)-98 5 °F (36 9 °C)] 98 5 °F (36 9 °C)  HR:  [] 100  Resp:  [16-20] 17  BP: (105-139)/(52-79) 121/69   Intake/Output Summary (Last 24 hours) at 7/26/2021 1210  Last data filed at 7/26/2021 1049  Gross per 24 hour   Intake 1460 ml   Output 595 ml   Net 865 ml        LABORATORY RESULTS:      Lab Results   Component Value Date    HGB 8 2 (L) 07/26/2021    HCT 25 5 (L) 07/26/2021    WBC 4 74 07/26/2021     Lab Results   Component Value Date    BUN 18 07/26/2021    BUN 18 10/13/2020    K 4 1 07/26/2021    K 4 2 10/13/2020     (H) 07/26/2021     10/13/2020    GLUCOSE 145 (H) 07/20/2021    CREATININE 0 72 07/26/2021     Lab Results   Component Value Date    PROTIME 13 9 07/20/2021    INR 1 07 07/20/2021        DIAGNOSTIC STUDIES:  XR cervical spine 2 or 3 views    Result Date: 7/21/2021  Impression: Postsurgical changes of posterior spinal decompression and instrumented fusion spanning C2-T2 and anterior cervical discectomy and fusion at C4-C5  Workstation performed: [id number]     XR spine cervical 2 or 3 vw injury    Result Date: 7/21/2021  Impression: Fluoroscopic guidance provided for procedure guidance  Please refer to the separate procedure notes for additional details  Workstation performed: [id number]     CT spine cervical wo contrast    Result Date: 7/21/2021  Impression: 1  Interval displacement of C6 anterior-inferior corner fracture  2   New postoperative change status post C3-C7 decompressive laminectomy and posterior instrumented fusion spanning C2-T2  ACDF at C4-5 with interbody graft  Hardware is intact and appropriately aligned  3   Cannot reliably assess canal content due to distorted image quality by streak artifact from surgical hardware    I personally discussed this study with SHANIA Tracey on 7/21/2021 at 10:25 AM   Workstation performed: [id number]       PRECAUTIONS/SPECIAL NEEDS:  Tobacco:   Social History     Tobacco Use   Smoking Status Former Smoker    Packs/day: 1 50    Years: 10 00    Pack years: 15 00    Types: Cigarettes   Smokeless Tobacco Never Used   Tobacco Comment    quit age 20   , Alcohol:    Social History     Substance and Sexual Activity   Alcohol Use Never    Alcohol/week: 0 0 standard drinks   , Anticoagulation:  Lovenox, Edema Management, Safety Concerns, Pain Management, Dietary Restrictions: Regular diet, Language Preference: English and fall precautions    MEDICATIONS:     Current Facility-Administered Medications:     acetaminophen (TYLENOL) tablet 975 mg, 975 mg, Oral, Q8H [address], BRIDGER LawrenceNP, 975 mg at 07/26/21 0609    ALPRAZolam (XANAX) tablet 0 25 mg, 0 25 mg, Oral, HS PRN, Mala Boyd CRNP, 0 25 mg at 07/22/21 2041    atorvastatin (LIPITOR) tablet 20 mg, 20 mg, Oral, Daily, Mala Oliver, CRNP, 20 mg at 07/26/21 0338    bisacodyl (DULCOLAX) rectal suppository 10 mg, 10 mg, Rectal, Daily PRN, Mala Boyd, CRNP, 10 mg at 07/23/21 1125    cephalexin (KEFLEX) capsule 250 mg, 250 mg, Oral, Daily, Mala Boyd, CRNP, 250 mg at 07/26/21 0835    docusate sodium (COLACE) capsule 100 mg, 100 mg, Oral, BID, Mala Yuter, CRNP, 100 mg at 07/26/21 5840    enoxaparin (LOVENOX) subcutaneous injection 30 mg, 30 mg, Subcutaneous, Q12H [address]Mohinder CRNP, 30 mg at 07/26/21 2802    gabapentin (NEURONTIN) capsule 100 mg, 100 mg, Oral, TID, Mala Boyd CRNP, 100 mg at 07/26/21 6562    HYDROmorphone HCl (DILAUDID) injection 0 2 mg, 0 2 mg, Intravenous, Q4H PRN, ROYA Rico, 0 2 mg at 07/21/21 0841    lidocaine (LIDODERM) 5 % patch 2 patch, 2 patch, Topical, Daily, ROYA Huizar, 2 patch at 07/26/21 0834    loratadine (CLARITIN) tablet 10 mg, 10 mg, Oral, Daily, BRIDGER HuizarNP, 10 mg at 07/26/21 0300    magnesium citrate (CITROMA) oral solution 296 mL, 296 mL, Oral, Once, ROYA Card    meclizine (ANTIVERT) tablet 12 5 mg, 12 5 mg, Oral, Q8H PRN, ROYA Huizar    melatonin tablet 3 mg, 3 mg, Oral, HS, Taylor Lord, CRNP, 3 mg at 07/25/21 2159    methocarbamol (ROBAXIN) tablet 500 mg, 500 mg, Oral, Q6H [address], Taylor Lord, CRNP, 500 mg at 07/26/21 1119    ondansetron (ZOFRAN) injection 4 mg, 4 mg, Intravenous, Q6H PRN, Taylor Lord, ROYA, 4 mg at 07/19/21 2128    oxyCODONE (ROXICODONE) IR tablet 2 5 mg, 2 5 mg, Oral, Q4H PRN, BRIDGER HuizarNP, 2 5 mg at 07/25/21 1023    oxyCODONE (ROXICODONE) IR tablet 5 mg, 5 mg, Oral, Q4H PRN, Taylor Lord, CRNP, 5 mg at 07/26/21 0230    pantoprazole (PROTONIX) EC tablet 40 mg, 40 mg, Oral, QAM, Taylor Lord, BRIDGERNP, 40 mg at 07/26/21 8144    polyethylene glycol (MIRALAX) packet 17 g, 17 g, Oral, Daily, BRIDGER HuizarNP, 17 g at 07/26/21 3127    senna (SENOKOT) tablet 17 2 mg, 2 tablet, Oral, BID, BRIDGER HuizarNP, 17 2 mg at 07/26/21 5781    sodium chloride (OCEAN) 0 65 % nasal spray 1 spray, 1 spray, Each Nare, Q1H PRN, BRIDGER HuizarNP    SKIN INTEGRITY:    Bilateral neck s/p surgical incision(s) site(s) - treatment as ordered     PRIOR LEVEL OF FUNCTION:  She lives in a(n) single family home  Meet Evans is not  and lives with their family  Self Care: Independent, Indoor Mobility: Independent, Stairs (in/outdoor): Independent and Cognition: Independent    FALLS IN THE LAST 6 MONTHS: 2    HOME ENVIRONMENT:  The living area: bedroom on 2nd floor and bathroom on 2nd floor  There are Greater than 10 steps to enter the home  The patient will not have 24 hour supervision/physical assistance available upon discharge      PREVIOUS DME:  Equipment in home (previous DME): Single Point Cane    FUNCTIONAL STATUS:  Physical Therapy Occupational Therapy Speech Therapy   As per PT:     PT Visit Date 07/25/21   Note Type   Note Type Treatment   Pain Assessment   Pain Assessment Tool 0-10   Pain Score 6   Pain Location/Orientation Orientation: Bilateral;Location: Shoulder   Pain Radiating Towards non radiating    Restrictions/Precautions   Weight Bearing Precautions Per Order No   Braces or Orthoses    (none )   Other Precautions Chair Alarm; Bed Alarm;Multiple lines; Fall Risk;Pain;Spinal precautions   General   Chart Reviewed Yes   Family/Caregiver Present No   Cognition   Orientation Level Oriented X4   Comments pt motivated for PT- would like to get up and try to take some steps    Bed Mobility   Additional Comments was OOB on arrival in recliner    Transfers   Sit to Stand 3  Moderate assistance   Additional items Assist x 1  (progressing to Juanjo w/ cues and repetition )   Stand to Sit 4  Minimal assistance   Additional items Assist x 1; Increased time required;Verbal cues   Ambulation/Elevation   Gait pattern Excessively slow;Decreased foot clearance   Gait Assistance 3  Moderate assist   Additional items Assist x 1;Verbal cues; Tactile cues  (for upright posture/ forward head position)   Assistive Device Rolling walker   Distance 5'x2 forward then backward- modA w/ tactile input on pelvis/ trunk for upright/ Juanjo for manual weight shift to initiate swing on L  pt unable to tolerate further 2* c/o "severe weakness"    Curbs FOLLOWING AMBUALTION/ GAIT TRAINING W/ RW- PT C/O "SEVERE WEAKNESS"- BP TAKEN UPON SITTING W/ LE ELEVATED AND NOTEE TO / 67   REPEAT TAKEN IN STANDING  W/ ASSIST OF PCA 96/58- PT RETURNED TO CHIAR AND SEATED THEREX COMPLETED/ TOLERATED FAIR- RN AWARE (WOULD BENEFIT FROM TEDS AND CONSIDERATION OF BINDER)   Balance   Static Sitting Fair -   Dynamic Sitting Poor +   Static Standing Poor   Dynamic Standing Poor   Ambulatory Poor   Endurance Deficit   Endurance Deficit Yes   Endurance Deficit Description fatigue and hypotension limiting    Activity Tolerance   Activity Tolerance Patient limited by fatigue;Patient limited by pain; Other (Comment)  (hypotension )   Medical Staff Made Aware RN Neftaly Belcher    Nurse Made Aware yes- cleared for session    Exercises   Knee AROM Long Arc Quad 15 reps;Right;Left;Sitting  (x2)   Ankle Pumps 25 reps;Bilateral  (x2)   Marching Standing;Sitting;10 reps;Right;Left   Assessment   Prognosis Good   Problem List Decreased strength;Decreased range of motion; Impaired balance;Decreased endurance;Decreased mobility; Decreased coordination; Impaired sensation;Pain   Assessment Pt w/ improvement in ambulation / posture today using RW  Continues to benefit from verbal and tactile cues for posture/ weight shift to advance LLE  Pt did not c/o "dizziness" today- but following 2nd gait trial reported "severe" weakness" w/ noted decreased in BP  pt quick to recover in seated position w/ LE's elevated and AP  RN and team made aware  PT will continue to follow  inpt rehab on d/c recommended  As per OT:    OT Visit Date 07/25/21   Note Type   Note Type Treatment   Restrictions/Precautions   Other Precautions Cognitive; Chair Alarm; Bed Alarm; Fall Risk;Pain;Spinal precautions   Pain Assessment   Pain Assessment Tool 0-10   Pain Score 8   Pain Location/Orientation Orientation: Bilateral  (shoulders p session)   ADL   Where Assessed Chair   Grooming Assistance 5  Supervision/Setup   UB Bathing Assistance 4  Minimal Assistance   LB Bathing Assistance 2  Maximal Assistance   UB Dressing Assistance 3  Moderate Assistance   LB Dressing Assistance 2  Maximal [address]  2  Maximal Liini 22 one step commands without difficulty   Comments pt was able to initaite and sequence routine adls without difficulty this session     Activity Tolerance   Activity Tolerance Patient tolerated treatment well   Assessment   Assessment pt participated in am ot session and was seen focusing on adls seated in bedside chair post p t  session  pt was able to tolerate sitting upriught in chair with le's on ground with only c/o fatigue and discomfort b shoulders  pt was able to use b thumbs functoinally  she was motivated despite becoming dizziny with nsg and p t  earlier  pt reportedly has + orthostatic b/p's  pt remained seated this session  will follow focusing on goals from ie       n/a     CURRENT GAP IN FUNCTION  Prior to Admission: Functional Status: Patient was independent with mobility/ambulation, transfers, ADL's, IADL's  Estimated length of stay: 10 to 14 days    Anticipated Post-Discharge Disposition/Treatment  Disposition: Return to previous home/apartment  Outpatient Services: Physical Therapy (PT) and Occupational Therapy (OT)    BARRIERS TO DISCHARGE  Lovenox, Weakness, Pain, Balance Difficulty, Fatigue, Home Accessibility, Caregiver Accessibility and Equipment Needs    INTERVENTIONS FOR DISCHARGE  Adaptive equipment, Patient/Family/Caregiver Education, Arrange DME needs, Medication Changes per MD, Therapy exercises, Center of balance support  and Energy conservation education     REQUIRED THERAPY:  Patient will require PT and OT 90 minutes each per day, five days per week to achieve rehab goals  REQUIRED FUNCTIONAL AND MEDICAL MANAGEMENT FOR INPATIENT REHABILITATION:  Pain Management: Overall pain is well controlled, Deep Vein Thrombosis (DVT) Prophylaxis:  low molecular weight heparin, SCD's while in bed and nursing education and bowel/bladder management,internal medicine to monitor and manage medical conditions,PM&R to maximize function and provide medical oversight,PT/OT intervention,patient and family education and trainig and any consults as needed      RECOMMENDED LEVEL OF CARE:   Bhakti Smith  is a 66 y o  female with a medical history of but not limited to - asthma, GERD, UTI who presented to the [address] [place] [address]  on 7/18/21 after falling and striking her head on a table  She was found to have nasal bone fractures and MRI showed multilevel degenerative changes causing multilevel neural foraminal and spinal canal stenosis most severe at C4-C5 with small amount of left sided cord edema  Edema within the left C2-C3 facet - post-traumatic contusion without fracture  She was transferred to [place] [address] for admission / further eval / further assessment / surgical consultation  General surgery / neurosurgery / dental were consulted  Per dental - no surgical intervention was needed for nasal bone fractures  On 7/20/21 she underwent an anterior cervical discectomy and fusion C4-5, posterior cervical decompression and fusion C2-T2  S/p surgery patient noted with ABLA and hypotention, but per IM both issues / concerns are well controlled / stable at this time  The patient has a history of chronic UTI's, continued on her keflex  PT/ OT therapies were consulted and continue to follow patient at this time  PM&R was consulted and they recommended inpatient acute rehabilitation when medically stable  All medical disciplines feel/agree patient is ready for discharge at this time  Inpatient acute rehabilitation physician was consulted  Upon review of patients case and correspondence with PT/OT therapies and PM&R, [facility] physician feels Bhakti Smith is a good candidate / appropriate for inpatient acute rehab at this time  Bhakti Smith has demonstrated the willingness/desire and the tolerance to participate in the required 3 hours of therapies  Prior to admission she was independent for most ADLs, daughter assisted with washing her hair and supervised her going up the stairs  Currently with Physical Therapy: Moderate assist for bed mobility, transfers   Currently with Occupational Therapy:  Minimal assist for eating, grooming, moderate assist for UB bathing/dressing, maximal assist for LB bathing/dressing and toileting  Nursing is being recommended for education and bowel/bladder management ,nternal medicine to monitor ans manage medical conditions,PM&R to maximize function and provide medical oversight and inpatient rehab to maximize self care,mobility,strength and endurance upon discharge to home

## 2021-07-26 NOTE — PLAN OF CARE
Problem: Prexisting or High Potential for Compromised Skin Integrity  Goal: Skin integrity is maintained or improved  Description: INTERVENTIONS:  - Identify patients at risk for skin breakdown  - Assess and monitor skin integrity  - Assess and monitor nutrition and hydration status  - Monitor labs   - Assess for incontinence   - Turn and reposition patient  - Assist with mobility/ambulation  - Relieve pressure over bony prominences  - Avoid friction and shearing  - Provide appropriate hygiene as needed including keeping skin clean and dry  - Evaluate need for skin moisturizer/barrier cream  - Collaborate with interdisciplinary team   - Patient/family teaching  - Consider wound care consult   7/26/2021 1349 by Kayli Sargent RN  Outcome: Progressing  7/26/2021 1348 by Kayli Sargent RN  Outcome: Progressing     Problem: MOBILITY - ADULT  Goal: Maintain or return to baseline ADL function  Description: INTERVENTIONS:  -  Assess patient's ability to carry out ADLs; assess patient's baseline for ADL function and identify physical deficits which impact ability to perform ADLs (bathing, care of mouth/teeth, toileting, grooming, dressing, etc )  - Assess/evaluate cause of self-care deficits   - Assess range of motion  - Assess patient's mobility; develop plan if impaired  - Assess patient's need for assistive devices and provide as appropriate  - Encourage maximum independence but intervene and supervise when necessary  - Involve family in performance of ADLs  - Assess for home care needs following discharge   - Consider OT consult to assist with ADL evaluation and planning for discharge  - Provide patient education as appropriate  7/26/2021 1349 by Kayli Sargent RN  Outcome: Progressing  7/26/2021 1348 by Kayli Sargent RN  Outcome: Progressing  Goal: Maintains/Returns to pre admission functional level  Description: INTERVENTIONS:  - Perform BMAT or MOVE assessment daily    - Set and communicate daily mobility goal to care team and patient/family/caregiver  - Collaborate with rehabilitation services on mobility goals if consulted  - Perform Range of Motion  times a day  - Reposition patient every  hours    - Dangle patient  times a day  - Stand patient  times a day  - Ambulate patient  times a day  - Out of bed to chair  times a day   - Out of bed for meals times a day  - Out of bed for toileting  - Record patient progress and toleration of activity level   7/26/2021 1349 by Davie Runner, RN  Outcome: Progressing  7/26/2021 1348 by Davie Runner, RN  Outcome: Progressing     Problem: Potential for Falls  Goal: Patient will remain free of falls  Description: INTERVENTIONS:  - Educate patient/family on patient safety including physical limitations  - Instruct patient to call for assistance with activity   - Consult OT/PT to assist with strengthening/mobility   - Keep Call bell within reach  - Keep bed low and locked with side rails adjusted as appropriate  - Keep care items and personal belongings within reach  - Initiate and maintain comfort rounds  - Make Fall Risk Sign visible to staff  - Offer Toileting every  Hours, in advance of need  - Initiate/Maintain alarm  - Obtain necessary fall risk management equipment:   - Apply yellow socks and bracelet for high fall risk patients  - Consider moving patient to room near nurses station  7/26/2021 1349 by Davie Runner, RN  Outcome: Progressing  7/26/2021 1348 by Davie Runner, RN  Outcome: Progressing     Problem: PAIN - ADULT  Goal: Verbalizes/displays adequate comfort level or baseline comfort level  Description: Interventions:  - Encourage patient to monitor pain and request assistance  - Assess pain using appropriate pain scale  - Administer analgesics based on type and severity of pain and evaluate response  - Implement non-pharmacological measures as appropriate and evaluate response  - Consider cultural and social influences on pain and pain management  - Notify physician/advanced practitioner if interventions unsuccessful or patient reports new pain  7/26/2021 1349 by Marleen Alexander RN  Outcome: Progressing  7/26/2021 1348 by Marleen Alexander RN  Outcome: Progressing     Problem: SAFETY ADULT  Goal: Patient will remain free of falls  Description: INTERVENTIONS:  - Educate patient/family on patient safety including physical limitations  - Instruct patient to call for assistance with activity   - Consult OT/PT to assist with strengthening/mobility   - Keep Call bell within reach  - Keep bed low and locked with side rails adjusted as appropriate  - Keep care items and personal belongings within reach  - Initiate and maintain comfort rounds  - Make Fall Risk Sign visible to staff  - Offer Toileting every  Hours, in advance of need  - Initiate/Maintain alarm  - Obtain necessary fall risk management equipment:   - Apply yellow socks and bracelet for high fall risk patients  - Consider moving patient to room near nurses station  7/26/2021 1349 by Marleen Alexander RN  Outcome: Progressing  7/26/2021 1348 by Marleen Alexander RN  Outcome: Progressing  Goal: Maintain or return to baseline ADL function  Description: INTERVENTIONS:  -  Assess patient's ability to carry out ADLs; assess patient's baseline for ADL function and identify physical deficits which impact ability to perform ADLs (bathing, care of mouth/teeth, toileting, grooming, dressing, etc )  - Assess/evaluate cause of self-care deficits   - Assess range of motion  - Assess patient's mobility; develop plan if impaired  - Assess patient's need for assistive devices and provide as appropriate  - Encourage maximum independence but intervene and supervise when necessary  - Involve family in performance of ADLs  - Assess for home care needs following discharge   - Consider OT consult to assist with ADL evaluation and planning for discharge  - Provide patient education as appropriate  7/26/2021 1349 by Marleen Alexander RN  Outcome: Progressing  7/26/2021 1348 by Pedrito Haq RN  Outcome: Progressing  Goal: Maintains/Returns to pre admission functional level  Description: INTERVENTIONS:  - Perform BMAT or MOVE assessment daily    - Set and communicate daily mobility goal to care team and patient/family/caregiver  - Collaborate with rehabilitation services on mobility goals if consulted  - Perform Range of Motion  times a day  - Reposition patient every  hours    - Dangle patient times a day  - Stand patient  times a day  - Ambulate patient  times a day  - Out of bed to chair  times a day   - Out of bed for saman times a day  - Out of bed for toileting  - Record patient progress and toleration of activity level   7/26/2021 1349 by Pedrito Haq RN  Outcome: Progressing  7/26/2021 1348 by Pedrito Haq RN  Outcome: Progressing     Problem: DISCHARGE PLANNING  Goal: Discharge to home or other facility with appropriate resources  Description: INTERVENTIONS:  - Identify barriers to discharge w/patient and caregiver  - Arrange for needed discharge resources and transportation as appropriate  - Identify discharge learning needs (meds, wound care, etc )  - Arrange for interpretive services to assist at discharge as needed  - Refer to Case Management Department for coordinating discharge planning if the patient needs post-hospital services based on physician/advanced practitioner order or complex needs related to functional status, cognitive ability, or social support system  7/26/2021 1349 by Pedrito Haq RN  Outcome: Progressing  7/26/2021 1348 by Pedrito Haq RN  Outcome: Progressing

## 2021-07-26 NOTE — ASSESSMENT & PLAN NOTE
- Hg currently increased to 10 4  post-operatively  - IM monitoring and have cleared patient for dc (scrip will be provided upon dc for H&H with results to PCP)

## 2021-07-26 NOTE — ASSESSMENT & PLAN NOTE
- Cr currently 0 95  - baseline appears to be 1 0-1 2  - IM monitoring and have cleared patient for dc

## 2021-07-26 NOTE — TREATMENT PLAN
Individualized Plan of [address] 66 y o  female MRN: [id number]  Unit/Bed#: -01 Encounter: [id number]     PATIENT INFORMATION  ADMISSION DATE: 7/26/2021  1:13 PM SADI CATEGORY:cervical stenosis/central cord syndrome    ADMISSION DIAGNOSIS: Central cord synd at unsp level of cerv spinal cord, init ([facility] [address] ) [S14 129A]  EXPECTED LOS: 1-2 wks      MEDICAL/FUNCTIONAL PROGNOSIS  Based on my assessment of the patient's medical conditions and current functional status, the prognosis for attaining medical and functional goals or the IRF stay is: fair     Medical Goals: pain control     ANTICIPATED DISCHARGE DISPOSITION AND SERVICES  COMMUNITY SETTING: home     ANTICIPATED FOLLOW-UP SERVICE:   Outpatient Therapy Services: PT/OT    DISCIPLINE SPECIFIC PLANS:  Required Disciplines & Services: PT/OT     REQUIRED THERAPY:  Therapy Hours per Day Days per Week Total Days   Physical Therapy 1 5 5 10   Occupational Therapy 1 5 5 10   Speech/Language Therapy 0 0 0   NOTE: Additional therapy time(s) may be added as appropriate to meet patient needs and to achieve functional goals      ANTICIPATED FUNCTIONAL OUTCOMES:  ADL:   Patient will maximize level for ADLs with least restrictive device upon completion of the rehab program     Bladder/Bowel: Patient will maximize level for bladder/bowel management with least restrictive device upon completion of the rehab program     Transfers:   Patient will maximize level for transfers with least restrictive device upon completion of the rehab program     Locomotion:   Patient will maximize level for locomotion with least restrictive device upon completion of the rehab program     Cognitive:   Patient appears to be at baseline          [address] [facility] needs: tbd       REHAB ANTICIPATED PARTICIPATION RESTRICTIONS:  None

## 2021-07-26 NOTE — H&P
PHYSICAL MEDICINE AND REHABILITATION H&P/ADMISSION NOTE  Irving Domingo 66 y o  female MRN: [id number]  Unit/Bed#: -01 Encounter: [id number]     Rehab Diagnosis: 04 230    CC: cervical stenosis/central cord syndrome     History of Present Illness:   Irving Domingo is a 66 y o  female who presented to the [facility] after fall  Patient p/w c/o neck pain as well as bilateral arm pain/numbness  Imaging revealed cervical stenosis most severe at C4-5 with possible cord edema and patient subsequently underwent C4-5 ACDF & C2-T2 PCDF on 7/20 by Dr Wilfrid Jennings  Patient was also found to have B/L nasal bone fractures and L maxillary sinus hemorrhage which was managed conservatively  Post-op course complicated by urinary retention requiring morrow placement  Subjective: patient seen at bedside and d/w patient home medication regimen     Review of Systems: A 10-point review of systems was performed  Negative except as listed above      Plan:     Cervical stenosis of spine  Assessment & Plan  - C6 fx, per XR report of 7/21 "Redemonstrated anterior-inferior C6 vertebral body corner fracture, unchanged in alignment " (seen by neurosx on 7/26 no further intervention recommended)  - cervical stenosis most severe at C4-5 with possible cord edema s/p ACDF C4-5 & PCDF C2-T2 by Dr Wilfrid Jennings on 7/20  - c-spine precautions  - no brace needed per neurosx  - FU XR already e-prescribed in EMR by neurosx team  - OP FU with Dr Wilfrid Jennings     Nasal bone fractures  Assessment & Plan  - B/L nasal bone fractures & L maxillary sinus hemorrhage  - non-op management per OMFS  - sinus precautions   - abx recommended by OMFS however dc'd by trauma surgery in acute care who was primary team per their protocol     CKD (chronic kidney disease)  Assessment & Plan  - Cr currently 0 72  - baseline appears to be 1 0-1 2  - IM monitoring     Anemia  Assessment & Plan  - Hg currently 8 2 post-operatively  - recheck in AM   - IM monitoring Urinary retention  Assessment & Plan  - morrow placed on 7/26 in acute care just prior to transfer to [address] [place] [address] when patient is more mobile     CHF (congestive heart failure) ([facility])  Assessment & Plan  - EF 45-50%  - grade 1 DD  - not on diuretics at home  - IM monitoring volume status       Dyslipidemia  Assessment & Plan  - on home lipitor 20 mg qpm    History of recurrent UTIs  Assessment & Plan  - on home keflex 250 mg qd which patient is chronically on (confrimed with patient's OP pharmacy that patient receives monthly prescriptions for this, last provided by Dr Jennifer Paul of urology)  - OP FU with Dr Torsten Hand  - on home xanax 0 25 mg qhs prn     Vertigo  Assessment & Plan  - chronic, likely BPV  - on home antivert 12 5 mg q8 prn     * GERD (gastroesophageal reflux disease)  Assessment & Plan  - on home protonix 40 mg qd        Drug regimen reviewed, all potential adverse effects identified and addressed:    Scheduled Meds:  Current Facility-Administered Medications   Medication Dose Route Frequency Provider Last Rate    acetaminophen  975 mg Oral Q8H Jamal Kennedy MD      ALPRAZolam  0 25 mg Oral HS PRN MD Melia Wiggins Smoker ON 7/27/2021] atorvastatin  20 mg Oral Daily With Marv Hobbs MD      bisacodyl  10 mg Rectal Daily PRN MD Melia Wiggins Smoker ON 7/27/2021] cephalexin  250 mg Oral Daily Saad Acevedo MD      docusate sodium  100 mg Oral BID Saad Acevedo MD      [START ON 7/27/2021] heparin (porcine)  5,000 Units Subcutaneous Q8H MD Melia Stokes Smoker ON 7/27/2021] lidocaine  2 patch Topical Daily Saad Acevedo MD      meclizine  12 5 mg Oral Q8H PRN Saad Acevedo MD      melatonin  3 mg Oral HS Saad Acevedo MD      methocarbamol  500 mg Oral Q6H PRN Saad Acevedo MD      oxyCODONE  2 5 mg Oral Q4H PRN Saad Acevedo MD      oxyCODONE  5 mg Oral Q4H PRN Saad Acevedo MD      [START ON 7/27/2021] pantoprazole  40 mg Oral Early Morning Gladys Sims MD     Annia Garciajesus ON 7/27/2021] polyethylene glycol  17 g Oral Daily Gladys Sims MD      senna  1 tablet Oral BID Gladys Sims MD      sodium chloride  1 spray Each Nare 4x Daily PRN Gladys Sims MD            Incidental findings:    1) gallstones: asymptomatic; OP FU with PCP with GI referral at PCPs discretion     DVT ppx: HSQ (cleared in acute care by neurosx for pharmacologic DVT ppx)          Functional History - Prior to Admission:     I PTA     Functional Status Upon Admission to [facility]:  Transfers: min-mod  Mobility: mod  ADLs: mod-max     Physical Exam:  Vitals:    07/26/21 1451   BP: 140/63   Pulse: 94   Resp: 17   Temp: 97 9 °F (36 6 °C)   SpO2:          General: alert, no apparent distress, cooperative and comfortable  HEENT:  Eye: Normal external eye, conjunctiva, lidsc cornea  Ears: Normal external ears  CARDIAC:  +S1/2  LUNGS:  no abnormal respiratory pattern, no retractions noted, non-labored breathing   ABDOMEN:  soft NT  EXTREMITIES:  no cyanosis or clubbing   NEURO:  CN 2-12 grossly intact, lt touch grossly intact, no gross dysmetria on F-N testing B/L, 4- to 4/5 throughout on MMT   PSYCH:  mood/affect currently stable       Laboratory:    Results from last 7 days   Lab Units 07/26/21  0512 07/24/21  0505 07/23/21  0446   HEMOGLOBIN g/dL 8 2* 9 5* 7 6*   HEMATOCRIT % 25 5* 29 4* 23 1*   WBC Thousand/uL 4 74 7 47 6 77     Results from last 7 days   Lab Units 07/26/21  0512 07/24/21  0505 07/23/21  0729 07/20/21  1350 07/20/21  1306 07/20/21  1213   BUN mg/dL 18 13 11  --   --   --    SODIUM mmol/L 140 141 139  --   --   --    POTASSIUM mmol/L 4 1 3 8 3 9  --   --   --    CHLORIDE mmol/L 112* 113* 109*  --   --   --    GLUCOSE, ISTAT mg/dl  --   --   --  145* 140 118   CREATININE mg/dL 0 72 0 63 0 64  --   --   --      Results from last 7 days   Lab Units 07/20/21  0428   PROTIME seconds 13 9   INR  1 07            Past Medical History:   Past Surgical History:   Family History: Social history:   Past Medical History:   Diagnosis Date    Cholelithiasis     GERD (gastroesophageal reflux disease)     Influenza 1/29/2019    Migraine headache     Pneumonia     Urinary tract infection     Past Surgical History:   Procedure Laterality Date    NO PAST SURGERIES      CA ARTHRODESIS ANT INTERBODY MIN DISCECTOMY, CERVICAL BELOW C2 Bilateral 7/20/2021    Procedure: Anterior cervical discectomy and fusion C4-5, posterior cervical decompresion and fusion C2-T2;  Surgeon: Xavi Carrizales MD;  Location: [facility];  Service: Neurosurgery     Family History   Problem Relation Age of Onset    Heart disease Mother     No Known Problems Father     No Known Problems Son       Social History     Socioeconomic History    Marital status: /Civil Union     Spouse name: None    Number of children: None    Years of education: None    Highest education level: None   Occupational History    None   Tobacco Use    Smoking status: Former Smoker     Packs/day: 1 50     Years: 10 00     Pack years: 15 00     Types: Cigarettes    Smokeless tobacco: Never Used    Tobacco comment: quit age 20   Vaping Use    Vaping Use: Never used   Substance and Sexual Activity    Alcohol use: Never     Alcohol/week: 0 0 standard drinks    Drug use: Never    Sexual activity: None   Other Topics Concern    None   Social History Narrative    Lives with      Social Determinants of Health     Financial Resource Strain:     Difficulty of Paying Living Expenses:    Food Insecurity:     Worried About Running Out of Food in the Last Year:     Ran Out of Food in the Last Year:    Transportation Needs:     Lack of Transportation (Medical):      Lack of Transportation (Non-Medical):    Physical Activity:     Days of Exercise per Week:     Minutes of Exercise per Session:    Stress:     Feeling of Stress :    Social Connections:     Frequency of Communication with Friends and Family:     Frequency of Social Gatherings with Friends and Family:     Attends Christianity Services:     Active Member of Clubs or Organizations:     Attends Club or Organization Meetings:     Marital Status:    Intimate Partner Violence:     Fear of Current or Ex-Partner:     Emotionally Abused:     Physically Abused:     Sexually Abused:           Current Medical Diagnosis Allergies   Patient Active Problem List   Diagnosis    GERD (gastroesophageal reflux disease)    History of asthma    Migraine headache    Hypertension    Cholelithiasis    Moderate mitral regurgitation    Acute rhinitis    Vertigo    Viral illness    Medicare annual wellness visit, subsequent    Recurrent falls    Gait disturbance    Hyperlipidemia    Generalized weakness    Abnormal LFTs    Anxiety    Age related osteoporosis    Thrombocytopenia ([address] )    Allergic conjunctivitis of both eyes    Fall against object    Central cord syndrome ([address] )    History of recurrent UTIs    Nasal bone fractures    Orthostatic hypotension    Dyslipidemia    Urinary retention    Anemia    CKD (chronic kidney disease)    CHF (congestive heart failure) (HCC)    Cervical stenosis of spine    Allergies   Allergen Reactions    Bee Venom Anaphylaxis           Medical Necessity Criteria for [facility] Admission: anemia, CKD  In addition, the preadmission screen, post-admission physical evaluation, overall plan of care and admissions order demonstrate a reasonable expectation that the following criteria were met at the time of admission to the [facility]  1  The patient requires active and ongoing therapeutic intervention of multiple therapy disciplines (physical therapy, occupational therapy, speech-language pathology, or prosthetics/orthotics), one of which is physical or occupational therapy      2  Patient requires an intensive rehabilitation therapy program, as defined in Chapter 1, section 110 2 2 of the CMS Medicare Policy Manual  This intensive rehabilitation therapy program will consist of at least 3 hours of therapy per day at least 5 days per week or at least 15 hours of intensive rehabilitation therapy within a 7 consecutive day period, beginning with the date of admission to the [facility]  3  The patient is reasonably expected to actively participate in, and benefit significantly from, the intensive rehabilitation therapy program as defined in Chapter 1, section 110 2 2 of the CMS Medicare Policy Manual at this time of admission to the [facility]  She can reasonably be expected to make measurable improvement (that will be of practical value to improve the patients functional capacity or adaptation to impairments) as a result of the rehabilitation treatment, as defined in section 110 3, and such improvement can be expected to be made within the prescribed period of time  As noted in the CMS Medicare Policy Manual, the patient need not be expected to achieve complete independence in the domain of self-care nor be expected to return to his or her prior level of functioning in order to meet this standard  4  The patient must require physician supervision by a rehabilitation physician  As such, a rehabilitation physician will conduct face-to-face visits with the patient at least 3 days per week throughout the patients stay in the [facility] to assess the patient both medically and functionally, as well as to modify the course of treatment as needed to maximize the patients capacity to benefit from the rehabilitation process    5  The patient requires an intensive and coordinated interdisciplinary approach to providing rehabilitation, as defined in Chapter 1, section 110 2 5 of the CMS Medicare Policy Manual  This will be achieved through periodic team conferences, conducted at least once in a 7-day period, and comprising of an interdisciplinary team of medical professionals consisting of: a rehabilitation physician, registered nurse,  and/or , and a licensed/certified therapist from each therapy discipline involved in treating the patient  Changes Since Pre-admission Assessment: None -This patient's participation in rehab continues to be reasonable, necessary and appropriate  CMS Required Post-Admission Physician Evaluation Elements  History and Physical, including medical history, functional history and active comorbidities as in above text  Post-Admission Physician Evaluation:  The patient has the potential to make improvement and is in need of physical, occupational, and/or therapy services  The patient may also need nutritional services  Given the patient's complex medical condition and risk of further medical complications, rehabilitative services cannot be safely provided at a lower level of care, such as a skilled nursing facility  I have reviewed the patient's functional and medical status at the time of the preadmission screening and they are the same as on the day of this admission  I acknowledge that I have personally performed a full physical examination on this patient within 24 hours of admission  The patient and/or family demonstrated understanding the rehabilitation program and the discharge process after we discussed them       Agree in entirety: yes  Minor adaptions: none    Major changes: none     Carly Vega MD  Physical Medicine and Rehabilitation

## 2021-07-26 NOTE — PROGRESS NOTES
[facility]  Progress Note - Nani Halo 1945, 66 y o  female MRN: [id number]  Unit/Bed#: [facility] 738-10 Encounter: [id number]  Primary Care Provider: Yves Fierro MD   Date and time admitted to hospital: 7/18/2021  2:04 PM    Orthostatic hypotension  Assessment & Plan  - Orthostatic hypotension noted yesterday  - will recheck on 07/26/2021; did receive a L bolus on 07/25/2021  - likely condition of deconditioning in the setting of multiple days in the ICU and on pressors  - she will need continued therapy  - currently out of bed to chair    Nasal bone fractures  Assessment & Plan  - OMFS consulted, appreciate input  - initially recommended for antibiotics; they were discontinued  - will need outpatient follow-up  - no further workup at this juncture    History of recurrent UTIs  Assessment & Plan  - patient chronically on Keflex  - with outpatient follow-up with PCP  - no further workup at this juncture    Fall against object  Assessment & Plan  · PT/OT  · Appreciate PMR consult    Hyperlipidemia  Assessment & Plan  · Continue statin    GERD (gastroesophageal reflux disease)  Assessment & Plan  · Continue home PPI    * Central cord syndrome ([facility] [address] )  Assessment & Plan  · S/p ACDF C4-5 and PCDF C2-T2  · Completed 5 days of MAP pushes, patient maintaining MAP>85 without supplementation  · Frequent neurologic and vital checks  · Neurosurgery continuing to follow at this time  · Will need outpatient follow-up    DVT prophylaxis: SCDs and Lonveox  PT and OT: eval and treat    Disposition:  DC planning  Patient is medically appropriate for discharge at this time  SUBJECTIVE:  Chief Complaint: "No new complaints "    Subjective: Patient is offering no new complaints today  Reports that she does want to have a bowel movement         OBJECTIVE:     Meds/Allergies     Current Facility-Administered Medications:     acetaminophen (TYLENOL) tablet 975 mg, 975 mg, Oral, Q8H [address], Elisabeth Groves Samanta, CRNP, 975 mg at 07/26/21 0609    ALPRAZolam (XANAX) tablet 0 25 mg, 0 25 mg, Oral, HS PRN, Ronna Lown, CRNP, 0 25 mg at 07/22/21 2041    atorvastatin (LIPITOR) tablet 20 mg, 20 mg, Oral, Daily, Ronna Lown, CRNP, 20 mg at 07/26/21 9106    bisacodyl (DULCOLAX) rectal suppository 10 mg, 10 mg, Rectal, Daily PRN, Ronna Lown, CRNP, 10 mg at 07/23/21 1125    cephalexin (KEFLEX) capsule 250 mg, 250 mg, Oral, Daily, Ronna Lown, CRNP, 250 mg at 07/26/21 0835    docusate sodium (COLACE) capsule 100 mg, 100 mg, Oral, BID, Ronna Lown, CRNP, 100 mg at 07/26/21 9584    enoxaparin (LOVENOX) subcutaneous injection 30 mg, 30 mg, Subcutaneous, Q12H [address], Ronna Lown, CRNP, 30 mg at 07/26/21 9254    gabapentin (NEURONTIN) capsule 100 mg, 100 mg, Oral, TID, Ronna Lown, CRNP, 100 mg at 07/26/21 9912    HYDROmorphone HCl (DILAUDID) injection 0 2 mg, 0 2 mg, Intravenous, Q4H PRN, Ronna Lown, CRNP, 0 2 mg at 07/21/21 2346    lidocaine (LIDODERM) 5 % patch 2 patch, 2 patch, Topical, Daily, Ronna Lown, CRNP, 2 patch at 07/26/21 0834    loratadine (CLARITIN) tablet 10 mg, 10 mg, Oral, Daily, Ronna Lown, CRNP, 10 mg at 07/26/21 6659    meclizine (ANTIVERT) tablet 12 5 mg, 12 5 mg, Oral, Q8H PRN, Ronna Lown, CRNP    melatonin tablet 3 mg, 3 mg, Oral, HS, Ronna Lown, CRNP, 3 mg at 07/25/21 3509    methocarbamol (ROBAXIN) tablet 500 mg, 500 mg, Oral, Q6H [address], BRIDGER MominNP, 500 mg at 07/26/21 0609    ondansetron (ZOFRAN) injection 4 mg, 4 mg, Intravenous, Q6H PRN, Ronna Coronado, BRIDGERNP, 4 mg at 07/19/21 2128    oxyCODONE (ROXICODONE) IR tablet 2 5 mg, 2 5 mg, Oral, Q4H PRN, BRIDGER MominNP, 2 5 mg at 07/25/21 1023    oxyCODONE (ROXICODONE) IR tablet 5 mg, 5 mg, Oral, Q4H PRN, BRIDGER MominNP, 5 mg at 07/26/21 4120    pantoprazole (PROTONIX) EC tablet 40 mg, 40 mg, Oral, QAM, BRIDGER MominNP, 40 mg at 07/26/21 7302   polyethylene glycol (MIRALAX) packet 17 g, 17 g, Oral, Daily, ROYA Dailey, 17 g at 07/26/21 0835    senna (SENOKOT) tablet 17 2 mg, 2 tablet, Oral, BID, ROYA Lawrence, 17 2 mg at 07/26/21 0833    sodium chloride (OCEAN) 0 65 % nasal spray 1 spray, 1 spray, Each Nare, Q1H PRN, ROYA Dailey     Vitals:   Vitals:    07/26/21 0814   BP: 120/64   Pulse: 101   Resp: 16   Temp: 98 4 °F (36 9 °C)   SpO2: 97%       Intake/Output:  I/O       07/24 0701 - 07/25 0700 07/25 0701 - 07/26 0700 07/26 0701 - 07/27 0700    P  O  460 700     I V  (mL/kg) 187 6 (2 9)      IV Piggyback 200 1000     Total Intake(mL/kg) 847 6 (13 2) 1700 (26 2)     Urine (mL/kg/hr) 1325 (0 9) 420 (0 3)     Stool       Total Output 1325 420     Net -477 4 +1280                   Nutrition/GI Proph/Bowel Reg: Continue current diet    Physical Exam:   GENERAL APPEARANCE: NAD  NEURO: GCS 15  HEENT: Normocephalic  CV: RRR  LUNGS: CTA b/l  GI: Non-tender, non-distended  : No morrow  MSK:  Neurovascular intact continues paresthesias at the left thumb and anterior shoulders with no new changes  SKIN: warm, dry, intact    Invasive Devices     Peripheral Intravenous Line            Peripheral IV 07/23/21 Right Wrist 2 days    Peripheral IV 07/24/21 Distal;Dorsal (posterior); Right Forearm 2 days                 Lab Results:   Results: I have personally reviewed pertinent reports   , BMP/CMP:   Lab Results   Component Value Date    SODIUM 140 07/26/2021    K 4 1 07/26/2021     (H) 07/26/2021    CO2 23 07/26/2021    BUN 18 07/26/2021    CREATININE 0 72 07/26/2021    CALCIUM 9 1 07/26/2021    EGFR 81 07/26/2021    and CBC:   Lab Results   Component Value Date    WBC 4 74 07/26/2021    HGB 8 2 (L) 07/26/2021    HCT 25 5 (L) 07/26/2021     (H) 07/26/2021     07/26/2021    MCH 32 2 07/26/2021    MCHC 32 2 07/26/2021    RDW 13 9 07/26/2021    MPV 9 4 07/26/2021    NRBC 0 07/26/2021     Imaging/EKG Studies: Results:  I have personally reviewed pertinent reports      Other Studies: no other studies  VTE Prophylaxis: SCDs and Lovenox

## 2021-07-26 NOTE — ASSESSMENT & PLAN NOTE
- EF 45-50%  - grade 1 DD  - not on diuretics at home  - IM monitoring volume status and have cleared patient for dc   - does not appear to have an OP cardiologist, recommend patient follow with Bebe Flaherty cardiology (or a cardiologist of her choosing upon dc)

## 2021-07-26 NOTE — ASSESSMENT & PLAN NOTE
- C6 fx, per XR report of 7/21 "Redemonstrated anterior-inferior C6 vertebral body corner fracture, unchanged in alignment " (seen by neurosx on 7/26 no further intervention recommended)  - cervical stenosis most severe at C4-5 with possible cord edema s/p ACDF C4-5 & PCDF C2-T2 by Dr Raul Rodríguez on 7/20  - c-spine precautions  - no brace needed per neurosx  - 2 week post-op FU completed by neurosx while patient in rehab unit   - per neurosx protocol FU XR (already e-prescribed in EMR by neurosx team) to be done 2-3 days prior to FU appt of 9/3   - OP FU with neurosx Leila Summers PA-C/Dr Abrahan Isbell) on 9/3 at 9:15 AM

## 2021-07-26 NOTE — ASSESSMENT & PLAN NOTE
- s/p morrow removal, PVRs now consistently below 300 cc, and patient is being treated for UTI (started on abx on 8/8 by Dr Neha Dozier)

## 2021-07-26 NOTE — TELEPHONE ENCOUNTER
7/30/21- PT IN HOSPITAL    7/29/21- [facility]    7/28/21- [facility]    7/27/21- [facility]    7/26/21- [facility] ([facility])  2WK POV SCHED 8/3/21  6WK POV SCHED 9/3/21  PT WILL NEED XRAYS AT 6WKS (Reina Hamman 7/26/21 EMAIL)    GABRIELLE Carl; Debi Aldridge,     Pt is s/p Anterior cervical discectomy and fusion C4-5, posterior cervical decompresion and fusion C2-T2 (Bilateral) by Dr Gauthier Likes  Dr Michael Siddiqui last day will be this Wednesday so let's plan for 2 week incision check apt with nurse and 6 week follow up with Dr Bingham as a solo or Snplx with Dr Roberto Woo  Thanks

## 2021-07-26 NOTE — ASSESSMENT & PLAN NOTE
- B/L nasal bone fractures & L maxillary sinus hemorrhage  - non-op management per OMFS  - sinus precautions   - abx recommended by OMFS however dc'd by trauma surgery in acute care who was primary team per their protocol   - OP FU with Dr Sharon Nicolas DMD

## 2021-07-26 NOTE — DISCHARGE INSTRUCTIONS
Oral Facial Maxillary Instructions:  Nasal Fractures:  - Ice to face as needed   - Sinus precautions: 4 weeks: no nose blowing, avoid putting pressure on sinus area, avoid strenuous activity/straining, try to sneeze with mouth open  Use OTC Afrin BID 2 sprays/nostril 3 days maximum as needed, OTC decongestant (e g  Sudafed) or Antihistamine (e g  Claritin-D) as needed, and saline nasal spray as needed  Discharge instructions  Anterior and Posterior cervical decompression and fixation/fusion      Surgical incisional care:   Keep incision clean and dry  Avoid applying creams, lotion or antiseptic to incision area   Allow steri-strips to fall off  If still in place at two week postoperative visit, we will remove them   Check the wound daily  If the incision becomes red, swollen, tender, warm, or has increased drainage please notify physician immediately   May shower 3 days after surgery, but do not soak in a tub and no swimming  o Use mild antimicrobial soap and water with a clean washcloth  Pat incision dry after showering and a clean towel daily   Cervical VISTA collar to be worn at all times except for showering  Change from VISTA (grey) collar to the [facility] (peach) collar prior to showering  Incision may be cleaned with water and a mild antimicrobial soap using a clean washcloth  Incision is to be gently patted dry with a clean towel  Once dry, collar should be changed back to a VISTA (grey) collar with clean pads in place   Wash collar pads with mild soap and water  They are to be laid flat to dry on a clean towel  Recommend changing every 1-2 days   Please refer to VISTA collar instructions for further details  Activity Restrictions:   No heavy lifting greater than 5 - 10lbs  No strenuous activities   May walk as tolerated  Encourage at least 4 short walks per day   No driving while requiring cervical collar, anticipated six weeks   No significant neck movement     Diet: consider soft minced food with gravy  Recommend small bites with sips of water between  Postoperative medication:   Take pain medications to relieve incision pain, and muscle relaxants to prevent spasms as directed  Please see after visit summary (AVS) for details   Take over the counter stool softeners such as colace or senna-s to avoid constipation while on narcotics  Intake water and fiber intake   Do not take ibuprofen, Naproxen/Aleve or any NSAID until cleared by surgeon  May take Tylenol instead   If taking Coumadin, Aspirin, or Plavix, you may resume these medications when cleared by Neurosurgery  Follow-up as scheduled for a 2 week incision check  Follow-up 6 weeks after surgery with a repeat cervical spine upright x-rays to be completed prior to visit  **Please notify MD immediately if you experience a fever of 101F, have increased neck or arm pain, new numbness and/or weakness in your arms/hands, difficulty swallowing or breathing especially while lying down, numbness or weakness in your legs  **

## 2021-07-26 NOTE — CONSULTS
Internal Medicine Consultation Note    Patient: Rodolfo Huntley  Age/sex: 66 y o  female  Medical Record #: [id number]      Assessment/Plan:    Central cord syndrome; s/p ACDF C4-5/PCDF C2-T2 7/20/21  · Continue collar  · Will watch incision    Acute nasal bone fx  · OMS saw = no surgery needed  · Sinus precautions for 4 weeks    Urine retention  · Morrow reinserted today 7/26  · VT per PMR    Chronic UTI  · On suppressive tx with Keflex at home  · Will continue here    ABLA  · Did not require transfusion  · Will watch      Subjective/HPI:    Bravo Tomi Rodgers is a 66year old patient with history of asthma, GERD, chronic UTI on suppressive treatment with Keflex, anxiety  who was admitted after she fell face-first onto a coffee table  As a result of the fall she was noted to have a acute nasal bone fracture  OMS saw in consult  No surgery was needed  She was originally given Unasyn then placed back on her home dose of Keflex  She is to be on sinus precautions for 4 weeks  Upon admission, patient was complaining of bilateral upper extremity weakness and numbness  Neurosurgery saw her in consult  An MRI of the cervical spine showed "Multilevel degenerative changes causing multilevel neural foraminal and spinal canal stenosis most severe at C4-C5 where there could be small amount of left-sided cord edema  Edema within the left C2-C3 facet could be posttraumatic contusion without fracture"  On 7/20/21, she underwent an ACDF C4-5/PCDF C2-T2  She did have some post-op orthostatic hypotension and was given IVF  She failed a void trail and had her morrow reinserted today  Patient was referred to [facility] for inpatient rehabilitation  We are asked to assist with medical management  Currently, does not have any complaints of CP, SOB, dizziness, N/V/D  She states she hasn't had a BM since admission  She denies numbness/tingling arms and hands  Says legs are weak          ROS:   Negative 12 point ROS other than denoted above in HPI or assessment/plan      Social History:    Substance Use History:   Social History     Substance and Sexual Activity   Alcohol Use Never    Alcohol/week: 0 0 standard drinks     Social History     Tobacco Use   Smoking Status Former Smoker    Packs/day: 1 50    Years: 10 00    Pack years: 15 00    Types: Cigarettes   Smokeless Tobacco Never Used   Tobacco Comment    quit age 20     Social History     Substance and Sexual Activity   Drug Use Never       Family History:    Family History   Problem Relation Age of Onset    Heart disease Mother     No Known Problems Father     No Known Problems Son          Review of Scheduled Meds:  Current Facility-Administered Medications   Medication Dose Route Frequency Provider Last Rate    acetaminophen  975 mg Oral Q8H [address] James Menjivar MD      ALPRAZolam  0 25 mg Oral HS PRN MD Vasquez Cifuentesr ON 7/27/2021] atorvastatin  20 mg Oral Daily With Nadira Mon MD      bisacodyl  10 mg Rectal Daily PRN MD Vasquez Cifuentesr ON 7/27/2021] cephalexin  250 mg Oral Daily James Menjivar MD      docusate sodium  100 mg Oral BID James Menjivar MD      [START ON 7/27/2021] heparin (porcine)  5,000 Units Subcutaneous Q8H MD Vasquez Irene Spurr ON 7/27/2021] lidocaine  2 patch Topical Daily James Menjivar MD      meclizine  12 5 mg Oral Q8H PRN James Menjivar MD      melatonin  3 mg Oral HS James Menjivar MD      methocarbamol  500 mg Oral Q6H PRN James Menjivar MD      oxyCODONE  2 5 mg Oral Q4H PRN James Menjivar MD      oxyCODONE  5 mg Oral Q4H PRN MD Vasquez Cifuentesr ON 7/27/2021] pantoprazole  40 mg Oral Early Morning MD Vasquez Cifuentesr ON 7/27/2021] polyethylene glycol  17 g Oral Daily James Menjivar MD      senna  1 tablet Oral BID James Menjivar MD      sodium chloride  1 spray Each Nare 4x Daily PRN James Menjivar MD         Labs:     Results from last 7 days   Lab Units 07/26/21  0512 07/24/21  0505   WBC Thousand/uL 4  74 7 47   HEMOGLOBIN g/dL 8 2* 9 5*   HEMATOCRIT % 25 5* 29 4*   PLATELETS Thousands/uL 195 196     Results from last 7 days   Lab Units 21  0512 21  0505 21  1350   SODIUM mmol/L 140 141  --    POTASSIUM mmol/L 4 1 3 8  --    CHLORIDE mmol/L 112* 113*  --    CO2 mmol/L 23 23  --    CO2, I-STAT mmol/L  --   --  22   BUN mg/dL 18 13  --    CREATININE mg/dL 0 72 0 63  --    GLUCOSE, ISTAT mg/dl  --   --  145*   CALCIUM mg/dL 9 1 8 7  --          Results from last 7 days   Lab Units 21  0428   INR  1 07              Lab Results   Component Value Date    BLOODCX No Growth After 5 Days  2020    BLOODCX No Growth After 5 Days  2020    BLOODCX No Growth After 5 Days  2016    BLOODCX No Growth After 5 Days  2016    URINECX 40,000-49,000 cfu/ml Klebsiella pneumoniae (A) 2020    URINECX (A) 2020     >100,000 cfu/ml - ALLOSCARDOVIA OMNICOLENS- Gram-positive robby    URINECX No Growth <1000 cfu/mL 2020       Input and Output Summary (last 24 hours):     No intake or output data in the 24 hours ending 21 1459    Imaging:     No orders to display       *Labs /Radiology studies reviewed  *Medications reviewed and reconciled as needed  *Please refer to order section for additional ordered labs studies  *Case discussed with primary attending during morning huddle case rounds      Vitals:   Temp (24hrs), Av 1 °F (36 7 °C), Min:97 5 °F (36 4 °C), Max:98 5 °F (36 9 °C)    Temp:  [97 5 °F (36 4 °C)-98 5 °F (36 9 °C)] 97 9 °F (36 6 °C)  HR:  [] 94  Resp:  [16-20] 17  BP: (120-140)/(63-79) 140/63  SpO2:  [92 %-98 %] 96 %  Body mass index is 24 06 kg/m²       Physical Exam:   General Appearance: no distress, conversive  HEENT: PERRLA, conjuctiva normal; oropharynx clear; mucous membranes moist   Neck:  Posterior incision has some mild redness distally along staple line but no drainage; anterior incision is wo erythema/drainage  Lungs: CTA, normal respiratory effort, no retractions, expiratory effort normal  CV: regular rate and rhythm; no rubs/murmurs/gallops, PMI normal   ABD: soft; ND/NT; +BS  EXT: no edema  Skin: normal turgor, normal texture, no rashes  Psych: affect normal, mood normal  Neuro: AAO; LEs 4-/5; LUE 3+-4-/5; RUE 4/5         Invasive Devices     Peripheral Intravenous Line            Peripheral IV 07/23/21 Right Wrist 2 days    Peripheral IV 07/24/21 Distal;Dorsal (posterior); Right Forearm 2 days          Drain            Urethral Catheter 18 Fr  <1 day                 VTE Pharmacologic Prophylaxis: Heparin  Code Status: Level 1 - Full Code  Current Length of Stay: 0 day(s)    Total floor / unit time spent today 1 hour with more than 50% spent counseling/coordinating care  Counseling includes discussion with patient re: progress  and discussion with patient of his/her current medical state/information  Coordination of patient's care was performed in conjunction with primary service  Time invested included review of patient's labs, vitals, and management of their comorbidities with continued monitoring  In addition, this patient was discussed with medical team including physician and advanced extenders  The care of the patient was extensively discussed and appropriate treatment plan was formulated unique for this patient  ** Please Note: Fluency Direct voice to text software may have been used in the creation of this document   Audio transcription errors may occur**

## 2021-07-26 NOTE — ASSESSMENT & PLAN NOTE
- Orthostatic hypotension noted yesterday  - will recheck on 07/26/2021; did receive a L bolus on 07/25/2021  - likely condition of deconditioning in the setting of multiple days in the ICU and on pressors  - she will need continued therapy  - currently out of bed to chair

## 2021-07-26 NOTE — CASE MANAGEMENT
Patient can admit to [facility] this afternoon to room 961, TC to daughter, Hermila [facility] [place] left regarding DCP        Patient in agreement with DCP

## 2021-07-26 NOTE — ASSESSMENT & PLAN NOTE
6 Days Post-Op Procedure(s): Anterior cervical discectomy and fusion C4-5, posterior cervical decompresion and fusion C2-T2    · Degenerative spinal stenosis with cord signal change C4/5 s/p fall forwards onto face  · Presented to [facility] 7/18/21 with complaints of neck pain and bilateral arm pain/numbness  · Also sustained facial bone fractures  Imaging:  · CT cervical spine 7/21/21: Interval displacement of C6 anterior-inferior corner fracture  New postoperative change status post C3-C7 decompressive laminectomy and posterior instrumented fusion spanning C2-T2  ACDF at C4-5 with interbody graft  Hardware is intact and appropriately aligned  · MRI cervical spine 7/18/21: Multilevel degenerative changes causing multilevel neural foraminal and spinal canal stenosis most severe at C4-C5 where there could be small amount of left-sided cord edema  Edema within the left C2-C3 facet could be posttraumatic contusion without fracture  · Cervical post op x-rays: Postsurgical changes of posterior spinal decompression and instrumented fusion spanning C2-T2 and anterior cervical discectomy and fusion at C4-C5  Plan:  · Frequent neuro checks  · No brace needed  · Drain removed 7/23/21  · Mobilize with PT/OT  · DVT ppx: SCDs, ok for pharm dvt ppx  · No further neurosurgical intervention is anticipated at this time  · Patient will discharge to [facility] today  Patient will have further follow-up with Neurosurgery for 2 week incision check and 6 weeks postop visit with repeat x-rays of the cervical spine  · Please contact Neurosurgery with any questions or concerns

## 2021-07-26 NOTE — PROGRESS NOTES
[address] [facility]  Progress Note - Kevinie Alt 1945, 66 y o  female MRN: [id number]  Unit/Bed#: [facility] 286-38 Encounter: [id number]  Primary Care Provider: Noe Arce MD   Date and time admitted to hospital: 7/18/2021  2:04 PM    * Central cord syndrome [facility])  Assessment & Plan  6 Days Post-Op Procedure(s): Anterior cervical discectomy and fusion C4-5, posterior cervical decompresion and fusion C2-T2    · Degenerative spinal stenosis with cord signal change C4/5 s/p fall forwards onto face  · Presented to [place] [id number] 7/18/21 with complaints of neck pain and bilateral arm pain/numbness  · Also sustained facial bone fractures  Imaging:  · CT cervical spine 7/21/21: Interval displacement of C6 anterior-inferior corner fracture  New postoperative change status post C3-C7 decompressive laminectomy and posterior instrumented fusion spanning C2-T2  ACDF at C4-5 with interbody graft  Hardware is intact and appropriately aligned  · MRI cervical spine 7/18/21: Multilevel degenerative changes causing multilevel neural foraminal and spinal canal stenosis most severe at C4-C5 where there could be small amount of left-sided cord edema  Edema within the left C2-C3 facet could be posttraumatic contusion without fracture  · Cervical post op x-rays: Postsurgical changes of posterior spinal decompression and instrumented fusion spanning C2-T2 and anterior cervical discectomy and fusion at C4-C5  Plan:  · Frequent neuro checks  · No brace needed  · Drain removed 7/23/21  · Mobilize with PT/OT  · DVT ppx: SCDs, ok for pharm dvt ppx  · No further neurosurgical intervention is anticipated at this time  · Patient will discharge to [facility] today  Patient will have further follow-up with Neurosurgery for 2 week incision check and 6 weeks postop visit with repeat x-rays of the cervical spine  · Please contact Neurosurgery with any questions or concerns        Subjective/Objective Chief Complaint: "My neck hurts"    Subjective:  Patient reports she continues to have cervical incision pain  Patient also reports pain in the left arm that limits some of her movement in the left hand  Patient denies any new or worsening numbness, tingling, or weakness in bilateral arms or legs  Pt denies any fever, chills, chest pain or SOB  Objective:  Alert and awake, no acute distress    I/O       07/24 0701 - 07/25 0700 07/25 0701 - 07/26 0700 07/26 0701 - 07/27 0700    P  O  460 700     I V  (mL/kg) 187 6 (2 9)      IV Piggyback 200 1000     Total Intake(mL/kg) 847 6 (13 2) 1700 (26 2)     Urine (mL/kg/hr) 1325 (0 9) 420 (0 3) 175 (0 4)    Stool       Total Output 1325 420 175    Net -477 4 +1280 -175                 Invasive Devices     Peripheral Intravenous Line            Peripheral IV 07/23/21 Right Wrist 2 days    Peripheral IV 07/24/21 Distal;Dorsal (posterior); Right Forearm 2 days          Drain            Urethral Catheter 18 Fr  <1 day                Physical Exam:  Vitals: Blood pressure 121/69, pulse 100, temperature 98 5 °F (36 9 °C), resp  rate 17, height 5' 2" (1 575 m), weight 65 kg (143 lb 4 8 oz), SpO2 97 %, not currently breastfeeding  ,Body mass index is 26 21 kg/m²  General appearance:  Appears stated age  Head: Normocephalic, without obvious abnormality, atraumatic  Eyes: EOMI, PERRL  Neck: supple, symmetrical, trachea midline  Anterior and posterior cervical incisions are clean dry and intact  Lungs: non labored breathing  Heart: regular heart rate  Neurologic:   Mental status: Alert, oriented, thought content appropriate  Cranial nerves: grossly intact (Cranial nerves II-XII)  Sensory: normal to LT X 4, good proprioception  Motor: moving all extremities, strength RUE: 4/5, LUE 4-/5 2/2 to pain, BLE 4+/5          Lab Results:  Results from last 7 days   Lab Units 07/26/21  0512 07/24/21  0505 07/23/21  0446   WBC Thousand/uL 4 74 7 47 6 77   HEMOGLOBIN g/dL 8 2* 9 5* 7 6*   HEMATOCRIT % 25 5* 29 4* 23 1*   PLATELETS Thousands/uL 195 196 110*   NEUTROS PCT % 51 58 65   MONOS PCT % 12 9 9     Results from last 7 days   Lab Units 07/26/21  0512 07/24/21  0505 07/23/21  0729 07/20/21  1350 07/20/21  1306 07/20/21  1306 07/20/21  1213   POTASSIUM mmol/L 4 1 3 8 3 9  --    < >  --   --    CHLORIDE mmol/L 112* 113* 109*  --    < >  --   --    CO2 mmol/L 23 23 24  --    < >  --   --    CO2, I-STAT mmol/L  --   --   --  22  --  22 22   BUN mg/dL 18 13 11  --    < >  --   --    CREATININE mg/dL 0 72 0 63 0 64  --    < >  --   --    CALCIUM mg/dL 9 1 8 7 9 1  --    < >  --   --    GLUCOSE, ISTAT mg/dl  --   --   --  145*  --  140 118    < > = values in this interval not displayed  Results from last 7 days   Lab Units 07/23/21  0729 07/22/21  0606 07/21/21  0529   MAGNESIUM mg/dL 2 2 2 0 2 3     Results from last 7 days   Lab Units 07/23/21  0729 07/22/21  0606 07/20/21  0428   PHOSPHORUS mg/dL 2 6 1 8* 3 3     Results from last 7 days   Lab Units 07/20/21  0428   INR  1 07     Imaging Studies: I have personally reviewed pertinent reports and I have personally reviewed pertinent films in PACS    EKG, Pathology, and Other Studies: I have personally reviewed pertinent reports  VTE Mechanical Prophylaxis: sequential compression device    PLEASE NOTE:  This encounter may have been completed utilizing the [email or web address] Direct Speech Voice Recognition Software  Grammatical errors, random word insertions, pronoun errors and incomplete sentences are occasional consequences of the system due to software limitations, ambient noise and hardware issues  These may be missed by proof reading prior to affixing electronic signature  Any questions or concerns about the content, text or information contained within the body of this dictation should be directly addressed to the advanced practitioner or physician for clarification

## 2021-07-26 NOTE — ASSESSMENT & PLAN NOTE
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

## 2021-07-26 NOTE — DISCHARGE SUMMARY
[address] [facility]  Discharge- Jeimy Joseph 1945, 66 y o  female MRN: [id number]  Unit/Bed#: [facility] 329-26 Encounter: [id number]  Primary Care Provider: Maday Hendrix MD   Date and time admitted to hospital: 7/18/2021  2:04 PM    No new Assessment & Plan notes have been filed under this hospital service since the last note was generated  Service: Trauma            Medical Problems     Resolved Problems  Date Reviewed: 7/26/2021        Resolved    Neck pain 7/24/2021     Resolved by  ROYA Fraser    Bilateral arm weakness 7/24/2021     Resolved by  ROYA Fraser                Admission Date:   Admission Orders (From admission, onward)     Ordered        07/18/21 1505  Inpatient Admission  Once                     Admitting Diagnosis: Neck pain [M54 2]  Allergic conjunctivitis of both eyes [H10 13]  Recurrent falls [R29 6]  Central cord syndrome, initial encounter [facility]) [Z35 853I]    HPI: The patient is a 66year old female who fell hitting her face on a table  She sustained the following injuries from this fall:  1, Central cord syndrome  2  Mildly displaced bilateral nasal bone fractures with small left maxillary sinus hemorrhage  Procedures Performed: No orders of the defined types were placed in this encounter  Summary of Hospital Course: The patient had a history of degenerative spinal stenosis now with cord signal change at C4/C5  She had symptoms consistent with Central Cord syndrome, BUE weakness, numbness  She also complained of neck pain  She was admitted to the ICU for closer monitoring and further care  NSGY was consulted  Her MAP's were kept > 85 for her cord injury  VISTA cervical brace  The patient was taken to the OR by NSGY on 7/20/21 for ACDF C4-5,  PCDF C2-T2  OMFS was also consulted for bilateral nasal bone fracture  Management was non-operative  The patient continued to progress well  PT/OT were consulted  PMR was consulted   The patient has a history of chronic UTI's, continued on her keflex, and will need to follow up with her PCP for this  Once the patient was stable, tolerating a diet, having good pain control she was discharged to [facility]  Her physical exam today showed that she continues with paresthesias at the left thumb and anterior shoulders with no new changes  Significant Findings, Care, Treatment and Services Provided: as above    Complications: None    Condition at Discharge: fair         Discharge instructions/Information to patient and family:   See after visit summary for information provided to patient and family  Provisions for Follow-Up Care:  See after visit summary for information related to follow-up care and any pertinent home health orders  PCP: Buddy Russo MD    Disposition: Skilled nursing facility at [address] [place] [address]    Planned Readmission: No    Discharge Statement   I spent 20 minutes discharging the patient  This time was spent on the day of discharge  I had direct contact with the patient on the day of discharge  Additional documentation is required if more than 30 minutes were spent on discharge  Discharge Medications:  See after visit summary for reconciled discharge medications provided to patient and family

## 2021-07-27 LAB
HCT VFR BLD AUTO: 27.4 % (ref 34.8–46.1)
HGB BLD-MCNC: 8.9 G/DL (ref 11.5–15.4)

## 2021-07-27 PROCEDURE — 97116 GAIT TRAINING THERAPY: CPT

## 2021-07-27 PROCEDURE — 85014 HEMATOCRIT: CPT | Performed by: PHYSICAL MEDICINE & REHABILITATION

## 2021-07-27 PROCEDURE — 99232 SBSQ HOSP IP/OBS MODERATE 35: CPT | Performed by: PHYSICAL MEDICINE & REHABILITATION

## 2021-07-27 PROCEDURE — 85018 HEMOGLOBIN: CPT | Performed by: PHYSICAL MEDICINE & REHABILITATION

## 2021-07-27 PROCEDURE — 97167 OT EVAL HIGH COMPLEX 60 MIN: CPT

## 2021-07-27 PROCEDURE — 97163 PT EVAL HIGH COMPLEX 45 MIN: CPT

## 2021-07-27 PROCEDURE — 97530 THERAPEUTIC ACTIVITIES: CPT

## 2021-07-27 PROCEDURE — 97535 SELF CARE MNGMENT TRAINING: CPT

## 2021-07-27 PROCEDURE — 99232 SBSQ HOSP IP/OBS MODERATE 35: CPT | Performed by: INTERNAL MEDICINE

## 2021-07-27 RX ORDER — SODIUM CHLORIDE 9 MG/ML
50 INJECTION, SOLUTION INTRAVENOUS CONTINUOUS
Status: DISPENSED | OUTPATIENT
Start: 2021-07-27 | End: 2021-07-28

## 2021-07-27 RX ADMIN — LIDOCAINE 2 PATCH: 50 PATCH TOPICAL at 09:27

## 2021-07-27 RX ADMIN — METHOCARBAMOL 500 MG: 500 TABLET, FILM COATED ORAL at 09:25

## 2021-07-27 RX ADMIN — PANTOPRAZOLE SODIUM 40 MG: 40 TABLET, DELAYED RELEASE ORAL at 05:07

## 2021-07-27 RX ADMIN — MECLIZINE HCL 12.5 MG 12.5 MG: 12.5 TABLET ORAL at 09:25

## 2021-07-27 RX ADMIN — HEPARIN SODIUM 5000 UNITS: 5000 INJECTION INTRAVENOUS; SUBCUTANEOUS at 05:07

## 2021-07-27 RX ADMIN — ACETAMINOPHEN 975 MG: 325 TABLET, FILM COATED ORAL at 21:58

## 2021-07-27 RX ADMIN — OXYCODONE HYDROCHLORIDE 5 MG: 5 TABLET ORAL at 05:07

## 2021-07-27 RX ADMIN — DOCUSATE SODIUM 100 MG: 100 CAPSULE ORAL at 17:22

## 2021-07-27 RX ADMIN — CEPHALEXIN 250 MG: 250 CAPSULE ORAL at 11:28

## 2021-07-27 RX ADMIN — ATORVASTATIN CALCIUM 20 MG: 20 TABLET, FILM COATED ORAL at 17:22

## 2021-07-27 RX ADMIN — OXYCODONE HYDROCHLORIDE 5 MG: 5 TABLET ORAL at 17:22

## 2021-07-27 RX ADMIN — SENNOSIDES 8.6 MG: 8.6 TABLET ORAL at 09:25

## 2021-07-27 RX ADMIN — SODIUM CHLORIDE 50 ML/HR: 0.9 INJECTION, SOLUTION INTRAVENOUS at 17:20

## 2021-07-27 RX ADMIN — ACETAMINOPHEN 975 MG: 325 TABLET, FILM COATED ORAL at 05:07

## 2021-07-27 RX ADMIN — SENNOSIDES 8.6 MG: 8.6 TABLET ORAL at 17:22

## 2021-07-27 RX ADMIN — ACETAMINOPHEN 975 MG: 325 TABLET, FILM COATED ORAL at 13:00

## 2021-07-27 RX ADMIN — DOCUSATE SODIUM 100 MG: 100 CAPSULE ORAL at 09:25

## 2021-07-27 RX ADMIN — POLYETHYLENE GLYCOL 3350 17 G: 17 POWDER, FOR SOLUTION ORAL at 09:25

## 2021-07-27 RX ADMIN — MELATONIN TAB 3 MG 3 MG: 3 TAB at 21:58

## 2021-07-27 RX ADMIN — HEPARIN SODIUM 5000 UNITS: 5000 INJECTION INTRAVENOUS; SUBCUTANEOUS at 13:00

## 2021-07-27 RX ADMIN — HEPARIN SODIUM 5000 UNITS: 5000 INJECTION INTRAVENOUS; SUBCUTANEOUS at 21:59

## 2021-07-27 NOTE — PCC OCCUPATIONAL THERAPY
Pt is a 66 y  o  female who presented to the [address] [place] [address]  on 7/18/21 after falling and striking her head on a table  Pt diagnoses with central cord syndrome incomplete tetraplegia (anterior cervical discectomy and fusion C4-5 & posterior cervical decompression and fusion C2-T2)  Cervical spinal precautions and sinus precautions  Pt does not have C-collar  Pt reports living in an apartment [facility] & [address] ANA, with 8 steps a landing then 6 additional steps) with daughter and grandson  PTA pt was independent in I/ADLs, however supervision/Min A with bathing and tub transfer  Reports occasionally uses SPC in community  Pt is currently limited at this time 2* orthostatic BP, pain, activity tolerance, decreased safety awareness, decreased L strength and coordination compared to R, and impaired balance in ADLs & transfers  Pt currently requires mod assist for self care tasks mostly due to application of TEDS, thoroughness during toileting tasks and CS for STS and SPT with RW; fair RW management/safety noted during SPT  The following Occupational Performance Areas to address include: grooming, bathing/shower, toilet hygiene, dressing and functional mobility/transfers  Pt will not have 24 hr supervision/assistance upon d/c  Daughter, son and grandson present for family training--discussed recommendations for 24/7 supervision, need for DME--BSC, clip on walker tray, tub transfer bench, which pt's family is to purchase independently except for [facility]  Anticipate LOS 3 weeks to meet supervision/independent goals

## 2021-07-27 NOTE — PROGRESS NOTES
PT Initial Evaluation       07/27/21 1300   Patient Data   Rehab Impairment Impairment of mobility, safety and Activities of Daily Living (ADLs) due to Spinal Cord Dysfunction     Etiologic Diagnosis Central Cord Syndrome, S/p Mechanical fall   Date of Onset 07/18/21   Support System   Name Yana Balderrama   Relationship Daughter   Able to provide 24 hour supervision No  (Works during the day)   Able to provide physical help? Yes   Home Setup   Type of Home Apartment   Method of Entry Stairs;Hand Rail Right;Hand Rail Bilateral   Number of Stairs 18  (4 to porch, 8 to landing then 6 to apt)   Available Equipment Roller Walker;Single Point Cane;Rollator   Prior Level of Function   Indoor-Mobility (Ambulation) 3  Independent - Patient completed the activities by him/herself, with or without an assistive device, with no assistance from a helper  Stairs   (completed with supervision)   Functional Cognition 3  Independent - Patient completed the activities by him/herself, with or without an assistive device, with no assistance from a helper  Prior Device Used D  [place]  (or [facility])   Falls in the Last Year   Number of falls in the past 12 months 2   Type of Injury Associated with Fall Injury  (Nasal bone fractures)   Psychosocial   Psychosocial (WDL) WDL   Restrictions/Precautions   Precautions Bed/chair alarms;Spinal precautions; Fall Risk; [place]; Supervision on toilet/commode; Other (comment)  (Monitor orthostatic BP)   Braces or Orthoses   (no orders for cervical collar)   Pain Assessment   Pain Assessment Tool 0-10   Pain Score 7   Hospital Pain Intervention(s) Medication (See MAR); Declines   Toileting Hygiene   Type of Assistance Needed Physical assistance   Physical Assistance Level Total assistance   Comment attempts to wipe after BM, but unable to perform safely, wants to wipe back to front between her legs and educated on prvention of UTI, OTR? RN also informed   Unable to manage her pants on her own due to balance   Toileting Hygiene CARE Score 1   Toilet Transfer   Surface Assessed Standard Commode   Type of Assistance Needed Physical assistance   Physical Assistance Level 26%-50%   Comment using grab bar or RW for balance   Toilet Transfer CARE Score 3   Transfer Bed/Chair/Wheelchair   Positioning Concerns Skin Integrity   Limitations Noted In LE Strength;UE Strength; Endurance; Coordination;Balance;Confidence   Adaptive Equipment Roller Walker;None   Findings safest with RW for balance with less strain on BUE   Type of Assistance Needed Physical assistance   Physical Assistance Level 51%-75%   Comment recommend performing with RW, can perform at min/Mod A with RW for stand pivot   Chair/Bed-to-Chair Transfer CARE Score 2   Roll Left and Right   Comment sleeps in recliner, assessed for while in bed on unit, requires Mod A   Reason if not Attempted Activity not applicable   Roll Left and Right CARE Score 9   Sit to Lying   Comment sleeps in recliner, assessed for while in bed on unit, requires Mod A   Reason if not Attempted Activity not applicable   Sit to Lying CARE Score 9   Lying to Sitting on Side of Bed   Comment sleeps in recliner, assessed for while in bed on unit, requires Mod A   Reason if not Attempted Activity not applicable   Lying to Sitting on Side of Bed CARE Score 9   Sit to Stand   Type of Assistance Needed Physical assistance   Physical Assistance Level 26%-50%   Comment retropulsive and fearful of falling, c/o feeling lightheaded and change in vision with initial static standing, positive orthostasis   Sit to Stand CARE Score 3   Picking Up Object   Reason if not Attempted Safety concerns   Picking Up Object CARE Score 88   Car Transfer   Type of Assistance Needed Physical assistance; Adaptive equipment   Physical Assistance Level 26%-50%   Comment using RW, A with legs in/out   Car Transfer CARE Score 3   Ambulation   Primary Mode of Locomotion Prior to Admission Walk   Distance Walked (feet) 20 ft  (limited by fatigue and c/o Post neck & upper back pain)   Assist Device Roller Walker   Gait Pattern Step to; Improper weight shift;L foot drag;Decreased foot clearance; Slow Erika   Limitations Noted In Swing;Strength;Speed;Posture; Heel Strike; Endurance;Balance; Safety; Device Management   Provided Assistance with: Trunk Support;Balance   Walk 10 Feet   Type of Assistance Needed Physical assistance; Adaptive equipment   Physical Assistance Level Total assistance   Comment requires Mod A and chair follow for safety   Walk 10 Feet CARE Score 1   Walk 50 Feet with Two Turns   Reason if not Attempted Safety concerns   Walk 50 Feet with Two Turns CARE Score 88   Walk 150 Feet   Comment fatigue/pain limiting distance   Reason if not Attempted Safety concerns   Walk 150 Feet CARE Score 88   Wheelchair mobility   Type of Wheelchair Used   (NA)   Wheel 50 Feet with Two Turns   Reason if not Attempted Activity not applicable   Wheel 50 Feet with Two Turns CARE Score 9   Wheel 150 Feet   Reason if not Attempted Activity not applicable   Wheel 624 Feet CARE Score 9   Curb or Single Stair   Style negotiated Single stair   Type of Assistance Needed Physical assistance   Physical Assistance Level 26%-50%   Comment using B HR   1 Step (Curb) CARE Score 3   4 Steps   Comment fatigued after 2 steps   Reason if not Attempted Safety concerns   4 Steps CARE Score 88   12 Steps   Comment fatigued after 2 steps   Reason if not Attempted Safety concerns   12 Steps CARE Score 88   Comprehension   QI: Comprehension 4  Undestands: Clear comprehension without cues or repetitions   Expression   QI: Expression 4   Express complex messages without difficulty and with speech that is clear and easy to [place]   RLE Assessment   RLE Assessment WFL  (grossly 4+/5)   LLE Assessment   LLE Assessment X   Strength LLE   L Hip Flexion 3+/5   L Hip Extension 3+/5   L Hip ABduction 3+/5   L Knee Flexion 3+/5   L Knee Extension 3+/5   L Ankle Dorsiflexion 3/5   L Ankle Plantar Flexion 3+/5   Coordination   Movements are Fluid and Coordinated 0   Coordination and Movement Description decresaed coordination/strength left side   Sensation   Light Touch Partial deficits in the RUE;Partial deficits in the LUE   Cognition   Overall Cognitive Status WFL   Vision   Vision Comments glassess and reports recently at home noticing "floater" left eye   Therapeutic Exercise   Therapeutic Exercise/Activity BP checked througout session, check vitals for details  Pt initially orthostatic, but imporved and stable with TEDs, Binder and activity  Discharge Information   Patient's Discharge Plan to return home with family   Patient's Rehab Expectations to get stronger and be as Ind as possible   Barriers to Discharge Home Decreased Endurance;Decreased Strength;Limited Family Support; Safety Considerations   Impressions Pt is a 67 y/o female admitted to [facility] to address dclien in function after C-spine surgery to correct cord compression and stenosis  Pt had mechaical fall on 7/18 with new nasal bone fractues and imaging of c-spine revealed cervical problems with surgery on 7/20  Pt does not have orders for cervical collar at this time  PTA, pt was living in walk up apartment with DTr and was Idn with use of SPC, supervision on stairs  CUrrently presnets with ORthostatic BPs, decline in activity tolerance, left sided weakness, balance deficits  All affecting her ability to perform functional transfers, ambulate and navigae stairs  Good Rehab candidate to return home at Mod I level using LRAD for household distances and supervision on stairs  Pt sleeping in recliner at home previously      PT Therapy Minutes   PT Time In 1300   PT Time Out 1430   PT Total Time (minutes) 90   PT Mode of treatment - Individual (minutes) 90   PT Mode of treatment - Concurrent (minutes) 0   PT Mode of treatment - Group (minutes) 0   PT Mode of treatment - Co-treat (minutes) 0   PT Mode of Treatment - Total time(minutes) 90 minutes PT Cumulative Minutes 90   Cumulative Minutes   Cumulative therapy minutes 180

## 2021-07-27 NOTE — CASE MANAGEMENT
Met w/pt and reviewed rehab routine and cm role  Pt resides with dtr and grandson in a old [de-identified] home with 3 melecio and then 10 to main living area where everything she needs is on that level  Pt reports owning a 3pt cane and roller walker  Pt believes she has had [facility] in the past but doesn't recall the name of the agency  Pt states she was ind pta but does not drive  [facility] and has been made aware of [facility]  Reviewed team mtg process and potential los  Following to assist w/dc devin marrufo

## 2021-07-27 NOTE — PROGRESS NOTES
Internal Medicine Progress Note  Patient: Michelle Rivera  Age/sex: 66 y o  female  Medical Record #: [id number]      ASSESSMENT/PLAN: (Interval History)  Michelle Rivera is seen and examined and management for following issues:    Central cord syndrome; s/p ACDF C4-5/PCDF C2-T2 7/20/21  · Doesn't need collar per NS (stated in error in my consult 7/26)  ·  incisions w/o infection     Acute nasal bone fx  · OMS saw = no surgery needed  · Sinus precautions for 4 weeks     Urine retention  · Gotti reinserted  7/26  · VT per PMR     Chronic UTI  · On suppressive tx with Keflex at home  · Will continue here     ABLA  · Did not require transfusion  · Will watch    Orthostasis  · Had orthostasis on acute side and was given IVF  · This AM mild orthostasis supine-sit-stand = 140-140-110 with lightheadedness then again with TEDS and binder on  · To get 500ml NSS over 5 hrs      The above assessment and plan was reviewed and updated as determined by my evaluation of the patient on 7/27/2021      Labs:   Results from last 7 days   Lab Units 07/27/21  0510 07/26/21  0512 07/24/21  0505   WBC Thousand/uL  --  4 74 7 47   HEMOGLOBIN g/dL 8 9* 8 2* 9 5*   HEMATOCRIT % 27 4* 25 5* 29 4*   PLATELETS Thousands/uL  --  195 196     Results from last 7 days   Lab Units 07/26/21  0512 07/24/21  0505 07/20/21  1350   SODIUM mmol/L 140 141  --    POTASSIUM mmol/L 4 1 3 8  --    CHLORIDE mmol/L 112* 113*  --    CO2 mmol/L 23 23  --    CO2, I-STAT mmol/L  --   --  22   BUN mg/dL 18 13  --    CREATININE mg/dL 0 72 0 63  --    GLUCOSE, ISTAT mg/dl  --   --  145*   CALCIUM mg/dL 9 1 8 7  --                    Review of Scheduled Meds:  Current Facility-Administered Medications   Medication Dose Route Frequency Provider Last Rate    acetaminophen  975 mg Oral Q8H [facility] Unique Webb MD      ALPRAZolam  0 25 mg Oral HS PRN Unique Webb MD      atorvastatin  20 mg Oral Daily With Rimma Ruiz MD      bisacodyl  10 mg Rectal Daily PRN Unique Webb MD      cephalexin  250 mg Oral Daily Johanna Perez MD      docusate sodium  100 mg Oral BID Johanna Perez MD      heparin (porcine)  5,000 Units Subcutaneous [facility] Johanna Perez MD      lidocaine  2 patch Topical Daily Johanna Perez MD      meclizine  12 5 mg Oral Q8H PRN Johanna Perez MD      melatonin  3 mg Oral HS Johanna Perez MD      methocarbamol  500 mg Oral Q6H PRN Johanna Perez MD      oxyCODONE  2 5 mg Oral Q4H PRN Johanna Perez MD     [address] [place] [address] oxyCODONE  5 mg Oral Q4H PRN Johanna Perez MD      pantoprazole  40 mg Oral Early Morning Johanna Perez MD      polyethylene glycol  17 g Oral Daily Johanna Perez MD      senna  1 tablet Oral BID Johanna Perez MD      sodium chloride  1 spray Each Nare 4x Daily PRN Johanna Perez MD         Subjective/ HPI: Patient seen and examined  Patients overnight issues or events were reviewed with nursing or staff during rounds or morning huddle session   New or overnight issues include the following:     Orthostatic this AM    ROS:     *Labs /Radiology studies reviewed  *Medications reviewed and reconciled as needed  *Please refer to order section for additional ordered labs studies  *Case discussed with primary attending during morning huddle case rounds      Imaging:     No orders to display       *Labs /Radiology studies reviewed  *Medications reviewed and reconciled as needed  *Please refer to order section for additional ordered labs studies  *Case discussed with primary attending during morning huddle case rounds      Physical Examination:  Vitals:   Vitals:    07/27/21 0752 07/27/21 0801 07/27/21 0900 07/27/21 0931   BP: 110/68 136/80 109/60 104/58   BP Location: Left arm Right arm Right arm Right arm   Pulse:    86   Resp:    20   Temp:    97 6 °F (36 4 °C)   TempSrc:    Oral   SpO2:    96%   Weight:       Height:           General Appearance: no distress, conversive  HEENT: PERRLA, conjuctiva normal; oropharynx clear; mucous membranes moist   Neck:  anterior incision is wo erythema/drainage; posterior incision has some mild redness distally along staple line as before but no drainage  Lungs: CTA, normal respiratory effort, no retractions, expiratory effort normal  CV: regular rate and rhythm; no rubs/murmurs/gallops, PMI normal   ABD: soft; ND/NT; +BS  EXT: no edema  Skin: normal turgor, normal texture, no rashes  Psych: affect normal, mood normal  Neuro: AAO      The above physical exam was reviewed and updated as determined by my evaluation of the patient on 7/27/2021  Invasive Devices     Peripheral Intravenous Line            Peripheral IV 07/23/21 Right Wrist 3 days    Peripheral IV 07/24/21 Distal;Dorsal (posterior); Right Forearm 3 days          Drain            Urethral Catheter 18 Fr  <1 day                   VTE Pharmacologic Prophylaxis: Heparin  Code Status: Level 1 - Full Code  Current Length of Stay: 1 day(s)      Total time spent:  30 minutes with more than 50% spent counseling/coordinating care  Counseling includes discussion with patient re: progress  and discussion with patient of his/her current medical state/information  Coordination of patient's care was performed in conjunction with primary service  Time invested included review of patient's labs, vitals, and management of their comorbidities with continued monitoring  In addition, this patient was discussed with medical team including physician and advanced extenders  The care of the patient was extensively discussed and appropriate treatment plan was formulated unique for this patient  ** Please Note:  voice to text software may have been used in the creation of this document   Although proof errors in transcription or interpretation are a potential of such software**

## 2021-07-27 NOTE — PLAN OF CARE
Problem: Prexisting or High Potential for Compromised Skin Integrity  Goal: Skin integrity is maintained or improved  Description: INTERVENTIONS:  - Identify patients at risk for skin breakdown  - Assess and monitor skin integrity  - Assess and monitor nutrition and hydration status  - Monitor labs   - Assess for incontinence   - Turn and reposition patient  - Assist with mobility/ambulation  - Relieve pressure over bony prominences  - Avoid friction and shearing  - Provide appropriate hygiene as needed including keeping skin clean and dry  - Evaluate need for skin moisturizer/barrier cream  - Collaborate with interdisciplinary team   - Patient/family teaching  - Consider wound care consult   Outcome: Progressing     Problem: MOBILITY - ADULT  Goal: Maintain or return to baseline ADL function  Description: INTERVENTIONS:  -  Assess patient's ability to carry out ADLs; assess patient's baseline for ADL function and identify physical deficits which impact ability to perform ADLs (bathing, care of mouth/teeth, toileting, grooming, dressing, etc )  - Assess/evaluate cause of self-care deficits   - Assess range of motion  - Assess patient's mobility; develop plan if impaired  - Assess patient's need for assistive devices and provide as appropriate  - Encourage maximum independence but intervene and supervise when necessary  - Involve family in performance of ADLs  - Assess for home care needs following discharge   - Consider OT consult to assist with ADL evaluation and planning for discharge  - Provide patient education as appropriate  Outcome: Progressing  Goal: Maintains/Returns to pre admission functional level  Description: INTERVENTIONS:  - Perform BMAT or MOVE assessment daily    - Set and communicate daily mobility goal to care team and patient/family/caregiver  - Collaborate with rehabilitation services on mobility goals if consulted  - Perform Range of Motion  times a day    - Reposition patient every hours   - Dangle patient  times a day  - Stand patient  times a day  - Ambulate patient times a day  - Out of bed to chair  times a day   - Out of bed for meals  times a day  - Out of bed for toileting  - Record patient progress and toleration of activity level   Outcome: Progressing     Problem: Potential for Falls  Goal: Patient will remain free of falls  Description: INTERVENTIONS:  - Educate patient/family on patient safety including physical limitations  - Instruct patient to call for assistance with activity   - Consult OT/PT to assist with strengthening/mobility   - Keep Call bell within reach  - Keep bed low and locked with side rails adjusted as appropriate  - Keep care items and personal belongings within reach  - Initiate and maintain comfort rounds  - Make Fall Risk Sign visible to staff  - Offer Toileting every  Hours, in advance of need  - Initiate/Maintainalarm  - Obtain necessary fall risk management equipment:   - Apply yellow socks and bracelet for high fall risk patients  - Consider moving patient to room near nurses station  Outcome: Progressing     Problem: PAIN - ADULT  Goal: Verbalizes/displays adequate comfort level or baseline comfort level  Description: Interventions:  - Encourage patient to monitor pain and request assistance  - Assess pain using appropriate pain scale  - Administer analgesics based on type and severity of pain and evaluate response  - Implement non-pharmacological measures as appropriate and evaluate response  - Consider cultural and social influences on pain and pain management  - Notify physician/advanced practitioner if interventions unsuccessful or patient reports new pain  Outcome: Progressing     Problem: INFECTION - ADULT  Goal: Absence or prevention of progression during hospitalization  Description: INTERVENTIONS:  - Assess and monitor for signs and symptoms of infection  - Monitor lab/diagnostic results  - Monitor all insertion sites, i e  indwelling lines, tubes, and drains  - Monitor endotracheal if appropriate and nasal secretions for changes in amount and color  - [place] appropriate cooling/warming therapies per order  - Administer medications as ordered  - Instruct and encourage patient and family to use good hand hygiene technique  - Identify and instruct in appropriate isolation precautions for identified infection/condition  Outcome: Progressing     Problem: SAFETY ADULT  Goal: Patient will remain free of falls  Description: INTERVENTIONS:  - Educate patient/family on patient safety including physical limitations  - Instruct patient to call for assistance with activity   - Consult OT/PT to assist with strengthening/mobility   - Keep Call bell within reach  - Keep bed low and locked with side rails adjusted as appropriate  - Keep care items and personal belongings within reach  - Initiate and maintain comfort rounds  - Make Fall Risk Sign visible to staff  - Offer Toileting every  Hours, in advance of need  - Initiate/Maintain alarm  - Obtain necessary fall risk management equipment:  - Apply yellow socks and bracelet for high fall risk patients  - Consider moving patient to room near nurses station  Outcome: Progressing  Goal: Maintain or return to baseline ADL function  Description: INTERVENTIONS:  -  Assess patient's ability to carry out ADLs; assess patient's baseline for ADL function and identify physical deficits which impact ability to perform ADLs (bathing, care of mouth/teeth, toileting, grooming, dressing, etc )  - Assess/evaluate cause of self-care deficits   - Assess range of motion  - Assess patient's mobility; develop plan if impaired  - Assess patient's need for assistive devices and provide as appropriate  - Encourage maximum independence but intervene and supervise when necessary  - Involve family in performance of ADLs  - Assess for home care needs following discharge   - Consider OT consult to assist with ADL evaluation and planning for discharge  - Provide patient education as appropriate  Outcome: Progressing  Goal: Maintains/Returns to pre admission functional level  Description: INTERVENTIONS:  - Perform BMAT or MOVE assessment daily    - Set and communicate daily mobility goal to care team and patient/family/caregiver  - Collaborate with rehabilitation services on mobility goals if consulted  - Perform Range of Motion  times a day  - Reposition patient every  hours  - Dangle patient  times a day  - Stand patient  times a day  - Ambulate patient  times a day  - Out of bed to chair  times a day   - Out of bed for me times a day  - Out of bed for toileting  - Record patient progress and toleration of activity level   Outcome: Progressing     Problem: DISCHARGE PLANNING  Goal: Discharge to home or other facility with appropriate resources  Description: INTERVENTIONS:  - Identify barriers to discharge w/patient and caregiver  - Arrange for needed discharge resources and transportation as appropriate  - Identify discharge learning needs (meds, wound care, etc )  - Arrange for interpretive services to assist at discharge as needed  - Refer to Case Management Department for coordinating discharge planning if the patient needs post-hospital services based on physician/advanced practitioner order or complex needs related to functional status, cognitive ability, or social support system  Outcome: Progressing     Problem: Nutrition/Hydration-ADULT  Goal: Nutrient/Hydration intake appropriate for improving, restoring or maintaining nutritional needs  Description: Monitor and assess patient's nutrition/hydration status for malnutrition  Collaborate with interdisciplinary team and initiate plan and interventions as ordered  Monitor patient's weight and dietary intake as ordered or per policy  Utilize nutrition screening tool and intervene as necessary  Determine patient's food preferences and provide high-protein, high-caloric foods as appropriate  INTERVENTIONS:  - Monitor oral intake, urinary output, labs, and treatment plans  - Assess nutrition and hydration status and recommend course of action  - Evaluate amount of meals eaten  - Assist patient with eating if necessary   - Allow adequate time for meals  - Recommend/ encourage appropriate diets, oral nutritional supplements, and vitamin/mineral supplements  - Order, calculate, and assess calorie counts as needed  - Recommend, monitor, and adjust tube feedings and TPN/PPN based on assessed needs  - Assess need for intravenous fluids  - Provide specific nutrition/hydration education as appropriate  - Include patient/family/caregiver in decisions related to nutrition  Outcome: Progressing     Problem: Nutrition/Hydration-ADULT  Goal: Nutrient/Hydration intake appropriate for improving, restoring or maintaining nutritional needs  Description: Monitor and assess patient's nutrition/hydration status for malnutrition  Collaborate with interdisciplinary team and initiate plan and interventions as ordered  Monitor patient's weight and dietary intake as ordered or per policy  Utilize nutrition screening tool and intervene as necessary  Determine patient's food preferences and provide high-protein, high-caloric foods as appropriate       INTERVENTIONS:  - Monitor oral intake, urinary output, labs, and treatment plans  - Assess nutrition and hydration status and recommend course of action  - Evaluate amount of meals eaten  - Assist patient with eating if necessary   - Allow adequate time for meals  - Recommend/ encourage appropriate diets, oral nutritional supplements, and vitamin/mineral supplements  - Order, calculate, and assess calorie counts as needed  - Recommend, monitor, and adjust tube feedings and TPN/PPN based on assessed needs  - Assess need for intravenous fluids  - Provide specific nutrition/hydration education as appropriate  - Include patient/family/caregiver in decisions related to nutrition  Outcome: Progressing

## 2021-07-27 NOTE — PROGRESS NOTES
Physical Medicine and Rehabilitation Progress Note  Dulce Ball 66 y o  female MRN: [id number]  Unit/Bed#: -77 Encounter: [id number]    HPI: Dulce Ball is a 66 y o  female who presented to the [address] after fall  Patient p/w c/o neck pain as well as bilateral arm pain/numbness  Imaging revealed cervical stenosis most severe at C4-5 with possible cord edema and patient subsequently underwent C4-5 ACDF & C2-T2 PCDF on 7/20 by Dr Marino Donaldson  Patient was also found to have B/L nasal bone fractures and L maxillary sinus hemorrhage which was managed conservatively  Post-op course complicated by urinary retention requiring morrow placement  Chief Complaint: cervical stenosis/central cord syndrome     Subjective: updated patient on active medical issues     ROS: A 10 point ROS was performed; negative except as noted above       Assessment/Plan:      Cervical stenosis of spine  Assessment & Plan  - C6 fx, per XR report of 7/21 "Redemonstrated anterior-inferior C6 vertebral body corner fracture, unchanged in alignment " (seen by neurosx on 7/26 no further intervention recommended)  - cervical stenosis most severe at C4-5 with possible cord edema s/p ACDF C4-5 & PCDF C2-T2 by Dr Marino Donaldson on 7/20  - c-spine precautions  - no brace needed per neurosx  - FU XR already e-prescribed in EMR by neurosx team  - OP FU with Dr Marino Donaldson     Nasal bone fractures  Assessment & Plan  - B/L nasal bone fractures & L maxillary sinus hemorrhage  - non-op management per OMFS  - sinus precautions   - abx recommended by OMFS however dc'd by trauma surgery in acute care who was primary team per their protocol     CKD (chronic kidney disease)  Assessment & Plan  - Cr currently 0 72  - baseline appears to be 1 0-1 2  - IM monitoring     Anemia  Assessment & Plan  - Hg currently 8 9 post-operatively  - IM monitoring     Urinary retention  Assessment & Plan  - morrow placed on 7/26 in acute care just prior to transfer to [facility]  - TOV when patient is more mobile     CHF (congestive heart failure) ([facility])  Assessment & Plan  - EF 45-50%  - grade 1 DD  - not on diuretics at home  - IM monitoring volume status       Dyslipidemia  Assessment & Plan  - on home lipitor 20 mg qpm    History of recurrent UTIs  Assessment & Plan  - on home keflex 250 mg qd which patient is chronically on (confrimed with patient's OP pharmacy that patient receives monthly prescriptions for this, last provided by Dr Alma Aguilar of urology)  - OP FU with Dr Adrienne Ng  - on home xanax 0 25 mg qhs prn     Vertigo  Assessment & Plan  - chronic, likely BPV  - on home antivert 12 5 mg q8 prn     * GERD (gastroesophageal reflux disease)  Assessment & Plan  - on home protonix 40 mg qd      Scheduled Meds:  Current Facility-Administered Medications   Medication Dose Route Frequency Provider Last Rate    acetaminophen  975 mg Oral Q8H Gil Corey MD      ALPRAZolam  0 25 mg Oral HS PRN Cody Reyes MD      atorvastatin  20 mg Oral Daily With Paola Valverde MD      bisacodyl  10 mg Rectal Daily PRN Cody Reyes MD      cephalexin  250 mg Oral Daily Cody Reyes MD      docusate sodium  100 mg Oral BID Cody Reyes MD      heparin (porcine)  5,000 Units Subcutaneous Q8H Gil Corey MD      lidocaine  2 patch Topical Daily Cody Reyes MD      meclizine  12 5 mg Oral Q8H PRN Cody Reyes MD      melatonin  3 mg Oral HS Cody Reyes MD      methocarbamol  500 mg Oral Q6H PRN Cody Reyes MD      oxyCODONE  2 5 mg Oral Q4H PRN Cody Reyes MD      oxyCODONE  5 mg Oral Q4H PRN Cody Reyes MD      pantoprazole  40 mg Oral Early Morning Cody Reyes MD      polyethylene glycol  17 g Oral Daily Cody Reyes MD      senna  1 tablet Oral BID Cody Reyes MD      sodium chloride  1 spray Each Nare 4x Daily PRN Cody Reyes MD      sodium chloride  50 mL/hr Intravenous Continuous Cody Reyes MD            Incidental findings:     1) gallstones: asymptomatic; OP FU with PCP with GI referral at PCPs discretion      DVT ppx: HSQ (cleared in acute care by neurosx for pharmacologic DVT ppx)       Objective:    Functional Update:  Mobility: max   Transfers: max  ADLs: max       Physical Exam:  Vitals:    07/27/21 1345   BP: 134/72   Pulse:    Resp:    Temp:    SpO2:          General: alert, no apparent distress, cooperative and comfortable  HEENT:  Eye: Normal external eye, conjunctiva, lidsc cornea  Ears: Normal external ears  Nose: Normal external nose, mucus membranes  CARDIAC:  +S1/2  LUNGS:  no abnormal respiratory pattern, no retractions noted, non-labored breathing   ABDOMEN:  soft NT  EXTREMITIES:  no cyanosis or clubbing   NEURO:  awake, alert appropriate responses to questions   PSYCH:  mood/affect currently stable   INCISION:  surgical site C/D/I    Diagnostic Studies:  No orders to display       Laboratory:   Results from last 7 days   Lab Units 07/27/21  0510 07/26/21  0512 07/24/21  0505 07/23/21  0446   HEMOGLOBIN g/dL 8 9* 8 2* 9 5* 7 6*   HEMATOCRIT % 27 4* 25 5* 29 4* 23 1*   WBC Thousand/uL  --  4 74 7 47 6 77     Results from last 7 days   Lab Units 07/26/21  0512 07/24/21  0505 07/23/21  0729   BUN mg/dL 18 13 11   SODIUM mmol/L 140 141 139   POTASSIUM mmol/L 4 1 3 8 3 9   CHLORIDE mmol/L 112* 113* 109*   CREATININE mg/dL 0 72 0 63 0 64

## 2021-07-27 NOTE — PROGRESS NOTES
OT LTG       07/27/21 0700   Rehab Team Goals   ADL Team Goal Patient will require supervision with ADLs with least restrictive device upon completion of rehab program   Rehab Team Interventions   OT Interventions Self Care;Home Management; Energy Conservation;Patient/Family Education; Therapeutic Exercise   Eating Goal   Eating Goal 06  Independent - Patient completes the activity by him/herself with no assistance from a helper  Status Ongoing; Target goal - three weeks   Interventions Assistive Device; Compensation Strategies; Neuromuscular Education; Optimal Position   Grooming Goal   Oral Hygiene Goal 06  Independent - Patient completes the activity by him/herself with no assistance from a helper  Task Wash/Dry Face;Wash/Dry Hands;Brush Teeth   Environment Seated in Chair   Safety Precautions   (cervical spinal precautions )   Status Ongoing; Target goal - three weeks   Intervention Balance Work;Neuromuscular Education; Therapeutic Exercise; Tolerance Work   Tub/Shower Transfer Goal   Method Tub Shower  (Min A)   Status Ongoing; Target goal - three weeks  (Min A )   Interventions ADL Training;Assistive Device; Neuromuscular Education   Bathing Goal   Shower/bathe self Goal 03  Partial/moderate assistance - [place] does less than half the effort  [place] lifts or holds trunk or limbs and provides more than half the effort  Environment Tub   Safety Precautions   (cervical spinal precautions )   Status Ongoing; Target goal - three weeks   Intervention ADL Training;Assistive Device; Neuromuscular Education; Therapeutic Exercise   Upper Body Dressing Goal   Upper body dressing Goal 06  Independent - Patient completes the activity by him/herself with no assistance from a helper  Task Upper Body;Arms in/out; Over Head   Environment Seated   Safety Precautions   (cervical spinal precuations )   Status Ongoing; Target goal - three weeks   Intervention Therapeutic Exercise;Balance Work;Neuromuscular Education;Assistive Device; Tolerance Work   Lower Body Dressing Goal   Lower body dressing Goal 06  Independent - Patient completes the activity by him/herself with no assistance from a helper  Putting on/taking off footwear Goal 06  Independent - Patient completes the activity by him/herself with no assistance from a helper  Environment Seated;Standing   Status Ongoing; Target goal - three weeks   Intervention Assistive Device; Therapeutic Exercise; Tolerance Work;Neuromuscular Education;Balance Work   Toileting Transfer Goal   Toilet transfer Goal 06  Independent - Patient completes the activity by him/herself with no assistance from a helper  Assistive Device [place] [employer]   Status Ongoing; Target goal - three weeks   Intervention ADL Training;Balance Work;Assistive Device   Toileting Goal   Toileting hygiene Goal 06  Independent - Patient completes the activity by him/herself with no assistance from a helper  Task Pants Up;Pants Down;Hygiene   Safety Balance   Status Ongoing; Target goal - three weeks   Intervention ADL Training;Balance Work;Assistive Device   Comprehension Goal   Comprehension Assist Level Independant   Status Ongoing; Target goal - three weeks   Expression Goal   Expression Assist Level Independant   Status Ongoing; Target goal - three weeks   Meal Prep and Kitchen Mobility   Assist Level Supervision   Status Ongoing; Target goal - three weeks   Medication Management   Assist Level Independent   Status Ongoing; Target goal - three weeks   Laundry   Assist Level Independent   Status Ongoing; Target goal - three weeks

## 2021-07-27 NOTE — PROGRESS NOTES
07/27/21 0700   Patient Data   Rehab Impairment Impairment of mobility, safety and Activities of Daily Living (ADLs) due to Spinal Cord Dysfunction:  Traumatic:  04 230  Other Traumatic     Etiologic Diagnosis s/p fall - Central cord syndrome ; Traumatic incomplete tetraplegia    Date of Onset 07/18/21   Support System   Name Tristin Arboleda   Relationship Daughter   Able to provide 24 hour supervision No   Able to provide physical help? Yes   Home Setup   Type of Home Apartment   Method of Entry Stairs   Number of Stairs 4  (outside apartment complex)   Number of Stairs in Home 14  (8+6 inside apartment complex to get to apartment  )   First Floor Bathroom Full;Tub;Combo;Curtain   First Floor Setup Available No   Available Equipment Roller Walker;Single Point Cane;Rollator   Baseline Information   Vocation   (Retired)   Transportation Family/friends drive   Prior Device(s) Used Rollator;Single Point Cane   Prior IADL Participation   Money Management Identify Money  (son manages finances)   Meal Preparation Partial Participation   Laundry Partial Participation   Home Cleaning Partial Participation   Prior Level of Function   Self-Care 2  Needed Some Help - Patient needed a partial assistance from another person to complete activities  Indoor-Mobility (Ambulation) 3  Independent - Patient completed the activities by him/herself, with or without an assistive device, with no assistance from a helper  Stairs 3  Independent - Patient completed the activities by him/herself, with or without an assistive device, with no assistance from a helper  Functional Cognition 3  Independent - Patient completed the activities by him/herself, with or without an assistive device, with no assistance from a helper     Prior Device Used   (SPC in community)   Falls in the Last Year   Number of falls in the past 12 months 1   Type of Injury Associated with Fall Major injury  (reason for admission)   Psychosocial   Psychosocial (WDL) WDL Restrictions/Precautions   Precautions Bed/chair alarms; Fall Risk;Supervision on toilet/commode;Spinal precautions; Gotti  (sinus precautions)   Pain Assessment   Pain Assessment Tool 0-10   Pain Score 7   Pain Location/Orientation Orientation: Left; Location: Arm   Eating Assessment   Type of Assistance Needed Set-up / clean-up   Physical Assistance Level No physical assistance   Eating CARE Score 5   Oral Hygiene   Type of Assistance Needed Supervision   Physical Assistance Level No physical assistance   Comment seated at sink 2* to poor standing tolerance   Oral Hygiene CARE Score 4   Shower/Bathe Self   Type of Assistance Needed Physical assistance   Physical Assistance Level Total assistance   Comment A for B lower legs, buttock, and moira  Ax2 in stance    Shower/Bathe Self CARE Score 1   Dressing/Undressing Clothing   Type of Assistance Needed Physical assistance   Physical Assistance Level 25% or less   Comment Min A seated to thread down back    Upper Body Dressing CARE Score 3   Physical Assistance Level Total assistance   Amount of Physical Assistance Provided Total assistance   Comment A to thread B legs, Ax2 in stance for CM over hips while pt stabilized on sink    Lower Body Dressing CARE Score 1   Putting On/Taking Off Footwear   Type of Assistance Needed Physical assistance   Physical Assistance Level Total assistance   Putting On/Taking Off Footwear CARE Score 1   Toileting Hygiene   Comment Assess next session    Toilet Transfer   Comment Assess next session    Transfer Bed/Chair/Wheelchair   Positioning Concerns   (L sided weakness)   Limitations Noted In Balance;Confidence; Coordination; Endurance;Problem Solving; Sequencing;UE Strength;LE Strength;Sensation   Adaptive Equipment Roller Walker   Type of Assistance Needed Physical assistance   Physical Assistance Level Total assistance   Comment Mod A x2 SPT with RW  L sided LE and UE weakness noted   Chair/Bed-to-Chair Transfer CARE Score 1   Roll Left and Right   Reason if not Attempted Activity not applicable   Roll Left and Right CARE Score 9   Sit to Lying   Reason if not Attempted Activity not applicable   Sit to Lying CARE Score 9   Lying to Sitting on Side of Bed   Reason if not Attempted Activity not applicable   Lying to Sitting on Side of Bed CARE Score 9   Comprehension   QI: Comprehension 4  Undestands: Clear comprehension without cues or repetitions   Expression   QI: Expression 4  Express complex messages without difficulty and with speech that is clear and easy to [place]   RUE Assessment   RUE Assessment WFL   Strength - RUE   R Gross Arm Strength 4/5   LUE Assessment   LUE Assessment X   Strength - LUE   L Gross Arm Strength 3+/5   Coordination   Movements are Fluid and Coordinated 0   Coordination and Movement Description decreased strength and coordination in L hand compared to R  (complete  and 9HPT next session)   Sensation   Light Touch Partial deficits in the RUE;Partial deficits in the LUE   Propioception Partial deficits in the RUE;Partial deficits in the LUE   Cognition   Overall Cognitive Status [facility]   Arousal/Participation Alert   Attention Within functional limits   Orientation Level Oriented X4   Memory Within functional limits   Following Commands Follows one step commands without difficulty   Vision   Vision Comments no reported changes   Discharge Information   Vocational Plan Retired/not working   Patient's Discharge Plan return home with daughter and grandson support   Patient's Rehab Expectations "to get independent as possible before going home"   Barriers to Discharge Home Limited Family Support; Unsafe Home Setup; Decreased Strength;Decreased Endurance;Pain; Safety Considerations   Impressions Pt is a 66 y  o  female who presented to the [address] [place] [address]  on 7/18/21 after falling and striking her head on a table   Pt diagnoses with central cord syndrome incomplete tetraplegia (anterior cervical discectomy and fusion  C4-5 & posterior cervical decompression and fusion  C2-T2)  Pt  has a past medical history of Cholelithiasis, GERD, Influenza, Migraine headache, Pneumonia, and Urinary tract infection  Cervical spinal precautions and sinus precautions  Pt does not have C-collar  Pt reports living in an apartment [facility] & 4 ANA, with 8 steps a landing then 6 additional steps) with daughter and grandson  PTA pt was independent in I/ADLs, however supervision/Min A with bathing and tub transfer  Reports occasionally uses SPC in community  Pt is currently limited at this time 2* orthostatic BP, pain, activity tolerance, decreased safety awareness, decreased L strength and coordination compared to R, and impaired balance in ADLs & transfers  The following Occupational Performance Areas to address include: grooming, bathing/shower, toilet hygiene, dressing and functional mobility/transfers  Pt unable to state precautions during this session  Pt lives with daughter and grandson however will not have 24 hr S/A, they work full time jobs and try to plan schedules to assist as needed  Plan next session assessing  strength, 9 hole peg, and toilet transfer if able to tolerate  Pt set up in recliner with alarm active and all needs within reach     OT Therapy Minutes   OT Time In 0700   OT Time Out 0830   OT Total Time (minutes) 90   OT Mode of treatment - Individual (minutes) 90   OT Mode of treatment - Concurrent (minutes) 0   OT Mode of treatment - Group (minutes) 0   OT Mode of treatment - Co-treat (minutes) 0   OT Mode of Treatment - Total time(minutes) 90 minutes   OT Cumulative Minutes 90   Cumulative Minutes   Cumulative therapy minutes 90     Zenia Holter, OTS

## 2021-07-27 NOTE — PCC PHYSICAL THERAPY
Pt has demonstrated improvements over the last week with therapy  Pt has improved gait distance, stairs and BPs have leveled out  Pt cont to have 16 stairs to enter home and limited family support at time of d/c  Family has stated that pt will have 24/7 Sup for time of d/c to reduce fall risk  Pt with visual deficits needing cues during amb and use of RW to avoid obstacles on R side  Pt is being d/c Friday 8/13 and will have met d/c goals before then  FT has been done at this time  Will cont to work on improving endurance, balance and safety to decrease burden of care for time of d/c

## 2021-07-28 PROCEDURE — 99233 SBSQ HOSP IP/OBS HIGH 50: CPT | Performed by: PHYSICAL MEDICINE & REHABILITATION

## 2021-07-28 PROCEDURE — 97116 GAIT TRAINING THERAPY: CPT

## 2021-07-28 PROCEDURE — 97535 SELF CARE MNGMENT TRAINING: CPT

## 2021-07-28 PROCEDURE — 99232 SBSQ HOSP IP/OBS MODERATE 35: CPT | Performed by: INTERNAL MEDICINE

## 2021-07-28 PROCEDURE — 97530 THERAPEUTIC ACTIVITIES: CPT

## 2021-07-28 RX ORDER — SIMETHICONE 80 MG
80 TABLET,CHEWABLE ORAL
Status: DISCONTINUED | OUTPATIENT
Start: 2021-07-28 | End: 2021-07-29

## 2021-07-28 RX ADMIN — HEPARIN SODIUM 5000 UNITS: 5000 INJECTION INTRAVENOUS; SUBCUTANEOUS at 22:23

## 2021-07-28 RX ADMIN — SENNOSIDES 8.6 MG: 8.6 TABLET ORAL at 07:49

## 2021-07-28 RX ADMIN — HEPARIN SODIUM 5000 UNITS: 5000 INJECTION INTRAVENOUS; SUBCUTANEOUS at 05:50

## 2021-07-28 RX ADMIN — CEPHALEXIN 250 MG: 250 CAPSULE ORAL at 07:52

## 2021-07-28 RX ADMIN — LIDOCAINE 2 PATCH: 50 PATCH TOPICAL at 10:02

## 2021-07-28 RX ADMIN — MELATONIN TAB 3 MG 3 MG: 3 TAB at 22:23

## 2021-07-28 RX ADMIN — MECLIZINE HCL 12.5 MG 12.5 MG: 12.5 TABLET ORAL at 07:50

## 2021-07-28 RX ADMIN — HEPARIN SODIUM 5000 UNITS: 5000 INJECTION INTRAVENOUS; SUBCUTANEOUS at 14:23

## 2021-07-28 RX ADMIN — OXYCODONE HYDROCHLORIDE 5 MG: 5 TABLET ORAL at 22:31

## 2021-07-28 RX ADMIN — DOCUSATE SODIUM 100 MG: 100 CAPSULE ORAL at 17:05

## 2021-07-28 RX ADMIN — POLYETHYLENE GLYCOL 3350 17 G: 17 POWDER, FOR SOLUTION ORAL at 10:00

## 2021-07-28 RX ADMIN — SIMETHICONE 80 MG: 80 TABLET, CHEWABLE ORAL at 17:05

## 2021-07-28 RX ADMIN — ATORVASTATIN CALCIUM 20 MG: 20 TABLET, FILM COATED ORAL at 17:05

## 2021-07-28 RX ADMIN — PANTOPRAZOLE SODIUM 40 MG: 40 TABLET, DELAYED RELEASE ORAL at 05:50

## 2021-07-28 RX ADMIN — ACETAMINOPHEN 975 MG: 325 TABLET, FILM COATED ORAL at 22:22

## 2021-07-28 RX ADMIN — DOCUSATE SODIUM 100 MG: 100 CAPSULE ORAL at 07:50

## 2021-07-28 RX ADMIN — OXYCODONE HYDROCHLORIDE 5 MG: 5 TABLET ORAL at 07:51

## 2021-07-28 RX ADMIN — ACETAMINOPHEN 975 MG: 325 TABLET, FILM COATED ORAL at 14:23

## 2021-07-28 RX ADMIN — METHOCARBAMOL 500 MG: 500 TABLET, FILM COATED ORAL at 05:57

## 2021-07-28 RX ADMIN — OXYCODONE HYDROCHLORIDE 5 MG: 5 TABLET ORAL at 17:06

## 2021-07-28 RX ADMIN — SENNOSIDES 8.6 MG: 8.6 TABLET ORAL at 17:05

## 2021-07-28 RX ADMIN — ACETAMINOPHEN 975 MG: 325 TABLET, FILM COATED ORAL at 05:50

## 2021-07-28 RX ADMIN — SIMETHICONE 80 MG: 80 TABLET, CHEWABLE ORAL at 22:23

## 2021-07-28 RX ADMIN — SIMETHICONE 80 MG: 80 TABLET, CHEWABLE ORAL at 12:18

## 2021-07-28 NOTE — PROGRESS NOTES
07/28/21 1100   Pain Assessment   Pain Assessment Tool 0-10   Pain Score 4   Pain Location/Orientation Location: Neck   Pain Onset/Description Frequency: Intermittent; Onset: Ongoing   Restrictions/Precautions   Precautions Bed/chair alarms; Fall Risk; Gotti; Spinal precautions;Supervision on toilet/commode  (ortho BPs; no collar at this time)   Braces or Orthoses Other (Comment)  (TEDs only this session due to higher BP)   Cognition   Overall Cognitive Status WFL   Lying to Sitting on Side of Bed   Type of Assistance Needed Physical assistance   Physical Assistance Level 51%-75%   Lying to Sitting on Side of Bed CARE Score 2   Sit to Stand   Type of Assistance Needed Physical assistance   Physical Assistance Level 51%-75%   Sit to Stand CARE Score 2   Bed-Chair Transfer   Type of Assistance Needed Physical assistance; Adaptive equipment   Physical Assistance Level 26%-50%   Comment SPT with RW   Chair/Bed-to-Chair Transfer CARE Score 3   Walk 10 Feet   Type of Assistance Needed Physical assistance; Adaptive equipment   Physical Assistance Level 26%-50%   Walk 10 Feet CARE Score 3   Walk 50 Feet with Two Turns   Type of Assistance Needed Physical assistance; Adaptive equipment   Physical Assistance Level Total assistance   Comment CF for safety; [place] with RW   Walk 50 Feet with Two Turns CARE Score 1   Walk 150 Feet   Reason if not Attempted Safety concerns   Walk 150 Feet CARE Score 88   Walking 10 Feet on Uneven Surfaces   Reason if not Attempted Safety concerns   Walking 10 Feet on Uneven Surfaces CARE Score 88   Ambulation   Distance Walked (feet) 100 ft   Assist Device Roller Walker   Gait Pattern Slow Erika;Decreased foot clearance; Step to; Improper weight shift   Provided Assistance with: Balance   Findings needs A for RW advancement   Wheel 50 Feet with Two Turns   Reason if not Attempted Activity not applicable   Wheel 50 Feet with Two Turns CARE Score 9   Wheel 150 Feet   Reason if not Attempted Activity not applicable   Wheel 191 Feet CARE Score 9   Other Comments   Comments see vitals for BPs t/o session; nurse, Verena Short made aware of them   Assessment   Treatment Assessment BPs monitored t/o functional txs and mobility due to repots of orthostatics this a m   pt able to imrpove gait distance today but needed A with RW management  will cont to work on functional mobility and stairs as well as monitoring BP    Problem List Decreased strength;Decreased range of motion; Impaired balance;Decreased endurance;Decreased mobility; Decreased coordination; Impaired sensation;Pain   Barriers to Discharge Inaccessible home environment;Decreased caregiver support   Plan   Progress Progressing toward goals   Recommendation   PT Discharge Recommendation   (TBD)   Equipment Recommended Walker   PT Therapy Minutes   PT Time In 1100   PT Time Out 1130   PT Total Time (minutes) 30   PT Mode of treatment - Individual (minutes) 30   PT Mode of treatment - Concurrent (minutes) 0   PT Mode of treatment - Group (minutes) 0   PT Mode of treatment - Co-treat (minutes) 0   PT Mode of Treatment - Total time(minutes) 30 minutes   PT Cumulative Minutes 120   Therapy Time missed   Time missed?  No

## 2021-07-28 NOTE — PLAN OF CARE
Problem: Prexisting or High Potential for Compromised Skin Integrity  Goal: Skin integrity is maintained or improved  Description: INTERVENTIONS:  - Identify patients at risk for skin breakdown  - Assess and monitor skin integrity  - Assess and monitor nutrition and hydration status  - Monitor labs   - Assess for incontinence   - Turn and reposition patient  - Assist with mobility/ambulation  - Relieve pressure over bony prominences  - Avoid friction and shearing  - Provide appropriate hygiene as needed including keeping skin clean and dry  - Evaluate need for skin moisturizer/barrier cream  - Collaborate with interdisciplinary team   - Patient/family teaching  - Consider wound care consult   Outcome: Progressing     Problem: MOBILITY - ADULT  Goal: Maintain or return to baseline ADL function  Description: INTERVENTIONS:  -  Assess patient's ability to carry out ADLs; assess patient's baseline for ADL function and identify physical deficits which impact ability to perform ADLs (bathing, care of mouth/teeth, toileting, grooming, dressing, etc )  - Assess/evaluate cause of self-care deficits   - Assess range of motion  - Assess patient's mobility; develop plan if impaired  - Assess patient's need for assistive devices and provide as appropriate  - Encourage maximum independence but intervene and supervise when necessary  - Involve family in performance of ADLs  - Assess for home care needs following discharge   - Consider OT consult to assist with ADL evaluation and planning for discharge  - Provide patient education as appropriate  Outcome: Progressing  Goal: Maintains/Returns to pre admission functional level  Description: INTERVENTIONS:  - Perform BMAT or MOVE assessment daily    - Set and communicate daily mobility goal to care team and patient/family/caregiver     - Collaborate with rehabilitation services on mobility goals if consult    - Out of bed for toileting  - Record patient progress and toleration of activity level   Outcome: Progressing     Problem: Potential for Falls  Goal: Patient will remain free of falls  Description: INTERVENTIONS:  - Educate patient/family on patient safety including physical limitations  - Instruct patient to call for assistance with activity   - Consult OT/PT to assist with strengthening/mobility   - Keep Call bell within reach  - Keep bed low and locked with side rails adjusted as appropriate  - Keep care items and personal belongings within reach  - Initiate and maintain comfort rounds  - Make Fall Risk Sign visible to staff  - Apply yellow socks and bracelet for high fall risk patients  - Consider moving patient to room near nurses station  Outcome: Progressing     Problem: PAIN - ADULT  Goal: Verbalizes/displays adequate comfort level or baseline comfort level  Description: Interventions:  - Encourage patient to monitor pain and request assistance  - Assess pain using appropriate pain scale  - Administer analgesics based on type and severity of pain and evaluate response  - Implement non-pharmacological measures as appropriate and evaluate response  - Consider cultural and social influences on pain and pain management  - Notify physician/advanced practitioner if interventions unsuccessful or patient reports new pain  Outcome: Progressing     Problem: INFECTION - ADULT  Goal: Absence or prevention of progression during hospitalization  Description: INTERVENTIONS:  - Assess and monitor for signs and symptoms of infection  - Monitor lab/diagnostic results  - Monitor all insertion sites, i e  indwelling lines, tubes, and drains  - Monitor endotracheal if appropriate and nasal secretions for changes in amount and color  - [place] appropriate cooling/warming therapies per order  - Administer medications as ordered  - Instruct and encourage patient and family to use good hand hygiene technique  - Identify and instruct in appropriate isolation precautions for identified infection/condition  Outcome: Progressing     Problem: SAFETY ADULT  Goal: Patient will remain free of falls  Description: INTERVENTIONS:  - Educate patient/family on patient safety including physical limitations  - Instruct patient to call for assistance with activity   - Consult OT/PT to assist with strengthening/mobility   - Keep Call bell within reach  - Keep bed low and locked with side rails adjusted as appropriate  - Keep care items and personal belongings within reach  - Initiate and maintain comfort rounds  - Make Fall Risk Sign visible to staff    - Apply yellow socks and bracelet for high fall risk patients  - Consider moving patient to room near nurses station  Outcome: Progressing  Goal: Maintain or return to baseline ADL function  Description: INTERVENTIONS:  -  Assess patient's ability to carry out ADLs; assess patient's baseline for ADL function and identify physical deficits which impact ability to perform ADLs (bathing, care of mouth/teeth, toileting, grooming, dressing, etc )  - Assess/evaluate cause of self-care deficits   - Assess range of motion  - Assess patient's mobility; develop plan if impaired  - Assess patient's need for assistive devices and provide as appropriate  - Encourage maximum independence but intervene and supervise when necessary  - Involve family in performance of ADLs  - Assess for home care needs following discharge   - Consider OT consult to assist with ADL evaluation and planning for discharge  - Provide patient education as appropriate  Outcome: Progressing  Goal: Maintains/Returns to pre admission functional level  Description: INTERVENTIONS:  - Perform BMAT or MOVE assessment daily    - Set and communicate daily mobility goal to care team and patient/family/caregiver     - Collaborate with rehabilitation services on mobility goals if consulted      - Out of bed for toileting  - Record patient progress and toleration of activity level   Outcome: Progressing     Problem: DISCHARGE PLANNING  Goal: Discharge to home or other facility with appropriate resources  Description: INTERVENTIONS:  - Identify barriers to discharge w/patient and caregiver  - Arrange for needed discharge resources and transportation as appropriate  - Identify discharge learning needs (meds, wound care, etc )  - Arrange for interpretive services to assist at discharge as needed  - Refer to Case Management Department for coordinating discharge planning if the patient needs post-hospital services based on physician/advanced practitioner order or complex needs related to functional status, cognitive ability, or social support system  Outcome: Progressing     Problem: Nutrition/Hydration-ADULT  Goal: Nutrient/Hydration intake appropriate for improving, restoring or maintaining nutritional needs  Description: Monitor and assess patient's nutrition/hydration status for malnutrition  Collaborate with interdisciplinary team and initiate plan and interventions as ordered  Monitor patient's weight and dietary intake as ordered or per policy  Utilize nutrition screening tool and intervene as necessary  Determine patient's food preferences and provide high-protein, high-caloric foods as appropriate       INTERVENTIONS:  - Monitor oral intake, urinary output, labs, and treatment plans  - Assess nutrition and hydration status and recommend course of action  - Evaluate amount of meals eaten  - Assist patient with eating if necessary   - Allow adequate time for meals  - Recommend/ encourage appropriate diets, oral nutritional supplements, and vitamin/mineral supplements  - Order, calculate, and assess calorie counts as needed  - Recommend, monitor, and adjust tube feedings and TPN/PPN based on assessed needs  - Assess need for intravenous fluids  - Provide specific nutrition/hydration education as appropriate  - Include patient/family/caregiver in decisions related to nutrition  Outcome: Progressing

## 2021-07-28 NOTE — CASE MANAGEMENT
Met w/pt and reviewed team update informed of potential los for 2-3 weeks  Pt stated surprise in that information but also agreed she was not yet ready to go home from a functional stand point  Pt confirmed her dtr and grandson work but they try to take different days off so someone is home with her  Following to assist w/dc planning needs

## 2021-07-28 NOTE — TEAM CONFERENCE
Acute RehabilitationTeam Conference Note  Date: 7/28/2021   Time: 10:41 AM       Patient Name:  Dulce Ball       Medical Record Number: [id number]   YOB: 1945  Sex: Female          Room/Bed:  [facility]-01  Payor Info:  Payor: MEDICARE / Plan: MEDICARE A AND B / Product Type: Medicare A & B Fee for Service /      Admitting Diagnosis: Central cord synd at unsp level of cerv spinal cord, init ([facility] ) [S14 129A]   Admit Date/Time:  7/26/2021  1:13 PM  Admission Comments: No comment available     Primary Diagnosis:  GERD (gastroesophageal reflux disease)  Principal Problem: GERD (gastroesophageal reflux disease)    Patient Active Problem List    Diagnosis Date Noted    Dyslipidemia 07/26/2021    Urinary retention 07/26/2021    Anemia 07/26/2021    CKD (chronic kidney disease) 07/26/2021    CHF (congestive heart failure) ([facility] ) 07/26/2021    Cervical stenosis of spine 07/26/2021    History of recurrent UTIs 07/25/2021    Nasal bone fractures 07/25/2021    Orthostatic hypotension 07/25/2021    Fall against object 07/18/2021    Central cord syndrome ([facility] ) 07/18/2021    Thrombocytopenia ([facility] ) 03/02/2021    Allergic conjunctivitis of both eyes 03/02/2021    Age related osteoporosis 08/19/2020    Abnormal LFTs 06/05/2020    Anxiety 06/05/2020    Generalized weakness 06/01/2020    Hyperlipidemia 03/04/2020    Medicare annual wellness visit, subsequent 02/17/2020    Recurrent falls 02/17/2020    Gait disturbance 02/17/2020    Viral illness 01/20/2020    Acute rhinitis 01/20/2017    Vertigo 01/20/2017    Moderate mitral regurgitation 01/10/2017    Hypertension 01/08/2017    History of asthma     Migraine headache     Cholelithiasis     GERD (gastroesophageal reflux disease) 05/31/2016       Physical Therapy:    Weight Bearing Status: Full Weight Bearing  Transfers:  Moderate Assistance  Bed Mobility: Moderate Assistance  Amulation Distance (ft): 20 feet  Ambulation: Moderate Assistance (with chair follow)  Assistive Device for Ambulation: Roller Walker  Wheelchair Mobility Distance:  (NA)  Number of Stairs: 2  Assistive Device for Stairs: Bilateral [employer]  Stair Assistance: Moderate Assistance  Ramp: Moderate Assistance  Assistive Device for Ramp: Roller Walker  Discharge Recommendations: Home with:  [address] with[de-identified] Family Support, Home Physical Therapy    7/27/21  Pt is a 67 y/o female admitted to [facility] to address dclien in function after C-spine surgery to correct cord compression and stenosis  Pt had mechaical fall on 7/18 with new nasal bone fractues and imaging of c-spine revealed cervical problems with surgery on 7/20  Pt does not have orders for cervical collar at this time  PTA, pt was living in walk up apartment with DTr and was Idn with use of SPC, supervision on stairs  CUrrently presnets with ORthostatic BPs, decline in activity tolerance, left sided weakness, balance deficits  All affecting her ability to perform functional transfers, ambulate and navigae stairs  Good Rehab candidate to return home at Mod I level using LRAD for household distances and supervision on stairs  Pt sleeping in recliner at home previously  Occupational Therapy:  Eating: Supervision  Grooming: Supervision  Bathing: Assist of 2, Total Assistance  Bathing: Assist of 2, Total Assistance  Upper Body Dressing: Minimal Assistance  Lower Body Dressing: Assist of 2, Total Assistance  Toileting: Total Assistance, Assist of 2  Toilet Transfer: Total Assistance, Assist of 2  Cognition: Within Defined Limits  Orientation: Person, Place, Time, Situation  Discharge Recommendations: Home with:  [address] with[de-identified] Family Support       Pt is a 66 y  o  female who presented to the [address] [place] [address]  on 7/18/21 after falling and striking her head on a table   Pt diagnoses with central cord syndrome incomplete tetraplegia (anterior cervical discectomy and fusion  C4-5 & posterior cervical decompression and fusion  C2-T2)  Cervical spinal precautions and sinus precautions  Pt does not have C-collar  Pt reports living in an apartment [facility] & [address], with 8 steps a landing then 6 additional steps) with daughter and grandson  PTA pt was independent in I/ADLs, however supervision/Min A with bathing and tub transfer  Reports occasionally uses SPC in community  Pt is currently limited at this time 2* orthostatic BP, pain, activity tolerance, decreased safety awareness, decreased L strength and coordination compared to R, and impaired balance in ADLs & transfers  The following Occupational Performance Areas to address include: grooming, bathing/shower, toilet hygiene, dressing and functional mobility/transfers  Pt unable to state precautions during this session  Pt will not have 24 hr supervision/assitance upon d/c  Chrissy Regalado Anticipate LOS 3 weeks to meet supervision/independent goals  Speech Therapy:           No notes on file    Nursing Notes:  Appetite: Fair  Diet Type: Regular/House                      Diet Patient/Family Education Complete: Yes    Type of Wound (LDA): Wound                    Type of Wound Patient/Family Education: No  Bladder: Catheter  Catheter Type: Gotti  Bladder Patient/Family Education: No  Bowel: Continent     Bowel Patient/Family Education: No  Pain Location/Orientation: Orientation: Left, Orientation: Right, Location: Back, Location: Shoulder  Pain Score: 0                       Hospital Pain Intervention(s): Medication (See MAR), Declines  Pain Patient/Family Education: Yes  Medication Management/Safety  Injectable:  (heparin)  Safe Administration: Yes (by staff-will not need at d/c )  Medication Patient/Family Education Complete: No    Pt admitted with Central cord syndrome s/p fall  Had ACDF C4-5/PCDF C2-T2 7/20/21  Doesn't need collar per NS  Anterior and posterior incisions BRAULIO  Pain control with scheduled tylenol and lidoderm; prn oxycodone and robaxin    Acute nasal bone fx OMS saw = no surgery needed  Sinus precautions for 4 weeks Urine retention-Gotti reinserted  7/26  Void trial to be started at later date  Chronic UTI- On suppressive tx with Keflex as  at home  ABLA-  Did not require transfusion  Orthostasis- Had orthostasis on acute side and was given IVF  This AM mild orthostasis supine-sit-stand = 140-140-110 with lightheadedness then again with TEDS and binder on  IVF added  Has H/O vertigo- on meclizine prn  Pt was constipated and required an enema on day of admission to [facility]  Pt is continent of bowel  Requires alarms for safety  This week we will encourage independence with ADLs  We will monitor lab results and vital signs  We will monitor incisions for healing and s/s of infection  We will educate pt about repositioning to prevent skin breakdown  We will perform routine skin assessments  We will monitor for adequate pain control  We will monitor for constipation and medicate as ordered  We will increase safety awareness with transfers and keep pt free from falls  Case Management:     Discharge Planning  Living Arrangements: Lives w/ Children  Support Systems: Children  Assistance Needed: TBD  Type of Current Residence: Private residence  Current Home Care Services: No  Pt is participating with therapy and has a supportive family  Pt expects to return home and has been educated on the potential need for contd therapy services on dc  Following to assist w/dc planning needs  Is the patient actively participating in therapies? yes  List any modifications to the treatment plan:     Barriers Interventions   Orthostatic bps Medical team following, adjusting meds   Incontinent of bowel Medication initiated   Activity tolernce, left side weakness effecting trnsfers, ambulation Therapy exercise   stairs Therapy stair training   Standing tolerance effecting adls Occupational therapy exercises     Is the patient making expected progress toward goals?  yes  List any update or changes to goals:     Medical Goals: Patient will be medically stable for discharge to [facility] upon completion of rehab program and Patient will be able to manage medical conditions and comorbid conditions with medications and follow up upon completion of rehab program    Weekly Team Goals:   Rehab Team Goals  ADL Team Goal: Patient will require supervision with ADLs with least restrictive device upon completion of rehab program  Transfer Team Goal: Patient will be independent with transfers with least restrictive device upon completion of rehab program  Locomotion Team Goal: Patient will be independent with locomotion with least restrictive device upon completion of rehab program    Discussion: pt presents with the above barriers and is mod a with ambulation, transfers and stairs  adls are currently an assist of two at mod a  pts biggest barrier is orthostatic bps and activity tolerance  Estimated los 2-3 weeks with return home  Anticipated Discharge Date:  reteam [facility] Team Members Present: The following team members are supervising care for this patient and were present during this Weekly Team Conference      Physician: Dr Taylor Stoll MD  : MADELYN Pool  Registered Nurse: Albert Colon RN  Physical Therapist: Manuel Castaneda DPT  Occupational Therapist: Xavi Farmer MS, OTR/L  Speech Therapist: Danielito Llamas MS, CCC-SLP  Other: Xavier Ferguson, DAPHNE  [address] [place] [address] and Hospice

## 2021-07-28 NOTE — PCC CARE MANAGEMENT
Pt has made good gains and is expected to return home Friday with contd hhc services through [id number]  Family has been present for training and dme has been ordered

## 2021-07-28 NOTE — PCC NURSING
Pt admitted with Central cord syndrome s/p fall  Had ACDF C4-5/PCDF C2-T2 7/20/21  Doesn't need collar per NS  Anterior and posterior incisions BRAULIO  Pain control with scheduled tylenol and lidoderm; prn oxycodone and robaxin  Acute nasal bone fx OMS saw = no surgery needed  Sinus precautions for 4 weeks Urine retention - morrow reinserted 7/26 and removed 8/2, currently on the urinary retention protocol  Chronic UTI - on suppressive tx with Keflex as at home  ABLA -  did not require transfusion  Orthostasis - had orthostasis on acute side and was given IVF  This AM mild orthostasis supine-sit-stand = 140-140-110 with lightheadedness then again with TEDS and binder on, IVF added  Has H/O vertigo - on meclizine prn  Pt was constipated and required an enema on day of admission to [facility]  Pt is continent of bowel  Requires alarms for safety  8/4--currently undergoing void trial   Had recurrent hypotension 8/2  IVF given  Fluid goal set of 1200ml per day  Staples removed from incision by neuro on 8/3  L sided chest pain 7/29  Workup negative  This week we will encourage independence with ADLs  We will monitor lab results and vital signs  We will monitor incisions for healing and s/s of infection  We will educate pt about repositioning to prevent skin breakdown  We will perform routine skin assessments  We will monitor for adequate pain control  We will monitor for constipation and medicate as ordered  We will increase safety awareness with transfers and keep pt free from falls

## 2021-07-28 NOTE — OCCUPATIONAL THERAPY NOTE
Called pt's daughter Del Archuleta  Brief conversation, however reported she will be present this afternoon after 1:30  To touch base with her when present      Rashaad Bass MS, OTR/L

## 2021-07-28 NOTE — PROGRESS NOTES
07/28/21 1235   Pain Assessment   Pain Score 6   Pain Location/Orientation Location: Neck;Orientation: Right;Location: Shoulder   Pain Onset/Description Onset: Ongoing; Onset: Gradual   Hospital Pain Intervention(s) Repositioned; Ambulation/increased activity; Rest  (5/10 pain start of session, more pain at end of session)   Multiple Pain Sites   (RN provided pain meds end of session)   Restrictions/Precautions   Precautions Fall Risk;Supervision on toilet/commode;Pain;Bed/chair alarms; Gotti   Braces or Orthoses   (spine precautions)   Cognition   Arousal/Participation Cooperative   Subjective   Subjective pt reported feeling fairly well and ready for therapy    Sit to Lying   Type of Assistance Needed Physical assistance   Physical Assistance Level Total assistance   Comment Ax2    Sit to Lying CARE Score 1   Sit to Stand   Type of Assistance Needed Physical assistance   Physical Assistance Level 26%-50%   Sit to Stand CARE Score 3   Bed-Chair Transfer   Type of Assistance Needed Physical assistance   Physical Assistance Level 26%-50%   Chair/Bed-to-Chair Transfer CARE Score 3   Transfer Bed/Chair/Wheelchair   Limitations Noted In Balance; Endurance;LE Strength;UE Strength   Adaptive Equipment None   Stand Pivot Moderate Assist   Sit to Stand Minimal Assist   Stand to Sit Minimal Assist   Sit to Supine Moderate Assist;Assist x 2   Walk 10 Feet   Type of Assistance Needed Physical assistance; Adaptive equipment   Physical Assistance Level 26%-50%   Walk 10 Feet CARE Score 3   Ambulation   Primary Mode of Locomotion Prior to Admission Walk   Distance Walked (feet) 30 ft  (x3)   Assist Device Hand Hold  (hallway rail)   Gait Pattern Slow Erika; Step to; Step through; Improper weight shift   Limitations Noted In Balance; Endurance; Heel Strike;Speed;Strength;Swing   Provided Assistance with: Balance   Findings focused on step through pattern, faster gait speed, RUE on rail and LUE with HHA, gait belt, CFA fors afety, at end of rail took a few steps back to work on this to assist with transfers, etc  Fatigued at end of session  Recommending to inc BLE strength and activity tolerance to then dec reliance on RW    Toilet Transfer   Type of Assistance Needed Physical assistance; Adaptive equipment   Physical Assistance Level 26%-50%   Toilet Transfer CARE Score 3   Toilet Transfer   Surface Assessed Standard Toilet   Limitations Noted In Balance; Endurance;LE Strength   Adaptive Equipment Grab Bar   Findings 2nd person for safety with clothing management and hygeine, as pt has morrow line to manage, had hospital pants and gown on; if pt had regular clothes (no extra fabric) and leg bag would be Ax1 for hygeine; transfer Ax1  Equipment Use   NuStep level 1, no UE, 4 min x2  SPM 20-30, legs were fatigued after each bout of 4 minutes    Assessment   Treatment Assessment Pt presents w/deficits in overall activity toleance, BLE strength, ambulatory balance and dec righting reactions  Pt will benefit from skilled PT with focus on gait training, stair training and balance to progress BLE strength and balance to dec reliance on BUE support for mobility; this should assist in decreasing shoulder pain  To review log rolling mechanics with her as well  BP was WNL this session   Barriers to Discharge Inaccessible home environment;Decreased caregiver support   PT Barriers   Physical Impairment Decreased strength;Decreased range of motion;Decreased endurance; Impaired balance;Decreased mobility;Pain;Orthopedic restrictions   Functional Limitation Car transfers;Stair negotiation;Standing;Transfers; Walking   Plan   Treatment/Interventions Functional transfer training;LE strengthening/ROM; Elevations; Therapeutic exercise; Endurance training;Patient/family training;Equipment eval/education; Bed mobility;Gait training   Progress Progressing toward goals   Recommendation   PT Discharge Recommendation Home with outpatient rehabilitation   Equipment Recommended Walker   PT Therapy Minutes   PT Time In 2696   PT Time Out 1345   PT Total Time (minutes) 70   PT Mode of treatment - Individual (minutes) 70   PT Mode of treatment - Concurrent (minutes) 0   PT Mode of treatment - Group (minutes) 0   PT Mode of treatment - Co-treat (minutes) 0   PT Mode of Treatment - Total time(minutes) 70 minutes   PT Cumulative Minutes 190   Therapy Time missed   Time missed?  No

## 2021-07-28 NOTE — PROGRESS NOTES
Physical Medicine and Rehabilitation Progress Note  Inderjit Pa 66 y o  female MRN: [id number]  Unit/Bed#: -09 Encounter: [id number]    HPI: Inderjit Pa is a 66 y o  female who presented to the [facility] [address] after fall  Patient p/w c/o neck pain as well as bilateral arm pain/numbness  Imaging revealed cervical stenosis most severe at C4-5 with possible cord edema and patient subsequently underwent C4-5 ACDF & C2-T2 PCDF on 7/20 by Dr Rodrigo Gleason  Patient was also found to have B/L nasal bone fractures and L maxillary sinus hemorrhage which was managed conservatively  Post-op course complicated by urinary retention requiring morrow placement  Chief Complaint: cervical stenosis/central cord syndrome     Subjective: patient seen at bedside and patient was without complaint     ROS: A 10 point ROS was performed; negative except as noted above       Assessment/Plan:      Cervical stenosis of spine  Assessment & Plan  - C6 fx, per XR report of 7/21 "Redemonstrated anterior-inferior C6 vertebral body corner fracture, unchanged in alignment " (seen by neurosx on 7/26 no further intervention recommended)  - cervical stenosis most severe at C4-5 with possible cord edema s/p ACDF C4-5 & PCDF C2-T2 by Dr Rodrigo Gleason on 7/20  - c-spine precautions  - no brace needed per neurosx  - FU XR already e-prescribed in EMR by neurosx team  - OP FU with Dr Rodrigo Gleason     Nasal bone fractures  Assessment & Plan  - B/L nasal bone fractures & L maxillary sinus hemorrhage  - non-op management per OMFS  - sinus precautions   - abx recommended by OMFS however dc'd by trauma surgery in acute care who was primary team per their protocol     CKD (chronic kidney disease)  Assessment & Plan  - Cr currently 0 72  - baseline appears to be 1 0-1 2  - IM monitoring     Anemia  Assessment & Plan  - Hg currently 8 9 post-operatively  - IM monitoring     Urinary retention  Assessment & Plan  - morrow placed on 7/26 in acute care just prior to transfer to [address] [place] [address] when patient is more mobile     CHF (congestive heart failure) ([facility])  Assessment & Plan  - EF 45-50%  - grade 1 DD  - not on diuretics at home  - IM monitoring volume status       Dyslipidemia  Assessment & Plan  - on home lipitor 20 mg qpm    History of recurrent UTIs  Assessment & Plan  - on home keflex 250 mg qd which patient is chronically on (confrimed with patient's OP pharmacy that patient receives monthly prescriptions for this, last provided by Dr Jose Rafael Valverde of urology)  - OP FU with Dr Guille Collier  - on home xanax 0 25 mg qhs prn     Vertigo  Assessment & Plan  - chronic, likely BPV  - on home antivert 12 5 mg q8 prn     * GERD (gastroesophageal reflux disease)  Assessment & Plan  - on home protonix 40 mg qd      Scheduled Meds:  Current Facility-Administered Medications   Medication Dose Route Frequency Provider Last Rate    acetaminophen  975 mg Oral Q8H Maggi Hall MD      ALPRAZolam  0 25 mg Oral HS PRN Concetta Oh MD      atorvastatin  20 mg Oral Daily With Hugh Brock MD      bisacodyl  10 mg Rectal Daily PRN Concetta Oh MD      cephalexin  250 mg Oral Daily Concetta Oh MD      docusate sodium  100 mg Oral BID Concetta Oh MD      heparin (porcine)  5,000 Units Subcutaneous Q8H Maggi Hall MD      lidocaine  2 patch Topical Daily Concetta Oh MD      meclizine  12 5 mg Oral Q8H PRN Concetta Oh MD      melatonin  3 mg Oral HS Concetta Oh MD      methocarbamol  500 mg Oral Q6H PRN Concetta Oh MD      oxyCODONE  2 5 mg Oral Q4H PRN Concetta Oh MD      oxyCODONE  5 mg Oral Q4H PRN Concetta Oh MD      pantoprazole  40 mg Oral Early Morning Concetta Oh MD      polyethylene glycol  17 g Oral Daily Concetta Oh MD      senna  1 tablet Oral BID Concetta Oh MD      simethicone  80 mg Oral 4x Daily (with meals and at bedtime) ROYA Cook      sodium chloride  1 spray Each Nare 4x Daily RANULFO Lujan MD            Incidental findings:     1) gallstones: asymptomatic; OP FU with PCP with GI referral at PCPs discretion      DVT ppx: HSQ (cleared in acute care by neurosx for pharmacologic DVT ppx)       Objective:    Functional Update:  Mobility: mod   Transfers: mod  ADLs: max-total        Physical Exam:        General: alert, no apparent distress, cooperative and comfortable  HEENT:  Eye: Normal external eye, conjunctiva, lidsc cornea  Ears: Normal external ears  Nose: Normal external nose, mucus membranes  CARDIAC:  +S1/2  LUNGS:  no abnormal respiratory pattern, no retractions noted, non-labored breathing   ABDOMEN:  soft NT  EXTREMITIES:  no cyanosis or clubbing   NEURO:  awake, alert appropriate responses to questions   PSYCH:  mood/affect currently stable   INCISION:  surgical site C/D/I    Diagnostic Studies:  No orders to display       Laboratory:   Results from last 7 days   Lab Units 07/27/21  0510 07/26/21  0512 07/24/21  0505 07/23/21  0446   HEMOGLOBIN g/dL 8 9* 8 2* 9 5* 7 6*   HEMATOCRIT % 27 4* 25 5* 29 4* 23 1*   WBC Thousand/uL  --  4 74 7 47 6 77     Results from last 7 days   Lab Units 07/26/21  0512 07/24/21  0505 07/23/21  0729   BUN mg/dL 18 13 11   SODIUM mmol/L 140 141 139   POTASSIUM mmol/L 4 1 3 8 3 9   CHLORIDE mmol/L 112* 113* 109*   CREATININE mg/dL 0 72 0 63 0 64            This patient was discussed by the Interdisciplinary Team in weekly case conference today  The care of the patient was extensively discussed with all care providers and an appropriate rehabilitation plan was formulated unique for this patient  Barriers were identified preventing progression of therapy and appropriate interventions were discussed with each discipline  Please see the team note for input from all disciplines regarding barriers, intervention, and discharge planning  [ x ] Total time spent: 35 Mins, and greater than 50% of this time was spent counseling/coordinating care

## 2021-07-28 NOTE — PROGRESS NOTES
07/28/21 0800   Pain Assessment   Pain Assessment Tool 0-10   Pain Score No Pain   Restrictions/Precautions   Precautions Bed/chair alarms;Supervision on toilet/commode;Spinal precautions; Gotti; Fall Risk  (monitor orthostsic BP, cervical spinal precautions )   Braces or Orthoses   (no  Cervical collar)   Oral Hygiene   Type of Assistance Needed Supervision   Physical Assistance Level No physical assistance   Comment Completed bed leve 2* orhostatic BP   Oral Hygiene CARE Score 4   Shower/Bathe Self   Type of Assistance Needed Physical assistance   Physical Assistance Level 26%-50%   Comment bed level 2* orthostatic BP, Req A for lower legs and feet    Shower/Bathe Self CARE Score 3   Upper Body Dressing   Type of Assistance Needed Physical assistance   Physical Assistance Level 25% or less   Comment Min A bed level to thread downback    Upper Body Dressing CARE Score 3   Lower Body Dressing   Type of Assistance Needed Physical assistance   Physical Assistance Level 26%-50%   Comment Mod A bed level to thread up knees, able to pull up to upper thigh   pt bridged hips and rolled for CM over hips    Lower Body Dressing CARE Score 3   Putting On/Taking Off Footwear   Type of Assistance Needed Physical assistance   Physical Assistance Level Total assistance   Comment bed level    Putting On/Taking Off Footwear CARE Score 1   Roll Left and Right   Type of Assistance Needed Physical assistance   Physical Assistance Level 25% or less   Comment HOB raised, used bed rails    Roll Left and Right CARE Score 3   Sit to Lying   Type of Assistance Needed Physical assistance   Physical Assistance Level 51%-75%   Sit to Lying CARE Score 2   Lying to Sitting on Side of Bed   Type of Assistance Needed Physical assistance   Physical Assistance Level 26%-50%   Comment Mod A X1, Require A to move BLE on floor, used bed rails   Lying to Sitting on Side of Bed CARE Score 3   Sit to Stand   Type of Assistance Needed Physical assistance Physical Assistance Level 26%-50%   Comment Min A with RW   Sit to Stand CARE Score 3   Bed-Chair Transfer   Type of Assistance Needed Physical assistance   Physical Assistance Level Total assistance   Comment Ax2, Mod AX1, 2nd person Min A, stand pivot RW from bed to [facility], RW   Chair/Bed-to-Chair Transfer CARE Score 1   [address]   Type of Assistance Needed Physical assistance   Physical Assistance Level Total assistance   Comment rolled L & R to complete moira/rear hygiene, pt incontinent this session    Matthew Perez Vei 83 Score 1   Toilet Transfer   Type of Assistance Needed Physical assistance   Physical Assistance Level Total assistance   Comment Ax2, Mod AX1, 2nd person Min A, stand pivot RW from bed to [facility], RW   Toilet Transfer CARE Score 1   Cognition   Overall Cognitive Status WFL   Arousal/Participation Alert   Attention Within functional limits   Orientation Level Oriented X4   Memory Within functional limits   Following Commands Follows one step commands without difficulty   Activity Tolerance   Activity Tolerance Treatment limited secondary to medical complications (Comment)  (orthostatic BP)   Medical Staff Made Aware RN made aware   Assessment   Treatment Assessment Pt engaged in skilled OT session focusing on ADL retraining, activity tolerance, and monitoring orthostatic BP to maximize function and independence in ADLs  Gotti care done  Pt limited by orthostatic BP this session, completing bed level ADLs and maintaining cervical spine precautions  Pt /62 beginning of session, 148/70 EOB  Pt supine and reported dec of symptoms after 3 mins  Attempted EOB and pt able to tolerate with /68 (TEDS on)  Attempted transfer with /68  BP stable supine in bed (122/78)  Pt cont to be limited by orthostatic BP, activity tolerance and L sided weakness in ADLs and transfers  Pt had BM in bed and unaware  Completed toilet hygiene supine rolling L & R   Pt would cont to benefit from skilled OT sessions focusing on toilet transfers/hygiene, bathing, dressing, functional mobility, and OOB activity tolerance to maximize function and independence (if BP stable)  Pt's daughter to be present this afternoon at 1:30  Pt set up supine with bed alarm active and call bell within reach  Plan next session completing  strength and 9 hole peg  Prognosis Good   Problem List Decreased strength;Decreased range of motion; Impaired balance;Decreased endurance;Decreased mobility; Decreased coordination; Impaired sensation;Pain   Barriers to Discharge Inaccessible home environment;Decreased caregiver support   Plan   Progress Slow progress, medical status limitations  (orthostatic BP)   Recommendation   OT Discharge Recommendation   (pending pt progress)   OT Therapy Minutes   OT Time In 0800   OT Time Out 0930   OT Total Time (minutes) 90   OT Mode of treatment - Individual (minutes) 90   OT Mode of treatment - Concurrent (minutes) 0   OT Mode of treatment - Group (minutes) 0   OT Mode of treatment - Co-treat (minutes) 0   OT Mode of Treatment - Total time(minutes) 90 minutes   OT Cumulative Minutes 180   Therapy Time missed   Time missed?  No     Rejeana Risk, OTs

## 2021-07-29 PROBLEM — R07.9 CHEST PAIN: Status: ACTIVE | Noted: 2021-07-29

## 2021-07-29 LAB
ATRIAL RATE: 97 BPM
P AXIS: 17 DEGREES
PR INTERVAL: 140 MS
QRS AXIS: -9 DEGREES
QRSD INTERVAL: 76 MS
QT INTERVAL: 384 MS
QTC INTERVAL: 487 MS
T WAVE AXIS: 50 DEGREES
TROPONIN I SERPL-MCNC: <0.02 NG/ML
TROPONIN I SERPL-MCNC: <0.02 NG/ML
VENTRICULAR RATE: 97 BPM

## 2021-07-29 PROCEDURE — 97535 SELF CARE MNGMENT TRAINING: CPT

## 2021-07-29 PROCEDURE — 97116 GAIT TRAINING THERAPY: CPT

## 2021-07-29 PROCEDURE — 99231 SBSQ HOSP IP/OBS SF/LOW 25: CPT | Performed by: INTERNAL MEDICINE

## 2021-07-29 PROCEDURE — 84484 ASSAY OF TROPONIN QUANT: CPT | Performed by: PHYSICIAN ASSISTANT

## 2021-07-29 PROCEDURE — 93010 ELECTROCARDIOGRAM REPORT: CPT | Performed by: INTERNAL MEDICINE

## 2021-07-29 PROCEDURE — 93005 ELECTROCARDIOGRAM TRACING: CPT

## 2021-07-29 PROCEDURE — 97110 THERAPEUTIC EXERCISES: CPT

## 2021-07-29 PROCEDURE — 99232 SBSQ HOSP IP/OBS MODERATE 35: CPT | Performed by: PHYSICAL MEDICINE & REHABILITATION

## 2021-07-29 PROCEDURE — 97530 THERAPEUTIC ACTIVITIES: CPT

## 2021-07-29 RX ORDER — SIMETHICONE 20 MG/.3ML
80 EMULSION ORAL
Status: DISCONTINUED | OUTPATIENT
Start: 2021-07-29 | End: 2021-08-13 | Stop reason: HOSPADM

## 2021-07-29 RX ORDER — MAGNESIUM HYDROXIDE/ALUMINUM HYDROXICE/SIMETHICONE 120; 1200; 1200 MG/30ML; MG/30ML; MG/30ML
30 SUSPENSION ORAL EVERY 4 HOURS PRN
Status: DISCONTINUED | OUTPATIENT
Start: 2021-07-29 | End: 2021-08-08

## 2021-07-29 RX ADMIN — OXYCODONE HYDROCHLORIDE 5 MG: 5 TABLET ORAL at 10:39

## 2021-07-29 RX ADMIN — PANTOPRAZOLE SODIUM 40 MG: 40 TABLET, DELAYED RELEASE ORAL at 05:57

## 2021-07-29 RX ADMIN — ACETAMINOPHEN 975 MG: 325 TABLET, FILM COATED ORAL at 05:57

## 2021-07-29 RX ADMIN — LIDOCAINE 2 PATCH: 50 PATCH TOPICAL at 09:38

## 2021-07-29 RX ADMIN — SIMETHICONE 80 MG: 20 EMULSION ORAL at 09:37

## 2021-07-29 RX ADMIN — CEPHALEXIN 250 MG: 250 CAPSULE ORAL at 09:36

## 2021-07-29 RX ADMIN — SIMETHICONE 80 MG: 20 EMULSION ORAL at 22:03

## 2021-07-29 RX ADMIN — HEPARIN SODIUM 5000 UNITS: 5000 INJECTION INTRAVENOUS; SUBCUTANEOUS at 13:02

## 2021-07-29 RX ADMIN — SIMETHICONE 80 MG: 20 EMULSION ORAL at 17:35

## 2021-07-29 RX ADMIN — MELATONIN TAB 3 MG 3 MG: 3 TAB at 22:02

## 2021-07-29 RX ADMIN — METHOCARBAMOL 500 MG: 500 TABLET, FILM COATED ORAL at 09:36

## 2021-07-29 RX ADMIN — HEPARIN SODIUM 5000 UNITS: 5000 INJECTION INTRAVENOUS; SUBCUTANEOUS at 05:58

## 2021-07-29 RX ADMIN — ACETAMINOPHEN 975 MG: 325 TABLET, FILM COATED ORAL at 13:02

## 2021-07-29 RX ADMIN — ALUMINUM HYDROXIDE, MAGNESIUM HYDROXIDE, AND SIMETHICONE 30 ML: 200; 200; 20 SUSPENSION ORAL at 10:56

## 2021-07-29 RX ADMIN — ACETAMINOPHEN 975 MG: 325 TABLET, FILM COATED ORAL at 22:02

## 2021-07-29 RX ADMIN — SIMETHICONE 80 MG: 20 EMULSION ORAL at 13:03

## 2021-07-29 RX ADMIN — HEPARIN SODIUM 5000 UNITS: 5000 INJECTION INTRAVENOUS; SUBCUTANEOUS at 22:02

## 2021-07-29 RX ADMIN — ATORVASTATIN CALCIUM 20 MG: 20 TABLET, FILM COATED ORAL at 17:31

## 2021-07-29 NOTE — PLAN OF CARE
Problem: Prexisting or High Potential for Compromised Skin Integrity  Goal: Skin integrity is maintained or improved  Description: INTERVENTIONS:  - Identify patients at risk for skin breakdown  - Assess and monitor skin integrity  - Assess and monitor nutrition and hydration status  - Monitor labs   - Assess for incontinence   - Turn and reposition patient  - Assist with mobility/ambulation  - Relieve pressure over bony prominences  - Avoid friction and shearing  - Provide appropriate hygiene as needed including keeping skin clean and dry  - Evaluate need for skin moisturizer/barrier cream  - Collaborate with interdisciplinary team   - Patient/family teaching  - Consider wound care consult   Outcome: Progressing     Problem: MOBILITY - ADULT  Goal: Maintain or return to baseline ADL function  Description: INTERVENTIONS:  -  Assess patient's ability to carry out ADLs; assess patient's baseline for ADL function and identify physical deficits which impact ability to perform ADLs (bathing, care of mouth/teeth, toileting, grooming, dressing, etc )  - Assess/evaluate cause of self-care deficits   - Assess range of motion  - Assess patient's mobility; develop plan if impaired  - Assess patient's need for assistive devices and provide as appropriate  - Encourage maximum independence but intervene and supervise when necessary  - Involve family in performance of ADLs  - Assess for home care needs following discharge   - Consider OT consult to assist with ADL evaluation and planning for discharge  - Provide patient education as appropriate  Outcome: Progressing  Goal: Maintains/Returns to pre admission functional level  Description: INTERVENTIONS:  - Perform BMAT or MOVE assessment daily    - Set and communicate daily mobility goal to care team and patient/family/caregiver  - Collaborate with rehabilitation services on mobility goals if consulted  - Perform Range of Motion  times a day    - Reposition patient every hours   - Dangle patient  times a day  - Stand patient  times a day  - Ambulate patient  times a day  - Out of bed to chair  times a day   - Out of bed for meals  times a day  - Out of bed for toileting  - Record patient progress and toleration of activity level   Outcome: Progressing     Problem: Potential for Falls  Goal: Patient will remain free of falls  Description: INTERVENTIONS:  - Educate patient/family on patient safety including physical limitations  - Instruct patient to call for assistance with activity   - Consult OT/PT to assist with strengthening/mobility   - Keep Call bell within reach  - Keep bed low and locked with side rails adjusted as appropriate  - Keep care items and personal belongings within reach  - Initiate and maintain comfort rounds  - Make Fall Risk Sign visible to staff  - Offer Toileting every  Hours, in advance of need  - Initiate/Maintain alarm  - Obtain necessary fall risk management equipment:   - Apply yellow socks and bracelet for high fall risk patients  - Consider moving patient to room near nurses station  Outcome: Progressing     Problem: PAIN - ADULT  Goal: Verbalizes/displays adequate comfort level or baseline comfort level  Description: Interventions:  - Encourage patient to monitor pain and request assistance  - Assess pain using appropriate pain scale  - Administer analgesics based on type and severity of pain and evaluate response  - Implement non-pharmacological measures as appropriate and evaluate response  - Consider cultural and social influences on pain and pain management  - Notify physician/advanced practitioner if interventions unsuccessful or patient reports new pain  Outcome: Progressing     Problem: INFECTION - ADULT  Goal: Absence or prevention of progression during hospitalization  Description: INTERVENTIONS:  - Assess and monitor for signs and symptoms of infection  - Monitor lab/diagnostic results  - Monitor all insertion sites, i e  indwelling lines, tubes, and drains  - Monitor endotracheal if appropriate and nasal secretions for changes in amount and color  - Hamler appropriate cooling/warming therapies per order  - Administer medications as ordered  - Instruct and encourage patient and family to use good hand hygiene technique  - Identify and instruct in appropriate isolation precautions for identified infection/condition  Outcome: Progressing     Problem: SAFETY ADULT  Goal: Patient will remain free of falls  Description: INTERVENTIONS:  - Educate patient/family on patient safety including physical limitations  - Instruct patient to call for assistance with activity   - Consult OT/PT to assist with strengthening/mobility   - Keep Call bell within reach  - Keep bed low and locked with side rails adjusted as appropriate  - Keep care items and personal belongings within reach  - Initiate and maintain comfort rounds  - Make Fall Risk Sign visible to staff  - Offer Toileting every Hours, in advance of need  - Initiate/Maintain alarm  - Obtain necessary fall risk management equipment:  - Apply yellow socks and bracelet for high fall risk patients  - Consider moving patient to room near nurses station  Outcome: Progressing  Goal: Maintain or return to baseline ADL function  Description: INTERVENTIONS:  -  Assess patient's ability to carry out ADLs; assess patient's baseline for ADL function and identify physical deficits which impact ability to perform ADLs (bathing, care of mouth/teeth, toileting, grooming, dressing, etc )  - Assess/evaluate cause of self-care deficits   - Assess range of motion  - Assess patient's mobility; develop plan if impaired  - Assess patient's need for assistive devices and provide as appropriate  - Encourage maximum independence but intervene and supervise when necessary  - Involve family in performance of ADLs  - Assess for home care needs following discharge   - Consider OT consult to assist with ADL evaluation and planning for discharge  - Provide patient education as appropriate  Outcome: Progressing  Goal: Maintains/Returns to pre admission functional level  Description: INTERVENTIONS:  - Perform BMAT or MOVE assessment daily    - Set and communicate daily mobility goal to care team and patient/family/caregiver  - Collaborate with rehabilitation services on mobility goals if consulted  - Perform Range of Motion  times a day  - Reposition patient every  hours  - Dangle patient  times a day  - Stand patient  times a day  - Ambulate patient  times a day  - Out of bed to chair  times a day   - Out of bed for  times a day  - Out of bed for toileting  - Record patient progress and toleration of activity level   Outcome: Progressing     Problem: DISCHARGE PLANNING  Goal: Discharge to home or other facility with appropriate resources  Description: INTERVENTIONS:  - Identify barriers to discharge w/patient and caregiver  - Arrange for needed discharge resources and transportation as appropriate  - Identify discharge learning needs (meds, wound care, etc )  - Arrange for interpretive services to assist at discharge as needed  - Refer to Case Management Department for coordinating discharge planning if the patient needs post-hospital services based on physician/advanced practitioner order or complex needs related to functional status, cognitive ability, or social support system  Outcome: Progressing     Problem: Nutrition/Hydration-ADULT  Goal: Nutrient/Hydration intake appropriate for improving, restoring or maintaining nutritional needs  Description: Monitor and assess patient's nutrition/hydration status for malnutrition  Collaborate with interdisciplinary team and initiate plan and interventions as ordered  Monitor patient's weight and dietary intake as ordered or per policy  Utilize nutrition screening tool and intervene as necessary  Determine patient's food preferences and provide high-protein, high-caloric foods as appropriate  INTERVENTIONS:  - Monitor oral intake, urinary output, labs, and treatment plans  - Assess nutrition and hydration status and recommend course of action  - Evaluate amount of meals eaten  - Assist patient with eating if necessary   - Allow adequate time for meals  - Recommend/ encourage appropriate diets, oral nutritional supplements, and vitamin/mineral supplements  - Order, calculate, and assess calorie counts as needed  - Recommend, monitor, and adjust tube feedings and TPN/PPN based on assessed needs  - Assess need for intravenous fluids  - Provide specific nutrition/hydration education as appropriate  - Include patient/family/caregiver in decisions related to nutrition  Outcome: Progressing

## 2021-07-29 NOTE — ASSESSMENT & PLAN NOTE
- patient complained of brief episode of left sided chest pain while in therapy  - per patient this occurs at home as well and responds to OTC antacid medication however checked EKG to r/o cardiac etiology and EKG was unrevealing (did show mildly elevated QTc at 487, d/w IM who recommended no further GUTIERREZ/intervention at this time for QTc and to avoid QT prolonging medication if possible)   - resolved after given medication for heartburn   - troponin's negative (per IM no further troponins required)

## 2021-07-29 NOTE — PROGRESS NOTES
07/29/21 1430   Pain Assessment   Pain Assessment Tool 0-10   Pain Score 8   Pain Location/Orientation Orientation: Left; Location: Shoulder; Location: Neck   Pain Onset/Description Onset: Ongoing; Onset: Gradual;Descriptor: Throbbing   Hospital Pain Intervention(s) Repositioned; Emotional support; Rest   Restrictions/Precautions   Precautions Bed/chair alarms; Fall Risk;Pain;Supervision on toilet/commode;Spinal precautions; Gotti  (ortho BPs, cspine precautions)   Braces or Orthoses   (no C/S collar)   Sit to Lying   Type of Assistance Needed Physical assistance   Physical Assistance Level Total assistance   Comment Ax2 (trunk and BLE mgmt 2* max fatigue and pain today)   Sit to Lying CARE Score 1   Lying to Sitting on Side of Bed   Type of Assistance Needed Physical assistance   Physical Assistance Level 26%-50%   Comment ModAx1, bed rails   Lying to Sitting on Side of Bed CARE Score 3   Sit to Stand   Type of Assistance Needed Physical assistance;Verbal cues; Adaptive equipment   Physical Assistance Level 26%-50%   Comment Ryland at RW with vc's for hand placement   Sit to Stand CARE Score 3   Bed-Chair Transfer   Type of Assistance Needed Physical assistance;Verbal cues; Adaptive equipment   Physical Assistance Level 26%-50%   Comment Ryland SPT w/RW, vc's for hand placement   Chair/Bed-to-Chair Transfer CARE Score 3   Toileting Hygiene   Type of Assistance Needed Physical assistance   Physical Assistance Level Total assistance   Comment Ax2 in stance at RW (MinAx1 to steady, A of 2nd person for rear hygiene and CM up/down)   Matthew Chris Padilla 83 Score 1   Toileting   Able to [address] down no, up no  Able to Manage Clothing Closures No   Manage Hygiene Bowel   Limitations Noted In Balance; Coordination;Problem Solving;ROM;Safety; Sequencing;UE Strength;LE Strength   Toilet Transfer   Type of Assistance Needed Physical assistance; Adaptive equipment   Physical Assistance Level Total assistance   Comment Ax2 BSC<>w/c swap out 2* max fatigue this afternoon (MinAx1 to steady pt at RW, A of 2nd person to swap out equipment)   Toilet Transfer CARE Score 1   Toilet Transfer   Surface Assessed Standard Commode   Transfer Technique   (swap out)   Limitations Noted In Balance; Endurance;Confidence;Problem Solving;ROM;Safety; Sensation; Sequencing;UE Strength;LE Strength   Adaptive Equipment Walker   Exercise Tools   Exercise Tools Yes   Other Exercise Tool 1 Seated in w/c with Sup, [id number] alternating UE bicep curls, chest press, and supination/pronation using 1# free weight for OOB activity tolerance, UE strengthening, and endurance, for increased participation in fxl transfers  Pt tolerated with increased rest breaks for LUE 2* shoulder/neck pain, and slow pace to manage pain/fatigue  Coordination   Fine Motor Seated in w/c with Sup, pt engaged in LUE fxl reach crossing midline to retrieve marbles using pincer grasp and place in bucket to L, for LUE coordination, fine motor skills, repetitive fxl reach, and UE endurance, for improved integration of LUE into fxl ADL tasks  Pt completed with slow pace and frequent rest breaks to manage proximal LUE pain/fatigue, but no droppage and able to complete without assist    Cognition   Overall Cognitive Status [facility]   Arousal/Participation Alert; Cooperative   Attention Within functional limits   Orientation Level Oriented X4   Memory Within functional limits   Following Commands Follows one step commands without difficulty   Activity Tolerance   Activity Tolerance Patient limited by fatigue;Patient limited by pain   Medical Staff Made Aware DAPHNE Lara notified that russell emptied 400mL urine from pt morrow during OT session   Assessment   Treatment Assessment Pt seen for 60min skilled OT session focused on toileting on BSC (rear hygiene and clothing mgmt), bed mobility, fxl transfers, OOB activity tolerance, UE strengthening, and LUE coordination/FMC, for increased independence with ADLs and decreased caregiver burden  See detailed descriptions of fxl performance above  Pt reports that stabbing pain in L chest/arm that occurred during this morning's OT session has resolved  DAPHNE Garner states that EKG came back clear  Pt medically appropriate to be seen for PM OT session  Pt tolerated session with very frequent rest breaks during OOB activity to manage B/L shoulder pain and c/o max generalized fatigue  Pt continues to require TotalA for rear hygiene and CM during toileting  Pt continues to be limited by OOB activity tolerance, pain, L side weakness, and decreased standing balance/tolerance  Pt would benefit from continued skilled OT focused on ADL retraining, toilet transfers/hygiene, fxl transfers, OOB activity tolerance, LUE strengthening/coordination, and standing balance/tolerance  Problem List Decreased strength;Decreased range of motion;Decreased endurance; Impaired balance;Decreased mobility; Decreased coordination; Impaired sensation;Pain;Orthopedic restrictions   Barriers to Discharge Inaccessible home environment;Decreased caregiver support   Plan   Treatment/Interventions ADL retraining;Functional transfer training; Therapeutic exercise; Endurance training;Patient/family training;Equipment eval/education; Bed mobility; Compensatory technique education;Spoke to nursing   Progress Progressing toward goals   Recommendation   OT Discharge Recommendation   (pending pt progress)   OT Therapy Minutes   OT Time In 1430   OT Time Out 1530   OT Total Time (minutes) 60   OT Mode of treatment - Individual (minutes) 60   OT Mode of treatment - Concurrent (minutes) 0   OT Mode of treatment - Group (minutes) 0   OT Mode of treatment - Co-treat (minutes) 0   OT Mode of Treatment - Total time(minutes) 60 minutes   OT Cumulative Minutes 300   Therapy Time missed   Time missed?  No

## 2021-07-29 NOTE — PROGRESS NOTES
Internal Medicine Progress Note  Patient: Lloyd Banuelos  Age/sex: 66 y o  female  Medical Record #: [id number]      ASSESSMENT/PLAN: (Interval History)  Lloyd Banuelos is seen and examined and management for following issues:    Central cord syndrome; s/p ACDF C4-5/PCDF C2-T2 7/20/21  · Doesn't need collar per NS  ·  incisions w/o infection     Acute nasal bone fx  · OMS saw = no surgery needed  · Sinus precautions for 4 weeks     Urine retention  · Gotti reinserted 7/26  · VT per PMR     Chronic UTI  · On suppressive tx with Keflex at home  · Will continue here     ABLA  · Did not require transfusion  · Will watch     Orthostasis  · Had orthostasis on acute side and was given IVF  · Given IVF 7/27/21 and BP has been stable  · Will continue to monitor  Chest pain  ·  Located on the Lt side  · Pt reports having this type of chest pain at home but not as strong or lasting as long  · She will take Tums and it improves  · EKG without acute changes  · Will check troponin x 3  Discharge date:  Team      The above assessment and plan was reviewed and updated as determined by my evaluation of the patient on 7/29/2021      Labs:   Results from last 7 days   Lab Units 07/27/21  0510 07/26/21  0512 07/24/21  0505   WBC Thousand/uL  --  4 74 7 47   HEMOGLOBIN g/dL 8 9* 8 2* 9 5*   HEMATOCRIT % 27 4* 25 5* 29 4*   PLATELETS Thousands/uL  --  195 196     Results from last 7 days   Lab Units 07/26/21  0512 07/24/21  0505   SODIUM mmol/L 140 141   POTASSIUM mmol/L 4 1 3 8   CHLORIDE mmol/L 112* 113*   CO2 mmol/L 23 23   BUN mg/dL 18 13   CREATININE mg/dL 0 72 0 63   CALCIUM mg/dL 9 1 8 7                   Review of Scheduled Meds:  Current Facility-Administered Medications   Medication Dose Route Frequency Provider Last Rate    acetaminophen  975 mg Oral Q8H [address] Mirza Temple MD      ALPRAZolam  0 25 mg Oral HS PRN Mirza Temple MD      atorvastatin  20 mg Oral Daily With Arnol Mott MD      bisacodyl  10 mg Rectal Daily PRN Haley Nassar MD      cephalexin  250 mg Oral Daily Haley Nassar MD      docusate sodium  100 mg Oral BID Haley Nassar MD      heparin (porcine)  5,000 Units Subcutaneous [facility] Haley Nassar MD      lidocaine  2 patch Topical Daily Haley Nassar MD      meclizine  12 5 mg Oral Q8H PRN Haley Nassar MD      melatonin  3 mg Oral HS Haley Nassar MD      methocarbamol  500 mg Oral Q6H PRN Haley Nassar MD      oxyCODONE  2 5 mg Oral Q4H PRN Haley Nassar MD     [facility] oxyCODONE  5 mg Oral Q4H PRN Haley Nassar MD      pantoprazole  40 mg Oral Early Morning Haley Nassar MD      polyethylene glycol  17 g Oral Daily Haley Nassar MD      senna  1 tablet Oral BID Haley Nassar MD      simethicone  80 mg Oral 4x Daily (with meals and at bedtime) [place] ROYA Castillo      sodium chloride  1 spray Each Nare 4x Daily PRN Haley Nassar MD         Subjective/ HPI: Patient seen and examined  Patients overnight issues or events were reviewed with nursing or staff during rounds or morning huddle session  New or overnight issues include the following:     Pt reports Lt sided chest pain today  She does have chest pain at home for which she takes Tums  She denies any other complaints  ROS:   Negative 12 point ROS other than denoted above in HPI or assessment/plan        Imaging:     No orders to display       *Labs /Radiology studies reviewed  *Medications reviewed and reconciled as needed  *Please refer to order section for additional ordered labs studies  *Case discussed with primary attending during morning huddle case rounds      Physical Examination:  Vitals:   Vitals:    07/28/21 1235 07/28/21 1300 07/28/21 1500 07/29/21 0035   BP: 124/66 136/72 146/67 122/60   BP Location: Left arm Left arm Left arm Left arm   Pulse:   99 91   Resp:   18 16   Temp:   98 °F (36 7 °C) 98 1 °F (36 7 °C)   TempSrc:   Oral Oral   SpO2:   96% 95%   Weight:       Height:           General Appearance: no distress, conversive  HEENT: PERRLA, conjuctiva normal; oropharynx clear; mucous membranes moist   Neck:  Supple, normal ROM, no JVD  Lungs: CTA, normal respiratory effort, no retractions, expiratory effort normal  CV: regular rate and rhythm; no rubs/murmurs/gallops, PMI normal   ABD: soft; ND/NT; +BS  EXT: no edema  Skin: normal turgor, normal texture, no rashes  Psych: affect normal, mood normal  Neuro: AAO      The above physical exam was reviewed and updated as determined by my evaluation of the patient on 7/29/2021  Invasive Devices     Peripheral Intravenous Line            Peripheral IV 07/28/21 Right Antecubital <1 day          Drain            Urethral Catheter 18 Fr  2 days                   VTE Pharmacologic Prophylaxis: Heparin  Code Status: Level 1 - Full Code  Current Length of Stay: 3 day(s)      Total time spent:  30 minutes with more than 50% spent counseling/coordinating care  Counseling includes discussion with patient re: progress  and discussion with patient of his/her current medical state/information  Coordination of patient's care was performed in conjunction with primary service  Time invested included review of patient's labs, vitals, and management of their comorbidities with continued monitoring  In addition, this patient was discussed with medical team including physician and advanced extenders  The care of the patient was extensively discussed and appropriate treatment plan was formulated unique for this patient  ** Please Note:  voice to text software may have been used in the creation of this document   Although proof errors in transcription or interpretation are a potential of such software**

## 2021-07-29 NOTE — PROGRESS NOTES
07/29/21 1230   Pain Assessment   Pain Assessment Tool 0-10   Pain Score 8   Pain Location/Orientation Location: Neck;Orientation: Left; Location: Shoulder   Pain Radiating Towards was not radiating at this time   Pain Onset/Description Onset: Ongoing; Onset: Gradual;Descriptor: Throbbing   Restrictions/Precautions   Precautions Bed/chair alarms; Fall Risk;Pain;Supervision on toilet/commode;Spinal precautions   Braces or Orthoses   (C/S collar not ordered)   Subjective   Subjective "i think i need to use the bathroom"   Lying to Sitting on Side of Bed   Type of Assistance Needed Physical assistance   Physical Assistance Level 26%-50%   Lying to Sitting on Side of Bed CARE Score 3   Sit to Stand   Type of Assistance Needed Physical assistance   Physical Assistance Level 25% or less   Sit to Stand CARE Score 3   Bed-Chair Transfer   Type of Assistance Needed Physical assistance; Adaptive equipment   Physical Assistance Level 25% or less   Comment with RW   Chair/Bed-to-Chair Transfer CARE Score 3   Walk 10 Feet   Type of Assistance Needed Physical assistance; Adaptive equipment   Physical Assistance Level 26%-50%   Walk 10 Feet CARE Score 3   Walk 50 Feet with Two Turns   Type of Assistance Needed Physical assistance; Adaptive equipment   Physical Assistance Level 26%-50%   Walk 50 Feet with Two Turns CARE Score 3   Walk 150 Feet   Reason if not Attempted Safety concerns   Walk 150 Feet CARE Score 88   Walking 10 Feet on Uneven Surfaces   Reason if not Attempted Safety concerns   Walking 10 Feet on Uneven Surfaces CARE Score 88   Ambulation   Distance Walked (feet) 70 ft  (x2)   Gait Pattern Inconsistant Erika; Slow Erika;Decreased foot clearance; Improper weight shift   Provided Assistance with: Balance   Findings worked on with and without [address] 50 Feet with Two Turns   Reason if not Attempted Activity not applicable   Wheel 50 Feet with Two Turns CARE Score 9   Wheel 150 Feet   Reason if not Attempted Activity not applicable   Wheel 094 Feet CARE Score 9   Curb or Single Stair   Style negotiated Single stair   Type of Assistance Needed Physical assistance; Adaptive equipment   Physical Assistance Level 25% or less   Comment use of B HRs   1 Step (Curb) CARE Score 3   4 Steps   Type of Assistance Needed Physical assistance; Adaptive equipment   Physical Assistance Level 25% or less   Comment use of B HRs   4 Steps CARE Score 3   12 Steps   Comment fatigued at 10   Reason if not Attempted Safety concerns   12 Steps CARE Score 88   Stairs   Type Stairs   Weight Bearing Precautions Cervical;Fall Risk   Assist Devices Bilateral Rail   Findings ascending fwd/descending bwd reciprocal pattern which she performed safely   Toilet Transfer   Type of Assistance Needed Physical assistance; Adaptive equipment   Physical Assistance Level 25% or less   Toilet Transfer CARE Score 3   Toilet Transfer   Findings BM urgency x2 during session   Other Comments   Comments /72 consistently in that range t/o session   Assessment   Treatment Assessment pt with focus on functional txs and mobility with and without RW   pt had BM urgency x2 during session  pt had increase pain earlier in the day with OT but followed up with Dr Alisha Johnson and to monitor pt of any cardiac signs  pt remains fall risk due to weakness and decrease foot clearance with amb   will cont to work on balance and use of RW for safety to decrease burden of care and stairs for d/c home  Problem List Decreased strength;Decreased range of motion; Impaired balance;Decreased endurance;Decreased mobility; Decreased coordination; Impaired sensation;Pain   Barriers to Discharge Inaccessible home environment;Decreased caregiver support   PT Barriers   Functional Limitation Car transfers;Stair negotiation;Standing;Transfers; Walking   Plan   Progress Progressing toward goals   Recommendation   PT Discharge Recommendation Home with outpatient rehabilitation   Equipment Recommended Harman Duff PT Therapy Minutes   PT Time In 1240   PT Time Out 1345   PT Total Time (minutes) 65   PT Mode of treatment - Individual (minutes) 65   PT Mode of treatment - Concurrent (minutes) 0   PT Mode of treatment - Group (minutes) 0   PT Mode of treatment - Co-treat (minutes) 0   PT Mode of Treatment - Total time(minutes) 65 minutes   PT Cumulative Minutes 255   Therapy Time missed   Time missed?  No

## 2021-07-29 NOTE — PROGRESS NOTES
Physical Medicine and Rehabilitation Progress Note  Sarthak Burgess 66 y o  female MRN: [id number]  Unit/Bed#: [facility] 292-25 Encounter: [id number]    HPI: Sarthak Burgess is a 66 y o  female who presented to the [address] after fall  Patient p/w c/o neck pain as well as bilateral arm pain/numbness  Imaging revealed cervical stenosis most severe at C4-5 with possible cord edema and patient subsequently underwent C4-5 ACDF & C2-T2 PCDF on 7/20 by Dr Loraine Benavidez  Patient was also found to have B/L nasal bone fractures and L maxillary sinus hemorrhage which was managed conservatively  Post-op course complicated by urinary retention requiring morrow placement  Chief Complaint: cervical stenosis/central cord syndrome     Subjective: patient seen in therapy and noted to have chest pain     ROS: A 10 point ROS was performed; negative except as noted above       Assessment/Plan:      Cervical stenosis of spine  Assessment & Plan  - C6 fx, per XR report of 7/21 "Redemonstrated anterior-inferior C6 vertebral body corner fracture, unchanged in alignment " (seen by neurosx on 7/26 no further intervention recommended)  - cervical stenosis most severe at C4-5 with possible cord edema s/p ACDF C4-5 & PCDF C2-T2 by Dr Loraine Benavidez on 7/20  - c-spine precautions  - no brace needed per neurosx  - FU XR already e-prescribed in EMR by neurosx team  - OP FU with Dr Loraine Benavidez     Nasal bone fractures  Assessment & Plan  - B/L nasal bone fractures & L maxillary sinus hemorrhage  - non-op management per OMFS  - sinus precautions   - abx recommended by OMFS however dc'd by trauma surgery in acute care who was primary team per their protocol     CKD (chronic kidney disease)  Assessment & Plan  - Cr currently 0 72  - baseline appears to be 1 0-1 2  - IM monitoring     Anemia  Assessment & Plan  - Hg currently 8 9 post-operatively  - IM monitoring     Urinary retention  Assessment & Plan  - morrow placed on 7/26 in acute care just prior to transfer to [address] [place] [address] when patient is more mobile     Chest pain  Assessment & Plan  - patient complained of brief episode of left sided chest pain while in therapy  - per patient this occurs at home as well and responds to OTC antacid medication however checked EKG to r/o cardiac etiology and EKG was unrevealing (did show mildly elevated QTc at 487, d/w IM who recommended no further GUTIERREZ/intervention at this time for QTc and to avoid QT prolonging medication if possible)   - troponin's pending      CHF (congestive heart failure) ([facility])  Assessment & Plan  - EF 45-50%  - grade 1 DD  - not on diuretics at home  - IM monitoring volume status       Dyslipidemia  Assessment & Plan  - on home lipitor 20 mg qpm    History of recurrent UTIs  Assessment & Plan  - on home keflex 250 mg qd which patient is chronically on (confrimed with patient's OP pharmacy that patient receives monthly prescriptions for this, last provided by Dr Francheska Perez of urology)  - OP FU with Dr Azalia Lobo  - on home xanax 0 25 mg qhs prn     Vertigo  Assessment & Plan  - chronic, likely BPV  - on home antivert 12 5 mg q8 prn     * GERD (gastroesophageal reflux disease)  Assessment & Plan  - on home protonix 40 mg qd      Scheduled Meds:  Current Facility-Administered Medications   Medication Dose Route Frequency Provider Last Rate    acetaminophen  975 mg Oral Q8H [address] Ellen Estevez MD      ALPRAZolam  0 25 mg Oral HS PRN Ellen Estevez MD      aluminum-magnesium hydroxide-simethicone  30 mL Oral Q4H PRN Penny Deras PA-C      atorvastatin  20 mg Oral Daily With MD Mateusz      bisacodyl  10 mg Rectal Daily PRN Ellen Estevez MD      cephalexin  250 mg Oral Daily Ellen Estevez MD      docusate sodium  100 mg Oral BID Ellen Estevez MD      heparin (porcine)  5,000 Units Subcutaneous [facility] Ellen Estevez MD      lidocaine  2 patch Topical Daily Ellen Estevez MD      meclizine  12 5 mg Oral Q8H PRN Colonel Maury MD      melatonin  3 mg Oral HS Colonel Maury MD      methocarbamol  500 mg Oral Q6H PRN Colonel Maury MD      oxyCODONE  2 5 mg Oral Q4H PRN Colonel Maury MD     [place] oxyCODONE  5 mg Oral Q4H PRN Colonel Maury MD      pantoprazole  40 mg Oral Early Morning Colonel Maury MD      polyethylene glycol  17 g Oral Daily Colonel Maury MD      senna  1 tablet Oral BID Colonel Maury MD      simethicone  80 mg Oral 4x Daily (with meals and at bedtime) Penny Deras PA-C      sodium chloride  1 spray Each Nare 4x Daily PRN Colonel Maury MD            Incidental findings:     1) gallstones: asymptomatic; OP FU with PCP with GI referral at PCPs discretion      DVT ppx: HSQ (cleared in acute care by neurosx for pharmacologic DVT ppx)       Objective:    Functional Update:  Mobility: mod   Transfers: mod  ADLs: max-total        Physical Exam:    Vitals:    07/29/21 1010   BP: 141/70   Pulse: 92   Resp:    Temp:    SpO2:          General: alert, no apparent distress, cooperative and comfortable  HEENT:  Eye: Normal external eye, conjunctiva, lidsc cornea  Ears: Normal external ears  Nose: Normal external nose, mucus membranes  CARDIAC:  +S1/2  LUNGS:  no abnormal respiratory pattern, no retractions noted, non-labored breathing   ABDOMEN:  soft NT  EXTREMITIES:  no cyanosis or clubbing   NEURO:  awake, alert appropriate responses to questions   PSYCH:  mood/affect currently stable   INCISION:  surgical site C/D/I    Diagnostic Studies:  No orders to display       Laboratory:   Results from last 7 days   Lab Units 07/27/21  0510 07/26/21  0512 07/24/21  0505 07/23/21  0446   HEMOGLOBIN g/dL 8 9* 8 2* 9 5* 7 6*   HEMATOCRIT % 27 4* 25 5* 29 4* 23 1*   WBC Thousand/uL  --  4 74 7 47 6 77     Results from last 7 days   Lab Units 07/26/21  0512 07/24/21  0505 07/23/21  0729   BUN mg/dL 18 13 11   SODIUM mmol/L 140 141 139   POTASSIUM mmol/L 4 1 3 8 3 9   CHLORIDE mmol/L 112* 113* 109*   CREATININE mg/dL 0 72 0 63 0 64

## 2021-07-29 NOTE — NURSING NOTE
Patient did offer complaints today of numbness and tingling intermittently to left arm-this was reported to Dr Erik Lopez

## 2021-07-29 NOTE — PROGRESS NOTES
07/29/21 0900   Pain Assessment   Pain Assessment Tool 0-10   Pain Score 8   Pain Location/Orientation Orientation: Left; Location: Shoulder   Pain Onset/Description Onset: Ongoing;Frequency: Constant/Continuous  (with sharp 10/10 pain in L chest/arm last 10min of session)   Hospital Pain Intervention(s) Repositioned; Rest  (DAPHNE Lara and Dr Radha Page notified and present, ordered EKG)   Restrictions/Precautions   Precautions Bed/chair alarms; Fall Risk; Gotti; Supervision on toilet/commode;Spinal precautions  (ortho BP, c-spine precautions)   Sit to Lying   Type of Assistance Needed Physical assistance   Physical Assistance Level Total assistance   Comment Ax2 (for trunk and BLE mgmt 2* max L chest pain and shoulder pain at end of session)   Sit to Lying CARE Score 1   Sit to Stand   Type of Assistance Needed Physical assistance; Adaptive equipment;Verbal cues   Physical Assistance Level 26%-50%   Comment Ryland at RW with vc's for hand placement   Sit to Stand CARE Score 3   Bed-Chair Transfer   Type of Assistance Needed Physical assistance;Verbal cues; Adaptive equipment   Physical Assistance Level 26%-50%   Comment Ryland [facility]/, vc's for hand placement   Chair/Bed-to-Chair Transfer CARE Score 3   Toileting Hygiene   Type of Assistance Needed Physical assistance; Adaptive equipment;Verbal cues   Physical Assistance Level Total assistance   Comment Pt found upon therapist arrival to room to have bowel incontinence in brief, requiring total A for moira/rear hygiene in stance at  with 2nd person stabilizing pt  Total A to change soiled brief seated/standing  Toileting Hygiene CARE Score 1   Toileting   Able to Pull Clothing down no, up no  Able to Manage Clothing Closures No   Manage Hygiene Bowel   Limitations Noted In Balance; Coordination;Problem Solving;ROM;Safety; Sequencing;UE Strength;LE Strength   Toilet Transfer   Comment pt with bowel incontinence before therapist arrival to room, assisted in hygiene and CM to change brief, pt declined to sit on commode, stating she did not need to have any further BM   Reason if not Attempted Activity not applicable   Toilet Transfer CARE Score 9   Exercise Tools   Exercise Tools Yes   Other Exercise Tool 1 Seated in w/c with Sup, pt engaged in 4# B/L tabletop enrique, [id number] all planes, for B/L coordination, UE strengthening, and L gross grasp, as prep for increased integration of LUE into fxl tasks, and improved independence with ADLs  Pt tolerated at very slow pace with frequent and prolonged rest breaks to manage B/L shoulder pain, greater on L side  Pt demo G adherence to c-spine precautions t/o without cues  Cognition   Overall Cognitive Status WFL   Arousal/Participation Alert; Cooperative   Attention Within functional limits   Orientation Level Oriented X4   Memory Within functional limits   Following Commands Follows one step commands without difficulty   Activity Tolerance   Activity Tolerance Treatment limited secondary to medical complications (Comment)  (max/sharp L chest/arm pain suddenly that limited sessionZerita Amarilis Morales and RN Judge Grier present and aware   Assessment   Treatment Assessment Pt seen for 60min skilled OT session focused on rear hygiene and clothing mgmt 2* bowel incontinence, bed mobility, fxl transfers, OOB activity tolerance, UE strengthening, L gross grasp, and monitoring vitals, for increased independence with ADLs and decreased caregiver burden  See detailed descriptions of fxl performance above  Upon therapist arrival to pt room, pt found to have bowel incontinence, and required Ax2 to complete moira/rear hygiene and clothing mgmt to change brief  Pt reports having ongoing bowel incontinence while on ARC  Pt manual BP start of session: 138/70   Pt then able to tolerate UB TherEx seated in w/c in gym, with 8/10 but tolerable B/L shoulder pain, but then reporting sudden/new stabbing pain in L chest/arm (9/10) while at rest seated in w/c  Dr Ken Cullen present and aware, directing pt to be returned to bed and EKG completed immediately  Jane SERNA present and aware  Alexandra Ly took manual BP: 148/90, 100%SPO2, 99bpm  OT session ended early (originally scheduled for 90min), will attempt to make up time later today pending pt medically appropriate and EKG results  Pt positioned in bed with pillows under B/L arms while supine for increased comfort  Pt continues to be limited by OOB activity tolerance, pain, L side weakness, and decreased standing balance/tolerance  Pt would benefit from continued skilled OT focused on ADL retraining, toilet transfers/hygiene, fxl transfers, OOB activity tolerance, LUE strengthening/coordination, and standing balance/tolerance  Problem List Decreased strength;Decreased range of motion;Decreased endurance; Impaired balance;Decreased mobility; Decreased coordination; Impaired sensation;Orthopedic restrictions;Pain   Barriers to Discharge Inaccessible home environment;Decreased caregiver support   Plan   Treatment/Interventions ADL retraining;Functional transfer training; Therapeutic exercise; Endurance training;Patient/family training;Equipment eval/education; Bed mobility; Compensatory technique education;Spoke to MD;Spoke to nursing   Progress Slow progress, decreased activity tolerance   Recommendation   OT Discharge Recommendation   (pending pt progress)   OT Therapy Minutes   OT Time In 0900   OT Time Out 1000   OT Total Time (minutes) 60   OT Mode of treatment - Individual (minutes) 60   OT Mode of treatment - Concurrent (minutes) 0   OT Mode of treatment - Group (minutes) 0   OT Mode of treatment - Co-treat (minutes) 0   OT Mode of Treatment - Total time(minutes) 60 minutes   OT Cumulative Minutes 240   Therapy Time missed   Time missed?  No

## 2021-07-30 PROCEDURE — 99232 SBSQ HOSP IP/OBS MODERATE 35: CPT | Performed by: PHYSICAL MEDICINE & REHABILITATION

## 2021-07-30 PROCEDURE — 97535 SELF CARE MNGMENT TRAINING: CPT

## 2021-07-30 PROCEDURE — 99231 SBSQ HOSP IP/OBS SF/LOW 25: CPT | Performed by: PHYSICIAN ASSISTANT

## 2021-07-30 PROCEDURE — 97116 GAIT TRAINING THERAPY: CPT

## 2021-07-30 PROCEDURE — 97110 THERAPEUTIC EXERCISES: CPT

## 2021-07-30 PROCEDURE — 97530 THERAPEUTIC ACTIVITIES: CPT

## 2021-07-30 RX ORDER — POLYETHYLENE GLYCOL 3350 17 G/17G
17 POWDER, FOR SOLUTION ORAL DAILY PRN
Status: DISCONTINUED | OUTPATIENT
Start: 2021-07-31 | End: 2021-08-12

## 2021-07-30 RX ADMIN — CEPHALEXIN 250 MG: 250 CAPSULE ORAL at 08:27

## 2021-07-30 RX ADMIN — HEPARIN SODIUM 5000 UNITS: 5000 INJECTION INTRAVENOUS; SUBCUTANEOUS at 13:18

## 2021-07-30 RX ADMIN — SIMETHICONE 80 MG: 20 EMULSION ORAL at 17:17

## 2021-07-30 RX ADMIN — MELATONIN TAB 3 MG 3 MG: 3 TAB at 21:43

## 2021-07-30 RX ADMIN — HEPARIN SODIUM 5000 UNITS: 5000 INJECTION INTRAVENOUS; SUBCUTANEOUS at 21:43

## 2021-07-30 RX ADMIN — HEPARIN SODIUM 5000 UNITS: 5000 INJECTION INTRAVENOUS; SUBCUTANEOUS at 05:36

## 2021-07-30 RX ADMIN — SIMETHICONE 80 MG: 20 EMULSION ORAL at 13:18

## 2021-07-30 RX ADMIN — Medication 1 PACKET: at 11:17

## 2021-07-30 RX ADMIN — ACETAMINOPHEN 975 MG: 325 TABLET, FILM COATED ORAL at 21:43

## 2021-07-30 RX ADMIN — ATORVASTATIN CALCIUM 20 MG: 20 TABLET, FILM COATED ORAL at 17:17

## 2021-07-30 RX ADMIN — ACETAMINOPHEN 975 MG: 325 TABLET, FILM COATED ORAL at 05:34

## 2021-07-30 RX ADMIN — SIMETHICONE 80 MG: 20 EMULSION ORAL at 08:27

## 2021-07-30 RX ADMIN — SIMETHICONE 80 MG: 20 EMULSION ORAL at 21:48

## 2021-07-30 RX ADMIN — PANTOPRAZOLE SODIUM 40 MG: 40 TABLET, DELAYED RELEASE ORAL at 05:34

## 2021-07-30 RX ADMIN — ACETAMINOPHEN 975 MG: 325 TABLET, FILM COATED ORAL at 13:18

## 2021-07-30 RX ADMIN — LIDOCAINE 2 PATCH: 50 PATCH TOPICAL at 08:27

## 2021-07-30 NOTE — PROGRESS NOTES
Physical Medicine and Rehabilitation Progress Note  Cassandra Lopez 66 y o  female MRN: [id number]  Unit/Bed#: [facility] 209-95 Encounter: [id number]    HPI: Cassandra Lopez is a 66 y o  female who presented to the [address] after fall  Patient p/w c/o neck pain as well as bilateral arm pain/numbness  Imaging revealed cervical stenosis most severe at C4-5 with possible cord edema and patient subsequently underwent C4-5 ACDF & C2-T2 PCDF on 7/20 by Dr Carlos Truner  Patient was also found to have B/L nasal bone fractures and L maxillary sinus hemorrhage which was managed conservatively  Post-op course complicated by urinary retention requiring morrow placement  Chief Complaint: cervical stenosis/central cord syndrome     Subjective: patient without complaint currently     ROS: A 10 point ROS was performed; negative except as noted above       Assessment/Plan:      Cervical stenosis of spine  Assessment & Plan  - C6 fx, per XR report of 7/21 "Redemonstrated anterior-inferior C6 vertebral body corner fracture, unchanged in alignment " (seen by neurosx on 7/26 no further intervention recommended)  - cervical stenosis most severe at C4-5 with possible cord edema s/p ACDF C4-5 & PCDF C2-T2 by Dr Carlos Turner on 7/20  - c-spine precautions  - no brace needed per neurosx  - FU XR already e-prescribed in EMR by neurosx team  - OP FU with Dr Carlos Turner     Nasal bone fractures  Assessment & Plan  - B/L nasal bone fractures & L maxillary sinus hemorrhage  - non-op management per OMFS  - sinus precautions   - abx recommended by OMFS however dc'd by trauma surgery in acute care who was primary team per their protocol     CKD (chronic kidney disease)  Assessment & Plan  - Cr currently 0 72  - baseline appears to be 1 0-1 2  - IM monitoring     Anemia  Assessment & Plan  - Hg currently 8 9 post-operatively  - IM monitoring     Urinary retention  Assessment & Plan  - morrow placed on 7/26 in acute care just prior to transfer to [facility]  - plan for TOV on 8/2    Chest pain  Assessment & Plan  - patient complained of brief episode of left sided chest pain while in therapy  - per patient this occurs at home as well and responds to OTC antacid medication however checked EKG to r/o cardiac etiology and EKG was unrevealing (did show mildly elevated QTc at 487, d/w IM who recommended no further GUTIERREZ/intervention at this time for QTc and to avoid QT prolonging medication if possible)   - resolved after given medication for heartburn   - troponin's negative x 2     CHF (congestive heart failure) ([facility])  Assessment & Plan  - EF 45-50%  - grade 1 DD  - not on diuretics at home  - IM monitoring volume status       Dyslipidemia  Assessment & Plan  - on home lipitor 20 mg qpm    History of recurrent UTIs  Assessment & Plan  - on home keflex 250 mg qd which patient is chronically on (confrimed with patient's OP pharmacy that patient receives monthly prescriptions for this, last provided by Dr Yoselyn Chilel of urology)  - OP FU with Dr Garrison Ped  - on home xanax 0 25 mg qhs prn     Vertigo  Assessment & Plan  - chronic, likely BPV  - on home antivert 12 5 mg q8 prn     * GERD (gastroesophageal reflux disease)  Assessment & Plan  - on home protonix 40 mg qd      Scheduled Meds:  Current Facility-Administered Medications   Medication Dose Route Frequency Provider Last Rate    acetaminophen  975 mg Oral Q8H Yamilka Crawford MD      ALPRAZolam  0 25 mg Oral HS PRN Alexis Ramírez MD      aluminum-magnesium hydroxide-simethicone  30 mL Oral Q4H PRN Penny Deras PA-C      atorvastatin  20 mg Oral Daily With MD Mateusz      cephalexin  250 mg Oral Daily Alexis Ramírez MD      heparin (porcine)  5,000 Units Subcutaneous Q8H [address] Alexis Ramírez MD      lidocaine  2 patch Topical Daily Alexis Ramírez MD      meclizine  12 5 mg Oral Q8H PRN Alexis Ramírez MD      melatonin  3 mg Oral HS Alexis Ramírez MD      methocarbamol 500 mg Oral Q6H PRN Mirza Temple MD      oxyCODONE  2 5 mg Oral Q4H PRN MD Avinash Caldera oxyCODONE  5 mg Oral Q4H PRN Mirza Temple MD      pantoprazole  40 mg Oral Early Morning MD Avinash Caldera Saroj Primes ON 7/31/2021] polyethylene glycol  17 g Oral Daily PRN Mirza Temple MD      psyllium  1 packet Oral Daily Mirza Temple MD      simethicone  80 mg Oral 4x Daily (with meals and at bedtime) Penny Deras PA-C      sodium chloride  1 spray Each Nare 4x Daily PRN Mirza Temple MD            Incidental findings:     1) gallstones: asymptomatic; OP FU with PCP with GI referral at PCPs discretion      DVT ppx: HSQ (cleared in acute care by neurosx for pharmacologic DVT ppx)       Objective:    Functional Update:  Mobility: mod   Transfers: mod  ADLs: max-total        Physical Exam:    Vitals:    07/30/21 0808   BP: 134/70   Pulse: 82   Resp: 18   Temp: 98 2 °F (36 8 °C)   SpO2: 96%         General: alert, no apparent distress, cooperative and comfortable  HEENT:  Eye: Normal external eye, conjunctiva, lidsc cornea  Ears: Normal external ears  Nose: Normal external nose, mucus membranes  CARDIAC:  +S1/2  LUNGS:  no abnormal respiratory pattern, no retractions noted, non-labored breathing   ABDOMEN:  soft NT  EXTREMITIES:  no cyanosis or clubbing   NEURO:  awake, alert appropriate responses to questions   PSYCH:  mood/affect currently stable   INCISION:  surgical site C/D/I       Diagnostic Studies:  No orders to display       Laboratory:   Results from last 7 days   Lab Units 07/27/21  0510 07/26/21  0512 07/24/21  0505   HEMOGLOBIN g/dL 8 9* 8 2* 9 5*   HEMATOCRIT % 27 4* 25 5* 29 4*   WBC Thousand/uL  --  4 74 7 47     Results from last 7 days   Lab Units 07/26/21  0512 07/24/21  0505   BUN mg/dL 18 13   SODIUM mmol/L 140 141   POTASSIUM mmol/L 4 1 3 8   CHLORIDE mmol/L 112* 113*   CREATININE mg/dL 0 72 0 63

## 2021-07-30 NOTE — PLAN OF CARE
Problem: Prexisting or High Potential for Compromised Skin Integrity  Goal: Skin integrity is maintained or improved  Description: INTERVENTIONS:  - Identify patients at risk for skin breakdown  - Assess and monitor skin integrity  - Assess and monitor nutrition and hydration status  - Monitor labs   - Assess for incontinence   - Turn and reposition patient  - Assist with mobility/ambulation  - Relieve pressure over bony prominences  - Avoid friction and shearing  - Provide appropriate hygiene as needed including keeping skin clean and dry  - Evaluate need for skin moisturizer/barrier cream  - Collaborate with interdisciplinary team   - Patient/family teaching  - Consider wound care consult   Outcome: Progressing     Problem: MOBILITY - ADULT  Goal: Maintain or return to baseline ADL function  Description: INTERVENTIONS:  -  Assess patient's ability to carry out ADLs; assess patient's baseline for ADL function and identify physical deficits which impact ability to perform ADLs (bathing, care of mouth/teeth, toileting, grooming, dressing, etc )  - Assess/evaluate cause of self-care deficits   - Assess range of motion  - Assess patient's mobility; develop plan if impaired  - Assess patient's need for assistive devices and provide as appropriate  - Encourage maximum independence but intervene and supervise when necessary  - Involve family in performance of ADLs  - Assess for home care needs following discharge   - Consider OT consult to assist with ADL evaluation and planning for discharge  - Provide patient education as appropriate  Outcome: Progressing  Goal: Maintains/Returns to pre admission functional level  Description: INTERVENTIONS:  - Perform BMAT or MOVE assessment daily    - Set and communicate daily mobility goal to care team and patient/family/caregiver  - Collaborate with rehabilitation services on mobility goals if consulted  - Perform Range of Motion times a day    - Reposition patient every hours   - Dangle patient times a day  - Stand patient times a day  - Ambulate patient times a day  - Out of bed to chair times a day   - Out of bed for meals   Problem: PAIN - ADULT  Goal: Verbalizes/displays adequate comfort level or baseline comfort level  Description: Interventions:  - Encourage patient to monitor pain and request assistance  - Assess pain using appropriate pain scale  - Administer analgesics based on type and severity of pain and evaluate response  - Implement non-pharmacological measures as appropriate and evaluate response  - Consider cultural and social influences on pain and pain management  - Notify physician/advanced practitioner if interventions unsuccessful or patient reports new pain  Outcome: Progressing     Problem: INFECTION - ADULT  Goal: Absence or prevention of progression during hospitalization  Description: INTERVENTIONS:  - Assess and monitor for signs and symptoms of infection  - Monitor lab/diagnostic results  - Monitor all insertion sites, i e  indwelling lines, tubes, and drains  - Monitor endotracheal if appropriate and nasal secretions for changes in amount and color  - [place] appropriate cooling/warming therapies per order  - Administer medications as ordered  - Instruct and encourage patient and family to use good hand hygiene technique  - Identify and instruct in appropriate isolation precautions for identified infection/condition  Outcome: Progressing   times a day  - Out of bed for toileting  - Record patient progress and toleration of activity level   Outcome: Progressing

## 2021-07-30 NOTE — PLAN OF CARE
Problem: Prexisting or High Potential for Compromised Skin Integrity  Goal: Skin integrity is maintained or improved  Description: INTERVENTIONS:  - Identify patients at risk for skin breakdown  - Assess and monitor skin integrity  - Assess and monitor nutrition and hydration status  - Monitor labs   - Assess for incontinence   - Turn and reposition patient  - Assist with mobility/ambulation  - Relieve pressure over bony prominences  - Avoid friction and shearing  - Provide appropriate hygiene as needed including keeping skin clean and dry  - Evaluate need for skin moisturizer/barrier cream  - Collaborate with interdisciplinary team   - Patient/family teaching  - Consider wound care consult   Outcome: Progressing     Problem: MOBILITY - ADULT  Goal: Maintain or return to baseline ADL function  Description: INTERVENTIONS:  -  Assess patient's ability to carry out ADLs; assess patient's baseline for ADL function and identify physical deficits which impact ability to perform ADLs (bathing, care of mouth/teeth, toileting, grooming, dressing, etc )  - Assess/evaluate cause of self-care deficits   - Assess range of motion  - Assess patient's mobility; develop plan if impaired  - Assess patient's need for assistive devices and provide as appropriate  - Encourage maximum independence but intervene and supervise when necessary  - Involve family in performance of ADLs  - Assess for home care needs following discharge   - Consider OT consult to assist with ADL evaluation and planning for discharge  - Provide patient education as appropriate  Outcome: Progressing    Problem: PAIN - ADULT  Goal: Verbalizes/displays adequate comfort level or baseline comfort level  Description: Interventions:  - Encourage patient to monitor pain and request assistance  - Assess pain using appropriate pain scale  - Administer analgesics based on type and severity of pain and evaluate response  - Implement non-pharmacological measures as appropriate and evaluate response  - Consider cultural and social influences on pain and pain management  - Notify physician/advanced practitioner if interventions unsuccessful or patient reports new pain  Outcome: Progressing     Problem: INFECTION - ADULT  Goal: Absence or prevention of progression during hospitalization  Description: INTERVENTIONS:  - Assess and monitor for signs and symptoms of infection  - Monitor lab/diagnostic results  - Monitor all insertion sites, i e  indwelling lines, tubes, and drains  - Monitor endotracheal if appropriate and nasal secretions for changes in amount and color  - [place] appropriate cooling/warming therapies per order  - Administer medications as ordered  - Instruct and encourage patient and family to use good hand hygiene technique  - Identify and instruct in appropriate isolation precautions for identified infection/condition  Outcome: Progressing     Problem: SAFETY ADULT  Goal: Patient will remain free of falls  Description: INTERVENTIONS:  - Educate patient/family on patient safety including physical limitations  - Instruct patient to call for assistance with activity   - Consult OT/PT to assist with strengthening/mobility   - Keep Call bell within reach  - Keep bed low and locked with side rails adjusted as appropriate  - Keep care items and personal belongings within reach  - Initiate and maintain comfort rounds  - Make Fall Risk Sign visible to staff  - Offer Toileting every 2 Hours, in advance of need  - Initiate/Maintain bed/chair alarm  - Obtain necessary fall risk management equipment: alarms  - Apply yellow socks and bracelet for high fall risk patients  - Consider moving patient to room near nurses station  Outcome: Progressing  Goal: Maintain or return to baseline ADL function  Description: INTERVENTIONS:  -  Assess patient's ability to carry out ADLs; assess patient's baseline for ADL function and identify physical deficits which impact ability to perform ADLs (bathing, care of mouth/teeth, toileting, grooming, dressing, etc )  - Assess/evaluate cause of self-care deficits   - Assess range of motion  - Assess patient's mobility; develop plan if impaired  - Assess patient's need for assistive devices and provide as appropriate  - Encourage maximum independence but intervene and supervise when necessary  - Involve family in performance of ADLs  - Assess for home care needs following discharge   - Consider OT consult to assist with ADL evaluation and planning for discharge  - Provide patient education as appropriate  Outcome: Progressing  Goal: Maintains/Returns to pre admission functional level  Description: INTERVENTIONS:  - Perform BMAT or MOVE assessment daily    - Set and communicate daily mobility goal to care team and patient/family/caregiver  - Collaborate with rehabilitation services on mobility goals if consulted  - Perform Range of Motion 3 times a day  - Reposition patient every 2 hours    - Dangle patient 3 times a day  - Stand patient 3 times a day  - Ambulate patient 3 times a day  - Out of bed to chair 3 times a day   - Out of bed for meals 3 times a day  - Out of bed for toileting  - Record patient progress and toleration of activity level   Outcome: Progressing     Problem: DISCHARGE PLANNING  Goal: Discharge to home or other facility with appropriate resources  Description: INTERVENTIONS:  - Identify barriers to discharge w/patient and caregiver  - Arrange for needed discharge resources and transportation as appropriate  - Identify discharge learning needs (meds, wound care, etc )  - Arrange for interpretive services to assist at discharge as needed  - Refer to Case Management Department for coordinating discharge planning if the patient needs post-hospital services based on physician/advanced practitioner order or complex needs related to functional status, cognitive ability, or social support system  Outcome: Progressing     Problem: Nutrition/Hydration-ADULT  Goal: Nutrient/Hydration intake appropriate for improving, restoring or maintaining nutritional needs  Description: Monitor and assess patient's nutrition/hydration status for malnutrition  Collaborate with interdisciplinary team and initiate plan and interventions as ordered  Monitor patient's weight and dietary intake as ordered or per policy  Utilize nutrition screening tool and intervene as necessary  Determine patient's food preferences and provide high-protein, high-caloric foods as appropriate       INTERVENTIONS:  - Monitor oral intake, urinary output, labs, and treatment plans  - Assess nutrition and hydration status and recommend course of action  - Evaluate amount of meals eaten  - Assist patient with eating if necessary   - Allow adequate time for meals  - Recommend/ encourage appropriate diets, oral nutritional supplements, and vitamin/mineral supplements  - Order, calculate, and assess calorie counts as needed  - Recommend, monitor, and adjust tube feedings and TPN/PPN based on assessed needs  - Assess need for intravenous fluids  - Provide specific nutrition/hydration education as appropriate  - Include patient/family/caregiver in decisions related to nutrition  Outcome: Progressing

## 2021-07-30 NOTE — PROGRESS NOTES
07/30/21 1330   Pain Assessment   Pain Assessment Tool 0-10   Pain Score 8   Pain Location/Orientation Location: Neck   Pain Radiating Towards L shoulder   Pain Onset/Description Descriptor: Throbbing   Restrictions/Precautions   Precautions Bed/chair alarms;Cognitive; Fall Risk;Pain;Spinal precautions;Supervision on toilet/commode  (ortho BPs)   Braces or Orthoses   (no C/S collar ordered)   Cognition   Overall Cognitive Status Impaired   Subjective   Subjective "i think I was sitting in the recliner to long"   Sit to Lying   Type of Assistance Needed Verbal cues; Supervision   Sit to Lying CARE Score 4   Sit to Stand   Type of Assistance Needed Incidental touching;Verbal cues   Comment cues for hand placement   Sit to Stand CARE Score 4   Bed-Chair Transfer   Type of Assistance Needed Physical assistance; Adaptive equipment   Physical Assistance Level 25% or less   Comment with RW   Chair/Bed-to-Chair Transfer CARE Score 3   Walk 10 Feet   Type of Assistance Needed Physical assistance; Adaptive equipment   Physical Assistance Level 26%-50%   Comment had slight LOB with initial walk, with use of RW   Walk 10 Feet CARE Score 3   Walk 50 Feet with Two Turns   Type of Assistance Needed Physical assistance; Adaptive equipment   Physical Assistance Level 26%-50%   Comment needs cues to avoid running into objects on R side  Walk 50 Feet with Two Turns CARE Score 3   Walk 150 Feet   Reason if not Attempted Safety concerns   Walk 150 Feet CARE Score 88   Walking 10 Feet on Uneven Surfaces   Reason if not Attempted Safety concerns   Walking 10 Feet on Uneven Surfaces CARE Score 88   Ambulation   Does the patient walk? 2  Yes   Distance Walked (feet) 75 ft  (x2)   Assist Device Roller Walker   Gait Pattern Inconsistant Erika; Slow Erika;Decreased foot clearance; Improper weight shift   Limitations Noted In Balance; Coordination; Endurance; Heel Strike; Sequencing;Speed;Strength   Findings pt reports having perception issues on the R and needs cues to keeep upright poture and avoid objects sooner if she knows they are there  mild LOB initially with first walk  decrease step length   Wheel 50 Feet with Two Turns   Reason if not Attempted Activity not applicable   Wheel 50 Feet with Two Turns CARE Score 9   Wheel 150 Feet   Reason if not Attempted Activity not applicable   Wheel 904 Feet CARE Score 9   Wheelchair mobility   Does the patient use a wheelchair? 0  No   Curb or Single Stair   Style negotiated Single stair   Type of Assistance Needed Physical assistance;Verbal cues; Adaptive equipment   Physical Assistance Level 25% or less   Comment R HR with both hands on rail; cues for sequencing   1 Step (Curb) CARE Score 3   4 Steps   Type of Assistance Needed Incidental touching; Adaptive equipment   Physical Assistance Level 25% or less   Comment R HR with both hands on rail; cues for sequencing   4 Steps CARE Score 3   12 Steps   Reason if not Attempted Safety concerns   12 Steps CARE Score 88   Stairs   # of Steps 6   Weight Bearing Precautions Cervical;Fall Risk   Assist Devices Single Rail   Picking Up Object   Reason if not Attempted Safety concerns   Picking Up Object CARE Score 88   Assessment   Treatment Assessment due to pain and fatigue, pt had a little more of a challenge with walking  pt had difficulty avoid obstacles in the hallway to the R and needed cues to keep head up and scan surroundings as able  pt had small LOB when walking and still rquires cueing to imrpove step length and RW managment  pt below baseline and remains high fall risk at this time  will benefit from cont stairs and walking to maximize funcitonal mobiltiy and safety wtih use of RW to reduce fall risk and burden of care at d/c   Problem List Decreased strength;Decreased range of motion;Decreased endurance; Impaired balance;Decreased mobility; Decreased coordination; Impaired sensation;Pain;Orthopedic restrictions   Barriers to Discharge Inaccessible home environment;Decreased caregiver support   PT Barriers   Functional Limitation Car transfers;Stair negotiation;Standing;Transfers; Walking   Plan   Progress Progressing toward goals   Recommendation   PT Discharge Recommendation Home with outpatient rehabilitation   Equipment Recommended Walker   PT Therapy Minutes   PT Time In 1330   PT Time Out 1430   PT Total Time (minutes) 60   PT Mode of treatment - Individual (minutes) 60   PT Mode of treatment - Concurrent (minutes) 0   PT Mode of treatment - Group (minutes) 0   PT Mode of treatment - Co-treat (minutes) 0   PT Mode of Treatment - Total time(minutes) 60 minutes   PT Cumulative Minutes 315   Therapy Time missed   Time missed?  No

## 2021-07-30 NOTE — PROGRESS NOTES
Internal Medicine Progress Note  Patient: Nathan Bernstein  Age/sex: 66 y o  female  Medical Record #: [id number]      ASSESSMENT/PLAN: (Interval History)  Nathan Bernstein is seen and examined and management for following issues:    Central cord syndrome; s/p ACDF C4-5/PCDF C2-T2 7/20/21  · Doesn't need collar per NS  ·  incisions w/o infection     Acute nasal bone fx  · OMS saw = no surgery needed  · Sinus precautions for 4 weeks     Urine retention  · Gotti reinserted 7/26  · VT per PMR     Chronic UTI  · On suppressive tx with Keflex at home  · Will continue here     ABLA  · Did not require transfusion  · Will watch     Orthostasis  · Had orthostasis on acute side and was given IVF  · Given IVF 7/27/21 and BP has been stable  · Will continue to monitor  Chest pain  · Occurred 7/29/21 and was located on the Lt side  · Pt reports having this type of chest pain at home but not as strong or lasting as long  · EKG without acute changes  · Troponin negative  · Chest pain resolved  · Mylanta ordered  Discharge date:  Team      The above assessment and plan was reviewed and updated as determined by my evaluation of the patient on 7/30/2021      Labs:   Results from last 7 days   Lab Units 07/27/21  0510 07/26/21  0512 07/24/21  0505   WBC Thousand/uL  --  4 74 7 47   HEMOGLOBIN g/dL 8 9* 8 2* 9 5*   HEMATOCRIT % 27 4* 25 5* 29 4*   PLATELETS Thousands/uL  --  195 196     Results from last 7 days   Lab Units 07/26/21  0512 07/24/21  0505   SODIUM mmol/L 140 141   POTASSIUM mmol/L 4 1 3 8   CHLORIDE mmol/L 112* 113*   CO2 mmol/L 23 23   BUN mg/dL 18 13   CREATININE mg/dL 0 72 0 63   CALCIUM mg/dL 9 1 8 7                   Review of Scheduled Meds:  Current Facility-Administered Medications   Medication Dose Route Frequency Provider Last Rate    acetaminophen  975 mg Oral Q8H [facility] Xavier Hoffman MD      ALPRAZolam  0 25 mg Oral HS PRN Xavier Hoffman MD      aluminum-magnesium hydroxide-simethicone  30 mL Oral Q4H PRN Penny Deras PA-C      atorvastatin  20 mg Oral Daily With MD Mateusz      bisacodyl  10 mg Rectal Daily PRN Chencho Rod MD      cephalexin  250 mg Oral Daily Chencho Rod MD      docusate sodium  100 mg Oral BID Chencho Rod MD      heparin (porcine)  5,000 Units Subcutaneous Q8H [address] Chencho Rod MD      lidocaine  2 patch Topical Daily Chencho Rod MD      meclizine  12 5 mg Oral Q8H PRN Chencho Rod MD      melatonin  3 mg Oral HS Chencho Rod MD      methocarbamol  500 mg Oral Q6H PRN Chencho Rod MD      oxyCODONE  2 5 mg Oral Q4H PRN MD Edmundo Garcia oxyCODONE  5 mg Oral Q4H PRN Chencho Rod MD      pantoprazole  40 mg Oral Early Morning Chencho Rod MD      polyethylene glycol  17 g Oral Daily Chencho Rod MD      senna  1 tablet Oral BID Chencho Rod MD      simethicone  80 mg Oral 4x Daily (with meals and at bedtime) Penny Deras PA-C      sodium chloride  1 spray Each Nare 4x Daily PRN Chencho Rod MD         Subjective/ HPI: Patient seen and examined  Patients overnight issues or events were reviewed with nursing or staff during rounds or morning huddle session  New or overnight issues include the following:     Pt seen in her room  She denies any current complaints  ROS:   Negative 12 point ROS other than denoted above in HPI or assessment/plan        Imaging:     No orders to display       *Labs /Radiology studies reviewed  *Medications reviewed and reconciled as needed  *Please refer to order section for additional ordered labs studies  *Case discussed with primary attending during morning huddle case rounds      Physical Examination:  Vitals:   Vitals:    07/29/21 0731 07/29/21 1008 07/29/21 1010 07/30/21 0011   BP: 118/62 148/90 141/70 140/70   BP Location: Left arm Right arm Right arm Left arm   Pulse: 80 94 92 98   Resp: 18 19  17   Temp: 98 6 °F (37 °C) 98 °F (36 7 °C)  98 3 °F (36 8 °C)   TempSrc: Oral Oral  Oral   SpO2: 95% 99%  98%   Weight: Height:           General Appearance: no distress, conversive  HEENT: PERRLA, conjuctiva normal; oropharynx clear; mucous membranes moist   Neck:  Supple, normal ROM, no JVD  Lungs: CTA, normal respiratory effort, no retractions, expiratory effort normal  CV: regular rate and rhythm; no rubs/murmurs/gallops, PMI normal   ABD: soft; ND/NT; +BS  EXT: no edema  Skin: normal turgor, normal texture, no rashes  Psych: affect normal, mood normal  Neuro: AAO      The above physical exam was reviewed and updated as determined by my evaluation of the patient on 7/30/2021  Invasive Devices     Peripheral Intravenous Line            Peripheral IV 07/28/21 Right Antecubital 1 day          Drain            Urethral Catheter 18 Fr  3 days                   VTE Pharmacologic Prophylaxis: Heparin  Code Status: Level 1 - Full Code  Current Length of Stay: 4 day(s)      Total time spent:  30 minutes with more than 50% spent counseling/coordinating care  Counseling includes discussion with patient re: progress  and discussion with patient of his/her current medical state/information  Coordination of patient's care was performed in conjunction with primary service  Time invested included review of patient's labs, vitals, and management of their comorbidities with continued monitoring  In addition, this patient was discussed with medical team including physician and advanced extenders  The care of the patient was extensively discussed and appropriate treatment plan was formulated unique for this patient  ** Please Note:  voice to text software may have been used in the creation of this document   Although proof errors in transcription or interpretation are a potential of such software**

## 2021-07-30 NOTE — PROGRESS NOTES
07/30/21 0830   Pain Assessment   Pain Assessment Tool 0-10   Pain Score 6   Pain Location/Orientation Orientation: Bilateral;Location: Shoulder;Orientation: Left; Location: Arm;Location: Neck   Pain Onset/Description Onset: Ongoing;Frequency: Constant/Continuous; Descriptor: Aching;Descriptor: Throbbing   Hospital Pain Intervention(s) Repositioned;Rest;Emotional support   Restrictions/Precautions   Precautions Bed/chair alarms;Cognitive; Fall Risk;Pain;Spinal precautions; Gotti; Supervision on toilet/commode  (ortho BPs, c-spine precautions)   Braces or Orthoses   (no C/S collar)   Oral Hygiene   Type of Assistance Needed Supervision   Physical Assistance Level No physical assistance   Comment bed level 2* max fatigue and ortho BP   Oral Hygiene CARE Score 4   Shower/Bathe Self   Type of Assistance Needed Physical assistance;Verbal cues; Adaptive equipment   Physical Assistance Level 26%-50%   Comment Pt engaged in sponge bath bed-level 2* max fatigue and ortho BP  HOB elevated, pt able to wash UB, moira, and B/L upper legs  Pt used bed rails with vc's for hand placement to roll to L side, total A to wash rear in sidelying  A to wash B/L lower legs and feet 2* decreased dynamic reach and cspine precautions  Shower/Bathe Self CARE Score 3   Bathing   Assessed Bath Style Sponge Bath   Able to Gather/Transport No   Able to [employer] Temperature No   Able to Wash/Rinse/Dry (body part) Left Arm;Right Arm;L Upper Leg;R Upper Leg;Chest;Abdomen;Perineal Area   Limitations Noted in Balance; Endurance;ROM; Sequencing;Strength;Timeliness   Positioning Supine   Adaptive Equipment   (bed rails)   Tub/Shower Transfer   Reason Not Assessed Sponge Bath;Safety; Medical   Upper Body Dressing   Type of Assistance Needed Physical assistance   Physical Assistance Level 25% or less   Comment Ryland bed level to manage back   Upper Body Dressing CARE Score 3   Lower Body Dressing   Type of Assistance Needed Physical assistance   Physical Assistance Level 51%-75%   Comment A bed level to thread depends-style brief over feet and up to knees, pt then able to complete CM up over hips while supine by bridging and rolling  A to manage Gotti line and thread through brief   Lower Body Dressing CARE Score 2   Putting On/Taking Off Footwear   Type of Assistance Needed Physical assistance   Physical Assistance Level Total assistance   Comment bed level to doff/don socks and don B/L TEDs   Putting On/Taking Off Footwear CARE Score 1   Picking Up Object   Reason if not Attempted Safety concerns   Picking Up Object CARE Score 88   Dressing/Undressing Clothing   Remove UB Clothes Other  (hospital gown)   Don UB Clothes Other  (hospital gown)   Remove LB Clothes Undergarment;Socks   Don LB Clothes Undergarment;Socks;TEDs   Limitations Noted In Balance; Coordination; Endurance; Sequencing;Strength;ROM; Timeliness   Positioning In Bed   Roll Left and Right   Type of Assistance Needed Physical assistance; Adaptive equipment   Physical Assistance Level 25% or less   Comment HOB slightly raised, used bed rails   Roll Left and Right CARE Score 3   Lying to Sitting on Side of Bed   Type of Assistance Needed Physical assistance; Adaptive equipment   Physical Assistance Level 26%-50%   Comment ModAx1, bed rails   Lying to Sitting on Side of Bed CARE Score 3   Sit to Stand   Type of Assistance Needed Physical assistance;Verbal cues; Adaptive equipment   Physical Assistance Level 26%-50%   Comment Ryland at RW, cues for hand placement   Sit to Stand CARE Score 3   Bed-Chair Transfer   Type of Assistance Needed Physical assistance;Verbal cues; Adaptive equipment   Physical Assistance Level 26%-50%   Comment Ryland SPT w/RW, EOB>recliner, vc's for hand placement and to reach back for chair before sitting   Chair/Bed-to-Chair Transfer CARE Score 3   Exercise Tools   Exercise Tools Yes   Other Exercise Tool 1 Seated in recliner with Sup, [id number] alternating UE bicep curls, chest press, and supination/pronation using 1# free weight for UE strengthening and endurance for increased participation in fxl transfers  Pt continues to require increased rest breaks for LUE, which fatigues faster and increased L shoulder pain with movement  Cognition   Overall Cognitive Status Impaired   Arousal/Participation Alert; Cooperative   Attention Within functional limits   Orientation Level Oriented X4   Memory Within functional limits   Following Commands Follows one step commands without difficulty   Additional Activities   Additional Activities Comments Pt declined toileting during ADL session  States her daughter will be bringing in her dentures  Activity Tolerance   Activity Tolerance Patient limited by fatigue;Patient limited by pain   Medical Staff Made Aware RN Miguelangel Members aware and stated will apply lidocaine patches to B/L shoulders post-OT for pain mgmt   Assessment   Treatment Assessment Pt seen for 120min skilled OT session focused on bed-level sponge bath ADL, bed mobility, fxl transfers, OOB activity tolerance, positioning, and UE strengthening, for increased independence with ADLs and decreased caregiver burden  See detailed descriptions of fxl performance above  Pt continues to require bed-level ADL 2* orthostatic BP, max fatigue, and high fall risk  Pt required significantly increased time to complete ADL routine, with frequent rest breaks to manage B/L shoulder pain and fatigue  Pt demo G ability to bridge with BLEs while supine to complete CM up over hips, but continues to require A to thread BLEs and manage Gotti line  Pt continues to be limited by OOB activity tolerance, pain, L side weakness, and decreased standing balance/tolerance  Pt would benefit from continued skilled OT focused on ADL retraining, toilet transfers/hygiene, fxl transfers, OOB activity tolerance, LUE strengthening/coordination, endurance work,  and standing balance/tolerance      Prognosis Good   Problem List Decreased strength;Decreased range of motion;Decreased endurance; Impaired balance;Decreased mobility; Decreased coordination;Decreased cognition; Impaired sensation;Pain;Orthopedic restrictions   Barriers to Discharge Inaccessible home environment;Decreased caregiver support   Plan   Treatment/Interventions ADL retraining;Functional transfer training; Therapeutic exercise; Endurance training;Patient/family training;Equipment eval/education; Bed mobility; Compensatory technique education;Spoke to nursing   Progress Slow progress, decreased activity tolerance   Recommendation   OT Discharge Recommendation   (pending pt progress)   OT Therapy Minutes   OT Time In 0830   OT Time Out 1030   OT Total Time (minutes) 120   OT Mode of treatment - Individual (minutes) 120   OT Mode of treatment - Concurrent (minutes) 0   OT Mode of treatment - Group (minutes) 0   OT Mode of treatment - Co-treat (minutes) 0   OT Mode of Treatment - Total time(minutes) 120 minutes   OT Cumulative Minutes 420   Therapy Time missed   Time missed?  No

## 2021-07-31 PROCEDURE — 99232 SBSQ HOSP IP/OBS MODERATE 35: CPT | Performed by: NURSE PRACTITIONER

## 2021-07-31 PROCEDURE — 97535 SELF CARE MNGMENT TRAINING: CPT

## 2021-07-31 PROCEDURE — 97116 GAIT TRAINING THERAPY: CPT

## 2021-07-31 PROCEDURE — 97530 THERAPEUTIC ACTIVITIES: CPT

## 2021-07-31 PROCEDURE — 97110 THERAPEUTIC EXERCISES: CPT

## 2021-07-31 RX ADMIN — SIMETHICONE 80 MG: 20 EMULSION ORAL at 12:14

## 2021-07-31 RX ADMIN — Medication 1 PACKET: at 08:40

## 2021-07-31 RX ADMIN — LIDOCAINE 2 PATCH: 50 PATCH TOPICAL at 08:40

## 2021-07-31 RX ADMIN — HEPARIN SODIUM 5000 UNITS: 5000 INJECTION INTRAVENOUS; SUBCUTANEOUS at 21:35

## 2021-07-31 RX ADMIN — HEPARIN SODIUM 5000 UNITS: 5000 INJECTION INTRAVENOUS; SUBCUTANEOUS at 05:32

## 2021-07-31 RX ADMIN — SIMETHICONE 80 MG: 20 EMULSION ORAL at 17:21

## 2021-07-31 RX ADMIN — ACETAMINOPHEN 975 MG: 325 TABLET, FILM COATED ORAL at 14:01

## 2021-07-31 RX ADMIN — MELATONIN TAB 3 MG 3 MG: 3 TAB at 21:35

## 2021-07-31 RX ADMIN — ACETAMINOPHEN 975 MG: 325 TABLET, FILM COATED ORAL at 21:34

## 2021-07-31 RX ADMIN — SIMETHICONE 80 MG: 20 EMULSION ORAL at 08:40

## 2021-07-31 RX ADMIN — SIMETHICONE 80 MG: 20 EMULSION ORAL at 21:37

## 2021-07-31 RX ADMIN — ATORVASTATIN CALCIUM 20 MG: 20 TABLET, FILM COATED ORAL at 17:18

## 2021-07-31 RX ADMIN — ACETAMINOPHEN 975 MG: 325 TABLET, FILM COATED ORAL at 05:31

## 2021-07-31 RX ADMIN — CEPHALEXIN 250 MG: 250 CAPSULE ORAL at 08:40

## 2021-07-31 RX ADMIN — OXYCODONE HYDROCHLORIDE 5 MG: 5 TABLET ORAL at 12:55

## 2021-07-31 RX ADMIN — PANTOPRAZOLE SODIUM 40 MG: 40 TABLET, DELAYED RELEASE ORAL at 05:32

## 2021-07-31 RX ADMIN — HEPARIN SODIUM 5000 UNITS: 5000 INJECTION INTRAVENOUS; SUBCUTANEOUS at 14:01

## 2021-07-31 NOTE — PROGRESS NOTES
07/31/21 0839   Pain Assessment   Pain Assessment Tool 0-10   Pain Score 5   Pain Location/Orientation Orientation: Bilateral;Location: Shoulder   Pain Onset/Description Onset: Ongoing;Frequency: Constant/Continuous; Descriptor: Throbbing; Descriptor: Aching   Patient's Stated Pain Goal No pain   Hospital Pain Intervention(s) Repositioned; Emotional support   Restrictions/Precautions   Precautions Bed/chair alarms;Cognitive; Fall Risk;Pain;Spinal precautions;Supervision on toilet/commode  (ortho BPs)   Weight Bearing Restrictions No   ROM Restrictions Yes  (cervical spine)   Braces or Orthoses Other (Comment)  (no C/S collar )   Cognition   Overall Cognitive Status Impaired   Arousal/Participation Alert; Cooperative   Attention Within functional limits   Orientation Level Oriented X4   Memory Decreased recall of precautions   Following Commands Follows one step commands with increased time or repetition   Sit to Stand   Type of Assistance Needed Incidental touching;Verbal cues   Physical Assistance Level No physical assistance   Comment CGA with verbal cues for safety/hand placement   Sit to Stand CARE Score 4   Bed-Chair Transfer   Type of Assistance Needed Physical assistance;Verbal cues; Adaptive equipment   Physical Assistance Level 25% or less   Comment WYATT with RW   Chair/Bed-to-Chair Transfer CARE Score 3   Transfer Bed/Chair/Wheelchair   Limitations Noted In Balance; Coordination; Endurance;Problem Solving;UE Strength;LE Strength   Adaptive Equipment Roller Walker   Walk 10 Feet   Type of Assistance Needed Physical assistance;Verbal cues; Adaptive equipment   Physical Assistance Level 25% or less   Comment WYATT with RW, VC's to maintain posture   Walk 10 Feet CARE Score 3   Walk 50 Feet with Two Turns   Type of Assistance Needed Physical assistance;Verbal cues; Adaptive equipment   Physical Assistance Level 26%-50%   Comment WYATT with RW, VC's to maintain posture   Walk 50 Feet with Two Turns CARE Score 3   Walk 150 Feet   Reason if not Attempted Safety concerns   Walk 150 Feet CARE Score 88   Walking 10 Feet on Uneven Surfaces   Reason if not Attempted Safety concerns   Walking 10 Feet on Uneven Surfaces CARE Score 88   Ambulation   Does the patient walk? 2  Yes   Primary Mode of Locomotion Prior to Admission Walk   Distance Walked (feet) 75 ft  (x3)   Assist Device Roller Walker   Gait Pattern Slow Erika; Inconsistant Erika;Decreased foot clearance; Forward Flexion;Narrow FRACISCO; Improper weight shift   Limitations Noted In Balance; Coordination;Device Management; Endurance;Posture; Safety;Speed;Strength   Provided Assistance with: Balance   Findings  cues to keep upright poture   Wheel 50 Feet with Two Turns   Reason if not Attempted Activity not applicable   Wheel 50 Feet with Two Turns CARE Score 9   Wheel 150 Feet   Reason if not Attempted Activity not applicable   Wheel 185 Feet CARE Score 9   Wheelchair mobility   Does the patient use a wheelchair? 0  No   Curb or Single Stair   Style negotiated Single stair   Type of Assistance Needed Physical assistance; Adaptive equipment;Verbal cues   Physical Assistance Level 25% or less   Comment B hands on RHR semi sideline, descending bwds   1 Step (Curb) CARE Score 3   4 Steps   Type of Assistance Needed Physical assistance;Verbal cues; Adaptive equipment   Physical Assistance Level 25% or less   Comment B hands on RHR semi sideline, descending bwds   4 Steps CARE Score 3   12 Steps   Reason if not Attempted Safety concerns   12 Steps CARE Score 88   Stairs   Type Stairs   # of Steps 8   Weight Bearing Precautions Cervical;Fall Risk   Assist Devices Single Rail   Findings ascending fwd/descending bwd reciprocal pattern which she performed safely   Picking Up Object   Reason if not Attempted Safety concerns   Picking Up Object CARE Score 88   Equipment Use   NuStep L0 BLE only   Other Comments   Comments BP seated 130/72, standing 110/68, pt asymptomatic     Assessment   Treatment Assessment Pt cont to work on increased gait, functional transfers and act tolerance  Pt had moderate pain at start of session with no increased c/o with gait/transfers  Pt did require cont verbal cues for hand placement when sitting from RW and to maintain G posture when amb  Pt was unable to recall cervical spine or sinus precautions when questioned  Pt will cont to benefit from increased act tolerance, increased I with all gait and transfers and increased stair negotiation to decrease burden of care at discharge  Cont POC as tolerated  Problem List Decreased strength;Decreased range of motion;Decreased endurance; Impaired balance;Decreased mobility; Decreased coordination;Decreased cognition; Impaired judgement;Decreased safety awareness; Impaired tone;Orthopedic restrictions;Pain   Barriers to Discharge Inaccessible home environment;Decreased caregiver support   PT Barriers   Functional Limitation Car transfers;Stair negotiation;Standing;Transfers; Walking   Plan   Treatment/Interventions Functional transfer training;LE strengthening/ROM; Therapeutic exercise;Cognitive reorientation; Endurance training;Bed mobility;Gait training   Progress Progressing toward goals   Recommendation   PT Discharge Recommendation Home with outpatient rehabilitation   Equipment Recommended Walker   PT Therapy Minutes   PT Time In 0830   PT Time Out 1000   PT Total Time (minutes) 90   PT Mode of treatment - Individual (minutes) 90   PT Mode of treatment - Concurrent (minutes) 0   PT Mode of treatment - Group (minutes) 0   PT Mode of treatment - Co-treat (minutes) 0   PT Mode of Treatment - Total time(minutes) 90 minutes   PT Cumulative Minutes 405   Therapy Time missed   Time missed?  No

## 2021-07-31 NOTE — PLAN OF CARE
Problem: Prexisting or High Potential for Compromised Skin Integrity  Goal: Skin integrity is maintained or improved  Description: INTERVENTIONS:  - Identify patients at risk for skin breakdown  - Assess and monitor skin integrity  - Assess and monitor nutrition and hydration status  - Monitor labs   - Assess for incontinence   - Turn and reposition patient  - Assist with mobility/ambulation  - Relieve pressure over bony prominences  - Avoid friction and shearing  - Provide appropriate hygiene as needed including keeping skin clean and dry  - Evaluate need for skin moisturizer/barrier cream  - Collaborate with interdisciplinary team   - Patient/family teaching  - Consider wound care consult   Outcome: Progressing     Problem: MOBILITY - ADULT  Goal: Maintain or return to baseline ADL function  Description: INTERVENTIONS:  -  Assess patient's ability to carry out ADLs; assess patient's baseline for ADL function and identify physical deficits which impact ability to perform ADLs (bathing, care of mouth/teeth, toileting, grooming, dressing, etc )  - Assess/evaluate cause of self-care deficits   - Assess range of motion  - Assess patient's mobility; develop plan if impaired  - Assess patient's need for assistive devices and provide as appropriate  - Encourage maximum independence but intervene and supervise when necessary  - Involve family in performance of ADLs  - Assess for home care needs following discharge   - Consider OT consult to assist with ADL evaluation and planning for discharge  - Provide patient education as appropriate  Outcome: Progressing  Goal: Maintains/Returns to pre admission functional level  Description: INTERVENTIONS:  - Perform BMAT or MOVE assessment daily    - Set and communicate daily mobility goal to care team and patient/family/caregiver  - Collaborate with rehabilitation services on mobility goals if consulted  - Perform Range of Motion times a day    - Reposition patient every hours   - Dangle patient  times a day  - Stand patient  times a day  - Ambulate patient  times a day  - Out of bed to chair times a day   - Out of bed for meals  times a day  - Out of bed for toileting  - Record patient progress and toleration of activity level   Outcome: Progressing     Problem: Potential for Falls  Goal: Patient will remain free of falls  Description: INTERVENTIONS:  - Educate patient/family on patient safety including physical limitations  - Instruct patient to call for assistance with activity   - Consult OT/PT to assist with strengthening/mobility   - Keep Call bell within reach  - Keep bed low and locked with side rails adjusted as appropriate  - Keep care items and personal belongings within reach  - Initiate and maintain comfort rounds  - Make Fall Risk Sign visible to staff  - Offer Toileting every Hours, in advance of need  - Initiate/Maintain alarm  - Obtain necessary fall risk management equipment:   - Apply yellow socks and bracelet for high fall risk patients  - Consider moving patient to room near nurses station  Outcome: Progressing     Problem: PAIN - ADULT  Goal: Verbalizes/displays adequate comfort level or baseline comfort level  Description: Interventions:  - Encourage patient to monitor pain and request assistance  - Assess pain using appropriate pain scale  - Administer analgesics based on type and severity of pain and evaluate response  - Implement non-pharmacological measures as appropriate and evaluate response  - Consider cultural and social influences on pain and pain management  - Notify physician/advanced practitioner if interventions unsuccessful or patient reports new pain  Outcome: Progressing     Problem: INFECTION - ADULT  Goal: Absence or prevention of progression during hospitalization  Description: INTERVENTIONS:  - Assess and monitor for signs and symptoms of infection  - Monitor lab/diagnostic results  - Monitor all insertion sites, i e  indwelling lines, tubes, and drains  - Monitor endotracheal if appropriate and nasal secretions for changes in amount and color  - [place] appropriate cooling/warming therapies per order  - Administer medications as ordered  - Instruct and encourage patient and family to use good hand hygiene technique  - Identify and instruct in appropriate isolation precautions for identified infection/condition  Outcome: Progressing     Problem: SAFETY ADULT  Goal: Patient will remain free of falls  Description: INTERVENTIONS:  - Educate patient/family on patient safety including physical limitations  - Instruct patient to call for assistance with activity   - Consult OT/PT to assist with strengthening/mobility   - Keep Call bell within reach  - Keep bed low and locked with side rails adjusted as appropriate  - Keep care items and personal belongings within reach  - Initiate and maintain comfort rounds  - Make Fall Risk Sign visible to staff  - Offer Toileting every Hours, in advance of need  - Initiate/Maintain alarm  - Obtain necessary fall risk management equipment:   - Apply yellow socks and bracelet for high fall risk patients  - Consider moving patient to room near nurses station  Outcome: Progressing  Goal: Maintain or return to baseline ADL function  Description: INTERVENTIONS:  -  Assess patient's ability to carry out ADLs; assess patient's baseline for ADL function and identify physical deficits which impact ability to perform ADLs (bathing, care of mouth/teeth, toileting, grooming, dressing, etc )  - Assess/evaluate cause of self-care deficits   - Assess range of motion  - Assess patient's mobility; develop plan if impaired  - Assess patient's need for assistive devices and provide as appropriate  - Encourage maximum independence but intervene and supervise when necessary  - Involve family in performance of ADLs  - Assess for home care needs following discharge   - Consider OT consult to assist with ADL evaluation and planning for discharge  - Provide patient education as appropriate  Outcome: Progressing  Goal: Maintains/Returns to pre admission functional level  Description: INTERVENTIONS:  - Perform BMAT or MOVE assessment daily    - Set and communicate daily mobility goal to care team and patient/family/caregiver  - Collaborate with rehabilitation services on mobility goals if consulted  - Perform Range of Motion  times a day  - Reposition patient every hours  - Dangle patient  times a day  - Stand patient  times a day  - Ambulate patient  times a day  - Out of bed to chair times a day   - Out of bed for meals  times a day  - Out of bed for toileting  - Record patient progress and toleration of activity level   Outcome: Progressing     Problem: DISCHARGE PLANNING  Goal: Discharge to home or other facility with appropriate resources  Description: INTERVENTIONS:  - Identify barriers to discharge w/patient and caregiver  - Arrange for needed discharge resources and transportation as appropriate  - Identify discharge learning needs (meds, wound care, etc )  - Arrange for interpretive services to assist at discharge as needed  - Refer to Case Management Department for coordinating discharge planning if the patient needs post-hospital services based on physician/advanced practitioner order or complex needs related to functional status, cognitive ability, or social support system  Outcome: Progressing     Problem: Nutrition/Hydration-ADULT  Goal: Nutrient/Hydration intake appropriate for improving, restoring or maintaining nutritional needs  Description: Monitor and assess patient's nutrition/hydration status for malnutrition  Collaborate with interdisciplinary team and initiate plan and interventions as ordered  Monitor patient's weight and dietary intake as ordered or per policy  Utilize nutrition screening tool and intervene as necessary  Determine patient's food preferences and provide high-protein, high-caloric foods as appropriate  INTERVENTIONS:  - Monitor oral intake, urinary output, labs, and treatment plans  - Assess nutrition and hydration status and recommend course of action  - Evaluate amount of meals eaten  - Assist patient with eating if necessary   - Allow adequate time for meals  - Recommend/ encourage appropriate diets, oral nutritional supplements, and vitamin/mineral supplements  - Order, calculate, and assess calorie counts as needed  - Recommend, monitor, and adjust tube feedings and TPN/PPN based on assessed needs  - Assess need for intravenous fluids  - Provide specific nutrition/hydration education as appropriate  - Include patient/family/caregiver in decisions related to nutrition  Outcome: Progressing

## 2021-07-31 NOTE — PROGRESS NOTES
Internal Medicine Progress Note  Patient: Je Morocho  Age/sex: 66 y o  female  Medical Record #: [id number]      ASSESSMENT/PLAN: (Interval History)  Je Morocho is seen and examined and management for following issues:    Central cord syndrome; s/p ACDF C4-5/PCDF C2-T2 7/20/21  · Doesn't need collar per NS  · Monitor incisions  · Rehab per PMR     Acute nasal bone fx  · OMS saw = no surgery needed  · Sinus precautions for 4 weeks     Urine retention  · Gotti reinserted 7/26  · VT per PMR     Chronic UTI  · On suppressive tx with Keflex at home  · Will continue here     ABLA  · Did not require transfusion  · Repeat cbc monday     Orthostasis  · Improved s/p IVF on acute and here  · Will continue to monitor  Chest pain  · Occurred 7/29/21 and was located on the Lt side  · Pt reports having this type of chest pain at home but not as strong or lasting as long  · EKG without acute changes  · Troponin negative  · Mylanta ordered  Discharge date:  Team      The above assessment and plan was reviewed and updated as determined by my evaluation of the patient on 7/31/2021      Labs:   Results from last 7 days   Lab Units 07/27/21  0510 07/26/21  0512   WBC Thousand/uL  --  4 74   HEMOGLOBIN g/dL 8 9* 8 2*   HEMATOCRIT % 27 4* 25 5*   PLATELETS Thousands/uL  --  195     Results from last 7 days   Lab Units 07/26/21  0512   SODIUM mmol/L 140   POTASSIUM mmol/L 4 1   CHLORIDE mmol/L 112*   CO2 mmol/L 23   BUN mg/dL 18   CREATININE mg/dL 0 72   CALCIUM mg/dL 9 1                   Review of Scheduled Meds:  Current Facility-Administered Medications   Medication Dose Route Frequency Provider Last Rate    acetaminophen  975 mg Oral Q8H [facility] Morgan Tate MD      ALPRAZolam  0 25 mg Oral HS PRN Morgan Tate MD      aluminum-magnesium hydroxide-simethicone  30 mL Oral Q4H PRN Penny Deras PA-C      atorvastatin  20 mg Oral Daily With Zain Terrazas MD      cephalexin  250 mg Oral Daily Morgan Tate MD  heparin (porcine)  5,000 Units Subcutaneous Q8H [facility] Biju Patel MD      lidocaine  2 patch Topical Daily Biju Patel MD      meclizine  12 5 mg Oral Q8H PRN Biju Patel MD      melatonin  3 mg Oral HS Biju Patel MD      methocarbamol  500 mg Oral Q6H PRN Biju Patel MD      oxyCODONE  2 5 mg Oral Q4H PRN MD Regina Griffiht oxyCODONE  5 mg Oral Q4H PRN Biju Patel MD      pantoprazole  40 mg Oral Early Morning Biju Patel MD      polyethylene glycol  17 g Oral Daily PRN Biju Patel MD      psyllium  1 packet Oral Daily Biju Patel MD      simethicone  80 mg Oral 4x Daily (with meals and at bedtime) Penny Deras PA-C      sodium chloride  1 spray Each Nare 4x Daily PRN Biju Patel MD         Subjective/ HPI: Patient seen and examined  Patients overnight issues or events were reviewed with nursing or staff during rounds or morning huddle session  New or overnight issues include the following:     Pt seen in her room  NO further chest pain, no complaints    ROS:   Negative 12 point ROS other than denoted above in HPI or assessment/plan        Imaging:     No orders to display       *Labs /Radiology studies reviewed  *Medications reviewed and reconciled as needed  *Please refer to order section for additional ordered labs studies  *Case discussed with primary attending during morning huddle case rounds      Physical Examination:  Vitals:   Vitals:    07/30/21 1538 07/30/21 2203 07/31/21 0600 07/31/21 0801   BP: 128/72 130/68  128/64   BP Location: Right arm Left arm  Left arm   Pulse: 80 93  88   Resp: 18 16  16   Temp: 98 3 °F (36 8 °C) 98 9 °F (37 2 °C)  98 3 °F (36 8 °C)   TempSrc: Oral Oral  Oral   SpO2: 97% 96%  95%   Weight:   63 5 kg (139 lb 15 9 oz)    Height:           GEN: No apparent distress, interactive  NEURO: Alert and oriented x3  HEENT: Pupils are equal and reactive, EOMI, mucous membranes are moist, face symmetrical  CV: S1 S2 regular, no MRG, no peripheral edema noted  RESP: Lungs are clear bilaterally, no wheezes, rales or rhonchi noted, on room air, respirations easy and non labored  GI: Flat, soft non tender, non distended; +BS x4  : morrow draining clear yellow urine  MUSC: Moves all extremities  SKIN: pink, warm and dry, normal turgor, incision intact          The above physical exam was reviewed and updated as determined by my evaluation of the patient on 7/31/2021  Invasive Devices     Peripheral Intravenous Line            Peripheral IV 07/28/21 Right Antecubital 2 days          Drain            Urethral Catheter 18 Fr  4 days                   VTE Pharmacologic Prophylaxis: Heparin  Code Status: Level 1 - Full Code  Current Length of Stay: 5 day(s)      Total time spent:  30 minutes with more than 50% spent counseling/coordinating care  Counseling includes discussion with patient re: progress  and discussion with patient of his/her current medical state/information  Coordination of patient's care was performed in conjunction with primary service  Time invested included review of patient's labs, vitals, and management of their comorbidities with continued monitoring  In addition, this patient was discussed with medical team including physician and advanced extenders  The care of the patient was extensively discussed and appropriate treatment plan was formulated unique for this patient  ** Please Note:  voice to text software may have been used in the creation of this document   Although proof errors in transcription or interpretation are a potential of such software**

## 2021-07-31 NOTE — PROGRESS NOTES
07/31/21 1235   Pain Assessment   Pain Assessment Tool 0-10   Pain Score 7   Pain Location/Orientation Orientation: Left; Location: Shoulder; Location: Arm;Location: Neck   Restrictions/Precautions   Precautions Bed/chair alarms;Cognitive; Fall Risk;Pain; Gotti; Spinal precautions;Supervision on toilet/commode  (ortho BPs)   Lifestyle   Autonomy "I am really starting to feel better"   Bed-Chair Transfer   Type of Assistance Needed Physical assistance   Physical Assistance Level 25% or less   Comment Min A with RW   Chair/Bed-to-Chair Transfer CARE Score 3   Toileting Hygiene   Type of Assistance Needed Physical assistance   Physical Assistance Level 51%-75%   Comment A for hygiene, able to complete CM with increased time and Mod VC for hand placement/safety   Toileting Hygiene CARE Score 2   Toilet Transfer   Type of Assistance Needed Physical assistance   Physical Assistance Level 26%-50%   Comment Min-Mod Ax1 with RW, min VC for RW management   Toilet Transfer CARE Score 3   Exercise Tools   Other Exercise Tool 1 Provided, reviewed, and completed theraputty HEP with focus on improving L  strength and coordination  Provided OTToolKit HEP for theraputty  Completed varied reps and sets with varied exercises  Pt will benefit from additional reviews  Provided with RED theraputty, provided handout and putty in room  Reviewed recommendations and precuations to use in room  Completed for L hand only at this time  Cognition   Overall Cognitive Status Impaired   Arousal/Participation Alert; Cooperative   Attention Within functional limits   Orientation Level Oriented X4   Memory Decreased recall of precautions   Following Commands Follows one step commands with increased time or repetition   Additional Activities   Additional Activities Comments  strength and 9HPT assessed  (R) - 42# avg (with norm of 42#)  (L) - 24# avg (37# norm)  Above information indicates decreased LUE  strength   (R) 9HPT- 36 sec (22 sec norm)  (L) 9HPT- 49 sec (24 sec norm)  Activity Tolerance   Activity Tolerance Patient limited by fatigue;Patient limited by pain   Assessment   Treatment Assessment Pt engaged in skilled OT session with focus on toileting, outcome measure testing, UE strengthening, and developing HEP  Pt tolerated session well, with slight report of pain at start of session, but did not limit session  Pt did reportfatigue with L coordination/strengthening tasks  Completed  strength and 9HPT outcome measure testing today, see scores above  Pt demo below age norms for L  and coordination  Pt educated on scores  Began HEP with RED theraputty (benefit from ongoing reviews), however did demo good understanding and able to complete with min VC and visual example  Progress pt in room of Ax1 for SPT with RW  Continued with Ax2 for toileting 2* fatigue, poor RW management, L sided weakness, and poor ability to complete hygiene  Pt will benefit from continued skilled OT to maximize independence and safety with ADLs and functional transfers  Continue POC with focus on: ADL retraining, functional transfers, LHAE training/education, L coordination and strengthening, b/l strength and endurance training, toilet hygiene management, assessing for DME, and scheduling FT  Prognosis Good   Problem List Decreased strength;Decreased range of motion;Decreased endurance; Impaired balance;Decreased mobility; Decreased coordination;Decreased cognition; Impaired judgement;Decreased safety awareness; Impaired tone;Orthopedic restrictions;Pain   Barriers to Discharge Inaccessible home environment;Decreased caregiver support   Plan   Treatment/Interventions ADL retraining;Functional transfer training; Therapeutic exercise; Endurance training;Cognitive reorientation;Patient/family training;Equipment eval/education; Compensatory technique education   Progress Progressing toward goals   Recommendation   OT Discharge Recommendation   (pending pt progress)   OT Therapy Minutes   OT Time In 1235   OT Time Out 1405   OT Total Time (minutes) 90   OT Mode of treatment - Individual (minutes) 90   OT Mode of treatment - Concurrent (minutes) 0   OT Mode of treatment - Group (minutes) 0   OT Mode of treatment - Co-treat (minutes) 0   OT Mode of Treatment - Total time(minutes) 90 minutes   OT Cumulative Minutes 510   Therapy Time missed   Time missed?  No

## 2021-08-01 PROCEDURE — 97116 GAIT TRAINING THERAPY: CPT

## 2021-08-01 PROCEDURE — 99232 SBSQ HOSP IP/OBS MODERATE 35: CPT | Performed by: INTERNAL MEDICINE

## 2021-08-01 PROCEDURE — 97110 THERAPEUTIC EXERCISES: CPT

## 2021-08-01 RX ADMIN — LIDOCAINE 2 PATCH: 50 PATCH TOPICAL at 08:07

## 2021-08-01 RX ADMIN — SIMETHICONE 80 MG: 20 EMULSION ORAL at 08:07

## 2021-08-01 RX ADMIN — SIMETHICONE 80 MG: 20 EMULSION ORAL at 22:12

## 2021-08-01 RX ADMIN — HEPARIN SODIUM 5000 UNITS: 5000 INJECTION INTRAVENOUS; SUBCUTANEOUS at 13:39

## 2021-08-01 RX ADMIN — Medication 1 PACKET: at 08:07

## 2021-08-01 RX ADMIN — ACETAMINOPHEN 975 MG: 325 TABLET, FILM COATED ORAL at 05:29

## 2021-08-01 RX ADMIN — SIMETHICONE 80 MG: 20 EMULSION ORAL at 12:23

## 2021-08-01 RX ADMIN — SIMETHICONE 80 MG: 20 EMULSION ORAL at 17:25

## 2021-08-01 RX ADMIN — PANTOPRAZOLE SODIUM 40 MG: 40 TABLET, DELAYED RELEASE ORAL at 05:29

## 2021-08-01 RX ADMIN — ACETAMINOPHEN 975 MG: 325 TABLET, FILM COATED ORAL at 22:11

## 2021-08-01 RX ADMIN — HEPARIN SODIUM 5000 UNITS: 5000 INJECTION INTRAVENOUS; SUBCUTANEOUS at 22:12

## 2021-08-01 RX ADMIN — CEPHALEXIN 250 MG: 250 CAPSULE ORAL at 08:07

## 2021-08-01 RX ADMIN — MELATONIN TAB 3 MG 3 MG: 3 TAB at 22:11

## 2021-08-01 RX ADMIN — ACETAMINOPHEN 975 MG: 325 TABLET, FILM COATED ORAL at 13:39

## 2021-08-01 RX ADMIN — ATORVASTATIN CALCIUM 20 MG: 20 TABLET, FILM COATED ORAL at 17:23

## 2021-08-01 RX ADMIN — HEPARIN SODIUM 5000 UNITS: 5000 INJECTION INTRAVENOUS; SUBCUTANEOUS at 05:29

## 2021-08-01 NOTE — PLAN OF CARE
Problem: Prexisting or High Potential for Compromised Skin Integrity  Goal: Skin integrity is maintained or improved  Description: INTERVENTIONS:  - Identify patients at risk for skin breakdown  - Assess and monitor skin integrity  - Assess and monitor nutrition and hydration status  - Monitor labs   - Assess for incontinence   - Turn and reposition patient  - Assist with mobility/ambulation  - Relieve pressure over bony prominences  - Avoid friction and shearing  - Provide appropriate hygiene as needed including keeping skin clean and dry  - Evaluate need for skin moisturizer/barrier cream  - Collaborate with interdisciplinary team   - Patient/family teaching  - Consider wound care consult   Outcome: Progressing     Problem: MOBILITY - ADULT  Goal: Maintain or return to baseline ADL function  Description: INTERVENTIONS:  -  Assess patient's ability to carry out ADLs; assess patient's baseline for ADL function and identify physical deficits which impact ability to perform ADLs (bathing, care of mouth/teeth, toileting, grooming, dressing, etc )  - Assess/evaluate cause of self-care deficits   - Assess range of motion  - Assess patient's mobility; develop plan if impaired  - Assess patient's need for assistive devices and provide as appropriate  - Encourage maximum independence but intervene and supervise when necessary  - Involve family in performance of ADLs  - Assess for home care needs following discharge   - Consider OT consult to assist with ADL evaluation and planning for discharge  - Provide patient education as appropriate  Outcome: Progressing  Goal: Maintains/Returns to pre admission functional level  Description: INTERVENTIONS:  - Perform BMAT or MOVE assessment daily    - Set and communicate daily mobility goal to care team and patient/family/caregiver  - Collaborate with rehabilitation services on mobility goals if consulted  - Perform Range of Motion times a day    - Reposition patient every hours   - Dangle patient  times a day  - Stand patient  times a day  - Ambulate patient  times a day  - Out of bed to chair times a day   - Out of bed for meals  times a day  - Out of bed for toileting  - Record patient progress and toleration of activity level   Outcome: Progressing     Problem: Potential for Falls  Goal: Patient will remain free of falls  Description: INTERVENTIONS:  - Educate patient/family on patient safety including physical limitations  - Instruct patient to call for assistance with activity   - Consult OT/PT to assist with strengthening/mobility   - Keep Call bell within reach  - Keep bed low and locked with side rails adjusted as appropriate  - Keep care items and personal belongings within reach  - Initiate and maintain comfort rounds  - Make Fall Risk Sign visible to staff  - Offer Toileting every Hours, in advance of need  - Initiate/Maintain alarm  - Obtain necessary fall risk management equipment:   - Apply yellow socks and bracelet for high fall risk patients  - Consider moving patient to room near nurses station  Outcome: Progressing     Problem: PAIN - ADULT  Goal: Verbalizes/displays adequate comfort level or baseline comfort level  Description: Interventions:  - Encourage patient to monitor pain and request assistance  - Assess pain using appropriate pain scale  - Administer analgesics based on type and severity of pain and evaluate response  - Implement non-pharmacological measures as appropriate and evaluate response  - Consider cultural and social influences on pain and pain management  - Notify physician/advanced practitioner if interventions unsuccessful or patient reports new pain  Outcome: Progressing     Problem: INFECTION - ADULT  Goal: Absence or prevention of progression during hospitalization  Description: INTERVENTIONS:  - Assess and monitor for signs and symptoms of infection  - Monitor lab/diagnostic results  - Monitor all insertion sites, i e  indwelling lines, tubes, and drains  - Monitor endotracheal if appropriate and nasal secretions for changes in amount and color  - Warne appropriate cooling/warming therapies per order  - Administer medications as ordered  - Instruct and encourage patient and family to use good hand hygiene technique  - Identify and instruct in appropriate isolation precautions for identified infection/condition  Outcome: Progressing     Problem: SAFETY ADULT  Goal: Patient will remain free of falls  Description: INTERVENTIONS:  - Educate patient/family on patient safety including physical limitations  - Instruct patient to call for assistance with activity   - Consult OT/PT to assist with strengthening/mobility   - Keep Call bell within reach  - Keep bed low and locked with side rails adjusted as appropriate  - Keep care items and personal belongings within reach  - Initiate and maintain comfort rounds  - Make Fall Risk Sign visible to staff  - Offer Toileting every Hours, in advance of need  - Initiate/Maintain alarm  - Obtain necessary fall risk management equipment:   - Apply yellow socks and bracelet for high fall risk patients  - Consider moving patient to room near nurses station  Outcome: Progressing  Goal: Maintain or return to baseline ADL function  Description: INTERVENTIONS:  -  Assess patient's ability to carry out ADLs; assess patient's baseline for ADL function and identify physical deficits which impact ability to perform ADLs (bathing, care of mouth/teeth, toileting, grooming, dressing, etc )  - Assess/evaluate cause of self-care deficits   - Assess range of motion  - Assess patient's mobility; develop plan if impaired  - Assess patient's need for assistive devices and provide as appropriate  - Encourage maximum independence but intervene and supervise when necessary  - Involve family in performance of ADLs  - Assess for home care needs following discharge   - Consider OT consult to assist with ADL evaluation and planning for discharge  - Provide patient education as appropriate  Outcome: Progressing  Goal: Maintains/Returns to pre admission functional level  Description: INTERVENTIONS:  - Perform BMAT or MOVE assessment daily    - Set and communicate daily mobility goal to care team and patient/family/caregiver  - Collaborate with rehabilitation services on mobility goals if consulted  - Perform Range of Motion  times a day  - Reposition patient every hours    - Dangle patient  times a day  - Stand patient  times a day  - Ambulate patient  times a day  - Out of bed to chair times a day   - Out of bed for meals  times a day  - Out of bed for toileting  - Record patient progress and toleration of activity level   Outcome: Progressing     Problem: DISCHARGE PLANNING  Goal: Discharge to home or other facility with appropriate resources  Description: INTERVENTIONS:  - Identify barriers to discharge w/patient and caregiver  - Arrange for needed discharge resources and transportation as appropriate  - Identify discharge learning needs (meds, wound care, etc )  - Arrange for interpretive services to assist at discharge as needed  - Refer to Case Management Department for coordinating discharge planning if the patient needs post-hospital services based on physician/advanced practitioner order or complex needs related to functional status, cognitive ability, or social support system  Outcome: Progressing

## 2021-08-01 NOTE — PROGRESS NOTES
Internal Medicine Progress Note  Patient: Yara Valadez  Age/sex: 66 y o  female  Medical Record #: [id number]      ASSESSMENT/PLAN: (Interval History)  Yara Valadez is seen and examined and management for following issues:    Central cord syndrome; s/p ACDF C4-5/PCDF C2-T2 7/20/21  · Doesn't need collar per NS  · Monitor incisions  · Rehab per PMR     Acute nasal bone fx  · OMS saw = no surgery needed  · Sinus precautions for 4 weeks     Urine retention  · Gotti reinserted 7/26  · VT per PMR     Chronic UTI  · On suppressive tx with Keflex at home  · Will continue here     ABLA  · Did not require transfusion  · Repeat cbc monday     Orthostasis  · Improved s/p IVF on acute and here  · Will continue to monitor  Chest pain  · Occurred 7/29/21 and was located on the Lt side  · Pt reports having this type of chest pain at home but not as strong or lasting as long  · EKG without acute changes  · Troponin negative  · Mylanta ordered  Discharge date:  Team      The above assessment and plan was reviewed and updated as determined by my evaluation of the patient on 8/1/2021      Labs:   Results from last 7 days   Lab Units 07/27/21  0510 07/26/21  0512   WBC Thousand/uL  --  4 74   HEMOGLOBIN g/dL 8 9* 8 2*   HEMATOCRIT % 27 4* 25 5*   PLATELETS Thousands/uL  --  195     Results from last 7 days   Lab Units 07/26/21  0512   SODIUM mmol/L 140   POTASSIUM mmol/L 4 1   CHLORIDE mmol/L 112*   CO2 mmol/L 23   BUN mg/dL 18   CREATININE mg/dL 0 72   CALCIUM mg/dL 9 1                   Review of Scheduled Meds:  Current Facility-Administered Medications   Medication Dose Route Frequency Provider Last Rate    acetaminophen  975 mg Oral Q8H [facility] Mariam Givens MD      ALPRAZolam  0 25 mg Oral HS PRN Mariam Givens MD      aluminum-magnesium hydroxide-simethicone  30 mL Oral Q4H PRN Penny Deras PA-C      atorvastatin  20 mg Oral Daily With Amara Phani, MD      cephalexin  250 mg Oral Daily Mariam Givens MD  heparin (porcine)  5,000 Units Subcutaneous Q8H [facility] Taras Dang MD      lidocaine  2 patch Topical Daily Taras Dang MD      meclizine  12 5 mg Oral Q8H PRN Taras Dang MD      melatonin  3 mg Oral HS Taras Dang MD      methocarbamol  500 mg Oral Q6H PRN Taras Dang MD      oxyCODONE  2 5 mg Oral Q4H PRN Taras Dang MD     Mayme Greenleaf oxyCODONE  5 mg Oral Q4H PRN Taras Dang MD      pantoprazole  40 mg Oral Early Morning Taras Dang MD      polyethylene glycol  17 g Oral Daily PRN Taras Dang MD      psyllium  1 packet Oral Daily Taras Dang MD      simethicone  80 mg Oral 4x Daily (with meals and at bedtime) Penny Deras PA-C      sodium chloride  1 spray Each Nare 4x Daily PRN Taras Dang MD         Subjective/ HPI: Patient seen and examined  Patients overnight issues or events were reviewed with nursing or staff during rounds or morning huddle session  New or overnight issues include the following:     Pt seen in her room in her chair, no complaints overnight  ROS:   Negative 12 point ROS other than denoted above in HPI or assessment/plan        Imaging:     No orders to display       *Labs /Radiology studies reviewed  *Medications reviewed and reconciled as needed  *Please refer to order section for additional ordered labs studies  *Case discussed with primary attending during morning huddle case rounds      Physical Examination:  Vitals:   Vitals:    07/31/21 1530 08/01/21 0031 08/01/21 0545 08/01/21 0810   BP: 142/69 136/64  132/60   BP Location: Left arm Left arm  Left arm   Pulse: 86 96  86   Resp: 18 18  18   Temp: 98 1 °F (36 7 °C) 98 6 °F (37 °C)  98 3 °F (36 8 °C)   TempSrc: Oral Oral  Oral   SpO2: 100% 98%  98%   Weight:   63 6 kg (140 lb 4 8 oz)    Height:           GEN: No apparent distress, interactive  NEURO: Alert and oriented x3  HEENT: Pupils are equal and reactive, EOMI, mucous membranes are moist, face symmetrical  CV: S1 S2 regular, no MRG, no peripheral edema noted  RESP: Lungs are clear bilaterally, no wheezes, rales or rhonchi noted, on room air, respirations easy and non labored  GI: Flat, soft non tender, non distended; +BS x4  : morrow draining clear yellow urine  MUSC: Moves all extremities  SKIN: pink, warm and dry, normal turgor, neck incision intact      The above physical exam was reviewed and updated as determined by my evaluation of the patient on 8/1/2021  Invasive Devices     Peripheral Intravenous Line            Peripheral IV 07/28/21 Right Antecubital 3 days          Drain            Urethral Catheter 18 Fr  5 days                   VTE Pharmacologic Prophylaxis: Heparin  Code Status: Level 1 - Full Code  Current Length of Stay: 6 day(s)      Total time spent:  30 minutes with more than 50% spent counseling/coordinating care  Counseling includes discussion with patient re: progress  and discussion with patient of his/her current medical state/information  Coordination of patient's care was performed in conjunction with primary service  Time invested included review of patient's labs, vitals, and management of their comorbidities with continued monitoring  In addition, this patient was discussed with medical team including physician and advanced extenders  The care of the patient was extensively discussed and appropriate treatment plan was formulated unique for this patient  ** Please Note:  voice to text software may have been used in the creation of this document   Although proof errors in transcription or interpretation are a potential of such software**

## 2021-08-01 NOTE — PROGRESS NOTES
08/01/21 0930   Pain Assessment   Pain Assessment Tool 0-10   Pain Score 4   Pain Location/Orientation Orientation: Bilateral;Location: Shoulder   Pain Onset/Description Onset: Ongoing;Frequency: Constant/Continuous   Patient's Stated Pain Goal No pain   Hospital Pain Intervention(s) Repositioned; Emotional support   Restrictions/Precautions   Precautions Bed/chair alarms;Cognitive; Fall Risk; Gotti; Supervision on toilet/commode;Spinal precautions;Pain  (sinus precautions)   Weight Bearing Restrictions No   ROM Restrictions Yes  (C-spine)   Cognition   Overall Cognitive Status Impaired   Arousal/Participation Alert; Cooperative   Attention Within functional limits   Orientation Level Oriented X4   Memory Decreased recall of precautions   Following Commands Follows one step commands without difficulty   Sit to Stand   Type of Assistance Needed Incidental touching; Adaptive equipment   Physical Assistance Level No physical assistance   Comment CGA with verbal cues for safety/hand placement   Sit to Stand CARE Score 4   Bed-Chair Transfer   Type of Assistance Needed Incidental touching; Adaptive equipment;Verbal cues   Physical Assistance Level No physical assistance   Comment CGA with verbal cues for safety/hand placement   Chair/Bed-to-Chair Transfer CARE Score 4   Transfer Bed/Chair/Wheelchair   Adaptive Equipment Roller Walker   Walk 10 Feet   Type of Assistance Needed Adaptive equipment; Incidental touching   Physical Assistance Level No physical assistance   Comment CGA with RW   Walk 10 Feet CARE Score 4   Walking 10 Feet on Uneven Surfaces   Reason if not Attempted Safety concerns   Walking 10 Feet on Uneven Surfaces CARE Score 88   Ambulation   Does the patient walk? 2  Yes   Primary Mode of Locomotion Prior to Admission Walk   Distance Walked (feet) 40 ft  (x2)   Assist Device Roller Walker   Gait Pattern Slow Erika;Decreased foot clearance; Forward Flexion;Narrow FRACISCO; Improper weight shift   Limitations Noted In Balance; Coordination;Device Management; Endurance;Speed;Strength   Provided Assistance with: Direction   Walk Assist Level Contact Guard;Minimum Assist   Findings focussed on HH dist in room   Wheel 50 Feet with Two Turns   Reason if not Attempted Activity not applicable   Wheel 50 Feet with Two Turns CARE Score 9   Wheel 150 Feet   Reason if not Attempted Activity not applicable   Wheel 665 Feet CARE Score 9   Wheelchair mobility   Does the patient use a wheelchair? 0  No   Therapeutic Interventions   Strengthening Seated LAQ, hip flex, ankle DF/PF and glute sets 2x15 reps with 2#   Other Comments   Comments seated /63, TEDS placed and post TE's BP up to 123/68  Assessment   Treatment Assessment Pt participated in 30 min skilled PT session with increased focus on LE strengthening and increased HH dist gait  Pt cont to require VC's with RW management noted when having to turn  Pt focused on amb in room to simulate home setup  Pt was able to clear objects and perform safe turns with MIN VC's today  Pt will cont to benefit from increased safety awareness, increased functional transfers and increased I with LRAD to decrease burden of care at discharge  Cont POC as tolerated  Problem List Decreased strength;Decreased endurance; Impaired balance;Decreased mobility; Decreased coordination;Decreased safety awareness; Impaired tone;Orthopedic restrictions;Pain   Barriers to Discharge Inaccessible home environment;Decreased caregiver support   PT Barriers   Functional Limitation Car transfers;Stair negotiation;Standing;Transfers; Walking   Plan   Treatment/Interventions Functional transfer training;LE strengthening/ROM; Therapeutic exercise; Endurance training;Bed mobility;Gait training;Patient/family training   Progress Progressing toward goals   Recommendation   PT Discharge Recommendation Home with outpatient rehabilitation   PT Therapy Minutes   PT Time In 0930   PT Time Out 1000   PT Total Time (minutes) 30   PT Mode of treatment - Individual (minutes) 30   PT Mode of treatment - Concurrent (minutes) 0   PT Mode of treatment - Group (minutes) 0   PT Mode of treatment - Co-treat (minutes) 0   PT Mode of Treatment - Total time(minutes) 30 minutes   PT Cumulative Minutes 435   Therapy Time missed   Time missed?  No

## 2021-08-02 LAB
ANION GAP SERPL CALCULATED.3IONS-SCNC: 6 MMOL/L (ref 4–13)
BASOPHILS # BLD AUTO: 0.06 THOUSANDS/ΜL (ref 0–0.1)
BASOPHILS NFR BLD AUTO: 1 % (ref 0–1)
BUN SERPL-MCNC: 20 MG/DL (ref 5–25)
CALCIUM SERPL-MCNC: 9.6 MG/DL (ref 8.3–10.1)
CHLORIDE SERPL-SCNC: 109 MMOL/L (ref 100–108)
CO2 SERPL-SCNC: 23 MMOL/L (ref 21–32)
CREAT SERPL-MCNC: 0.95 MG/DL (ref 0.6–1.3)
EOSINOPHIL # BLD AUTO: 0.13 THOUSAND/ΜL (ref 0–0.61)
EOSINOPHIL NFR BLD AUTO: 2 % (ref 0–6)
ERYTHROCYTE [DISTWIDTH] IN BLOOD BY AUTOMATED COUNT: 15.6 % (ref 11.6–15.1)
GFR SERPL CREATININE-BSD FRML MDRD: 58 ML/MIN/1.73SQ M
GLUCOSE SERPL-MCNC: 113 MG/DL (ref 65–140)
HCT VFR BLD AUTO: 32.9 % (ref 34.8–46.1)
HGB BLD-MCNC: 10.4 G/DL (ref 11.5–15.4)
IMM GRANULOCYTES # BLD AUTO: 0.06 THOUSAND/UL (ref 0–0.2)
IMM GRANULOCYTES NFR BLD AUTO: 1 % (ref 0–2)
LYMPHOCYTES # BLD AUTO: 1.58 THOUSANDS/ΜL (ref 0.6–4.47)
LYMPHOCYTES NFR BLD AUTO: 25 % (ref 14–44)
MCH RBC QN AUTO: 32.4 PG (ref 26.8–34.3)
MCHC RBC AUTO-ENTMCNC: 31.6 G/DL (ref 31.4–37.4)
MCV RBC AUTO: 103 FL (ref 82–98)
MONOCYTES # BLD AUTO: 0.47 THOUSAND/ΜL (ref 0.17–1.22)
MONOCYTES NFR BLD AUTO: 8 % (ref 4–12)
NEUTROPHILS # BLD AUTO: 3.98 THOUSANDS/ΜL (ref 1.85–7.62)
NEUTS SEG NFR BLD AUTO: 63 % (ref 43–75)
NRBC BLD AUTO-RTO: 0 /100 WBCS
PLATELET # BLD AUTO: 339 THOUSANDS/UL (ref 149–390)
PMV BLD AUTO: 8.6 FL (ref 8.9–12.7)
POTASSIUM SERPL-SCNC: 4.2 MMOL/L (ref 3.5–5.3)
RBC # BLD AUTO: 3.21 MILLION/UL (ref 3.81–5.12)
SODIUM SERPL-SCNC: 138 MMOL/L (ref 136–145)
WBC # BLD AUTO: 6.28 THOUSAND/UL (ref 4.31–10.16)

## 2021-08-02 PROCEDURE — 97112 NEUROMUSCULAR REEDUCATION: CPT

## 2021-08-02 PROCEDURE — 97110 THERAPEUTIC EXERCISES: CPT

## 2021-08-02 PROCEDURE — 80048 BASIC METABOLIC PNL TOTAL CA: CPT | Performed by: NURSE PRACTITIONER

## 2021-08-02 PROCEDURE — 97530 THERAPEUTIC ACTIVITIES: CPT

## 2021-08-02 PROCEDURE — 99232 SBSQ HOSP IP/OBS MODERATE 35: CPT | Performed by: PHYSICAL MEDICINE & REHABILITATION

## 2021-08-02 PROCEDURE — 99232 SBSQ HOSP IP/OBS MODERATE 35: CPT | Performed by: INTERNAL MEDICINE

## 2021-08-02 PROCEDURE — 85025 COMPLETE CBC W/AUTO DIFF WBC: CPT | Performed by: NURSE PRACTITIONER

## 2021-08-02 RX ORDER — SODIUM CHLORIDE 9 MG/ML
50 INJECTION, SOLUTION INTRAVENOUS ONCE
Status: COMPLETED | OUTPATIENT
Start: 2021-08-02 | End: 2021-08-02

## 2021-08-02 RX ADMIN — HEPARIN SODIUM 5000 UNITS: 5000 INJECTION INTRAVENOUS; SUBCUTANEOUS at 14:28

## 2021-08-02 RX ADMIN — SIMETHICONE 80 MG: 20 EMULSION ORAL at 12:12

## 2021-08-02 RX ADMIN — SODIUM CHLORIDE 50 ML/HR: 0.9 INJECTION, SOLUTION INTRAVENOUS at 19:41

## 2021-08-02 RX ADMIN — HEPARIN SODIUM 5000 UNITS: 5000 INJECTION INTRAVENOUS; SUBCUTANEOUS at 22:30

## 2021-08-02 RX ADMIN — SIMETHICONE 80 MG: 20 EMULSION ORAL at 09:32

## 2021-08-02 RX ADMIN — MELATONIN TAB 3 MG 3 MG: 3 TAB at 22:31

## 2021-08-02 RX ADMIN — HEPARIN SODIUM 5000 UNITS: 5000 INJECTION INTRAVENOUS; SUBCUTANEOUS at 06:05

## 2021-08-02 RX ADMIN — LIDOCAINE 2 PATCH: 50 PATCH TOPICAL at 09:33

## 2021-08-02 RX ADMIN — PANTOPRAZOLE SODIUM 40 MG: 40 TABLET, DELAYED RELEASE ORAL at 06:05

## 2021-08-02 RX ADMIN — ACETAMINOPHEN 975 MG: 325 TABLET, FILM COATED ORAL at 22:30

## 2021-08-02 RX ADMIN — Medication 1 PACKET: at 09:32

## 2021-08-02 RX ADMIN — ATORVASTATIN CALCIUM 20 MG: 20 TABLET, FILM COATED ORAL at 17:20

## 2021-08-02 RX ADMIN — CEPHALEXIN 250 MG: 250 CAPSULE ORAL at 09:32

## 2021-08-02 RX ADMIN — ACETAMINOPHEN 975 MG: 325 TABLET, FILM COATED ORAL at 14:27

## 2021-08-02 RX ADMIN — SIMETHICONE 80 MG: 20 EMULSION ORAL at 17:20

## 2021-08-02 RX ADMIN — ACETAMINOPHEN 975 MG: 325 TABLET, FILM COATED ORAL at 06:04

## 2021-08-02 RX ADMIN — SIMETHICONE 80 MG: 20 EMULSION ORAL at 22:33

## 2021-08-02 NOTE — TELEPHONE ENCOUNTER
08/11/2021-PT STILL IN HOSPITAL    08/10/2021-PT STILL IN  HOSPITAL    08/09/2021-PT STILL IN HOSPITAL    08/05/2021-PT STILL IN HOSPITAL    08/04/2021-PT STILL IN HOSPITAL    08/03/2021-PT STILL [address] CX 08/03/2021 APT-Hospitalized (Sent message to [facility]  patient still in [facility]   They will add to AP schedule )  09/03/2021-6 WK POV SNPX W/INDER & HUY W/CERVICAL SPINE XRAY      08/02/2021-PT STILL IN HOSPITAL

## 2021-08-02 NOTE — PLAN OF CARE
Problem: Prexisting or High Potential for Compromised Skin Integrity  Goal: Skin integrity is maintained or improved  Description: INTERVENTIONS:  - Identify patients at risk for skin breakdown  - Assess and monitor skin integrity  - Assess and monitor nutrition and hydration status  - Monitor labs   - Assess for incontinence   - Turn and reposition patient  - Assist with mobility/ambulation  - Relieve pressure over bony prominences  - Avoid friction and shearing  - Provide appropriate hygiene as needed including keeping skin clean and dry  - Evaluate need for skin moisturizer/barrier cream  - Collaborate with interdisciplinary team   - Patient/family teaching  - Consider wound care consult   Outcome: Progressing     Problem: MOBILITY - ADULT  Goal: Maintain or return to baseline ADL function  Description: INTERVENTIONS:  -  Assess patient's ability to carry out ADLs; assess patient's baseline for ADL function and identify physical deficits which impact ability to perform ADLs (bathing, care of mouth/teeth, toileting, grooming, dressing, etc )  - Assess/evaluate cause of self-care deficits   - Assess range of motion  - Assess patient's mobility; develop plan if impaired  - Assess patient's need for assistive devices and provide as appropriate  - Encourage maximum independence but intervene and supervise when necessary  - Involve family in performance of ADLs  - Assess for home care needs following discharge   - Consider OT consult to assist with ADL evaluation and planning for discharge  - Provide patient education as appropriate  Outcome: Progressing  Goal: Maintains/Returns to pre admission functional level  Description: INTERVENTIONS:  - Perform BMAT or MOVE assessment daily    - Set and communicate daily mobility goal to care team and patient/family/caregiver  - Collaborate with rehabilitation services on mobility goals if consulted  - Perform Range of Motion times a day    - Reposition patient every hours   - Dangle patient  times a day  - Stand patient  times a day  - Ambulate patient  times a day  - Out of bed to chair times a day   - Out of bed for meals  times a day  - Out of bed for toileting  - Record patient progress and toleration of activity level   Outcome: Progressing     Problem: Potential for Falls  Goal: Patient will remain free of falls  Description: INTERVENTIONS:  - Educate patient/family on patient safety including physical limitations  - Instruct patient to call for assistance with activity   - Consult OT/PT to assist with strengthening/mobility   - Keep Call bell within reach  - Keep bed low and locked with side rails adjusted as appropriate  - Keep care items and personal belongings within reach  - Initiate and maintain comfort rounds  - Make Fall Risk Sign visible to staff  - Offer Toileting every Hours, in advance of need  - Initiate/Maintain alarm  - Obtain necessary fall risk management equipment:   - Apply yellow socks and bracelet for high fall risk patients  - Consider moving patient to room near nurses station  Outcome: Progressing     Problem: PAIN - ADULT  Goal: Verbalizes/displays adequate comfort level or baseline comfort level  Description: Interventions:  - Encourage patient to monitor pain and request assistance  - Assess pain using appropriate pain scale  - Administer analgesics based on type and severity of pain and evaluate response  - Implement non-pharmacological measures as appropriate and evaluate response  - Consider cultural and social influences on pain and pain management  - Notify physician/advanced practitioner if interventions unsuccessful or patient reports new pain  Outcome: Progressing     Problem: INFECTION - ADULT  Goal: Absence or prevention of progression during hospitalization  Description: INTERVENTIONS:  - Assess and monitor for signs and symptoms of infection  - Monitor lab/diagnostic results  - Monitor all insertion sites, i e  indwelling lines, tubes, and drains  - Monitor endotracheal if appropriate and nasal secretions for changes in amount and color  - [place] appropriate cooling/warming therapies per order  - Administer medications as ordered  - Instruct and encourage patient and family to use good hand hygiene technique  - Identify and instruct in appropriate isolation precautions for identified infection/condition  Outcome: Progressing     Problem: SAFETY ADULT  Goal: Patient will remain free of falls  Description: INTERVENTIONS:  - Educate patient/family on patient safety including physical limitations  - Instruct patient to call for assistance with activity   - Consult OT/PT to assist with strengthening/mobility   - Keep Call bell within reach  - Keep bed low and locked with side rails adjusted as appropriate  - Keep care items and personal belongings within reach  - Initiate and maintain comfort rounds  - Make Fall Risk Sign visible to staff  - Offer Toileting every Hours, in advance of need  - Initiate/Maintain alarm  - Obtain necessary fall risk management equipment:   - Apply yellow socks and bracelet for high fall risk patients  - Consider moving patient to room near nurses station  Outcome: Progressing  Goal: Maintain or return to baseline ADL function  Description: INTERVENTIONS:  -  Assess patient's ability to carry out ADLs; assess patient's baseline for ADL function and identify physical deficits which impact ability to perform ADLs (bathing, care of mouth/teeth, toileting, grooming, dressing, etc )  - Assess/evaluate cause of self-care deficits   - Assess range of motion  - Assess patient's mobility; develop plan if impaired  - Assess patient's need for assistive devices and provide as appropriate  - Encourage maximum independence but intervene and supervise when necessary  - Involve family in performance of ADLs  - Assess for home care needs following discharge   - Consider OT consult to assist with ADL evaluation and planning for discharge  - Provide patient education as appropriate  Outcome: Progressing  Goal: Maintains/Returns to pre admission functional level  Description: INTERVENTIONS:  - Perform BMAT or MOVE assessment daily    - Set and communicate daily mobility goal to care team and patient/family/caregiver  - Collaborate with rehabilitation services on mobility goals if consulted  - Perform Range of Motion  times a day  - Reposition patient every hours  - Dangle patient  times a day  - Stand patient  times a day  - Ambulate patient  times a day  - Out of bed to chair times a day   - Out of bed for meals  times a day  - Out of bed for toileting  - Record patient progress and toleration of activity level   Outcome: Progressing     Problem: DISCHARGE PLANNING  Goal: Discharge to home or other facility with appropriate resources  Description: INTERVENTIONS:  - Identify barriers to discharge w/patient and caregiver  - Arrange for needed discharge resources and transportation as appropriate  - Identify discharge learning needs (meds, wound care, etc )  - Arrange for interpretive services to assist at discharge as needed  - Refer to Case Management Department for coordinating discharge planning if the patient needs post-hospital services based on physician/advanced practitioner order or complex needs related to functional status, cognitive ability, or social support system  Outcome: Progressing     Problem: Nutrition/Hydration-ADULT  Goal: Nutrient/Hydration intake appropriate for improving, restoring or maintaining nutritional needs  Description: Monitor and assess patient's nutrition/hydration status for malnutrition  Collaborate with interdisciplinary team and initiate plan and interventions as ordered  Monitor patient's weight and dietary intake as ordered or per policy  Utilize nutrition screening tool and intervene as necessary  Determine patient's food preferences and provide high-protein, high-caloric foods as appropriate  INTERVENTIONS:  - Monitor oral intake, urinary output, labs, and treatment plans  - Assess nutrition and hydration status and recommend course of action  - Evaluate amount of meals eaten  - Assist patient with eating if necessary   - Allow adequate time for meals  - Recommend/ encourage appropriate diets, oral nutritional supplements, and vitamin/mineral supplements  - Order, calculate, and assess calorie counts as needed  - Recommend, monitor, and adjust tube feedings and TPN/PPN based on assessed needs  - Assess need for intravenous fluids  - Provide specific nutrition/hydration education as appropriate  - Include patient/family/caregiver in decisions related to nutrition  Outcome: Progressing

## 2021-08-02 NOTE — PROGRESS NOTES
08/02/21 1230   Pain Assessment   Pain Assessment Tool 0-10   Pain Score No Pain   Restrictions/Precautions   Precautions Bed/chair alarms;Cognitive; Fall Risk; Gotti; Other (comment); Spinal precautions;Supervision on toilet/commode   Weight Bearing Restrictions No   ROM Restrictions Yes  (spinal precaution)   Braces or Orthoses Other (Comment)  (BLE TEDs, abdominal binder)   Cognition   Overall Cognitive Status Impaired   Arousal/Participation Alert; Cooperative   Attention Within functional limits   Memory Decreased short term memory   Following Commands Follows one step commands without difficulty   Subjective   Subjective "They left me in that chair for too long"   Sit to Stand   Type of Assistance Needed Physical assistance; Adaptive equipment   Physical Assistance Level 25% or less   Comment Juanjo 2* lightheadedness w/ standing   Sit to Stand CARE Score 3   Bed-Chair Transfer   Type of Assistance Needed Physical assistance; Adaptive equipment   Physical Assistance Level 25% or less   Comment Juanjo RW   Chair/Bed-to-Chair Transfer CARE Score 3   Walk 10 Feet   Type of Assistance Needed Physical assistance   Physical Assistance Level Total assistance   Comment minAx1 w/ RW, CF 2* lightheadedness for safety  mod A HHA on L   Walk 10 Feet CARE Score 1   Walk 50 Feet with Two Turns   Type of Assistance Needed Physical assistance; Adaptive equipment   Physical Assistance Level Total assistance   Comment minAx1 w/ RW, CF 2* lightheadedness for safety  modA HHA on L side   Walk 50 Feet with Two Turns CARE Score 1   Walk 150 Feet   Type of Assistance Needed Physical assistance   Physical Assistance Level Total assistance   Comment minAx1 w/ RW, CF 2* lightheadedness for safety   Walk 150 Feet CARE Score 1   Walking 10 Feet on Uneven Surfaces   Reason if not Attempted Safety concerns   Walking 10 Feet on Uneven Surfaces CARE Score 88   Ambulation   Does the patient walk? 2   Yes   Primary Mode of Locomotion Prior to Admission Walk   Distance Walked (feet) 150 ft  (+ 50 HHA, +30'4 along HR)   Assist Device Roller Walker;Cane;Hand Hold   Gait Pattern Inconsistant Erika; Slow Erika;Decreased foot clearance;L foot drag;Improper weight shift   Limitations Noted In Balance; Coordination;Device Management; Endurance;Speed;Strength;Swing;Safety   Provided Assistance with: Direction   Walk Assist Level Minimum Assist;Chair Follow; Moderate Assist   Findings Juanjo w/ RW, CFA for safety 2* orthostasis at start of session  Trialed use of NBQC and pt amb 30' w/ modA and VC throughout for safety and sequencing  Also trialed HHA as pt did not use RW PTA, req modA  Wheel 50 Feet with Two Turns   Reason if not Attempted Activity not applicable   Wheel 50 Feet with Two Turns CARE Score 9   Wheel 150 Feet   Reason if not Attempted Activity not applicable   Wheel 121 Feet CARE Score 9   Wheelchair mobility   Does the patient use a wheelchair? 0  No   Therapeutic Interventions   Other donned abdominal binder   Other Comments   Comments BP as follows: seated 121/61, standing 90/63 w/ c/o lightheadedness, immediate sitting 110/61  Dr Ken Cullen and Saige Thompson NP made aware, donned abdominal binder and BP sitting 130/80, standing 120/70 and pt asymptomatc   Assessment   Treatment Assessment PT session focusing on HH distance amb, trialing various DME and NPP w/ amb for endurance and balance  Pt orthostatic w/ standing this session and very symptomatic stating "that is the worst I've felt"  BP stated above and Dr Ken Cullen made aware  Donned abdominal binder and ptab le to tolerated rest of session w/ no c/o dizziness and no significant drop in BP  Trialed use of NBQC, pt req modA and frequently tripping over cane  Recommend use of RW at this time as it allows pt to be most independent and dec falls risk  Cont w/ POC focusing on amb, stairs, transfers, bed mobility and family trianing to maximize functional mobilty and dec burden of care      Problem List Decreased strength;Decreased range of motion;Decreased endurance; Impaired balance;Decreased coordination;Decreased mobility;Orthopedic restrictions;Pain   Barriers to Discharge Inaccessible home environment;Decreased caregiver support   PT Barriers   Functional Limitation Car transfers; Ramp negotiation;Stair negotiation;Standing;Transfers; Walking   Plan   Treatment/Interventions Functional transfer training;LE strengthening/ROM; Therapeutic exercise;Elevations; Endurance training;Patient/family training;Equipment eval/education; Bed mobility;Gait training; Compensatory technique education   Progress Progressing toward goals   Recommendation   PT Discharge Recommendation Home with home health rehabilitation   Equipment Recommended Walker   PT Therapy Minutes   PT Time In 1230   PT Time Out 1330   PT Total Time (minutes) 60   PT Mode of treatment - Individual (minutes) 60   PT Mode of treatment - Concurrent (minutes) 0   PT Mode of treatment - Group (minutes) 0   PT Mode of treatment - Co-treat (minutes) 0   PT Mode of Treatment - Total time(minutes) 60 minutes   PT Cumulative Minutes 525   Therapy Time missed   Time missed?  No

## 2021-08-02 NOTE — PROGRESS NOTES
Physical Medicine and Rehabilitation Progress Note  Yara Valadez 66 y o  female MRN: [id number]  Unit/Bed#: -22 Encounter: [id number]    HPI: Yara Valadez is a 66 y o  female who presented to the [facility] after fall  Patient p/w c/o neck pain as well as bilateral arm pain/numbness  Imaging revealed cervical stenosis most severe at C4-5 with possible cord edema and patient subsequently underwent C4-5 ACDF & C2-T2 PCDF on 7/20 by Dr Cherie Hanley  Patient was also found to have B/L nasal bone fractures and L maxillary sinus hemorrhage which was managed conservatively  Post-op course complicated by urinary retention requiring morrow placement  Chief Complaint: cervical stenosis/central cord syndrome     Subjective: patient denies any events over the weekend     ROS: A 10 point ROS was performed; negative except as noted above       Assessment/Plan:      Cervical stenosis of spine  Assessment & Plan  - C6 fx, per XR report of 7/21 "Redemonstrated anterior-inferior C6 vertebral body corner fracture, unchanged in alignment " (seen by neurosx on 7/26 no further intervention recommended)  - cervical stenosis most severe at C4-5 with possible cord edema s/p ACDF C4-5 & PCDF C2-T2 by Dr Cherie Hanley on 7/20  - c-spine precautions  - no brace needed per neurosx  - FU XR already e-prescribed in EMR by neurosx team  - OP FU with Dr Cherie Hanley     Nasal bone fractures  Assessment & Plan  - B/L nasal bone fractures & L maxillary sinus hemorrhage  - non-op management per OMFS  - sinus precautions   - abx recommended by OMFS however dc'd by trauma surgery in acute care who was primary team per their protocol     CKD (chronic kidney disease)  Assessment & Plan  - Cr currently 0 72  - baseline appears to be 1 0-1 2  - IM monitoring     Anemia  Assessment & Plan  - Hg currently 8 9 post-operatively  - IM monitoring     Urinary retention  Assessment & Plan  - TOV in process     Chest pain  Assessment & Plan  - patient complained of brief episode of left sided chest pain while in therapy  - per patient this occurs at home as well and responds to OTC antacid medication however checked EKG to r/o cardiac etiology and EKG was unrevealing (did show mildly elevated QTc at 487, d/w IM who recommended no further GUTIERREZ/intervention at this time for QTc and to avoid QT prolonging medication if possible)   - resolved after given medication for heartburn   - troponin's negative (per IM no further troponins required)    CHF (congestive heart failure) ([facility])  Assessment & Plan  - EF 45-50%  - grade 1 DD  - not on diuretics at home  - IM monitoring volume status       Dyslipidemia  Assessment & Plan  - on home lipitor 20 mg qpm    History of recurrent UTIs  Assessment & Plan  - on home keflex 250 mg qd which patient is chronically on (confrimed with patient's OP pharmacy that patient receives monthly prescriptions for this, last provided by Dr Jeffrey Brewer of urology)  - OP FU with Dr Abbi Giron  - on home xanax 0 25 mg qhs prn     Vertigo  Assessment & Plan  - chronic, likely BPV  - on home antivert 12 5 mg q8 prn     * GERD (gastroesophageal reflux disease)  Assessment & Plan  - on home protonix 40 mg qd      Scheduled Meds:  Current Facility-Administered Medications   Medication Dose Route Frequency Provider Last Rate    acetaminophen  975 mg Oral Q8H Yayo Spivey MD      ALPRAZolam  0 25 mg Oral HS PRN Suzy Leon MD      aluminum-magnesium hydroxide-simethicone  30 mL Oral Q4H PRN Penny Deras PA-C      atorvastatin  20 mg Oral Daily With MD Mateusz      cephalexin  250 mg Oral Daily Suzy Leon MD      heparin (porcine)  5,000 Units Subcutaneous Q8H [address] Suzy Leon MD      lidocaine  2 patch Topical Daily Suzy Leon MD      meclizine  12 5 mg Oral Q8H PRN Suzy Leon MD      melatonin  3 mg Oral HS Suzy Leon MD      methocarbamol  500 mg Oral Q6H PRN Tashi Akilah Ty MD      oxyCODONE  2 5 mg Oral Q4H PRN MD Debra Alva oxyCODONE  5 mg Oral Q4H PRN Colonel Maury MD      pantoprazole  40 mg Oral Early Morning Colonel Maury MD      polyethylene glycol  17 g Oral Daily PRN Colonel Maury MD      psyllium  1 packet Oral Daily Colonel Maury MD      simethicone  80 mg Oral 4x Daily (with meals and at bedtime) Penny Deras PA-C      sodium chloride  1 spray Each Nare 4x Daily PRN Colonel Maury MD            Incidental findings:     1) gallstones: asymptomatic; OP FU with PCP with GI referral at PCPs discretion      DVT ppx: HSQ (cleared in acute care by neurosx for pharmacologic DVT ppx)       Objective:    Functional Update:  Mobility: mod   Transfers: mod  ADLs: max-total        Physical Exam:    Vitals:    08/02/21 1020   BP: 114/64   Pulse:    Resp:    Temp:    SpO2:          General: alert, no apparent distress, cooperative and comfortable  HEENT:  Eye: Normal external eye, conjunctiva, lidsc cornea  Ears: Normal external ears  Nose: Normal external nose, mucus membranes  CARDIAC:  +S1/2  LUNGS:  no abnormal respiratory pattern, no retractions noted, non-labored breathing   ABDOMEN:  soft NT  EXTREMITIES:  no cyanosis or clubbing   NEURO:  awake, alert appropriate responses to questions   PSYCH:  mood/affect currently stable   INCISION:  surgical site C/D/I          Diagnostic Studies:  No orders to display       Laboratory:   Results from last 7 days   Lab Units 07/27/21  0510   HEMOGLOBIN g/dL 8 9*   HEMATOCRIT % 27 4*           Invalid input(s): CA

## 2021-08-02 NOTE — QUICK NOTE
Had some orthostasis today with PT with sitting 121/61 and standing 90/63  Had some lightheadedness; Dr Sanjuana Krabbe added abd binder with repeat  sit, 120 stand and no dizziness  Not drinking much but in past 2 times she has been orthostatic her labs did not reflect being dry  However to be safe/complete will get BMP/CBC now  Will give 500ml of NSS again over 10 hrs as has had in the past that has been effective  We may need to set a goal for her of 1200-1400ml/day for PO intake  LVEF is 45%

## 2021-08-02 NOTE — PROGRESS NOTES
08/02/21 0830   Pain Assessment   Pain Assessment Tool 0-10   Pain Score No Pain   Restrictions/Precautions   Precautions Bed/chair alarms;Cognitive; Fall Risk; Gotti; Spinal precautions;Supervision on toilet/commode  (Sinus precautions)   Weight Bearing Restrictions No   ROM Restrictions Yes  (spinal precaution)   Braces or Orthoses Other (Comment)  (BLE TEDs)   Cognition   Overall Cognitive Status Impaired   Arousal/Participation Alert; Cooperative   Attention Within functional limits   Memory Decreased short term memory   Following Commands Follows one step commands without difficulty   Subjective   Subjective I had a good night's sleep   Sit to Stand   Type of Assistance Needed Physical assistance; Adaptive equipment   Physical Assistance Level 25% or less   Comment Juanjo w/ RW   Sit to Stand CARE Score 3   Bed-Chair Transfer   Type of Assistance Needed Physical assistance; Adaptive equipment   Physical Assistance Level 25% or less   Comment Juanjo RW   Chair/Bed-to-Chair Transfer CARE Score 3   Transfer Bed/Chair/Wheelchair   Findings orthostatic w/ sit to stand, 123/68 seated to 90/65 standing pt asymptomatic   Therapeutic Interventions   Strengthening repeated sit > stands   Balance marches in standing w/ RW   Other donned BLE knee high TEDs   Assessment   Treatment Assessment PT sesison limited today 2* pt being orthostatic  seated /68 and immediate standing 90/65 w/ no c/o dizziness or lightheadedness  Completed marches in standing w/ use of RW and BP immediately after sitting 127/64  TEDs donned at start of session  Pt cont to req acute rehab PT focusing on amb HH distances, transfers, stair negotiation and functional mobility to maximize independnece   Problem List Decreased strength;Decreased range of motion;Decreased endurance; Impaired balance;Decreased coordination;Decreased mobility;Orthopedic restrictions;Pain   Barriers to Discharge Inaccessible home environment;Decreased caregiver support   PT Barriers   Functional Limitation Car transfers; Ramp negotiation;Stair negotiation;Standing;Transfers; Walking   Plan   Treatment/Interventions Functional transfer training;LE strengthening/ROM; Elevations; Therapeutic exercise; Endurance training;Patient/family training;Equipment eval/education; Bed mobility;Gait training; Compensatory technique education   Progress Progressing toward goals   Recommendation   PT Discharge Recommendation Home with outpatient rehabilitation   Equipment Recommended Walker   PT Therapy Minutes   PT Time In 0830   PT Time Out 0900   PT Total Time (minutes) 30   PT Mode of treatment - Individual (minutes) 30   PT Mode of treatment - Concurrent (minutes) 0   PT Mode of treatment - Group (minutes) 0   PT Mode of treatment - Co-treat (minutes) 0   PT Mode of Treatment - Total time(minutes) 30 minutes   PT Cumulative Minutes 465   Therapy Time missed   Time missed?  No

## 2021-08-02 NOTE — PROGRESS NOTES
Internal Medicine Progress Note  Patient: Rommel Sheriff  Age/sex: 66 y o  female  Medical Record #: [id number]      ASSESSMENT/PLAN: (Interval History)  Rommel Sheriff is seen and examined and management for following issues:    Central cord syndrome; s/p ACDF C4-5/PCDF C2-T2 7/20/21  · Doesn't need collar per NS  · Incisions w/o erythema/infections  · Staples still in posterior (due out 8/3/21)     Acute nasal bone fx  · OMS saw = no surgery needed  · Sinus precautions for 4 weeks     Urine retention  · Gotti reinserted 7/26  · VT per PMR     Chronic UTI  · On suppressive tx with Keflex at home  · Will continue here     ABLA  · Did not require transfusion     Orthostasis  · Improved s/p IVF on acute and here  · Will continue to monitor      Chest pain  · Occurred 7/29/21 and was located on the Lt side  · Pt reports having this type of chest pain at home but not as strong or lasting as long  · EKG without acute changes  · Troponin negative  · Mylanta ordered  · No reoccurrence         Discharge date:  Team    The above assessment and plan was reviewed and updated as determined by my evaluation of the patient on 8/2/2021      Labs:   Results from last 7 days   Lab Units 07/27/21  0510   HEMOGLOBIN g/dL 8 9*   HEMATOCRIT % 27 4*           Invalid input(s): AMANDA HUNT                Review of Scheduled Meds:  Current Facility-Administered Medications   Medication Dose Route Frequency Provider Last Rate    acetaminophen  975 mg Oral Q8H [facility] Ellen Estevez MD      ALPRAZolam  0 25 mg Oral HS PRN Ellen Estevez MD      aluminum-magnesium hydroxide-simethicone  30 mL Oral Q4H PRN Penny Deras PA-C      atorvastatin  20 mg Oral Daily With MD Mateusz      cephalexin  250 mg Oral Daily Ellen Estevez MD      heparin (porcine)  5,000 Units Subcutaneous [facility] Ellen Estevez MD      lidocaine  2 patch Topical Daily Ellen Estevez MD      meclizine  12 5 mg Oral Q8H PRN lElen Estevez MD      melatonin  3 mg Oral HS Unique Webb MD      methocarbamol  500 mg Oral Q6H PRN Unique Webb MD      oxyCODONE  2 5 mg Oral Q4H PRN MD Ta Esquivel oxyCODONE  5 mg Oral Q4H PRN Unique Webb MD      pantoprazole  40 mg Oral Early Morning Unique Webb MD      polyethylene glycol  17 g Oral Daily PRN Unique Webb MD      psyllium  1 packet Oral Daily Unique Webb MD      simethicone  80 mg Oral 4x Daily (with meals and at bedtime) Penny Deras PA-C      sodium chloride  1 spray Each Nare 4x Daily PRN Unique Webb MD         Subjective/ HPI: Patient seen and examined  Patients overnight issues or events were reviewed with nursing or staff during rounds or morning huddle session  New or overnight issues include the following:     None     ROS:   Negative 12 point ROS other than denoted above in HPI or assessment/plan        Imaging:     No orders to display       *Labs /Radiology studies reviewed  *Medications reviewed and reconciled as needed  *Please refer to order section for additional ordered labs studies  *Case discussed with primary attending during morning huddle case rounds      Physical Examination:  Vitals:   Vitals:    08/02/21 0830 08/02/21 0831 08/02/21 1015 08/02/21 1020   BP: 123/68 90/65 122/68 114/64   BP Location: Left arm Left arm Right arm Right arm   Pulse:       Resp:       Temp:       TempSrc:       SpO2:       Weight:       Height:           General Appearance: no distress, conversive  HEENT: PERRLA, conjuctiva normal; oropharynx clear; mucous membranes moist   Neck:  Supple, normal ROM, no JVD  Lungs: CTA, normal respiratory effort, no retractions, expiratory effort normal  CV: regular rate and rhythm; no rubs/murmurs/gallops, PMI normal   ABD: soft; ND/NT; +BS  EXT: no edema  Skin: normal turgor, normal texture, no rashes  Psych: affect normal, mood normal  Neuro: AAO; mild LUE weakness 4+/5 = ?2/2 pain in shoulders    The above physical exam was reviewed and updated as determined by my evaluation of the patient on 8/2/2021  Invasive Devices     Drain            Urethral Catheter 18 Fr  7 days                   VTE Pharmacologic Prophylaxis: Heparin  Code Status: Level 1 - Full Code  Current Length of Stay: 7 day(s)      Total time spent:  30 minutes with more than 50% spent counseling/coordinating care  Counseling includes discussion with patient re: progress  and discussion with patient of his/her current medical state/information  Coordination of patient's care was performed in conjunction with primary service  Time invested included review of patient's labs, vitals, and management of their comorbidities with continued monitoring  In addition, this patient was discussed with medical team including physician and advanced extenders  The care of the patient was extensively discussed and appropriate treatment plan was formulated unique for this patient  ** Please Note:  voice to text software may have been used in the creation of this document   Although proof errors in transcription or interpretation are a potential of such software**

## 2021-08-02 NOTE — PROGRESS NOTES
08/02/21 1000   Pain Assessment   Pain Assessment Tool 0-10   Pain Score No Pain   Restrictions/Precautions   Precautions Bed/chair alarms;Cognitive; Fall Risk; Gotti; Other (comment); Spinal precautions;Supervision on toilet/commode  (sinus precautions)   Weight Bearing Restrictions No   ROM Restrictions Yes   Braces or Orthoses   (cervical spine)   Lifestyle   Autonomy "I'm definitely not able to go home yet"   Sit to Stand   Type of Assistance Needed Incidental touching   Physical Assistance Level No physical assistance   Comment CGA with verbal cues for safety/hand placement   Sit to Stand CARE Score 4   Meal Prep   Meal Preparation Completed simple meal prep task to make packet of oatmeal  Pt states that she often makes hot and cold ceral for herself in the morning for breakfast  Pt used RW for mobility in kitchen w/ [place] and ample cues for correct technique w/ RW when transporting items on counterop  Pt able to retrieve targetted items from counter, cabinet height slightly above head level, and drawer at thigh height  Pt able to use microwave w/o cues for technique  Pt commented that she felt fatigued at task completion  Overall, safety concerns exist d/t poor technique w/ RW  Will benefit from additonal meal prep task training at pt was completing simple meal prep PTA IND  Therapeutic Excerise-Strength   UE Strength Yes   Exercise Tools   Exercise Tools Yes   Other Exercise Tool 2 Table enrique w/ 4# inserted  Pt completed 2x15 in available shoulder planes  She required rest breaks to manage slight increase in pain at shoulder, which resolved w/ rest breaks  Completed to increase BUE strength and endurance for increased IND w/ fxnl activity  HR and SpO2 monitored t/o tasks and SpO2 remained 98% and HR 98-11bpm and recovered to ~95bpm w/ rest breaks     Coordination   Fine Motor Small peg board completed w/ L hand, pt inserted all pegs into board but required plenty of extra time to complete d/t LUE FM deficits  Pt trialed several pegs w/ R hand for comparison purposes, noted more [facility] deficits w/ L hand vs R  Will benefit from additonal LUE strength and coordination tasks  Cognition   Overall Cognitive Status Impaired   Arousal/Participation Alert; Cooperative   Attention Attends with cues to redirect   Orientation Level Oriented X4   Following Commands Follows one step commands with increased time or repetition   Comments Pt able to recall all spinal and sinus precautions when prompted w/o cues   Activity Tolerance   Activity Tolerance Patient tolerated treatment well   Assessment   Treatment Assessment 90min OT session focusing on fxnl transfers, simple meal prep tasks, UE strength and LUE FMC activity  Pt tolerated well w/ only light pain during tasks that recovered w/ rest breaks  Safety concerns exist for this pt as she demo's poor-fair use of RW  She will benefit from additional OT focused on proper RW use, UE TE, LUE FMC/FMS  Prognosis Good   Problem List Decreased strength;Decreased range of motion;Decreased endurance; Impaired balance;Decreased mobility; Decreased coordination;Decreased cognition; Impaired judgement;Decreased safety awareness;Decreased skin integrity;Orthopedic restrictions;Pain   Barriers to Discharge Inaccessible home environment;Decreased caregiver support   Plan   Treatment/Interventions ADL retraining;Functional transfer training; Therapeutic exercise; Endurance training;Patient/family training;Equipment eval/education; Compensatory technique education;Continued evaluation   Progress Progressing toward goals   OT Therapy Minutes   OT Time In 1000   OT Time Out 1130   OT Total Time (minutes) 90   OT Mode of treatment - Individual (minutes) 90   OT Mode of treatment - Concurrent (minutes) 0   OT Mode of treatment - Group (minutes) 0   OT Mode of treatment - Co-treat (minutes) 0   OT Mode of Treatment - Total time(minutes) 90 minutes   OT Cumulative Minutes 600   Therapy Time missed   Time missed?  No

## 2021-08-03 PROCEDURE — 99232 SBSQ HOSP IP/OBS MODERATE 35: CPT | Performed by: PHYSICAL MEDICINE & REHABILITATION

## 2021-08-03 PROCEDURE — 97530 THERAPEUTIC ACTIVITIES: CPT

## 2021-08-03 PROCEDURE — 99232 SBSQ HOSP IP/OBS MODERATE 35: CPT | Performed by: INTERNAL MEDICINE

## 2021-08-03 PROCEDURE — 97535 SELF CARE MNGMENT TRAINING: CPT

## 2021-08-03 PROCEDURE — 99024 POSTOP FOLLOW-UP VISIT: CPT | Performed by: PHYSICIAN ASSISTANT

## 2021-08-03 PROCEDURE — 97112 NEUROMUSCULAR REEDUCATION: CPT

## 2021-08-03 PROCEDURE — 97110 THERAPEUTIC EXERCISES: CPT

## 2021-08-03 RX ADMIN — MELATONIN TAB 3 MG 3 MG: 3 TAB at 22:01

## 2021-08-03 RX ADMIN — LIDOCAINE 2 PATCH: 50 PATCH TOPICAL at 08:00

## 2021-08-03 RX ADMIN — LIDOCAINE 2 PATCH: 50 PATCH TOPICAL at 08:15

## 2021-08-03 RX ADMIN — HEPARIN SODIUM 5000 UNITS: 5000 INJECTION INTRAVENOUS; SUBCUTANEOUS at 14:41

## 2021-08-03 RX ADMIN — SIMETHICONE 80 MG: 20 EMULSION ORAL at 22:06

## 2021-08-03 RX ADMIN — PANTOPRAZOLE SODIUM 40 MG: 40 TABLET, DELAYED RELEASE ORAL at 05:47

## 2021-08-03 RX ADMIN — OXYCODONE HYDROCHLORIDE 5 MG: 5 TABLET ORAL at 12:47

## 2021-08-03 RX ADMIN — HEPARIN SODIUM 5000 UNITS: 5000 INJECTION INTRAVENOUS; SUBCUTANEOUS at 22:01

## 2021-08-03 RX ADMIN — ATORVASTATIN CALCIUM 20 MG: 20 TABLET, FILM COATED ORAL at 18:26

## 2021-08-03 RX ADMIN — HEPARIN SODIUM 5000 UNITS: 5000 INJECTION INTRAVENOUS; SUBCUTANEOUS at 05:47

## 2021-08-03 RX ADMIN — ACETAMINOPHEN 975 MG: 325 TABLET, FILM COATED ORAL at 22:02

## 2021-08-03 RX ADMIN — SIMETHICONE 80 MG: 20 EMULSION ORAL at 18:26

## 2021-08-03 RX ADMIN — ACETAMINOPHEN 975 MG: 325 TABLET, FILM COATED ORAL at 14:41

## 2021-08-03 RX ADMIN — ACETAMINOPHEN 975 MG: 325 TABLET, FILM COATED ORAL at 05:47

## 2021-08-03 RX ADMIN — Medication 1 PACKET: at 07:52

## 2021-08-03 RX ADMIN — CEPHALEXIN 250 MG: 250 CAPSULE ORAL at 07:52

## 2021-08-03 RX ADMIN — SIMETHICONE 80 MG: 20 EMULSION ORAL at 06:41

## 2021-08-03 RX ADMIN — SIMETHICONE 80 MG: 20 EMULSION ORAL at 12:47

## 2021-08-03 NOTE — PLAN OF CARE
Problem: Prexisting or High Potential for Compromised Skin Integrity  Goal: Skin integrity is maintained or improved  Description: INTERVENTIONS:  - Identify patients at risk for skin breakdown  - Assess and monitor skin integrity  - Assess and monitor nutrition and hydration status  - Monitor labs   - Assess for incontinence   - Turn and reposition patient  - Assist with mobility/ambulation  - Relieve pressure over bony prominences  - Avoid friction and shearing  - Provide appropriate hygiene as needed including keeping skin clean and dry  - Evaluate need for skin moisturizer/barrier cream  - Collaborate with interdisciplinary team   - Patient/family teaching  - Consider wound care consult   Outcome: Progressing     Problem: MOBILITY - ADULT  Goal: Maintain or return to baseline ADL function  Description: INTERVENTIONS:  -  Assess patient's ability to carry out ADLs; assess patient's baseline for ADL function and identify physical deficits which impact ability to perform ADLs (bathing, care of mouth/teeth, toileting, grooming, dressing, etc )  - Assess/evaluate cause of self-care deficits   - Assess range of motion  - Assess patient's mobility; develop plan if impaired  - Assess patient's need for assistive devices and provide as appropriate  - Encourage maximum independence but intervene and supervise when necessary  - Involve family in performance of ADLs  - Assess for home care needs following discharge   - Consider OT consult to assist with ADL evaluation and planning for discharge  - Provide patient education as appropriate  Outcome: Progressing  Goal: Maintains/Returns to pre admission functional level  Description: INTERVENTIONS:  - Perform BMAT or MOVE assessment daily    - Set and communicate daily mobility goal to care team and patient/family/caregiver  - Collaborate with rehabilitation services on mobility goals if consulted  - Perform Range of Motion times a day    - Reposition patient every hours   - Dangle patient  times a day  - Stand patient  times a day  - Ambulate patient  times a day  - Out of bed to chair times a day   - Out of bed for meals  times a day  - Out of bed for toileting  - Record patient progress and toleration of activity level   Outcome: Progressing     Problem: Potential for Falls  Goal: Patient will remain free of falls  Description: INTERVENTIONS:  - Educate patient/family on patient safety including physical limitations  - Instruct patient to call for assistance with activity   - Consult OT/PT to assist with strengthening/mobility   - Keep Call bell within reach  - Keep bed low and locked with side rails adjusted as appropriate  - Keep care items and personal belongings within reach  - Initiate and maintain comfort rounds  - Make Fall Risk Sign visible to staff  - Offer Toileting every Hours, in advance of need  - Initiate/Maintain alarm  - Obtain necessary fall risk management equipment:   - Apply yellow socks and bracelet for high fall risk patients  - Consider moving patient to room near nurses station  Outcome: Progressing     Problem: PAIN - ADULT  Goal: Verbalizes/displays adequate comfort level or baseline comfort level  Description: Interventions:  - Encourage patient to monitor pain and request assistance  - Assess pain using appropriate pain scale  - Administer analgesics based on type and severity of pain and evaluate response  - Implement non-pharmacological measures as appropriate and evaluate response  - Consider cultural and social influences on pain and pain management  - Notify physician/advanced practitioner if interventions unsuccessful or patient reports new pain  Outcome: Progressing     Problem: INFECTION - ADULT  Goal: Absence or prevention of progression during hospitalization  Description: INTERVENTIONS:  - Assess and monitor for signs and symptoms of infection  - Monitor lab/diagnostic results  - Monitor all insertion sites, i e  indwelling lines, tubes, and drains  - Monitor endotracheal if appropriate and nasal secretions for changes in amount and color  - [place] appropriate cooling/warming therapies per order  - Administer medications as ordered  - Instruct and encourage patient and family to use good hand hygiene technique  - Identify and instruct in appropriate isolation precautions for identified infection/condition  Outcome: Progressing     Problem: SAFETY ADULT  Goal: Patient will remain free of falls  Description: INTERVENTIONS:  - Educate patient/family on patient safety including physical limitations  - Instruct patient to call for assistance with activity   - Consult OT/PT to assist with strengthening/mobility   - Keep Call bell within reach  - Keep bed low and locked with side rails adjusted as appropriate  - Keep care items and personal belongings within reach  - Initiate and maintain comfort rounds  - Make Fall Risk Sign visible to staff  - Offer Toileting every Hours, in advance of need  - Initiate/Maintain alarm  - Obtain necessary fall risk management equipment:   - Apply yellow socks and bracelet for high fall risk patients  - Consider moving patient to room near nurses station  Outcome: Progressing  Goal: Maintain or return to baseline ADL function  Description: INTERVENTIONS:  -  Assess patient's ability to carry out ADLs; assess patient's baseline for ADL function and identify physical deficits which impact ability to perform ADLs (bathing, care of mouth/teeth, toileting, grooming, dressing, etc )  - Assess/evaluate cause of self-care deficits   - Assess range of motion  - Assess patient's mobility; develop plan if impaired  - Assess patient's need for assistive devices and provide as appropriate  - Encourage maximum independence but intervene and supervise when necessary  - Involve family in performance of ADLs  - Assess for home care needs following discharge   - Consider OT consult to assist with ADL evaluation and planning for discharge  - Provide patient education as appropriate  Outcome: Progressing  Goal: Maintains/Returns to pre admission functional level  Description: INTERVENTIONS:  - Perform BMAT or MOVE assessment daily    - Set and communicate daily mobility goal to care team and patient/family/caregiver  - Collaborate with rehabilitation services on mobility goals if consulted  - Perform Range of Motion  times a day  - Reposition patient every hours  - Dangle patient  times a day  - Stand patient  times a day  - Ambulate patient  times a day  - Out of bed to chair times a day   - Out of bed for meals  times a day  - Out of bed for toileting  - Record patient progress and toleration of activity level   Outcome: Progressing     Problem: DISCHARGE PLANNING  Goal: Discharge to home or other facility with appropriate resources  Description: INTERVENTIONS:  - Identify barriers to discharge w/patient and caregiver  - Arrange for needed discharge resources and transportation as appropriate  - Identify discharge learning needs (meds, wound care, etc )  - Arrange for interpretive services to assist at discharge as needed  - Refer to Case Management Department for coordinating discharge planning if the patient needs post-hospital services based on physician/advanced practitioner order or complex needs related to functional status, cognitive ability, or social support system  Outcome: Progressing     Problem: Nutrition/Hydration-ADULT  Goal: Nutrient/Hydration intake appropriate for improving, restoring or maintaining nutritional needs  Description: Monitor and assess patient's nutrition/hydration status for malnutrition  Collaborate with interdisciplinary team and initiate plan and interventions as ordered  Monitor patient's weight and dietary intake as ordered or per policy  Utilize nutrition screening tool and intervene as necessary  Determine patient's food preferences and provide high-protein, high-caloric foods as appropriate  INTERVENTIONS:  - Monitor oral intake, urinary output, labs, and treatment plans  - Assess nutrition and hydration status and recommend course of action  - Evaluate amount of meals eaten  - Assist patient with eating if necessary   - Allow adequate time for meals  - Recommend/ encourage appropriate diets, oral nutritional supplements, and vitamin/mineral supplements  - Order, calculate, and assess calorie counts as needed  - Recommend, monitor, and adjust tube feedings and TPN/PPN based on assessed needs  - Assess need for intravenous fluids  - Provide specific nutrition/hydration education as appropriate  - Include patient/family/caregiver in decisions related to nutrition  Outcome: Progressing

## 2021-08-03 NOTE — PLAN OF CARE
Problem: Prexisting or High Potential for Compromised Skin Integrity  Goal: Skin integrity is maintained or improved  Description: INTERVENTIONS:  - Identify patients at risk for skin breakdown  - Assess and monitor skin integrity  - Assess and monitor nutrition and hydration status  - Monitor labs   - Assess for incontinence   - Turn and reposition patient  - Assist with mobility/ambulation  - Relieve pressure over bony prominences  - Avoid friction and shearing  - Provide appropriate hygiene as needed including keeping skin clean and dry  - Evaluate need for skin moisturizer/barrier cream  - Collaborate with interdisciplinary team   - Patient/family teaching  - Consider wound care consult   Outcome: Progressing     Problem: MOBILITY - ADULT  Goal: Maintain or return to baseline ADL function  Description: INTERVENTIONS:  -  Assess patient's ability to carry out ADLs; assess patient's baseline for ADL function and identify physical deficits which impact ability to perform ADLs (bathing, care of mouth/teeth, toileting, grooming, dressing, etc )  - Assess/evaluate cause of self-care deficits   - Assess range of motion  - Assess patient's mobility; develop plan if impaired  - Assess patient's need for assistive devices and provide as appropriate  - Encourage maximum independence but intervene and supervise when necessary  - Involve family in performance of ADLs  - Assess for home care needs following discharge   - Consider OT consult to assist with ADL evaluation and planning for discharge  - Provide patient education as appropriate  Outcome: Progressing  Goal: Maintains/Returns to pre admission functional level  Description: INTERVENTIONS:  - Perform BMAT or MOVE assessment daily    - Set and communicate daily mobility goal to care team and patient/family/caregiver  - Collaborate with rehabilitation services on mobility goals if consulted  - Perform Range of Motion times a day    - Reposition patient every hours   - Dangle patient  times a day  - Stand patient  times a day  - Ambulate patient  times a day  - Out of bed to chair times a day   - Out of bed for meals  times a day  - Out of bed for toileting  - Record patient progress and toleration of activity level   Outcome: Progressing     Problem: Potential for Falls  Goal: Patient will remain free of falls  Description: INTERVENTIONS:  - Educate patient/family on patient safety including physical limitations  - Instruct patient to call for assistance with activity   - Consult OT/PT to assist with strengthening/mobility   - Keep Call bell within reach  - Keep bed low and locked with side rails adjusted as appropriate  - Keep care items and personal belongings within reach  - Initiate and maintain comfort rounds  - Make Fall Risk Sign visible to staff  - Offer Toileting every Hours, in advance of need  - Initiate/Maintain alarm  - Obtain necessary fall risk management equipment:   - Apply yellow socks and bracelet for high fall risk patients  - Consider moving patient to room near nurses station  Outcome: Progressing     Problem: PAIN - ADULT  Goal: Verbalizes/displays adequate comfort level or baseline comfort level  Description: Interventions:  - Encourage patient to monitor pain and request assistance  - Assess pain using appropriate pain scale  - Administer analgesics based on type and severity of pain and evaluate response  - Implement non-pharmacological measures as appropriate and evaluate response  - Consider cultural and social influences on pain and pain management  - Notify physician/advanced practitioner if interventions unsuccessful or patient reports new pain  Outcome: Progressing     Problem: INFECTION - ADULT  Goal: Absence or prevention of progression during hospitalization  Description: INTERVENTIONS:  - Assess and monitor for signs and symptoms of infection  - Monitor lab/diagnostic results  - Monitor all insertion sites, i e  indwelling lines, tubes, and drains  - Monitor endotracheal if appropriate and nasal secretions for changes in amount and color  - [place] appropriate cooling/warming therapies per order  - Administer medications as ordered  - Instruct and encourage patient and family to use good hand hygiene technique  - Identify and instruct in appropriate isolation precautions for identified infection/condition  Outcome: Progressing     Problem: SAFETY ADULT  Goal: Patient will remain free of falls  Description: INTERVENTIONS:  - Educate patient/family on patient safety including physical limitations  - Instruct patient to call for assistance with activity   - Consult OT/PT to assist with strengthening/mobility   - Keep Call bell within reach  - Keep bed low and locked with side rails adjusted as appropriate  - Keep care items and personal belongings within reach  - Initiate and maintain comfort rounds  - Make Fall Risk Sign visible to staff  - Offer Toileting every Hours, in advance of need  - Initiate/Maintain alarm  - Obtain necessary fall risk management equipment:   - Apply yellow socks and bracelet for high fall risk patients  - Consider moving patient to room near nurses station  Outcome: Progressing  Goal: Maintain or return to baseline ADL function  Description: INTERVENTIONS:  -  Assess patient's ability to carry out ADLs; assess patient's baseline for ADL function and identify physical deficits which impact ability to perform ADLs (bathing, care of mouth/teeth, toileting, grooming, dressing, etc )  - Assess/evaluate cause of self-care deficits   - Assess range of motion  - Assess patient's mobility; develop plan if impaired  - Assess patient's need for assistive devices and provide as appropriate  - Encourage maximum independence but intervene and supervise when necessary  - Involve family in performance of ADLs  - Assess for home care needs following discharge   - Consider OT consult to assist with ADL evaluation and planning for discharge  - Provide patient education as appropriate  Outcome: Progressing  Goal: Maintains/Returns to pre admission functional level  Description: INTERVENTIONS:  - Perform BMAT or MOVE assessment daily    - Set and communicate daily mobility goal to care team and patient/family/caregiver  - Collaborate with rehabilitation services on mobility goals if consulted  - Perform Range of Motion  times a day  - Reposition patient every hours  - Dangle patient  times a day  - Stand patient  times a day  - Ambulate patient  times a day  - Out of bed to chair times a day   - Out of bed for meals  times a day  - Out of bed for toileting  - Record patient progress and toleration of activity level   Outcome: Progressing     Problem: DISCHARGE PLANNING  Goal: Discharge to home or other facility with appropriate resources  Description: INTERVENTIONS:  - Identify barriers to discharge w/patient and caregiver  - Arrange for needed discharge resources and transportation as appropriate  - Identify discharge learning needs (meds, wound care, etc )  - Arrange for interpretive services to assist at discharge as needed  - Refer to Case Management Department for coordinating discharge planning if the patient needs post-hospital services based on physician/advanced practitioner order or complex needs related to functional status, cognitive ability, or social support system  Outcome: Progressing     Problem: Nutrition/Hydration-ADULT  Goal: Nutrient/Hydration intake appropriate for improving, restoring or maintaining nutritional needs  Description: Monitor and assess patient's nutrition/hydration status for malnutrition  Collaborate with interdisciplinary team and initiate plan and interventions as ordered  Monitor patient's weight and dietary intake as ordered or per policy  Utilize nutrition screening tool and intervene as necessary  Determine patient's food preferences and provide high-protein, high-caloric foods as appropriate  INTERVENTIONS:  - Monitor oral intake, urinary utput, labs, and treatment plans  - Assess nutrition and hydration status and recommend course of action  - Evaluate amount of meals eaten  - Assist patient with eating if necessary   - Allow adequate time for meals  - Recommend/ encourage appropriate diets, oral nutritional supplements, and vitamin/mineral supplements  - Order, calculate, and assess calorie counts as needed  - Recommend, monitor, and adjust tube feedings and TPN/PPN based on assessed needs  - Assess need for intravenous fluids  - Provide specific nutrition/hydration education as appropriate  - Include patient/family/caregiver in decisions related to nutrition  Outcome: Progressing

## 2021-08-03 NOTE — PLAN OF CARE
Problem: Prexisting or High Potential for Compromised Skin Integrity  Goal: Skin integrity is maintained or improved  Description: INTERVENTIONS:  - Identify patients at risk for skin breakdown  - Assess and monitor skin integrity  - Assess and monitor nutrition and hydration status  - Monitor labs   - Assess for incontinence   - Turn and reposition patient  - Assist with mobility/ambulation  - Relieve pressure over bony prominences  - Avoid friction and shearing  - Provide appropriate hygiene as needed including keeping skin clean and dry  - Evaluate need for skin moisturizer/barrier cream  - Collaborate with interdisciplinary team   - Patient/family teaching  - Consider wound care consult   Outcome: Progressing     Problem: MOBILITY - ADULT  Goal: Maintain or return to baseline ADL function  Description: INTERVENTIONS:  -  Assess patient's ability to carry out ADLs; assess patient's baseline for ADL function and identify physical deficits which impact ability to perform ADLs (bathing, care of mouth/teeth, toileting, grooming, dressing, etc )  - Assess/evaluate cause of self-care deficits   - Assess range of motion  - Assess patient's mobility; develop plan if impaired  - Assess patient's need for assistive devices and provide as appropriate  - Encourage maximum independence but intervene and supervise when necessary  - Involve family in performance of ADLs  - Assess for home care needs following discharge   - Consider OT consult to assist with ADL evaluation and planning for discharge  - Provide patient education as appropriate  Outcome: Progressing  Goal: Maintains/Returns to pre admission functional level  Description: INTERVENTIONS:  - Perform BMAT or MOVE assessment daily    - Set and communicate daily mobility goal to care team and patient/family/caregiver  - Collaborate with rehabilitation services on mobility goals if consulted  - Perform Range of Motion times a day    - Reposition patient every hours   - Dangle patient  times a day  - Stand patient  times a day  - Ambulate patient  times a day  - Out of bed to chair times a day   - Out of bed for meals  times a day  - Out of bed for toileting  - Record patient progress and toleration of activity level   Outcome: Progressing     Problem: Potential for Falls  Goal: Patient will remain free of falls  Description: INTERVENTIONS:  - Educate patient/family on patient safety including physical limitations  - Instruct patient to call for assistance with activity   - Consult OT/PT to assist with strengthening/mobility   - Keep Call bell within reach  - Keep bed low and locked with side rails adjusted as appropriate  - Keep care items and personal belongings within reach  - Initiate and maintain comfort rounds  - Make Fall Risk Sign visible to staff  - Offer Toileting every Hours, in advance of need  - Initiate/Maintain alarm  - Obtain necessary fall risk management equipment:   - Apply yellow socks and bracelet for high fall risk patients  - Consider moving patient to room near nurses station  Outcome: Progressing     Problem: PAIN - ADULT  Goal: Verbalizes/displays adequate comfort level or baseline comfort level  Description: Interventions:  - Encourage patient to monitor pain and request assistance  - Assess pain using appropriate pain scale  - Administer analgesics based on type and severity of pain and evaluate response  - Implement non-pharmacological measures as appropriate and evaluate response  - Consider cultural and social influences on pain and pain management  - Notify physician/advanced practitioner if interventions unsuccessful or patient reports new pain  Outcome: Progressing     Problem: INFECTION - ADULT  Goal: Absence or prevention of progression during hospitalization  Description: INTERVENTIONS:  - Assess and monitor for signs and symptoms of infection  - Monitor lab/diagnostic results  - Monitor all insertion sites, i e  indwelling lines, tubes, and drains  - Monitor endotracheal if appropriate and nasal secretions for changes in amount and color  - [place] appropriate cooling/warming therapies per order  - Administer medications as ordered  - Instruct and encourage patient and family to use good hand hygiene technique  - Identify and instruct in appropriate isolation precautions for identified infection/condition  Outcome: Progressing     Problem: SAFETY ADULT  Goal: Patient will remain free of falls  Description: INTERVENTIONS:  - Educate patient/family on patient safety including physical limitations  - Instruct patient to call for assistance with activity   - Consult OT/PT to assist with strengthening/mobility   - Keep Call bell within reach  - Keep bed low and locked with side rails adjusted as appropriate  - Keep care items and personal belongings within reach  - Initiate and maintain comfort rounds  - Make Fall Risk Sign visible to staff  - Offer Toileting every Hours, in advance of need  - Initiate/Maintain alarm  - Obtain necessary fall risk management equipment:   - Apply yellow socks and bracelet for high fall risk patients  - Consider moving patient to room near nurses station  Outcome: Progressing  Goal: Maintain or return to baseline ADL function  Description: INTERVENTIONS:  -  Assess patient's ability to carry out ADLs; assess patient's baseline for ADL function and identify physical deficits which impact ability to perform ADLs (bathing, care of mouth/teeth, toileting, grooming, dressing, etc )  - Assess/evaluate cause of self-care deficits   - Assess range of motion  - Assess patient's mobility; develop plan if impaired  - Assess patient's need for assistive devices and provide as appropriate  - Encourage maximum independence but intervene and supervise when necessary  - Involve family in performance of ADLs  - Assess for home care needs following discharge   - Consider OT consult to assist with ADL evaluation and planning for discharge  - Provide patient education as appropriate  Outcome: Progressing  Goal: Maintains/Returns to pre admission functional level  Description: INTERVENTIONS:  - Perform BMAT or MOVE assessment daily    - Set and communicate daily mobility goal to care team and patient/family/caregiver  - Collaborate with rehabilitation services on mobility goals if consulted  - Perform Range of Motion  times a day  - Reposition patient every hours  - Dangle patient  times a day  - Stand patient  times a day  - Ambulate patient  times a day  - Out of bed to chair times a day   - Out of bed for meals  times a day  - Out of bed for toileting  - Record patient progress and toleration of activity level   Outcome: Progressing     Problem: DISCHARGE PLANNING  Goal: Discharge to home or other facility with appropriate resources  Description: INTERVENTIONS:  - Identify barriers to discharge w/patient and caregiver  - Arrange for needed discharge resources and transportation as appropriate  - Identify discharge learning needs (meds, wound care, etc )  - Arrange for interpretive services to assist at discharge as needed  - Refer to Case Management Department for coordinating discharge planning if the patient needs post-hospital services based on physician/advanced practitioner order or complex needs related to functional status, cognitive ability, or social support system  Outcome: Progressing     Problem: Nutrition/Hydration-ADULT  Goal: Nutrient/Hydration intake appropriate for improving, restoring or maintaining nutritional needs  Description: Monitor and assess patient's nutrition/hydration status for malnutrition  Collaborate with interdisciplinary team and initiate plan and interventions as ordered  Monitor patient's weight and dietary intake as ordered or per policy  Utilize nutrition screening tool and intervene as necessary  Determine patient's food preferences and provide high-protein, high-caloric foods as appropriate  INTERVENTIONS:  - Monitor oral intake, urinary output, labs, and treatment plans  - Assess nutrition and hydration status and recommend course of action  - Evaluate amount of meals eaten  - Assist patient with eating if necessary   - Allow adequate time for meals  - Recommend/ encourage appropriate diets, oral nutritional supplements, and vitamin/mineral supplements  - Order, calculate, and assess calorie counts as needed  - Recommend, monitor, and adjust tube feedings and TPN/PPN based on assessed needs  - Assess need for intravenous fluids  - Provide specific nutrition/hydration education as appropriate  - Include patient/family/caregiver in decisions related to nutrition  Outcome: Progressing

## 2021-08-03 NOTE — PROGRESS NOTES
Internal Medicine Progress Note  Patient: Je Morocho  Age/sex: 66 y o  female  Medical Record #: [id number]      ASSESSMENT/PLAN: (Interval History)  Je Morocho is seen and examined and management for following issues:    Central cord syndrome; s/p ACDF C4-5/PCDF C2-T2 7/20/21  · Doesn't need collar per NS  · Incisions w/o erythema/infections  · Staples removed today 8/3 by NS     Acute nasal bone fx  · OMS saw = no surgery needed  · Sinus precautions for 4 weeks     Urine retention  · Gotti reinserted 7/26  · VT per PMR ongoing     Chronic UTI  · On suppressive tx with Keflex at home  · Will continue here     ABLA  · Did not require transfusion     Orthostasis  · Improved s/p IVF on acute and here last week but reoccurred 8/2 = given 500ml NSS  · Since seems to be volume dependent, will set PO fluid goal of 1200ml/day  · Will try off abd binder today     Chest pain  · Occurred 7/29/21 and was located on the Lt side  · Pt reports having this type of chest pain at home but not as strong or lasting as long  · EKG without acute changes  · Troponin negative  · Mylanta ordered prn  · No reoccurrence         Discharge date:  Team    The above assessment and plan was reviewed and updated as determined by my evaluation of the patient on 8/3/2021      Labs:   Results from last 7 days   Lab Units 08/02/21  1804   WBC Thousand/uL 6 28   HEMOGLOBIN g/dL 10 4*   HEMATOCRIT % 32 9*   PLATELETS Thousands/uL 339     Results from last 7 days   Lab Units 08/02/21  1804   SODIUM mmol/L 138   POTASSIUM mmol/L 4 2   CHLORIDE mmol/L 109*   CO2 mmol/L 23   BUN mg/dL 20   CREATININE mg/dL 0 95   CALCIUM mg/dL 9 6                   Review of Scheduled Meds:  Current Facility-Administered Medications   Medication Dose Route Frequency Provider Last Rate    acetaminophen  975 mg Oral Q8H [address] Morgan Tate MD      ALPRAZolam  0 25 mg Oral HS PRN Morgan Tate MD      aluminum-magnesium hydroxide-simethicone  30 mL Oral Q4H PRN Penny GABRIELLE Deras      atorvastatin  20 mg Oral Daily With MD Mateusz      cephalexin  250 mg Oral Daily Amber Brown MD      heparin (porcine)  5,000 Units Subcutaneous [facility] Amber Brown MD      lidocaine  2 patch Topical Daily Amber Brown MD      meclizine  12 5 mg Oral Q8H PRN Amber Brown MD      melatonin  3 mg Oral HS Amber Brown MD      methocarbamol  500 mg Oral Q6H PRN Amber Brown MD      oxyCODONE  2 5 mg Oral Q4H PRN Amber Brown MD      oxyCODONE  5 mg Oral Q4H PRN Amber Brown MD      pantoprazole  40 mg Oral Early Morning Amber Brown MD      polyethylene glycol  17 g Oral Daily PRN Amber Brown MD      psyllium  1 packet Oral Daily Amber Brown MD      simethicone  80 mg Oral 4x Daily (with meals and at bedtime) Penny Deras PA-C      sodium chloride  1 spray Each Nare 4x Daily PRN Amber Brown MD         Subjective/ HPI: Patient seen and examined  Patients overnight issues or events were reviewed with nursing or staff during rounds or morning huddle session  New or overnight issues include the following:     +orthostasis 8/2 with sx = given IVF    ROS:   Negative 12 point ROS other than denoted above in HPI or assessment/plan        Imaging:     No orders to display       *Labs /Radiology studies reviewed  *Medications reviewed and reconciled as needed  *Please refer to order section for additional ordered labs studies  *Case discussed with primary attending during morning huddle case rounds      Physical Examination:  Vitals:   Vitals:    08/02/21 1020 08/02/21 1559 08/03/21 0600 08/03/21 0731   BP: 114/64 146/63  122/66   BP Location: Right arm Right arm  Right arm   Pulse:  91  80   Resp:  18  18   Temp:  99 9 °F (37 7 °C)  98 3 °F (36 8 °C)   TempSrc:  Oral  Oral   SpO2:  97%  98%   Weight:   64 9 kg (143 lb)    Height:           General Appearance: no distress, conversive  HEENT: PERRLA, conjuctiva normal; oropharynx clear; mucous membranes moist   Neck: Supple, normal ROM, no JVD  Lungs: CTA, normal respiratory effort, no retractions, expiratory effort normal  CV: regular rate and rhythm; no rubs/murmurs/gallops, PMI normal   ABD: soft; ND/NT; +BS  EXT: no edema  Skin: normal turgor, normal texture, no rashes  Psych: affect normal, mood normal  Neuro: AAO      The above physical exam was reviewed and updated as determined by my evaluation of the patient on 8/3/2021  Invasive Devices     Peripheral Intravenous Line            Peripheral IV 08/02/21 Dorsal (posterior); Left Forearm <1 day                   VTE Pharmacologic Prophylaxis: Heparin  Code Status: Level 1 - Full Code  Current Length of Stay: 8 day(s)      Total time spent:  30 minutes with more than 50% spent counseling/coordinating care  Counseling includes discussion with patient re: progress  and discussion with patient of his/her current medical state/information  Coordination of patient's care was performed in conjunction with primary service  Time invested included review of patient's labs, vitals, and management of their comorbidities with continued monitoring  In addition, this patient was discussed with medical team including physician and advanced extenders  The care of the patient was extensively discussed and appropriate treatment plan was formulated unique for this patient  ** Please Note:  voice to text software may have been used in the creation of this document   Although proof errors in transcription or interpretation are a potential of such software**

## 2021-08-03 NOTE — PROGRESS NOTES
Physical Medicine and Rehabilitation Progress Note  Brennen Chan 66 y o  female MRN: [id number]  Unit/Bed#: -95 Encounter: [id number]    HPI: Brennen Chan is a 66 y o  female who presented to the [address] after fall  Patient p/w c/o neck pain as well as bilateral arm pain/numbness  Imaging revealed cervical stenosis most severe at C4-5 with possible cord edema and patient subsequently underwent C4-5 ACDF & C2-T2 PCDF on 7/20 by Dr Mckenna Mendez  Patient was also found to have B/L nasal bone fractures and L maxillary sinus hemorrhage which was managed conservatively  Post-op course complicated by urinary retention requiring morrow placement  Chief Complaint: cervical stenosis/central cord syndrome     Subjective: updated patient on active medical issues     ROS: A 10 point ROS was performed; negative except as noted above       Assessment/Plan:      Cervical stenosis of spine  Assessment & Plan  - C6 fx, per XR report of 7/21 "Redemonstrated anterior-inferior C6 vertebral body corner fracture, unchanged in alignment " (seen by neurosx on 7/26 no further intervention recommended)  - cervical stenosis most severe at C4-5 with possible cord edema s/p ACDF C4-5 & PCDF C2-T2 by Dr Mckenna Mendez on 7/20  - c-spine precautions  - no brace needed per neurosx  - FU XR already e-prescribed in EMR by neurosx team  - OP FU with Dr Mckenna Mendez     Nasal bone fractures  Assessment & Plan  - B/L nasal bone fractures & L maxillary sinus hemorrhage  - non-op management per OMFS  - sinus precautions   - abx recommended by OMFS however dc'd by trauma surgery in acute care who was primary team per their protocol     CKD (chronic kidney disease)  Assessment & Plan  - Cr currently 0 95  - baseline appears to be 1 0-1 2  - IM monitoring     Anemia  Assessment & Plan  - Hg currently increased to 10 4  post-operatively  - IM monitoring     Urinary retention  Assessment & Plan  - s/p morrow removal, PVRs 250, 139, 217    Chest pain  Assessment & Plan  - patient complained of brief episode of left sided chest pain while in therapy  - per patient this occurs at home as well and responds to OTC antacid medication however checked EKG to r/o cardiac etiology and EKG was unrevealing (did show mildly elevated QTc at 487, d/w IM who recommended no further GUTIERREZ/intervention at this time for QTc and to avoid QT prolonging medication if possible)   - resolved after given medication for heartburn   - troponin's negative (per IM no further troponins required)    CHF (congestive heart failure) ([facility])  Assessment & Plan  - EF 45-50%  - grade 1 DD  - not on diuretics at home  - IM monitoring volume status       Dyslipidemia  Assessment & Plan  - on home lipitor 20 mg qpm    History of recurrent UTIs  Assessment & Plan  - on home keflex 250 mg qd which patient is chronically on (confrimed with patient's OP pharmacy that patient receives monthly prescriptions for this, last provided by Dr Ramón Kc of urology)  - OP FU with Dr Youssef Breath  - on home xanax 0 25 mg qhs prn     Vertigo  Assessment & Plan  - chronic, likely BPV  - on home antivert 12 5 mg q8 prn     * GERD (gastroesophageal reflux disease)  Assessment & Plan  - on home protonix 40 mg qd      Scheduled Meds:  Current Facility-Administered Medications   Medication Dose Route Frequency Provider Last Rate    acetaminophen  975 mg Oral Q8H Royal Luther MD      ALPRAZolam  0 25 mg Oral HS PRN Taras Dang MD      aluminum-magnesium hydroxide-simethicone  30 mL Oral Q4H PRN Penny Deras PA-C      atorvastatin  20 mg Oral Daily With MD Mateusz      cephalexin  250 mg Oral Daily Taras Dang MD      heparin (porcine)  5,000 Units Subcutaneous Q8H [address] Taras Dang MD      lidocaine  2 patch Topical Daily Taras Dang MD      meclizine  12 5 mg Oral Q8H PRN Taras Dang MD      melatonin  3 mg Oral HS MD Jacqueline Vanessa methocarbamol  500 mg Oral Q6H PRN Biju Patel MD      oxyCODONE  2 5 mg Oral Q4H PRN Biju Patel MD     Regina Pierce oxyCODONE  5 mg Oral Q4H PRN Biju Patel MD      pantoprazole  40 mg Oral Early Morning Biju Patel MD      polyethylene glycol  17 g Oral Daily PRN Biju Patel MD      psyllium  1 packet Oral Daily Biju Patel MD      simethicone  80 mg Oral 4x Daily (with meals and at bedtime) Penny Deras PA-C      sodium chloride  1 spray Each Nare 4x Daily PRN Biju Patel MD            Incidental findings:     1) gallstones: asymptomatic; OP FU with PCP with GI referral at PCPs discretion      DVT ppx: HSQ (cleared in acute care by neurosx for pharmacologic DVT ppx)       Objective:    Functional Update:  Mobility: mod   Transfers: mod  ADLs: max-total        Physical Exam:    Vitals:    08/03/21 0731   BP: 122/66   Pulse: 80   Resp: 18   Temp: 98 3 °F (36 8 °C)   SpO2: 98%         General: alert, no apparent distress, cooperative and comfortable  HEENT:  Eye: Normal external eye, conjunctiva, lidsc cornea  Ears: Normal external ears  Nose: Normal external nose, mucus membranes  CARDIAC:  +S1/2  LUNGS:  no abnormal respiratory pattern, no retractions noted, non-labored breathing   ABDOMEN:  soft NT  EXTREMITIES:  no cyanosis or clubbing   NEURO:  awake, alert appropriate responses to questions   PSYCH:  mood/affect currently stable   INCISION:  surgical site C/D/I             Diagnostic Studies:  No orders to display       Laboratory:   Results from last 7 days   Lab Units 08/02/21  1804   HEMOGLOBIN g/dL 10 4*   HEMATOCRIT % 32 9*   WBC Thousand/uL 6 28     Results from last 7 days   Lab Units 08/02/21  1804   BUN mg/dL 20   SODIUM mmol/L 138   POTASSIUM mmol/L 4 2   CHLORIDE mmol/L 109*   CREATININE mg/dL 0 95

## 2021-08-03 NOTE — PROGRESS NOTES
08/03/21 1100   Pain Assessment   Pain Score 6   Pain Location/Orientation Orientation: Bilateral;Orientation: Upper; Location: Shoulder   Hospital Pain Intervention(s) Repositioned; Rest   Restrictions/Precautions   Precautions Bed/chair alarms;Cognitive; Fall Risk;Spinal precautions;Supervision on toilet/commode  (sinus precaution )   Braces or Orthoses   (TEDS LE and abdominal binder this session )   Cognition   Arousal/Participation Alert; Cooperative   Attention Within functional limits   Memory Decreased short term memory;Decreased recall of precautions   Following Commands Follows one step commands with increased time or repetition   Subjective   Subjective Pt  reported she is feeling good today, cont to have on B shoulders but no c/o dizziness this session  Sit to Stand   Type of Assistance Needed Incidental touching   Comment with RW   Sit to Stand CARE Score 4   Bed-Chair Transfer   Type of Assistance Needed Physical assistance   Physical Assistance Level 25% or less   Chair/Bed-to-Chair Transfer CARE Score 3   Car Transfer   Type of Assistance Needed Physical assistance   Physical Assistance Level 26%-50%   Car Transfer CARE Score 3   Walk 10 Feet   Type of Assistance Needed Verbal cues; Physical assistance   Physical Assistance Level 25% or less   Comment verbal cues for posture    Walk 10 Feet CARE Score 3   Walk 50 Feet with Two Turns   Type of Assistance Needed Physical assistance   Physical Assistance Level 25% or less   Comment verbal cues for posture    Walk 50 Feet with Two Turns CARE Score 3   Walk 150 Feet   Comment limited by fatigue    Reason if not Attempted Safety concerns   Walk 150 Feet CARE Score 88   Ambulation   Does the patient walk? 2  Yes   Primary Mode of Locomotion Prior to Admission Walk   Distance Walked (feet) 75 ft  (X 2 and 100 feet x 1 )   Assist Device Roller Walker   Gait Pattern Inconsistant Erika; Forward Flexion;Narrow FRACISCO   Limitations Noted In Device Management; Endurance;Posture   Provided Assistance with: Balance   Walk Assist Level Minimum Assist   Assessment   Treatment Assessment Pt  participated in 30 mins PT session with focused on increasing activity tolerance and ambulation using RW  Pt  max walk distance was 100 feet and gets fatigue after that  BP checked and were written on vitals section with pt  asymptomatic this time  No other complaints reported  Pt  assisted on recliner at end of session for lunch  Cont with POC in the PM    Problem List Decreased strength;Decreased endurance; Impaired balance;Decreased mobility; Decreased coordination;Orthopedic restrictions;Pain   Barriers to Discharge Inaccessible home environment;Decreased caregiver support   PT Barriers   Physical Impairment Decreased strength;Decreased endurance; Impaired balance;Decreased mobility; Decreased coordination;Orthopedic restrictions;Pain   Functional Limitation Car transfers;Stair negotiation;Standing;Transfers; Walking   Plan   Treatment/Interventions Functional transfer training;LE strengthening/ROM; Elevations; Therapeutic exercise; Endurance training;Patient/family training;Equipment eval/education; Bed mobility;Gait training   Recommendation   PT Discharge Recommendation Home with home health rehabilitation   Equipment Recommended Walker   PT Therapy Minutes   PT Time In 1100   PT Time Out 1130   PT Total Time (minutes) 30   PT Mode of treatment - Individual (minutes) 30   PT Mode of treatment - Concurrent (minutes) 0   PT Mode of treatment - Group (minutes) 0   PT Mode of treatment - Co-treat (minutes) 0   PT Mode of Treatment - Total time(minutes) 30 minutes   PT Cumulative Minutes 555   Therapy Time missed   Time missed?  No

## 2021-08-03 NOTE — ASSESSMENT & PLAN NOTE
POD 14 from C4-5 ACDF and C2-T2 PCDF  · Degenerative spinal stenosis with cord signal change C4/5 s/p fall forwards onto face  · [facility]ed [facility]21 with complaints of neck pain and bilateral arm pain/numbness  · Also sustained facial bone fractures  Imaging:  · X-ray X-spine 7/21/2021: Postsurgical changes of posterior spinal decompression and instrumented fusion spanning C2-T2 and anterior cervical discectomy and fusion at C4-C5  Plan:  · Frequent neuro checks  · No cervical collar required  · Drain removed 7/23/21  · Mobilize as toelrated with rehabilitation staff  · DVT ppx: SCDs, HSQ  · Continue with pain control per rehab staff  Consider gabapentin for bilateral shoulder nerve pains  · Please contact Neurosurgery with any questions or concerns  Will see patient in 4 weeks for 6 week post operative appointment

## 2021-08-03 NOTE — PROGRESS NOTES
[facility]  Progress Note - Cassandra Jessica 1945, 66 y o  female MRN: [id number]  Unit/Bed#: -01 Encounter: [id number]  Primary Care Provider: Octavio Herrera MD   Date and time admitted to hospital: 7/26/2021  1:13 PM    Cervical stenosis of spine  Assessment & Plan  POD 14 from C4-5 ACDF and C2-T2 PCDF  · Degenerative spinal stenosis with cord signal change C4/5 s/p fall forwards onto face  · [facility] 7/18/21 with complaints of neck pain and bilateral arm pain/numbness  · Also sustained facial bone fractures  Imaging:  · X-ray X-spine 7/21/2021: Postsurgical changes of posterior spinal decompression and instrumented fusion spanning C2-T2 and anterior cervical discectomy and fusion at C4-C5  Plan:  · Frequent neuro checks  · No cervical collar required  · Drain removed 7/23/21  · Mobilize as toelrated with rehabilitation staff  · DVT ppx: SCDs, HSQ  · Continue with pain control per rehab staff  Consider gabapentin for bilateral shoulder nerve pains  · Please contact Neurosurgery with any questions or concerns  Will see patient in 4 weeks for 6 week post operative appointment  Urinary retention  Assessment & Plan  · Bladder scanning to monitor for urinary retention  Subjective/Objective   Chief Complaint: Neck/shoulder pain     Subjective: Patient states that she has been doing well overall  She has some continued pain/discomfort in her bilateral shoulder and sensitivity in her triceps  She was very nervous for her staples to be removed  She has been ambulating with a walker  Objective: laying in bed in NAD  I/O       08/01 0701 - 08/02 0700 08/02 0701 - 08/03 0700 08/03 0701 - 08/04 0700    P  O  120 600 200    Total Intake(mL/kg) 120 (1 9) 600 (9 2) 200 (3 1)    Urine (mL/kg/hr) 600 (0 4) 750 (0 5) 140 (0 4)    Stool  0     Total Output 600 750 140    Net -480 -150 +60           Unmeasured Urine Occurrence  1 x Unmeasured Stool Occurrence 1 x 1 x           Invasive Devices     Peripheral Intravenous Line            Peripheral IV 08/02/21 Dorsal (posterior); Left Forearm <1 day                Physical Exam:  Vitals: Blood pressure 122/66, pulse 80, temperature 98 3 °F (36 8 °C), temperature source Oral, resp  rate 18, height 5' 2" (1 575 m), weight 64 9 kg (143 lb), SpO2 98 %, not currently breastfeeding  ,Body mass index is 26 16 kg/m²  General appearance: alert, appears stated age, cooperative and no distress  Head: Normocephalic, without obvious abnormality  Eyes: EOMI, PERRL  Neck: supple, symmetrical, trachea midline  Incision appears to be well healed  She dehiscence  No bleeding or drainage  Minimal erythema  Staples and suture removed without complication  Lungs: non labored breathing  Heart: regular heart rate  Neurologic:   Mental status: Alert, oriented x3, thought content appropriate  Cranial nerves: grossly intact (Cranial nerves II-XII)  Sensory: normal to light touch   Motor: moving all extremities without focal weakness  Strength grossly intact  Lab Results:  Results from last 7 days   Lab Units 08/02/21  1804   WBC Thousand/uL 6 28   HEMOGLOBIN g/dL 10 4*   HEMATOCRIT % 32 9*   PLATELETS Thousands/uL 339   NEUTROS PCT % 63   MONOS PCT % 8     Results from last 7 days   Lab Units 08/02/21  1804   POTASSIUM mmol/L 4 2   CHLORIDE mmol/L 109*   CO2 mmol/L 23   BUN mg/dL 20   CREATININE mg/dL 0 95   CALCIUM mg/dL 9 6                 No results found for: TROPONINT  ABG:No results found for: PHART, FRQ3NAY, PO2ART, IOZ1BXA, E4UYGIWL, BEART, SOURCE    Imaging Studies: I have personally reviewed pertinent reports  and I have personally reviewed pertinent films in PACS  No results found  EKG, Pathology, and Other Studies: I have personally reviewed pertinent reports        VTE Pharmacologic Prophylaxis: Heparin    VTE Mechanical Prophylaxis: sequential compression device

## 2021-08-03 NOTE — PROGRESS NOTES
08/03/21 1230   Pain Assessment   Pain Score 9   Pain Location/Orientation Orientation: Bilateral;Location: Knee; Location: Shoulder  (L hip )   Hospital Pain Intervention(s) Repositioned; Rest  (nurse made aware and was given pain meds during session )   Restrictions/Precautions   Precautions Bed/chair alarms;Cognitive; Fall Risk;Spinal precautions;Supervision on toilet/commode  (sinus precaution)   Braces or Orthoses   (TEDS LE and abdominal binder this session )   General   Change In Medical/Functional Status As per CRNP towards end of session to obtain orthostatic BP when abdominal binder is off  Unable to get standing BP due to pt  is fatigue and was starting to get so sleepy  Please check tomorrow   Cognition   Arousal/Participation Alert; Cooperative   Attention Within functional limits   Memory Decreased short term memory;Decreased recall of precautions   Following Commands Follows one step commands with increased time or repetition   Subjective   Subjective Pt  reported that her staples were removed over lunch time and it is making her nervous to move  Pain is more this session and nurse made aware    Sit to Stand   Type of Assistance Needed Incidental touching   Sit to Stand CARE Score 4   Bed-Chair Transfer   Type of Assistance Needed Physical assistance   Physical Assistance Level 25% or less   Chair/Bed-to-Chair Transfer CARE Score 3   Walk 10 Feet   Type of Assistance Needed Physical assistance   Physical Assistance Level 26%-50%   Comment R HHA    Walk 10 Feet CARE Score 3   Walk 50 Feet with Two Turns   Type of Assistance Needed Physical assistance   Physical Assistance Level 26%-50%   Comment R HHA   Walk 50 Feet with Two Turns CARE Score 3   Walk 150 Feet   Reason if not Attempted Safety concerns   Walk 150 Feet CARE Score 88   Ambulation   Does the patient walk? 2   Yes   Primary Mode of Locomotion Prior to Admission Walk   Distance Walked (feet) 50 ft  (40 and 20)   Assist Device Hand Hold  (R) Gait Pattern Inconsistant Erika; Forward Flexion;Narrow FRACISCO   Limitations Noted In Balance; Coordination; Endurance;Posture   Provided Assistance with: Balance   Walk Assist Level Minimum Assist   Wheelchair mobility   Does the patient use a wheelchair? 0  No   Toilet Transfer   Type of Assistance Needed Physical assistance   Physical Assistance Level 25% or less   Toilet Transfer CARE Score 3   Therapeutic Interventions   Strengthening LAQ and hip flex with 2# wts    Flexibility B hamstring and gastroc stretching    Assessment   Treatment Assessment Pt  demonstrates anxious behavior at start of session stating that she is afraid to move now that her staples are out  Re- assured patient that everything is good that's why they are, pt  did able to participate in therapy  Tx focused this session with Neuro re-ed with functional mobility using R HHA  Pt  needed extra assist vs when she is walking with RW due to dec balance and righting reactions  Pt  had a incontinet episode of urine at midlle of session and assisted to toilet after that to try to go more and was changed   Pt  then got fatigued but still was able to participate in seated thera ex  Pt  positioned with pillows under B UE for comfort   No other complaints reported  Problem List Decreased strength;Decreased endurance; Impaired balance;Decreased mobility; Decreased coordination;Orthopedic restrictions;Pain   Barriers to Discharge Inaccessible home environment;Decreased caregiver support   PT Barriers   Physical Impairment Decreased strength;Decreased endurance; Impaired balance;Decreased mobility; Decreased coordination;Orthopedic restrictions;Pain   Functional Limitation Car transfers;Stair negotiation;Standing;Transfers; Walking   Plan   Treatment/Interventions Functional transfer training;LE strengthening/ROM; Elevations; Therapeutic exercise; Endurance training;Patient/family training;Equipment eval/education; Bed mobility;Gait training   Recommendation   PT Discharge Recommendation Home with home health rehabilitation   Equipment Recommended Walker   PT Therapy Minutes   PT Time In 1230   PT Time Out 1330   PT Total Time (minutes) 60   PT Mode of treatment - Individual (minutes) 60   PT Mode of treatment - Concurrent (minutes) 0   PT Mode of treatment - Group (minutes) 0   PT Mode of treatment - Co-treat (minutes) 0   PT Mode of Treatment - Total time(minutes) 60 minutes   PT Cumulative Minutes 615   Therapy Time missed   Time missed?  No

## 2021-08-04 LAB
BACTERIA UR QL AUTO: ABNORMAL /HPF
BILIRUB UR QL STRIP: NEGATIVE
CLARITY UR: ABNORMAL
COLOR UR: YELLOW
GLUCOSE UR STRIP-MCNC: NEGATIVE MG/DL
HGB UR QL STRIP.AUTO: ABNORMAL
HYALINE CASTS #/AREA URNS LPF: ABNORMAL /LPF
KETONES UR STRIP-MCNC: NEGATIVE MG/DL
LEUKOCYTE ESTERASE UR QL STRIP: ABNORMAL
NITRITE UR QL STRIP: POSITIVE
NON-SQ EPI CELLS URNS QL MICRO: ABNORMAL /HPF
PH UR STRIP.AUTO: 6 [PH]
PROT UR STRIP-MCNC: ABNORMAL MG/DL
RBC #/AREA URNS AUTO: ABNORMAL /HPF
SP GR UR STRIP.AUTO: 1.01 (ref 1–1.03)
UROBILINOGEN UR QL STRIP.AUTO: 0.2 E.U./DL
WBC #/AREA URNS AUTO: ABNORMAL /HPF

## 2021-08-04 PROCEDURE — 97530 THERAPEUTIC ACTIVITIES: CPT

## 2021-08-04 PROCEDURE — 81001 URINALYSIS AUTO W/SCOPE: CPT | Performed by: PHYSICAL MEDICINE & REHABILITATION

## 2021-08-04 PROCEDURE — 97535 SELF CARE MNGMENT TRAINING: CPT

## 2021-08-04 PROCEDURE — 87186 SC STD MICRODIL/AGAR DIL: CPT | Performed by: PHYSICAL MEDICINE & REHABILITATION

## 2021-08-04 PROCEDURE — 99232 SBSQ HOSP IP/OBS MODERATE 35: CPT | Performed by: NURSE PRACTITIONER

## 2021-08-04 PROCEDURE — 99233 SBSQ HOSP IP/OBS HIGH 50: CPT | Performed by: PHYSICAL MEDICINE & REHABILITATION

## 2021-08-04 PROCEDURE — 97112 NEUROMUSCULAR REEDUCATION: CPT

## 2021-08-04 PROCEDURE — 87077 CULTURE AEROBIC IDENTIFY: CPT | Performed by: PHYSICAL MEDICINE & REHABILITATION

## 2021-08-04 PROCEDURE — 97116 GAIT TRAINING THERAPY: CPT

## 2021-08-04 PROCEDURE — 87086 URINE CULTURE/COLONY COUNT: CPT | Performed by: PHYSICAL MEDICINE & REHABILITATION

## 2021-08-04 PROCEDURE — 97110 THERAPEUTIC EXERCISES: CPT

## 2021-08-04 RX ADMIN — ATORVASTATIN CALCIUM 20 MG: 20 TABLET, FILM COATED ORAL at 17:11

## 2021-08-04 RX ADMIN — CEPHALEXIN 250 MG: 250 CAPSULE ORAL at 08:46

## 2021-08-04 RX ADMIN — ACETAMINOPHEN 975 MG: 325 TABLET, FILM COATED ORAL at 22:07

## 2021-08-04 RX ADMIN — SIMETHICONE 80 MG: 20 EMULSION ORAL at 17:11

## 2021-08-04 RX ADMIN — MELATONIN TAB 3 MG 3 MG: 3 TAB at 22:07

## 2021-08-04 RX ADMIN — HEPARIN SODIUM 5000 UNITS: 5000 INJECTION INTRAVENOUS; SUBCUTANEOUS at 13:48

## 2021-08-04 RX ADMIN — Medication 1 PACKET: at 08:45

## 2021-08-04 RX ADMIN — PANTOPRAZOLE SODIUM 40 MG: 40 TABLET, DELAYED RELEASE ORAL at 05:25

## 2021-08-04 RX ADMIN — SIMETHICONE 80 MG: 20 EMULSION ORAL at 22:08

## 2021-08-04 RX ADMIN — SIMETHICONE 80 MG: 20 EMULSION ORAL at 13:48

## 2021-08-04 RX ADMIN — LIDOCAINE 2 PATCH: 50 PATCH TOPICAL at 08:46

## 2021-08-04 RX ADMIN — HEPARIN SODIUM 5000 UNITS: 5000 INJECTION INTRAVENOUS; SUBCUTANEOUS at 05:25

## 2021-08-04 RX ADMIN — ACETAMINOPHEN 975 MG: 325 TABLET, FILM COATED ORAL at 13:48

## 2021-08-04 RX ADMIN — SIMETHICONE 80 MG: 20 EMULSION ORAL at 06:40

## 2021-08-04 RX ADMIN — ACETAMINOPHEN 975 MG: 325 TABLET, FILM COATED ORAL at 05:25

## 2021-08-04 RX ADMIN — HEPARIN SODIUM 5000 UNITS: 5000 INJECTION INTRAVENOUS; SUBCUTANEOUS at 22:07

## 2021-08-04 NOTE — TEAM CONFERENCE
Acute RehabilitationTeam Conference Note  Date: 8/4/2021   Time: 10:37 AM       Patient Name:  Stephanie De La Cruz       Medical Record Number: [id number]   YOB: 1945  Sex: Female          Room/Bed:  [facility]-01  Payor Info:  Payor: MEDICARE / Plan: MEDICARE A AND B / Product Type: Medicare A & B Fee for Service /      Admitting Diagnosis: Central cord synd at unsp level of cerv spinal cord, init ([facility] ) [S14 129A]   Admit Date/Time:  7/26/2021  1:13 PM  Admission Comments: No comment available     Primary Diagnosis:  GERD (gastroesophageal reflux disease)  Principal Problem: GERD (gastroesophageal reflux disease)    Patient Active Problem List    Diagnosis Date Noted    Chest pain 07/29/2021    Dyslipidemia 07/26/2021    Urinary retention 07/26/2021    Anemia 07/26/2021    CKD (chronic kidney disease) 07/26/2021    CHF (congestive heart failure) ([facility] ) 07/26/2021    Cervical stenosis of spine 07/26/2021    History of recurrent UTIs 07/25/2021    Nasal bone fractures 07/25/2021    Orthostatic hypotension 07/25/2021    Fall against object 07/18/2021    Central cord syndrome ([facility] ) 07/18/2021    Thrombocytopenia ([facility] ) 03/02/2021    Allergic conjunctivitis of both eyes 03/02/2021    Age related osteoporosis 08/19/2020    Abnormal LFTs 06/05/2020    Anxiety 06/05/2020    Generalized weakness 06/01/2020    Hyperlipidemia 03/04/2020    Medicare annual wellness visit, subsequent 02/17/2020    Recurrent falls 02/17/2020    Gait disturbance 02/17/2020    Viral illness 01/20/2020    Acute rhinitis 01/20/2017    Vertigo 01/20/2017    Moderate mitral regurgitation 01/10/2017    Hypertension 01/08/2017    History of asthma     Migraine headache     Cholelithiasis     GERD (gastroesophageal reflux disease) 05/31/2016       Physical Therapy:    Weight Bearing Status: Weight Bearing as Tolerated  Transfers: Incidental Touching  Bed Mobility: Minimal Assistance  Amulation Distance (ft): 100 feet (max with RW, 50 feet with HHA)  Ambulation: Minimal Assistance  Assistive Device for Ambulation: Roller Walker  Wheelchair Mobility Distance:  (NA)  Number of Stairs: 2  Assistive Device for Stairs:  (to be assessed if safe)  Stair Assistance: Moderate Assistance  Ramp: Moderate Assistance  Assistive Device for Ramp: Roller Walker  Discharge Recommendations: Home with:  [address] with[de-identified] Family Support, First Floor Setup, Home Physical Therapy    Pt  Demonstrate good progress since initial evaluation  Pt  Currently is CGA to min A with functional transfers using RW  And is walking up to 100 feet  Pt's main barrier for now is orthostatic BP's   Pt  Had IV fluids yesterday and BP's are better with pt   asymptomatic as of today with TEDS and abdominal binder  Pt  Cont to need min to mod A without AD/ HHA due to LE weakness , dec balance and righting reactions  Main barrier for d/c are orthosttaic BP's, pain and steps at home and dec caregiver support  Pt  Will benefit from skilled PT to achieve goals in POC to safely return home with family support  Froy Truong PT  Occupational Therapy:  Eating: Supervision  Grooming: Supervision  Bathing: Minimal Assistance  Bathing: Minimal Assistance  Upper Body Dressing: Supervision  Lower Body Dressing: Moderate Assistance, Maximum Assistance  Toileting: Assist of 2 (assist of 2 present upon arrival, however, based on clinical judgement pt could require mod/max of 1 based on functions during self care tasks  will reassess as able)  Toilet Transfer: Minimal Assistance  Cognition: Within Defined Limits  Orientation: Person, Place, Time, Situation  Discharge Recommendations: Home with:  [address] with[de-identified] Family Support       Pt is a 66 y  o  female who presented to the [address] [place] [address]  on 7/18/21 after falling and striking her head on a table   Pt diagnoses with central cord syndrome incomplete tetraplegia (anterior cervical discectomy and fusion C4-5 & posterior cervical decompression and fusion  C2-T2)  Cervical spinal precautions and sinus precautions  Pt does not have C-collar  Pt reports living in an apartment [facility] & [address], with 8 steps a landing then 6 additional steps) with daughter and grandson  PTA pt was independent in I/ADLs, however supervision/Min A with bathing and tub transfer  Reports occasionally uses SPC in community  Pt is currently limited at this time 2* orthostatic BP, pain, activity tolerance, decreased safety awareness, decreased L strength and coordination compared to R, and impaired balance in ADLs & transfers  Pt currently requires min-max assist for self care tasks mostly due to application of TEDS, thoroughness during toileting tasks and CG/min assist for STS and SPT with RW; poor-fair RW management/safety noted during SPT  The following Occupational Performance Areas to address include: grooming, bathing/shower, toilet hygiene, dressing and functional mobility/transfers  Pt will not have 24 hr supervision/assistance upon d/c  Anticipate LOS 3 weeks to meet supervision/independent goals  Speech Therapy:           No notes on file    Nursing Notes:  Appetite: Fair  Diet Type: Regular/House, Other (Specify) (lactose free)                      Diet Patient/Family Education Complete: Yes    Type of Wound (LDA):  Wound                    Type of Wound Patient/Family Education: No  Bladder: Incontinent (at times)  Catheter Type: Gotti  Bladder Patient/Family Education: No (void trial just initiated)  Bowel: Continent     Bowel Patient/Family Education: Yes  Pain Location/Orientation: Orientation: Bilateral, Location: Knee, Location: Shoulder (L hip )  Pain Score: 0                       Hospital Pain Intervention(s): Repositioned, Rest (nurse made aware and was given pain meds during session )  Pain Patient/Family Education: Yes  Medication Management/Safety  Injectable:  (heparin)  Safe Administration: Yes  Medication Patient/Family Education Complete: Yes (ongoing)    Pt admitted with Central cord syndrome s/p fall  Had ACDF C4-5/PCDF C2-T2 7/20/21  Doesn't need collar per NS  Anterior and posterior incisions BRAULIO  Pain control with scheduled tylenol and lidoderm; prn oxycodone and robaxin  Acute nasal bone fx OMS saw = no surgery needed  Sinus precautions for 4 weeks Urine retention - morrow reinserted 7/26 and removed 8/2, currently on the urinary retention protocol  Chronic UTI - on suppressive tx with Keflex as at home  ABLA -  did not require transfusion  Orthostasis - had orthostasis on acute side and was given IVF  This AM mild orthostasis supine-sit-stand = 140-140-110 with lightheadedness then again with TEDS and binder on, IVF added  Has H/O vertigo - on meclizine prn  Pt was constipated and required an enema on day of admission to [facility]  Pt is continent of bowel  Requires alarms for safety  8/4--currently undergoing void trial   Had recurrent hypotension 8/2  IVF given  Fluid goal set of 1200ml per day  Staples removed from incision by neuro on 8/3  L sided chest pain 7/29  Workup negative  This week we will encourage independence with ADLs  We will monitor lab results and vital signs  We will monitor incisions for healing and s/s of infection  We will educate pt about repositioning to prevent skin breakdown  We will perform routine skin assessments  We will monitor for adequate pain control  We will monitor for constipation and medicate as ordered  We will increase safety awareness with transfers and keep pt free from falls  Case Management:     Discharge Planning  Living Arrangements: Lives w/ Children  Support Systems: Children  Assistance Needed: TBD  Type of Current Residence: Private residence  Current Home Care Services: No  Pt is participating well with therapy and making gains  Pt is expected to return home w/family support and current recommendations for c services   Following to assist w/dc planning needs  Is the patient actively participating in therapies? yes  List any modifications to the treatment plan:     Barriers Interventions   frerquent urination ua ordered   Orthostatic bps Abdominal binder, increase fluids   Strength endurance limiting activity tolerance Therapy exercises   Steps to enter Therapy stair training   Alone during the day Family training     Is the patient making expected progress toward goals? yes  List any update or changes to goals:     Medical Goals: Patient will be medically stable for discharge to [facility] restrictive envrionment upon completion of rehab program and Patient will be able to manage medical conditions and comorbid conditions with medications and follow up upon completion of rehab program    Weekly Team Goals:   Rehab Team Goals  ADL Team Goal: Patient will require supervision with ADLs with least restrictive device upon completion of rehab program  Transfer Team Goal: Patient will be independent with transfers with least restrictive device upon completion of rehab program  Locomotion Team Goal: Patient will be independent with locomotion with least restrictive device upon completion of rehab program    Discussion: pt presents with the above barriers and is functioning at contact guard with a walker, min a stairs  adls are min to mod a, max with teds, toileting can be two assist due to orthostatic bps  Goals are for supervision for stairs and independent for all other mobility goals  Family training to be intiated  Recommendations are for contd home pt and ot  Anticipated Discharge Date:  8/13/21  [facility] Team Members Present: The following team members are supervising care for this patient and were present during this Weekly Team Conference      Physician: Dr Kayden Campbell MD  : Boaz Diaz MSW  Registered Nurse: Marin Denton RN  Physical Therapist: Shahla Ndiaye DPT  Occupational Therapist: Jeanine De Leon MS, OTR/L, CBIS  Speech Therapist: Audie Rios MA, CCC-SLP  Other: Justino Benavides, RN  [facility]

## 2021-08-04 NOTE — PROGRESS NOTES
08/04/21 0700   Pain Assessment   Pain Assessment Tool Pain Assessment not indicated - pt denies pain   Pain Score No Pain   Restrictions/Precautions   Precautions Spinal precautions; Fall Risk;Supervision on toilet/commode   General   Change In Medical/Functional Status Mionitor Vitals   Subjective   Subjective No complaints, No reports of feeling lightheaded or Dizzy with change in position or drop in BP   Lying to Sitting on Side of Bed   Type of Assistance Needed Supervision   Physical Assistance Level No physical assistance   Lying to Sitting on Side of Bed CARE Score 4   Sit to Stand   Type of Assistance Needed Incidental touching; Adaptive equipment   Comment with RW, improved to [address] as she was up moving more   Sit to Stand CARE Score 4   Bed-Chair Transfer   Type of Assistance Needed Incidental touching; Adaptive equipment   Physical Assistance Level No physical assistance   Comment with RW, safety concerns with use of RW with turns, gets outside of walker  Will practice repeatedly during session   Chair/Bed-to-Chair Transfer CARE Score 4   Car Transfer   Type of Assistance Needed Physical assistance   Physical Assistance Level 25% or less   Comment no c/o pain   Car Transfer CARE Score 3   Walk 10 Feet   Type of Assistance Needed Physical assistance; Adaptive equipment;Verbal cues   Physical Assistance Level 25% or less   Comment with Rw, cueing for proper control of device, demo for turns when approaching a chair   Walk 10 Feet CARE Score 3   Walk 50 Feet with Two Turns   Type of Assistance Needed Physical assistance; Adaptive equipment   Physical Assistance Level 25% or less   Comment with RW   Walk 50 Feet with Two Turns CARE Score 3   Walk 150 Feet   Comment not focused on this session   Ambulation   Does the patient walk? 2  Yes   Wheelchair mobility   Does the patient use a wheelchair? 0   No   Curb or Single Stair   Style negotiated Curb   Type of Assistance Needed Physical assistance;Verbal cues;Adaptive equipment   Physical Assistance Level 25% or less   Comment cueing for sequencing, pt improved with practice x 3   1 Step (Curb) CARE Score 3   4 Steps   Type of Assistance Needed Physical assistance   Physical Assistance Level 25% or less   Comment single HR with B UE support   4 Steps CARE Score 3   12 Steps   Reason if not Attempted Safety concerns   12 Steps CARE Score 88   Therapeutic Interventions   Other practiced walking chair to chair 10 ft apart, demo at start on how to manage RW and appraoch chair  Worked for 30 min straight, working from external to [employer]  Improved with practice, but recommend continued focus for safety in various settings  Equipment Use   NuStep 10 min lvl 2 legs only for general conditioning working at HR of 116bpm   Assessment   Treatment Assessment Pt remains high fall risk with weakness of  LLE and poor righting reactions  Pt requires RW for balance, but has difficulty coordinating turns at times with device, getting outside of legs and having to back up to sit in chair  Recommend continued HHD ambulation with emphasis on obstacles, approach to sit down on various surfaces and standing balance activities to decrease fall risk  PT Therapy Minutes   PT Time In 0700   PT Time Out 0830   PT Total Time (minutes) 90   PT Mode of treatment - Individual (minutes) 90   PT Mode of treatment - Concurrent (minutes) 0   PT Mode of treatment - Group (minutes) 0   PT Mode of treatment - Co-treat (minutes) 0   PT Mode of Treatment - Total time(minutes) 90 minutes   PT Cumulative Minutes 705   Therapy Time missed   Time missed?  No

## 2021-08-04 NOTE — PROGRESS NOTES
Physical Medicine and Rehabilitation Progress Note  Arnold Watson 66 y o  female MRN: [id number]  Unit/Bed#: [facility] 516-31 Encounter: [id number]    HPI: Arnold Watson is a 66 y o  female who presented to the [address] after fall  Patient p/w c/o neck pain as well as bilateral arm pain/numbness  Imaging revealed cervical stenosis most severe at C4-5 with possible cord edema and patient subsequently underwent C4-5 ACDF & C2-T2 PCDF on 7/20 by Dr Luis Resendiz  Patient was also found to have B/L nasal bone fractures and L maxillary sinus hemorrhage which was managed conservatively  Post-op course complicated by urinary retention requiring morrow placement  Chief Complaint: cervical stenosis/central cord syndrome     Subjective: d/w patient barriers to dc     ROS: A 10 point ROS was performed; negative except as noted above       Assessment/Plan:      Cervical stenosis of spine  Assessment & Plan  - C6 fx, per XR report of 7/21 "Redemonstrated anterior-inferior C6 vertebral body corner fracture, unchanged in alignment " (seen by neurosx on 7/26 no further intervention recommended)  - cervical stenosis most severe at C4-5 with possible cord edema s/p ACDF C4-5 & PCDF C2-T2 by Dr Luis Resendiz on 7/20  - c-spine precautions  - no brace needed per neurosx  - 2 week post-op FU completed by neurosx while patient in rehab unit   - per neurosx protocol FU XR (already e-prescribed in EMR by neurosx team) to be done 2-3 days prior to FU appt of 9/3   - OP FU with neurosx on 9/3    Nasal bone fractures  Assessment & Plan  - B/L nasal bone fractures & L maxillary sinus hemorrhage  - non-op management per OMFS  - sinus precautions   - abx recommended by OMFS however dc'd by trauma surgery in acute care who was primary team per their protocol     CKD (chronic kidney disease)  Assessment & Plan  - Cr currently 0 95  - baseline appears to be 1 0-1 2  - IM monitoring     Anemia  Assessment & Plan  - Hg currently increased to 10 4  post-operatively  - IM monitoring     Urinary retention  Assessment & Plan  - s/p morrow removal, TOV in process     Chest pain  Assessment & Plan  - patient complained of brief episode of left sided chest pain while in therapy  - per patient this occurs at home as well and responds to OTC antacid medication however checked EKG to r/o cardiac etiology and EKG was unrevealing (did show mildly elevated QTc at 487, d/w IM who recommended no further GUTIERREZ/intervention at this time for QTc and to avoid QT prolonging medication if possible)   - resolved after given medication for heartburn   - troponin's negative (per IM no further troponins required)    CHF (congestive heart failure) ([facility])  Assessment & Plan  - EF 45-50%  - grade 1 DD  - not on diuretics at home  - IM monitoring volume status       Dyslipidemia  Assessment & Plan  - on home lipitor 20 mg qpm    History of recurrent UTIs  Assessment & Plan  - on home keflex 250 mg qd which patient is chronically on (confrimed with patient's OP pharmacy that patient receives monthly prescriptions for this, last provided by Dr Pieter Jacome of urology)  - OP FU with Dr Robyn Lane  - on home xanax 0 25 mg qhs prn     Vertigo  Assessment & Plan  - chronic, likely BPV  - on home antivert 12 5 mg q8 prn     * GERD (gastroesophageal reflux disease)  Assessment & Plan  - on home protonix 40 mg qd      Scheduled Meds:  Current Facility-Administered Medications   Medication Dose Route Frequency Provider Last Rate    acetaminophen  975 mg Oral Q8H [facility] detention Re Herzog MD      ALPRAZolam  0 25 mg Oral HS PRN Re Herzog MD      aluminum-magnesium hydroxide-simethicone  30 mL Oral Q4H PRN Penny Deras PA-C      atorvastatin  20 mg Oral Daily With MD Mateusz      cephalexin  250 mg Oral Daily Re Herzog MD      heparin (porcine)  5,000 Units [facility] Re Herzog MD      lidocaine  2 patch Topical Daily Tashi Haleigh Coil, MD      meclizine  12 5 mg Oral Q8H PRN [place] MD Lacy      melatonin  3 mg Oral HS Juan House MD      methocarbamol  500 mg Oral Q6H PRN Juan House MD      oxyCODONE  2 5 mg Oral Q4H PRN MD Ruby Moe oxyCODONE  5 mg Oral Q4H PRN Juan House MD      pantoprazole  40 mg Oral Early Morning Juan House MD      polyethylene glycol  17 g Oral Daily PRN Juan House MD      psyllium  1 packet Oral Daily Juan House MD      simethicone  80 mg Oral 4x Daily (with meals and at bedtime) Penny Deras PA-C      sodium chloride  1 spray Each Nare 4x Daily PRN Juan House MD            Incidental findings:     1) gallstones: asymptomatic; OP FU with PCP with GI referral at PCPs discretion      DVT ppx: HSQ (cleared in acute care by neurosx for pharmacologic DVT ppx)       Objective:    Functional Update:  Mobility: min-mod  Transfers: min   ADLs: min-mod       Physical Exam:    Vitals:    08/04/21 0800   BP: 101/60   Pulse:    Resp:    Temp:    SpO2:          General: alert, no apparent distress, cooperative and comfortable  HEENT:  Eye: Normal external eye, conjunctiva, lidsc cornea  Ears: Normal external ears  Nose: Normal external nose, mucus membranes  CARDIAC:  +S1/2  LUNGS:  no abnormal respiratory pattern, no retractions noted, non-labored breathing   ABDOMEN:  soft NT  EXTREMITIES:  no cyanosis or clubbing   NEURO:  awake, alert appropriate responses to questions   PSYCH:  mood/affect currently stable   INCISION:  surgical site C/D/I             Diagnostic Studies:  No orders to display       Laboratory:   Results from last 7 days   Lab Units 08/02/21  1804   HEMOGLOBIN g/dL 10 4*   HEMATOCRIT % 32 9*   WBC Thousand/uL 6 28     Results from last 7 days   Lab Units 08/02/21  1804   BUN mg/dL 20   SODIUM mmol/L 138   POTASSIUM mmol/L 4 2   CHLORIDE mmol/L 109*   CREATININE mg/dL 0 95              This patient was discussed by the Interdisciplinary Team in weekly case conference today   The care of the patient was extensively discussed with all care providers and an appropriate rehabilitation plan was formulated unique for this patient  Barriers were identified preventing progression of therapy and appropriate interventions were discussed with each discipline  Please see the team note for input from all disciplines regarding barriers, intervention, and discharge planning  [ x ] Total time spent: 35 Mins, and greater than 50% of this time was spent counseling/coordinating care

## 2021-08-04 NOTE — PROGRESS NOTES
08/04/21 1000   Pain Assessment   Pain Assessment Tool Pain Assessment not indicated - pt denies pain   Pain Score No Pain   Restrictions/Precautions   Precautions Bed/chair alarms; Fall Risk;Supervision on toilet/commode;Spinal precautions  (sinus precautions)   Weight Bearing Restrictions No   ROM Restrictions Yes  (cervical spine)   Braces or Orthoses   (TEDS, ab binder)   Lifestyle   Autonomy "I slept good, but I wasn't able to pee on my own  I just tried"   Eating   Type of Assistance Needed Set-up / clean-up   Physical Assistance Level No physical assistance   Comment seated in recliner chair    Eating CARE Score 5   Sit to Stand   Type of Assistance Needed Incidental touching   Physical Assistance Level No physical assistance   Comment CG with RW; max VCs for safe hand placement   Sit to Stand CARE Score 4   Bed-Chair Transfer   Type of Assistance Needed Physical assistance   Physical Assistance Level 25% or less   Comment assist to safely manage RW, max VCs for safe hand placement   Chair/Bed-to-Chair Transfer CARE Score 3   Toilet Transfer   Type of Assistance Needed Physical assistance   Physical Assistance Level 25% or less   Comment assist to safely manage RW to standard toilet; max VCs for safe use of GB   Toilet Transfer CARE Score 3   Functional Standing Tolerance   Time 5 min x1   Activity folding laundry at table top   Comments Pt engages in unsupported standing task at table top, folding laundry with overall CG  Balance was challenged by spreading out clothing on table and encouraging pt to perform functional reaching within her restrictions  No LOB noted  Exercise Tools   Other Exercise Tool 2 Pt completes no weighted table top towel slides focusing on B/L shoulder AROM/stretching due to complaint of discomfort  Pt tolerates well with rest breaks as needed      Coordination   Gross Motor  Pt completes resistive clothes pin activity in which she was instructed to place varying resistive clothes pins on dowel rods focusing on hand strength, pinch strength and UE strength  Good tolerance to tasks  In Hand Manipulation Pt completes in hand manipulation using marbles, transferring from palm to finger tips in order to inc overall dexterity, [facility], in hand manipulation  Mild difficulty noted, requiring inc time to complete  Cognition   Overall Cognitive Status Impaired   Arousal/Participation Alert; Cooperative   Attention Within functional limits   Orientation Level Oriented X4   Memory Decreased short term memory;Decreased recall of precautions   Following Commands Follows one step commands with increased time or repetition   Activity Tolerance   Activity Tolerance Patient tolerated treatment well   Assessment   Treatment Assessment Pt engages in 90 minute skilled OT session focusing on functional transfers, standing adalgisa/bal, L UE NMR  See above for full functional details  Pt demo's consistent CG/min assist for in room functional transfers with continued max VCs for safe hand placement when transitioning from stand to sit  Various L UE NMR tasks completed with inc time and mild difficulty due to decreased [facility], in hand manipulation and overall strength  Pt would benefit from continued skilled care in order to focus on RW management/safety, light IADLs, in room functional transfers, standing adalgisa/bal in order to decrease burden of care at d/c  Prognosis Good   Problem List Decreased strength;Decreased endurance; Impaired balance;Decreased mobility; Decreased coordination;Orthopedic restrictions;Pain   Barriers to Discharge Inaccessible home environment;Decreased caregiver support   Plan   Treatment/Interventions ADL retraining;Functional transfer training; Therapeutic exercise; Endurance training;Patient/family training;Equipment eval/education; Compensatory technique education   OT Therapy Minutes   OT Time In 1000   OT Time Out 1130   OT Total Time (minutes) 90   OT Mode of treatment - Individual (minutes) 90 OT Mode of treatment - Concurrent (minutes) 0   OT Mode of treatment - Group (minutes) 0   OT Mode of treatment - Co-treat (minutes) 0   OT Mode of Treatment - Total time(minutes) 90 minutes   OT Cumulative Minutes 780   Therapy Time missed   Time missed?  No

## 2021-08-04 NOTE — PLAN OF CARE
Problem: Prexisting or High Potential for Compromised Skin Integrity  Goal: Skin integrity is maintained or improved  Description: INTERVENTIONS:  - Identify patients at risk for skin breakdown  - Assess and monitor skin integrity  - Assess and monitor nutrition and hydration status  - Monitor labs   - Assess for incontinence   - Turn and reposition patient  - Assist with mobility/ambulation  - Relieve pressure over bony prominences  - Avoid friction and shearing  - Provide appropriate hygiene as needed including keeping skin clean and dry  - Evaluate need for skin moisturizer/barrier cream  - Collaborate with interdisciplinary team   - Patient/family teaching  - Consider wound care consult   Outcome: Progressing     Problem: MOBILITY - ADULT  Goal: Maintain or return to baseline ADL function  Description: INTERVENTIONS:  -  Assess patient's ability to carry out ADLs; assess patient's baseline for ADL function and identify physical deficits which impact ability to perform ADLs (bathing, care of mouth/teeth, toileting, grooming, dressing, etc )  - Assess/evaluate cause of self-care deficits   - Assess range of motion  - Assess patient's mobility; develop plan if impaired  - Assess patient's need for assistive devices and provide as appropriate  - Encourage maximum independence but intervene and supervise when necessary  - Involve family in performance of ADLs  - Assess for home care needs following discharge   - Consider OT consult to assist with ADL evaluation and planning for discharge  - Provide patient education as appropriate  Outcome: Progressing  Goal: Maintains/Returns to pre admission functional level  Description: INTERVENTIONS:  - Perform BMAT or MOVE assessment daily    - Set and communicate daily mobility goal to care team and patient/family/caregiver  - Collaborate with rehabilitation services on mobility goals if consulted  - Perform Range of Motion times a day    - Reposition patient every hours   - Dangle patient  times a day  - Stand patient  times a day  - Ambulate patient  times a day  - Out of bed to chair times a day   - Out of bed for meals  times a day  - Out of bed for toileting  - Record patient progress and toleration of activity level   Outcome: Progressing     Problem: Potential for Falls  Goal: Patient will remain free of falls  Description: INTERVENTIONS:  - Educate patient/family on patient safety including physical limitations  - Instruct patient to call for assistance with activity   - Consult OT/PT to assist with strengthening/mobility   - Keep Call bell within reach  - Keep bed low and locked with side rails adjusted as appropriate  - Keep care items and personal belongings within reach  - Initiate and maintain comfort rounds  - Make Fall Risk Sign visible to staff  - Offer Toileting every Hours, in advance of need  - Initiate/Maintain alarm  - Obtain necessary fall risk management equipment:   - Apply yellow socks and bracelet for high fall risk patients  - Consider moving patient to room near nurses station  Outcome: Progressing     Problem: PAIN - ADULT  Goal: Verbalizes/displays adequate comfort level or baseline comfort level  Description: Interventions:  - Encourage patient to monitor pain and request assistance  - Assess pain using appropriate pain scale  - Administer analgesics based on type and severity of pain and evaluate response  - Implement non-pharmacological measures as appropriate and evaluate response  - Consider cultural and social influences on pain and pain management  - Notify physician/advanced practitioner if interventions unsuccessful or patient reports new pain  Outcome: Progressing     Problem: INFECTION - ADULT  Goal: Absence or prevention of progression during hospitalization  Description: INTERVENTIONS:  - Assess and monitor for signs and symptoms of infection  - Monitor lab/diagnostic results  - Monitor all insertion sites, i e  indwelling lines, tubes, and drains  - Monitor endotracheal if appropriate and nasal secretions for changes in amount and color  - [place] appropriate cooling/warming therapies per order  - Administer medications as ordered  - Instruct and encourage patient and family to use good hand hygiene technique  - Identify and instruct in appropriate isolation precautions for identified infection/condition  Outcome: Progressing     Problem: SAFETY ADULT  Goal: Patient will remain free of falls  Description: INTERVENTIONS:  - Educate patient/family on patient safety including physical limitations  - Instruct patient to call for assistance with activity   - Consult OT/PT to assist with strengthening/mobility   - Keep Call bell within reach  - Keep bed low and locked with side rails adjusted as appropriate  - Keep care items and personal belongings within reach  - Initiate and maintain comfort rounds  - Make Fall Risk Sign visible to staff  - Offer Toileting every Hours, in advance of need  - Initiate/Maintain alarm  - Obtain necessary fall risk management equipment:   - Apply yellow socks and bracelet for high fall risk patients  - Consider moving patient to room near nurses station  Outcome: Progressing  Goal: Maintain or return to baseline ADL function  Description: INTERVENTIONS:  -  Assess patient's ability to carry out ADLs; assess patient's baseline for ADL function and identify physical deficits which impact ability to perform ADLs (bathing, care of mouth/teeth, toileting, grooming, dressing, etc )  - Assess/evaluate cause of self-care deficits   - Assess range of motion  - Assess patient's mobility; develop plan if impaired  - Assess patient's need for assistive devices and provide as appropriate  - Encourage maximum independence but intervene and supervise when necessary  - Involve family in performance of ADLs  - Assess for home care needs following discharge   - Consider OT consult to assist with ADL evaluation and planning for discharge  - Provide patient education as appropriate  Outcome: Progressing  Goal: Maintains/Returns to pre admission functional level  Description: INTERVENTIONS:  - Perform BMAT or MOVE assessment daily    - Set and communicate daily mobility goal to care team and patient/family/caregiver  - Collaborate with rehabilitation services on mobility goals if consulted  - Perform Range of Motion  times a day  - Reposition patient every hours  - Dangle patient  times a day  - Stand patient  times a day  - Ambulate patient  times a day  - Out of bed to chair times a day   - Out of bed for meals  times a day  - Out of bed for toileting  - Record patient progress and toleration of activity level   Outcome: Progressing     Problem: DISCHARGE PLANNING  Goal: Discharge to home or other facility with appropriate resources  Description: INTERVENTIONS:  - Identify barriers to discharge w/patient and caregiver  - Arrange for needed discharge resources and transportation as appropriate  - Identify discharge learning needs (meds, wound care, etc )  - Arrange for interpretive services to assist at discharge as needed  - Refer to Case Management Department for coordinating discharge planning if the patient needs post-hospital services based on physician/advanced practitioner order or complex needs related to functional status, cognitive ability, or social support system  Outcome: Progressing     Problem: Nutrition/Hydration-ADULT  Goal: Nutrient/Hydration intake appropriate for improving, restoring or maintaining nutritional needs  Description: Monitor and assess patient's nutrition/hydration status for malnutrition  Collaborate with interdisciplinary team and initiate plan and interventions as ordered  Monitor patient's weight and dietary intake as ordered or per policy  Utilize nutrition screening tool and intervene as necessary  Determine patient's food preferences and provide high-protein, high-caloric foods as appropriate  INTERVENTIONS:  - Monitor oral intake, urinary output, labs, and treatment plans  - Assess nutrition and hydration status and recommend course of action  - Evaluate amount of meals eaten  - Assist patient with eating if necessary   - Allow adequate time for meals  - Recommend/ encourage appropriate diets, oral nutritional supplements, and vitamin/mineral supplements  - Order, calculate, and assess calorie counts as needed  - Recommend, monitor, and adjust tube feedings and TPN/PPN based on assessed needs  - Assess need for intravenous fluids  - Provide specific nutrition/hydration education as appropriate  - Include patient/family/caregiver in decisions related to nutrition  Outcome: Progressing

## 2021-08-04 NOTE — PROGRESS NOTES
Internal Medicine Progress Note  Patient: Nathan Bernstein  Age/sex: 66 y o  female  Medical Record #: [id number]      ASSESSMENT/PLAN: (Interval History)  Nathan Bernstein is seen and examined and management for following issues:    Central cord syndrome; s/p ACDF C4-5/PCDF C2-T2 7/20/21  · Doesn't need collar per NS  · Incisions w/o erythema/infections  · Staples removed 8/3 by NS     Acute nasal bone fx  · OMS saw = no surgery needed  · Sinus precautions for 4 weeks     Urine retention  · Gotti reinserted 7/26  · VT per PMR ongoing     Chronic UTI  · On suppressive tx with Keflex at home  · Will continue here     ABLA  · Did not require transfusion     Orthostasis  · Improved s/p IVF on acute and here last week but reoccurred 8/2 = given 500ml NSS  · Set PO fluid goal of 1200ml/day which she did reach 8/3  · Will try off abd binder today     Chest pain  · Occurred 7/29/21 and was located on the Lt side  · Pt reports having this type of chest pain at home but not as strong or lasting as long  · EKG without acute changes  · Troponin negative  · Mylanta ordered prn  · No reoccurrence         Discharge date:  8/13/21    The above assessment and plan was reviewed and updated as determined by my evaluation of the patient on 8/4/2021      Labs:   Results from last 7 days   Lab Units 08/02/21  1804   WBC Thousand/uL 6 28   HEMOGLOBIN g/dL 10 4*   HEMATOCRIT % 32 9*   PLATELETS Thousands/uL 339     Results from last 7 days   Lab Units 08/02/21  1804   SODIUM mmol/L 138   POTASSIUM mmol/L 4 2   CHLORIDE mmol/L 109*   CO2 mmol/L 23   BUN mg/dL 20   CREATININE mg/dL 0 95   CALCIUM mg/dL 9 6                   Review of Scheduled Meds:  Current Facility-Administered Medications   Medication Dose Route Frequency Provider Last Rate    acetaminophen  975 mg Oral Q8H [address] Xavier Hoffman MD      ALPRAZolam  0 25 mg Oral HS PRN Xavier Hoffman MD      aluminum-magnesium hydroxide-simethicone  30 mL Oral Q4H PRN Penny Deras PA-C  atorvastatin  20 mg Oral Daily With Hugh Brock MD      cephalexin  250 mg Oral Daily Concetta Oh MD      heparin (porcine)  5,000 Units Subcutaneous [facility] Concetta Oh MD      lidocaine  2 patch Topical Daily Concetta Oh MD      meclizine  12 5 mg Oral Q8H PRN Concetta Oh MD      melatonin  3 mg Oral HS Concetta Oh MD      methocarbamol  500 mg Oral Q6H PRN Concetta Oh MD      oxyCODONE  2 5 mg Oral Q4H PRN Concetta Oh MD      oxyCODONE  5 mg Oral Q4H PRN Concetta Oh MD      pantoprazole  40 mg Oral Early Morning Concetta Oh MD      polyethylene glycol  17 g Oral Daily PRN Concetta Oh MD      psyllium  1 packet Oral Daily Concetta Oh MD      simethicone  80 mg Oral 4x Daily (with meals and at bedtime) Penny Deras PA-C      sodium chloride  1 spray Each Nare 4x Daily PRN Concetta Oh MD         Subjective/ HPI: Patient seen and examined  Patients overnight issues or events were reviewed with nursing or staff during rounds or morning huddle session  New or overnight issues include the following:     None     ROS:   Negative 12 point ROS other than denoted above in HPI or assessment/plan        Imaging:     No orders to display       *Labs /Radiology studies reviewed  *Medications reviewed and reconciled as needed  *Please refer to order section for additional ordered labs studies  *Case discussed with primary attending during morning huddle case rounds      Physical Examination:  Vitals:   Vitals:    08/04/21 0647 08/04/21 0730 08/04/21 0732 08/04/21 0800   BP: 115/72 121/61 107/57 101/60   BP Location: Right arm Right arm Right arm Right arm   Pulse: 98 98 102    Resp: 18      Temp: 97 7 °F (36 5 °C)      TempSrc: Oral      SpO2: 100%      Weight:       Height:           General Appearance: no distress, conversive  HEENT: PERRLA, conjuctiva normal; oropharynx clear; mucous membranes moist   Neck:  Supple, normal ROM, no JVD  Lungs: CTA, normal respiratory effort, no retractions, expiratory effort normal  CV: regular rate and rhythm; no rubs/murmurs/gallops, PMI normal   ABD: soft; ND/NT; +BS  EXT: no edema  Skin: normal turgor, normal texture, no rashes  Psych: affect normal, mood normal  Neuro: AAO      The above physical exam was reviewed and updated as determined by my evaluation of the patient on 8/4/2021  Invasive Devices     Peripheral Intravenous Line            Peripheral IV 08/02/21 Dorsal (posterior); Left Forearm 1 day                   VTE Pharmacologic Prophylaxis: Heparin  Code Status: Level 1 - Full Code  Current Length of Stay: 9 day(s)      Total time spent:  30 minutes with more than 50% spent counseling/coordinating care  Counseling includes discussion with patient re: progress  and discussion with patient of his/her current medical state/information  Coordination of patient's care was performed in conjunction with primary service  Time invested included review of patient's labs, vitals, and management of their comorbidities with continued monitoring  In addition, this patient was discussed with medical team including physician and advanced extenders  The care of the patient was extensively discussed and appropriate treatment plan was formulated unique for this patient  ** Please Note:  voice to text software may have been used in the creation of this document   Although proof errors in transcription or interpretation are a potential of such software**

## 2021-08-05 PROBLEM — L60.2 OVERGROWN TOENAILS: Status: ACTIVE | Noted: 2021-08-05

## 2021-08-05 PROCEDURE — 97530 THERAPEUTIC ACTIVITIES: CPT

## 2021-08-05 PROCEDURE — 99232 SBSQ HOSP IP/OBS MODERATE 35: CPT | Performed by: NURSE PRACTITIONER

## 2021-08-05 PROCEDURE — 97110 THERAPEUTIC EXERCISES: CPT

## 2021-08-05 PROCEDURE — 99232 SBSQ HOSP IP/OBS MODERATE 35: CPT | Performed by: PHYSICAL MEDICINE & REHABILITATION

## 2021-08-05 PROCEDURE — 97535 SELF CARE MNGMENT TRAINING: CPT

## 2021-08-05 RX ADMIN — SIMETHICONE 80 MG: 20 EMULSION ORAL at 17:38

## 2021-08-05 RX ADMIN — HEPARIN SODIUM 5000 UNITS: 5000 INJECTION INTRAVENOUS; SUBCUTANEOUS at 13:40

## 2021-08-05 RX ADMIN — SIMETHICONE 80 MG: 20 EMULSION ORAL at 21:11

## 2021-08-05 RX ADMIN — SIMETHICONE 80 MG: 20 EMULSION ORAL at 11:46

## 2021-08-05 RX ADMIN — HEPARIN SODIUM 5000 UNITS: 5000 INJECTION INTRAVENOUS; SUBCUTANEOUS at 06:10

## 2021-08-05 RX ADMIN — CEPHALEXIN 250 MG: 250 CAPSULE ORAL at 08:02

## 2021-08-05 RX ADMIN — ACETAMINOPHEN 975 MG: 325 TABLET, FILM COATED ORAL at 06:10

## 2021-08-05 RX ADMIN — ACETAMINOPHEN 975 MG: 325 TABLET, FILM COATED ORAL at 13:40

## 2021-08-05 RX ADMIN — SIMETHICONE 80 MG: 20 EMULSION ORAL at 08:02

## 2021-08-05 RX ADMIN — ACETAMINOPHEN 975 MG: 325 TABLET, FILM COATED ORAL at 21:11

## 2021-08-05 RX ADMIN — LIDOCAINE 2 PATCH: 50 PATCH TOPICAL at 08:02

## 2021-08-05 RX ADMIN — HEPARIN SODIUM 5000 UNITS: 5000 INJECTION INTRAVENOUS; SUBCUTANEOUS at 21:11

## 2021-08-05 RX ADMIN — Medication 1 PACKET: at 08:02

## 2021-08-05 RX ADMIN — PANTOPRAZOLE SODIUM 40 MG: 40 TABLET, DELAYED RELEASE ORAL at 06:10

## 2021-08-05 RX ADMIN — ATORVASTATIN CALCIUM 20 MG: 20 TABLET, FILM COATED ORAL at 17:48

## 2021-08-05 RX ADMIN — MELATONIN TAB 3 MG 3 MG: 3 TAB at 21:11

## 2021-08-05 NOTE — PLAN OF CARE
Problem: Prexisting or High Potential for Compromised Skin Integrity  Goal: Skin integrity is maintained or improved  Description: INTERVENTIONS:  - Identify patients at risk for skin breakdown  - Assess and monitor skin integrity  - Assess and monitor nutrition and hydration status  - Monitor labs   - Assess for incontinence   - Turn and reposition patient  - Assist with mobility/ambulation  - Relieve pressure over bony prominences  - Avoid friction and shearing  - Provide appropriate hygiene as needed including keeping skin clean and dry  - Evaluate need for skin moisturizer/barrier cream  - Collaborate with interdisciplinary team   - Patient/family teaching  - Consider wound care consult   Outcome: Progressing     Problem: MOBILITY - ADULT  Goal: Maintain or return to baseline ADL function  Description: INTERVENTIONS:  -  Assess patient's ability to carry out ADLs; assess patient's baseline for ADL function and identify physical deficits which impact ability to perform ADLs (bathing, care of mouth/teeth, toileting, grooming, dressing, etc )  - Assess/evaluate cause of self-care deficits   - Assess range of motion  - Assess patient's mobility; develop plan if impaired  - Assess patient's need for assistive devices and provide as appropriate  - Encourage maximum independence but intervene and supervise when necessary  - Involve family in performance of ADLs  - Assess for home care needs following discharge   - Consider OT consult to assist with ADL evaluation and planning for discharge  - Provide patient education as appropriate  Outcome: Progressing  Goal: Maintains/Returns to pre admission functional level  Description: INTERVENTIONS:  - Perform BMAT or MOVE assessment daily    - Set and communicate daily mobility goal to care team and patient/family/caregiver  - Collaborate with rehabilitation services on mobility goals if consulted  - Perform Range of Motion times a day    - Reposition patient every hours   - Dangle patient  times a day  - Stand patient  times a day  - Ambulate patient  times a day  - Out of bed to chair times a day   - Out of bed for meals  times a day  - Out of bed for toileting  - Record patient progress and toleration of activity level   Outcome: Progressing     Problem: Potential for Falls  Goal: Patient will remain free of falls  Description: INTERVENTIONS:  - Educate patient/family on patient safety including physical limitations  - Instruct patient to call for assistance with activity   - Consult OT/PT to assist with strengthening/mobility   - Keep Call bell within reach  - Keep bed low and locked with side rails adjusted as appropriate  - Keep care items and personal belongings within reach  - Initiate and maintain comfort rounds  - Make Fall Risk Sign visible to staff  - Offer Toileting every Hours, in advance of need  - Initiate/Maintain alarm  - Obtain necessary fall risk management equipment:   - Apply yellow socks and bracelet for high fall risk patients  - Consider moving patient to room near nurses station  Outcome: Progressing

## 2021-08-05 NOTE — CASE MANAGEMENT
Met w/pt and reviewed rehab routine and cm role  Confirmed potential dc for next Friday 8/13  Pt hopes she is doing better by then  Cm reviewed recommendations for contd [facility] services for nursing and therapy   Following to make appropriate referral

## 2021-08-05 NOTE — PROGRESS NOTES
08/05/21 0930   Pain Assessment   Pain Assessment Tool 0-10   Pain Score 6   Pain Location/Orientation Orientation: Bilateral;Location: Shoulder   Restrictions/Precautions   Precautions Bed/chair alarms; Fall Risk;Supervision on toilet/commode;Spinal precautions  (sinus precaution )   ROM Restrictions Yes   Braces or Orthoses Other (Comment)  (TEDs and abd/binder )   Subjective   Subjective pt agreeable to perform skilled PT , lyingon side last night and sleeping    Sit to Stand   Type of Assistance Needed Incidental touching   Sit to Stand CARE Score 4   Bed-Chair Transfer   Type of Assistance Needed Incidental touching; Adaptive equipment   Chair/Bed-to-Chair Transfer CARE Score 4   Transfer Bed/Chair/Wheelchair   Adaptive Equipment Roller Walker;Hand Hold   Walk 10 Feet   Type of Assistance Needed Physical assistance; Adaptive equipment   Physical Assistance Level 25% or less   Comment RW    Walk 10 Feet CARE Score 3   Walk 50 Feet with Two Turns   Type of Assistance Needed Physical assistance   Physical Assistance Level 25% or less   Comment RW    Walk 50 Feet with Two Turns CARE Score 3   Ambulation   Does the patient walk? 2  Yes   Primary Mode of Locomotion Prior to Admission Walk   Distance Walked (feet) 100 ft   Assist Device Roller [employer]   Wheelchair mobility   Does the patient use a wheelchair? 0   No   Curb or Single Stair   Style negotiated Single stair   Type of Assistance Needed Physical assistance   Physical Assistance Level 25% or less   1 Step (Curb) CARE Score 3   4 Steps   Type of Assistance Needed Physical assistance   Physical Assistance Level 26%-50%   4 Steps CARE Score 3   12 Steps   Reason if not Attempted Safety concerns   12 Steps CARE Score 88   Stairs   Type Stairs   # of Steps 10   Weight Bearing Precautions Cervical;Fall Risk   Assist Devices Single Rail   Findings ascending fwd/descending bwd reciprocal pattern which she performed safely   Equipment Use   NuStep lvl 2 for 10 min hitting target     Assessment   Treatment Assessment pt focus on ambulation with RW 95 feet [place] and HHA around cones to inc weigth shfiting and dynamic balance awareness  VC's not to look down during walking and all C spinal percaution  Pt overall progressing instructed with C precaution , pt also perform  steps as above and 126/75 BP see vitals  Pt LAQ AP ball abd/ 20 reps and STS x8   Pt will cont to benefit with skilled PT   Barriers to Discharge Inaccessible home environment;Decreased caregiver support   Plan   Progress Progressing toward goals   PT Therapy Minutes   PT Time In 0930   PT Time Out 1030   PT Total Time (minutes) 60   PT Mode of treatment - Individual (minutes) 60   PT Mode of treatment - Concurrent (minutes) 0   PT Mode of treatment - Group (minutes) 0   PT Mode of treatment - Co-treat (minutes) 0   PT Mode of Treatment - Total time(minutes) 60 minutes   PT Cumulative Minutes 765   Therapy Time missed   Time missed?  No

## 2021-08-05 NOTE — PROGRESS NOTES
08/05/21 1430   Pain Assessment   Pain Assessment Tool Pain Assessment not indicated - pt denies pain   Restrictions/Precautions   Precautions Bed/chair alarms;Cognitive; Fall Risk;Supervision on toilet/commode;Spinal precautions   Braces or Orthoses Other (Comment)  (Teds only today trail w/o binder see virtals)   Subjective   Subjective pt agreeable to perform skilled PT    Sit to Stand   Type of Assistance Needed Incidental touching   Sit to Stand CARE Score 4   Bed-Chair Transfer   Type of Assistance Needed Incidental touching   Comment RW vcs for hand placement    Chair/Bed-to-Chair Transfer CARE Score 4   Transfer Bed/Chair/Wheelchair   Adaptive Equipment Roller Walker   Walk 10 Feet   Type of Assistance Needed Incidental touching; Adaptive equipment   Comment RW    Walk 10 Feet CARE Score 4   Walk 50 Feet with Two Turns   Type of Assistance Needed Physical assistance; Adaptive equipment   Physical Assistance Level 25% or less   Comment RW    Walk 50 Feet with Two Turns CARE Score 3   Walking 10 Feet on Uneven Surfaces   Type of Assistance Needed Physical assistance; Adaptive equipment   Physical Assistance Level 25% or less   Comment floor mat    Walking 10 Feet on Uneven Surfaces CARE Score 3   Ambulation   Does the patient walk? 2  Yes   Primary Mode of Locomotion Prior to Admission Walk   Distance Walked (feet) 100 ft  (x2)   Assist Device Roller Walker   Findings vc's to keep straight in possible    Curb or Single Stair   Style negotiated Curb   Type of Assistance Needed Physical assistance; Adaptive equipment   Physical Assistance Level 26%-50%   Comment A with RW placement up and down    1 Step (Curb) CARE Score 3   Toilet Transfer   Type of Assistance Needed Incidental touching; Adaptive equipment   Toilet Transfer CARE Score 4   Therapeutic Interventions   Strengthening 2# LAQ X20 reps and marching x20 reps and AP x30  , STS x5    Flexibility PROM BLLE    Equipment Use   NuStep Lvl 2for 10 min  Assessment   Treatment Assessment Pt focus on ambulation with RW and w/o AD HHA for short distance about 50 feet   100 feet with RW x2 VC'S for head up and scan with eye, VC's to turn body not C ' neck   Pt also perform floor mat and 6 inch curb steps with [place]land to [address] [place] with lifting RW , pt upper shoulders and neck limited with strengthening joselo lifting , follow C'  restriction   Pt needs vc 's not to walk to close to RW , keep RW at safe distance   Pt return to her recliner with all needs in reach   Cont POC working on DC goals   Barriers to Discharge Inaccessible home environment;Decreased caregiver support   Plan   Progress Progressing toward goals   Recommendation   PT Discharge Recommendation Home with home health rehabilitation   PT Therapy Minutes   PT Time In 1430   PT Time Out 1530   PT Total Time (minutes) 60   PT Mode of treatment - Individual (minutes) 50   PT Mode of treatment - Concurrent (minutes) 10   PT Mode of treatment - Group (minutes) 0   PT Mode of treatment - Co-treat (minutes) 0   PT Mode of Treatment - Total time(minutes) 60 minutes   PT Cumulative Minutes 825   Therapy Time missed   Time missed?  No

## 2021-08-05 NOTE — PROGRESS NOTES
Internal Medicine Progress Note  Patient: Stephanie De La Cruz  Age/sex: 66 y o  female  Medical Record #: [id number]      ASSESSMENT/PLAN: (Interval History)  Stephanie De La Cruz is seen and examined and management for following issues:    Central cord syndrome; s/p ACDF C4-5/PCDF C2-T2 7/20/21  · Doesn't need collar per NS  · Incisions w/o erythema/infections  · Staples removed 8/3 by NS     Acute nasal bone fx  · OMS saw = no surgery needed  · Sinus precautions for 4 weeks     Urine retention  · Gotti reinserted 7/26  · VT now per PMR ongoing     Recurrent UTI  · On suppressive tx with Keflex at home  · Will continue here  · Primary service ruling out acute UTI currently (>100,000 GNR)  · Await final cx     ABLA  · Did not require transfusion     Orthostasis  · Improved s/p IVF on acute and here last week but reoccurred 8/2 = given 500ml NSS  · Set PO fluid goal of 1200ml/day which she did reach 8/3 but only 830 8/4  · PT to try off abd binder      Chest pain  · Occurred 7/29/21 and was located on the Lt side  · Pt reports having this type of chest pain at home but not as strong or lasting as long  · EKG without acute changes  · Troponin negative  · Mylanta ordered prn  · No reoccurrence         Discharge date:  8/13/21    The above assessment and plan was reviewed and updated as determined by my evaluation of the patient on 8/5/2021      Labs:   Results from last 7 days   Lab Units 08/02/21  1804   WBC Thousand/uL 6 28   HEMOGLOBIN g/dL 10 4*   HEMATOCRIT % 32 9*   PLATELETS Thousands/uL 339     Results from last 7 days   Lab Units 08/02/21  1804   SODIUM mmol/L 138   POTASSIUM mmol/L 4 2   CHLORIDE mmol/L 109*   CO2 mmol/L 23   BUN mg/dL 20   CREATININE mg/dL 0 95   CALCIUM mg/dL 9 6                   Review of Scheduled Meds:  Current Facility-Administered Medications   Medication Dose Route Frequency Provider Last Rate    acetaminophen  975 mg Oral Q8H [address] Derrick Garza MD      ALPRAZolam  0 25 mg Oral HS PRN Tashi Kelsea Ricci MD      aluminum-magnesium hydroxide-simethicone  30 mL Oral Q4H PRN Penny Deras PA-C      atorvastatin  20 mg Oral Daily With MD Mateusz      cephalexin  250 mg Oral Daily Swati Reeves MD      heparin (porcine)  5,000 Units Subcutaneous Q8H [facility] Swati Reeves MD      lidocaine  2 patch Topical Daily Swati Reeves MD      meclizine  12 5 mg Oral Q8H PRN Swati Reeves MD      melatonin  3 mg Oral HS Swati Reeves MD      methocarbamol  500 mg Oral Q6H PRN Swati Reeves MD      oxyCODONE  2 5 mg Oral Q4H PRN Swati Reeves MD      oxyCODONE  5 mg Oral Q4H PRN Swati Reeves MD      pantoprazole  40 mg Oral Early Morning Swati Reeves MD      polyethylene glycol  17 g Oral Daily PRN Swati Reeves MD      psyllium  1 packet Oral Daily Swati Reeves MD      simethicone  80 mg Oral 4x Daily (with meals and at bedtime) Penny Deras PA-C      sodium chloride  1 spray Each Nare 4x Daily PRN Swati Reeves MD         Subjective/ HPI: Patient seen and examined  Patients overnight issues or events were reviewed with nursing or staff during rounds or morning huddle session  New or overnight issues include the following:     None     ROS:   Negative 12 point ROS other than denoted above in HPI or assessment/plan        Imaging:     No orders to display       *Labs /Radiology studies reviewed  *Medications reviewed and reconciled as needed  *Please refer to order section for additional ordered labs studies  *Case discussed with primary attending during morning huddle case rounds      Physical Examination:  Vitals:   Vitals:    08/04/21 0800 08/04/21 1521 08/05/21 0600 08/05/21 0715   BP: 101/60 137/67  125/75   BP Location: Right arm Right arm  Left arm   Pulse:  101  103   Resp:  18  19   Temp:  98 1 °F (36 7 °C)  97 8 °F (36 6 °C)   TempSrc:  Oral  Oral   SpO2:  99%  97%   Weight:   62 2 kg (137 lb 2 oz)    Height:           General Appearance: no distress, conversive  HEENT: PERRLA, conjuctiva normal; oropharynx clear; mucous membranes moist   Neck:  Supple, normal ROM, no JVD  Lungs: CTA, normal respiratory effort, no retractions, expiratory effort normal  CV: regular rate and rhythm; no rubs/murmurs/gallops, PMI normal   ABD: soft; ND/NT; +BS  EXT: no edema  Skin: normal turgor, normal texture, no rashes  Psych: affect normal, mood normal  Neuro: AAO      The above physical exam was reviewed and updated as determined by my evaluation of the patient on 8/5/2021  Invasive Devices     Peripheral Intravenous Line            Peripheral IV 08/02/21 Dorsal (posterior); Left Forearm 2 days                   VTE Pharmacologic Prophylaxis: Heparin  Code Status: Level 1 - Full Code  Current Length of Stay: 10 day(s)      Total time spent:  30 minutes with more than 50% spent counseling/coordinating care  Counseling includes discussion with patient re: progress  and discussion with patient of his/her current medical state/information  Coordination of patient's care was performed in conjunction with primary service  Time invested included review of patient's labs, vitals, and management of their comorbidities with continued monitoring  In addition, this patient was discussed with medical team including physician and advanced extenders  The care of the patient was extensively discussed and appropriate treatment plan was formulated unique for this patient  ** Please Note:  voice to text software may have been used in the creation of this document   Although proof errors in transcription or interpretation are a potential of such software**

## 2021-08-05 NOTE — PROGRESS NOTES
Physical Medicine and Rehabilitation Progress Note  Leo Adams 66 y o  female MRN: [id number]  Unit/Bed#: -08 Encounter: [id number]    HPI: Leo Adams is a 66 y o  female who presented to the [facility] after fall  Patient p/w c/o neck pain as well as bilateral arm pain/numbness  Imaging revealed cervical stenosis most severe at C4-5 with possible cord edema and patient subsequently underwent C4-5 ACDF & C2-T2 PCDF on 7/20 by Dr Naif Eden  Patient was also found to have B/L nasal bone fractures and L maxillary sinus hemorrhage which was managed conservatively  Post-op course complicated by urinary retention requiring morrow placement  Chief Complaint: cervical stenosis/central cord syndrome     Subjective: patient without complaint currently     ROS: A 10 point ROS was performed; negative except as noted above       Assessment/Plan:      Cervical stenosis of spine  Assessment & Plan  - C6 fx, per XR report of 7/21 "Redemonstrated anterior-inferior C6 vertebral body corner fracture, unchanged in alignment " (seen by neurosx on 7/26 no further intervention recommended)  - cervical stenosis most severe at C4-5 with possible cord edema s/p ACDF C4-5 & PCDF C2-T2 by Dr Naif Eden on 7/20  - c-spine precautions  - no brace needed per neurosx  - 2 week post-op FU completed by neurosx while patient in rehab unit   - per neurosx protocol FU XR (already e-prescribed in EMR by neurosx team) to be done 2-3 days prior to FU appt of 9/3   - OP FU with neurosx on 9/3    Nasal bone fractures  Assessment & Plan  - B/L nasal bone fractures & L maxillary sinus hemorrhage  - non-op management per OMFS  - sinus precautions   - abx recommended by OMFS however dc'd by trauma surgery in acute care who was primary team per their protocol     CKD (chronic kidney disease)  Assessment & Plan  - Cr currently 0 95  - baseline appears to be 1 0-1 2  - IM monitoring     Anemia  Assessment & Plan  - Hg currently increased to 10 4  post-operatively  - IM monitoring     Urinary retention  Assessment & Plan  - s/p morrow removal, TOV in process     Chest pain  Assessment & Plan  - patient complained of brief episode of left sided chest pain while in therapy  - per patient this occurs at home as well and responds to OTC antacid medication however checked EKG to r/o cardiac etiology and EKG was unrevealing (did show mildly elevated QTc at 487, d/w IM who recommended no further GUTIERREZ/intervention at this time for QTc and to avoid QT prolonging medication if possible)   - resolved after given medication for heartburn   - troponin's negative (per IM no further troponins required)    CHF (congestive heart failure) ([facility])  Assessment & Plan  - EF 45-50%  - grade 1 DD  - not on diuretics at home  - IM monitoring volume status       Dyslipidemia  Assessment & Plan  - on home lipitor 20 mg qpm    History of recurrent UTIs  Assessment & Plan  - on home keflex 250 mg qd which patient is chronically on (confrimed with patient's OP pharmacy that patient receives monthly prescriptions for this, last provided by Dr Leanna Pulido of urology)  - OP FU with Dr Cj Cunningham  - on home xanax 0 25 mg qhs prn     Vertigo  Assessment & Plan  - chronic, likely BPV  - on home antivert 12 5 mg q8 prn     * GERD (gastroesophageal reflux disease)  Assessment & Plan  - on home protonix 40 mg qd      Scheduled Meds:  Current Facility-Administered Medications   Medication Dose Route Frequency Provider Last Rate    acetaminophen  975 mg Oral Q8H Latha Carter MD      ALPRAZolam  0 25 mg Oral HS PRN Amber Brown MD      aluminum-magnesium hydroxide-simethicone  30 mL Oral Q4H PRN Penny Deras PA-C      atorvastatin  20 mg Oral Daily With Priya Ornelas MD      cephalexin  250 mg Oral Daily Amber Brown MD      heparin (porcine)  5,000 Units Subcutaneous [address] Riccardo Brown MD      lidocaine  2 patch Topical Daily Suzy Leon MD      meclizine  12 5 mg Oral Q8H PRN Suzy Leon MD      melatonin  3 mg Oral HS Suzy Leon MD      methocarbamol  500 mg Oral Q6H PRN Suzy Leon MD      oxyCODONE  2 5 mg Oral Q4H PRN Suzy Leon, MD     Robert Kristen oxyCODONE  5 mg Oral Q4H PRN Suzy Leon, MD      pantoprazole  40 mg Oral Early Morning Suzy Leon MD      polyethylene glycol  17 g Oral Daily PRN Suzy Leon MD      psyllium  1 packet Oral Daily Suzy Leon MD      simethicone  80 mg Oral 4x Daily (with meals and at bedtime) Penny Deras PA-C      sodium chloride  1 spray Each Nare 4x Daily PRN Suzy Leon MD            Incidental findings:     1) gallstones: asymptomatic; OP FU with PCP with GI referral at PCPs discretion      DVT ppx: HSQ (cleared in acute care by neurosx for pharmacologic DVT ppx)       Objective:    Functional Update:  Mobility: min-mod  Transfers: min   ADLs: min-mod       Physical Exam:          General: alert, no apparent distress, cooperative and comfortable  HEENT:  Eye: Normal external eye, conjunctiva, lidsc cornea  Ears: Normal external ears  Nose: Normal external nose, mucus membranes  CARDIAC:  +S1/2  LUNGS:  no abnormal respiratory pattern, no retractions noted, non-labored breathing   ABDOMEN:  soft NT  EXTREMITIES:  no cyanosis or clubbing   NEURO:  awake, alert appropriate responses to questions   PSYCH:  mood/affect currently stable   INCISION:  surgical site C/D/I             Diagnostic Studies:  No orders to display       Laboratory:   Results from last 7 days   Lab Units 08/02/21  1804   HEMOGLOBIN g/dL 10 4*   HEMATOCRIT % 32 9*   WBC Thousand/uL 6 28     Results from last 7 days   Lab Units 08/02/21  1804   BUN mg/dL 20   SODIUM mmol/L 138   POTASSIUM mmol/L 4 2   CHLORIDE mmol/L 109*   CREATININE mg/dL 0 95

## 2021-08-06 PROCEDURE — 97530 THERAPEUTIC ACTIVITIES: CPT

## 2021-08-06 PROCEDURE — 97110 THERAPEUTIC EXERCISES: CPT

## 2021-08-06 PROCEDURE — 97535 SELF CARE MNGMENT TRAINING: CPT

## 2021-08-06 PROCEDURE — 0HBRXZZ EXCISION OF TOE NAIL, EXTERNAL APPROACH: ICD-10-PCS | Performed by: PODIATRIST

## 2021-08-06 PROCEDURE — 99232 SBSQ HOSP IP/OBS MODERATE 35: CPT | Performed by: NURSE PRACTITIONER

## 2021-08-06 PROCEDURE — 99232 SBSQ HOSP IP/OBS MODERATE 35: CPT | Performed by: PHYSICAL MEDICINE & REHABILITATION

## 2021-08-06 PROCEDURE — 97116 GAIT TRAINING THERAPY: CPT

## 2021-08-06 RX ADMIN — HEPARIN SODIUM 5000 UNITS: 5000 INJECTION INTRAVENOUS; SUBCUTANEOUS at 21:00

## 2021-08-06 RX ADMIN — CEPHALEXIN 250 MG: 250 CAPSULE ORAL at 08:12

## 2021-08-06 RX ADMIN — SIMETHICONE 80 MG: 20 EMULSION ORAL at 13:32

## 2021-08-06 RX ADMIN — ACETAMINOPHEN 975 MG: 325 TABLET, FILM COATED ORAL at 21:00

## 2021-08-06 RX ADMIN — Medication 1 PACKET: at 08:12

## 2021-08-06 RX ADMIN — LIDOCAINE 2 PATCH: 50 PATCH TOPICAL at 08:12

## 2021-08-06 RX ADMIN — SIMETHICONE 80 MG: 20 EMULSION ORAL at 08:12

## 2021-08-06 RX ADMIN — PANTOPRAZOLE SODIUM 40 MG: 40 TABLET, DELAYED RELEASE ORAL at 05:41

## 2021-08-06 RX ADMIN — HEPARIN SODIUM 5000 UNITS: 5000 INJECTION INTRAVENOUS; SUBCUTANEOUS at 05:41

## 2021-08-06 RX ADMIN — SIMETHICONE 80 MG: 20 EMULSION ORAL at 21:01

## 2021-08-06 RX ADMIN — SIMETHICONE 80 MG: 20 EMULSION ORAL at 16:57

## 2021-08-06 RX ADMIN — ATORVASTATIN CALCIUM 20 MG: 20 TABLET, FILM COATED ORAL at 16:55

## 2021-08-06 RX ADMIN — ACETAMINOPHEN 975 MG: 325 TABLET, FILM COATED ORAL at 05:41

## 2021-08-06 RX ADMIN — MELATONIN TAB 3 MG 3 MG: 3 TAB at 21:01

## 2021-08-06 RX ADMIN — HEPARIN SODIUM 5000 UNITS: 5000 INJECTION INTRAVENOUS; SUBCUTANEOUS at 13:32

## 2021-08-06 RX ADMIN — ACETAMINOPHEN 975 MG: 325 TABLET, FILM COATED ORAL at 13:32

## 2021-08-06 NOTE — PROGRESS NOTES
Internal Medicine Progress Note  Patient: Rommel Sheriff  Age/sex: 66 y o  female  Medical Record #: [id number]      ASSESSMENT/PLAN: (Interval History)  Rommel Sheriff is seen and examined and management for following issues:    Central cord syndrome; s/p ACDF C4-5/PCDF C2-T2 7/20/21  · Doesn't need collar per NS  · Incisions w/o erythema/infections  · Staples removed 8/3 by NS     Acute nasal bone fx  · OMS saw = no surgery needed  · Sinus precautions for 4 weeks     Urine retention  · Gotti reinserted 7/26  · VT now per PMR ongoing     Recurrent UTI  · On suppressive tx with Keflex at home  · Will continue here  · Primary service ruling out acute UTI currently (>100,000 GNR)  · Await final cx     ABLA  · Did not require transfusion     Orthostasis  · Improved s/p IVF on acute and here last week but reoccurred 8/2 = given 500ml NSS  · Set PO fluid goal of 1200ml/day which she did not reach 8/4 or 8/5 at least according to I/o  · Doing fine wo abd binder now; using TEDs only     Chest pain  · Occurred 7/29/21 and was located on the Lt side  · Pt reports having this type of chest pain at home but not as strong or lasting as long  · EKG without acute changes  · Troponin negative  · Mylanta ordered prn  · No reoccurrence         Discharge date:  8/13/21    The above assessment and plan was reviewed and updated as determined by my evaluation of the patient on 8/6/2021      Labs:   Results from last 7 days   Lab Units 08/02/21  1804   WBC Thousand/uL 6 28   HEMOGLOBIN g/dL 10 4*   HEMATOCRIT % 32 9*   PLATELETS Thousands/uL 339     Results from last 7 days   Lab Units 08/02/21  1804   SODIUM mmol/L 138   POTASSIUM mmol/L 4 2   CHLORIDE mmol/L 109*   CO2 mmol/L 23   BUN mg/dL 20   CREATININE mg/dL 0 95   CALCIUM mg/dL 9 6                   Review of Scheduled Meds:  Current Facility-Administered Medications   Medication Dose Route Frequency Provider Last Rate    acetaminophen  975 mg Oral Q8H MD Thais Stokes ALPRAZolam  0 25 mg Oral HS PRN Chencho Rod MD      aluminum-magnesium hydroxide-simethicone  30 mL Oral Q4H PRN Penny Deras PA-C      atorvastatin  20 mg Oral Daily With MD Mateusz      cephalexin  250 mg Oral Daily Chencho Rod MD      heparin (porcine)  5,000 Units Subcutaneous Q8H [facility] Chencho Rod MD      lidocaine  2 patch Topical Daily Chencho Rod MD      meclizine  12 5 mg Oral Q8H PRN Chencho Rod MD      melatonin  3 mg Oral HS Chencho Rod MD      methocarbamol  500 mg Oral Q6H PRN Chencho Rod MD      oxyCODONE  2 5 mg Oral Q4H PRN Chencho Rod MD      oxyCODONE  5 mg Oral Q4H PRN Chencho Rod MD      pantoprazole  40 mg Oral Early Morning Chencho Rod MD      polyethylene glycol  17 g Oral Daily PRN Chencho Rod MD      psyllium  1 packet Oral Daily Chencho Rod MD      simethicone  80 mg Oral 4x Daily (with meals and at bedtime) Penny Deras PA-C      sodium chloride  1 spray Each Nare 4x Daily PRN Chencho Rod MD         Subjective/ HPI: Patient seen and examined  Patients overnight issues or events were reviewed with nursing or staff during rounds or morning huddle session  New or overnight issues include the following:     None     ROS:   Negative 12 point ROS other than denoted above in HPI or assessment/plan          Imaging:     No orders to display       *Labs /Radiology studies reviewed  *Medications reviewed and reconciled as needed  *Please refer to order section for additional ordered labs studies  *Case discussed with primary attending during morning huddle case rounds      Physical Examination:  Vitals:   Vitals:    08/05/21 1500 08/06/21 0056 08/06/21 0600 08/06/21 0730   BP: 124/66 155/71  145/70   BP Location: Right arm Right arm  Right arm   Pulse: 103 70  76   Resp: 18 18  20   Temp: 98 2 °F (36 8 °C) 98 1 °F (36 7 °C)  98 4 °F (36 9 °C)   TempSrc: Oral Oral  Oral   SpO2: 100% 99%  98%   Weight:   61 6 kg (135 lb 12 9 oz)    Height: General Appearance: no distress, conversive  HEENT: PERRLA, conjuctiva normal; oropharynx clear; mucous membranes moist   Neck:  Supple, normal ROM, no JVD  Lungs: CTA, normal respiratory effort, no retractions, expiratory effort normal  CV: regular rate and rhythm; no rubs/murmurs/gallops, PMI normal   ABD: soft; ND/NT; +BS  EXT: no edema  Skin: normal turgor, normal texture, no rashes  Psych: affect normal, mood normal  Neuro: AAO     The above physical exam was reviewed and updated as determined by my evaluation of the patient on 8/6/2021  Invasive Devices     Peripheral Intravenous Line            Peripheral IV 08/02/21 Dorsal (posterior); Left Forearm 3 days                   VTE Pharmacologic Prophylaxis: Heparin  Code Status: Level 1 - Full Code  Current Length of Stay: 11 day(s)      Total time spent:  30 minutes with more than 50% spent counseling/coordinating care  Counseling includes discussion with patient re: progress  and discussion with patient of his/her current medical state/information  Coordination of patient's care was performed in conjunction with primary service  Time invested included review of patient's labs, vitals, and management of their comorbidities with continued monitoring  In addition, this patient was discussed with medical team including physician and advanced extenders  The care of the patient was extensively discussed and appropriate treatment plan was formulated unique for this patient  ** Please Note:  voice to text software may have been used in the creation of this document   Although proof errors in transcription or interpretation are a potential of such software**

## 2021-08-06 NOTE — PROGRESS NOTES
08/06/21 1230   Pain Assessment   Pain Assessment Tool 0-10   Pain Score 7   Pain Location/Orientation Orientation: Bilateral;Location: Shoulder   Pain Onset/Description Onset: Ongoing;Frequency: Constant/Continuous   Hospital Pain Intervention(s) Ambulation/increased activity;Repositioned  (gentle AROM/stretching)   Restrictions/Precautions   Precautions Bed/chair alarms;Cognitive; Fall Risk;Hard of hearing;Pain;Spinal precautions;Supervision on toilet/commode   Weight Bearing Restrictions No   ROM Restrictions Yes  (c-spine precautions)   Braces or Orthoses   (TEDs)   Sit to Stand   Type of Assistance Needed Supervision;Verbal cues; Adaptive equipment   Physical Assistance Level No physical assistance   Comment CS w/RW, vc's for hand placement   Sit to Stand CARE Score 4   Bed-Chair Transfer   Type of Assistance Needed Supervision; Adaptive equipment;Verbal cues; Incidental touching   Physical Assistance Level No physical assistance   Comment CS-CGA SPT and short-distance in-room fxl mob w/RW, vc's for hand placement and obstacle negotiation   Chair/Bed-to-Chair Transfer CARE Score 4   Toileting Hygiene   Type of Assistance Needed Incidental touching; Adaptive equipment;Verbal cues   Physical Assistance Level No physical assistance   Comment CGA for hygiene and CM in stance at RW, no LOB   Toileting Hygiene CARE Score 4   Toilet Transfer   Type of Assistance Needed Incidental touching;Supervision; Adaptive equipment;Verbal cues   Physical Assistance Level No physical assistance   Comment CS-CGA fxl mob w/RW from w/c in pt room into pt bathroom and back, no LOB, vc's for obstacle negotiation and hand placement   Toilet Transfer CARE Score 4   Kitchen Mobility   Kitchen-Mobility Level Walker   Kitchen Activity Retrieve items;Transport items   Kitchen Mobility Comments Pt engaged in fxl mobility w/RW at CS level around OT kitchen utilizing RUE fxl reach to retrieve cones from countertop and OH cabinets, with focus on RW mgmt, kitchen mobility, fxl reach, and maintaining c-spine precautions, for increased indep w/IADLs  Pt utilized [employer] w/RW to transport cones, and stated she wants to ask her daughter to order her a basket off [employer] for use in home after D/C  Min vc's required to adhere to spinal precautions  Exercise Tools   Exercise Tools Yes   Other Exercise Tool 1 Seated with Sup, 3x10 reps all planes BUE TherEx using 4# tabletop enrique for increased UE strength and endurance for improved participation in STS/xfers  Pt tolerated at slow pace 2* B/L shoulder pain/strain and with increased time for rest breaks to manage  Cognition   Overall Cognitive Status Impaired   Arousal/Participation Alert; Cooperative   Attention Within functional limits   Orientation Level Oriented X4   Memory Decreased short term memory;Decreased recall of precautions   Following Commands Follows one step commands with increased time or repetition   Additional Activities   Additional Activities Comments Seated in w/c with Sup, educated pt on [facility] for item retrieval to increase independence with fxl reach in light of spinal precautions limiting pt's ROM and fxl reach  Pt then utilized LHR while seated to retrieve bean bags from floor level on B/L sides of pt and place into bucket on floor ahead, with focus on item retrieval, adherence to spinal precautions, and LHAE training  Pt tolerated well with slow pace 2* B/L shoulder pain, able to retrieve all items w/increased time, and demo G carryover of demonstrated LHR technique  G adherence to spinal precautions with min vc's     Activity Tolerance   Activity Tolerance Patient tolerated treatment well   Medical Staff Made Aware BP seated 125/64, pulse 100 bpm, pt asymptomatic for othostatic BP   Assessment   Treatment Assessment Pt seen for 60min skilled OT session focused on toileting, fxl mobility w/RW, UE strengthening, kitchen mobility, item retrieval, LHR edu, adherence to spinal precautions, and toileting, for increased independence with ADLs/IADLs and decreased caregiver burden  See detailed descriptions of fxl performance above  Pt tolerated session well, completing fxl mobility/transfers w/RW at overall CS-CGA level with min vc's for obstacle negotiation and hand placement  Overall G adherence to c-spine precautions but pt continues to require min vc's at times to adhere  Pt continues to be limited by B/L shoulder pain, decreased endurance, standing balance/tolerance, and strength  Pt would benefit from continued skilled OT focused on fxl transfers, ADL skills retraining, standing tolerance/balance, RW mgmt/safety, and IADL retraining  Prognosis Good   Problem List Decreased strength;Decreased range of motion;Decreased endurance;Decreased mobility; Impaired balance;Decreased cognition;Decreased safety awareness; Impaired tone;Orthopedic restrictions;Pain   Barriers to Discharge Inaccessible home environment;Decreased caregiver support   Plan   Treatment/Interventions ADL retraining;Functional transfer training; Therapeutic exercise; Endurance training;Cognitive reorientation;Patient/family training;Equipment eval/education; Bed mobility; Compensatory technique education   Progress Progressing toward goals   Recommendation   OT Discharge Recommendation   (pending pt progress)   OT Therapy Minutes   OT Time In 1230   OT Time Out 1330   OT Total Time (minutes) 60   OT Mode of treatment - Individual (minutes) 60   OT Mode of treatment - Concurrent (minutes) 0   OT Mode of treatment - Group (minutes) 0   OT Mode of treatment - Co-treat (minutes) 0   OT Mode of Treatment - Total time(minutes) 60 minutes   OT Cumulative Minutes 900   Therapy Time missed   Time missed?  No

## 2021-08-06 NOTE — CONSULTS
Podiatry - Consultation    Patient Information:   Dulce Ball 66 y o  female MRN: [id number]  Unit/Bed#: -01 Encounter: [id number]  PCP: Darleen Davis MD  Date of Admission:  7/26/2021  Date of Consultation: 08/06/21  Requesting Physician: Concetta Oh MD      ASSESSMENT:    Dulce Ball is a 66 y o  female with:    1  Onychomycosis  2  Pain in toes from elongated toe nails    PLAN:     Toenails x10 were excisionally debrided using a large nipper to normal length and thickness without incident   Weight bearing as tolerated   Rest of Medical care per primary team    Podiatry signing off, thank you for the consult! Please contact with any questions or concerns  SUBJECTIVE:    History of Present Illness:    Dulce Ball is a 66 y o  female who is originally admitted 7/26/2021 due to fall with neck pain and arm numbness with a past medical history of cerival spinal stenosis, CKD, anemia, chest pain, CHF and urinary retention  We are consulted for painful, elongated toenails x 10  They state that they have difficulty applying their socks and shoes due to the elongation of the nails  They report pain with palpation and pressure within their shoe gear  They have been unable to cut their nails adequately  Patient has no further pedal complaints at this time  Review of Systems:    Constitutional: Negative  HENT: Negative  Eyes: Negative  Respiratory: Negative  Cardiovascular: Negative  Gastrointestinal: Negative  Musculoskeletal: negative  Skin: Thickened, elongated toenails  Neurological: negative  Psych: Negative       Past Medical and Surgical History:     Past Medical History:   Diagnosis Date    Cholelithiasis     GERD (gastroesophageal reflux disease)     Influenza 1/29/2019    Migraine headache     Pneumonia     Urinary tract infection        Past Surgical History:   Procedure Laterality Date    NO PAST SURGERIES      DC ARTHRODESIS ANT INTERBODY MIN DISCECTOMY, normocephalic, atraumatic, without obvious abnormality  Heart: deferred  Lungs: Non-labored breathing  No respiratory distress  Abdomen: deferred  Psychiatric: AAOx3  Lower Extremity:  Vascular:   Right DP and PT pulses are present  Left DP and PT pulses are present  CRT < 3 seconds at the digits  +1/4 edema noted at bilateral lower extremities  Pedal hair is absent  Skin temperature is WNL bilaterally  Musculoskeletal:  MMT is 5/5 in all muscle compartments bilaterally  ROM at the ankle joint is WNL bilaterally with the leg extended  No Pain on palpation of BL feeet  No gross deformities noted  Dermatological:  Elongated, discolored, dystrophic nails x 10 that are positive for subungual debris with painful incurvated nail borders  Neurological:  Gross sensation is intact  Protective sensation is intact  Patient Denies numbness and/or paresthesias  Clinical Images 08/06/21:  N/A    Additional data:     Lab Results: I have personally reviewed pertinent labs including:    Results from last 7 days   Lab Units 08/02/21  1804   WBC Thousand/uL 6 28   HEMOGLOBIN g/dL 10 4*   HEMATOCRIT % 32 9*   PLATELETS Thousands/uL 339   NEUTROS PCT % 63   LYMPHS PCT % 25   MONOS PCT % 8   EOS PCT % 2     Results from last 7 days   Lab Units 08/02/21  1804   POTASSIUM mmol/L 4 2   CHLORIDE mmol/L 109*   CO2 mmol/L 23   BUN mg/dL 20   CREATININE mg/dL 0 95   CALCIUM mg/dL 9 6           Cultures: I have personally reviewed pertinent cultures including:    Results from last 7 days   Lab Units 08/04/21  1434   URINE CULTURE  >100,000 cfu/ml Gram Negative Gonzalez*           Imaging: I have personally reviewed pertinent reports in PACS  EKG, Pathology, and Other Studies: I have personally reviewed pertinent reports  ** Please Note: Portions of the record may have been created with voice recognition software   Occasional wrong word or "sound a like" substitutions may have occurred due to the inherent limitations of voice recognition software  Read the chart carefully and recognize, using context, where substitutions have occurred   **

## 2021-08-06 NOTE — PROGRESS NOTES
08/06/21 1330   Pain Assessment   Pain Assessment Tool 0-10   Pain Score 7   Pain Location/Orientation Orientation: Bilateral;Location: Shoulder   Pain Onset/Description Onset: Ongoing;Frequency: Constant/Continuous   Patient's Stated Pain Goal No pain   Hospital Pain Intervention(s) Repositioned  (gentle stretching to [facility])   Restrictions/Precautions   Precautions Bed/chair alarms;Cognitive; Fall Risk;Hard of hearing;Pain;Spinal precautions;Supervision on toilet/commode   Weight Bearing Restrictions No   ROM Restrictions Yes   Braces or Orthoses Other (Comment)  (Teds )   Cognition   Overall Cognitive Status Impaired   Arousal/Participation Alert; Cooperative   Attention Within functional limits   Orientation Level Oriented X4   Memory Decreased short term memory;Decreased recall of precautions   Following Commands Follows one step commands with increased time or repetition   Lying to Sitting on Side of Bed   Type of Assistance Needed Supervision   Physical Assistance Level No physical assistance   Lying to Sitting on Side of Bed CARE Score 4   Sit to Stand   Type of Assistance Needed Verbal cues; Supervision   Physical Assistance Level No physical assistance   Comment CS with RW, VC's for hand placement   Sit to Stand CARE Score 4   Bed-Chair Transfer   Type of Assistance Needed Supervision;Verbal cues; Incidental touching   Physical Assistance Level No physical assistance   Comment CS/CGA with RW   Chair/Bed-to-Chair Transfer CARE Score 4   Transfer Bed/Chair/Wheelchair   Adaptive Equipment Roller Walker   Car Transfer   Type of Assistance Needed Incidental touching; Adaptive equipment   Physical Assistance Level No physical assistance   Comment CGA and VC's for hand placement   Car Transfer CARE Score 4   Walk 10 Feet   Type of Assistance Needed Supervision; Adaptive equipment   Physical Assistance Level No physical assistance   Comment CS with RW   Walk 10 Feet CARE Score 4   Walk 50 Feet with Two Turns   Type of Assistance Needed Incidental touching; Adaptive equipment   Physical Assistance Level No physical assistance   Comment CGA with VC's to increase posture   Walk 50 Feet with Two Turns CARE Score 4   Ambulation   Does the patient walk? 2  Yes   Primary Mode of Locomotion Prior to Admission Walk   Distance Walked (feet) 100 ft  (50 x2 HHA)   Assist Device Roller Walker;Hand Hold   Gait Pattern Inconsistant Erika; Slow Erika;Decreased foot clearance; Forward Flexion;Narrow FRACISCO;Shuffle;Decreased L stance; Improper weight shift   Limitations Noted In Balance; Heel Strike;Posture; Safety; Sequencing;Speed;Strength   Provided Assistance with: Balance   Walk Assist Level Close Supervision;Contact Guard   Findings HHA x50 feet on R side, pt noted increased L shuffled gait with fatigue  Wheel 50 Feet with Two Turns   Reason if not Attempted Activity not applicable   Wheel 50 Feet with Two Turns CARE Score 9   Wheel 150 Feet   Reason if not Attempted Activity not applicable   Wheel 484 Feet CARE Score 9   Wheelchair mobility   Does the patient use a wheelchair? 0  No   Curb or Single Stair   Style negotiated Curb   Type of Assistance Needed Physical assistance; Adaptive equipment;Verbal cues   Physical Assistance Level 25% or less   Comment A to lift RW on step, 6 inch curb step  1 Step (Curb) CARE Score 3   4 Steps   Type of Assistance Needed Physical assistance;Verbal cues; Adaptive equipment   Physical Assistance Level 25% or less   Comment RHR ascending with B hands on FF   4 Steps CARE Score 3   12 Steps   Type of Assistance Needed Physical assistance;Verbal cues; Adaptive equipment   Physical Assistance Level 25% or less   Comment RHR ascending with B hands on FF   12 Steps CARE Score 3   Stairs   Type Stairs;Curb   # of Steps 12   Weight Bearing Precautions Cervical;Fall Risk   Assist Devices Single Rail   Findings reciprocal gait fwd ascending  Picking Up Object   Type of Assistance Needed Incidental touching; Adaptive equipment   Physical Assistance Level No physical assistance   Comment CGA with RW and reacher, used eyes to look down to maintain cervical precqautions   Picking Up Object CARE Score 4   Other Comments   Comments Seated /72, standing 112/68   Assessment   Treatment Assessment Pt BP cont to be monitored throughout session with no major changes from seated to standing position and pt remaining asymptomatic  Pt was able to amb up/down FF steps with R HR and WYATT showing improved strength and endurance  Pt's daughter to come in possibly next week for family training  Pt will cont to benefit from increased LE strengthening, increased functional transfers and increased gait  Cont POC as tolerated  Problem List Decreased strength;Decreased range of motion;Decreased endurance; Impaired balance;Decreased mobility; Decreased cognition;Decreased safety awareness; Impaired tone;Orthopedic restrictions;Pain   Barriers to Discharge Inaccessible home environment;Decreased caregiver support   PT Barriers   Functional Limitation Car transfers;Stair negotiation;Transfers; Walking   Plan   Treatment/Interventions Functional transfer training;LE strengthening/ROM; Therapeutic exercise; Endurance training;Cognitive reorientation;Equipment eval/education; Bed mobility;Gait training   Progress Progressing toward goals   Recommendation   PT Discharge Recommendation Home with home health rehabilitation   PT Therapy Minutes   PT Time In 1330   PT Time Out 1430   PT Total Time (minutes) 60   PT Mode of treatment - Individual (minutes) 60   PT Mode of treatment - Concurrent (minutes) 0   PT Mode of treatment - Group (minutes) 0   PT Mode of treatment - Co-treat (minutes) 0   PT Mode of Treatment - Total time(minutes) 60 minutes   PT Cumulative Minutes 945   Therapy Time missed   Time missed?  No

## 2021-08-06 NOTE — PROGRESS NOTES
08/06/21 1000   Pain Assessment   Pain Assessment Tool 0-10   Pain Score 7   Pain Location/Orientation Orientation: Bilateral;Location: Shoulder   Pain Onset/Description Onset: Ongoing;Frequency: Constant/Continuous   Patient's Stated Pain Goal No pain   Hospital Pain Intervention(s) Ambulation/increased activity;Repositioned; Other (Comment)  (gentle stretching to [facility])   Restrictions/Precautions   Precautions Bed/chair alarms;Cognitive; Fall Risk;Supervision on toilet/commode;Spinal precautions   Weight Bearing Restrictions No   ROM Restrictions Yes   Braces or Orthoses Other (Comment)  ( Teds )   Cognition   Overall Cognitive Status Impaired   Arousal/Participation Alert; Uncooperative   Attention Within functional limits   Orientation Level Oriented X4   Memory Decreased short term memory;Decreased recall of precautions   Following Commands Follows one step commands with increased time or repetition   Lying to Sitting on Side of Bed   Type of Assistance Needed Supervision   Physical Assistance Level No physical assistance   Lying to Sitting on Side of Bed CARE Score 4   Sit to Stand   Type of Assistance Needed Verbal cues; Supervision   Physical Assistance Level No physical assistance   Comment CS with RW, VC's for hand placement   Sit to Stand CARE Score 4   Bed-Chair Transfer   Type of Assistance Needed Supervision; Incidental touching   Physical Assistance Level No physical assistance   Comment CS/CGA with RW   Chair/Bed-to-Chair Transfer CARE Score 4   Transfer Bed/Chair/Wheelchair   Adaptive Equipment Roller Walker   Walk 10 Feet   Type of Assistance Needed Supervision; Adaptive equipment   Physical Assistance Level No physical assistance   Comment CS with RW   Walk 10 Feet CARE Score 4   Walk 50 Feet with Two Turns   Type of Assistance Needed Incidental touching; Adaptive equipment;Supervision   Physical Assistance Level No physical assistance   Comment CGA with VC's to increase posture   Walk 50 Feet with Two Turns CARE Score 4   Ambulation   Does the patient walk? 2  Yes   Primary Mode of Locomotion Prior to Admission Walk   Distance Walked (feet) 100 ft  (x20 HHA )   Assist Device Roller Walker   Gait Pattern Inconsistant Erika; Slow Erika; Forward Flexion;Narrow FRACISCO; Improper weight shift   Limitations Noted In Balance;Device Management;Posture; Safety;Speed;Strength   Provided Assistance with: Balance   Walk Assist Level Close Supervision;Contact Guard   Wheel 50 Feet with Two Turns   Reason if not Attempted Activity not applicable   Wheel 50 Feet with Two Turns CARE Score 9   Wheel 150 Feet   Reason if not Attempted Activity not applicable   Wheel 003 Feet CARE Score 9   Wheelchair mobility   Does the patient use a wheelchair? 0  No   Toilet Transfer   Type of Assistance Needed Incidental touching;Supervision; Adaptive equipment   Physical Assistance Level No physical assistance   Comment CS/CGA with grab bars   Toilet Transfer CARE Score 4   Toilet Transfer   Surface Assessed Standard Toilet   Limitations Noted In Sequencing   Adaptive Equipment Grab Bar   Equipment Use   NuStep L2 x10 min for BLE only   Other Comments   Comments BP seated 119/68, standing 115/68, stable throughout session with just TEDS on  Assessment   Treatment Assessment Pt participated in skilled PT session with increased focus on gait and functional transfers with AD/no AD  Pt remained CS/CGA with RW, verbal cues to maintain G posture  Pt did amb short dist HH but with increased fwd lean noted  [place] stretch was done while pt seated in chair to help with rounding shoulders and poor posture  Pt will cont to benefit from increased functional transfers, increased balance, coordination and increased gait with LRAD  Cont POC as tolerated  Problem List Decreased strength;Decreased endurance; Impaired balance;Decreased mobility; Decreased coordination;Decreased safety awareness; Impaired tone;Orthopedic restrictions;Pain   Barriers to Discharge Inaccessible home environment;Decreased caregiver support   PT Barriers   Functional Limitation Car transfers;Stair negotiation;Standing;Transfers; Walking   Plan   Treatment/Interventions Functional transfer training;LE strengthening/ROM; Therapeutic exercise; Endurance training;Bed mobility;Gait training   Progress Progressing toward goals   Recommendation   PT Discharge Recommendation Home with home health rehabilitation   PT Therapy Minutes   PT Time In 1000   PT Time Out 1100   PT Total Time (minutes) 60   PT Mode of treatment - Individual (minutes) 60   PT Mode of treatment - Concurrent (minutes) 0   PT Mode of treatment - Group (minutes) 0   PT Mode of treatment - Co-treat (minutes) 0   PT Mode of Treatment - Total time(minutes) 60 minutes   PT Cumulative Minutes 885   Therapy Time missed   Time missed?  No

## 2021-08-06 NOTE — PROGRESS NOTES
Physical Medicine and Rehabilitation Progress Note  Arnold Watson 66 y o  female MRN: [id number]  Unit/Bed#: [facility] 310-95 Encounter: [id number]    HPI: Arnold Watson is a 66 y o  female who presented to the [address] after fall  Patient p/w c/o neck pain as well as bilateral arm pain/numbness  Imaging revealed cervical stenosis most severe at C4-5 with possible cord edema and patient subsequently underwent C4-5 ACDF & C2-T2 PCDF on 7/20 by Dr Luis Resendiz  Patient was also found to have B/L nasal bone fractures and L maxillary sinus hemorrhage which was managed conservatively  Post-op course complicated by urinary retention requiring morrow placement  Chief Complaint: cervical stenosis/central cord syndrome     Subjective: updated patient on active medical issues     ROS: A 10 point ROS was performed; negative except as noted above       Assessment/Plan:      Cervical stenosis of spine  Assessment & Plan  - C6 fx, per XR report of 7/21 "Redemonstrated anterior-inferior C6 vertebral body corner fracture, unchanged in alignment " (seen by neurosx on 7/26 no further intervention recommended)  - cervical stenosis most severe at C4-5 with possible cord edema s/p ACDF C4-5 & PCDF C2-T2 by Dr Luis Resendiz on 7/20  - c-spine precautions  - no brace needed per neurosx  - 2 week post-op FU completed by neurosx while patient in rehab unit   - per neurosx protocol FU XR (already e-prescribed in EMR by neurosx team) to be done 2-3 days prior to FU appt of 9/3   - OP FU with neurosx on 9/3    Nasal bone fractures  Assessment & Plan  - B/L nasal bone fractures & L maxillary sinus hemorrhage  - non-op management per OMFS  - sinus precautions   - abx recommended by OMFS however dc'd by trauma surgery in acute care who was primary team per their protocol     CKD (chronic kidney disease)  Assessment & Plan  - Cr currently 0 95  - baseline appears to be 1 0-1 2  - IM monitoring     Anemia  Assessment & Plan  - Hg currently increased to 10 4  post-operatively  - IM monitoring     Urinary retention  Assessment & Plan  - s/p morrow removal, TOV in process however PVRs have been variable, will continue urinary retention protocol and are awaiting UCx results     Chest pain  Assessment & Plan  - patient complained of brief episode of left sided chest pain while in therapy  - per patient this occurs at home as well and responds to OTC antacid medication however checked EKG to r/o cardiac etiology and EKG was unrevealing (did show mildly elevated QTc at 487, d/w IM who recommended no further GUTIERREZ/intervention at this time for QTc and to avoid QT prolonging medication if possible)   - resolved after given medication for heartburn   - troponin's negative (per IM no further troponins required)    CHF (congestive heart failure) ([facility])  Assessment & Plan  - EF 45-50%  - grade 1 DD  - not on diuretics at home  - IM monitoring volume status       Dyslipidemia  Assessment & Plan  - on home lipitor 20 mg qpm    History of recurrent UTIs  Assessment & Plan  - on home keflex 250 mg qd which patient is chronically on (confrimed with patient's OP pharmacy that patient receives monthly prescriptions for this, last provided by Dr Miki Rivera of urology) however patient with UA indicative of possible active UTI, UCx results pending   - OP FU with Dr Matilde Chinchilla  - on home xanax 0 25 mg qhs prn     Vertigo  Assessment & Plan  - chronic, likely BPV  - on home antivert 12 5 mg q8 prn     * GERD (gastroesophageal reflux disease)  Assessment & Plan  - on home protonix 40 mg qd      Scheduled Meds:  Current Facility-Administered Medications   Medication Dose Route Frequency Provider Last Rate    acetaminophen  975 mg Oral Q8H [address] Colonel Maury MD      ALPRAZolam  0 25 mg Oral HS PRN Colonel Maury MD      aluminum-magnesium hydroxide-simethicone  30 mL Oral Q4H PRN Penny Deras PA-C      atorvastatin  20 mg Oral Daily With Wil Buitrago MD      cephalexin  250 mg Oral Daily Ricky Manuel MD      heparin (porcine)  5,000 Units Subcutaneous [address] Ricky Manuel MD      lidocaine  2 patch Topical Daily Ricky Manuel MD      meclizine  12 5 mg Oral Q8H PRN Ricky Manuel MD      melatonin  3 mg Oral HS Ricky Manuel MD      methocarbamol  500 mg Oral Q6H PRN Ricky Manuel MD      oxyCODONE  2 5 mg Oral Q4H PRN Ricky Manuel MD     [employer] oxyCODONE  5 mg Oral Q4H PRN Ricky Manuel MD      pantoprazole  40 mg Oral Early Morning Ricky Manuel MD      polyethylene glycol  17 g Oral Daily PRN Ricky Manuel MD      psyllium  1 packet Oral Daily Ricky Manuel MD      simethicone  80 mg Oral 4x Daily (with meals and at bedtime) Penny Deras PA-C      sodium chloride  1 spray Each Nare 4x Daily PRN Ricky Manuel MD            Incidental findings:     1) gallstones: asymptomatic; OP FU with PCP with GI referral at PCPs discretion      DVT ppx: HSQ (cleared in acute care by neurosx for pharmacologic DVT ppx)       Objective:    Functional Update:  Mobility: min-mod  Transfers: min   ADLs: min-mod       Physical Exam:    Vitals:    08/06/21 1005   BP: 115/68   Pulse:    Resp:    Temp:    SpO2:            General: alert, no apparent distress, cooperative and comfortable  HEENT:  Eye: Normal external eye, conjunctiva, lidsc cornea  Ears: Normal external ears  Nose: Normal external nose, mucus membranes  CARDIAC:  +S1/2  LUNGS:  no abnormal respiratory pattern, no retractions noted, non-labored breathing   ABDOMEN:  soft NT  EXTREMITIES:  no cyanosis or clubbing   NEURO:  awake, alert appropriate responses to questions   PSYCH:  mood/affect currently stable   INCISION:  surgical site C/D/I             Diagnostic Studies:  No orders to display       Laboratory:   Results from last 7 days   Lab Units 08/02/21  1804   HEMOGLOBIN g/dL 10 4*   HEMATOCRIT % 32 9*   WBC Thousand/uL 6 28     Results from last 7 days   Lab Units 08/02/21  1804   BUN mg/dL 20   SODIUM mmol/L 138   POTASSIUM mmol/L 4 2   CHLORIDE mmol/L 109*   CREATININE mg/dL 0 95

## 2021-08-07 PROCEDURE — 97530 THERAPEUTIC ACTIVITIES: CPT

## 2021-08-07 PROCEDURE — 99232 SBSQ HOSP IP/OBS MODERATE 35: CPT | Performed by: NURSE PRACTITIONER

## 2021-08-07 PROCEDURE — 97110 THERAPEUTIC EXERCISES: CPT

## 2021-08-07 RX ORDER — CEPHALEXIN 500 MG/1
500 CAPSULE ORAL EVERY 6 HOURS SCHEDULED
Status: DISCONTINUED | OUTPATIENT
Start: 2021-08-07 | End: 2021-08-08

## 2021-08-07 RX ADMIN — MELATONIN TAB 3 MG 3 MG: 3 TAB at 21:46

## 2021-08-07 RX ADMIN — HEPARIN SODIUM 5000 UNITS: 5000 INJECTION INTRAVENOUS; SUBCUTANEOUS at 07:00

## 2021-08-07 RX ADMIN — PANTOPRAZOLE SODIUM 40 MG: 40 TABLET, DELAYED RELEASE ORAL at 07:00

## 2021-08-07 RX ADMIN — SIMETHICONE 80 MG: 20 EMULSION ORAL at 17:24

## 2021-08-07 RX ADMIN — CEPHALEXIN 250 MG: 250 CAPSULE ORAL at 08:31

## 2021-08-07 RX ADMIN — ACETAMINOPHEN 975 MG: 325 TABLET, FILM COATED ORAL at 21:46

## 2021-08-07 RX ADMIN — CEPHALEXIN 500 MG: 500 CAPSULE ORAL at 11:56

## 2021-08-07 RX ADMIN — LIDOCAINE 2 PATCH: 50 PATCH TOPICAL at 08:31

## 2021-08-07 RX ADMIN — Medication 1 PACKET: at 08:34

## 2021-08-07 RX ADMIN — CEPHALEXIN 500 MG: 500 CAPSULE ORAL at 17:22

## 2021-08-07 RX ADMIN — SIMETHICONE 80 MG: 20 EMULSION ORAL at 08:30

## 2021-08-07 RX ADMIN — ATORVASTATIN CALCIUM 20 MG: 20 TABLET, FILM COATED ORAL at 17:22

## 2021-08-07 RX ADMIN — HEPARIN SODIUM 5000 UNITS: 5000 INJECTION INTRAVENOUS; SUBCUTANEOUS at 13:14

## 2021-08-07 RX ADMIN — ACETAMINOPHEN 975 MG: 325 TABLET, FILM COATED ORAL at 07:00

## 2021-08-07 RX ADMIN — CEPHALEXIN 500 MG: 500 CAPSULE ORAL at 23:08

## 2021-08-07 RX ADMIN — SIMETHICONE 80 MG: 20 EMULSION ORAL at 11:56

## 2021-08-07 RX ADMIN — SIMETHICONE 80 MG: 20 EMULSION ORAL at 21:51

## 2021-08-07 RX ADMIN — ACETAMINOPHEN 975 MG: 325 TABLET, FILM COATED ORAL at 13:13

## 2021-08-07 RX ADMIN — HEPARIN SODIUM 5000 UNITS: 5000 INJECTION INTRAVENOUS; SUBCUTANEOUS at 21:47

## 2021-08-07 NOTE — PROGRESS NOTES
08/07/21 1430   Pain Assessment   Pain Assessment Tool Pain Assessment not indicated - pt denies pain   Pain Score No Pain   Restrictions/Precautions   Precautions Bed/chair alarms;Cognitive; Fall Risk;Pain;Spinal precautions;Supervision on toilet/commode  (sinus)   Weight Bearing Restrictions No   ROM Restrictions Yes  (Spinal)   Cognition   Overall Cognitive Status Impaired   Arousal/Participation Alert; Cooperative   Attention Within functional limits   Memory Decreased short term memory   Following Commands Follows one step commands without difficulty   Sit to Stand   Type of Assistance Needed Supervision; Adaptive equipment   Comment  w/ RW   Sit to Stand CARE Score 4   Bed-Chair Transfer   Type of Assistance Needed Supervision; Adaptive equipment   Comment  RW   Chair/Bed-to-Chair Transfer CARE Score 4   Walk 10 Feet   Type of Assistance Needed Supervision; Adaptive equipment   Comment  RW   Walk 10 Feet CARE Score 4   Walk 50 Feet with Two Turns   Type of Assistance Needed Supervision; Adaptive equipment   Comment  RW   Walk 50 Feet with Two Turns CARE Score 4   Ambulation   Does the patient walk? 2  Yes   Primary Mode of Locomotion Prior to Admission Walk   Distance Walked (feet) 50 ft  (x2)   Assist Device Roller Walker   Gait Pattern Inconsistant Erika; Slow Erika;Decreased foot clearance; Forward Flexion; Improper weight shift   Limitations Noted In Balance; Coordination;Device Management; Endurance;Speed;Strength;Swing   Provided Assistance with: Balance   Walk Assist Level Close Supervision   Findings  w/ RW   Wheel 50 Feet with Two Turns   Reason if not Attempted Activity not applicable   Wheel 50 Feet with Two Turns CARE Score 9   Wheel 150 Feet   Reason if not Attempted Activity not applicable   Wheel 742 Feet CARE Score 9   Wheelchair mobility   Does the patient use a wheelchair? 0  No   Toilet Transfer   Type of Assistance Needed Supervision; Adaptive equipment   Comment  w/ RW   Toilet Transfer CARE Score 4   Therapeutic Interventions   Other Donned MATHIEU TEDs   Assessment   Treatment Assessment Pt requesting to use bathroom at start of session  Brief PT session spent discussing family training this week w/ pts daughter Sobeida Vick in preparation for d/c Friday  Pt overall progressing well, cont to req CS w/ RW 2* spinal precautions and dec safety awareness at times  Cont w/ POC focusing on HH distances, stairs, dynamic balance andf ucntional tasks to maximize independence and dec risk of falling at d/c  Problem List Decreased strength;Decreased range of motion;Decreased endurance; Impaired balance;Decreased mobility; Decreased cognition;Decreased safety awareness; Impaired tone;Orthopedic restrictions;Pain   Barriers to Discharge Inaccessible home environment;Decreased caregiver support   Barriers to Discharge Comments Family training w/ daughter Sobeida Vick on Tuesday   PT Barriers   Functional Limitation Car transfers; Ramp negotiation;Standing;Stair negotiation;Transfers; Walking   Plan   Treatment/Interventions Functional transfer training;LE strengthening/ROM; Elevations; Therapeutic exercise; Endurance training;Patient/family training;Equipment eval/education;Gait training; Compensatory technique education;Cognitive reorientation; Bed mobility;Continued evaluation   Progress Progressing toward goals   Recommendation   PT Discharge Recommendation Home with home health rehabilitation   Equipment Recommended Walker   PT Therapy Minutes   PT Time In 1430   PT Time Out 1500   PT Total Time (minutes) 30   PT Mode of treatment - Individual (minutes) 30   PT Mode of treatment - Concurrent (minutes) 0   PT Mode of treatment - Group (minutes) 0   PT Mode of treatment - Co-treat (minutes) 0   PT Mode of Treatment - Total time(minutes) 30 minutes   PT Cumulative Minutes 975   Therapy Time missed   Time missed?  No

## 2021-08-07 NOTE — PROGRESS NOTES
Internal Medicine Progress Note  Patient: Evelyn Forbes  Age/sex: 66 y o  female  Medical Record #: [id number]      ASSESSMENT/PLAN: (Interval History)  Evelyn Forbes is seen and examined and management for following issues:    Central cord syndrome; s/p ACDF C4-5/PCDF C2-T2 7/20/21  · Doesn't need collar per NS  · Incisions w/o erythema/infections  · Staples removed 8/3 by NS     Acute nasal bone fx  · OMS saw = no surgery needed  · Sinus precautions for 4 weeks     Urine retention  · Gotti reinserted 7/26  · VT now per PMR ongoing     Recurrent UTI  · On suppressive tx with Keflex at home  · Will increase to tx suspected acute UTI in the setting of urinary frequency/burning  · Primary service ruling out acute UTI currently (>100,000 GNR)  · Cx and sensitivity pending will adjust abx based on same     ABLA  · Did not require transfusion     Orthostasis  · Improved s/p IVF on acute and here last week but reoccurred 8/2 = given 500ml NSS  · Set PO fluid goal of 1200ml/day which she did not reach 8/4 or 8/5 at least according to I/o  · Doing fine wo abd binder now; using TEDs only     Chest pain  · Occurred 7/29/21 and was located on the Lt side  · w/u negative   · No further occurrence        Discharge date:  8/13/21    The above assessment and plan was reviewed and updated as determined by my evaluation of the patient on 8/7/2021      Labs:   Results from last 7 days   Lab Units 08/02/21  1804   WBC Thousand/uL 6 28   HEMOGLOBIN g/dL 10 4*   HEMATOCRIT % 32 9*   PLATELETS Thousands/uL 339     Results from last 7 days   Lab Units 08/02/21  1804   SODIUM mmol/L 138   POTASSIUM mmol/L 4 2   CHLORIDE mmol/L 109*   CO2 mmol/L 23   BUN mg/dL 20   CREATININE mg/dL 0 95   CALCIUM mg/dL 9 6                   Review of Scheduled Meds:  Current Facility-Administered Medications   Medication Dose Route Frequency Provider Last Rate    acetaminophen  975 mg Oral Q8H [facility] Alexis Ramírez MD      ALPRAZolam  0 25 mg Oral HS PRN Tashi Rosenda Morales MD      aluminum-magnesium hydroxide-simethicone  30 mL Oral Q4H PRN Penny Deras PA-C      atorvastatin  20 mg Oral Daily With MD Mateusz      cephalexin  250 mg Oral Daily Cody Reyes MD      heparin (porcine)  5,000 Units Subcutaneous Q8H [facility] Cody Reyes MD      lidocaine  2 patch Topical Daily Cody Reyes MD      meclizine  12 5 mg Oral Q8H PRN Cody Reyes MD      melatonin  3 mg Oral HS Cody Reyes MD      methocarbamol  500 mg Oral Q6H PRN Cody Reyes MD      oxyCODONE  2 5 mg Oral Q4H PRN Cody Reyes MD      oxyCODONE  5 mg Oral Q4H PRN Cody Reyes MD      pantoprazole  40 mg Oral Early Morning Cody Reyes MD      polyethylene glycol  17 g Oral Daily PRN Cody Reyes MD      psyllium  1 packet Oral Daily Cody Reyes MD      simethicone  80 mg Oral 4x Daily (with meals and at bedtime) Penny Deras PA-C      sodium chloride  1 spray Each Nare 4x Daily PRN Cody Reyes MD         Subjective/ HPI: Patient seen and examined  Patients overnight issues or events were reviewed with nursing or staff during rounds or morning huddle session  New or overnight issues include the following:     Pt seen and examined at bedside; c/o urinary frequency/dysuria overnight; will await culture and sensitivity, will adjust abx as needed     ROS:   Negative 12 point ROS other than denoted above in HPI or assessment/plan          Imaging:     No orders to display       *Labs /Radiology studies reviewed  *Medications reviewed and reconciled as needed  *Please refer to order section for additional ordered labs studies  *Case discussed with primary attending during morning huddle case rounds      Physical Examination:  Vitals:   Vitals:    08/06/21 2304 08/07/21 0526 08/07/21 0540 08/07/21 0700   BP: 128/59   139/68   BP Location: Left arm   Left arm   Pulse: 105   96   Resp: 18   18   Temp: 97 9 °F (36 6 °C)   98 4 °F (36 9 °C)   TempSrc: Oral   Oral   SpO2: 100%   99% Weight:  64 4 kg (142 lb) 64 4 kg (142 lb)    Height:           GEN: No apparent distress, interactive  NEURO: Alert and oriented x3  HEENT: Pupils are equal and reactive, EOMI, mucous membranes are moist, face symmetrical  CV: S1 S2 regular, no MRG, no peripheral edema noted  RESP: Lungs are clear bilaterally, no wheezes, rales or rhonchi noted, on room air, respirations easy and non labored  GI: Flat, soft non tender, non distended; +BS x4  : Voiding without difficulty  MUSC: Moves all extremities  SKIN: pink, warm and dry, normal turgor, neck incision intact        The above physical exam was reviewed and updated as determined by my evaluation of the patient on 8/7/2021  Invasive Devices     None                    VTE Pharmacologic Prophylaxis: Heparin  Code Status: Level 1 - Full Code  Current Length of Stay: 12 day(s)      Total time spent:  30 minutes with more than 50% spent counseling/coordinating care  Counseling includes discussion with patient re: progress  and discussion with patient of his/her current medical state/information  Coordination of patient's care was performed in conjunction with primary service  Time invested included review of patient's labs, vitals, and management of their comorbidities with continued monitoring  In addition, this patient was discussed with medical team including physician and advanced extenders  The care of the patient was extensively discussed and appropriate treatment plan was formulated unique for this patient  ** Please Note:  voice to text software may have been used in the creation of this document   Although proof errors in transcription or interpretation are a potential of such software**

## 2021-08-07 NOTE — PROGRESS NOTES
08/07/21 1115   Pain Assessment   Pain Assessment Tool Pain Assessment not indicated - pt denies pain   Pain Score No Pain   Restrictions/Precautions   Precautions Bed/chair alarms;Cognitive; Fall Risk;Pain;Spinal precautions;Supervision on toilet/commode  (sinus precautions)   Weight Bearing Restrictions No   ROM Restrictions Yes  (cervical spine)   Lifestyle   Autonomy Pt agreeable to OT session with daughter present   Eating   Type of Assistance Needed Set-up / clean-up   Physical Assistance Level No physical assistance   Comment seated in recliner chair    Eating CARE Score 5   Sit to Stand   Type of Assistance Needed Supervision   Physical Assistance Level No physical assistance   Comment CS with RW   Sit to Stand CARE Score 4   Bed-Chair Transfer   Type of Assistance Needed Supervision   Physical Assistance Level No physical assistance   Comment CS with RW   Chair/Bed-to-Chair Transfer CARE Score 4   Toileting Hygiene   Type of Assistance Needed Supervision   Physical Assistance Level No physical assistance   Comment CS while in stance for hygiene and CM   Toileting Hygiene CARE Score 4   Toilet Transfer   Type of Assistance Needed Supervision   Physical Assistance Level No physical assistance   Comment CS with RW; GB   Toilet Transfer CARE Score 4   Cognition   Overall Cognitive Status Impaired   Arousal/Participation Alert; Cooperative   Attention Within functional limits   Orientation Level Oriented X4   Memory Decreased short term memory;Decreased recall of precautions   Following Commands Follows one step commands with increased time or repetition   Activity Tolerance   Activity Tolerance Patient tolerated treatment well   Assessment   Treatment Assessment Pt engages in 38 minute skilled OT session focusing on toileting, toilet transfer and education/recommendations for safe d/c home with pt's daughter present  See above for full functional details   Pt demo's consistent CS with RW, able to recall safety recommendations made by previous therapists, however occ requires min VCs to carryover in functional tasks  Discussion had with pt and pt's daughter Jihan Johnson, regarding DME needs for d/c home  Daughter reports that they are looking for a lift chair as her current chair is only a recliner chair, pt and spouse very receptive [facility] for over top toilet as well as sitting in living room for periods of time when pt is alone, and finally discussed recommendation of walker tray (which pt has access to from another family member)  Family training scheduled for next Tuesday 8/10/21 at 830 with pt and pt's daughter Jihan Johnson  Recommend continued skilled care in order to inc independence with self care, functional transfers, standing adalgisa/bal, light IADLs in order to decrease burden of care at d/c     OT Family training done with: daughter cortez   Prognosis Good   Problem List Decreased strength;Decreased range of motion;Decreased endurance; Impaired balance;Decreased mobility; Decreased cognition;Decreased safety awareness; Impaired tone;Orthopedic restrictions;Pain   Barriers to Discharge Inaccessible home environment;Decreased caregiver support   Plan   Treatment/Interventions ADL retraining;Functional transfer training; Therapeutic exercise; Endurance training;Cognitive reorientation;Patient/family training;Equipment eval/education; Compensatory technique education   OT Therapy Minutes   OT Time In 1115   OT Time Out 1153   OT Total Time (minutes) 38   OT Mode of treatment - Individual (minutes) 38   OT Mode of treatment - Concurrent (minutes) 0   OT Mode of treatment - Group (minutes) 0   OT Mode of treatment - Co-treat (minutes) 0   OT Mode of Treatment - Total time(minutes) 38 minutes   OT Cumulative Minutes 938   Therapy Time missed   Time missed?  No

## 2021-08-08 LAB — BACTERIA UR CULT: ABNORMAL

## 2021-08-08 PROCEDURE — 99232 SBSQ HOSP IP/OBS MODERATE 35: CPT | Performed by: NURSE PRACTITIONER

## 2021-08-08 PROCEDURE — 97530 THERAPEUTIC ACTIVITIES: CPT

## 2021-08-08 RX ORDER — CIPROFLOXACIN 500 MG/1
500 TABLET, FILM COATED ORAL EVERY 12 HOURS SCHEDULED
Status: COMPLETED | OUTPATIENT
Start: 2021-08-08 | End: 2021-08-12

## 2021-08-08 RX ADMIN — OXYCODONE HYDROCHLORIDE 5 MG: 5 TABLET ORAL at 23:52

## 2021-08-08 RX ADMIN — SIMETHICONE 80 MG: 20 EMULSION ORAL at 17:41

## 2021-08-08 RX ADMIN — CEPHALEXIN 500 MG: 500 CAPSULE ORAL at 11:43

## 2021-08-08 RX ADMIN — HEPARIN SODIUM 5000 UNITS: 5000 INJECTION INTRAVENOUS; SUBCUTANEOUS at 06:53

## 2021-08-08 RX ADMIN — SIMETHICONE 80 MG: 20 EMULSION ORAL at 21:02

## 2021-08-08 RX ADMIN — SIMETHICONE 80 MG: 20 EMULSION ORAL at 07:59

## 2021-08-08 RX ADMIN — HEPARIN SODIUM 5000 UNITS: 5000 INJECTION INTRAVENOUS; SUBCUTANEOUS at 20:54

## 2021-08-08 RX ADMIN — CIPROFLOXACIN HYDROCHLORIDE 500 MG: 500 TABLET, FILM COATED ORAL at 20:53

## 2021-08-08 RX ADMIN — ACETAMINOPHEN 975 MG: 325 TABLET, FILM COATED ORAL at 06:52

## 2021-08-08 RX ADMIN — SIMETHICONE 80 MG: 20 EMULSION ORAL at 11:43

## 2021-08-08 RX ADMIN — LIDOCAINE 2 PATCH: 50 PATCH TOPICAL at 08:00

## 2021-08-08 RX ADMIN — HEPARIN SODIUM 5000 UNITS: 5000 INJECTION INTRAVENOUS; SUBCUTANEOUS at 13:24

## 2021-08-08 RX ADMIN — Medication 1 PACKET: at 08:00

## 2021-08-08 RX ADMIN — ACETAMINOPHEN 975 MG: 325 TABLET, FILM COATED ORAL at 20:53

## 2021-08-08 RX ADMIN — CIPROFLOXACIN HYDROCHLORIDE 500 MG: 500 TABLET, FILM COATED ORAL at 15:00

## 2021-08-08 RX ADMIN — PANTOPRAZOLE SODIUM 40 MG: 40 TABLET, DELAYED RELEASE ORAL at 06:53

## 2021-08-08 RX ADMIN — ACETAMINOPHEN 975 MG: 325 TABLET, FILM COATED ORAL at 13:24

## 2021-08-08 RX ADMIN — ATORVASTATIN CALCIUM 20 MG: 20 TABLET, FILM COATED ORAL at 17:41

## 2021-08-08 RX ADMIN — MELATONIN TAB 3 MG 3 MG: 3 TAB at 20:53

## 2021-08-08 RX ADMIN — CEPHALEXIN 500 MG: 500 CAPSULE ORAL at 06:53

## 2021-08-08 NOTE — QUICK NOTE
Physical Medicine and Rehabilitation Quick Note  Rodolfo Huntley 66 y o  female MRN: [id number]  Unit/Bed#: -01 Encounter: [id number]     HPI: Gold Escobar a 66 y  o  female who presented to the [address] [place] [address] fall  Patient p/w c/o neck pain as well as bilateral arm pain/numbness  Imaging revealed cervical stenosis most severe at C4-5 with possible cord edema and patient subsequently underwent C4-5 ACDF & C2-T2 PCDF on 7/20 by Dr Angelina Grajeda  Patient was also found to have B/L nasal bone fractures and L maxillary sinus hemorrhage which was managed conservatively  Post-op course complicated by urinary retention requiring morrow placement        Culture returned with MRDO, enterbacter  · Isolation orders in place  · Ordered Cipro 500mg Q12H for 5 days  · Discontinued Keflex  · Discontinued Mylanta as decreases serum concentration of flouroquinolones    Urine Culture >100,000 cfu/ml Enterobacter cloacae complexAbnormal     Multi-Drug Resistant Organism   Please note: This patient requires contact precautions        Susceptibility     Enterobacter cloacae complex     MAGALY     Amoxicillin + Clavulanate >16/8 ug/ml Resistant     Ampicillin ($$) >16 00 ug/ml Resistant     Ampicillin + Sulbactam ($) >16/8 ug/ml Resistant     Aztreonam ($$$)  >16 ug/ml Resistant     Cefazolin ($) >16 00 ug/ml Resistant     Cefepime ($) 8 00 ug/ml Susceptible     Cefotaxime ($) >32 00 ug/ml Resistant     Ceftazidime ($$) >16 ug/ml Resistant     Ceftriaxone ($$) >32 00 ug/ml Resistant     Cefuroxime ($$) >16 ug/ml Resistant     Ciprofloxacin ($)  <=1 00 ug/ml Susceptible     Ertapenem ($$$) <=0 5 ug/ml Susceptible     Gentamicin ($$) <=1 ug/ml Susceptible     Levofloxacin ($) <=0 25 ug/ml Susceptible     Nitrofurantoin <=32 ug/ml Susceptible     Piperacillin + Tazobactam ($$$) 32 ug/ml Intermediate     Tetracycline <=4 ug/ml Susceptible     Tobramycin ($) <=1 ug/ml Susceptible     Trimethoprim + Sulfamethoxazole ($$$) <=2/38 ug/ml Susceptible     ZID Performed Yes               [place], DO  [facility] Medicine and [facility]

## 2021-08-08 NOTE — PROGRESS NOTES
08/08/21 1345   Pain Assessment   Pain Assessment Tool Pain Assessment not indicated - pt denies pain   Pain Score No Pain   Restrictions/Precautions   Precautions Bed/chair alarms; Fall Risk;Spinal precautions;Supervision on toilet/commode  (sinus precautions)   Weight Bearing Restrictions No   ROM Restrictions Yes  (cervical spine)   Lifestyle   Autonomy "Why are you wearing that?! Last time I saw those blue things I wasn't allowed to see my !"   Oral Hygiene   Type of Assistance Needed Supervision   Physical Assistance Level No physical assistance   Comment in stance at sink   Oral Hygiene CARE Score 4   Sit to Stand   Type of Assistance Needed Supervision   Physical Assistance Level No physical assistance   Comment CS with RW   Sit to Stand CARE Score 4   Bed-Chair Transfer   Type of Assistance Needed Supervision   Physical Assistance Level No physical assistance   Comment CS with RW   Chair/Bed-to-Chair Transfer CARE Score 4   Kitchen Mobility   Kitchen-Mobility Level Walker   Kitchen Activity Retrieve items;Transport items   Kitchen Mobility Comments Simulated kitchen mobility completed in room due to new contact precautions  Pt completes light housekeeping using RW and walker tray to simulate retrieving and transporting items as she would when she is assisting with meal prep  Pt noted to demo safer RW management with walker tray in place with pt stating, "I feel like this makes me stand nice and close to the walker"  No significant safety concerns with item retrieval and transportation, no LOB noted  Cognition   Overall Cognitive Status Impaired   Arousal/Participation Alert; Cooperative   Attention Within functional limits   Orientation Level Oriented X4   Memory Decreased short term memory   Following Commands Follows one step commands without difficulty   Activity Tolerance   Activity Tolerance Patient tolerated treatment well   Assessment   Treatment Assessment Pt engages in 45 minutes skilled OT session focusing on functional transfers with RW, simulated kitchen mobility in room using RW and walker tray with pt focusing on retrieving and transporting items within room  See above for full functional details  This VAN was informed of new contact precautions for MDRO in her urine, warranting gown and gloves to be donned  Upon entrance to pt room, pt immediately emotional and upset at the sight of this VAN wearing PPE with pt stating, "Why are you wearing that?! Last time I saw those blue things I wasn't allowed to see my !" Pt requires emotional support to ensure her that she was OK, pt questioning if she had COVID-19, ensured pt she did not and that her nurse would be able to better explain reasoning for PPE  Pt able to be calmed and redirected and agreeable to OT session in room  Daughter and grandson are scheduled for family training on Tuesday  Please focus OT treatment sessions on ADL retraining, functional transfers, IADLs with RW and walker tray, standing adalgisa/bal in order to decrease burden of care at d/c  Prognosis Good   Problem List Decreased strength;Decreased range of motion;Decreased endurance; Impaired balance;Decreased mobility; Decreased cognition;Decreased safety awareness; Impaired tone;Orthopedic restrictions;Pain   Barriers to Discharge Inaccessible home environment;Decreased caregiver support   Plan   Treatment/Interventions ADL retraining;Functional transfer training; Therapeutic exercise; Endurance training;Patient/family training;Equipment eval/education; Compensatory technique education   OT Therapy Minutes   OT Time In 1345   OT Time Out 1430   OT Total Time (minutes) 45   OT Mode of treatment - Individual (minutes) 45   OT Mode of treatment - Concurrent (minutes) 0   OT Mode of treatment - Group (minutes) 0   OT Mode of treatment - Co-treat (minutes) 0   OT Mode of Treatment - Total time(minutes) 45 minutes   OT Cumulative Minutes 983   Therapy Time missed   Time missed?  No

## 2021-08-08 NOTE — PROGRESS NOTES
Internal Medicine Progress Note  Patient: Mil Wang  Age/sex: 66 y o  female  Medical Record #: [id number]      ASSESSMENT/PLAN: (Interval History)  Mil Wang is seen and examined and management for following issues:    Central cord syndrome; s/p ACDF C4-5/PCDF C2-T2 7/20/21  · Doesn't need collar per NS  · Incisions w/o erythema/infections  · Staples removed 8/3 by NS     Acute nasal bone fx  · OMS saw = no surgery needed  · Sinus precautions for 4 weeks     Urine retention  · VT now per PMR ongoing     Recurrent UTI  · On suppressive tx with Keflex at home  · Will increase to tx suspected acute UTI in the setting of urinary frequency/burning  · cx + GNR >100,000 symptoms have improved  · Cx and sensitivity pending will adjust abx based on same     ABLA  · Did not require transfusion     Orthostasis  · Improved s/p IVF on acute and here last week but reoccurred 8/2 = given 500ml NSS  · Set PO fluid goal of 1200ml/day which she did not reach 8/4 or 8/5 at least according to I/o  · Doing fine wo abd binder now; using TEDs only     Chest pain  · Occurred 7/29/21 and was located on the Lt side  · w/u negative   · No further occurrence        Discharge date:  8/13/21    The above assessment and plan was reviewed and updated as determined by my evaluation of the patient on 8/8/2021      Labs:   Results from last 7 days   Lab Units 08/02/21  1804   WBC Thousand/uL 6 28   HEMOGLOBIN g/dL 10 4*   HEMATOCRIT % 32 9*   PLATELETS Thousands/uL 339     Results from last 7 days   Lab Units 08/02/21  1804   SODIUM mmol/L 138   POTASSIUM mmol/L 4 2   CHLORIDE mmol/L 109*   CO2 mmol/L 23   BUN mg/dL 20   CREATININE mg/dL 0 95   CALCIUM mg/dL 9 6                   Review of Scheduled Meds:  Current Facility-Administered Medications   Medication Dose Route Frequency Provider Last Rate    acetaminophen  975 mg Oral Q8H [facility] Rani Tierney MD      ALPRAZolam  0 25 mg Oral HS PRN Rani Tierney MD      aluminum-magnesium hydroxide-simethicone  30 mL Oral Q4H PRN Penny Deras PA-C      atorvastatin  20 mg Oral Daily With MD Mateusz      cephalexin  500 mg Oral Q6H [facility] ROYA Huerta      heparin (porcine)  5,000 Units Subcutaneous Q8H [facility] Gladys Sims MD      lidocaine  2 patch Topical Daily Gladys Sims MD      meclizine  12 5 mg Oral Q8H PRN Gladys Sims MD      melatonin  3 mg Oral HS Gladys Sims MD      methocarbamol  500 mg Oral Q6H PRN Gladys Sims MD      oxyCODONE  2 5 mg Oral Q4H PRN Gladys Sims MD      oxyCODONE  5 mg Oral Q4H PRN Gladys Sims MD      pantoprazole  40 mg Oral Early Morning Gladys Sims MD      polyethylene glycol  17 g Oral Daily PRN Gladys Sims MD      psyllium  1 packet Oral Daily Gladys Sims MD      simethicone  80 mg Oral 4x Daily (with meals and at bedtime) Penny Deras PA-C      sodium chloride  1 spray Each Nare 4x Daily PRN Gladys Sims MD         Subjective/ HPI: Patient seen and examined  Patients overnight issues or events were reviewed with nursing or staff during rounds or morning huddle session  New or overnight issues include the following:     Pt seen and examined at bedside, c/o lots of gas and feels like she needs to have BM    ROS:   Negative 12 point ROS other than denoted above in HPI or assessment/plan          Imaging:     No orders to display       *Labs /Radiology studies reviewed  *Medications reviewed and reconciled as needed  *Please refer to order section for additional ordered labs studies  *Case discussed with primary attending during morning huddle case rounds      Physical Examination:  Vitals:   Vitals:    08/07/21 1500 08/08/21 0002 08/08/21 0600 08/08/21 0620   BP: 116/68 102/58  102/60   BP Location: Right arm Right arm  Left arm   Pulse: 103 96  101   Resp: 18 17  18   Temp: 98 2 °F (36 8 °C) 97 8 °F (36 6 °C)  97 9 °F (36 6 °C)   TempSrc: Oral Oral  Oral   SpO2: 100% 98%  98%   Weight:   61 4 kg (135 lb 5 8 oz) Height:           GEN: No apparent distress, interactive; frail  NEURO: Alert and oriented x3  HEENT: Pupils are equal and reactive, EOMI, mucous membranes are moist, face symmetrical  CV: S1 S2 regular, no MRG, no peripheral edema noted  RESP: Lungs are clear bilaterally, no wheezes, rales or rhonchi noted, on room air, respirations easy and non labored  GI: Flat, soft non tender, non distended; +BS x4  : Voiding without difficulty  MUSC: Moves all extremities; generalized deconditioning  SKIN: pink, warm and dry, normal turgor, incision intact        The above physical exam was reviewed and updated as determined by my evaluation of the patient on 8/8/2021  Invasive Devices     None                    VTE Pharmacologic Prophylaxis: Heparin  Code Status: Level 1 - Full Code  Current Length of Stay: 13 day(s)      Total time spent:  30 minutes with more than 50% spent counseling/coordinating care  Counseling includes discussion with patient re: progress  and discussion with patient of his/her current medical state/information  Coordination of patient's care was performed in conjunction with primary service  Time invested included review of patient's labs, vitals, and management of their comorbidities with continued monitoring  In addition, this patient was discussed with medical team including physician and advanced extenders  The care of the patient was extensively discussed and appropriate treatment plan was formulated unique for this patient  ** Please Note:  voice to text software may have been used in the creation of this document   Although proof errors in transcription or interpretation are a potential of such software**

## 2021-08-09 PROCEDURE — 97110 THERAPEUTIC EXERCISES: CPT

## 2021-08-09 PROCEDURE — 97530 THERAPEUTIC ACTIVITIES: CPT

## 2021-08-09 PROCEDURE — 97535 SELF CARE MNGMENT TRAINING: CPT

## 2021-08-09 PROCEDURE — 99232 SBSQ HOSP IP/OBS MODERATE 35: CPT | Performed by: NURSE PRACTITIONER

## 2021-08-09 PROCEDURE — 99232 SBSQ HOSP IP/OBS MODERATE 35: CPT | Performed by: PHYSICAL MEDICINE & REHABILITATION

## 2021-08-09 RX ADMIN — POLYETHYLENE GLYCOL 3350 17 G: 17 POWDER, FOR SOLUTION ORAL at 18:19

## 2021-08-09 RX ADMIN — CIPROFLOXACIN HYDROCHLORIDE 500 MG: 500 TABLET, FILM COATED ORAL at 21:19

## 2021-08-09 RX ADMIN — CIPROFLOXACIN HYDROCHLORIDE 500 MG: 500 TABLET, FILM COATED ORAL at 09:31

## 2021-08-09 RX ADMIN — Medication 1 PACKET: at 09:31

## 2021-08-09 RX ADMIN — ACETAMINOPHEN 975 MG: 325 TABLET, FILM COATED ORAL at 21:18

## 2021-08-09 RX ADMIN — HEPARIN SODIUM 5000 UNITS: 5000 INJECTION INTRAVENOUS; SUBCUTANEOUS at 05:55

## 2021-08-09 RX ADMIN — ACETAMINOPHEN 975 MG: 325 TABLET, FILM COATED ORAL at 14:41

## 2021-08-09 RX ADMIN — SIMETHICONE 80 MG: 20 EMULSION ORAL at 13:30

## 2021-08-09 RX ADMIN — SIMETHICONE 80 MG: 20 EMULSION ORAL at 21:19

## 2021-08-09 RX ADMIN — SIMETHICONE 80 MG: 20 EMULSION ORAL at 09:30

## 2021-08-09 RX ADMIN — HEPARIN SODIUM 5000 UNITS: 5000 INJECTION INTRAVENOUS; SUBCUTANEOUS at 21:18

## 2021-08-09 RX ADMIN — SIMETHICONE 80 MG: 20 EMULSION ORAL at 18:19

## 2021-08-09 RX ADMIN — ACETAMINOPHEN 975 MG: 325 TABLET, FILM COATED ORAL at 05:56

## 2021-08-09 RX ADMIN — MELATONIN TAB 3 MG 3 MG: 3 TAB at 21:19

## 2021-08-09 RX ADMIN — PANTOPRAZOLE SODIUM 40 MG: 40 TABLET, DELAYED RELEASE ORAL at 05:56

## 2021-08-09 RX ADMIN — HEPARIN SODIUM 5000 UNITS: 5000 INJECTION INTRAVENOUS; SUBCUTANEOUS at 14:42

## 2021-08-09 RX ADMIN — LIDOCAINE 2 PATCH: 50 PATCH TOPICAL at 09:32

## 2021-08-09 RX ADMIN — ATORVASTATIN CALCIUM 20 MG: 20 TABLET, FILM COATED ORAL at 18:20

## 2021-08-09 NOTE — PROGRESS NOTES
08/09/21 1330   Pain Assessment   Pain Assessment Tool 0-10   Pain Score 4   Pain Location/Orientation Orientation: Left; Location: Arm;Location: Neck   Restrictions/Precautions   Precautions Bed/chair alarms;Cognitive; Fall Risk;Spinal precautions;Supervision on toilet/commode;Visual deficit   Braces or Orthoses   (TEDS)   Cognition   Overall Cognitive Status Impaired   Subjective   Subjective "i had a shower"   Transfer Bed/Chair/Wheelchair   Findings CS with RW   Walk 10 Feet   Type of Assistance Needed Supervision; Adaptive equipment   Comment CS with RW   Walk 10 Feet CARE Score 4   Walk 50 Feet with Two Turns   Type of Assistance Needed Supervision; Adaptive equipment   Comment CS with RW   Walk 50 Feet with Two Turns CARE Score 4   Walk 150 Feet   Type of Assistance Needed Supervision; Adaptive equipment   Comment CS with RW   Walk 150 Feet CARE Score 4   Ambulation   Distance Walked (feet) 150 ft  (x2)   Assist Device Stevia First   Picking Up Object   Type of Assistance Needed Supervision; Adaptive equipment   Comment with reacher   Picking Up Object CARE Score 4   Therapeutic Interventions   Strengthening seated LAQ and hip flex x20 B/L 2# AW   Other standing wiping down cones and reacher and putting them back in the cabinet working on standing tolerance and balance   Equipment Use   [employer] 12mins   Assessment   Treatment Assessment pt focusing on functional tasks to improve safety and standing tolerance for d/c   pt reports standing is a big barrier for her and had trouble at home performing it   pt cont to need cueing with amb due to visual deficits with use of RW  FT to be done tomorrow with family and will cont to work on functional mobility and ind to decrease burden of care for time of d/c  Problem List Decreased strength;Decreased range of motion;Decreased endurance; Impaired balance;Decreased mobility; Decreased cognition;Decreased safety awareness; Impaired tone;Orthopedic restrictions;Pain Barriers to Discharge Inaccessible home environment;Decreased caregiver support   PT Barriers   Functional Limitation Car transfers; Ramp negotiation;Standing;Stair negotiation;Transfers; Walking   Plan   Progress Progressing toward goals   Recommendation   PT Discharge Recommendation Home with home health rehabilitation   Equipment Recommended Walker   PT Therapy Minutes   PT Time In 1330   PT Time Out 1430   PT Total Time (minutes) 60   PT Mode of treatment - Individual (minutes) 60   PT Mode of treatment - Concurrent (minutes) 0   PT Mode of treatment - Group (minutes) 0   PT Mode of treatment - Co-treat (minutes) 0   PT Mode of Treatment - Total time(minutes) 60 minutes   PT Cumulative Minutes 1095   Therapy Time missed   Time missed?  No

## 2021-08-09 NOTE — PROGRESS NOTES
Physical Medicine and Rehabilitation Progress Note  Rashaad Kearney 66 y o  female MRN: [id number]  Unit/Bed#: -37 Encounter: [id number]    HPI: Rashaad Kearney is a 66 y o  female who presented to the [address] after fall  Patient p/w c/o neck pain as well as bilateral arm pain/numbness  Imaging revealed cervical stenosis most severe at C4-5 with possible cord edema and patient subsequently underwent C4-5 ACDF & C2-T2 PCDF on 7/20 by Dr Marcelino Hensley  Patient was also found to have B/L nasal bone fractures and L maxillary sinus hemorrhage which was managed conservatively  Post-op course complicated by urinary retention requiring morrow placement  Chief Complaint: cervical stenosis/central cord syndrome     Subjective: patient denies any events over the weekend     ROS: A 10 point ROS was performed; negative except as noted above       Assessment/Plan:      Cervical stenosis of spine  Assessment & Plan  - C6 fx, per XR report of 7/21 "Redemonstrated anterior-inferior C6 vertebral body corner fracture, unchanged in alignment " (seen by neurosx on 7/26 no further intervention recommended)  - cervical stenosis most severe at C4-5 with possible cord edema s/p ACDF C4-5 & PCDF C2-T2 by Dr Marcelino Hensley on 7/20  - c-spine precautions  - no brace needed per neurosx  - 2 week post-op FU completed by neurosx while patient in rehab unit   - per neurosx protocol FU XR (already e-prescribed in EMR by neurosx team) to be done 2-3 days prior to FU appt of 9/3   - OP FU with neurosx on 9/3    Nasal bone fractures  Assessment & Plan  - B/L nasal bone fractures & L maxillary sinus hemorrhage  - non-op management per OMFS  - sinus precautions   - abx recommended by OMFS however dc'd by trauma surgery in acute care who was primary team per their protocol     CKD (chronic kidney disease)  Assessment & Plan  - Cr currently 0 95  - baseline appears to be 1 0-1 2  - IM monitoring     Anemia  Assessment & Plan  - Hg currently increased to 10 4  post-operatively  - IM monitoring     Urinary retention  Assessment & Plan  - s/p morrow removal, TOV in process however PVRs have been variable, will continue urinary retention protocol and patient is being treated for UTI (started on abx on 8/8 by Dr Clary Tuttle)     Chest pain  Assessment & Plan  - patient complained of brief episode of left sided chest pain while in therapy  - per patient this occurs at home as well and responds to OTC antacid medication however checked EKG to r/o cardiac etiology and EKG was unrevealing (did show mildly elevated QTc at 487, d/w IM who recommended no further GUTIERREZ/intervention at this time for QTc and to avoid QT prolonging medication if possible)   - resolved after given medication for heartburn   - troponin's negative (per IM no further troponins required)    CHF (congestive heart failure) ([facility])  Assessment & Plan  - EF 45-50%  - grade 1 DD  - not on diuretics at home  - IM monitoring volume status       Dyslipidemia  Assessment & Plan  - on home lipitor 20 mg qpm    History of recurrent UTIs  Assessment & Plan  - at home patient was chronically on keflex 250 mg qd for suppression of recurrent UTIs (confrimed with patient's OP pharmacy that patient receives monthly prescriptions for this, last provided by Dr Jose Rafael Valverde of urology) however patient with UA indicative of possible active UTI, UCx confirmed this and patient is on abx appropriate to susceptibilities (started on 8/8 by Dr Clary Tuttle) once patient completes current abx course will d/w IM if patient should resume home suppressive keflex 250 mg qd   - OP FU with Dr Han American  - on home xanax 0 25 mg qhs prn     Vertigo  Assessment & Plan  - chronic, likely BPV  - on home antivert 12 5 mg q8 prn     * GERD (gastroesophageal reflux disease)  Assessment & Plan  - on home protonix 40 mg qd      Scheduled Meds:  Current Facility-Administered Medications   Medication Dose Route Frequency Provider Last Rate    acetaminophen  975 mg Oral [facility] Colonel Maury MD      ALPRAZolam  0 25 mg Oral HS PRN Colonel Maury MD      atorvastatin  20 mg Oral Daily With Spencer Chilel MD      ciprofloxacin  500 mg Oral Q12H [address] Keo Meyers DO      heparin (porcine)  5,000 Units Subcutaneous Q8H [address] Colonel Maury MD      lidocaine  2 patch Topical Daily Colonel Maury MD      meclizine  12 5 mg Oral Q8H PRN Colonel Maury MD      melatonin  3 mg Oral HS Colonel Maury MD      methocarbamol  500 mg Oral Q6H PRN Colonel Maury MD      oxyCODONE  2 5 mg Oral Q4H PRN Colonel Maury MD     [place] oxyCODONE  5 mg Oral Q4H PRN Colonel Maury MD      pantoprazole  40 mg Oral Early Morning Colonel Maury MD      polyethylene glycol  17 g Oral Daily PRN Colonel Maury MD      psyllium  1 packet Oral Daily Colonel Maury MD      simethicone  80 mg Oral 4x Daily (with meals and at bedtime) Penny Deras PA-C      sodium chloride  1 spray Each Nare 4x Daily PRN Colonel Maury MD            Incidental findings:     1) gallstones: asymptomatic; OP FU with PCP with GI referral at PCPs discretion      DVT ppx: HSQ (cleared in acute care by neurosx for pharmacologic DVT ppx)       Objective:    Functional Update:  Mobility: min-mod  Transfers: min   ADLs: min-mod       Physical Exam:    Vitals:    08/09/21 1330   BP: 132/78   Pulse:    Resp:    Temp:    SpO2:            General: alert, no apparent distress, cooperative and comfortable  HEENT:  Eye: Normal external eye, conjunctiva, lidsc cornea  Ears: Normal external ears  Nose: Normal external nose, mucus membranes  CARDIAC:  +S1/2  LUNGS:  no abnormal respiratory pattern, no retractions noted, non-labored breathing   ABDOMEN:  soft NT  EXTREMITIES:  no cyanosis or clubbing   NEURO:  awake, alert appropriate responses to questions   PSYCH:  mood/affect currently stable   INCISION:  surgical site C/D/I             Diagnostic Studies:  No orders to display Laboratory:   Results from last 7 days   Lab Units 08/02/21  1804   HEMOGLOBIN g/dL 10 4*   HEMATOCRIT % 32 9*   WBC Thousand/uL 6 28     Results from last 7 days   Lab Units 08/02/21  1804   BUN mg/dL 20   SODIUM mmol/L 138   POTASSIUM mmol/L 4 2   CHLORIDE mmol/L 109*   CREATININE mg/dL 0 95

## 2021-08-09 NOTE — PROGRESS NOTES
08/09/21 1230   Pain Assessment   Pain Assessment Tool 0-10   Pain Score 4   Pain Location/Orientation Orientation: Left; Location: Shoulder   Restrictions/Precautions   Precautions Bed/chair alarms;Cognitive; Fall Risk;Spinal precautions;Supervision on toilet/commode  (sinus precuations)   Shower/Bathe Self   Type of Assistance Needed Incidental touching   Physical Assistance Level No physical assistance   Comment CGA to complete shower  Pt able to wash 10/10 body parts seated, stood to wash buttock/moira area with UE support  Able to wash lower legs and B feet with cross leg technique  Shower/Bathe Self CARE Score 4   Tub/Shower Transfer   Findings complete SPT to walk in shower at CGA level  PLAN TO ASSESS TUB TRANSFER TOMORROW   Upper Body Dressing   Type of Assistance Needed Supervision   Physical Assistance Level No physical assistance   Upper Body Dressing CARE Score 4   Lower Body Dressing   Type of Assistance Needed Incidental touching   Physical Assistance Level No physical assistance   Comment CGA in stance for CM   Lower Body Dressing CARE Score 4   Putting On/Taking Off Footwear   Type of Assistance Needed Physical assistance   Physical Assistance Level 51%-75%   Comment A for teds  May benefit from sock aid attempt to alo if D/Cing with TEDs   Putting On/Taking Off Footwear CARE Score 2   Sit to Stand   Type of Assistance Needed Supervision   Physical Assistance Level No physical assistance   Comment CS with RW   Sit to Stand CARE Score 4   Bed-Chair Transfer   Type of Assistance Needed Supervision   Physical Assistance Level No physical assistance   Comment CS with RW   Chair/Bed-to-Chair Transfer CARE Score 4   Toileting Hygiene   Type of Assistance Needed Supervision   Physical Assistance Level No physical assistance   Comment CS for CM  able to complete hygiene   Toileting Hygiene CARE Score 4   Toilet Transfer   Type of Assistance Needed Supervision; Adaptive equipment   Physical Assistance Level No physical assistance   Comment CS with RW   Toilet Transfer CARE Score 4   Cognition   Overall Cognitive Status Impaired   Arousal/Participation Alert; Cooperative   Attention Within functional limits   Orientation Level Oriented X4   Memory Decreased short term memory   Following Commands Follows one step commands without difficulty   Activity Tolerance   Activity Tolerance Patient tolerated treatment well   Assessment   Treatment Assessment Pt engaged in skilled OT session with focus on: completing shower (with incisions covered), ADL retraining, functional transfers, toileting, and ongoing education  Pt tolerated session well, did report she was anxious about completing first shower  Reports having a tub shower and will benefit from tub transfer practice/education  Pt also may benefit from education with [email or web address] [facility] to alo CECYs if pt will be D/Cing with TEDs  Will need shower chair information  Pt will benefit from continued skilled OT to maximize independence and safety with ADLs and functional transfers  Continue POC with focus on ADL retraining, LHAE education (for TEDs), FT with daughter, IADLs with RW and walker tray, standing tolerance and balance in order to decrease burden of care  Prognosis Good   Problem List Decreased strength;Decreased range of motion;Decreased endurance; Impaired balance;Decreased mobility; Decreased cognition;Decreased safety awareness; Impaired tone;Orthopedic restrictions;Pain   Barriers to Discharge Inaccessible home environment;Decreased caregiver support   Plan   Treatment/Interventions ADL retraining;Functional transfer training; Therapeutic exercise; Endurance training;Patient/family training;Equipment eval/education; Compensatory technique education   Progress Progressing toward goals   Recommendation   OT Discharge Recommendation   (pending pt progress)   OT Therapy Minutes   OT Time In 1230   OT Time Out 1330   OT Total Time (minutes) 60   OT Mode of treatment - Individual (minutes) 60   OT Mode of treatment - Concurrent (minutes) 0   OT Mode of treatment - Group (minutes) 0   OT Mode of treatment - Co-treat (minutes) 0   OT Mode of Treatment - Total time(minutes) 60 minutes   OT Cumulative Minutes 5709

## 2021-08-09 NOTE — PROGRESS NOTES
Internal Medicine Progress Note  Patient: Slim Malik  Age/sex: 66 y o  female  Medical Record #: [id number]      ASSESSMENT/PLAN: (Interval History)  Slim Malik is seen and examined and management for following issues:    Central cord syndrome; s/p ACDF C4-5/PCDF C2-T2 7/20/21  · Doesn't need collar per NS  · Incisions w/o erythema/infections  · Staples removed 8/3 by NS     Acute nasal bone fx  · OMS saw = no surgery needed  · Sinus precautions for 4 weeks     Urine retention  · VT now per PMR ongoing     Enterobacter UTI  · On suppressive tx with Keflex at home for recurrent UTI  · Had urinary frequency/burning here  · Switched to Cipro based on cx results  · Has MDRO     ABLA  · Did not require transfusion     Orthostasis  · Set PO fluid goal of 1200ml/day   · Doing fine wo abd binder now; using TEDs only     Chest pain  · Occurred 7/29/21 and was located on the Lt side  · w/u negative   · No further occurrence        Discharge date:  8/13/21    The above assessment and plan was reviewed and updated as determined by my evaluation of the patient on 8/9/2021      Labs:   Results from last 7 days   Lab Units 08/02/21  1804   WBC Thousand/uL 6 28   HEMOGLOBIN g/dL 10 4*   HEMATOCRIT % 32 9*   PLATELETS Thousands/uL 339     Results from last 7 days   Lab Units 08/02/21  1804   SODIUM mmol/L 138   POTASSIUM mmol/L 4 2   CHLORIDE mmol/L 109*   CO2 mmol/L 23   BUN mg/dL 20   CREATININE mg/dL 0 95   CALCIUM mg/dL 9 6                   Review of Scheduled Meds:  Current Facility-Administered Medications   Medication Dose Route Frequency Provider Last Rate    acetaminophen  975 mg Oral Q8H [address] Carly Vega MD      ALPRAZolam  0 25 mg Oral HS PRN Caryl Vega MD      atorvastatin  20 mg Oral Daily With Vitor Rivera MD      ciprofloxacin  500 mg Oral Q12H [address] Aram Marshall DO      heparin (porcine)  5,000 Units Subcutaneous [facility] Carly Vega MD      lidocaine  2 patch Topical Daily Carly Vega MD     [facility] meclizine  12 5 mg Oral Q8H PRN Laura Pantoja MD      melatonin  3 mg Oral HS Laura Pantoja, MD      methocarbamol  500 mg Oral Q6H PRN Laura Pantoja MD      oxyCODONE  2 5 mg Oral Q4H PRN Laura Pantoja, MD Rosalva Lombardo oxyCODONE  5 mg Oral Q4H PRN Laura Pantoja, MD      pantoprazole  40 mg Oral Early Morning Laura Pantoja MD      polyethylene glycol  17 g Oral Daily PRN Laura Pantoja, MD      psyllium  1 packet Oral Daily Laura Pantoja, MD      simethicone  80 mg Oral 4x Daily (with meals and at bedtime) Penny Deras PA-C      sodium chloride  1 spray Each Nare 4x Daily PRN Laura Pantoja MD         Subjective/ HPI: Patient seen and examined  Patients overnight issues or events were reviewed with nursing or staff during rounds or morning huddle session  New or overnight issues include the following:     None     ROS:   Negative 12 point ROS other than denoted above in HPI or assessment/plan        Imaging:     No orders to display       *Labs /Radiology studies reviewed  *Medications reviewed and reconciled as needed  *Please refer to order section for additional ordered labs studies  *Case discussed with primary attending during morning huddle case rounds      Physical Examination:  Vitals:   Vitals:    08/08/21 2342 08/09/21 0600 08/09/21 0748 08/09/21 0830   BP: 114/77  116/76 118/60   BP Location: Left arm  Left arm Left arm   Pulse: 94  74 (!) 110   Resp: 17  18    Temp: 98 °F (36 7 °C)  98 5 °F (36 9 °C)    TempSrc: Oral  Oral    SpO2: 98%  98%    Weight:  61 kg (134 lb 7 7 oz)     Height:           General Appearance: no distress, conversive  HEENT: PERRLA, conjuctiva normal; oropharynx clear; mucous membranes moist   Neck:  no JVD; incisions healed  Lungs: CTA, normal respiratory effort, no retractions, expiratory effort normal  CV: regular rate and rhythm; no rubs/murmurs/gallops, PMI normal   ABD: soft; ND/NT; +BS  EXT: no edema  Skin: normal turgor, normal texture, no rashes  Psych: affect normal, mood normal  Neuro: AAO    The above physical exam was reviewed and updated as determined by my evaluation of the patient on 8/9/2021  Invasive Devices     None                    VTE Pharmacologic Prophylaxis: Heparin  Code Status: Level 1 - Full Code  Current Length of Stay: 14 day(s)      Total time spent:  30 minutes with more than 50% spent counseling/coordinating care  Counseling includes discussion with patient re: progress  and discussion with patient of his/her current medical state/information  Coordination of patient's care was performed in conjunction with primary service  Time invested included review of patient's labs, vitals, and management of their comorbidities with continued monitoring  In addition, this patient was discussed with medical team including physician and advanced extenders  The care of the patient was extensively discussed and appropriate treatment plan was formulated unique for this patient  ** Please Note:  voice to text software may have been used in the creation of this document   Although proof errors in transcription or interpretation are a potential of such software**

## 2021-08-09 NOTE — PROGRESS NOTES
08/09/21 0830   Pain Assessment   Pain Assessment Tool 0-10   Pain Score 4   Pain Onset/Description Onset: Ongoing   Restrictions/Precautions   Precautions Bed/chair alarms;Cognitive; Fall Risk;Spinal precautions;Supervision on toilet/commode  (sinus precautions)   Braces or Orthoses   (B TEDs)   Cognition   Overall Cognitive Status Impaired   Subjective   Subjective "i don't like how my room is set up"   Sit to Stand   Type of Assistance Needed Supervision;Verbal cues   Comment occasional cueing for hand placement   Sit to Stand CARE Score 4   Bed-Chair Transfer   Type of Assistance Needed Verbal cues; Supervision; Adaptive equipment   Comment CS with RW    Chair/Bed-to-Chair Transfer CARE Score 4   Walk 10 Feet   Type of Assistance Needed Supervision; Adaptive equipment   Comment CS with RW   Walk 10 Feet CARE Score 4   Walk 50 Feet with Two Turns   Type of Assistance Needed Verbal cues; Supervision; Adaptive equipment   Comment CS with RW; cues to avoid objects due to visual deficits   Walk 50 Feet with Two Turns CARE Score 4   Walk 150 Feet   Type of Assistance Needed Incidental touching;Verbal cues; Adaptive equipment   Comment CS/CGA with RW   Walk 150 Feet CARE Score 4   Ambulation   Does the patient walk? 2  Yes   Findings needs cueing for RW management and to avoid obstacles on R side  pt reports her perception is off   Wheel 50 Feet with Two Turns   Reason if not Attempted Activity not applicable   Wheel 50 Feet with Two Turns CARE Score 9   Wheel 150 Feet   Reason if not Attempted Activity not applicable   Wheel 167 Feet CARE Score 9   Wheelchair mobility   Does the patient use a wheelchair? 0  No   Curb or Single Stair   Style negotiated Single stair   Type of Assistance Needed Incidental touching; Adaptive equipment   Comment single R HR   1 Step (Curb) CARE Score 4   4 Steps   Type of Assistance Needed Incidental touching   Comment single R HR   4 Steps CARE Score 4   12 Steps   Type of Assistance Needed Incidental touching; Adaptive equipment   Comment single R HR   12 Steps CARE Score 4   Stairs   # of Steps 12   Weight Bearing Precautions Cervical;Fall Risk   Assist Devices Single Rail   Findings pt uses two hands on R HR ascending, trialed single hand descending  pt performs reciprocal pattern   Therapeutic Interventions   Strengthening unsupported STS 2x5   Flexibility B gastroc and HS    Neuromuscular Re-Education HHA on L carrying cup of water 150'  standing ball toss  Assessment   Treatment Assessment pt cont to make progress towards d/c goals with use of RW   pt with difficulty at times with RW management and visual deficits making it difficult to avoid objects in the hallway  pt remains high fall risk at this time and recommendation for 24/7 sup at time of d/c with use of external AD   Problem List Decreased strength;Decreased range of motion;Decreased endurance; Impaired balance;Decreased mobility; Decreased cognition;Decreased safety awareness; Impaired tone;Orthopedic restrictions;Pain   Barriers to Discharge Inaccessible home environment;Decreased caregiver support   PT Barriers   Functional Limitation Car transfers; Ramp negotiation;Standing;Stair negotiation;Transfers; Walking   Plan   Progress Progressing toward goals   Recommendation   PT Discharge Recommendation Home with home health rehabilitation   Equipment Recommended Walker   PT Therapy Minutes   PT Time In 0830   PT Time Out 0930   PT Total Time (minutes) 60   PT Mode of treatment - Individual (minutes) 60   PT Mode of treatment - Concurrent (minutes) 0   PT Mode of treatment - Group (minutes) 0   PT Mode of treatment - Co-treat (minutes) 0   PT Mode of Treatment - Total time(minutes) 60 minutes   PT Cumulative Minutes 1035   Therapy Time missed   Time missed?  No

## 2021-08-09 NOTE — PLAN OF CARE
Problem: Prexisting or High Potential for Compromised Skin Integrity  Goal: Skin integrity is maintained or improved  Description: INTERVENTIONS:  - Identify patients at risk for skin breakdown  - Assess and monitor skin integrity  - Assess and monitor nutrition and hydration status  - Monitor labs   - Assess for incontinence   - Turn and reposition patient  - Assist with mobility/ambulation  - Relieve pressure over bony prominences  - Avoid friction and shearing  - Provide appropriate hygiene as needed including keeping skin clean and dry  - Evaluate need for skin moisturizer/barrier cream  - Collaborate with interdisciplinary team   - Patient/family teaching  - Consider wound care consult   Outcome: Progressing     Problem: MOBILITY - ADULT  Goal: Maintain or return to baseline ADL function  Description: INTERVENTIONS:  -  Assess patient's ability to carry out ADLs; assess patient's baseline for ADL function and identify physical deficits which impact ability to perform ADLs (bathing, care of mouth/teeth, toileting, grooming, dressing, etc )  - Assess/evaluate cause of self-care deficits   - Assess range of motion  - Assess patient's mobility; develop plan if impaired  - Assess patient's need for assistive devices and provide as appropriate  - Encourage maximum independence but intervene and supervise when necessary  - Involve family in performance of ADLs  - Assess for home care needs following discharge   - Consider OT consult to assist with ADL evaluation and planning for discharge  - Provide patient education as appropriate  Outcome: Progressing  Goal: Maintains/Returns to pre admission functional level  Description: INTERVENTIONS:  - Perform BMAT or MOVE assessment daily    - Set and communicate daily mobility goal to care team and patient/family/caregiver  - Collaborate with rehabilitation services on mobility goals if consulted  - Perform Range of Motion times a day    - Reposition patient every hours   - Dangle patient  times a day  - Stand patient  times a day  - Ambulate patient  times a day  - Out of bed to chair times a day   - Out of bed for meals  times a day  - Out of bed for toileting  - Record patient progress and toleration of activity level   Outcome: Progressing     Problem: Potential for Falls  Goal: Patient will remain free of falls  Description: INTERVENTIONS:  - Educate patient/family on patient safety including physical limitations  - Instruct patient to call for assistance with activity   - Consult OT/PT to assist with strengthening/mobility   - Keep Call bell within reach  - Keep bed low and locked with side rails adjusted as appropriate  - Keep care items and personal belongings within reach  - Initiate and maintain comfort rounds  - Make Fall Risk Sign visible to staff  - Offer Toileting every Hours, in advance of need  - Initiate/Maintain alarm  - Obtain necessary fall risk management equipment:   - Apply yellow socks and bracelet for high fall risk patients  - Consider moving patient to room near nurses station  Outcome: Progressing     Problem: PAIN - ADULT  Goal: Verbalizes/displays adequate comfort level or baseline comfort level  Description: Interventions:  - Encourage patient to monitor pain and request assistance  - Assess pain using appropriate pain scale  - Administer analgesics based on type and severity of pain and evaluate response  - Implement non-pharmacological measures as appropriate and evaluate response  - Consider cultural and social influences on pain and pain management  - Notify physician/advanced practitioner if interventions unsuccessful or patient reports new pain  Outcome: Progressing     Problem: INFECTION - ADULT  Goal: Absence or prevention of progression during hospitalization  Description: INTERVENTIONS:  - Assess and monitor for signs and symptoms of infection  - Monitor lab/diagnostic results  - Monitor all insertion sites, i e  indwelling lines, tubes, and drains  - Monitor endotracheal if appropriate and nasal secretions for changes in amount and color  - [place] appropriate cooling/warming therapies per order  - Administer medications as ordered  - Instruct and encourage patient and family to use good hand hygiene technique  - Identify and instruct in appropriate isolation precautions for identified infection/condition  Outcome: Progressing     Problem: SAFETY ADULT  Goal: Patient will remain free of falls  Description: INTERVENTIONS:  - Educate patient/family on patient safety including physical limitations  - Instruct patient to call for assistance with activity   - Consult OT/PT to assist with strengthening/mobility   - Keep Call bell within reach  - Keep bed low and locked with side rails adjusted as appropriate  - Keep care items and personal belongings within reach  - Initiate and maintain comfort rounds  - Make Fall Risk Sign visible to staff  - Offer Toileting every Hours, in advance of need  - Initiate/Maintain alarm  - Obtain necessary fall risk management equipment:   - Apply yellow socks and bracelet for high fall risk patients  - Consider moving patient to room near nurses station  Outcome: Progressing  Goal: Maintain or return to baseline ADL function  Description: INTERVENTIONS:  -  Assess patient's ability to carry out ADLs; assess patient's baseline for ADL function and identify physical deficits which impact ability to perform ADLs (bathing, care of mouth/teeth, toileting, grooming, dressing, etc )  - Assess/evaluate cause of self-care deficits   - Assess range of motion  - Assess patient's mobility; develop plan if impaired  - Assess patient's need for assistive devices and provide as appropriate  - Encourage maximum independence but intervene and supervise when necessary  - Involve family in performance of ADLs  - Assess for home care needs following discharge   - Consider OT consult to assist with ADL evaluation and planning for discharge  - Provide patient education as appropriate  Outcome: Progressing  Goal: Maintains/Returns to pre admission functional level  Description: INTERVENTIONS:  - Perform BMAT or MOVE assessment daily    - Set and communicate daily mobility goal to care team and patient/family/caregiver  - Collaborate with rehabilitation services on mobility goals if consulted  - Perform Range of Motion  times a day  - Reposition patient every hours  - Dangle patient  times a day  - Stand patient  times a day  - Ambulate patient  times a day  - Out of bed to chair times a day   - Out of bed for meals  times a day  - Out of bed for toileting  - Record patient progress and toleration of activity level   Outcome: Progressing     Problem: DISCHARGE PLANNING  Goal: Discharge to home or other facility with appropriate resources  Description: INTERVENTIONS:  - Identify barriers to discharge w/patient and caregiver  - Arrange for needed discharge resources and transportation as appropriate  - Identify discharge learning needs (meds, wound care, etc )  - Arrange for interpretive services to assist at discharge as needed  - Refer to Case Management Department for coordinating discharge planning if the patient needs post-hospital services based on physician/advanced practitioner order or complex needs related to functional status, cognitive ability, or social support system  Outcome: Progressing     Problem: Nutrition/Hydration-ADULT  Goal: Nutrient/Hydration intake appropriate for improving, restoring or maintaining nutritional needs  Description: Monitor and assess patient's nutrition/hydration status for malnutrition  Collaborate with interdisciplinary team and initiate plan and interventions as ordered  Monitor patient's weight and dietary intake as ordered or per policy  Utilize nutrition screening tool and intervene as necessary  Determine patient's food preferences and provide high-protein, high-caloric foods as appropriate  INTERVENTIONS:  - Monitor oral intake, urinary output, labs, and treatment plans  - Assess nutrition and hydration status and recommend course of action  - Evaluate amount of meals eaten  - Assist patient with eating if necessary   - Allow adequate time for meals  - Recommend/ encourage appropriate diets, oral nutritional supplements, and vitamin/mineral supplements  - Order, calculate, and assess calorie counts as needed  - Recommend, monitor, and adjust tube feedings and TPN/PPN based on assessed needs  - Assess need for intravenous fluids  - Provide specific nutrition/hydration education as appropriate  - Include patient/family/caregiver in decisions related to nutrition  Outcome: Progressing     Problem: Prexisting or High Potential for Compromised Skin Integrity  Goal: Skin integrity is maintained or improved  Description: INTERVENTIONS:  - Identify patients at risk for skin breakdown  - Assess and monitor skin integrity  - Assess and monitor nutrition and hydration status  - Monitor labs   - Assess for incontinence   - Turn and reposition patient  - Assist with mobility/ambulation  - Relieve pressure over bony prominences  - Avoid friction and shearing  - Provide appropriate hygiene as needed including keeping skin clean and dry  - Evaluate need for skin moisturizer/barrier cream  - Collaborate with interdisciplinary team   - Patient/family teaching  - Consider wound care consult   Outcome: Progressing     Problem: MOBILITY - ADULT  Goal: Maintain or return to baseline ADL function  Description: INTERVENTIONS:  -  Assess patient's ability to carry out ADLs; assess patient's baseline for ADL function and identify physical deficits which impact ability to perform ADLs (bathing, care of mouth/teeth, toileting, grooming, dressing, etc )  - Assess/evaluate cause of self-care deficits   - Assess range of motion  - Assess patient's mobility; develop plan if impaired  - Assess patient's need for assistive devices and provide as appropriate  - Encourage maximum independence but intervene and supervise when necessary  - Involve family in performance of ADLs  - Assess for home care needs following discharge   - Consider OT consult to assist with ADL evaluation and planning for discharge  - Provide patient education as appropriate  Outcome: Progressing     Problem: MOBILITY - ADULT  Goal: Maintains/Returns to pre admission functional level  Description: INTERVENTIONS:  - Perform BMAT or MOVE assessment daily    - Set and communicate daily mobility goal to care team and patient/family/caregiver  - Collaborate with rehabilitation services on mobility goals if consulted  - Perform Range of Motion times a day  - Reposition patient every hours    - Dangle patient  times a day  - Stand patient  times a day  - Ambulate patient  times a day  - Out of bed to chair times a day   - Out of bed for meals  times a day  - Out of bed for toileting  - Record patient progress and toleration of activity level   Outcome: Progressing     Problem: Potential for Falls  Goal: Patient will remain free of falls  Description: INTERVENTIONS:  - Educate patient/family on patient safety including physical limitations  - Instruct patient to call for assistance with activity   - Consult OT/PT to assist with strengthening/mobility   - Keep Call bell within reach  - Keep bed low and locked with side rails adjusted as appropriate  - Keep care items and personal belongings within reach  - Initiate and maintain comfort rounds  - Make Fall Risk Sign visible to staff  - Offer Toileting every Hours, in advance of need  - Initiate/Maintain alarm  - Obtain necessary fall risk management equipment:   - Apply yellow socks and bracelet for high fall risk patients  - Consider moving patient to room near nurses station  Outcome: Progressing     Problem: PAIN - ADULT  Goal: Verbalizes/displays adequate comfort level or baseline comfort level  Description: Interventions:  - Encourage patient to monitor pain and request assistance  - Assess pain using appropriate pain scale  - Administer analgesics based on type and severity of pain and evaluate response  - Implement non-pharmacological measures as appropriate and evaluate response  - Consider cultural and social influences on pain and pain management  - Notify physician/advanced practitioner if interventions unsuccessful or patient reports new pain  Outcome: Progressing     Problem: INFECTION - ADULT  Goal: Absence or prevention of progression during hospitalization  Description: INTERVENTIONS:  - Assess and monitor for signs and symptoms of infection  - Monitor lab/diagnostic results  - Monitor all insertion sites, i e  indwelling lines, tubes, and drains  - Monitor endotracheal if appropriate and nasal secretions for changes in amount and color  - Helen appropriate cooling/warming therapies per order  - Administer medications as ordered  - Instruct and encourage patient and family to use good hand hygiene technique  - Identify and instruct in appropriate isolation precautions for identified infection/condition  Outcome: Progressing     Problem: SAFETY ADULT  Goal: Patient will remain free of falls  Description: INTERVENTIONS:  - Educate patient/family on patient safety including physical limitations  - Instruct patient to call for assistance with activity   - Consult OT/PT to assist with strengthening/mobility   - Keep Call bell within reach  - Keep bed low and locked with side rails adjusted as appropriate  - Keep care items and personal belongings within reach  - Initiate and maintain comfort rounds  - Make Fall Risk Sign visible to staff  - Offer Toileting every Hours, in advance of need  - Initiate/Maintain alarm  - Obtain necessary fall risk management equipment:   - Apply yellow socks and bracelet for high fall risk patients  - Consider moving patient to room near nurses station  Outcome: Progressing     Problem: SAFETY ADULT  Goal: Maintain or return to baseline ADL function  Description: INTERVENTIONS:  -  Assess patient's ability to carry out ADLs; assess patient's baseline for ADL function and identify physical deficits which impact ability to perform ADLs (bathing, care of mouth/teeth, toileting, grooming, dressing, etc )  - Assess/evaluate cause of self-care deficits   - Assess range of motion  - Assess patient's mobility; develop plan if impaired  - Assess patient's need for assistive devices and provide as appropriate  - Encourage maximum independence but intervene and supervise when necessary  - Involve family in performance of ADLs  - Assess for home care needs following discharge   - Consider OT consult to assist with ADL evaluation and planning for discharge  - Provide patient education as appropriate  Outcome: Progressing     Problem: SAFETY ADULT  Goal: Maintains/Returns to pre admission functional level  Description: INTERVENTIONS:  - Perform BMAT or MOVE assessment daily    - Set and communicate daily mobility goal to care team and patient/family/caregiver  - Collaborate with rehabilitation services on mobility goals if consulted  - Perform Range of Motion  times a day  - Reposition patient every hours    - Dangle patient  times a day  - Stand patient  times a day  - Ambulate patient  times a day  - Out of bed to chair times a day   - Out of bed for meals  times a day  - Out of bed for toileting  - Record patient progress and toleration of activity level   Outcome: Progressing     Problem: DISCHARGE PLANNING  Goal: Discharge to home or other facility with appropriate resources  Description: INTERVENTIONS:  - Identify barriers to discharge w/patient and caregiver  - Arrange for needed discharge resources and transportation as appropriate  - Identify discharge learning needs (meds, wound care, etc )  - Arrange for interpretive services to assist at discharge as needed  - Refer to Case Management Department for coordinating discharge planning if the patient needs post-hospital services based on physician/advanced practitioner order or complex needs related to functional status, cognitive ability, or social support system  Outcome: Progressing     Problem: Nutrition/Hydration-ADULT  Goal: Nutrient/Hydration intake appropriate for improving, restoring or maintaining nutritional needs  Description: Monitor and assess patient's nutrition/hydration status for malnutrition  Collaborate with interdisciplinary team and initiate plan and interventions as ordered  Monitor patient's weight and dietary intake as ordered or per policy  Utilize nutrition screening tool and intervene as necessary  Determine patient's food preferences and provide high-protein, high-caloric foods as appropriate  INTERVENTIONS:  - Monitor oral intake, urinary output, labs, and treatment plans  - Assess nutrition and hydration status and recommend course of action  - Evaluate amount of meals eaten  - Assist patient with eating if necessary   - Allow adequate time for meals  - Recommend/ encourage appropriate diets, oral nutritional supplements, and vitamin/mineral supplements  - Order, calculate, and assess calorie counts as needed  - Recommend, monitor, and adjust tube feedings and TPN/PPN based on assessed needs  - Assess need for intravenous fluids  - Provide specific nutrition/hydration education as appropriate  - Include patient/family/caregiver in decisions related to nutrition  Outcome: Progressing     Problem: Nutrition/Hydration-ADULT  Goal: Nutrient/Hydration intake appropriate for improving, restoring or maintaining nutritional needs  Description: Monitor and assess patient's nutrition/hydration status for malnutrition  Collaborate with interdisciplinary team and initiate plan and interventions as ordered  Monitor patient's weight and dietary intake as ordered or per policy  Utilize nutrition screening tool and intervene as necessary  Determine patient's food preferences and provide high-protein, high-caloric foods as appropriate       INTERVENTIONS:  - Monitor oral intake, urinary output, labs, and treatment plans  - Assess nutrition and hydration status and recommend course of action  - Evaluate amount of meals eaten  - Assist patient with eating if necessary   - Allow adequate time for meals  - Recommend/ encourage appropriate diets, oral nutritional supplements, and vitamin/mineral supplements  - Order, calculate, and assess calorie counts as needed  - Recommend, monitor, and adjust tube feedings and TPN/PPN based on assessed needs  - Assess need for intravenous fluids  - Provide specific nutrition/hydration education as appropriate  - Include patient/family/caregiver in decisions related to nutrition  Outcome: Progressing

## 2021-08-10 PROCEDURE — 99232 SBSQ HOSP IP/OBS MODERATE 35: CPT | Performed by: INTERNAL MEDICINE

## 2021-08-10 PROCEDURE — 97530 THERAPEUTIC ACTIVITIES: CPT

## 2021-08-10 PROCEDURE — 97112 NEUROMUSCULAR REEDUCATION: CPT

## 2021-08-10 PROCEDURE — 99232 SBSQ HOSP IP/OBS MODERATE 35: CPT | Performed by: PHYSICAL MEDICINE & REHABILITATION

## 2021-08-10 PROCEDURE — 97110 THERAPEUTIC EXERCISES: CPT

## 2021-08-10 RX ORDER — LACTULOSE 20 G/30ML
20 SOLUTION ORAL ONCE
Status: COMPLETED | OUTPATIENT
Start: 2021-08-10 | End: 2021-08-10

## 2021-08-10 RX ADMIN — HEPARIN SODIUM 5000 UNITS: 5000 INJECTION INTRAVENOUS; SUBCUTANEOUS at 06:24

## 2021-08-10 RX ADMIN — CIPROFLOXACIN HYDROCHLORIDE 500 MG: 500 TABLET, FILM COATED ORAL at 22:33

## 2021-08-10 RX ADMIN — LIDOCAINE 2 PATCH: 50 PATCH TOPICAL at 08:30

## 2021-08-10 RX ADMIN — SIMETHICONE 80 MG: 20 EMULSION ORAL at 22:34

## 2021-08-10 RX ADMIN — CIPROFLOXACIN HYDROCHLORIDE 500 MG: 500 TABLET, FILM COATED ORAL at 08:29

## 2021-08-10 RX ADMIN — SIMETHICONE 80 MG: 20 EMULSION ORAL at 06:33

## 2021-08-10 RX ADMIN — HEPARIN SODIUM 5000 UNITS: 5000 INJECTION INTRAVENOUS; SUBCUTANEOUS at 22:35

## 2021-08-10 RX ADMIN — LACTULOSE 20 G: 10 SOLUTION ORAL at 14:10

## 2021-08-10 RX ADMIN — SIMETHICONE 80 MG: 20 EMULSION ORAL at 17:49

## 2021-08-10 RX ADMIN — Medication 1 PACKET: at 08:28

## 2021-08-10 RX ADMIN — ACETAMINOPHEN 975 MG: 325 TABLET, FILM COATED ORAL at 06:23

## 2021-08-10 RX ADMIN — OXYCODONE HYDROCHLORIDE 5 MG: 5 TABLET ORAL at 08:29

## 2021-08-10 RX ADMIN — SIMETHICONE 80 MG: 20 EMULSION ORAL at 12:07

## 2021-08-10 RX ADMIN — HEPARIN SODIUM 5000 UNITS: 5000 INJECTION INTRAVENOUS; SUBCUTANEOUS at 14:11

## 2021-08-10 RX ADMIN — ACETAMINOPHEN 975 MG: 325 TABLET, FILM COATED ORAL at 22:35

## 2021-08-10 RX ADMIN — MELATONIN TAB 3 MG 3 MG: 3 TAB at 22:35

## 2021-08-10 RX ADMIN — ATORVASTATIN CALCIUM 20 MG: 20 TABLET, FILM COATED ORAL at 17:49

## 2021-08-10 RX ADMIN — PANTOPRAZOLE SODIUM 40 MG: 40 TABLET, DELAYED RELEASE ORAL at 06:24

## 2021-08-10 RX ADMIN — ACETAMINOPHEN 975 MG: 325 TABLET, FILM COATED ORAL at 14:10

## 2021-08-10 NOTE — PROGRESS NOTES
Internal Medicine Progress Note  Patient: Matthew Irving  Age/sex: 66 y o  female  Medical Record #: [id number]      ASSESSMENT/PLAN: (Interval History)  Matthew Irving is seen and examined and management for following issues:    Central cord syndrome; s/p ACDF C4-5/PCDF C2-T2 7/20/21  · Doesn't need collar per NS  · Incisions w/o erythema/infection  · Staples removed 8/3 by NS     Acute nasal bone fx  · OMS saw = no surgery needed  · Sinus precautions for 4 weeks     Urine retention  · VT now per PMR ongoing     Enterobacter UTI/MDRO  · On suppressive tx with Keflex at home for recurrent UTI  · Had urinary frequency/burning here  · Switched to Cipro based on cx results  · Currently no sx     ABLA  · Did not require transfusion     Orthostasis  · Continue PO fluid goal of 1200ml/day   · Doing fine w/o abd binder now; using TEDs only and no sx dizziness     Chest pain  · Occurred 7/29/21 and was located on the Lt side  · w/u negative   · No further occurrence        Discharge date:  8/13/21    The above assessment and plan was reviewed and updated as determined by my evaluation of the patient on 8/10/2021  Labs:              Invalid input(s): LABGLOM, CMP                Review of Scheduled Meds:  Current Facility-Administered Medications   Medication Dose Route Frequency Provider Last Rate    acetaminophen  975 mg Oral Q8H [address] Haley Nassar MD      ALPRAZolam  0 25 mg Oral HS PRN Haley Nassar MD      atorvastatin  20 mg Oral Daily With Deidra Holm MD      ciprofloxacin  500 mg Oral Q12H [address] Cherrie Hernandez DO      heparin (porcine)  5,000 Units Subcutaneous Q8H Jose Ramos MD      lidocaine  2 patch Topical Daily Haley Nassar MD      meclizine  12 5 mg Oral Q8H PRN Haley Nassar MD      melatonin  3 mg Oral HS Haley Nassar MD      methocarbamol  500 mg Oral Q6H PRN Haley Nassar MD      oxyCODONE  2 5 mg Oral Q4H PRN MD Patrick Campos oxyCODONE  5 mg Oral Q4H PRN MD Patrick Campos pantoprazole  40 mg Oral Early Morning Taras Dang MD      polyethylene glycol  17 g Oral Daily PRN Taras Dang MD      psyllium  1 packet Oral Daily Taras Dang MD      simethicone  80 mg Oral 4x Daily (with meals and at bedtime) Penny Deras PA-C      sodium chloride  1 spray Each Nare 4x Daily PRN Taras Dang MD         Subjective/ HPI: Patient seen and examined  Patients overnight issues or events were reviewed with nursing or staff during rounds or morning huddle session  New or overnight issues include the following:     None     ROS:   Negative 12 point ROS other than denoted above in HPI or assessment/plan  Imaging:     No orders to display       *Labs /Radiology studies reviewed  *Medications reviewed and reconciled as needed  *Please refer to order section for additional ordered labs studies  *Case discussed with primary attending during morning huddle case rounds      Physical Examination:  Vitals:   Vitals:    08/09/21 1545 08/10/21 0101 08/10/21 0600 08/10/21 0747   BP: 112/70 130/72  136/70   BP Location: Left arm Left arm  Left arm   Pulse: 62 96  84   Resp: 16 18  18   Temp: 98 1 °F (36 7 °C) 97 8 °F (36 6 °C)  98 °F (36 7 °C)   TempSrc: Oral Oral  Oral   SpO2: 97% 97%  96%   Weight:   61 2 kg (134 lb 14 7 oz)    Height:           General Appearance: no distress, conversive  HEENT: PERRLA, conjuctiva normal; oropharynx clear; mucous membranes moist   Neck:  Incisions healing  Lungs: CTA, normal respiratory effort, no retractions, expiratory effort normal  CV: regular rate and rhythm; no rubs/murmurs/gallops, PMI normal   ABD: soft; ND/NT; +BS  EXT: no edema  Skin: normal turgor, normal texture, no rashes  Psych: affect normal, mood normal  Neuro: AAO      The above physical exam was reviewed and updated as determined by my evaluation of the patient on 8/10/2021      Invasive Devices     None                    VTE Pharmacologic Prophylaxis: Heparin  Code Status: Level 1 - Full Code  Current Length of Stay: 15 day(s)      Total time spent:  30 minutes with more than 50% spent counseling/coordinating care  Counseling includes discussion with patient re: progress  and discussion with patient of his/her current medical state/information  Coordination of patient's care was performed in conjunction with primary service  Time invested included review of patient's labs, vitals, and management of their comorbidities with continued monitoring  In addition, this patient was discussed with medical team including physician and advanced extenders  The care of the patient was extensively discussed and appropriate treatment plan was formulated unique for this patient  ** Please Note:  voice to text software may have been used in the creation of this document   Although proof errors in transcription or interpretation are a potential of such software**

## 2021-08-10 NOTE — PROGRESS NOTES
08/10/21 0891   Pain Assessment   Pain Assessment Tool 0-10   Pain Score 7   Pain Location/Orientation Orientation: Left; Location: Arm;Location: Neck   Pain Onset/Description Onset: Ongoing   Restrictions/Precautions   Precautions Bed/chair alarms;Cognitive; Fall Risk;Contact/isolation;Spinal precautions;Supervision on toilet/commode;Visual deficit   Braces or Orthoses   (TEDs)   Cognition   Overall Cognitive Status Impaired   Subjective   Subjective reports pain as stated above  family present for training   Sit to Stand   Type of Assistance Needed Supervision   Sit to Stand CARE Score 4   Bed-Chair Transfer   Type of Assistance Needed Supervision; Adaptive equipment   Comment with RW   Chair/Bed-to-Chair Transfer CARE Score 4   Car Transfer   Type of Assistance Needed Incidental touching;Verbal cues; Adaptive equipment   Comment pt very confused on how to approach car seat, needing max cueing to perform tx   Car Transfer CARE Score 4   Walk 10 Feet   Type of Assistance Needed Supervision;Verbal cues; Adaptive equipment   Comment with RW, CS   Walk 10 Feet CARE Score 4   Walk 50 Feet with Two Turns   Type of Assistance Needed Supervision; Adaptive equipment   Comment with RW; CS   Walk 50 Feet with Two Turns CARE Score 4   Walk 150 Feet   Type of Assistance Needed Verbal cues; Supervision; Adaptive equipment   Comment CS with RW   Walk 150 Feet CARE Score 4   Ambulation   Does the patient walk? 2  Yes   Distance Walked (feet) 150 ft   Findings cues for object avoidance on the R and stay closer to [employer] or Single Stair   Style negotiated Single stair   Type of Assistance Needed Supervision; Adaptive equipment   1 Step (Curb) CARE Score 4   4 Steps   Type of Assistance Needed Supervision; Adaptive equipment   Comment CS   4 Steps CARE Score 4   12 Steps   Type of Assistance Needed Incidental touching;Supervision; Adaptive equipment   Comment CS with RW   12 Steps CARE Score 4   Stairs   # of Steps 12   Findings pt has 16 steps per the family to get into the house on the level she will be staying  pt using R HR with both hands   Assessment   Treatment Assessment FT done this session with pt's dtr, son, and grandson  at start of session this therapist spoke to family about recommendation for 24/7 Sup and family stated "we will figure it out"  family wanted to get RW and commode through insurance  recommendation for use of RW at time of d/c due to safety and stability until pt becomes stronger to use the rollator  family with good understanding of guarding for stairs and use of verbal cueing due ot visual deficits  overall pt functioning at  with RW and improving with functional mobility and ind  Family/Caregiver Present see assessment   Problem List Decreased strength;Decreased range of motion;Decreased endurance; Impaired balance;Decreased mobility; Decreased cognition;Decreased safety awareness; Impaired tone;Orthopedic restrictions;Pain   Barriers to Discharge Inaccessible home environment;Decreased caregiver support   PT Barriers   Functional Limitation Car transfers;Stair negotiation;Transfers; Walking   Plan   Progress Progressing toward goals   Recommendation   PT Discharge Recommendation Home with home health rehabilitation  (24/7 Sup)   Equipment Recommended Walker   PT Therapy Minutes   PT Time In 0830   PT Time Out 0900   PT Total Time (minutes) 30   PT Mode of treatment - Individual (minutes) 30   PT Mode of treatment - Concurrent (minutes) 0   PT Mode of treatment - Group (minutes) 0   PT Mode of treatment - Co-treat (minutes) 0   PT Mode of Treatment - Total time(minutes) 30 minutes   PT Cumulative Minutes 1125   Therapy Time missed   Time missed?  No

## 2021-08-10 NOTE — PLAN OF CARE
Problem: Prexisting or High Potential for Compromised Skin Integrity  Goal: Skin integrity is maintained or improved  Description: INTERVENTIONS:  - Identify patients at risk for skin breakdown  - Assess and monitor skin integrity  - Assess and monitor nutrition and hydration status  - Monitor labs   - Assess for incontinence   - Turn and reposition patient  - Assist with mobility/ambulation  - Relieve pressure over bony prominences  - Avoid friction and shearing  - Provide appropriate hygiene as needed including keeping skin clean and dry  - Evaluate need for skin moisturizer/barrier cream  - Collaborate with interdisciplinary team   - Patient/family teaching  - Consider wound care consult   Outcome: Progressing     Problem: MOBILITY - ADULT  Goal: Maintain or return to baseline ADL function  Description: INTERVENTIONS:  -  Assess patient's ability to carry out ADLs; assess patient's baseline for ADL function and identify physical deficits which impact ability to perform ADLs (bathing, care of mouth/teeth, toileting, grooming, dressing, etc )  - Assess/evaluate cause of self-care deficits   - Assess range of motion  - Assess patient's mobility; develop plan if impaired  - Assess patient's need for assistive devices and provide as appropriate  - Encourage maximum independence but intervene and supervise when necessary  - Involve family in performance of ADLs  - Assess for home care needs following discharge   - Consider OT consult to assist with ADL evaluation and planning for discharge  - Provide patient education as appropriate  Outcome: Progressing  Goal: Maintains/Returns to pre admission functional level  Description: INTERVENTIONS:  - Perform BMAT or MOVE assessment daily    - Set and communicate daily mobility goal to care team and patient/family/caregiver  - Collaborate with rehabilitation services on mobility goals if consulted  - Perform Range of Motion times a day    - Reposition patient every hours   - Dangle patient  times a day  - Stand patient  times a day  - Ambulate patient  times a day  - Out of bed to chair times a day   - Out of bed for meals  times a day  - Out of bed for toileting  - Record patient progress and toleration of activity level   Outcome: Progressing     Problem: Potential for Falls  Goal: Patient will remain free of falls  Description: INTERVENTIONS:  - Educate patient/family on patient safety including physical limitations  - Instruct patient to call for assistance with activity   - Consult OT/PT to assist with strengthening/mobility   - Keep Call bell within reach  - Keep bed low and locked with side rails adjusted as appropriate  - Keep care items and personal belongings within reach  - Initiate and maintain comfort rounds  - Make Fall Risk Sign visible to staff  - Offer Toileting every Hours, in advance of need  - Initiate/Maintain alarm  - Obtain necessary fall risk management equipment:   - Apply yellow socks and bracelet for high fall risk patients  - Consider moving patient to room near nurses station  Outcome: Progressing     Problem: PAIN - ADULT  Goal: Verbalizes/displays adequate comfort level or baseline comfort level  Description: Interventions:  - Encourage patient to monitor pain and request assistance  - Assess pain using appropriate pain scale  - Administer analgesics based on type and severity of pain and evaluate response  - Implement non-pharmacological measures as appropriate and evaluate response  - Consider cultural and social influences on pain and pain management  - Notify physician/advanced practitioner if interventions unsuccessful or patient reports new pain  Outcome: Progressing     Problem: INFECTION - ADULT  Goal: Absence or prevention of progression during hospitalization  Description: INTERVENTIONS:  - Assess and monitor for signs and symptoms of infection  - Monitor lab/diagnostic results  - Monitor all insertion sites, i e  indwelling lines, tubes, and drains  - Monitor endotracheal if appropriate and nasal secretions for changes in amount and color  - [place] appropriate cooling/warming therapies per order  - Administer medications as ordered  - Instruct and encourage patient and family to use good hand hygiene technique  - Identify and instruct in appropriate isolation precautions for identified infection/condition  Outcome: Progressing     Problem: SAFETY ADULT  Goal: Patient will remain free of falls  Description: INTERVENTIONS:  - Educate patient/family on patient safety including physical limitations  - Instruct patient to call for assistance with activity   - Consult OT/PT to assist with strengthening/mobility   - Keep Call bell within reach  - Keep bed low and locked with side rails adjusted as appropriate  - Keep care items and personal belongings within reach  - Initiate and maintain comfort rounds  - Make Fall Risk Sign visible to staff  - Offer Toileting every Hours, in advance of need  - Initiate/Maintain alarm  - Obtain necessary fall risk management equipment:   - Apply yellow socks and bracelet for high fall risk patients  - Consider moving patient to room near nurses station  Outcome: Progressing  Goal: Maintain or return to baseline ADL function  Description: INTERVENTIONS:  -  Assess patient's ability to carry out ADLs; assess patient's baseline for ADL function and identify physical deficits which impact ability to perform ADLs (bathing, care of mouth/teeth, toileting, grooming, dressing, etc )  - Assess/evaluate cause of self-care deficits   - Assess range of motion  - Assess patient's mobility; develop plan if impaired  - Assess patient's need for assistive devices and provide as appropriate  - Encourage maximum independence but intervene and supervise when necessary  - Involve family in performance of ADLs  - Assess for home care needs following discharge   - Consider OT consult to assist with ADL evaluation and planning for discharge  - Provide patient education as appropriate  Outcome: Progressing  Goal: Maintains/Returns to pre admission functional level  Description: INTERVENTIONS:  - Perform BMAT or MOVE assessment daily    - Set and communicate daily mobility goal to care team and patient/family/caregiver  - Collaborate with rehabilitation services on mobility goals if consulted  - Perform Range of Motion  times a day  - Reposition patient every hours  - Dangle patient  times a day  - Stand patient  times a day  - Ambulate patient  times a day  - Out of bed to chair times a day   - Out of bed for meals  times a day  - Out of bed for toileting  - Record patient progress and toleration of activity level   Outcome: Progressing     Problem: DISCHARGE PLANNING  Goal: Discharge to home or other facility with appropriate resources  Description: INTERVENTIONS:  - Identify barriers to discharge w/patient and caregiver  - Arrange for needed discharge resources and transportation as appropriate  - Identify discharge learning needs (meds, wound care, etc )  - Arrange for interpretive services to assist at discharge as needed  - Refer to Case Management Department for coordinating discharge planning if the patient needs post-hospital services based on physician/advanced practitioner order or complex needs related to functional status, cognitive ability, or social support system  Outcome: Progressing     Problem: Nutrition/Hydration-ADULT  Goal: Nutrient/Hydration intake appropriate for improving, restoring or maintaining nutritional needs  Description: Monitor and assess patient's nutrition/hydration status for malnutrition  Collaborate with interdisciplinary team and initiate plan and interventions as ordered  Monitor patient's weight and dietary intake as ordered or per policy  Utilize nutrition screening tool and intervene as necessary  Determine patient's food preferences and provide high-protein, high-caloric foods as appropriate  INTERVENTIONS:  - Monitor oral intake, urinary output, labs, and treatment plans  - Assess nutrition and hydration status and recommend course of action  - Evaluate amount of meals eaten  - Assist patient with eating if necessary   - Allow adequate time for meals  - Recommend/ encourage appropriate diets, oral nutritional supplements, and vitamin/mineral supplements  - Order, calculate, and assess calorie counts as needed  - Recommend, monitor, and adjust tube feedings and TPN/PPN based on assessed needs  - Assess need for intravenous fluids  - Provide specific nutrition/hydration education as appropriate  - Include patient/family/caregiver in decisions related to nutrition  Outcome: Progressing

## 2021-08-10 NOTE — CASE MANAGEMENT
In preparation for dc cm made referral to [facility] for contd [facility] for rn pt and ot  Cm provided dc address of [facility] [place] [address] [facility] [address] for dc  Lindsay Jacinto accepted referral  Order placed with [email or web address] for a roller walker and commode  Cm learned commode is covered however per medicare check pt received a walker in January 2017 while at [facility]  Cm phoned dtr Vanessa Salcedo and made aware  Jack Zuniga does not recall hospitalization then but stated she will pick one up in the pharmacy

## 2021-08-10 NOTE — PROGRESS NOTES
08/10/21 0900   Pain Assessment   Pain Assessment Tool 0-10   Pain Score 7   Pain Location/Orientation Orientation: Left; Location: Arm;Location: Shoulder   Restrictions/Precautions   Precautions Bed/chair alarms;Cognitive; Fall Risk;Spinal precautions;Supervision on toilet/commode   Weight Bearing Restrictions No   ROM Restrictions Yes  (cervical spine)   Lifestyle   Autonomy Pt and family agreeable to family training   Tub/Shower Transfer   Limitations Noted In Balance; Coordination;UE Strength;LE Strength   Adaptive Equipment Transfer Bench   Assessed Tub/shower combo   Findings Pt completes DRY tub/shower transfer with transfer bench at a CG level  Pt demo's good safety awareness with sit swivel tech and use of hand rail on transfer bench to assist with scooting over  Pt's daughter present for transfer and prefers transfer bench over shower chair  Sit to Stand   Type of Assistance Needed Supervision   Physical Assistance Level No physical assistance   Comment CS with RW   Sit to Stand CARE Score 4   Bed-Chair Transfer   Type of Assistance Needed Supervision   Physical Assistance Level No physical assistance   Comment CS with RW   Chair/Bed-to-Chair Transfer CARE Score 4   Cognition   Overall Cognitive Status Impaired   Arousal/Participation Alert; Cooperative   Attention Within functional limits   Orientation Level Oriented X4   Memory Decreased short term memory   Following Commands Follows one step commands without difficulty   Activity Tolerance   Activity Tolerance Patient tolerated treatment well   Assessment   Treatment Assessment Pt engages in 60 minute skilled OT session focusing on family training with daughter, son and grandson  See below for full functional details  Pt and family extremely engaging during family training, asking appropriate questions throughout with good understanding of recommendations   Continue with skilled OT focusing on ADL retraining, functional transfers, standing adalgisa/bal, light IADLs in order to decrease burden of care at d/c     OT Family training done with: daughter-cortez; son-dhaval; and grandson-chelsea   Assessment of family training Pt and family participates in family training focusing on role of OT, current LOF, recommendations as far as 24/7 supervision/assistance and safety modifications and DME  Family extremely engaged in session asking appropriate questions throughout  Gave family printed material on transfer bench, clip on walker tray and discussed that therapy would order her a [facility]  Family also to obtain a long handled shower head as well  Daughter present and observed pt completing DRY tub/shower transfer with transfer bench (at CG level)  Discussed safety recommendations as far as providing closer supervision when pt is ambulating when fatigued, providing supervision/assist during showering/self care tasks and during IADLs (like light meal prep) also discussed to use lift chair features when needed, and not necessarily to rely on them in order to maintain overall strength and endurance  Pt thankful for training, with no additional questions/concerns at this time  Prognosis Good   Problem List Decreased strength;Decreased range of motion;Decreased endurance; Impaired balance;Decreased mobility; Decreased cognition;Decreased safety awareness; Impaired tone;Orthopedic restrictions;Pain   Barriers to Discharge Inaccessible home environment;Decreased caregiver support   Plan   Treatment/Interventions ADL retraining;Functional transfer training; Therapeutic exercise; Endurance training;Cognitive reorientation;Patient/family training;Equipment eval/education; Compensatory technique education   OT Therapy Minutes   OT Time In 0900   OT Time Out 1000   OT Total Time (minutes) 60   OT Mode of treatment - Individual (minutes) 60   OT Mode of treatment - Concurrent (minutes) 0   OT Mode of treatment - Group (minutes) 0   OT Mode of treatment - Co-treat (minutes) 0   OT Mode of Treatment - Total time(minutes) 60 minutes   OT Cumulative Minutes 1103   Therapy Time missed   Time missed?  No

## 2021-08-10 NOTE — PLAN OF CARE
Problem: Prexisting or High Potential for Compromised Skin Integrity  Goal: Skin integrity is maintained or improved  Description: INTERVENTIONS:  - Identify patients at risk for skin breakdown  - Assess and monitor skin integrity  - Assess and monitor nutrition and hydration status  - Monitor labs   - Assess for incontinence   - Turn and reposition patient  - Assist with mobility/ambulation  - Relieve pressure over bony prominences  - Avoid friction and shearing  - Provide appropriate hygiene as needed including keeping skin clean and dry  - Evaluate need for skin moisturizer/barrier cream  - Collaborate with interdisciplinary team   - Patient/family teaching  - Consider wound care consult   Outcome: Progressing     Problem: MOBILITY - ADULT  Goal: Maintain or return to baseline ADL function  Description: INTERVENTIONS:  -  Assess patient's ability to carry out ADLs; assess patient's baseline for ADL function and identify physical deficits which impact ability to perform ADLs (bathing, care of mouth/teeth, toileting, grooming, dressing, etc )  - Assess/evaluate cause of self-care deficits   - Assess range of motion  - Assess patient's mobility; develop plan if impaired  - Assess patient's need for assistive devices and provide as appropriate  - Encourage maximum independence but intervene and supervise when necessary  - Involve family in performance of ADLs  - Assess for home care needs following discharge   - Consider OT consult to assist with ADL evaluation and planning for discharge  - Provide patient education as appropriate  Outcome: Progressing  Goal: Maintains/Returns to pre admission functional level  Description: INTERVENTIONS:  - Perform BMAT or MOVE assessment daily    - Set and communicate daily mobility goal to care team and patient/family/caregiver  - Collaborate with rehabilitation services on mobility goals if consulted  - Perform Range of Motion times a day    - Reposition patient every hours   - Dangle patient  times a day  - Stand patient  times a day  - Ambulate patient  times a day  - Out of bed to chair times a day   - Out of bed for meals  times a day  - Out of bed for toileting  - Record patient progress and toleration of activity level   Outcome: Progressing     Problem: Potential for Falls  Goal: Patient will remain free of falls  Description: INTERVENTIONS:  - Educate patient/family on patient safety including physical limitations  - Instruct patient to call for assistance with activity   - Consult OT/PT to assist with strengthening/mobility   - Keep Call bell within reach  - Keep bed low and locked with side rails adjusted as appropriate  - Keep care items and personal belongings within reach  - Initiate and maintain comfort rounds  - Make Fall Risk Sign visible to staff  - Offer Toileting every Hours, in advance of need  - Initiate/Maintain alarm  - Obtain necessary fall risk management equipment:   - Apply yellow socks and bracelet for high fall risk patients  - Consider moving patient to room near nurses station  Outcome: Progressing     Problem: PAIN - ADULT  Goal: Verbalizes/displays adequate comfort level or baseline comfort level  Description: Interventions:  - Encourage patient to monitor pain and request assistance  - Assess pain using appropriate pain scale  - Administer analgesics based on type and severity of pain and evaluate response  - Implement non-pharmacological measures as appropriate and evaluate response  - Consider cultural and social influences on pain and pain management  - Notify physician/advanced practitioner if interventions unsuccessful or patient reports new pain  Outcome: Progressing     Problem: INFECTION - ADULT  Goal: Absence or prevention of progression during hospitalization  Description: INTERVENTIONS:  - Assess and monitor for signs and symptoms of infection  - Monitor lab/diagnostic results  - Monitor all insertion sites, i e  indwelling lines, tubes, and drains  - Monitor endotracheal if appropriate and nasal secretions for changes in amount and color  - [place] appropriate cooling/warming therapies per order  - Administer medications as ordered  - Instruct and encourage patient and family to use good hand hygiene technique  - Identify and instruct in appropriate isolation precautions for identified infection/condition  Outcome: Progressing     Problem: SAFETY ADULT  Goal: Patient will remain free of falls  Description: INTERVENTIONS:  - Educate patient/family on patient safety including physical limitations  - Instruct patient to call for assistance with activity   - Consult OT/PT to assist with strengthening/mobility   - Keep Call bell within reach  - Keep bed low and locked with side rails adjusted as appropriate  - Keep care items and personal belongings within reach  - Initiate and maintain comfort rounds  - Make Fall Risk Sign visible to staff  - Offer Toileting every Hours, in advance of need  - Initiate/Maintain alarm  - Obtain necessary fall risk management equipment:   - Apply yellow socks and bracelet for high fall risk patients  - Consider moving patient to room near nurses station  Outcome: Progressing  Goal: Maintain or return to baseline ADL function  Description: INTERVENTIONS:  -  Assess patient's ability to carry out ADLs; assess patient's baseline for ADL function and identify physical deficits which impact ability to perform ADLs (bathing, care of mouth/teeth, toileting, grooming, dressing, etc )  - Assess/evaluate cause of self-care deficits   - Assess range of motion  - Assess patient's mobility; develop plan if impaired  - Assess patient's need for assistive devices and provide as appropriate  - Encourage maximum independence but intervene and supervise when necessary  - Involve family in performance of ADLs  - Assess for home care needs following discharge   - Consider OT consult to assist with ADL evaluation and planning for discharge  - Provide patient education as appropriate  Outcome: Progressing  Goal: Maintains/Returns to pre admission functional level  Description: INTERVENTIONS:  - Perform BMAT or MOVE assessment daily    - Set and communicate daily mobility goal to care team and patient/family/caregiver  - Collaborate with rehabilitation services on mobility goals if consulted  - Perform Range of Motion  times a day  - Reposition patient every hours  - Dangle patient  times a day  - Stand patient  times a day  - Ambulate patient  times a day  - Out of bed to chair times a day   - Out of bed for meals  times a day  - Out of bed for toileting  - Record patient progress and toleration of activity level   Outcome: Progressing     Problem: DISCHARGE PLANNING  Goal: Discharge to home or other facility with appropriate resources  Description: INTERVENTIONS:  - Identify barriers to discharge w/patient and caregiver  - Arrange for needed discharge resources and transportation as appropriate  - Identify discharge learning needs (meds, wound care, etc )  - Arrange for interpretive services to assist at discharge as needed  - Refer to Case Management Department for coordinating discharge planning if the patient needs post-hospital services based on physician/advanced practitioner order or complex needs related to functional status, cognitive ability, or social support system  Outcome: Progressing     Problem: Nutrition/Hydration-ADULT  Goal: Nutrient/Hydration intake appropriate for improving, restoring or maintaining nutritional needs  Description: Monitor and assess patient's nutrition/hydration status for malnutrition  Collaborate with interdisciplinary team and initiate plan and interventions as ordered  Monitor patient's weight and dietary intake as ordered or per policy  Utilize nutrition screening tool and intervene as necessary  Determine patient's food preferences and provide high-protein, high-caloric foods as appropriate  INTERVENTIONS:  - Monitor oral intake, urinary output, labs, and treatment plans  - Assess nutrition and hydration status and recommend course of action  - Evaluate amount of meals eaten  - Assist patient with eating if necessary   - Allow adequate time for meals  - Recommend/ encourage appropriate diets, oral nutritional supplements, and vitamin/mineral supplements  - Order, calculate, and assess calorie counts as needed  - Recommend, monitor, and adjust tube feedings and TPN/PPN based on assessed needs  - Assess need for intravenous fluids  - Provide specific nutrition/hydration education as appropriate  - Include patient/family/caregiver in decisions related to nutrition  Outcome: Progressing

## 2021-08-10 NOTE — PROGRESS NOTES
08/10/21 1230   Pain Assessment   Pain Assessment Tool 0-10   Pain Score 3   Pain Location/Orientation Orientation: Left; Location: Arm   Restrictions/Precautions   Precautions Bed/chair alarms;Cognitive; Fall Risk;Supervision on toilet/commode;Visual deficit   Cognition   Overall Cognitive Status Impaired   Subjective   Subjective no complaints at this time   Roll Left and Right   Reason if not Attempted Activity not applicable   Roll Left and Right CARE Score 9   Sit to Lying   Reason if not Attempted Activity not applicable   Sit to Lying CARE Score 9   Lying to Sitting on Side of Bed   Reason if not Attempted Activity not applicable   Lying to Sitting on Side of Bed CARE Score 9   Sit to Stand   Type of Assistance Needed Supervision   Sit to Stand CARE Score 4   Bed-Chair Transfer   Type of Assistance Needed Supervision; Adaptive equipment   Comment with RW   Chair/Bed-to-Chair Transfer CARE Score 4   Walk 10 Feet   Type of Assistance Needed Supervision; Adaptive equipment   Comment with RW   Walk 10 Feet CARE Score 4   Walk 50 Feet with Two Turns   Type of Assistance Needed Supervision; Adaptive equipment   Comment with RW   Walk 50 Feet with Two Turns CARE Score 4   Walk 150 Feet   Type of Assistance Needed Supervision; Adaptive equipment   Walk 150 Feet CARE Score 4   Walking 10 Feet on Uneven Surfaces   Type of Assistance Needed Incidental touching;Supervision; Adaptive equipment   Comment with RW on mat on floor   Walking 10 Feet on Uneven Surfaces CARE Score 4   Ambulation   Distance Walked (feet) 150 ft  (x2)   Assist Device Roller Walker   Gait Pattern Inconsistant Erika; Slow Erika;Decreased foot clearance; Forward Flexion; Improper weight shift   Limitations Noted In Balance; Endurance; Heel Strike;Posture;Speed;Strength   Findings cont to give cues for obstacle avoidance   Wheel 50 Feet with Two Turns   Reason if not Attempted Activity not applicable   Wheel 50 Feet with Two Turns CARE Score 9   Wheel 150 Feet   Reason if not Attempted Activity not applicable   Wheel 374 Feet CARE Score 9   Wheelchair mobility   Does the patient use a wheelchair? 0  No   Toilet Transfer   Type of Assistance Needed Supervision; Adaptive equipment   Comment with RW   Toilet Transfer CARE Score 4   Therapeutic Interventions   Strengthening seated LAQ and hip flex with 2 5# B/L during rest breaks   Flexibility B gastroc and HS x3mins   Neuromuscular Re-Education fwd/bwd walking with ball toss 15' in gym, CG/Juanjo on hips   Equipment Use   NuStep 15mins L1, LEs only   Assessment   Treatment Assessment pt cont to function well at CS level with RW   no changes from previous session  [facility] reported that pt is unable to get RW from insurance and will contact dtr   will cont to focus on balance, safety and activity tolerance to progress with functional mobility and ind for safe d/c    Problem List Decreased strength;Decreased range of motion;Decreased endurance; Impaired balance;Decreased mobility; Decreased cognition;Decreased safety awareness; Impaired tone;Orthopedic restrictions;Pain   Barriers to Discharge Inaccessible home environment;Decreased caregiver support   PT Barriers   Functional Limitation Car transfers;Stair negotiation;Transfers; Walking   Plan   Treatment/Interventions Functional transfer training;LE strengthening/ROM; Endurance training;Patient/family training;Gait training   Recommendation   PT Discharge Recommendation Home with home health rehabilitation   Equipment Recommended Walker   PT Therapy Minutes   PT Time In 1230   PT Time Out 1400   PT Total Time (minutes) 90   PT Mode of treatment - Individual (minutes) 90   PT Mode of treatment - Concurrent (minutes) 0   PT Mode of treatment - Group (minutes) 0   PT Mode of treatment - Co-treat (minutes) 0   PT Mode of Treatment - Total time(minutes) 90 minutes   PT Cumulative Minutes 1215   Therapy Time missed   Time missed?  No

## 2021-08-11 PROCEDURE — 99232 SBSQ HOSP IP/OBS MODERATE 35: CPT | Performed by: INTERNAL MEDICINE

## 2021-08-11 PROCEDURE — 99233 SBSQ HOSP IP/OBS HIGH 50: CPT | Performed by: PHYSICAL MEDICINE & REHABILITATION

## 2021-08-11 PROCEDURE — 97110 THERAPEUTIC EXERCISES: CPT

## 2021-08-11 PROCEDURE — 97535 SELF CARE MNGMENT TRAINING: CPT

## 2021-08-11 PROCEDURE — 97530 THERAPEUTIC ACTIVITIES: CPT

## 2021-08-11 RX ORDER — BISACODYL 10 MG
10 SUPPOSITORY, RECTAL RECTAL ONCE
Status: COMPLETED | OUTPATIENT
Start: 2021-08-11 | End: 2021-08-11

## 2021-08-11 RX ADMIN — ACETAMINOPHEN 975 MG: 325 TABLET, FILM COATED ORAL at 14:51

## 2021-08-11 RX ADMIN — ACETAMINOPHEN 975 MG: 325 TABLET, FILM COATED ORAL at 05:29

## 2021-08-11 RX ADMIN — MELATONIN TAB 3 MG 3 MG: 3 TAB at 22:05

## 2021-08-11 RX ADMIN — HEPARIN SODIUM 5000 UNITS: 5000 INJECTION INTRAVENOUS; SUBCUTANEOUS at 14:52

## 2021-08-11 RX ADMIN — PANTOPRAZOLE SODIUM 40 MG: 40 TABLET, DELAYED RELEASE ORAL at 05:29

## 2021-08-11 RX ADMIN — HEPARIN SODIUM 5000 UNITS: 5000 INJECTION INTRAVENOUS; SUBCUTANEOUS at 05:29

## 2021-08-11 RX ADMIN — SIMETHICONE 80 MG: 20 EMULSION ORAL at 22:05

## 2021-08-11 RX ADMIN — ATORVASTATIN CALCIUM 20 MG: 20 TABLET, FILM COATED ORAL at 16:04

## 2021-08-11 RX ADMIN — CIPROFLOXACIN HYDROCHLORIDE 500 MG: 500 TABLET, FILM COATED ORAL at 22:05

## 2021-08-11 RX ADMIN — SIMETHICONE 80 MG: 20 EMULSION ORAL at 16:04

## 2021-08-11 RX ADMIN — SIMETHICONE 80 MG: 20 EMULSION ORAL at 05:29

## 2021-08-11 RX ADMIN — HEPARIN SODIUM 5000 UNITS: 5000 INJECTION INTRAVENOUS; SUBCUTANEOUS at 22:05

## 2021-08-11 RX ADMIN — CIPROFLOXACIN HYDROCHLORIDE 500 MG: 500 TABLET, FILM COATED ORAL at 09:36

## 2021-08-11 RX ADMIN — POLYETHYLENE GLYCOL 3350 17 G: 17 POWDER, FOR SOLUTION ORAL at 09:30

## 2021-08-11 RX ADMIN — ACETAMINOPHEN 975 MG: 325 TABLET, FILM COATED ORAL at 22:04

## 2021-08-11 RX ADMIN — BISACODYL 10 MG: 10 SUPPOSITORY RECTAL at 12:41

## 2021-08-11 RX ADMIN — LIDOCAINE 2 PATCH: 50 PATCH TOPICAL at 09:37

## 2021-08-11 NOTE — PROGRESS NOTES
08/11/21 0700   Pain Assessment   Pain Assessment Tool Pain Assessment not indicated - pt denies pain   Pain Score No Pain   Restrictions/Precautions   Precautions Bed/chair alarms;Cognitive; Fall Risk;Spinal precautions;Supervision on toilet/commode  (sinus precautions)   Weight Bearing Restrictions No   ROM Restrictions Yes  (cervical spine)   Lifestyle   Autonomy "The meeting went very well yesterday I thought"   Eating   Type of Assistance Needed Set-up / clean-up   Physical Assistance Level No physical assistance   Comment seated in recliner chair    Eating CARE Score 5   Oral Hygiene   Type of Assistance Needed Supervision   Physical Assistance Level No physical assistance   Comment in stance at sink   Oral Hygiene CARE Score 4   Shower/Bathe Self   Type of Assistance Needed Supervision   Physical Assistance Level No physical assistance   Comment Pt completes sponge bathing at sink seated/standing with overall supervision for safety  Pt bathes 10/10 parts with set up of items, supervision for UB, supervision while in stance for moira area and buttocks and supervision for lower legs/feet using cross leg tech  Shower/Bathe Self CARE Score 4   Bathing   Assessed Bath Style Sponge Bath   Anticipated D/C Bath Style Shower   Able to Jessica Chuck Yes   Able to [employer] Temperature Yes   Able to Wash/Rinse/Dry (body part) Left Arm;Right Arm;L Upper Leg;R Upper Leg;L Lower Leg/Foot;R Lower Leg/Foot;Chest;Abdomen;Perineal Area; Buttocks   Limitations Noted in Balance;ROM;Strength   Positioning Seated;Standing   Upper Body Dressing   Type of Assistance Needed Supervision   Physical Assistance Level No physical assistance   Comment to retrieve clothing from dresser and supervision to don overhead shirt   Upper Body Dressing CARE Score 4   Lower Body Dressing   Type of Assistance Needed Supervision   Physical Assistance Level No physical assistance   Comment to retrieve clothing with RW from dresser, supervision to thread underwear while seated and supervision in stance to pull up over hips   Lower Body Dressing CARE Score 4   Putting On/Taking Off Footwear   Type of Assistance Needed Supervision   Physical Assistance Level No physical assistance   Comment seated using cross leg tech to don socks/shoes   Putting On/Taking Off Footwear CARE Score 4   Sit to Stand   Type of Assistance Needed Supervision   Physical Assistance Level No physical assistance   Comment with RW   Sit to Stand CARE Score 4   Bed-Chair Transfer   Type of Assistance Needed Supervision   Physical Assistance Level No physical assistance   Comment with RW   Chair/Bed-to-Chair Transfer CARE Score 4   Toileting Hygiene   Type of Assistance Needed Supervision   Physical Assistance Level No physical assistance   Comment in stance for hygiene/CM   Toileting Hygiene CARE Score 4   Toilet Transfer   Type of Assistance Needed Supervision   Physical Assistance Level No physical assistance   Comment with RW   Toilet Transfer CARE Score 4   Cognition   Overall Cognitive Status Impaired   Arousal/Participation Alert; Cooperative   Attention Within functional limits   Orientation Level Oriented X4   Memory Decreased short term memory   Following Commands Follows one step commands without difficulty   Activity Tolerance   Activity Tolerance Patient tolerated treatment well   Assessment   Treatment Assessment Pt engages in 90 minute skilled OT session focusing on full sponge bath seated/standing at sink, functional transfers, standing at sink for grooming tasks and BP monitoring w/o ab binder and TEDS  See above for full functional details  Pt demo's consistent supervision level for self care and basic functional transfers with RW  Pt maintains precautions well throughout ADL routine with VCs/assist  BP monitored throughout w/o TEDS and ab binder with vitals [facility]  Asked OT to confirm if pt will require TEDS upon d/c home when in interdisciplinary team meeting   OT confirms pt WILL NOT require TEDS upon d/c home  Recommend continued skilled care in order to inc independence with self care, functional transfers, light IADLs, in order to decrease burden of care at d/c  Prognosis Good   Problem List Decreased strength;Decreased range of motion;Decreased endurance; Impaired balance;Decreased mobility; Decreased cognition;Decreased safety awareness; Impaired tone;Orthopedic restrictions;Pain   Barriers to Discharge Inaccessible home environment;Decreased caregiver support   Plan   Treatment/Interventions ADL retraining;Functional transfer training; Therapeutic exercise; Endurance training;Patient/family training;Equipment eval/education; Compensatory technique education   OT Therapy Minutes   OT Time In 0700   OT Time Out 0830   OT Total Time (minutes) 90   OT Mode of treatment - Individual (minutes) 90   OT Mode of treatment - Concurrent (minutes) 0   OT Mode of treatment - Group (minutes) 0   OT Mode of treatment - Co-treat (minutes) 0   OT Mode of Treatment - Total time(minutes) 90 minutes   OT Cumulative Minutes 1193   Therapy Time missed   Time missed?  No

## 2021-08-11 NOTE — TEAM CONFERENCE
Acute RehabilitationTeam Conference Note  Date: 8/11/2021   Time: 11:47 AM       Patient Name:  Earle Infante       Medical Record Number: [id number]   YOB: 1945  Sex: Female          Room/Bed:  [facility]4/[facility] 974-01  Payor Info:  Payor: Jonas Dinh / Plan: MEDICARE A AND B / Product Type: Medicare A & B Fee for Service /      Admitting Diagnosis: Central cord synd at unsp level of cerv spinal cord, init ([facility] ) [S14 129A]   Admit Date/Time:  7/26/2021  1:13 PM  Admission Comments: No comment available     Primary Diagnosis:  GERD (gastroesophageal reflux disease)  Principal Problem: GERD (gastroesophageal reflux disease)    Patient Active Problem List    Diagnosis Date Noted    Overgrown toenails 08/05/2021    Chest pain 07/29/2021    Dyslipidemia 07/26/2021    Urinary retention 07/26/2021    Anemia 07/26/2021    CKD (chronic kidney disease) 07/26/2021    CHF (congestive heart failure) ([facility] ) 07/26/2021    Cervical stenosis of spine 07/26/2021    History of recurrent UTIs 07/25/2021    Nasal bone fractures 07/25/2021    Orthostatic hypotension 07/25/2021    Fall against object 07/18/2021    Central cord syndrome ([facility] ) 07/18/2021    Thrombocytopenia ([facility] ) 03/02/2021    Allergic conjunctivitis of both eyes 03/02/2021    Age related osteoporosis 08/19/2020    Abnormal LFTs 06/05/2020    Anxiety 06/05/2020    Generalized weakness 06/01/2020    Hyperlipidemia 03/04/2020    Medicare annual wellness visit, subsequent 02/17/2020    Recurrent falls 02/17/2020    Gait disturbance 02/17/2020    Viral illness 01/20/2020    Acute rhinitis 01/20/2017    Vertigo 01/20/2017    Moderate mitral regurgitation 01/10/2017    Hypertension 01/08/2017    History of asthma     Migraine headache     Cholelithiasis     GERD (gastroesophageal reflux disease) 05/31/2016       Physical Therapy:    Weight Bearing Status: Full Weight Bearing  Transfers: Supervision  Bed Mobility: Minimal Assistance  Amulation Distance (ft): 150 feet  Ambulation: Supervision  Assistive Device for Ambulation: Roller Walker  Wheelchair Mobility Distance:  (NA)  Number of Stairs: 12  Assistive Device for Stairs: Right Hand Rail  Stair Assistance: Incidental Touching  Ramp: Moderate Assistance  Assistive Device for Ramp: Roller Walker  Discharge Recommendations: Home with:  [address] Rodriguez with[de-identified] 24 Hour Supervision, 24 Hour Assisteance, Family Support, Home Physical Therapy    Pt has demonstrated improvements over the last week with therapy  Pt has improved gait distance, stairs and BPs have leveled out  Pt cont to have 16 stairs to enter home and limited family support at time of d/c  Family has stated that pt will have 24/7 Sup for time of d/c to reduce fall risk  Pt with visual deficits needing cues during amb and use of RW to avoid obstacles on R side  Pt is being d/c Friday 8/13 and will have met d/c goals before then  FT has been done at this time  Will cont to work on improving endurance, balance and safety to decrease burden of care for time of d/c  Occupational Therapy:  Eating: Supervision  Grooming: Supervision  Bathing: Incidental Touching  Bathing: Incidental Touching  Upper Body Dressing: Supervision  Lower Body Dressing: Moderate Assistance (CG for donning underwear; total assist with donning TEDS)  Toileting: Supervision (CS in stance for hygiene/CM)  Tub/Shower Transfer: Incidental Touching (with transfer bench)  Toilet Transfer: Supervision (CS with RW)  Cognition: Within Defined Limits  Orientation: Person, Place, Time, Situation  Discharge Recommendations: Home with:  [address] Rodriguez with[de-identified] Family Support, Home Occupational Therapy       Pt is a 66 y  o  female who presented to the [address] [place] [address]  on 7/18/21 after falling and striking her head on a table  Pt diagnoses with central cord syndrome incomplete tetraplegia (anterior cervical discectomy and fusion C4-5 & posterior cervical decompression and fusion C2-T2)  Cervical spinal precautions and sinus precautions  Pt does not have C-collar  Pt reports living in an apartment [facility] & [address]NA, with 8 steps a landing then 6 additional steps) with daughter and grandson  PTA pt was independent in I/ADLs, however supervision/Min A with bathing and tub transfer  Reports occasionally uses SPC in community  Pt is currently limited at this time 2* orthostatic BP, pain, activity tolerance, decreased safety awareness, decreased L strength and coordination compared to R, and impaired balance in ADLs & transfers  Pt currently requires mod assist for self care tasks mostly due to application of TEDS, thoroughness during toileting tasks and CS for STS and SPT with RW; fair RW management/safety noted during SPT  The following Occupational Performance Areas to address include: grooming, bathing/shower, toilet hygiene, dressing and functional mobility/transfers  Pt will not have 24 hr supervision/assistance upon d/c  Daughter, son and grandson present for family training--discussed recommendations for 24/7 supervision, need for DME--BSC, clip on walker tray, tub transfer bench, which pt's family is to purchase independently except for [facility]  Anticipate LOS 3 weeks to meet supervision/independent goals  Speech Therapy:           No notes on file    Nursing Notes:  Appetite: Good  Diet Type: Regular/House                      Diet Patient/Family Education Complete: Yes    Type of Wound (LDA):  Wound                    Type of Wound Patient/Family Education: No  Bladder: Incontinent (at times)  Catheter Type: Gotti  Bladder Patient/Family Education: No (void trial just initiated)  Bowel: Continent     Bowel Patient/Family Education: Yes  Pain Location/Orientation: Orientation: Left, Location: Arm  Pain Score: 0                       Hospital Pain Intervention(s): Medication (See MAR)  Pain Patient/Family Education: Yes  Medication Management/Safety  Injectable:  (heparin)  Safe Administration: Yes  Medication Patient/Family Education Complete: Yes (ongoing)    Pt admitted with Central cord syndrome s/p fall  Had ACDF C4-5/PCDF C2-T2 7/20/21  Doesn't need collar per NS  Anterior and posterior incisions BRAULIO  Pain control with scheduled tylenol and lidoderm; prn oxycodone and robaxin  Acute nasal bone fx OMS saw = no surgery needed  Sinus precautions for 4 weeks Urine retention - morrow reinserted 7/26 and removed 8/2, currently on the urinary retention protocol  Chronic UTI - on suppressive tx with Keflex as at home  ABLA -  did not require transfusion  Orthostasis - had orthostasis on acute side and was given IVF  This AM mild orthostasis supine-sit-stand = 140-140-110 with lightheadedness then again with TEDS and binder on, IVF added  Has H/O vertigo - on meclizine prn  Pt was constipated and required an enema on day of [facility] to [facility]  Pt is continent of bowel  Requires alarms for safety  8/4--currently undergoing void trial   Had recurrent hypotension 8/2  IVF given  Fluid goal set of 1200ml per day  Staples removed from incision by neuro on 8/3  L sided chest pain 7/29  Workup negative  This week we will encourage independence with ADLs  We will monitor lab results and vital signs  We will monitor incisions for healing and s/s of infection  We will educate pt about repositioning to prevent skin breakdown  We will perform routine skin assessments  We will monitor for adequate pain control  We will monitor for constipation and medicate as ordered  We will increase safety awareness with transfers and keep pt free from falls  Case Management:     Discharge Planning  Living Arrangements: Lives w/ Children  Support Systems: Children  Assistance Needed: TBD  Type of Current Residence: Private residence  Current Home Care Services: No  Pt has made good gains and is expected to return home Friday with [address] [place] [address] services through [facility] [address]   Family has been present for training and dme has been ordered  Is the patient actively participating in therapies? yes  List any modifications to the treatment plan:     Barriers Interventions   All functional barriers resolved                      Is the patient making expected progress toward goals? yes  List any update or changes to goals:     Medical Goals: Patient will be medically stable for discharge to [facility] upon completion of rehab program and Patient will be able to manage medical conditions and comorbid conditions with medications and follow up upon completion of rehab program    Weekly Team Goals:   Rehab Team Goals  ADL Team Goal: Patient will require supervision with ADLs with least restrictive device upon completion of rehab program  Transfer Team Goal: Patient will be independent with transfers with least restrictive device upon completion of rehab program  Locomotion Team Goal: Patient will be independent with locomotion with least restrictive device upon completion of rehab program    Discussion: pt made good gains and is dc'ing home at supervision level with family on Friday  Family training occurred and pt will be continuing with [facility] for rn pt and ot services  Anticipated Discharge Date:  8/13/2021  [facility] Team Members Present: The following team members are supervising care for this patient and were present during this Weekly Team Conference      Physician: Dr Harmony Gatica MD  : Ashok Jaramillo, MSW  Registered Nurse: Capo Eric RN  Physical Therapist: Santino Taylor DPT  Occupational Therapist: Gaviota Jurado MS, OTR/L, CBIS  Speech Therapist: Radha Molina MA, CCC-SLP  Other: [place]  [address] [place] [address] and [facility]

## 2021-08-11 NOTE — PROGRESS NOTES
Internal Medicine Progress Note  Patient: Inderjit Pa  Age/sex: 66 y o  female  Medical Record #: [id number]      ASSESSMENT/PLAN: (Interval History)  Inderjit Pa is seen and examined and management for following issues:    Central cord syndrome; s/p ACDF C4-5/PCDF C2-T2 7/20/21  · Doesn't need collar per NS  · Incisions w/o erythema/infection  · Staples removed 8/3 by NS     Acute nasal bone fx  · OMS saw = no surgery needed  · Sinus precautions for 4 weeks     Urine retention  · VT now per PMR ongoing     Enterobacter UTI/MDRO  · On suppressive tx with Keflex at home for recurrent UTI  · Had urinary frequency/burning here  · Switched to Cipro based on cx results  · Currently no sx     ABLA  · Did not require transfusion     Orthostasis  · Continue PO fluid goal of 1200ml/day   · Doing fine w/o abd binder now; using TEDs only and no sx dizziness     Chest pain  · Occurred 7/29/21 and was located on the Lt side  · w/u negative   · No further occurrence        Discharge date:  8/13/21    The above assessment and plan was reviewed and updated as determined by my evaluation of the patient on 8/11/2021  Labs:              Invalid input(s): LABGLOM, CMP                Review of Scheduled Meds:  Current Facility-Administered Medications   Medication Dose Route Frequency Provider Last Rate    acetaminophen  975 mg Oral Q8H [facility]term Re Herzog MD      ALPRAZolam  0 25 mg Oral HS PRN Re Herzog MD      atorvastatin  20 mg Oral Daily With Lesley Panda MD      ciprofloxacin  500 mg Oral Q12H [facility] Kristie Way DO      heparin (porcine)  5,000 Units Subcutaneous Q8H Petra Ellis MD      lidocaine  2 patch Topical Daily Re Herzog MD      meclizine  12 5 mg Oral Q8H PRN Re Herzog MD      melatonin  3 mg Oral HS Re Herzog MD      methocarbamol  500 mg Oral Q6H PRN Re Herzog MD      oxyCODONE  2 5 mg Oral Q4H PRN MD Rosamaria Barreto oxyCODONE  5 mg Oral Q4H PRN MD Rosamaria Barreto pantoprazole  40 mg Oral Early Morning Mariam Givens MD      polyethylene glycol  17 g Oral Daily PRN Mariam Givens MD      psyllium  1 packet Oral Daily Mariam Givens MD      simethicone  80 mg Oral 4x Daily (with meals and at bedtime) Penny Deras PA-C      sodium chloride  1 spray Each Nare 4x Daily PRN Mariam Givens MD         Subjective/ HPI: Patient seen and examined  Patients overnight issues or events were reviewed with nursing or staff during rounds or morning huddle session  New or overnight issues include the following:     None     ROS:   Negative 12 point ROS other than denoted above in HPI or assessment/plan  Imaging:     No orders to display       *Labs /Radiology studies reviewed  *Medications reviewed and reconciled as needed  *Please refer to order section for additional ordered labs studies  *Case discussed with primary attending during morning huddle case rounds      Physical Examination:  Vitals:   Vitals:    08/10/21 1500 08/11/21 0000 08/11/21 0525 08/11/21 0600   BP: 130/76 122/59 120/60    BP Location: Left arm Left arm Left arm    Pulse: 82 (!) 106 91    Resp: 18 20 18    Temp: 98 1 °F (36 7 °C) 98 °F (36 7 °C) 98 3 °F (36 8 °C)    TempSrc: Oral Oral Oral    SpO2: 96% 100% 97%    Weight:    61 kg (134 lb 7 7 oz)   Height:           General Appearance: no distress, conversive  HEENT: PERRLA, conjuctiva normal; oropharynx clear; mucous membranes moist   Neck:  Incisions healed  Lungs: CTA, normal respiratory effort, no retractions, expiratory effort normal  CV: regular rate and rhythm; no rubs/murmurs/gallops, PMI normal   ABD: soft; ND/NT; +BS  EXT: no edema  Skin: normal turgor, normal texture, no rashes  Psych: affect normal, mood normal  Neuro: AAO      The above physical exam was reviewed and updated as determined by my evaluation of the patient on 8/11/2021      Invasive Devices     None                    VTE Pharmacologic Prophylaxis: Heparin  Code Status: Level 1 - Full Code  Current Length of Stay: 16 day(s)      Total time spent:  30 minutes with more than 50% spent counseling/coordinating care  Counseling includes discussion with patient re: progress  and discussion with patient of his/her current medical state/information  Coordination of patient's care was performed in conjunction with primary service  Time invested included review of patient's labs, vitals, and management of their comorbidities with continued monitoring  In addition, this patient was discussed with medical team including physician and advanced extenders  The care of the patient was extensively discussed and appropriate treatment plan was formulated unique for this patient  ** Please Note:  voice to text software may have been used in the creation of this document   Although proof errors in transcription or interpretation are a potential of such software**

## 2021-08-11 NOTE — PROGRESS NOTES
Physical Medicine and Rehabilitation Progress Note  Nicky Lagos 66 y o  female MRN: [id number]  Unit/Bed#: -87 Encounter: [id number]    HPI: Nicky Lagos is a 66 y o  female who presented to the [address] after fall  Patient p/w c/o neck pain as well as bilateral arm pain/numbness  Imaging revealed cervical stenosis most severe at C4-5 with possible cord edema and patient subsequently underwent C4-5 ACDF & C2-T2 PCDF on 7/20 by Dr Rupesh Hewitt  Patient was also found to have B/L nasal bone fractures and L maxillary sinus hemorrhage which was managed conservatively  Post-op course complicated by urinary retention requiring morrow placement  Chief Complaint: cervical stenosis/central cord syndrome     Subjective: d/w patient planned dc date and patient is agreeable     ROS: A 10 point ROS was performed; negative except as noted above       Assessment/Plan:      Cervical stenosis of spine  Assessment & Plan  - C6 fx, per XR report of 7/21 "Redemonstrated anterior-inferior C6 vertebral body corner fracture, unchanged in alignment " (seen by neurosx on 7/26 no further intervention recommended)  - cervical stenosis most severe at C4-5 with possible cord edema s/p ACDF C4-5 & PCDF C2-T2 by Dr Rupesh Hewitt on 7/20  - c-spine precautions  - no brace needed per neurosx  - 2 week post-op FU completed by neurosx while patient in rehab unit   - per neurosx protocol FU XR (already e-prescribed in EMR by neurosx team) to be done 2-3 days prior to FU appt of 9/3   - OP FU with neurosx on 9/3    Nasal bone fractures  Assessment & Plan  - B/L nasal bone fractures & L maxillary sinus hemorrhage  - non-op management per OMFS  - sinus precautions   - abx recommended by OMFS however dc'd by trauma surgery in acute care who was primary team per their protocol     CKD (chronic kidney disease)  Assessment & Plan  - Cr currently 0 95  - baseline appears to be 1 0-1 2  - IM monitoring     Anemia  Assessment & Plan  - Hg currently increased to 10 4  post-operatively  - IM monitoring     Urinary retention  Assessment & Plan  - s/p morrow removal, PVRs now consistently below 300 cc, and patient is being treated for UTI (started on abx on 8/8 by Dr Verónica Vegas)     Chest pain  Assessment & Plan  - patient complained of brief episode of left sided chest pain while in therapy  - per patient this occurs at home as well and responds to OTC antacid medication however checked EKG to r/o cardiac etiology and EKG was unrevealing (did show mildly elevated QTc at 487, d/w IM who recommended no further GUTIERREZ/intervention at this time for QTc and to avoid QT prolonging medication if possible)   - resolved after given medication for heartburn   - troponin's negative (per IM no further troponins required)    CHF (congestive heart failure) ([facility])  Assessment & Plan  - EF 45-50%  - grade 1 DD  - not on diuretics at home  - IM monitoring volume status       Dyslipidemia  Assessment & Plan  - on home lipitor 20 mg qpm    History of recurrent UTIs  Assessment & Plan  - at home patient was chronically on keflex 250 mg qd for suppression of recurrent UTIs (confrimed with patient's OP pharmacy that patient receives monthly prescriptions for this, last provided by Dr Analia Matthews of urology) however patient with UA indicative of possible active UTI, UCx confirmed this and patient is on abx appropriate to susceptibilities (started on 8/8 by Dr Verónica Vegas) once patient completes current abx course will d/w IM if patient should resume home suppressive keflex 250 mg qd   - OP FU with Dr Keiko Roche  - on home xanax 0 25 mg qhs prn     Vertigo  Assessment & Plan  - chronic, likely BPV  - on home antivert 12 5 mg q8 prn     * GERD (gastroesophageal reflux disease)  Assessment & Plan  - on home protonix 40 mg qd      Scheduled Meds:  Current Facility-Administered Medications   Medication Dose Route Frequency Provider Last Rate    acetaminophen  975 mg Oral Q8H [address] Alexis Ramírez MD      ALPRAZolam  0 25 mg Oral HS PRN Alexis Ramírez MD      atorvastatin  20 mg Oral Daily With Jasiel Maldonado MD      bisacodyl  10 mg Rectal Once Alexis Ramírez MD      ciprofloxacin  500 mg Oral Q12H [address] Ofilia Holden, DO      heparin (porcine)  5,000 Units Subcutaneous Q8H [address] Alexis Ramírez MD      lidocaine  2 patch Topical Daily Alexis Ramírez MD      meclizine  12 5 mg Oral Q8H PRN Alexis Ramírez MD      melatonin  3 mg Oral HS Alexis Ramírez MD      methocarbamol  500 mg Oral Q6H PRN Alexis Ramírez MD      oxyCODONE  2 5 mg Oral Q4H PRN Alexis Ramírez MD     Levora Mckeon oxyCODONE  5 mg Oral Q4H PRN Alexis Ramírez MD      pantoprazole  40 mg Oral Early Morning Alexis Ramírez MD      polyethylene glycol  17 g Oral Daily PRN Alexis Ramírez MD      psyllium  1 packet Oral Daily Alexis Ramírez MD      simethicone  80 mg Oral 4x Daily (with meals and at bedtime) Penny Deras PA-C      sodium chloride  1 spray Each Nare 4x Daily PRN Alexis Ramírez MD            Incidental findings:     1) gallstones: asymptomatic; OP FU with PCP with GI referral at PCPs discretion      DVT ppx: HSQ (cleared in acute care by neurosx for pharmacologic DVT ppx)       Objective:    Functional Update:  Mobility: sup  Transfers: sup   ADLs: sup       Physical Exam:    Vitals:    08/11/21 0525   BP: 120/60   Pulse: 91   Resp: 18   Temp: 98 3 °F (36 8 °C)   SpO2: 97%           General: alert, no apparent distress, cooperative and comfortable  HEENT:  Eye: Normal external eye, conjunctiva, lidsc cornea  Ears: Normal external ears  Nose: Normal external nose, mucus membranes  CARDIAC:  +S1/2  LUNGS:  no abnormal respiratory pattern, no retractions noted, non-labored breathing   ABDOMEN:  soft NT  EXTREMITIES:  no cyanosis or clubbing   NEURO:  awake, alert appropriate responses to questions   PSYCH:  mood/affect currently stable   INCISION:  surgical site C/D/I             Diagnostic Studies:  No orders to display       Laboratory:         Invalid input(s): PLTCT        Invalid input(s): CA           This patient was discussed by the Interdisciplinary Team in weekly case conference today  The care of the patient was extensively discussed with all care providers and an appropriate rehabilitation plan was formulated unique for this patient  Barriers were identified preventing progression of therapy and appropriate interventions were discussed with each discipline  Please see the team note for input from all disciplines regarding barriers, intervention, and discharge planning  [ x ] Total time spent: 35 Mins, and greater than 50% of this time was spent counseling/coordinating care

## 2021-08-11 NOTE — PROGRESS NOTES
08/11/21 1000   Pain Assessment   Pain Assessment Tool 0-10   Restrictions/Precautions   Precautions Bed/chair alarms;Cognitive; Fall Risk;Visual deficit;Supervision on toilet/commode   Subjective   Subjective pt agreeable to perform skilled PT    Sit to Stand   Type of Assistance Needed Supervision   Sit to Stand CARE Score 4   Bed-Chair Transfer   Type of Assistance Needed Supervision; Adaptive equipment   Chair/Bed-to-Chair Transfer CARE Score 4   Transfer Bed/Chair/Wheelchair   Adaptive Equipment Roller Walker   Car Transfer   Type of Assistance Needed Supervision   Car Transfer CARE Score 4   Walk 10 Feet   Type of Assistance Needed Supervision; Adaptive equipment   Walk 10 Feet CARE Score 4   Walk 50 Feet with Two Turns   Type of Assistance Needed Supervision; Adaptive equipment   Walk 50 Feet with Two Turns CARE Score 4   Walk 150 Feet   Type of Assistance Needed Supervision; Adaptive equipment   Walk 150 Feet CARE Score 4   Ambulation   Does the patient walk? 1  No, and walking goal is clinically indicated  Primary Mode of Locomotion Prior to Admission Walk   Distance Walked (feet) 160 ft  (x2)   Assist Device [place]   Wheelchair mobility   Does the patient use a wheelchair? 0  No   Curb or Single Stair   Style negotiated Single stair   Type of Assistance Needed Supervision; Adaptive equipment   Comment FF    1 Step (Curb) CARE Score 4   4 Steps   Type of Assistance Needed Supervision; Adaptive equipment   4 Steps CARE Score 4   12 Steps   Type of Assistance Needed Incidental touching; Adaptive equipment   12 Steps CARE Score 4   Stairs   Type Stairs;Curb   # of Steps 12   Assist Devices Single Rail   Picking Up Object   Type of Assistance Needed Supervision; Adaptive equipment   Comment with reacher    Picking Up Object CARE Score 4   Therapeutic Interventions   Strengthening 2# LAQ AP marching x20 reps STS x8   Flexibility PROM BLLE    Balance standing with reacher    Equipment Use   NuStep lvl 2 for 10 min BLLE    Other Comments   Comments see vitals    Assessment   Treatment Assessment Pt focus on walking with RW , strengthening and conditioning on Nu Step bike see above   Pt also perform FF stairs with LHR BWD and RHR ascending   Pt needs vc;'s for LEFT foot placement for safety and dec fall risk   Pt retrn to her recliner ith all needs in reach   cont POC working on DC needs    Barriers to Discharge Inaccessible home environment;Decreased caregiver support   Plan   Progress Progressing toward goals   PT Therapy Minutes   PT Time In 1000   PT Time Out 1130   PT Total Time (minutes) 90   PT Mode of treatment - Individual (minutes) 90   PT Mode of treatment - Concurrent (minutes) 0   PT Mode of treatment - Group (minutes) 0   PT Mode of treatment - Co-treat (minutes) 0   PT Mode of Treatment - Total time(minutes) 90 minutes   PT Cumulative Minutes 1305   Therapy Time missed   Time missed?  No

## 2021-08-12 PROCEDURE — 99231 SBSQ HOSP IP/OBS SF/LOW 25: CPT | Performed by: PHYSICAL MEDICINE & REHABILITATION

## 2021-08-12 PROCEDURE — 97116 GAIT TRAINING THERAPY: CPT

## 2021-08-12 PROCEDURE — 97535 SELF CARE MNGMENT TRAINING: CPT

## 2021-08-12 PROCEDURE — 97110 THERAPEUTIC EXERCISES: CPT

## 2021-08-12 PROCEDURE — 99232 SBSQ HOSP IP/OBS MODERATE 35: CPT | Performed by: NURSE PRACTITIONER

## 2021-08-12 PROCEDURE — 97530 THERAPEUTIC ACTIVITIES: CPT

## 2021-08-12 RX ORDER — ATORVASTATIN CALCIUM 20 MG/1
20 TABLET, FILM COATED ORAL
COMMUNITY
End: 2021-12-27 | Stop reason: SDUPTHER

## 2021-08-12 RX ORDER — SODIUM PHOSPHATE, DIBASIC AND SODIUM PHOSPHATE, MONOBASIC 7; 19 G/133ML; G/133ML
1 ENEMA RECTAL ONCE
Status: COMPLETED | OUTPATIENT
Start: 2021-08-12 | End: 2021-08-12

## 2021-08-12 RX ADMIN — CIPROFLOXACIN HYDROCHLORIDE 500 MG: 500 TABLET, FILM COATED ORAL at 08:31

## 2021-08-12 RX ADMIN — POLYETHYLENE GLYCOL 3350 17 G: 17 POWDER, FOR SOLUTION ORAL at 05:17

## 2021-08-12 RX ADMIN — SODIUM PHOSPHATE 1 ENEMA: 7; 19 ENEMA RECTAL at 09:36

## 2021-08-12 RX ADMIN — SIMETHICONE 80 MG: 20 EMULSION ORAL at 16:36

## 2021-08-12 RX ADMIN — PANTOPRAZOLE SODIUM 40 MG: 40 TABLET, DELAYED RELEASE ORAL at 05:17

## 2021-08-12 RX ADMIN — LIDOCAINE 2 PATCH: 50 PATCH TOPICAL at 08:31

## 2021-08-12 RX ADMIN — HEPARIN SODIUM 5000 UNITS: 5000 INJECTION INTRAVENOUS; SUBCUTANEOUS at 05:17

## 2021-08-12 RX ADMIN — SIMETHICONE 80 MG: 20 EMULSION ORAL at 21:18

## 2021-08-12 RX ADMIN — HEPARIN SODIUM 5000 UNITS: 5000 INJECTION INTRAVENOUS; SUBCUTANEOUS at 21:18

## 2021-08-12 RX ADMIN — HEPARIN SODIUM 5000 UNITS: 5000 INJECTION INTRAVENOUS; SUBCUTANEOUS at 13:14

## 2021-08-12 RX ADMIN — SIMETHICONE 80 MG: 20 EMULSION ORAL at 12:20

## 2021-08-12 RX ADMIN — MELATONIN TAB 3 MG 3 MG: 3 TAB at 21:19

## 2021-08-12 RX ADMIN — CIPROFLOXACIN HYDROCHLORIDE 500 MG: 500 TABLET, FILM COATED ORAL at 21:18

## 2021-08-12 RX ADMIN — ACETAMINOPHEN 975 MG: 325 TABLET, FILM COATED ORAL at 05:17

## 2021-08-12 RX ADMIN — ACETAMINOPHEN 975 MG: 325 TABLET, FILM COATED ORAL at 13:14

## 2021-08-12 RX ADMIN — SIMETHICONE 80 MG: 20 EMULSION ORAL at 06:45

## 2021-08-12 RX ADMIN — ATORVASTATIN CALCIUM 20 MG: 20 TABLET, FILM COATED ORAL at 16:36

## 2021-08-12 RX ADMIN — ACETAMINOPHEN 975 MG: 325 TABLET, FILM COATED ORAL at 21:18

## 2021-08-12 RX ADMIN — Medication 1 PACKET: at 08:31

## 2021-08-12 NOTE — INCIDENTAL FINDINGS
1) gallstones: Please follow up with your primary care provider for further testing/treatment if any and/or specialist referral if any as they deem necessary     2) prolonged QTc interval on EKG of your heart: Please follow up with your primary care provider or [facility] (or a cardiologist of your choosing) for further testing/treatment/monitoring if any as they deem necessary     3) grade 1 diastolic dysfunction and reduced ejection fraction of 45-50% on echocardiogram of your heart: Please follow up with [facility] (or a cardiologist of your choosing) for further testing/treatment/monitoring if any as they deem necessary     4) low hemoglobin (anemia): Please have your blood work drawn as prescribed to monitor your hemoglobin level the results will be sent to your primary care provider     5) chronic kidney disease: Please follow up with your primary care provider for further testing/treatment if any and/or specialist referral if any as they deem necessary

## 2021-08-12 NOTE — PROGRESS NOTES
08/12/21 0830   Pain Assessment   Pain Assessment Tool Pain Assessment not indicated - pt denies pain   Restrictions/Precautions   Precautions Bed/chair alarms;Cognitive; Fall Risk;Contact/isolation;Supervision on toilet/commode;Visual deficit   Subjective   Subjective Pt reports no sleeping will at night and concern about not having BM    Roll Left and Right   Type of Assistance Needed Supervision   Roll Left and Right CARE Score 4   Sit to Lying   Type of Assistance Needed Supervision   Sit to Lying CARE Score 4   Lying to Sitting on Side of Bed   Type of Assistance Needed Supervision   Lying to Sitting on Side of Bed CARE Score 4   Sit to Stand   Type of Assistance Needed Supervision   Sit to Stand CARE Score 4   Bed-Chair Transfer   Type of Assistance Needed Supervision; Adaptive equipment   Comment RW   Chair/Bed-to-Chair Transfer CARE Score 4   Transfer Bed/Chair/Wheelchair   Adaptive Equipment Roller Walker   Car Transfer   Type of Assistance Needed Supervision   Car Transfer CARE Score 4   Walk 10 Feet   Type of Assistance Needed Supervision; Adaptive equipment   Comment RW   Walk 10 Feet CARE Score 4   Walk 50 Feet with Two Turns   Type of Assistance Needed Supervision; Adaptive equipment   Comment RW    Walk 50 Feet with Two Turns CARE Score 4   Walk 150 Feet   Type of Assistance Needed Supervision; Adaptive equipment   Comment RW    Walk 150 Feet CARE Score 4   Walking 10 Feet on Uneven Surfaces   Type of Assistance Needed Supervision   Comment floor mat with RW    Walking 10 Feet on Uneven Surfaces CARE Score 4   Ambulation   Does the patient walk? 2  Yes   Primary Mode of Locomotion Prior to Admission Walk   Distance Walked (feet) 160 ft  (x2)   Assist Device [place]   Wheelchair mobility   Does the patient use a wheelchair? 0  No   Curb or Single Stair   Style negotiated Single stair   Type of Assistance Needed Supervision; Adaptive equipment   Comment FF   1 Step (Curb) CARE Score 4   4 Steps Type of Assistance Needed Supervision; Adaptive equipment   Comment FF    4 Steps CARE Score 4   12 Steps   Type of Assistance Needed Supervision; Adaptive equipment   Comment FF    12 Steps CARE Score 4   Stairs   Type Stairs;Curb   # of Steps 12   Weight Bearing Precautions Fall Risk;Cervical   Assist Devices Single Rail   Toilet Transfer   Type of Assistance Needed Supervision; Adaptive equipment   Comment RW    Toilet Transfer CARE Score 4   Assessment   Treatment Assessment pt focus on overall functional mobility with RW during ambulation , FF stairs as above with vc's S lvl for safety , Pt instructed not to perform FF stair by herself w/o Assist  Pt also perform thex ex's for BLLE strengthening   pt progressing toward DC this week   , pt will cont to f/u with MD and pt educated on fall risk and floor recovery  Barriers to Discharge Inaccessible home environment;Decreased caregiver support   Plan   Progress Progressing toward goals   PT Therapy Minutes   PT Time In 0830   PT Time Out 0940   PT Total Time (minutes) 70   PT Mode of treatment - Individual (minutes) 70   PT Mode of treatment - Concurrent (minutes) 0   PT Mode of treatment - Group (minutes) 0   PT Mode of treatment - Co-treat (minutes) 0   PT Mode of Treatment - Total time(minutes) 70 minutes   PT Cumulative Minutes 1375   Therapy Time missed   Time missed?  No

## 2021-08-12 NOTE — PROGRESS NOTES
08/12/21 1000   Pain Assessment   Pain Assessment Tool 0-10   Pain Score 2   Pain Location/Orientation Orientation: Bilateral;Location: Shoulder   Restrictions/Precautions   Precautions Bed/chair alarms;Cognitive; Fall Risk;Contact/isolation;Supervision on toilet/commode  (sinus precautions)   Weight Bearing Restrictions No   ROM Restrictions Yes  (cervical spine)   Lifestyle   Autonomy "I'm afraid they won't let me go home tomorrow"   Eating   Type of Assistance Needed Set-up / clean-up   Physical Assistance Level No physical assistance   Comment seated in recliner chair   Eating CARE Score 5   Oral Hygiene   Type of Assistance Needed Supervision   Physical Assistance Level No physical assistance   Comment in stance at sink   Oral Hygiene CARE Score 4   Shower/Bathe Self   Type of Assistance Needed Supervision   Physical Assistance Level No physical assistance   Comment Pt completes full shower seated on bench and standing with overall supervision for balance/safety  Pt bathes 10/10 body parts using cross leg tech for lower legs and feet in order to maintain spinal precautions  Incisions covered and remains dry during shower  Shower/Bathe Self CARE Score 4   Bathing   Assessed Bath Style Shower   Anticipated D/C Bath Style Shower   Able to [place] Yes   Able to [employer] Temperature Yes   Able to Wash/Rinse/Dry (body part) Right Arm;Left Arm;L Upper Leg;R Upper Leg;L Lower Leg/Foot;R Lower Leg/Foot;Chest;Abdomen;Perineal Area; Buttocks   Limitations Noted in Balance; Endurance;ROM;Strength   Positioning Seated;Standing   Adaptive Equipment Tub Bench;Hand Held Shower   Tub/Shower Transfer   Limitations Noted In Balance;LE Strength   Adaptive Equipment Transfer Bench   Upper Body Dressing   Type of Assistance Needed Supervision   Physical Assistance Level No physical assistance   Comment to retrieve from dresser drawers and don while seated   Upper Body Dressing CARE Score 4   Lower Body Dressing Type of Assistance Needed Supervision   Physical Assistance Level No physical assistance   Comment to retrieve from dresser drawers and don while seated; pt threads underwear over feet while seated and stands with supervision to pull up over hips   Lower Body Dressing CARE Score 4   Putting On/Taking Off Footwear   Type of Assistance Needed Supervision   Physical Assistance Level No physical assistance   Comment seated using cross leg tech   Putting On/Taking Off Footwear CARE Score 4   Sit to Stand   Type of Assistance Needed Supervision   Physical Assistance Level No physical assistance   Comment with RW   Sit to Stand CARE Score 4   Bed-Chair Transfer   Type of Assistance Needed Supervision   Physical Assistance Level No physical assistance   Comment with RW   Chair/Bed-to-Chair Transfer CARE Score 4   Toileting Hygiene   Type of Assistance Needed Supervision   Physical Assistance Level No physical assistance   Comment in stance with hygiene/CM post small BM   Toileting Hygiene CARE Score 4   Toilet Transfer   Type of Assistance Needed Supervision   Physical Assistance Level No physical assistance   Comment with RW   Toilet Transfer CARE Score 4   Cognition   Overall Cognitive Status Impaired   Arousal/Participation Alert; Cooperative   Attention Within functional limits   Orientation Level Oriented X4   Memory Decreased short term memory   Following Commands Follows one step commands without difficulty   Activity Tolerance   Activity Tolerance Patient tolerated treatment well   Medical Staff Made Aware RN aware of very small BM   Assessment   Treatment Assessment Pt engages in 90 minute skilled OT session focusing on full shower routine, functional transfers with RW, grooming tasks in stance at sink  See above for full functional details   Pt initially upset and emotional upon arrival to OT session reporting that she has not had a BM in a couple days and is worried that she won't be able to go home tomorrow as her scheduled d/c date  Pt in bed, on radha pads with very minimal BM noted with mucous looking stool as well  RN Tracy Soriano notified of above  Pt demo's consistent supervision level for ADL routine and basic functional transfers with RW  Min VCs required for RW safety and ambulating with RW space  Family training completed and all necessary DME identified with pt's family purchasing independently except for RW which was ordered for her  Family to set up/provide 24/7 supervision as per therapy recommendation upon d/c home  Pt with no additional questions/concerns at this time  Prognosis Good   Problem List Decreased strength;Decreased range of motion;Decreased endurance; Impaired balance;Decreased mobility; Decreased cognition;Decreased safety awareness; Impaired tone;Orthopedic restrictions;Pain   Barriers to Discharge Inaccessible home environment;Decreased caregiver support   Plan   Treatment/Interventions ADL retraining;Functional transfer training; Therapeutic exercise; Endurance training;Cognitive reorientation;Patient/family training;Equipment eval/education; Compensatory technique education   OT Therapy Minutes   OT Time In 1000   OT Time Out 1130   OT Total Time (minutes) 90   OT Mode of treatment - Individual (minutes) 90   OT Mode of treatment - Concurrent (minutes) 0   OT Mode of treatment - Group (minutes) 0   OT Mode of treatment - Co-treat (minutes) 0   OT Mode of Treatment - Total time(minutes) 90 minutes   OT Cumulative Minutes 3575   Therapy Time missed   Time missed?  No

## 2021-08-12 NOTE — PLAN OF CARE
Problem: Prexisting or High Potential for Compromised Skin Integrity  Goal: Skin integrity is maintained or improved  Description: INTERVENTIONS:  - Identify patients at risk for skin breakdown  - Assess and monitor skin integrity  - Assess and monitor nutrition and hydration status  - Monitor labs   - Assess for incontinence   - Turn and reposition patient  - Assist with mobility/ambulation  - Relieve pressure over bony prominences  - Avoid friction and shearing  - Provide appropriate hygiene as needed including keeping skin clean and dry  - Evaluate need for skin moisturizer/barrier cream  - Collaborate with interdisciplinary team   - Patient/family teaching  - Consider wound care consult   Outcome: Progressing     Problem: MOBILITY - ADULT  Goal: Maintain or return to baseline ADL function  Description: INTERVENTIONS:  -  Assess patient's ability to carry out ADLs; assess patient's baseline for ADL function and identify physical deficits which impact ability to perform ADLs (bathing, care of mouth/teeth, toileting, grooming, dressing, etc )  - Assess/evaluate cause of self-care deficits   - Assess range of motion  - Assess patient's mobility; develop plan if impaired  - Assess patient's need for assistive devices and provide as appropriate  - Encourage maximum independence but intervene and supervise when necessary  - Involve family in performance of ADLs  - Assess for home care needs following discharge   - Consider OT consult to assist with ADL evaluation and planning for discharge  - Provide patient education as appropriate  Outcome: Progressing  Goal: Maintains/Returns to pre admission functional level  Description: INTERVENTIONS:  - Perform BMAT or MOVE assessment daily    - Set and communicate daily mobility goal to care team and patient/family/caregiver  - Collaborate with rehabilitation services on mobility goals if consulted  - Perform Range of Motion times a day    - Reposition patient every hours   - Dangle patient  times a day  - Stand patient  times a day  - Ambulate patient  times a day  - Out of bed to chair times a day   - Out of bed for meals  times a day  - Out of bed for toileting  - Record patient progress and toleration of activity level   Outcome: Progressing     Problem: Potential for Falls  Goal: Patient will remain free of falls  Description: INTERVENTIONS:  - Educate patient/family on patient safety including physical limitations  - Instruct patient to call for assistance with activity   - Consult OT/PT to assist with strengthening/mobility   - Keep Call bell within reach  - Keep bed low and locked with side rails adjusted as appropriate  - Keep care items and personal belongings within reach  - Initiate and maintain comfort rounds  - Make Fall Risk Sign visible to staff  - Offer Toileting every Hours, in advance of need  - Initiate/Maintain alarm  - Obtain necessary fall risk management equipment:   - Apply yellow socks and bracelet for high fall risk patients  - Consider moving patient to room near nurses station  Outcome: Progressing     Problem: PAIN - ADULT  Goal: Verbalizes/displays adequate comfort level or baseline comfort level  Description: Interventions:  - Encourage patient to monitor pain and request assistance  - Assess pain using appropriate pain scale  - Administer analgesics based on type and severity of pain and evaluate response  - Implement non-pharmacological measures as appropriate and evaluate response  - Consider cultural and social influences on pain and pain management  - Notify physician/advanced practitioner if interventions unsuccessful or patient reports new pain  Outcome: Progressing     Problem: INFECTION - ADULT  Goal: Absence or prevention of progression during hospitalization  Description: INTERVENTIONS:  - Assess and monitor for signs and symptoms of infection  - Monitor lab/diagnostic results  - Monitor all insertion sites, i e  indwelling lines, tubes, and drains  - Monitor endotracheal if appropriate and nasal secretions for changes in amount and color  - [place] appropriate cooling/warming therapies per order  - Administer medications as ordered  - Instruct and encourage patient and family to use good hand hygiene technique  - Identify and instruct in appropriate isolation precautions for identified infection/condition  Outcome: Progressing     Problem: SAFETY ADULT  Goal: Patient will remain free of falls  Description: INTERVENTIONS:  - Educate patient/family on patient safety including physical limitations  - Instruct patient to call for assistance with activity   - Consult OT/PT to assist with strengthening/mobility   - Keep Call bell within reach  - Keep bed low and locked with side rails adjusted as appropriate  - Keep care items and personal belongings within reach  - Initiate and maintain comfort rounds  - Make Fall Risk Sign visible to staff  - Offer Toileting every Hours, in advance of need  - Initiate/Maintain alarm  - Obtain necessary fall risk management equipment:   - Apply yellow socks and bracelet for high fall risk patients  - Consider moving patient to room near nurses station  Outcome: Progressing  Goal: Maintain or return to baseline ADL function  Description: INTERVENTIONS:  -  Assess patient's ability to carry out ADLs; assess patient's baseline for ADL function and identify physical deficits which impact ability to perform ADLs (bathing, care of mouth/teeth, toileting, grooming, dressing, etc )  - Assess/evaluate cause of self-care deficits   - Assess range of motion  - Assess patient's mobility; develop plan if impaired  - Assess patient's need for assistive devices and provide as appropriate  - Encourage maximum independence but intervene and supervise when necessary  - Involve family in performance of ADLs  - Assess for home care needs following discharge   - Consider OT consult to assist with ADL evaluation and planning for discharge  - Provide patient education as appropriate  Outcome: Progressing  Goal: Maintains/Returns to pre admission functional level  Description: INTERVENTIONS:  - Perform BMAT or MOVE assessment daily    - Set and communicate daily mobility goal to care team and patient/family/caregiver  - Collaborate with rehabilitation services on mobility goals if consulted  - Perform Range of Motion  times a day  - Reposition patient every hours    - Dangle patient  times a day  - Stand patient  times a day  - Ambulate patient  times a day  - Out of bed to chair times a day   - Out of bed for meals  times a day  - Out of bed for toileting  - Record patient progress and toleration of activity level   Outcome: Progressing     Problem: DISCHARGE PLANNING  Goal: Discharge to home or other facility with appropriate resources  Description: INTERVENTIONS:  - Identify barriers to discharge w/patient and caregiver  - Arrange for needed discharge resources and transportation as appropriate  - Identify discharge learning needs (meds, wound care, etc )  - Arrange for interpretive services to assist at discharge as needed  - Refer to Case Management Department for coordinating discharge planning if the patient needs post-hospital services based on physician/advanced practitioner order or complex needs related to functional status, cognitive ability, or social support system  Outcome: Progressing

## 2021-08-12 NOTE — PROGRESS NOTES
08/12/21 1340   Pain Assessment   Pain Assessment Tool 0-10   Pain Score 2   Pain Location/Orientation Orientation: Lower; Location: Back   Restrictions/Precautions   Precautions Bed/chair alarms; Fall Risk;Cognitive;Contact/isolation;Supervision on toilet/commode  (sinus precautions)   ROM Restrictions Yes   Subjective   Subjective Pt agreeable to perform skilled PT    Sit to Stand   Type of Assistance Needed Supervision   Sit to Stand CARE Score 4   Bed-Chair Transfer   Type of Assistance Needed Supervision   Comment RW    Chair/Bed-to-Chair Transfer CARE Score 4   Transfer Bed/Chair/Wheelchair   Adaptive Equipment Roller Walker   Car Transfer   Type of Assistance Needed Supervision   Car Transfer CARE Score 4   Walk 10 Feet   Type of Assistance Needed Supervision; Adaptive equipment   Comment RW   Walk 10 Feet CARE Score 4   Walk 50 Feet with Two Turns   Type of Assistance Needed Supervision; Adaptive equipment   Comment RW   Walk 50 Feet with Two Turns CARE Score 4   Walk 150 Feet   Type of Assistance Needed Supervision; Adaptive equipment   Comment RW    Walk 150 Feet CARE Score 4   Ambulation   Does the patient walk? 2  Yes   Primary Mode of Locomotion Prior to Admission Walk   Distance Walked (feet) 150 ft   Assist Device Roller Walker   Picking Up Object   Type of Assistance Needed Supervision; Adaptive equipment   Comment with reacher    Picking Up Object CARE Score 4   Toilet Transfer   Type of Assistance Needed Supervision; Independent   Comment with RW    Toilet Transfer CARE Score -   Therapeutic Interventions   Strengthening 3# LAQ AP marching x20 reps ball adduction x20 reps    Flexibility PRM BLLE    Equipment Use   NuStep lvl 2 for 15 min    Assessment   Treatment Assessment Pt focus on skilled PT functional mobility with RW cont to be S lvl , progressing with dynamic  standing balance activities  Pt had no LOB during activities, walking and standing activities also     Family to set up/provide 24/7 supervision as per therapy recommendation upon d/c home  Pt with no additional questions/concerns at this time  Barriers to Discharge Inaccessible home environment;Decreased caregiver support   Plan   Progress Progressing toward goals   PT Therapy Minutes   PT Time In 1340   PT Time Out 1420   PT Total Time (minutes) 40   PT Mode of treatment - Individual (minutes) 40   PT Mode of treatment - Concurrent (minutes) 0   PT Mode of treatment - Group (minutes) 0   PT Mode of treatment - Co-treat (minutes) 0   PT Mode of Treatment - Total time(minutes) 40 minutes   PT Cumulative Minutes 1415   Therapy Time missed   Time missed?  No

## 2021-08-12 NOTE — DISCHARGE INSTRUCTIONS
Please discuss with Dr Yadi Bruner regarding if you should resume taking your keflex as you had a urinary tract infection in spite of being on this medication     Should you develop fevers, chills, sweats, rigors, or any drainage from your surgical site please contact your family doctor or surgeon immediately or go to the ER immediately as these are indicators of possible infection     Please have your blood work drawn as prescribed the results will be sent to your primary care provider     Please note you are restricted from driving/operating a motorized vehicle/operating heavy machinery/etc     Please see your doctors listed in the follow up providers section of your discharge paperwork, and take the discharge paperwork with you to your appointments    Please call the doctors listed in the follow up providers section to confirm office locations of your follow up appointments     Please note changes may have been made to your medications please refer to your discharge paperwork for your current medications and take this list with you to all your doctors appointments for your doctors to review    Please do not resume a home medication unless the medication reconciliation sheet indicates to do so, please do not assume that a medication that you were given a prescription for is the same as a medication you have at home based on both medications having the same name as dosages and frequency may have changed  Your nurse also reviewed with you just prior to your discharge from the hospital your medications, when to take them, how to take them, what they are for, how much to take, and when your next dose is due, please follow these instructions         Please note the following incidental findings were found during your recent hospitalization please discuss them with your doctors so that they may arrange any tests/referrals as they deem necessary:     1) gallstones: Please follow up with your primary care provider for further testing/treatment if any and/or specialist referral if any as they deem necessary     2) prolonged QTc interval on EKG of your heart: Please follow up with your primary care provider or [facility] (or a cardiologist of your choosing) for further testing/treatment/monitoring if any as they deem necessary     3) grade 1 diastolic dysfunction and reduced ejection fraction of 45-50% on echocardiogram of your heart: Please follow up with [facility] (or a cardiologist of your choosing) for further testing/treatment/monitoring if any as they deem necessary     4) low hemoglobin (anemia): Please have your blood work drawn as prescribed to monitor your hemoglobin level the results will be sent to your primary care provider     5) chronic kidney disease: Please follow up with your primary care provider for further testing/treatment if any and/or specialist referral if any as they deem necessary       Please avoid NSAID (including but not limited to nabumetone, relafen, celebrex, celecoxib, advil, aleve, motrin, naproxen, ibuprofen, mobic, meloxicam, diclofenac, voltaren etc) medications as NSAID medications may delay bone healing unless cleared to do so by your doctors     Please avoid NSAID (including but not limited to nabumetone, relafen, celebrex, celecoxib, advil, aleve, motrin, naproxen, ibuprofen, mobic, meloxicam, diclofenac, voltaren etc) medications unless cleared to do so by your doctors as these medications can potentially cause worsening of your chronic kidney disease       Please continue your sinus precautions as instructed until cleared by Dr Kaylee Orona DMD    Please continue your spine precautions as instructed until cleared by Dr Cuba Hernandez PA-C     Please have your cervical spine x-rays done 2-3 days prior to your appt with Dr Cuba Hernandez PA-C on 9/3/21 (you have been provided with an electronic prescription for the x-ray that can be done at any [facility] radiology facility that provides this service)       Please note a summary of your hospital stay with relevant information for your doctors has been sent to them, please confirm with your doctors at your follow up visits that they have received this summary and have them contact [facility] medical records if they have not received them along with any other medical records they may require

## 2021-08-12 NOTE — PROGRESS NOTES
Physical Medicine and Rehabilitation Progress Note  Magdalena Juárez 66 y o  female MRN: [id number]  Unit/Bed#: -25 Encounter: [id number]    HPI: Magdalena Juárez is a 66 y o  female who presented to the [facility] [address] [facility] [address] after fall  Patient p/w c/o neck pain as well as bilateral arm pain/numbness  Imaging revealed cervical stenosis most severe at C4-5 with possible cord edema and patient subsequently underwent C4-5 ACDF & C2-T2 PCDF on 7/20 by Dr Stephanie Chappell  Patient was also found to have B/L nasal bone fractures and L maxillary sinus hemorrhage which was managed conservatively  Post-op course complicated by urinary retention requiring morrow placement  Chief Complaint: cervical stenosis/central cord syndrome     Subjective: patient looking forward to dc tomorrow     ROS: A 10 point ROS was performed; negative except as noted above       Assessment/Plan:      Cervical stenosis of spine  Assessment & Plan  - C6 fx, per XR report of 7/21 "Redemonstrated anterior-inferior C6 vertebral body corner fracture, unchanged in alignment " (seen by neurosx on 7/26 no further intervention recommended)  - cervical stenosis most severe at C4-5 with possible cord edema s/p ACDF C4-5 & PCDF C2-T2 by Dr Stephanie Chappell on 7/20  - c-spine precautions  - no brace needed per neurosx  - 2 week post-op FU completed by neurosx while patient in rehab unit   - per neurosx protocol FU XR (already e-prescribed in EMR by neurosx team) to be done 2-3 days prior to FU appt of 9/3   - OP FU with neurosx [facility] Degree GABRIELLE/Dr Greg Waters) on 9/3 at 9:15 AM    Nasal bone fractures  Assessment & Plan  - B/L nasal bone fractures & L maxillary sinus hemorrhage  - non-op management per OMFS  - sinus precautions   - abx recommended by OMFS however dc'd by trauma surgery in acute care who was primary team per their protocol   - OP FU with Dr Edward Lacy DMD     CKD (chronic kidney disease)  Assessment & Plan  - Cr currently 0 95  - baseline appears to be 1  0-1  2  - IM monitoring and have cleared patient for dc     Anemia  Assessment & Plan  - Hg currently increased to 10 4  post-operatively  - IM monitoring and have cleared patient for dc (scrip will be provided upon dc for H&H with results to PCP)     CHF (congestive heart failure) ([facility])  Assessment & Plan  - EF 45-50%  - grade 1 DD  - not on diuretics at home  - IM monitoring volume status and have cleared patient for dc   - does not appear to have an OP cardiologist, recommend patient follow with [facility] Bolton cardiology (or a cardiologist of her choosing upon dc)     Dyslipidemia  Assessment & Plan  - on home lipitor 20 mg qpm    History of recurrent UTIs  Assessment & Plan  - at home patient was chronically on keflex 250 mg qd for suppression of recurrent UTIs (confrimed with patient's OP pharmacy that patient receives monthly prescriptions for this, last provided by Dr Christine Mercado of urology) however patient with UA indicative of possible active UTI, UCx confirmed this and patient is on abx appropriate to susceptibilities; started on 8/8 by Dr Khadijah Mayen and IM recommend a 5 day course through 8/12 (completed); d/w Dr Niels Fletcher if patient should resume home keflex 250 mg qd for suppression given that patient had a UTI while on keflex 250 mg qd however Dr Niels Fletcher recommended keflex 250 mg qd be resumed upon completion of cipro course as he feels that breakthrough UTIs are uncommon with this patient while on keflex 250 mg qd and therefore should be continued    - OP FU with Dr Manish Fine  - at home on xanax 0 25 mg qhs prn however has not required any doses while in rehab unit therefore dc'd      Vertigo  Assessment & Plan  - chronic, likely BPV  - at home on antivert 12 5 mg q8 prn however has not required any doses while in rehab unit therefore dc'd      * GERD (gastroesophageal reflux disease)  Assessment & Plan  - on home protonix 40 mg qd      Scheduled Meds:  Current Facility-Administered Medications   Medication Dose Route Frequency Provider Last Rate    acetaminophen  975 mg Oral Q8H [address] Alexis Ramírez MD      atorvastatin  20 mg Oral Daily With Jasiel Maldonado MD      cephalexin  250 mg Oral Daily Alexis Ramírez MD      heparin (porcine)  5,000 Units Subcutaneous Q8H [address] Alexis Ramírez MD      lidocaine  2 patch Topical Daily Alexis Ramírez MD      melatonin  3 mg Oral HS Alexis Ramírez MD      oxyCODONE  2 5 mg Oral Q4H PRN Alexis Ramírez MD     Levora Mckeon oxyCODONE  5 mg Oral Q4H PRN Alexis Ramírez MD      pantoprazole  40 mg Oral Early Morning Alexis Ramírez MD      psyllium  1 packet Oral Daily Alexis Ramírez MD      simethicone  80 mg Oral 4x Daily (with meals and at bedtime) Hermes Rivera PA-C            Incidental findings:     1) gallstones: asymptomatic; OP FU with PCP with GI referral at PCPs discretion     2) mildly elevated QTc at 487: d/w IM who recommended no further GUTIERREZ/intervention at this time for QTc and to avoid QT prolonging medication if possible; OP FU with PCP or cards      DVT ppx: HSQ (cleared in acute care by neurosx for pharmacologic DVT ppx)  Dispo: home with 24 hr support from family        Objective:    Functional Update:  Mobility: mod I   Transfers: mod I   ADLs: mod I       Physical Exam:    Vitals:    08/12/21 0557   BP: 128/60   Pulse: 89   Resp: 18   Temp: 98 2 °F    SpO2: 98%           General: alert, no apparent distress, cooperative and comfortable  HEENT:  Eye: Normal external eye, conjunctiva, lidsc cornea  Ears: Normal external ears  Nose: Normal external nose, mucus membranes  CARDIAC:  +S1/2  LUNGS:  no abnormal respiratory pattern, no retractions noted, non-labored breathing   ABDOMEN:  soft NT  EXTREMITIES:  no cyanosis or clubbing   NEURO:  awake, alert appropriate responses to questions   PSYCH:  mood/affect currently stable   INCISION:  surgical site C/D/I                Diagnostic Studies:  No orders to display       Laboratory: Invalid input(s): PLTCT        Invalid input(s): CA

## 2021-08-12 NOTE — PROGRESS NOTES
Internal Medicine Progress Note  Patient: Inderjit Pa  Age/sex: 66 y o  female  Medical Record #: [id number]      ASSESSMENT/PLAN: (Interval History)  Inderjit Pa is seen and examined and management for following issues:    Central cord syndrome; s/p ACDF C4-5/PCDF C2-T2 7/20/21  · Doesn't need collar per NS  · Incisions w/o erythema/infection  · Staples removed 8/3 by NS     Acute nasal bone fx  · OMS saw = no surgery needed  · Sinus precautions for 4 weeks     Enterobacter UTI/MDRO  · On suppressive tx with Keflex at home for recurrent UTI  · Had urinary frequency/burning here  · Switched to Cipro based on cx results but after completed to go back on previous suppressive tx with Keflex until seen by her Urologist  · Currently no sx     ABLA  · Did not require transfusion     Orthostasis  · Continue PO fluid goal of minimum intake of 1200ml/day   · Doing fine w/o abd binder now; using TEDs only and no sx dizziness     Chest pain  · Occurred 7/29/21 and was located on the Lt side  · w/u negative   · No further occurrence        Discharge date:  8/13/21    The above assessment and plan was reviewed and updated as determined by my evaluation of the patient on 8/12/2021  Labs:              Invalid input(s): LABGLOM, CMP                Review of Scheduled Meds:  Current Facility-Administered Medications   Medication Dose Route Frequency Provider Last Rate    acetaminophen  975 mg Oral Q8H [facility] Re Herzog MD      ALPRAZolam  0 25 mg Oral HS PRN Re Herzog MD      atorvastatin  20 mg Oral Daily With Lesley Panda MD      ciprofloxacin  500 mg Oral Q12H [facility] Kristie Way DO      heparin (porcine)  5,000 Units Subcutaneous Q8H Petra Ellis MD      lidocaine  2 patch Topical Daily Re Herzog MD      meclizine  12 5 mg Oral Q8H PRN Re Herzog MD      melatonin  3 mg Oral HS Re Herzog MD      methocarbamol  500 mg Oral Q6H PRN Re Herzog MD      oxyCODONE  2 5 mg Oral Q4H PRN Re Herzog MD      oxyCODONE  5 mg Oral Q4H PRN James Menjivar MD      pantoprazole  40 mg Oral Early Morning James Menjivar MD      polyethylene glycol  17 g Oral Daily PRN James Menjivar MD      psyllium  1 packet Oral Daily James Menjivar MD      simethicone  80 mg Oral 4x Daily (with meals and at bedtime) Penny Deras PA-C      sodium chloride  1 spray Each Nare 4x Daily PRN James Menjivar MD         Subjective/ HPI: Patient seen and examined  Patients overnight issues or events were reviewed with nursing or staff during rounds or morning huddle session  New or overnight issues include the following:     No new or overnight issues  Offers no complaints  ROS:   A 10 point ROS was performed; negative except as noted above  Imaging:     No orders to display       *Labs /Radiology studies reviewed  *Medications reviewed and reconciled as needed  *Please refer to order section for additional ordered labs studies  *Case discussed with primary attending during morning huddle case rounds      Physical Examination:  Vitals:   Vitals:    08/11/21 1506 08/12/21 0100 08/12/21 0557 08/12/21 0600   BP: 118/73 140/65 128/60    BP Location: Left arm Left arm Left arm    Pulse: 97 98 89    Resp: 19 18 18    Temp: 97 7 °F (36 5 °C) 97 7 °F (36 5 °C) 98 2 °F (36 8 °C)    TempSrc: Oral Oral Oral    SpO2: 94% 100% 98%    Weight:    60 kg (132 lb 4 4 oz)   Height:           General Appearance: no distress, conversive  HEENT: PERRLA, conjuctiva normal; oropharynx clear; mucous membranes moist   Neck:  Incisions healed  Lungs: CTA, normal respiratory effort, no retractions, expiratory effort normal  CV: regular rate and rhythm; no rubs/murmurs/gallops, PMI normal   ABD: soft; ND/NT; +BS  EXT: no edema  Skin: normal turgor, normal texture, no rashes  Psych: affect normal, mood normal  Neuro: AAO      The above physical exam was reviewed and updated as determined by my evaluation of the patient on 8/12/2021      Invasive Devices None                    VTE Pharmacologic Prophylaxis: Heparin  Code Status: Level 1 - Full Code  Current Length of Stay: 17 day(s)      Total time spent:  30 minutes with more than 50% spent counseling/coordinating care  Counseling includes discussion with patient re: progress  and discussion with patient of his/her current medical state/information  Coordination of patient's care was performed in conjunction with primary service  Time invested included review of patient's labs, vitals, and management of their comorbidities with continued monitoring  In addition, this patient was discussed with medical team including physician and advanced extenders  The care of the patient was extensively discussed and appropriate treatment plan was formulated unique for this patient  ** Please Note:  voice to text software may have been used in the creation of this document   Although proof errors in transcription or interpretation are a potential of such software**

## 2021-08-13 ENCOUNTER — TRANSITIONAL CARE MANAGEMENT (OUTPATIENT)
Dept: FAMILY MEDICINE CLINIC | Facility: CLINIC | Age: 76
End: 2021-08-13

## 2021-08-13 VITALS
BODY MASS INDEX: 25.21 KG/M2 | OXYGEN SATURATION: 98 % | RESPIRATION RATE: 19 BRPM | SYSTOLIC BLOOD PRESSURE: 143 MMHG | HEIGHT: 62 IN | DIASTOLIC BLOOD PRESSURE: 72 MMHG | WEIGHT: 137 LBS | HEART RATE: 88 BPM | TEMPERATURE: 98 F

## 2021-08-13 PROCEDURE — 99239 HOSP IP/OBS DSCHRG MGMT >30: CPT | Performed by: PHYSICAL MEDICINE & REHABILITATION

## 2021-08-13 PROCEDURE — 99232 SBSQ HOSP IP/OBS MODERATE 35: CPT | Performed by: NURSE PRACTITIONER

## 2021-08-13 RX ORDER — CEPHALEXIN 250 MG/1
250 CAPSULE ORAL DAILY
Status: DISCONTINUED | OUTPATIENT
Start: 2021-08-13 | End: 2021-08-13 | Stop reason: HOSPADM

## 2021-08-13 RX ORDER — CEPHALEXIN 250 MG/1
250 CAPSULE ORAL DAILY
COMMUNITY
End: 2022-04-21 | Stop reason: ALTCHOICE

## 2021-08-13 RX ADMIN — ACETAMINOPHEN 975 MG: 325 TABLET, FILM COATED ORAL at 13:11

## 2021-08-13 RX ADMIN — HEPARIN SODIUM 5000 UNITS: 5000 INJECTION INTRAVENOUS; SUBCUTANEOUS at 05:52

## 2021-08-13 RX ADMIN — CEPHALEXIN 250 MG: 250 CAPSULE ORAL at 11:04

## 2021-08-13 RX ADMIN — ACETAMINOPHEN 975 MG: 325 TABLET, FILM COATED ORAL at 05:52

## 2021-08-13 RX ADMIN — LIDOCAINE 2 PATCH: 50 PATCH TOPICAL at 08:11

## 2021-08-13 RX ADMIN — PANTOPRAZOLE SODIUM 40 MG: 40 TABLET, DELAYED RELEASE ORAL at 05:52

## 2021-08-13 RX ADMIN — SIMETHICONE 80 MG: 20 EMULSION ORAL at 11:04

## 2021-08-13 RX ADMIN — HEPARIN SODIUM 5000 UNITS: 5000 INJECTION INTRAVENOUS; SUBCUTANEOUS at 13:11

## 2021-08-13 RX ADMIN — SIMETHICONE 80 MG: 20 EMULSION ORAL at 06:40

## 2021-08-13 RX ADMIN — Medication 1 PACKET: at 08:11

## 2021-08-13 NOTE — PLAN OF CARE
Problem: Prexisting or High Potential for Compromised Skin Integrity  Goal: Skin integrity is maintained or improved  Description: INTERVENTIONS:  - Identify patients at risk for skin breakdown  - Assess and monitor skin integrity  - Assess and monitor nutrition and hydration status  - Monitor labs   - Assess for incontinence   - Turn and reposition patient  - Assist with mobility/ambulation  - Relieve pressure over bony prominences  - Avoid friction and shearing  - Provide appropriate hygiene as needed including keeping skin clean and dry  - Evaluate need for skin moisturizer/barrier cream  - Collaborate with interdisciplinary team   - Patient/family teaching  - Consider wound care consult   Outcome: Adequate for Discharge     Problem: MOBILITY - ADULT  Goal: Maintain or return to baseline ADL function  Description: INTERVENTIONS:  -  Assess patient's ability to carry out ADLs; assess patient's baseline for ADL function and identify physical deficits which impact ability to perform ADLs (bathing, care of mouth/teeth, toileting, grooming, dressing, etc )  - Assess/evaluate cause of self-care deficits   - Assess range of motion  - Assess patient's mobility; develop plan if impaired  - Assess patient's need for assistive devices and provide as appropriate  - Encourage maximum independence but intervene and supervise when necessary  - Involve family in performance of ADLs  - Assess for home care needs following discharge   - Consider OT consult to assist with ADL evaluation and planning for discharge  - Provide patient education as appropriate  Outcome: Adequate for Discharge  Goal: Maintains/Returns to pre admission functional level  Description: INTERVENTIONS:  - Perform BMAT or MOVE assessment daily    - Set and communicate daily mobility goal to care team and patient/family/caregiver  - Collaborate with rehabilitation services on mobility goals if consulted  - Perform Range of Motion times a day    - Reposition patient every hours    - Dangle patient  times a day  - Stand patient  times a day  - Ambulate patient  times a day  - Out of bed to chair times a day   - Out of bed for meals  times a day  - Out of bed for toileting  - Record patient progress and toleration of activity level   Outcome: Adequate for Discharge     Problem: Potential for Falls  Goal: Patient will remain free of falls  Description: INTERVENTIONS:  - Educate patient/family on patient safety including physical limitations  - Instruct patient to call for assistance with activity   - Consult OT/PT to assist with strengthening/mobility   - Keep Call bell within reach  - Keep bed low and locked with side rails adjusted as appropriate  - Keep care items and personal belongings within reach  - Initiate and maintain comfort rounds  - Make Fall Risk Sign visible to staff  - Offer Toileting every Hours, in advance of need  - Initiate/Maintain alarm  - Obtain necessary fall risk management equipment:   - Apply yellow socks and bracelet for high fall risk patients  - Consider moving patient to room near nurses station  Outcome: Adequate for Discharge     Problem: PAIN - ADULT  Goal: Verbalizes/displays adequate comfort level or baseline comfort level  Description: Interventions:  - Encourage patient to monitor pain and request assistance  - Assess pain using appropriate pain scale  - Administer analgesics based on type and severity of pain and evaluate response  - Implement non-pharmacological measures as appropriate and evaluate response  - Consider cultural and social influences on pain and pain management  - Notify physician/advanced practitioner if interventions unsuccessful or patient reports new pain  Outcome: Adequate for Discharge     Problem: INFECTION - ADULT  Goal: Absence or prevention of progression during hospitalization  Description: INTERVENTIONS:  - Assess and monitor for signs and symptoms of infection  - Monitor lab/diagnostic results  - Monitor all insertion sites, i e  indwelling lines, tubes, and drains  - Monitor endotracheal if appropriate and nasal secretions for changes in amount and color  - [place] appropriate cooling/warming therapies per order  - Administer medications as ordered  - Instruct and encourage patient and family to use good hand hygiene technique  - Identify and instruct in appropriate isolation precautions for identified infection/condition  Outcome: Adequate for Discharge     Problem: SAFETY ADULT  Goal: Patient will remain free of falls  Description: INTERVENTIONS:  - Educate patient/family on patient safety including physical limitations  - Instruct patient to call for assistance with activity   - Consult OT/PT to assist with strengthening/mobility   - Keep Call bell within reach  - Keep bed low and locked with side rails adjusted as appropriate  - Keep care items and personal belongings within reach  - Initiate and maintain comfort rounds  - Make Fall Risk Sign visible to staff  - Offer Toileting every Hours, in advance of need  - Initiate/Maintain alarm  - Obtain necessary fall risk management equipment:   - Apply yellow socks and bracelet for high fall risk patients  - Consider moving patient to room near nurses station  Outcome: Adequate for Discharge  Goal: Maintain or return to baseline ADL function  Description: INTERVENTIONS:  -  Assess patient's ability to carry out ADLs; assess patient's baseline for ADL function and identify physical deficits which impact ability to perform ADLs (bathing, care of mouth/teeth, toileting, grooming, dressing, etc )  - Assess/evaluate cause of self-care deficits   - Assess range of motion  - Assess patient's mobility; develop plan if impaired  - Assess patient's need for assistive devices and provide as appropriate  - Encourage maximum independence but intervene and supervise when necessary  - Involve family in performance of ADLs  - Assess for home care needs following discharge   - Consider OT consult to assist with ADL evaluation and planning for discharge  - Provide patient education as appropriate  Outcome: Adequate for Discharge  Goal: Maintains/Returns to pre admission functional level  Description: INTERVENTIONS:  - Perform BMAT or MOVE assessment daily    - Set and communicate daily mobility goal to care team and patient/family/caregiver  - Collaborate with rehabilitation services on mobility goals if consulted  - Perform Range of Motion  times a day  - Reposition patient every hours  - Dangle patient  times a day  - Stand patient  times a day  - Ambulate patient  times a day  - Out of bed to chair times a day   - Out of bed for meals  times a day  - Out of bed for toileting  - Record patient progress and toleration of activity level   Outcome: Adequate for Discharge     Problem: DISCHARGE PLANNING  Goal: Discharge to home or other facility with appropriate resources  Description: INTERVENTIONS:  - Identify barriers to discharge w/patient and caregiver  - Arrange for needed discharge resources and transportation as appropriate  - Identify discharge learning needs (meds, wound care, etc )  - Arrange for interpretive services to assist at discharge as needed  - Refer to Case Management Department for coordinating discharge planning if the patient needs post-hospital services based on physician/advanced practitioner order or complex needs related to functional status, cognitive ability, or social support system  Outcome: Adequate for Discharge     Problem: Nutrition/Hydration-ADULT  Goal: Nutrient/Hydration intake appropriate for improving, restoring or maintaining nutritional needs  Description: Monitor and assess patient's nutrition/hydration status for malnutrition  Collaborate with interdisciplinary team and initiate plan and interventions as ordered  Monitor patient's weight and dietary intake as ordered or per policy  Utilize nutrition screening tool and intervene as necessary  Determine patient's food preferences and provide high-protein, high-caloric foods as appropriate       INTERVENTIONS:  - Monitor oral intake, urinary output, labs, and treatment plans  - Assess nutrition and hydration status and recommend course of action  - Evaluate amount of meals eaten  - Assist patient with eating if necessary   - Allow adequate time for meals  - Recommend/ encourage appropriate diets, oral nutritional supplements, and vitamin/mineral supplements  - Order, calculate, and assess calorie counts as needed  - Recommend, monitor, and adjust tube feedings and TPN/PPN based on assessed needs  - Assess need for intravenous fluids  - Provide specific nutrition/hydration education as appropriate  - Include patient/family/caregiver in decisions related to nutrition  Outcome: Adequate for Discharge

## 2021-08-13 NOTE — DISCHARGE SUMMARY
Physical Medicine and Rehabilitation Progress Note  Ned Gomez 66 y o  female MRN: [id number]  Unit/Bed#: -01 Encounter: [id number]    Discharge Summary - PMR           Admission Date:    7/26/21  Discharge Date:   8/13/21    Diagnosis:   Cervical stenosis/central cord syndrome     Functional Status Upon Discharge:   Modified independent for ambulation/transfers/activities of daily living     Condition at Discharge: stable    Discharge instructions/Information to patient and family:   See after visit summary for information provided to patient and family  Provisions for Follow-Up Care:  See after visit summary for information related to follow-up care and any pertinent home health orders  Disposition: home with 24 hr support from family     Planned Readmission: no        Discharge Medications:  See after visit summary for reconciled discharge medications provided to patient and family  Comorbidities:   See below for medical details     Hospital Course: The patient had an unremarkable rehab course with significant functional gains made, please see below for medical details:          Ned Gomez is a 66 y o  female who presented to the [place] [facility] after fall  Patient p/w c/o neck pain as well as bilateral arm pain/numbness  Imaging revealed cervical stenosis most severe at C4-5 with possible cord edema and patient subsequently underwent C4-5 ACDF & C2-T2 PCDF on 7/20 by Dr Asa Gongora  Patient was also found to have B/L nasal bone fractures and L maxillary sinus hemorrhage which was managed conservatively  Post-op course complicated by urinary retention requiring morrow placement              Cervical stenosis of spine  Assessment & Plan  - C6 fx, per XR report of 7/21 "Redemonstrated anterior-inferior C6 vertebral body corner fracture, unchanged in alignment " (seen by neurosx on 7/26 no further intervention recommended)  - cervical stenosis most severe at C4-5 with possible cord edema s/p ACDF C4-5 & PCDF C2-T2 by Dr Carlos Turner on 7/20  - c-spine precautions  - no brace needed per neurosx  - 2 week post-op FU completed by neurosx while patient in rehab unit   - per neurosx protocol FU XR (already e-prescribed in EMR by neurosx team) to be done 2-3 days prior to FU appt of 9/3   - OP FU with neurosx Chantelle Barrera PA-C/Dr Alisa Lynn) on 9/3 at 9:15 AM    Nasal bone fractures  Assessment & Plan  - B/L nasal bone fractures & L maxillary sinus hemorrhage  - non-op management per OMFS  - sinus precautions   - abx recommended by OMFS however dc'd by trauma surgery in acute care who was primary team per their protocol   - OP FU with Dr Iris Chavez DMD     CKD (chronic kidney disease)  Assessment & Plan  - Cr currently 0 95  - baseline appears to be 1 0-1 2  - IM monitoring and have cleared patient for dc     Anemia  Assessment & Plan  - Hg currently increased to 10 4  post-operatively  - IM monitoring and have cleared patient for dc (scrip will be provided upon dc for H&H with results to PCP)     CHF (congestive heart failure) (HCC)  Assessment & Plan  - EF 45-50%  - grade 1 DD  - not on diuretics at home  - IM monitoring volume status and have cleared patient for dc   - does not appear to have an OP cardiologist, recommend patient follow with Jimmie Coates cardiology (or a cardiologist of her choosing upon dc)     Dyslipidemia  Assessment & Plan  - on home lipitor 20 mg qpm    History of recurrent UTIs  Assessment & Plan  - at home patient was chronically on keflex 250 mg qd for suppression of recurrent UTIs (confrimed with patient's OP pharmacy that patient receives monthly prescriptions for this, last provided by Dr Ramón Kc of urology) however patient with UA indicative of possible active UTI, UCx confirmed this and patient is on abx appropriate to susceptibilities; started on 8/8 by Dr Ricky Ashton and IM recommend a 5 day course through 8/12 (completed); d/w Dr Kyung Garcias if patient should resume home keflex 250 mg qd for suppression given that patient had a UTI while on keflex 250 mg qd however Dr Kelly Bowman recommended keflex 250 mg qd be resumed upon completion of cipro course as he feels that breakthrough UTIs are uncommon with this patient while on keflex 250 mg qd and therefore should be continued    - OP FU with Dr Meli Allen  - at home on xanax 0 25 mg qhs prn however has not required any doses while in rehab unit therefore dc'd      Vertigo  Assessment & Plan  - chronic, likely BPV  - at home on antivert 12 5 mg q8 prn however has not required any doses while in rehab unit therefore dc'd      * GERD (gastroesophageal reflux disease)  Assessment & Plan  - on home protonix 40 mg qd      Scheduled Meds:  Current Facility-Administered Medications   Medication Dose Route Frequency Provider Last Rate    acetaminophen  975 mg Oral Q8H Yamilka Crawford MD      atorvastatin  20 mg Oral Daily With MD Mateusz      cephalexin  250 mg Oral Daily Alexis Ramírez MD      heparin (porcine)  5,000 Units Subcutaneous Q8H Yamilka Crawford MD      lidocaine  2 patch Topical Daily Alexis Ramírez MD      melatonin  3 mg Oral HS Alexis Ramírez MD      oxyCODONE  2 5 mg Oral Q4H PRN Alexis Ramírez MD      oxyCODONE  5 mg Oral Q4H PRN Alexis Ramírez MD      pantoprazole  40 mg Oral Early Morning Alexis Ramírez MD      psyllium  1 packet Oral Daily Alexis Ramírez MD      simethicone  80 mg Oral 4x Daily (with meals and at bedtime) Hermes Rivera PA-C            Incidental findings:     1) gallstones: asymptomatic; OP FU with PCP with GI referral at PCPs discretion     2) mildly elevated QTc at 487: d/w IM who recommended no further GUTIERREZ/intervention at this time for QTc and to avoid QT prolonging medication if possible; OP FU with PCP or cards      DVT ppx: HSQ (cleared in acute care by neurosx for pharmacologic DVT ppx)  Dispo: home with 24 hr support from family            35 minutes was spent from 9:15 AM to 9:50 AM preparing patient's discharge including preparing written discharge instructions, follow up providers, medication reconciliation, prescriptions and reviewing this information with the patient verbally

## 2021-08-13 NOTE — NURSING NOTE
Reviewed discharge instructions with both the patient and the patients son  All questions answered at this time  Patient min assist x 1 with the use of a RW  Patient continent of both bowel and bladder  Written rx for blood work given to son with instructions to have obtained within one week  Neelima Mealing for commode for DME which was delivered to patient  Patient escorted to car by PCA,

## 2021-08-13 NOTE — CASE MANAGEMENT
Team dc summary - pt made good progress and returned home w/family support and contd c services through Pat[facility] 0[facility] for rn pt and ot  Pt received a commode through [employer] and family needed to purchase a walker in the pharmacy as medicare paid for one in 2017  Family present for training and for dc instructions

## 2021-08-13 NOTE — PROGRESS NOTES
Internal Medicine Progress Note  Patient: Yara Valadez  Age/sex: 66 y o  female  Medical Record #: [id number]      ASSESSMENT/PLAN: (Interval History)  Yara Valadez is seen and examined and management for following issues:    Central cord syndrome; s/p ACDF C4-5/PCDF C2-T2 7/20/21  · Doesn't need collar per NS  · Incisions w/o erythema/infection  · Staples removed 8/3 by NS     Acute nasal bone fx  · OMS saw = no surgery needed  · Sinus precautions for 4 weeks     Enterobacter UTI/MDRO  · On suppressive tx with Keflex at home for recurrent UTI  · Had urinary frequency/burning here  · Switched to Cipro based on cx results but after completed to go back on previous suppressive tx with Keflex per her Urologist  · Currently no sx     ABLA  · Did not require transfusion     Orthostasis  · resolved     Chest pain  · Occurred 7/29/21 and was located on the Lt side  · w/u negative   · No further occurrence        Discharge date:  8/13/21    The above assessment and plan was reviewed and updated as determined by my evaluation of the patient on 8/13/2021  Labs:              Invalid input(s): LABGLOM, CMP                Review of Scheduled Meds:  Current Facility-Administered Medications   Medication Dose Route Frequency Provider Last Rate    acetaminophen  975 mg Oral Q8H Jason Olmstead MD      atorvastatin  20 mg Oral Daily With Amara Jeronimo MD      cephalexin  250 mg Oral Daily Mariam Givens MD      heparin (porcine)  5,000 Units Subcutaneous Q8H Jason Olmstead MD      lidocaine  2 patch Topical Daily Mariam Givens MD      melatonin  3 mg Oral HS Mariam Givens MD      oxyCODONE  2 5 mg Oral Q4H PRN Mariam Givens MD     Rocio Frank oxyCODONE  5 mg Oral Q4H PRN Mariam Givens MD      pantoprazole  40 mg Oral Early Morning Mariam Givens MD      psyllium  1 packet Oral Daily Mariam Givens MD      simethicone  80 mg Oral 4x Daily (with meals and at bedtime) Penny Deras PA-C         Subjective/ HPI: Patient seen and examined  Patients overnight issues or events were reviewed with nursing or staff during rounds or morning huddle session  New or overnight issues include the following:     No new or overnight issues  Offers no complaints  Anxious to go home    ROS:   A 10 point ROS was performed; negative except as noted above  Imaging:     No orders to display       *Labs /Radiology studies reviewed  *Medications reviewed and reconciled as needed  *Please refer to order section for additional ordered labs studies  *Case discussed with primary attending during morning huddle case rounds      Physical Examination:  Vitals:   Vitals:    08/12/21 0600 08/12/21 1530 08/12/21 2233 08/13/21 0612   BP:  124/82 120/58 143/72   BP Location:  Left arm Right arm Right arm   Pulse:  76 94 88   Resp:  18 18 19   Temp:  98 3 °F (36 8 °C) 98 °F (36 7 °C) 98 °F (36 7 °C)   TempSrc:  Oral Oral Oral   SpO2:  98% 96% 98%   Weight: 60 kg (132 lb 4 4 oz)   62 1 kg (137 lb)   Height:           General Appearance: no distress, conversive  HEENT: PERRLA, conjuctiva normal; oropharynx clear; mucous membranes moist   Neck:  Supple, normal ROM, no JVD  Lungs: CTA, normal respiratory effort, no retractions, expiratory effort normal  CV: regular rate and rhythm; no rubs/murmurs/gallops, PMI normal   ABD: soft; ND/NT; +BS  EXT: no edema  Skin: normal turgor, normal texture, no rashes  Psych: affect normal, mood normal  Neuro: AAO      The above physical exam was reviewed and updated as determined by my evaluation of the patient on 8/13/2021  Invasive Devices     None                    VTE Pharmacologic Prophylaxis: Heparin  Code Status: Level 1 - Full Code  Current Length of Stay: 18 day(s)      Total time spent:  30 minutes with more than 50% spent counseling/coordinating care  Counseling includes discussion with patient re: progress  and discussion with patient of his/her current medical state/information   Coordination of patient's care was performed in conjunction with primary service  Time invested included review of patient's labs, vitals, and management of their comorbidities with continued monitoring  In addition, this patient was discussed with medical team including physician and advanced extenders  The care of the patient was extensively discussed and appropriate treatment plan was formulated unique for this patient  ** Please Note:  voice to text software may have been used in the creation of this document   Although proof errors in transcription or interpretation are a potential of such software**

## 2021-08-16 NOTE — PROGRESS NOTES
08/16/21 1241   Hello, [Guardians Name / Kelly Allison, this is [Caller Lesia Brito from [facility], and our clinical care team wanted to check on you / your child after your recent visit to the hospital  It will only take 3-5 minutes  Is this a good time? Discharge Call Type/ Specific Diagnosis: General Call   General Discharge Phone Call   Were your/your child's discharge instructions clear and understandable? Please tell me in your own words how to care for yourself/your child now that you're home Yes   Have you filled your/your child's new prescriptions yet? Yes   What questions do you have about those medications? No questions   Are you/your child having any unusual symptoms or problems? (Specific to problem- i e , dressing, pain, bruising or swelling, procedure, etc ) No reported symptoms/problems   Do you have follow up appointment with your/your child's physician? Yes   Is there anything preventing you from keeping that appointment? No;Patient able to keep appointment   Are there any physicians, nurses, or hospital staff you would like us to recognize for doing a very good job? Nurse;PCA/Tech;PT/OT/RT/SLP  (everyone was excellent)   Thank you for taking the time to share with me about your care and recovery  Do you have any suggestions for us? No   This call resulted in: No interventions needed   Call Complete   Attempted Number of Calls 1   Discharge phone call complete?  Complete

## 2021-08-16 NOTE — TELEPHONE ENCOUNTER
Called patient to see how she is doing after surgery  Patient reports she is doing well overall and denies any incisional issues or fevers  Patient is able to ambulate around the house and complete ADLs  Educated the patient about the importance of preventing blood clots and provided measures how to prevent them  Patient has moved her bowels since the surgery  Encouraged patient to take an over the counter stool softener, if she is taking narcotic pain medication  Encouraged fiber intake and fluids  Reviewed incision care with the patient  Advised that she may take a shower and gently wash the surgical site with soap and water  Use clean wash cloth, towels, and clothing  Do not submerge in water until cleared by the surgeon  Do not apply any creams, ointments, or lotions to the site  Patient is aware to call the office if any redness, swelling, drainage, dehiscence of incision, or fever >100 F occurs  Patient is aware to call the office if any concerns or questions may arise  Reminded patient of her upcoming appointment with the date/time/location and reminded her to complete the x-ray beforehand  Patient was appreciative for the call

## 2021-08-18 ENCOUNTER — OFFICE VISIT (OUTPATIENT)
Dept: FAMILY MEDICINE CLINIC | Facility: CLINIC | Age: 76
End: 2021-08-18
Payer: MEDICARE

## 2021-08-18 VITALS — WEIGHT: 137 LBS | BODY MASS INDEX: 25.21 KG/M2 | HEIGHT: 62 IN

## 2021-08-18 DIAGNOSIS — N18.2 STAGE 2 CHRONIC KIDNEY DISEASE: ICD-10-CM

## 2021-08-18 DIAGNOSIS — K21.9 GASTROESOPHAGEAL REFLUX DISEASE, UNSPECIFIED WHETHER ESOPHAGITIS PRESENT: ICD-10-CM

## 2021-08-18 DIAGNOSIS — E78.5 HYPERLIPIDEMIA, UNSPECIFIED HYPERLIPIDEMIA TYPE: ICD-10-CM

## 2021-08-18 DIAGNOSIS — S14.129D CENTRAL CORD SYNDROME, SUBSEQUENT ENCOUNTER (HCC): Primary | ICD-10-CM

## 2021-08-18 DIAGNOSIS — S02.2XXD CLOSED FRACTURE OF NASAL BONE WITH ROUTINE HEALING, SUBSEQUENT ENCOUNTER: ICD-10-CM

## 2021-08-18 DIAGNOSIS — D64.9 ANEMIA, UNSPECIFIED TYPE: ICD-10-CM

## 2021-08-18 PROBLEM — B34.9 VIRAL ILLNESS: Status: RESOLVED | Noted: 2020-01-20 | Resolved: 2021-08-18

## 2021-08-18 PROCEDURE — 99495 TRANSJ CARE MGMT MOD F2F 14D: CPT | Performed by: FAMILY MEDICINE

## 2021-08-18 RX ORDER — FOLIC ACID/VIT B COMPLEX AND C 0.8 MG
TABLET ORAL
COMMUNITY
End: 2021-08-20 | Stop reason: SDUPTHER

## 2021-08-18 NOTE — ASSESSMENT & PLAN NOTE
Lab Results   Component Value Date    EGFR 58 08/02/2021    EGFR 81 07/26/2021    EGFR 87 07/24/2021    CREATININE 0 95 08/02/2021    CREATININE 0 72 07/26/2021    CREATININE 0 63 07/24/2021    blood work scheduled for tomorrow  Follow up when results are available

## 2021-08-18 NOTE — PROGRESS NOTES
Virtual TCM Visit:    Verification of patient location:    Patient is located in the following state in which I hold an active license [place]        Assessment/Plan:        Problem List Items Addressed This Visit        Digestive    GERD (gastroesophageal reflux disease)       Continue pantoprazole  Nervous and Auditory    Central cord syndrome ([address] ) - Primary      She appears to be well healing after her anterior and posterior cervical diskectomy and fusion  She is wearing her neck brace  She appears to have no postoperative complication  She has home physical therapy  She has follow-up scheduled with Neurosurgery  All her records were reviewed and questions answered  Musculoskeletal and Integument    Nasal bone fractures       Follow-up scheduled with oral maxillofacial surgery            Genitourinary    CKD (chronic kidney disease)     Lab Results   Component Value Date    EGFR 58 08/02/2021    EGFR 81 07/26/2021    EGFR 87 07/24/2021    CREATININE 0 95 08/02/2021    CREATININE 0 72 07/26/2021    CREATININE 0 63 07/24/2021    blood work scheduled for tomorrow  Follow up when results are available  Other    Hyperlipidemia       Continue atorvastatin  Anemia       Follow-up scheduled for tomorrow  Reason for visit is virtual transition of care  Encounter provider Naeem Grijalva MD       Provider located at [address] [id number]      Recent Visits  No visits were found meeting these conditions  Showing recent visits within past 7 days and meeting all other requirements  Today's Visits  Date Type Provider Dept   08/18/21 Office Visit Naeem Grijalva MD [facility]   Showing today's visits and meeting all other requirements  Future Appointments  No visits were found meeting these conditions    Showing future appointments within next 150 days and meeting all other requirements After connecting through [employer], the patient was identified by name and date of birth  Sarthak Burgess was informed that this is a telemedicine visit and that the visit is being conducted through [address] [employer] and patient was informed that this is a secure, HIPAA-compliant platform  She agrees to proceed     My office door was closed  No one else was in the room  She acknowledged consent and understanding of privacy and security of the video platform  The patient has agreed to participate and understands they can discontinue the visit at any time  Patient is aware this is a billable service  Subjective:     Patient ID: Sarthak Burgess is a 66 y o  female  Dr Susan Flores 9/3 and Dr Evelia Urena 10/5   Up walking this am, home PT  Using walker Has assistive device  Had cervical ACDF as well as posterior  9/14 Yudith, OM facial surgery  Not using straw  No bending  Some shoulder pain  Has a script for f/u XR  NS  BW tomorrow  C/w cephalexin  Dr Becca Medel  No fever, BM ok now  C/w stool softener and Miralax  No edema  No CP or SOB  Appetite OK and improved  Sleeping pretty good  HPI      The patient is a 58-year-old female who presents today for a transition of care visit  She was admitted to [facility] in [place] on July 26th and discharged on August 13th  She had a fall at home  She struck her face  She suffered a spinal cord contusion as well as nasal fracture with left maxillary sinus hemorrhage  Fractures or manage conservatively  After the fall she had neck pain with bilateral upper arm neurologic symptoms  Imaging revealed cervical stenosis at C4-5 and possible cord edema  She underwent anterior cervical diskectomy and fusion at C4 and 5 as well as posterior cervical disc fusion from C2 to T2  This was performed by Dr Loraine Benavidez  She had mild postoperative anemia with a hemoglobin of 10 4  My of chronic kidney disease    She had mild diastolic congestive heart failure with an Disposition  Home    Current Symptoms  None      TCM Call (since 7/18/2021)     Post hospital issues  None    Scheduled for follow up? Yes    Did you obtain your prescribed medications  Yes    Do you need help managing your prescriptions or medications  No    Is transportation to your appointment needed  Yes    Specify why  Patient does not drive  I have advised the patient to call PCP with any new or worsening symptoms  Carlos Islas MA          I spent 20 minutes with the patient during this visit  Graciela Lobo MD      VIRTUAL VISIT 88 Paula Packer verbally agrees to participate in [employer]  Pt is aware that [employer] could be limited without vital signs or the ability to perform a full hands-on physical exam  Leah Gamboa understands she or the provider may request at any time to terminate the video visit and request the patient to seek care or treatment in person

## 2021-08-18 NOTE — ASSESSMENT & PLAN NOTE
She appears to be well healing after her anterior and posterior cervical diskectomy and fusion  She is wearing her neck brace  She appears to have no postoperative complication  She has home physical therapy  She has follow-up scheduled with Neurosurgery  All her records were reviewed and questions answered

## 2021-08-18 NOTE — OCCUPATIONAL THERAPY NOTE
OT Discharge Summary    Pt made good progress during their stay on the [facility] following admission cervical stenosis/central cord syndrome   Pt progress to supervision for overall ADLs and supervision for functional transfers  FT was completed prior to D/C with family  Provided family printed material on transfer bench, clip on walker tray and discussed that therapy would order her a [facility]  Daughter present and observed pt completing DRY tub/shower transfer with transfer bench (at CG level)  Discussed safety recommendations as far as providing closer supervision when pt is ambulating when fatigued, providing supervision/assist during showering/self care tasks and during IADLs (like light meal prep) also discussed to use lift chair features when needed At this time, recommending home OT and required [facility] for DME prior to D/C       Cherie Lu MS, OTR/L

## 2021-08-20 DIAGNOSIS — N18.2 STAGE 2 CHRONIC KIDNEY DISEASE: Primary | ICD-10-CM

## 2021-08-20 RX ORDER — FOLIC ACID/VIT B COMPLEX AND C 0.8 MG
1 TABLET ORAL DAILY
Qty: 100 TABLET | Refills: 0 | Status: SHIPPED | OUTPATIENT
Start: 2021-08-20 | End: 2021-11-29 | Stop reason: SDUPTHER

## 2021-08-26 DIAGNOSIS — J30.9 ALLERGIC RHINITIS, UNSPECIFIED SEASONALITY, UNSPECIFIED TRIGGER: Primary | ICD-10-CM

## 2021-08-27 RX ORDER — LORATADINE 10 MG/1
TABLET ORAL
Qty: 30 TABLET | Refills: 2 | Status: SHIPPED | OUTPATIENT
Start: 2021-08-27 | End: 2021-12-13 | Stop reason: SDUPTHER

## 2021-08-31 ENCOUNTER — HOSPITAL ENCOUNTER (OUTPATIENT)
Dept: RADIOLOGY | Facility: HOSPITAL | Age: 76
Discharge: HOME | End: 2021-08-31
Payer: MEDICARE

## 2021-08-31 DIAGNOSIS — S14.129D CENTRAL CORD SYNDROME, SUBSEQUENT ENCOUNTER (HCC): ICD-10-CM

## 2021-08-31 PROCEDURE — 72040 X-RAY EXAM NECK SPINE 2-3 VW: CPT

## 2021-08-31 NOTE — PHYSICAL THERAPY NOTE
Physical Therapy D/C Summary    Pt made good progerss throughout their stay in [facility] w/ physical therapy s/p admission for cervical stenosis/central cord syndrome  Pt able to progress from [address] on IE to S at time of d/c w/ use of RW for all functional mobility and stair negotiation  FT was completed prior to d/c w/ pts daughter who will be assisting pt at time of d/c  Discussed safety recommendations such as S w/ all functional mobility as pt in cervical collar w/ limited ROM increasing falls risk   DME order placed for RW, recommend d/c home w/ S level, family support and HHPT to maximize functional independence and dec risk of falling at d/c        Sabra Pulido, PT DPT

## 2021-09-03 ENCOUNTER — OFFICE VISIT (OUTPATIENT)
Dept: NEUROSURGERY | Facility: CLINIC | Age: 76
End: 2021-09-03

## 2021-09-03 VITALS
DIASTOLIC BLOOD PRESSURE: 86 MMHG | HEIGHT: 62 IN | TEMPERATURE: 97.1 F | BODY MASS INDEX: 25.21 KG/M2 | HEART RATE: 91 BPM | RESPIRATION RATE: 16 BRPM | SYSTOLIC BLOOD PRESSURE: 126 MMHG | WEIGHT: 137 LBS

## 2021-09-03 DIAGNOSIS — S14.129D CENTRAL CORD SYNDROME, SUBSEQUENT ENCOUNTER (HCC): ICD-10-CM

## 2021-09-03 DIAGNOSIS — Z98.1 STATUS POST CERVICAL SPINAL FUSION: Primary | ICD-10-CM

## 2021-09-03 DIAGNOSIS — M48.02 CERVICAL STENOSIS OF SPINAL CANAL: ICD-10-CM

## 2021-09-03 PROCEDURE — 99024 POSTOP FOLLOW-UP VISIT: CPT | Performed by: NEUROLOGICAL SURGERY

## 2021-09-03 NOTE — ASSESSMENT & PLAN NOTE
· Pt is approximately 6 weeks s/p Anterior cervical discectomy and fusion C4-5, posterior cervical decompresion and fusion C2-T2 by Dr Emmy Aquino on 7/20/21  · Pt had initially presented to the hospital with complaints of neck pain and bilateral arm pain/numbness status post fall forward on her face  · Pt was found to have degenerative spinal stenosis with cord signal changes C4/5  · Imaging reviewed personally and by attending  Final results below discussed with the patient  · X-ray cervical spine 08/31/2021:  Extensive postoperative changes with bilateral posterior screws and rods extending from C2 through T2  Stable anterior fusion C4-C5  No hardware malfunction noted  Stable cervical alignment  Plan  · At this time, pt may begin to increase her activity level  · Recommend pt continue physical therapy as tolerated  · Pt reports improvement in her symptoms since surgery  Her xrays show hardware intact with stable alignment  · At this time, no further nsx intervention anticipated  Patient will have further postop follow-up with Neurosurgery in 6 months with repeat x-rays of the cervical spine  · Discussed plan of care with patient and her daughter,  they showed understanding     · Patient made aware to contact neurosurgery with any questions or concerns

## 2021-09-03 NOTE — PROGRESS NOTES
Neurosurgery Office Note  Priscila Wright 66 y o  female MRN: [id number]      Assessment/Plan     Central cord syndrome [facility])  · Pt is approximately 6 weeks s/p Anterior cervical discectomy and fusion C4-5, posterior cervical decompresion and fusion C2-T2 by Dr Darron Kim on 7/20/21  · Pt had initially presented to the hospital with complaints of neck pain and bilateral arm pain/numbness status post fall forward on her face  · Pt was found to have degenerative spinal stenosis with cord signal changes C4/5  · Imaging reviewed personally and by attending  Final results below discussed with the patient  · X-ray cervical spine 08/31/2021:  Extensive postoperative changes with bilateral posterior screws and rods extending from C2 through T2  Stable anterior fusion C4-C5  No hardware malfunction noted  Stable cervical alignment  Plan  · At this time, pt may begin to increase her activity level  · Recommend pt continue physical therapy as tolerated  · Pt reports improvement in her symptoms since surgery  Her xrays show hardware intact with stable alignment  · At this time, no further nsx intervention anticipated  Patient will have further postop follow-up with Neurosurgery in 6 months with repeat x-rays of the cervical spine  · Discussed plan of care with patient and her daughter,  they showed understanding  · Patient made aware to contact neurosurgery with any questions or concerns          Cervical stenosis of spinal canal  See plan above  Diagnoses and all orders for this visit:    Status post cervical spinal fusion  -     XR spine cervical 2 or 3 vw injury;  Future    Central cord syndrome, subsequent encounter [facility])    Cervical stenosis of spinal canal            CHIEF COMPLAINT    Chief Complaint   Patient presents with    Post-op       HISTORY    History of Present Illness     66y o  year old female     Pt is approximately 6 weeks s/p Anterior cervical discectomy and fusion C4-5, posterior cervical decompresion and fusion C2-T2 by Dr Joshi Poster on 7/20/2021  Pt had initially presented with to the hospital with complaints of neck pain and bilateral arm pain/numbness status post fall forward on to her face  Pt was found to have degenerative spinal stenosis with cord signal change C4/5 for which cervical spine decompression and fusion had been recommended  Today pt reports improvement in her symptoms  Pt reports that the pain she had in her neck and bilateral arms prior to surgery has significantly improved  Pt reports also reports a decrease in the numbness/tingling she had in her hands, particularly the right arm  Pt reports she had a lot of numbness, tingling and pain in her right hand that she could not tolerate anyone touching it but reports this has improved  Pt also reports improvement in the strength in bilateral hands  Pt presented to this visit in a wheelchair but reports that she uses a walker at home  Pt reports that she has been having home Physical therapy sessions and has had improvement in her mobility  Pt was accompanied to this visit by her daughter  REVIEW OF SYSTEMS    Review of Systems   Constitutional: Negative  HENT: Negative  Eyes: Negative  Respiratory: Negative  Cardiovascular: Negative  Gastrointestinal: Negative  Endocrine: Negative  Genitourinary: Negative  Musculoskeletal: Positive for gait problem (ambulates with walker), neck pain (soreness sensation) and neck stiffness  Pt performs her own PT at home, PT comes to home twice a week  Skin: Negative  Allergic/Immunologic: Negative  Hematological: Negative  Psychiatric/Behavioral: Negative            Meds/Allergies     Current Outpatient Medications   Medication Sig Dispense Refill    atorvastatin (LIPITOR) 20 mg tablet Take 20 mg by mouth daily with dinner      B Complex-C-Folic Acid (Ayanna-Dylan) TABS Take 1 tablet by mouth daily Dispense one bottle, regardless of size and quantity  Patient takes daily  100 tablet 0    cephalexin (KEFLEX) 250 mg capsule Take 250 mg by mouth daily       loratadine (CLARITIN) 10 mg tablet TAKE ONE TABLET BY MOUTH EVERY DAY 30 tablet 2    pantoprazole (PROTONIX) 40 mg tablet Take 1 tablet (40 mg total) by mouth every morning 30 tablet 5     No current facility-administered medications for this visit  Allergies   Allergen Reactions    Bee Venom Anaphylaxis       PAST HISTORY    Past Medical History:   Diagnosis Date    Cholelithiasis     GERD (gastroesophageal reflux disease)     Influenza 1/29/2019    Migraine headache     Pneumonia     Urinary tract infection        Past Surgical History:   Procedure Laterality Date    NO PAST SURGERIES      ID ARTHRODESIS ANT INTERBODY MIN DISCECTOMY, CERVICAL BELOW C2 Bilateral 7/20/2021    Procedure: Anterior cervical discectomy and fusion C4-5, posterior cervical decompresion and fusion C2-T2;  Surgeon: Khadijah Sarabia MD;  [facility];  Service: Neurosurgery       Social History     Tobacco Use    Smoking status: Former Smoker     Packs/day: 1 50     Years: 10 00     Pack years: 15 00     Types: Cigarettes    Smokeless tobacco: Never Used    Tobacco comment: quit age 20   Vaping Use    Vaping Use: Never used   Substance Use Topics    Alcohol use: Never     Alcohol/week: 0 0 standard drinks    Drug use: Never       Family History   Problem Relation Age of Onset    Heart disease Mother     No Known Problems Father     No Known Problems Son          Above history personally reviewed  EXAM    Vitals:Blood pressure 126/86, pulse 91, temperature (!) 97 1 °F (36 2 °C), temperature source Tympanic, resp  rate 16, height 5' 2" (1 575 m), weight 62 1 kg (137 lb), not currently breastfeeding  ,Body mass index is 25 06 kg/m²  Physical Exam  Constitutional:       General: She is not in acute distress  Appearance: She is well-developed  Comments: Pt in wheelchair      HENT: Head: Normocephalic and atraumatic  Eyes:      Pupils: Pupils are equal, round, and reactive to light  Neck:      Trachea: No tracheal deviation  Cardiovascular:      Rate and Rhythm: Normal rate  Pulmonary:      Effort: Pulmonary effort is normal    Abdominal:      Palpations: Abdomen is soft  Tenderness: There is no abdominal tenderness  There is no guarding  Musculoskeletal:      Cervical back: Neck supple  Skin:     General: Skin is warm and dry  Coloration: Skin is not pale  Findings: No rash  Comments: Anterior and posterior cervical spine incisions healing well  CDI   Neurological:      Mental Status: She is alert and oriented to person, place, and time  Comments: GCS 15, Awake, Alert, Oriented x 3    Motor: CASTILLO, strength  4+/5 BUE, BLE 4/5  Sensation:  Decreased throughout to pinprick except left upper medial arm and LLE proximally  Reflexes: 2+ and symmetric, bilateral carver's  Coordination: no drift bilateral upper extremities  Psychiatric:         Speech: Speech normal          Behavior: Behavior normal          Neurologic Exam     Mental Status   Oriented to person, place, and time  Attention: normal  Concentration: normal    Speech: speech is normal   Level of consciousness: alert  Knowledge: good  Normal comprehension  Cranial Nerves   Cranial nerves II through XII intact  CN III, IV, VI   Pupils are equal, round, and reactive to light  Motor Exam   Muscle bulk: normal  Overall muscle tone: normal        MEDICAL DECISION MAKING    Imaging Studies:     XR spine cervical 2 or 3 vw injury    Result Date: 9/2/2021  Narrative: CERVICAL SPINE INDICATION:   S14 129D: Central cord syndrome at unspecified level of cervical spinal cord, subsequent encounter  COMPARISON:  July 21, 2021 VIEWS:  XR SPINE CERVICAL 2 OR 3 VW INJURY Images: 2 FINDINGS: Diffuse osteopenia   Extensive postoperative changes with bilateral posterior screws and rods extending from C2 through T2  Stable anterior fusion C4/C5  Stable appearance of hardware  Stable alignment  Stable degenerative disc disease  The prevertebral soft tissues are within normal limits  Visualized lung apices are clear  Atherosclerotic aorta  Impression: Stable postop changes  No acute osseous abnormality  Workstation performed: [id number]       I have personally reviewed pertinent reports     and I have personally reviewed pertinent films in PACS

## 2021-09-14 DIAGNOSIS — F41.9 ANXIETY: Primary | ICD-10-CM

## 2021-09-14 RX ORDER — ONDANSETRON 4 MG/1
4 TABLET, ORALLY DISINTEGRATING ORAL EVERY 6 HOURS PRN
Qty: 30 TABLET | Refills: 0 | Status: SHIPPED | OUTPATIENT
Start: 2021-09-14 | End: 2021-11-29 | Stop reason: SDUPTHER

## 2021-09-21 DIAGNOSIS — Z12.31 ENCOUNTER FOR SCREENING MAMMOGRAM FOR MALIGNANT NEOPLASM OF BREAST: Primary | ICD-10-CM

## 2021-10-04 DIAGNOSIS — K21.9 GASTROESOPHAGEAL REFLUX DISEASE: ICD-10-CM

## 2021-10-04 RX ORDER — PANTOPRAZOLE SODIUM 40 MG/1
TABLET, DELAYED RELEASE ORAL
Qty: 30 TABLET | Refills: 5 | Status: SHIPPED | OUTPATIENT
Start: 2021-10-04 | End: 2022-04-14 | Stop reason: SDUPTHER

## 2021-10-05 ENCOUNTER — OFFICE VISIT (OUTPATIENT)
Dept: CARDIOLOGY CLINIC | Facility: CLINIC | Age: 76
End: 2021-10-05
Payer: MEDICARE

## 2021-10-05 VITALS
WEIGHT: 134 LBS | OXYGEN SATURATION: 97 % | DIASTOLIC BLOOD PRESSURE: 84 MMHG | HEART RATE: 96 BPM | SYSTOLIC BLOOD PRESSURE: 148 MMHG | HEIGHT: 62 IN | BODY MASS INDEX: 24.66 KG/M2

## 2021-10-05 DIAGNOSIS — R07.9 CHEST PAIN, UNSPECIFIED TYPE: ICD-10-CM

## 2021-10-05 DIAGNOSIS — K21.9 GASTROESOPHAGEAL REFLUX DISEASE: ICD-10-CM

## 2021-10-05 DIAGNOSIS — E78.00 PURE HYPERCHOLESTEROLEMIA: Primary | ICD-10-CM

## 2021-10-05 DIAGNOSIS — Z82.49 FAMILY HISTORY OF CARDIAC DISORDER: ICD-10-CM

## 2021-10-05 PROCEDURE — 99204 OFFICE O/P NEW MOD 45 MIN: CPT | Performed by: INTERNAL MEDICINE

## 2021-10-05 RX ORDER — PANTOPRAZOLE SODIUM 40 MG/1
40 TABLET, DELAYED RELEASE ORAL DAILY
Qty: 30 TABLET | Refills: 5 | Status: CANCELLED | OUTPATIENT
Start: 2021-10-05

## 2021-10-26 ENCOUNTER — TELEMEDICINE (OUTPATIENT)
Dept: FAMILY MEDICINE CLINIC | Facility: CLINIC | Age: 76
End: 2021-10-26
Payer: MEDICARE

## 2021-10-26 ENCOUNTER — HOSPITAL ENCOUNTER (OUTPATIENT)
Dept: RADIOLOGY | Facility: HOSPITAL | Age: 76
Discharge: HOME | End: 2021-10-26
Payer: MEDICARE

## 2021-10-26 VITALS — BODY MASS INDEX: 24.66 KG/M2 | WEIGHT: 134 LBS | HEIGHT: 62 IN

## 2021-10-26 VITALS — HEIGHT: 62 IN | BODY MASS INDEX: 24.66 KG/M2 | WEIGHT: 134 LBS

## 2021-10-26 DIAGNOSIS — Z12.31 ENCOUNTER FOR SCREENING MAMMOGRAM FOR MALIGNANT NEOPLASM OF BREAST: ICD-10-CM

## 2021-10-26 DIAGNOSIS — L30.4 INTERTRIGINOUS DERMATITIS ASSOCIATED WITH MOISTURE: Primary | ICD-10-CM

## 2021-10-26 PROCEDURE — 99214 OFFICE O/P EST MOD 30 MIN: CPT | Performed by: FAMILY MEDICINE

## 2021-10-26 PROCEDURE — 77067 SCR MAMMO BI INCL CAD: CPT

## 2021-10-26 PROCEDURE — 77063 BREAST TOMOSYNTHESIS BI: CPT

## 2021-10-26 RX ORDER — NYSTATIN 100000 [USP'U]/G
POWDER TOPICAL 3 TIMES DAILY
Qty: 30 G | Refills: 1 | Status: SHIPPED | OUTPATIENT
Start: 2021-10-26 | End: 2022-04-21 | Stop reason: ALTCHOICE

## 2021-11-12 NOTE — PROGRESS NOTES
11/12/21 1147   Hello, [Guardians Name / Lashonda Roche, this is [Caller Iain Blanton from [facility], and our clinical care team wanted to check on you / your child after your recent visit to the hospital  It will only take 3-5 minutes  Is this a good time? Discharge Call Type/ Specific Diagnosis: ARC 90 Day Call   ARC 90 Day Discharge Call   Assessment Source 2   Call Complete for 90 Days call back? Complete   Call Complete   Hi, This is ________ from [facility]  This is just a courtesy call, and there is no need to call us back  Have a great day     (Called by [facility])

## 2021-11-29 DIAGNOSIS — N18.2 STAGE 2 CHRONIC KIDNEY DISEASE: ICD-10-CM

## 2021-11-29 DIAGNOSIS — F41.9 ANXIETY: ICD-10-CM

## 2021-11-29 RX ORDER — FOLIC ACID/VIT B COMPLEX AND C 0.8 MG
1 TABLET ORAL DAILY
Qty: 100 TABLET | Refills: 0 | Status: SHIPPED | OUTPATIENT
Start: 2021-11-29 | End: 2022-03-07 | Stop reason: SDUPTHER

## 2021-11-29 RX ORDER — ALPRAZOLAM 0.25 MG/1
0.25 TABLET ORAL
Qty: 30 TABLET | Refills: 1 | Status: SHIPPED | OUTPATIENT
Start: 2021-11-29

## 2021-11-29 RX ORDER — ONDANSETRON 4 MG/1
4 TABLET, ORALLY DISINTEGRATING ORAL EVERY 6 HOURS PRN
Qty: 30 TABLET | Refills: 0 | Status: SHIPPED | OUTPATIENT
Start: 2021-11-29

## 2021-12-08 ENCOUNTER — HOSPITAL ENCOUNTER (OUTPATIENT)
Dept: RADIOLOGY | Facility: HOSPITAL | Age: 76
Discharge: HOME | End: 2021-12-08
Payer: MEDICARE

## 2021-12-08 ENCOUNTER — HOSPITAL ENCOUNTER (OUTPATIENT)
Dept: NON INVASIVE DIAGNOSTICS | Facility: HOSPITAL | Age: 76
Discharge: HOME | End: 2021-12-08
Payer: MEDICARE

## 2021-12-08 VITALS
DIASTOLIC BLOOD PRESSURE: 82 MMHG | HEART RATE: 89 BPM | SYSTOLIC BLOOD PRESSURE: 140 MMHG | HEIGHT: 62 IN | WEIGHT: 134 LBS | BODY MASS INDEX: 24.66 KG/M2

## 2021-12-08 DIAGNOSIS — R07.9 CHEST PAIN, UNSPECIFIED TYPE: ICD-10-CM

## 2021-12-08 DIAGNOSIS — E78.00 PURE HYPERCHOLESTEROLEMIA: ICD-10-CM

## 2021-12-08 DIAGNOSIS — Z82.49 FAMILY HISTORY OF CARDIAC DISORDER: ICD-10-CM

## 2021-12-08 LAB
CHEST PAIN STATEMENT: NORMAL
MAX DIASTOLIC BP: 72 MMHG
MAX HEART RATE: 116 BPM
MAX PREDICTED HEART RATE: 142 BPM
MAX. SYSTOLIC BP: 143 MMHG
PROTOCOL NAME: NORMAL
REASON FOR TERMINATION: NORMAL
TARGET HR FORMULA: NORMAL
TEST INDICATION: NORMAL
TIME IN EXERCISE PHASE: NORMAL

## 2021-12-08 PROCEDURE — A9502 TC99M TETROFOSMIN: HCPCS

## 2021-12-08 PROCEDURE — 78452 HT MUSCLE IMAGE SPECT MULT: CPT

## 2021-12-08 PROCEDURE — 93017 CV STRESS TEST TRACING ONLY: CPT

## 2021-12-08 PROCEDURE — G1004 CDSM NDSC: HCPCS

## 2021-12-08 PROCEDURE — 93306 TTE W/DOPPLER COMPLETE: CPT

## 2021-12-08 RX ADMIN — REGADENOSON 0.4 MG: 0.08 INJECTION, SOLUTION INTRAVENOUS at 10:39

## 2021-12-09 LAB
AORTIC ROOT: 2.8 CM
APICAL FOUR CHAMBER EJECTION FRACTION: 54 %
ASCENDING AORTA: 2.9 CM
E WAVE DECELERATION TIME: 81 MS
FRACTIONAL SHORTENING: 23 % (ref 28–44)
INTERVENTRICULAR SEPTUM IN DIASTOLE (PARASTERNAL SHORT AXIS VIEW): 1 CM
LEFT ATRIUM AREA SYSTOLE SINGLE PLANE A4C: 7.5 CM2
LEFT INTERNAL DIMENSION IN SYSTOLE: 3.1 CM (ref 2.1–4)
LEFT VENTRICULAR INTERNAL DIMENSION IN DIASTOLE: 4 CM (ref 3.78–5.63)
LEFT VENTRICULAR POSTERIOR WALL IN END DIASTOLE: 1 CM
LEFT VENTRICULAR STROKE VOLUME: 32 ML
MAX HR PERCENT: 81 %
MAX HR: 142 BPM
MV E'TISSUE VEL-SEP: 7 CM/S
MV PEAK A VEL: 0.8 M/S
MV PEAK E VEL: 65 CM/S
MV STENOSIS PRESSURE HALF TIME: 0 MS
NUC STRESS EJECTION FRACTION: 61 %
RA PRESSURE ESTIMATED: 8 MMHG
RATE PRESSURE PRODUCT: NORMAL
RIGHT ATRIUM AREA SYSTOLE A4C: 8.5 CM2
RIGHT VENTRICLE ID DIMENSION: 2.4 CM
RV PSP: 31 MMHG
SL CV LV EF: 55
SL CV PED ECHO LEFT VENTRICLE DIASTOLIC VOLUME (MOD BIPLANE) 2D: 71 ML
SL CV PED ECHO LEFT VENTRICLE SYSTOLIC VOLUME (MOD BIPLANE) 2D: 39 ML
SL CV REST NUCLEAR ISOTOPE DOSE: 10.6 MCI
SL CV STRESS NUCLEAR ISOTOPE DOSE: 31.8 MCI
SL CV STRESS RECOVERY BP: NORMAL MMHG
SL CV STRESS RECOVERY HR: 96 BPM
SL CV STRESS RECOVERY O2 SAT: 99 %
STRESS ANGINA INDEX: 0
STRESS BASELINE BP: NORMAL MMHG
STRESS BASELINE HR: 80 BPM
STRESS O2 SAT REST: 99 %
STRESS PEAK HR: 114 BPM
STRESS PERCENT HR: 81 %
STRESS POST ESTIMATED WORKLOAD: 1 METS
STRESS POST EXERCISE DUR MIN: 1 MIN
STRESS POST O2 SAT PEAK: 98 %
STRESS POST PEAK BP: 128 MMHG
STRESS TARGET HR: 116 BPM
TRICUSPID VALVE PEAK REGURGITATION VELOCITY: 2.38 M/S
TRICUSPID VALVE S': 0.7 CM/S
TV PEAK GRADIENT: 23 MMHG
Z-SCORE OF LEFT VENTRICULAR DIMENSION IN END SYSTOLE: -1.43

## 2021-12-09 PROCEDURE — 93018 CV STRESS TEST I&R ONLY: CPT | Performed by: INTERNAL MEDICINE

## 2021-12-09 PROCEDURE — 93016 CV STRESS TEST SUPVJ ONLY: CPT | Performed by: INTERNAL MEDICINE

## 2021-12-09 PROCEDURE — 93306 TTE W/DOPPLER COMPLETE: CPT | Performed by: INTERNAL MEDICINE

## 2021-12-09 PROCEDURE — 78452 HT MUSCLE IMAGE SPECT MULT: CPT | Performed by: INTERNAL MEDICINE

## 2021-12-13 DIAGNOSIS — J30.9 ALLERGIC RHINITIS, UNSPECIFIED SEASONALITY, UNSPECIFIED TRIGGER: ICD-10-CM

## 2021-12-13 RX ORDER — LORATADINE 10 MG/1
10 TABLET ORAL DAILY
Qty: 30 TABLET | Refills: 2 | Status: SHIPPED | OUTPATIENT
Start: 2021-12-13 | End: 2022-03-03 | Stop reason: SDUPTHER

## 2021-12-22 ENCOUNTER — HOSPITAL ENCOUNTER (EMERGENCY)
Facility: HOSPITAL | Age: 76
Discharge: HOME | End: 2021-12-22
Attending: EMERGENCY MEDICINE
Payer: MEDICARE

## 2021-12-22 VITALS
OXYGEN SATURATION: 100 % | SYSTOLIC BLOOD PRESSURE: 186 MMHG | HEART RATE: 98 BPM | DIASTOLIC BLOOD PRESSURE: 96 MMHG | RESPIRATION RATE: 20 BRPM | TEMPERATURE: 97.5 F

## 2021-12-22 DIAGNOSIS — U07.1 COVID: Primary | ICD-10-CM

## 2021-12-22 DIAGNOSIS — N18.2 STAGE 2 CHRONIC KIDNEY DISEASE: Primary | ICD-10-CM

## 2021-12-22 DIAGNOSIS — U07.1 COVID-19: ICD-10-CM

## 2021-12-22 LAB
ALBUMIN SERPL BCP-MCNC: 3.4 G/DL (ref 3.5–5)
ALP SERPL-CCNC: 76 U/L (ref 46–116)
ALT SERPL W P-5'-P-CCNC: 32 U/L (ref 12–78)
ANION GAP SERPL CALCULATED.3IONS-SCNC: 9 MMOL/L (ref 4–13)
AST SERPL W P-5'-P-CCNC: 21 U/L (ref 5–45)
BACTERIA UR QL AUTO: NORMAL /HPF
BASOPHILS # BLD AUTO: 0.01 THOUSANDS/ΜL (ref 0–0.1)
BASOPHILS NFR BLD AUTO: 0 % (ref 0–1)
BILIRUB SERPL-MCNC: 0.34 MG/DL (ref 0.2–1)
BILIRUB UR QL STRIP: NEGATIVE
BUN SERPL-MCNC: 12 MG/DL (ref 5–25)
CALCIUM ALBUM COR SERPL-MCNC: 9.2 MG/DL (ref 8.3–10.1)
CALCIUM SERPL-MCNC: 8.7 MG/DL (ref 8.3–10.1)
CHLORIDE SERPL-SCNC: 101 MMOL/L (ref 100–108)
CLARITY UR: CLEAR
CO2 SERPL-SCNC: 26 MMOL/L (ref 21–32)
COLOR UR: YELLOW
CREAT SERPL-MCNC: 1.1 MG/DL (ref 0.6–1.3)
EOSINOPHIL # BLD AUTO: 0 THOUSAND/ΜL (ref 0–0.61)
EOSINOPHIL NFR BLD AUTO: 0 % (ref 0–6)
ERYTHROCYTE [DISTWIDTH] IN BLOOD BY AUTOMATED COUNT: 13.5 % (ref 11.6–15.1)
FLUAV RNA RESP QL NAA+PROBE: NEGATIVE
FLUBV RNA RESP QL NAA+PROBE: NEGATIVE
GFR SERPL CREATININE-BSD FRML MDRD: 48 ML/MIN/1.73SQ M
GLUCOSE SERPL-MCNC: 122 MG/DL (ref 65–140)
GLUCOSE UR STRIP-MCNC: NEGATIVE MG/DL
HCT VFR BLD AUTO: 39 % (ref 34.8–46.1)
HGB BLD-MCNC: 12.9 G/DL (ref 11.5–15.4)
HGB UR QL STRIP.AUTO: NEGATIVE
IMM GRANULOCYTES # BLD AUTO: 0 THOUSAND/UL (ref 0–0.2)
IMM GRANULOCYTES NFR BLD AUTO: 0 % (ref 0–2)
KETONES UR STRIP-MCNC: NEGATIVE MG/DL
LEUKOCYTE ESTERASE UR QL STRIP: ABNORMAL
LIPASE SERPL-CCNC: 94 U/L (ref 73–393)
LYMPHOCYTES # BLD AUTO: 1.14 THOUSANDS/ΜL (ref 0.6–4.47)
LYMPHOCYTES NFR BLD AUTO: 39 % (ref 14–44)
MCH RBC QN AUTO: 30.6 PG (ref 26.8–34.3)
MCHC RBC AUTO-ENTMCNC: 33.1 G/DL (ref 31.4–37.4)
MCV RBC AUTO: 93 FL (ref 82–98)
MONOCYTES # BLD AUTO: 0.45 THOUSAND/ΜL (ref 0.17–1.22)
MONOCYTES NFR BLD AUTO: 15 % (ref 4–12)
NEUTROPHILS # BLD AUTO: 1.36 THOUSANDS/ΜL (ref 1.85–7.62)
NEUTS SEG NFR BLD AUTO: 46 % (ref 43–75)
NITRITE UR QL STRIP: NEGATIVE
NON-SQ EPI CELLS URNS QL MICRO: NORMAL /HPF
NRBC BLD AUTO-RTO: 0 /100 WBCS
OTHER STN SPEC: NORMAL
PH UR STRIP.AUTO: 6 [PH]
PLATELET # BLD AUTO: 130 THOUSANDS/UL (ref 149–390)
PMV BLD AUTO: 9.4 FL (ref 8.9–12.7)
POTASSIUM SERPL-SCNC: 3.5 MMOL/L (ref 3.5–5.3)
PROT SERPL-MCNC: 6.8 G/DL (ref 6.4–8.2)
PROT UR STRIP-MCNC: NEGATIVE MG/DL
RBC # BLD AUTO: 4.21 MILLION/UL (ref 3.81–5.12)
RBC #/AREA URNS AUTO: NORMAL /HPF
RSV RNA RESP QL NAA+PROBE: NEGATIVE
SARS-COV-2 RNA RESP QL NAA+PROBE: POSITIVE
SODIUM SERPL-SCNC: 136 MMOL/L (ref 136–145)
SP GR UR STRIP.AUTO: <=1.005 (ref 1–1.03)
UROBILINOGEN UR QL STRIP.AUTO: 0.2 E.U./DL
WBC # BLD AUTO: 2.96 THOUSAND/UL (ref 4.31–10.16)
WBC #/AREA URNS AUTO: NORMAL /HPF

## 2021-12-22 PROCEDURE — 81001 URINALYSIS AUTO W/SCOPE: CPT | Performed by: PHYSICIAN ASSISTANT

## 2021-12-22 PROCEDURE — 83690 ASSAY OF LIPASE: CPT | Performed by: PHYSICIAN ASSISTANT

## 2021-12-22 PROCEDURE — 0241U HB NFCT DS VIR RESP RNA 4 TRGT: CPT | Performed by: PHYSICIAN ASSISTANT

## 2021-12-22 PROCEDURE — 99285 EMERGENCY DEPT VISIT HI MDM: CPT | Performed by: PHYSICIAN ASSISTANT

## 2021-12-22 PROCEDURE — 80053 COMPREHEN METABOLIC PANEL: CPT | Performed by: PHYSICIAN ASSISTANT

## 2021-12-22 PROCEDURE — 93005 ELECTROCARDIOGRAM TRACING: CPT

## 2021-12-22 PROCEDURE — 96374 THER/PROPH/DIAG INJ IV PUSH: CPT

## 2021-12-22 PROCEDURE — 96361 HYDRATE IV INFUSION ADD-ON: CPT

## 2021-12-22 PROCEDURE — 99284 EMERGENCY DEPT VISIT MOD MDM: CPT

## 2021-12-22 PROCEDURE — 85025 COMPLETE CBC W/AUTO DIFF WBC: CPT | Performed by: PHYSICIAN ASSISTANT

## 2021-12-22 PROCEDURE — 36415 COLL VENOUS BLD VENIPUNCTURE: CPT | Performed by: PHYSICIAN ASSISTANT

## 2021-12-22 RX ORDER — ONDANSETRON 2 MG/ML
4 INJECTION INTRAMUSCULAR; INTRAVENOUS ONCE AS NEEDED
Status: CANCELLED | OUTPATIENT
Start: 2021-12-24

## 2021-12-22 RX ORDER — ONDANSETRON 4 MG/1
4 TABLET, ORALLY DISINTEGRATING ORAL EVERY 6 HOURS PRN
Qty: 10 TABLET | Refills: 0 | Status: SHIPPED | OUTPATIENT
Start: 2021-12-22

## 2021-12-22 RX ORDER — ALBUTEROL SULFATE 90 UG/1
3 AEROSOL, METERED RESPIRATORY (INHALATION) ONCE AS NEEDED
Status: CANCELLED | OUTPATIENT
Start: 2021-12-24

## 2021-12-22 RX ORDER — ONDANSETRON 2 MG/ML
4 INJECTION INTRAMUSCULAR; INTRAVENOUS ONCE
Status: COMPLETED | OUTPATIENT
Start: 2021-12-22 | End: 2021-12-22

## 2021-12-22 RX ORDER — ACETAMINOPHEN 325 MG/1
650 TABLET ORAL ONCE AS NEEDED
Status: CANCELLED | OUTPATIENT
Start: 2021-12-24

## 2021-12-22 RX ORDER — SODIUM CHLORIDE 9 MG/ML
20 INJECTION, SOLUTION INTRAVENOUS ONCE AS NEEDED
Status: CANCELLED | OUTPATIENT
Start: 2021-12-24

## 2021-12-22 RX ADMIN — ONDANSETRON 4 MG: 2 INJECTION INTRAMUSCULAR; INTRAVENOUS at 12:29

## 2021-12-22 RX ADMIN — SODIUM CHLORIDE 1000 ML: 0.9 INJECTION, SOLUTION INTRAVENOUS at 12:23

## 2021-12-23 LAB
ATRIAL RATE: 100 BPM
P AXIS: 74 DEGREES
PR INTERVAL: 156 MS
QRS AXIS: -25 DEGREES
QRSD INTERVAL: 78 MS
QT INTERVAL: 370 MS
QTC INTERVAL: 477 MS
T WAVE AXIS: 36 DEGREES
VENTRICULAR RATE: 100 BPM

## 2021-12-23 PROCEDURE — 93010 ELECTROCARDIOGRAM REPORT: CPT | Performed by: INTERNAL MEDICINE

## 2021-12-24 ENCOUNTER — HOSPITAL ENCOUNTER (OUTPATIENT)
Dept: INFUSION CENTER | Facility: HOSPITAL | Age: 76
Discharge: HOME | End: 2021-12-24
Payer: MEDICARE

## 2021-12-24 VITALS
SYSTOLIC BLOOD PRESSURE: 143 MMHG | DIASTOLIC BLOOD PRESSURE: 75 MMHG | OXYGEN SATURATION: 97 % | HEART RATE: 87 BPM | TEMPERATURE: 97.7 F

## 2021-12-24 DIAGNOSIS — N18.2 STAGE 2 CHRONIC KIDNEY DISEASE: Primary | ICD-10-CM

## 2021-12-24 DIAGNOSIS — U07.1 COVID-19: ICD-10-CM

## 2021-12-24 PROCEDURE — M0245 HB BAMLAN AND ETESEV INF ADMIN: HCPCS | Performed by: FAMILY MEDICINE

## 2021-12-24 RX ORDER — ACETAMINOPHEN 325 MG/1
650 TABLET ORAL ONCE AS NEEDED
Status: CANCELLED | OUTPATIENT
Start: 2021-12-24

## 2021-12-24 RX ORDER — SODIUM CHLORIDE 9 MG/ML
20 INJECTION, SOLUTION INTRAVENOUS ONCE AS NEEDED
Status: DISCONTINUED | OUTPATIENT
Start: 2021-12-24 | End: 2021-12-27 | Stop reason: HOSPADM

## 2021-12-24 RX ORDER — ALBUTEROL SULFATE 90 UG/1
3 AEROSOL, METERED RESPIRATORY (INHALATION) ONCE AS NEEDED
Status: DISCONTINUED | OUTPATIENT
Start: 2021-12-24 | End: 2021-12-27 | Stop reason: HOSPADM

## 2021-12-24 RX ORDER — ONDANSETRON 2 MG/ML
4 INJECTION INTRAMUSCULAR; INTRAVENOUS ONCE AS NEEDED
Status: DISCONTINUED | OUTPATIENT
Start: 2021-12-24 | End: 2021-12-27 | Stop reason: HOSPADM

## 2021-12-24 RX ORDER — SODIUM CHLORIDE 9 MG/ML
20 INJECTION, SOLUTION INTRAVENOUS ONCE AS NEEDED
Status: CANCELLED | OUTPATIENT
Start: 2021-12-24

## 2021-12-24 RX ORDER — ACETAMINOPHEN 325 MG/1
650 TABLET ORAL ONCE AS NEEDED
Status: DISCONTINUED | OUTPATIENT
Start: 2021-12-24 | End: 2021-12-27 | Stop reason: HOSPADM

## 2021-12-24 RX ORDER — ONDANSETRON 2 MG/ML
4 INJECTION INTRAMUSCULAR; INTRAVENOUS ONCE AS NEEDED
Status: CANCELLED | OUTPATIENT
Start: 2021-12-24

## 2021-12-24 RX ORDER — ALBUTEROL SULFATE 90 UG/1
3 AEROSOL, METERED RESPIRATORY (INHALATION) ONCE AS NEEDED
Status: CANCELLED | OUTPATIENT
Start: 2021-12-24

## 2021-12-24 RX ADMIN — SODIUM CHLORIDE 20 ML/HR: 0.9 INJECTION, SOLUTION INTRAVENOUS at 08:16

## 2021-12-24 RX ADMIN — SODIUM CHLORIDE 2100 MG COMBINED: 9 INJECTION, SOLUTION INTRAVENOUS at 08:23

## 2021-12-27 DIAGNOSIS — E78.5 HYPERLIPIDEMIA, UNSPECIFIED HYPERLIPIDEMIA TYPE: Primary | ICD-10-CM

## 2021-12-27 RX ORDER — ATORVASTATIN CALCIUM 20 MG/1
20 TABLET, FILM COATED ORAL
Qty: 90 TABLET | Refills: 0 | Status: SHIPPED | OUTPATIENT
Start: 2021-12-27 | End: 2022-04-07

## 2022-03-03 DIAGNOSIS — J30.9 ALLERGIC RHINITIS, UNSPECIFIED SEASONALITY, UNSPECIFIED TRIGGER: ICD-10-CM

## 2022-03-03 RX ORDER — LORATADINE 10 MG/1
10 TABLET ORAL DAILY
Qty: 30 TABLET | Refills: 2 | Status: SHIPPED | OUTPATIENT
Start: 2022-03-03 | End: 2022-06-09 | Stop reason: SDUPTHER

## 2022-03-07 DIAGNOSIS — N18.2 STAGE 2 CHRONIC KIDNEY DISEASE: ICD-10-CM

## 2022-03-07 RX ORDER — FOLIC ACID/VIT B COMPLEX AND C 0.8 MG
1 TABLET ORAL DAILY
Qty: 100 TABLET | Refills: 0 | Status: SHIPPED | OUTPATIENT
Start: 2022-03-07 | End: 2022-07-08 | Stop reason: SDUPTHER

## 2022-04-01 ENCOUNTER — HOSPITAL ENCOUNTER (OUTPATIENT)
Dept: RADIOLOGY | Facility: HOSPITAL | Age: 77
Discharge: HOME | End: 2022-04-01
Payer: MEDICARE

## 2022-04-01 ENCOUNTER — OFFICE VISIT (OUTPATIENT)
Dept: NEUROSURGERY | Facility: CLINIC | Age: 77
End: 2022-04-01
Payer: MEDICARE

## 2022-04-01 VITALS
HEART RATE: 98 BPM | DIASTOLIC BLOOD PRESSURE: 78 MMHG | SYSTOLIC BLOOD PRESSURE: 143 MMHG | WEIGHT: 124 LBS | HEIGHT: 62 IN | RESPIRATION RATE: 16 BRPM | TEMPERATURE: 97.8 F | BODY MASS INDEX: 22.82 KG/M2

## 2022-04-01 DIAGNOSIS — M79.18 MYOFASCIAL PAIN SYNDROME, CERVICAL: ICD-10-CM

## 2022-04-01 DIAGNOSIS — M48.02 CERVICAL STENOSIS OF SPINAL CANAL: ICD-10-CM

## 2022-04-01 DIAGNOSIS — S14.129A CENTRAL CORD SYNDROME, INITIAL ENCOUNTER (HCC): Primary | ICD-10-CM

## 2022-04-01 DIAGNOSIS — M25.512 LEFT SHOULDER PAIN: ICD-10-CM

## 2022-04-01 DIAGNOSIS — Z98.1 STATUS POST CERVICAL SPINAL FUSION: ICD-10-CM

## 2022-04-01 PROCEDURE — 72040 X-RAY EXAM NECK SPINE 2-3 VW: CPT

## 2022-04-01 PROCEDURE — 99214 OFFICE O/P EST MOD 30 MIN: CPT | Performed by: NEUROLOGICAL SURGERY

## 2022-04-01 RX ORDER — LIDOCAINE 50 MG/G
1 PATCH TOPICAL DAILY
Qty: 30 PATCH | Refills: 1 | Status: SHIPPED | OUTPATIENT
Start: 2022-04-01 | End: 2022-05-31

## 2022-04-01 NOTE — PROGRESS NOTES
Assessment/Plan:    Cervical stenosis of spinal canal  As addressed in HPI  As addressed in central cord syndrome  Central cord syndrome ([facility] [address] )  · As addressed in HPI  · 9 month post op visit  · Reports left shoulder muscle aching,discomfort in left scapular area and between scapula  · Feel a little weak in legs, get up a walkt  Lot around the house  · Describes cervical paraspinal aching soreness, weakness in neck and upper trunk  not as strong  · Slight decreased strength in left upper extremity compared to right , no myelopathy, no gait instability   · Has some burning in right leg  · Denied numbness or tingling in upper extremity and no increased sensitivity/painful touch as she described immediately post incident/fall  · Reports home physical therapy has stopped, with [facility]    · No urine retention or dribbling, no loss of bowel control  · Reports taking short walks outside, and walks throughout the house durning the day,   · Is right hand dominate  Imagining   Xray cervical spine 2-3 views-Unchanged, satisfactory appearance of the spinal hardware  No acute osseous abnormality  Cervical spinal alignment is unchanged  Degenerative changes with intervertebral disc space narrowing is not significantly different from the prior study  There is no fracture or subluxation  PLAN  · Reviewed imagining with patient and son  · Ordered therapy , patient and son understand it will be out patient must travel to clinic for therapy, patient is not home bound from a recent hospitalization      · Ordered Lidoderm patches for cervical paraspinal area between scapula jessica reports she had the patched ordered previous     · RTO for 1 year f/u visit in July 2022   · Complete cervical spine xray 2-3 days prior f/u visit   · Call office if develop weakness in upper extremities arms and hands , gait instability , dropping objects , problem urinating, loss of bowel control  · Call office with additional questions or concerns          Subjective: I am better , I get outside for short walks       Patient ID: Kamala Romo is a 64 y o  female     HPI   Near 5 month f/u visit from surgery 7/20/21  With Dr Yola Lo Anterior cervical discectomy and fusion C4-5, posterior cervical decompresion and fusion C2-T2   DX:Central cord syndrome,  She presented in ed 7/18/2021 with complaint of neck pain and bilateral arm numbness and pain status post a fall forward on her face   Was found on imagining to have degenerative spinal stenosis of cervical spine and cord single changes  C4 C5  She has had an uneventful recovery   She has continually reported improvement ib her symptoms since surgery  Cervical spine xray have showed intact hardware  Ad stable alignment since 6 week postop xray  No further neurosurgery interventions required  At 6 week f/u visit was ordered a 6 month  F/y Xray 4 views of cervical spine and RTO visit  She returns now nearly 9 months post surgery  I have spent 30 minutes with patient today in which greater than 50% of this time was spent in assessment, examination, impressions, reviewing imagining and recommendations for care  All questions were answered to his/her satisfaction, and contact information provided in the event additional questions arise  Patient acknowledged an understanding and agreement with plan             REVIEW OF SYSTEMS  Review of Systems   Constitutional: Positive for appetite change (no appetite)  HENT: Positive for hearing loss  Eyes: Negative  Respiratory: Positive for shortness of breath  Cardiovascular: Negative  Gastrointestinal: Positive for nausea  Dark stool  Infrequent BMs   Endocrine: Positive for heat intolerance  Genitourinary: Negative  Musculoskeletal: Positive for arthralgias, gait problem (presents in wheelchair, typically walks with cane), myalgias (shoulders), neck pain and neck stiffness  Skin: Negative  Allergic/Immunologic: Positive for environmental allergies  Neurological: Positive for weakness (legs)  Negative for numbness  Right leg, burning sensation   Hematological: Negative  Psychiatric/Behavioral: Positive for sleep disturbance (wakes up every 2 hours)  The patient is nervous/anxious  Meds/Allergies     Current Outpatient Medications   Medication Sig Dispense Refill    ALPRAZolam (XANAX) 0 25 mg tablet Take 1 tablet (0 25 mg total) by mouth daily at bedtime as needed for anxiety 30 tablet 1    atorvastatin (LIPITOR) 20 mg tablet Take 1 tablet (20 mg total) by mouth daily with dinner 90 tablet 0    B Complex-C-Folic Acid (Ayanna-Dylan) TABS Take 1 tablet by mouth daily Dispense one bottle, regardless of size and quantity  Patient takes daily  100 tablet 0    CRANBERRY PO Take by mouth      Docusate Sodium (COLACE PO) Take by mouth      nystatin (MYCOSTATIN) powder Apply topically 3 (three) times a day (Patient taking differently: Apply topically if needed  ) 30 g 1    ondansetron (Zofran ODT) 4 mg disintegrating tablet Take 1 tablet (4 mg total) by mouth every 6 (six) hours as needed for nausea or vomiting 10 tablet 0    pantoprazole (PROTONIX) 40 mg tablet TAKE ONE TABLET BY MOUTH EVERY MORNING 30 tablet 5    cephalexin (KEFLEX) 250 mg capsule Take 250 mg by mouth daily  (Patient not taking: Reported on 10/26/2021)      lidocaine (Lidoderm) 5 % Apply 1 patch topically daily Remove & Discard patch within 12 hours or as directed by MD 30 patch 1    loratadine (CLARITIN) 10 mg tablet Take 1 tablet (10 mg total) by mouth daily (Patient not taking: Reported on 4/1/2022 ) 30 tablet 2    ondansetron (ZOFRAN-ODT) 4 mg disintegrating tablet Take 1 tablet (4 mg total) by mouth every 6 (six) hours as needed for nausea or vomiting 30 tablet 0     No current facility-administered medications for this visit         Allergies   Allergen Reactions    Bee Venom Anaphylaxis       PAST HISTORY    Past Medical History:   Diagnosis Date    Cholelithiasis     GERD (gastroesophageal reflux disease)     Influenza 1/29/2019    Migraine headache     Pneumonia     Urinary tract infection        Past Surgical History:   Procedure Laterality Date    NO PAST SURGERIES      GA ARTHRODESIS ANT INTERBODY MIN DISCECTOMY, CERVICAL BELOW C2 Bilateral 7/20/2021    Procedure: Anterior cervical discectomy and fusion C4-5, posterior cervical decompresion and fusion C2-T2;  Surgeon: Deneen Vazquez MD;  [facility];  Service: Neurosurgery       Social History     Tobacco Use    Smoking status: Former Smoker     Packs/day: 1 50     Years: 10 00     Pack years: 15 00     Types: Cigarettes    Smokeless tobacco: Never Used    Tobacco comment: quit age 20   Vaping Use    Vaping Use: Never used   Substance Use Topics    Alcohol use: Never     Alcohol/week: 0 0 standard drinks    Drug use: Never       Family History   Problem Relation Age of Onset    Heart disease Mother     No Known Problems Father     No Known Problems Son     No Known Problems Sister     No Known Problems Daughter     No Known Problems Sister     No Known Problems Sister     No Known Problems Sister        The following portions of the patient's history were reviewed and updated as appropriate: allergies, current medications, past family history, past medical history, past social history, past surgical history and problem list       EXAM    Vitals:Blood pressure 143/78, pulse 98, temperature 97 8 °F (36 6 °C), temperature source Tympanic, resp  rate 16, height 5' 2" (1 575 m), weight 56 2 kg (124 lb), not currently breastfeeding  ,Body mass index is 22 68 kg/m²  Physical Exam  Vitals reviewed  Exam conducted with a chaperone present (son)  Constitutional:       Appearance: She is normal weight  Eyes:      General: No scleral icterus  Right eye: No discharge  Left eye: No discharge        Conjunctiva/sclera: Conjunctivae normal    Musculoskeletal:         General: No swelling, tenderness, deformity or signs of injury  Right lower leg: No edema  Left lower leg: No edema  Skin:     General: Skin is warm and dry  Coloration: Skin is pale  Skin is not jaundiced  Neurological:      General: No focal deficit present  Mental Status: She is alert and oriented to person, place, and time  Gait: Gait is intact  Psychiatric:         Mood and Affect: Mood normal          Behavior: Behavior normal          Neurologic Exam     Mental Status   Oriented to person, place, and time  Oriented to person  Oriented to place  Oriented to country, city, area, street and number  Oriented to time  Oriented to year, month, date, day and season  Attention: normal  Concentration: normal    Level of consciousness: alert  Able to perform simple calculations  Motor Exam   Right arm tone: normal  Left arm tone: normal    Strength   Right deltoid: 4/5  Left deltoid: 4/5  Right biceps: 4/5  Left biceps: 4/5  Right triceps: 4/5  Left triceps: 4/5  Right wrist flexion: 4/5  Left wrist flexion: 4/5  Right wrist extension: 4/5  Left wrist extension: 4/5  Right interossei: 4/5  Left interossei: 4/5  Right iliopsoas: 5/5  Left iliopsoas: 5/5  Right quadriceps: 5/5  Left quadriceps: 5/5  Right hamstrin/5  Left hamstrin/5  Right anterior tibial: 5/5  Left anterior tibial: 5/5  Right gastroc: 5/5  Left gastroc: 5/5    Sensory Exam   Right arm light touch: normal  Left arm light touch: normal  Right leg light touch: normal  Left leg light touch: normal    Gait, Coordination, and Reflexes     Gait  Gait: normal      Imaging Studies  XR spine cervical 2 or 3 vw injury    Result Date: 2022  Narrative: CERVICAL SPINE INDICATION:   Z98 1: Arthrodesis status   COMPARISON:  Radiograph 2021 VIEWS:  XR SPINE CERVICAL 2 OR 3 VW INJURY FINDINGS: Bilateral rods and screws extending from C2 through T2, as well as anterior fusion hardware at C4-C5, are intact and unchanged in appearance from the prior radiographs  Cervical spinal alignment is unchanged  Degenerative changes with intervertebral disc space narrowing is not significantly different from the prior study  There is no fracture or subluxation  There are no suspicious osseous lesion  The visualized lung apices are clear  Impression: Unchanged, satisfactory appearance of the spinal hardware  No acute osseous abnormality Workstation performed: [id number]       I have personally reviewed pertinent reports     and I have personally reviewed pertinent films in PACS

## 2022-04-02 NOTE — ASSESSMENT & PLAN NOTE
· As addressed in HPI  · 9 month post op visit  · Reports left shoulder muscle aching,discomfort in left scapular area and between scapula  · Feel a little weak in legs, get up a walkt  Lot around the house  · Describes cervical paraspinal aching soreness, weakness in neck and upper trunk  not as strong  · Slight decreased strength in left upper extremity compared to right , no myelopathy, no gait instability   · Has some burning in right leg  · Denied numbness or tingling in upper extremity and no increased sensitivity/painful touch as she described immediately post incident/fall  · Reports home physical therapy has stopped, with [facility]    · No urine retention or dribbling, no loss of bowel control  · Reports taking short walks outside, and walks throughout the house durning the day,   · Is right hand dominate  Imagining   Xray cervical spine 2-3 views-Unchanged, satisfactory appearance of the spinal hardware  No acute osseous abnormality  Cervical spinal alignment is unchanged  Degenerative changes with intervertebral disc space narrowing is not significantly different from the prior study  There is no fracture or subluxation  PLAN  · Reviewed imagining with patient and son  · Ordered therapy , patient and son understand it will be out patient must travel to clinic for therapy, patient is not home bound from a recent hospitalization      · Ordered Lidoderm patches for cervical paraspinal area between scapula , jessica reports she had the patched ordered previous     · RTO for 1 year f/u visit in July 2022   · Complete cervical spine xray 2-3 days prior f/u visit   · Call office if develop weakness in upper extremities arms and hands , gait instability , dropping objects , problem urinating, loss of bowel control  · Call office with additional questions or concerns

## 2022-04-06 DIAGNOSIS — E78.5 HYPERLIPIDEMIA, UNSPECIFIED HYPERLIPIDEMIA TYPE: ICD-10-CM

## 2022-04-07 RX ORDER — ATORVASTATIN CALCIUM 20 MG/1
TABLET, FILM COATED ORAL
Qty: 90 TABLET | Refills: 0 | Status: SHIPPED | OUTPATIENT
Start: 2022-04-07 | End: 2022-07-25 | Stop reason: SDUPTHER

## 2022-04-14 ENCOUNTER — EVALUATION (OUTPATIENT)
Dept: PHYSICAL THERAPY | Facility: CLINIC | Age: 77
End: 2022-04-14
Payer: MEDICARE

## 2022-04-14 DIAGNOSIS — G89.29 CHRONIC LEFT SHOULDER PAIN: ICD-10-CM

## 2022-04-14 DIAGNOSIS — K21.9 GASTROESOPHAGEAL REFLUX DISEASE: ICD-10-CM

## 2022-04-14 DIAGNOSIS — M79.18 MYOFASCIAL PAIN SYNDROME, CERVICAL: ICD-10-CM

## 2022-04-14 DIAGNOSIS — M25.512 CHRONIC LEFT SHOULDER PAIN: ICD-10-CM

## 2022-04-14 DIAGNOSIS — Z98.1 STATUS POST CERVICAL SPINAL FUSION: Primary | ICD-10-CM

## 2022-04-14 PROCEDURE — 97110 THERAPEUTIC EXERCISES: CPT | Performed by: PHYSICAL THERAPIST

## 2022-04-14 RX ORDER — PANTOPRAZOLE SODIUM 40 MG/1
40 TABLET, DELAYED RELEASE ORAL DAILY
Qty: 30 TABLET | Refills: 0 | Status: SHIPPED | OUTPATIENT
Start: 2022-04-14 | End: 2022-05-09 | Stop reason: SDUPTHER

## 2022-04-14 NOTE — PROGRESS NOTES
PT Evaluation     Today's date: 2022  Patient name: Twin Burns  : 1945  MRN: [id number]  Referring provider: ROYA Steiner  Dx:   Encounter Diagnosis     ICD-10-CM    1  Status post cervical spinal fusion  Z98 1 Ambulatory Referral to Physical Therapy     PT plan of care cert/re-cert   2  Myofascial pain syndrome, cervical  M79 18 Ambulatory Referral to Physical Therapy     PT plan of care cert/re-cert   3  Chronic left shoulder pain  M25 512     G89 29        Start Time: 1145  Stop Time: 1230  Total time in clinic (min): 45 minutes    Assessment  Assessment details: Twin Burns is a 64 y o  female who presents with pain, decreased strength, decreased ROM, decreased joint mobility and postural dysfunction  Due to these impairments, patient has difficulty performing ADL's, ambulation, lifting/carrying, transfers, reaching  Patient's clinical presentation is consistent with their referring diagnosis of Status post cervical spinal fusion  Plan: Ambulatory Referral to Physical Therapy    Myofascial pain syndrome, cervical  Plan: Ambulatory Referral to Physical Therapy    Left shoulder pain  Plan: Ambulatory Referral to Physical Therapy    Patient has been educated in home exercise program and plan of care   Patient would benefit from skilled physical therapy services to address their aforementioned functional limitations and progress towards prior level of function and independence with home exercise program      Impairments: abnormal or restricted ROM, activity intolerance, impaired physical strength, lacks appropriate home exercise program, pain with function, poor posture  and poor body mechanics  Understanding of Dx/Px/POC: good   Prognosis: fair    Goals  Short Term Goals to be accomplished in 3 weeks:  STG1: Pt will be I with HEP  STG2: Pt will be I with posture management  STG3: Pt will demo Cervical AROM >50% improvement  STG4: Pt will demo 1/2 MMT grade inc in UE shoulder strength  STG5: Pt will be able to negotiate 1 FOS      Long Term Goals to be accomplished in 6 weeks:   LTG1: Pt will demo shoulder strength to WNL as per PLOF  LTG2: Pt will return to ADLs as per PLOF pain free  LTG3: Pt will demo cervical AROM pain free        Plan  Plan details:  HEP development, stretching, strengthening, A/AA/PROM, joint mobilizations, posture education, STM/MI as needed to reduce muscle tension, muscle reeducation, PLOC discussed and agreed upon with patient  Patient would benefit from: PT eval and skilled physical therapy  Planned modality interventions: cryotherapy and thermotherapy: hydrocollator packs  Planned therapy interventions: manual therapy, neuromuscular re-education, self care, therapeutic activities, therapeutic exercise and home exercise program  Frequency: 2x week  Duration in weeks: 6  Treatment plan discussed with: patient        Subjective Evaluation    History of Present Illness  Mechanism of injury: Pt reports she has neck pain and B shoulder pain  Pt feels her pain is worsening  She also feels her overall mobility is worsening but she attributes this to her poor diet and lack of appetite  Pt feels her legs are weak but not in her arms  She wears a pain patch reportedly for managing her neck pain  She denies sleep disturbance due to her neck pain  Pt is able ot dress herself, she has supervision and assistance for bathing while she uses a shower bench in her home  She often has supervision during her day and nighttime  Pt has 1 FOS to manage to get into her second floor apartment which she has assistance with  Sh feels better when she is able to lean her head on support and if her pain in creases she likes ot lean back and look up slightly however fully looking upward is paiful for her  Pt reports her primary goals aside form reducing pain, is be able to feed herself without missing her hand     Quality of life: fair    Pain  Current pain ratin  At best pain ratin  At worst pain ratin          Objective     Concurrent Complaints  Negative for night pain    Postural Observations  Seated posture: poor  Standing posture: poor    Additional Postural Observation Details  Major FHP, inc trunk flexion, unable to stand erect of achieve neutral posture  Neurological Testing     Sensation   Cervical/Thoracic   Left   Intact: light touch    Right   Intact: light touch    Additional Neurological Details  Myotomes WFL    Active Range of Motion   Cervical/Thoracic Spine       Cervical  Subcranial protraction:  with pain   Restriction level: moderate  Subcranial retraction:   Restriction level: moderate  Flexion:  Restriction level: moderate  Extension:  with pain Restriction level: maximal  Left lateral flexion:  with pain Restriction level: maximal  Right lateral flexion:  with pain Restriction level maximal  Left rotation:  Restriction level: moderate  Right rotation:  with pain Restriction level: moderate    Additional Active Range of Motion Details  B Shoulder AROM: WFL L shoulder pain    General Comments:      Cervical/Thoracic Comments  Function:    Gait: with SPC in clinic requires HHA  Stairs: railing and assistance  Xfers: cues and SPC                    Precautions: Standard        Visits 1             22                                                   Neuro Re-Ed             CS Rep Ret 10x            Posture Edu Edu                                                                             Ther Ex             B shldr flex 10x            TS Ext over fulcrum 10x            Rows Red TB 10x            Shrugs/Scpa ret 10x2                                                                             B LE therex Seated marching, LAQ 10x ea                                                                Modalities

## 2022-04-19 ENCOUNTER — OFFICE VISIT (OUTPATIENT)
Dept: PHYSICAL THERAPY | Facility: CLINIC | Age: 77
End: 2022-04-19
Payer: MEDICARE

## 2022-04-19 DIAGNOSIS — G89.29 CHRONIC LEFT SHOULDER PAIN: ICD-10-CM

## 2022-04-19 DIAGNOSIS — M79.18 MYOFASCIAL PAIN SYNDROME, CERVICAL: Primary | ICD-10-CM

## 2022-04-19 DIAGNOSIS — Z98.1 STATUS POST CERVICAL SPINAL FUSION: ICD-10-CM

## 2022-04-19 DIAGNOSIS — M25.512 CHRONIC LEFT SHOULDER PAIN: ICD-10-CM

## 2022-04-19 PROCEDURE — 97110 THERAPEUTIC EXERCISES: CPT | Performed by: PHYSICAL THERAPIST

## 2022-04-19 NOTE — PROGRESS NOTES
Daily Note     Today's date: 2022  Patient name: Stacy Joshua  : 1945  MRN: [id number]  Referring provider: ROYA Tovar  Dx:   Encounter Diagnosis     ICD-10-CM    1  Myofascial pain syndrome, cervical  M79 18    2  Chronic left shoulder pain  M25 512     G89 29    3  Status post cervical spinal fusion  Z98 1                   Subjective: Pt reports her shoulders are overall doing "ok" today, her neck pain is 6/10 currently  She reports R knee is a limiting factor due to pain and she is hoping to gain capability to dress UB indep, needs help otherwise modifies with extreme difficulty and nil practicality  Pt reports R shank "tingling"    Objective: See treatment diary below  Pt amb to clinic with RW today  Able to amb 100' wihtout AD with close supervision  Assessment: Tolerated treatment well  Patient demo excellentmotivation and while shefatigues wih exertion she demo good ability to tolerate progression of therex in clinic today  Plan: Continue per plan of care  Precautions: Standard        Visits 1 2            22                                                  Neuro Re-Ed             CS Rep Ret 10x 10x           Posture Edu Edu Rev           CS AROM  All directions 3' x 2                                                               Ther Ex             B shldr flex 10x 10x2           TS Ext over fulcrum 10x 15x           Rows Red TB 10x 2x10            Shrugs/Scpa ret 10x2 20x ea           Bicep curls  2lbs 2x12                                                               B LE therex Seated marching, LAQ 10x ea 20x ea, added B hip add ball sq, seated HS curl Red TB           STS  5x 1, 8x1 without UEs, legs leaning on chair                                                  Modalities

## 2022-04-21 ENCOUNTER — OFFICE VISIT (OUTPATIENT)
Dept: FAMILY MEDICINE CLINIC | Facility: CLINIC | Age: 77
End: 2022-04-21
Payer: MEDICARE

## 2022-04-21 ENCOUNTER — APPOINTMENT (OUTPATIENT)
Dept: PHYSICAL THERAPY | Facility: CLINIC | Age: 77
End: 2022-04-21
Payer: MEDICARE

## 2022-04-21 VITALS
DIASTOLIC BLOOD PRESSURE: 78 MMHG | WEIGHT: 124 LBS | HEIGHT: 62 IN | BODY MASS INDEX: 22.82 KG/M2 | SYSTOLIC BLOOD PRESSURE: 122 MMHG

## 2022-04-21 DIAGNOSIS — E78.5 HYPERLIPIDEMIA, UNSPECIFIED HYPERLIPIDEMIA TYPE: ICD-10-CM

## 2022-04-21 DIAGNOSIS — Z11.59 ENCOUNTER FOR HEPATITIS C SCREENING TEST FOR LOW RISK PATIENT: ICD-10-CM

## 2022-04-21 DIAGNOSIS — F41.9 ANXIETY: ICD-10-CM

## 2022-04-21 DIAGNOSIS — K21.9 GASTROESOPHAGEAL REFLUX DISEASE, UNSPECIFIED WHETHER ESOPHAGITIS PRESENT: Primary | ICD-10-CM

## 2022-04-21 DIAGNOSIS — I34.0 MODERATE MITRAL REGURGITATION: Chronic | ICD-10-CM

## 2022-04-21 DIAGNOSIS — M81.0 AGE RELATED OSTEOPOROSIS, UNSPECIFIED PATHOLOGICAL FRACTURE PRESENCE: ICD-10-CM

## 2022-04-21 DIAGNOSIS — D64.9 ANEMIA, UNSPECIFIED TYPE: ICD-10-CM

## 2022-04-21 DIAGNOSIS — I10 HYPERTENSION, UNSPECIFIED TYPE: ICD-10-CM

## 2022-04-21 PROBLEM — I95.1 ORTHOSTATIC HYPOTENSION: Status: RESOLVED | Noted: 2021-07-25 | Resolved: 2022-04-21

## 2022-04-21 PROBLEM — U07.1 COVID-19: Status: RESOLVED | Noted: 2021-12-22 | Resolved: 2022-04-21

## 2022-04-21 PROBLEM — J00 ACUTE RHINITIS: Status: RESOLVED | Noted: 2017-01-20 | Resolved: 2022-04-21

## 2022-04-21 PROBLEM — S02.2XXA NASAL BONE FRACTURES: Status: RESOLVED | Noted: 2021-07-25 | Resolved: 2022-04-21

## 2022-04-21 PROBLEM — H10.13 ALLERGIC CONJUNCTIVITIS OF BOTH EYES: Status: RESOLVED | Noted: 2021-03-02 | Resolved: 2022-04-21

## 2022-04-21 PROCEDURE — 36415 COLL VENOUS BLD VENIPUNCTURE: CPT | Performed by: FAMILY MEDICINE

## 2022-04-21 PROCEDURE — 99214 OFFICE O/P EST MOD 30 MIN: CPT | Performed by: FAMILY MEDICINE

## 2022-04-21 NOTE — ASSESSMENT & PLAN NOTE
We discussed her diagnosis of osteoporosis  I do think that pharmacotherapy is appropriate for her given her high FRAX scores  We are going to arrange for Prolia injections to begin as soon as possible  Follow-up of this was delayed somewhat by the pandemic

## 2022-04-21 NOTE — ASSESSMENT & PLAN NOTE
Wt Readings from Last 3 Encounters:   04/21/22 56 2 kg (124 lb)   04/01/22 56 2 kg (124 lb)   12/08/21 60 8 kg (134 lb)     Asymptomatic and appears euvolemic at present

## 2022-04-21 NOTE — ASSESSMENT & PLAN NOTE
She continues to follow with Neurosurgery  She saw them about 3 weeks ago  She continues with some neck pain for which she uses a lidocaine patch  No worrisome symptoms such as incontinence of bowel or bladder

## 2022-04-21 NOTE — ASSESSMENT & PLAN NOTE
Very infrequent use of alprazolam   Based on her history of fall she knows to be extremely careful only use this at bedtime

## 2022-04-21 NOTE — PROGRESS NOTES
Assessment/Plan:  GERD (gastroesophageal reflux disease)  She continues with pantoprazole  Attempts at weaning have been unsuccessful  She does have osteoporosis  We are going to try to arrange for Prolia injections  We had discussion with patient and son today and they agree  Moderate mitral regurgitation  No change in murmur    CHF (congestive heart failure) ([facility])  Wt Readings from Last 3 Encounters:   04/21/22 56 2 kg (124 lb)   04/01/22 56 2 kg (124 lb)   12/08/21 60 8 kg (134 lb)     Asymptomatic and appears euvolemic at present        Hypertension  Blood pressure normal without pharmacotherapy presently  Central cord syndrome [facility])  She continues to follow with Neurosurgery  She saw them about 3 weeks ago  She continues with some neck pain for which she uses a lidocaine patch  No worrisome symptoms such as incontinence of bowel or bladder  Age related osteoporosis  We discussed her diagnosis of osteoporosis  I do think that pharmacotherapy is appropriate for her given her high FRAX scores  We are going to arrange for Prolia injections to begin as soon as possible  Follow-up of this was delayed somewhat by the pandemic  Hyperlipidemia  Continue with atorvastatin  Lipid profile today  Anemia  CBC today  Abnormal LFTs  CMP today  Anxiety  Very infrequent use of alprazolam   Based on her history of fall she knows to be extremely careful only use this at bedtime         Diagnoses and all orders for this visit:    Gastroesophageal reflux disease, unspecified whether esophagitis present    Moderate mitral regurgitation    Hypertension, unspecified type    Age related osteoporosis, unspecified pathological fracture presence    Hyperlipidemia, unspecified hyperlipidemia type    Anemia, unspecified type    Anxiety          Subjective:   Chief Complaint   Patient presents with    Follow-up     Med check        Patient ID: Ryan Britton is a 64 y o  female       HPI  The patient is a 64-year-old female who presents today for routine follow-up she is accompanied by her son  She has history of gastroesophageal reflux disease, cholelithiasis, mitral regurgitation, essential hypertension, migraine headache, CHF, decompressive laminectomy from C3-C7 after C6 fracture as well as fusion from C2-T2  She has chronic cervicalgia  Using Lidoderm patch some improvement  She does follow with neuro surgery  This occurred after a fall July of 2021 with subsequent central cord syndrome where she also had nasal fracture  Echo during that hospitalization revealed an ejection fraction of 45-50  She has some diastolic dysfunction  Presently she denies chest pain shortness a breath PND orthopnea or edema  She also has a history of osteoporosis based on DEXA scan  We had discussed with her beginning Prolia during the pandemic but this had not occurred prior to her injuries  The following portions of the patient's history were reviewed and updated as appropriate: allergies, current medications, past family history, past medical history, past social history, past surgical history and problem list     ROS    Per the HPI  She has no incontinence of bowel or bladder presently  No weakness of her lower extremities  No recent falls      Objective:    Physical Exam  Constitutional:       Appearance: Normal appearance  Neck:      Vascular: No carotid bruit  Comments: No JVD  Cardiovascular:      Rate and Rhythm: Normal rate and regular rhythm  Pulmonary:      Effort: Pulmonary effort is normal       Breath sounds: Normal breath sounds  Musculoskeletal:      Right lower leg: No edema  Left lower leg: No edema  Comments: Diminished cervical range of motion  Wearing lidocaine patch over her lower cervical region  Lymphadenopathy:      Cervical: No cervical adenopathy  Neurological:      Mental Status: She is alert and oriented to person, place, and time     Psychiatric:         Thought Content:  Thought content normal          Judgment: Judgment normal       Comments: Mildly dysphoric affect which is chronic

## 2022-04-21 NOTE — ASSESSMENT & PLAN NOTE
She continues with pantoprazole  Attempts at weaning have been unsuccessful  She does have osteoporosis  We are going to try to arrange for Prolia injections  We had discussion with patient and son today and they agree

## 2022-04-22 LAB
ALBUMIN SERPL-MCNC: 4.2 G/DL (ref 3.6–5.1)
ALBUMIN/GLOB SERPL: 1.7 (CALC) (ref 1–2.5)
ALP SERPL-CCNC: 69 U/L (ref 37–153)
ALT SERPL-CCNC: 12 U/L (ref 6–29)
AST SERPL-CCNC: 17 U/L (ref 10–35)
BILIRUB SERPL-MCNC: 0.4 MG/DL (ref 0.2–1.2)
BUN SERPL-MCNC: 13 MG/DL (ref 7–25)
BUN/CREAT SERPL: 14 (CALC) (ref 6–22)
CALCIUM SERPL-MCNC: 9.6 MG/DL (ref 8.6–10.4)
CHLORIDE SERPL-SCNC: 108 MMOL/L (ref 98–110)
CHOLEST SERPL-MCNC: 165 MG/DL
CHOLEST/HDLC SERPL: 2.5 (CALC)
CO2 SERPL-SCNC: 28 MMOL/L (ref 20–32)
CREAT SERPL-MCNC: 0.95 MG/DL (ref 0.6–0.93)
ERYTHROCYTE [DISTWIDTH] IN BLOOD BY AUTOMATED COUNT: 12.9 % (ref 11–15)
GLOBULIN SER CALC-MCNC: 2.5 G/DL (CALC) (ref 1.9–3.7)
GLUCOSE SERPL-MCNC: 83 MG/DL (ref 65–99)
HCT VFR BLD AUTO: 40.1 % (ref 35–45)
HCV AB S/CO SERPL IA: <0.02
HCV AB SERPL QL IA: NORMAL
HDLC SERPL-MCNC: 66 MG/DL
HGB BLD-MCNC: 13.6 G/DL (ref 11.7–15.5)
LDLC SERPL CALC-MCNC: 77 MG/DL (CALC)
MCH RBC QN AUTO: 31.3 PG (ref 27–33)
MCHC RBC AUTO-ENTMCNC: 33.9 G/DL (ref 32–36)
MCV RBC AUTO: 92.4 FL (ref 80–100)
NONHDLC SERPL-MCNC: 99 MG/DL (CALC)
PLATELET # BLD AUTO: 176 THOUSAND/UL (ref 140–400)
PMV BLD REES-ECKER: 10 FL (ref 7.5–12.5)
POTASSIUM SERPL-SCNC: 3.8 MMOL/L (ref 3.5–5.3)
PROT SERPL-MCNC: 6.7 G/DL (ref 6.1–8.1)
RBC # BLD AUTO: 4.34 MILLION/UL (ref 3.8–5.1)
SL AMB EGFR AFRICAN AMERICAN: 66 ML/MIN/1.73M2
SL AMB EGFR NON AFRICAN AMERICAN: 57 ML/MIN/1.73M2
SODIUM SERPL-SCNC: 143 MMOL/L (ref 135–146)
TRIGL SERPL-MCNC: 132 MG/DL
WBC # BLD AUTO: 4.6 THOUSAND/UL (ref 3.8–10.8)

## 2022-04-26 ENCOUNTER — OFFICE VISIT (OUTPATIENT)
Dept: PHYSICAL THERAPY | Facility: CLINIC | Age: 77
End: 2022-04-26
Payer: MEDICARE

## 2022-04-26 DIAGNOSIS — G89.29 CHRONIC LEFT SHOULDER PAIN: ICD-10-CM

## 2022-04-26 DIAGNOSIS — M79.18 MYOFASCIAL PAIN SYNDROME, CERVICAL: Primary | ICD-10-CM

## 2022-04-26 DIAGNOSIS — M25.512 CHRONIC LEFT SHOULDER PAIN: ICD-10-CM

## 2022-04-26 DIAGNOSIS — Z98.1 STATUS POST CERVICAL SPINAL FUSION: ICD-10-CM

## 2022-04-26 PROCEDURE — 97110 THERAPEUTIC EXERCISES: CPT

## 2022-04-26 NOTE — PROGRESS NOTES
Daily Note     Today's date: 2022  Patient name: Divine Montgomery  : 1945  MRN: [id number]  Referring provider: ROYA Carrasco  Dx:   Encounter Diagnosis     ICD-10-CM    1  Myofascial pain syndrome, cervical  M79 18    2  Chronic left shoulder pain  M25 512     G89 29    3  Status post cervical spinal fusion  Z98 1        Start Time: 1145  Stop Time: 1220  Total time in clinic (min): 35 minutes    Subjective: Patient with no new complaints in regards to neck pain at the start of today's session  She states that she is "very nervous" at the start of today's session as she has another doctor's appointment she has to get to  Objective: See treatment diary below      Assessment: Tolerated treatment well  Duration of session limited as pt has physician follow up  Patient provided with graded vc during c/s rotation to limit compensatory t/s rotation  Patient with quick fatigue during exercises, requiring frequently breaks  Patient will continue to benefit from skilled PT to improve functional mobility and activity tolerance  Plan: Continue per plan of care  Precautions: Standard        Visits 1 2 3           22                                                 Neuro Re-Ed             CS Rep Ret 10x 10x           Posture Edu Edu Rev Rev          CS AROM  All directions 3' x 2 All planes, 3' x2                                                              Ther Ex             B shldr flex 10x 10x2           TS Ext over fulcrum 10x 15x x15          Rows Red TB 10x 2x10  RTB, 2x10          Shrugs/Scpa ret 10x2 20x ea 10x2          Bicep curls  2lbs 2x12 2# 2x12                                                              B LE therex Seated marching, LAQ 10x ea 20x ea, added B hip add ball sq, seated HS curl Red TB Seated ad sq, x20    Seated h/s curl, RTB, x15 ea    Seated hip abd, RTB, 2x10          STS  5x 1, 8x1 without UEs, legs leaning on chair 2x3 Modalities

## 2022-04-28 ENCOUNTER — OFFICE VISIT (OUTPATIENT)
Dept: PHYSICAL THERAPY | Facility: CLINIC | Age: 77
End: 2022-04-28
Payer: MEDICARE

## 2022-04-28 DIAGNOSIS — M79.18 MYOFASCIAL PAIN SYNDROME, CERVICAL: Primary | ICD-10-CM

## 2022-04-28 DIAGNOSIS — M25.512 CHRONIC LEFT SHOULDER PAIN: ICD-10-CM

## 2022-04-28 DIAGNOSIS — G89.29 CHRONIC LEFT SHOULDER PAIN: ICD-10-CM

## 2022-04-28 DIAGNOSIS — Z98.1 STATUS POST CERVICAL SPINAL FUSION: ICD-10-CM

## 2022-04-28 PROCEDURE — 97110 THERAPEUTIC EXERCISES: CPT

## 2022-04-28 PROCEDURE — 97112 NEUROMUSCULAR REEDUCATION: CPT

## 2022-04-28 NOTE — PROGRESS NOTES
Daily Note     Today's date: 2022  Patient name: Ryan Britton  : 1945  MRN: [id number]  Referring provider: ROYA Ziegler  Dx:   Encounter Diagnosis     ICD-10-CM    1  Myofascial pain syndrome, cervical  M79 18    2  Chronic left shoulder pain  M25 512     G89 29    3  Status post cervical spinal fusion  Z98 1        Start Time: 1102  Stop Time: 1149  Total time in clinic (min): 47 minutes    Subjective: Patient with no new complaints in regards to neck pain at the start of today's session  She states that she is "very nervous" at the start of today's session as she has another doctor's appointment she has to get to  Objective: See treatment diary below      Assessment: Tolerated treatment well  Time spent on technique for cx retraction  Patient provided with graded vc during c/s rotation to limit compensatory t/s rotation  Patient pushes herself past required number of reps  Patient will continue to benefit from skilled PT to improve functional mobility and activity tolerance  Pt describes UE intention tremor and reduced UE coordination, imbalance, slow movement, fear of falls, and micrographia  Pt would benefit from Neuro PT and pt agrees to attend after she is finished with PT for her Cspine  Plan: Continue per plan of care  Plan for Neuro PT after Ortho PT is complete  Precautions: Standard        Visits 1 2 3 4          22                                                Neuro Re-Ed             CS Rep Ret 10x 10x  10 reps         Posture Edu Edu Rev Rev rev         CS AROM  All directions 3' x 2 All planes, 3' x2 All planes, 3' x2                                                             Ther Ex             B shldr flex 10x 10x2           TS Ext over fulcrum 10x 15x x15 X 15         Rows Red TB 10x 2x10  RTB, 2x10 17 x 2         Shrugs/Scpa ret 10x2 20x ea 10x2 20 x 2         Bicep curls  2lbs 2x12 2# 2x12 3# 15 reps 2 B LE therex Seated marching, LAQ 10x ea 20x ea, added B hip add ball sq, seated HS curl Red TB Seated ad sq, x20    Seated h/s curl, RTB, x15 ea    Seated hip abd, RTB, 2x10 Seated ad sq, x25    -Seated h/s curl, RTB, x20 ea        -Seated hip abd, RTB, 25, 27 reps          STS  5x 1, 8x1 without UEs, legs leaning on chair 2x3 8 reps  12 reps     No UE                                                Modalities

## 2022-05-03 ENCOUNTER — OFFICE VISIT (OUTPATIENT)
Dept: PHYSICAL THERAPY | Facility: CLINIC | Age: 77
End: 2022-05-03
Payer: MEDICARE

## 2022-05-03 DIAGNOSIS — M25.512 CHRONIC LEFT SHOULDER PAIN: Primary | ICD-10-CM

## 2022-05-03 DIAGNOSIS — M79.18 MYOFASCIAL PAIN SYNDROME, CERVICAL: ICD-10-CM

## 2022-05-03 DIAGNOSIS — Z98.1 STATUS POST CERVICAL SPINAL FUSION: ICD-10-CM

## 2022-05-03 DIAGNOSIS — G89.29 CHRONIC LEFT SHOULDER PAIN: Primary | ICD-10-CM

## 2022-05-03 PROCEDURE — 97112 NEUROMUSCULAR REEDUCATION: CPT | Performed by: PHYSICAL THERAPIST

## 2022-05-03 PROCEDURE — 97110 THERAPEUTIC EXERCISES: CPT | Performed by: PHYSICAL THERAPIST

## 2022-05-03 NOTE — PROGRESS NOTES
Daily Note     Today's date: 5/3/2022  Patient name: Chad White  : 1945  MRN: [id number]  Referring provider: ROYA Wilhelm  Dx:   Encounter Diagnosis     ICD-10-CM    1  Chronic left shoulder pain  M25 512     G89 29    2  Myofascial pain syndrome, cervical  M79 18    3  Status post cervical spinal fusion  Z98 1                   Subjective: Pt reports she has 7/10 neck pain even with her lidocaine patch  She is going to attempt to change her sleep position tonight  Objective: See treatment diary below  STS limited today due to R knee pain  Assessment: Tolerated treatment well  Patient is consistently motivated and demo good pacing throughout therex  Plan: Continue per plan of care  Precautions: Standard        Visits 1 2 3 4 5    4/14/22 4/19 4/26 4/26 5/3                           Neuro Re-Ed        CS Rep Ret 10x 10x  10 reps 10x3   Posture Edu Edu Rev Rev rev    CS AROM  All directions 3' x 2 All planes, 3' x2 All planes, 3' x2 5' total all directions                                   Ther Ex        B shldr flex 10x 10x2   2x10   TS Ext over fulcrum 10x 15x x15 X 15 10x   Rows Red TB 10x 2x10  RTB, 2x10 17 x 2 3x10   Shrugs/Scpa ret 10x2 20x ea 10x2 20 x 2 20x2 ea   Bicep curls  2lbs 2x12 2# 2x12 3# 15 reps 2  3lbs 15x2                                   B LE therex Seated marching, LAQ 10x ea 20x ea, added B hip add ball sq, seated HS curl Red TB Seated ad sq, x20    Seated h/s curl, RTB, x15 ea    Seated hip abd, RTB, 2x10 Seated ad sq, x25    -Seated h/s curl, RTB, x20 ea        -Seated hip abd, RTB, 25, 27 reps  Seated ad sq, x25    -Seated h/s curl, RTB, x20 ea        -Seated hip abd, RTB, 30x reps    STS  5x 1, 8x1 without UEs, legs leaning on chair 2x3 8 reps  12 reps     No UE 10x, 5x                           Modalities

## 2022-05-05 ENCOUNTER — EVALUATION (OUTPATIENT)
Dept: PHYSICAL THERAPY | Facility: CLINIC | Age: 77
End: 2022-05-05
Payer: MEDICARE

## 2022-05-05 DIAGNOSIS — M25.512 CHRONIC LEFT SHOULDER PAIN: Primary | ICD-10-CM

## 2022-05-05 DIAGNOSIS — M79.18 MYOFASCIAL PAIN SYNDROME, CERVICAL: ICD-10-CM

## 2022-05-05 DIAGNOSIS — G89.29 CHRONIC LEFT SHOULDER PAIN: Primary | ICD-10-CM

## 2022-05-05 DIAGNOSIS — Z98.1 STATUS POST CERVICAL SPINAL FUSION: ICD-10-CM

## 2022-05-05 PROCEDURE — 97112 NEUROMUSCULAR REEDUCATION: CPT | Performed by: PHYSICAL THERAPIST

## 2022-05-05 PROCEDURE — 97110 THERAPEUTIC EXERCISES: CPT | Performed by: PHYSICAL THERAPIST

## 2022-05-05 NOTE — PROGRESS NOTES
Daily Note     Today's date: 2022  Patient name: Ramin Brewer  : 1945  MRN: [id number]  Referring provider: ROYA Hill  Dx:   Encounter Diagnosis     ICD-10-CM    1  Chronic left shoulder pain  M25 512     G89 29    2  Myofascial pain syndrome, cervical  M79 18    3  Status post cervical spinal fusion  Z98 1                   Subjective: Pt reports she has 6/10 pain today  Objective: See treatment diary below  Assessment: Tolerated treatment well  Patient is consistently motivated and demo good pacing throughout therex  She had some right knee pain following sit to stand and C-S soreness with attempts at side bending AROM  Plan: Continue per plan of care  Precautions: Standard        Visits 1 2 3 4 5 6    4/14/22 4/19 4/26 4/26 5/3 5/5                              Neuro Re-Ed         CS Rep Ret 10x 10x  10 reps 10x3 20   Posture Edu Edu Rev Rev rev     CS AROM  All directions 3' x 2 All planes, 3' x2 All planes, 3' x2 5' total all directions 5' total                                       Ther Ex         B shldr flex 10x 10x2   2x10 30   TS Ext over fulcrum 10x 15x x15 X 15 10x 20   Rows Red TB 10x 2x10  RTB, 2x10 17 x 2 3x10 20  YTB   Shrugs/Scpa ret 10x2 20x ea 10x2 20 x 2 20x2 ea 30   Bicep curls  2lbs 2x12 2# 2x12 3# 15 reps 2  3lbs 15x2 3#  12x   Shld Ext      20  YTB                              B LE therex Seated marching, LAQ 10x ea 20x ea, added B hip add ball sq, seated HS curl Red TB Seated ad sq, x20    Seated h/s curl, RTB, x15 ea    Seated hip abd, RTB, 2x10 Seated ad sq, x25    -Seated h/s curl, RTB, x20 ea        -Seated hip abd, RTB, 25, 27 reps  Seated ad sq, x25    -Seated h/s curl, RTB, x20 ea        -Seated hip abd, RTB, 30x reps  Seated ad sq, x25    Seated h/s curl, RTB, x20 ea    Seated hip abd, RTB, 30x reps    STS  5x 1, 8x1 without UEs, legs leaning on chair 2x3 8 reps  12 reps     No UE 10x, 5x 10x                              Modalities

## 2022-05-09 DIAGNOSIS — K21.9 GASTROESOPHAGEAL REFLUX DISEASE: ICD-10-CM

## 2022-05-09 RX ORDER — PANTOPRAZOLE SODIUM 40 MG/1
40 TABLET, DELAYED RELEASE ORAL DAILY
Qty: 90 TABLET | Refills: 1 | Status: SHIPPED | OUTPATIENT
Start: 2022-05-09

## 2022-05-10 ENCOUNTER — OFFICE VISIT (OUTPATIENT)
Dept: PHYSICAL THERAPY | Facility: CLINIC | Age: 77
End: 2022-05-10
Payer: MEDICARE

## 2022-05-10 DIAGNOSIS — M79.18 MYOFASCIAL PAIN SYNDROME, CERVICAL: ICD-10-CM

## 2022-05-10 DIAGNOSIS — Z98.1 STATUS POST CERVICAL SPINAL FUSION: Primary | ICD-10-CM

## 2022-05-10 DIAGNOSIS — M25.512 CHRONIC LEFT SHOULDER PAIN: ICD-10-CM

## 2022-05-10 DIAGNOSIS — G89.29 CHRONIC LEFT SHOULDER PAIN: ICD-10-CM

## 2022-05-10 PROCEDURE — 97110 THERAPEUTIC EXERCISES: CPT | Performed by: PHYSICAL THERAPIST

## 2022-05-10 NOTE — PROGRESS NOTES
Daily Note     Today's date: 5/10/2022  Patient name: Junior Eason  : 1945  MRN: [id number]  Referring provider: ROYA Miranda  Dx:   Encounter Diagnosis     ICD-10-CM    1  Status post cervical spinal fusion  Z98 1    2  Chronic left shoulder pain  M25 512     G89 29    3  Myofascial pain syndrome, cervical  M79 18                   Subjective: Pt reports that overall she feels she is improving and her neck is not bothering her as it had been  Objective: See treatment diary below      Assessment: Tolerated treatment well  Patient demo good overall progression and increasing indepoendenc e with est POC, pt demo good progression toward ets LTGs  Plan: Continue per plan of care  Precautions: Standard        Visits 3 4 5 6 7    4/26 4/26 5/3 5/5 5/10/22                           Neuro Re-Ed        CS Rep Ret  10 reps 10x3 20 20x   Posture Edu Rev rev      CS AROM All planes, 3' x2 All planes, 3' x2 5' total all directions 5' total 5'                                   Ther Ex        B shldr flex   2x10 30 20x   TS Ext over fulcrum x15 X 15 10x 20 10x   Rows RTB, 2x10 17 x 2 3x10 20  YTB    Shrugs/Scpa ret 10x2 20 x 2 20x2 ea 30 30x   Bicep curls 2# 2x12 3# 15 reps 2  3lbs 15x2 3#  12x    Shld Ext    20  YTB                            B LE therex Seated ad sq, x20    Seated h/s curl, RTB, x15 ea    Seated hip abd, RTB, 2x10 Seated ad sq, x25    -Seated h/s curl, RTB, x20 ea        -Seated hip abd, RTB, 25, 27 reps  Seated ad sq, x25    -Seated h/s curl, RTB, x20 ea        -Seated hip abd, RTB, 30x reps  Seated ad sq, x25    Seated h/s curl, RTB, x20 ea    Seated hip abd, RTB, 30x reps  3x20 ea   STS 2x3 8 reps  12 reps     No UE 10x, 5x 10x 5x                           Modalities

## 2022-05-12 ENCOUNTER — OFFICE VISIT (OUTPATIENT)
Dept: PHYSICAL THERAPY | Facility: CLINIC | Age: 77
End: 2022-05-12
Payer: MEDICARE

## 2022-05-12 DIAGNOSIS — Z98.1 STATUS POST CERVICAL SPINAL FUSION: Primary | ICD-10-CM

## 2022-05-12 DIAGNOSIS — M25.512 CHRONIC LEFT SHOULDER PAIN: ICD-10-CM

## 2022-05-12 DIAGNOSIS — M79.18 MYOFASCIAL PAIN SYNDROME, CERVICAL: ICD-10-CM

## 2022-05-12 DIAGNOSIS — G89.29 CHRONIC LEFT SHOULDER PAIN: ICD-10-CM

## 2022-05-12 PROCEDURE — 97110 THERAPEUTIC EXERCISES: CPT

## 2022-05-12 PROCEDURE — 97112 NEUROMUSCULAR REEDUCATION: CPT

## 2022-05-12 NOTE — PROGRESS NOTES
Daily Note     Today's date: 2022  Patient name: Leon Haines  : 1945  MRN: [id number]  Referring provider: ROYA Aguirre  Dx:   Encounter Diagnosis     ICD-10-CM    1  Status post cervical spinal fusion  Z98 1    2  Chronic left shoulder pain  M25 512     G89 29    3  Myofascial pain syndrome, cervical  M79 18        Start Time: 1100  Stop Time: 1145  Total time in clinic (min): 45 minutes    Subjective: Pt reports of having the "Shakes" today  She reports she gets this once a month  Objective: See treatment diary below      Assessment: Tolerated treatment well  Patient demonstrated fatigue post treatment, exhibited good technique with therapeutic exercises and would benefit from continued PT  She was not able to perform STS today due to "shakes" and knee pain  Demo good tolerance with all other exercises, cues for proper tempo      Plan: Continue per plan of care  Precautions: Standard        Visits 3 4 5 6 7 8    4/26 4/26 5/3 5/5 5/10/22 5/12/22                              Neuro Re-Ed         CS Rep Ret  10 reps 10x3 20 20x 20x   Posture Edu Rev rev       CS AROM All planes, 3' x2 All planes, 3' x2 5' total all directions 5' total 5' 5'                                       Ther Ex         B shldr flex   2x10 30 20x 20x   TS Ext over fulcrum x15 X 15 10x 20 10x 10x   Rows RTB, 2x10 17 x 2 3x10 20  YTB  20 YTB   Shrugs/Scap ret 10x2 20 x 2 20x2 ea 30 30x 30x   Bicep curls 2# 2x12 3# 15 reps 2  3lbs 15x2 3#  12x  3# 10x   Shld Ext    20  YTB  20 YTB                              B LE therex Seated ad sq, x20    Seated h/s curl, RTB, x15 ea    Seated hip abd, RTB, 2x10 Seated ad sq, x25    -Seated h/s curl, RTB, x20 ea        -Seated hip abd, RTB, 25, 27 reps  Seated ad sq, x25    -Seated h/s curl, RTB, x20 ea        -Seated hip abd, RTB, 30x reps  Seated ad sq, x25    Seated h/s curl, RTB, x20 ea    Seated hip abd, RTB, 30x reps  3x20 ea Seated ad sq, x30    Seated h/s curl, RTB, x30 ea    Seated hip abd, RTB, 30x reps    STS 2x3 8 reps  12 reps     No UE 10x, 5x 10x 5x 2x (knee pain) deferred                              Modalities

## 2022-05-17 ENCOUNTER — OFFICE VISIT (OUTPATIENT)
Dept: PHYSICAL THERAPY | Facility: CLINIC | Age: 77
End: 2022-05-17
Payer: MEDICARE

## 2022-05-17 DIAGNOSIS — Z98.1 STATUS POST CERVICAL SPINAL FUSION: Primary | ICD-10-CM

## 2022-05-17 DIAGNOSIS — G89.29 CHRONIC LEFT SHOULDER PAIN: ICD-10-CM

## 2022-05-17 DIAGNOSIS — M79.18 MYOFASCIAL PAIN SYNDROME, CERVICAL: ICD-10-CM

## 2022-05-17 DIAGNOSIS — M25.512 CHRONIC LEFT SHOULDER PAIN: ICD-10-CM

## 2022-05-17 PROCEDURE — 97110 THERAPEUTIC EXERCISES: CPT | Performed by: PHYSICAL THERAPIST

## 2022-05-17 NOTE — PROGRESS NOTES
Daily Note     Today's date: 2022  Patient name: Dewanda Bosworth  : 1945  MRN: [id number]  Referring provider: ROYA Hernandez  Dx:   Encounter Diagnosis     ICD-10-CM    1  Status post cervical spinal fusion  Z98 1    2  Chronic left shoulder pain  M25 512     G89 29    3  Myofascial pain syndrome, cervical  M79 18                   Subjective: Pt feels overall her neck is doing much better than it had been  Objective: See treatment diary below      Assessment: Tolerated treatment well  Patient demronda majanoayd progression and overall inc activity tolerance  Plan: Continue per plan of care  Precautions: Standard        Visits 5 6 7 8 9    5/3 5/5 5/10/22 5/12/22 5/17/22                           Neuro Re-Ed        CS Rep Ret 10x3 20 20x 20x 20x   Posture Edu        CS AROM 5' total all directions 5' total 5' 5' 5'                                    Ther Ex        B shldr flex 2x10 30 20x 20x 20x   TS Ext over fulcrum 10x 20 10x 10x 10x   Rows 3x10 20  YTB  20 YTB 30x   Shrugs/Scap ret 20x2 ea 30 30x 30x 20x   Bicep curls 3lbs 15x2 3#  12x  3# 10x    Shld Ext  20  YTB  20 YTB                            B LE therex Seated ad sq, x25    -Seated h/s curl, RTB, x20 ea        -Seated hip abd, RTB, 30x reps  Seated ad sq, x25    Seated h/s curl, RTB, x20 ea    Seated hip abd, RTB, 30x reps  3x20 ea Seated ad sq, x30    Seated h/s curl, RTB, x30 ea    Seated hip abd, RTB, 30x reps  x30 ea   STS 10x, 5x 10x 5x 2x (knee pain) deferred x10                           Modalities

## 2022-05-19 ENCOUNTER — OFFICE VISIT (OUTPATIENT)
Dept: PHYSICAL THERAPY | Facility: CLINIC | Age: 77
End: 2022-05-19
Payer: MEDICARE

## 2022-05-19 DIAGNOSIS — Z98.1 STATUS POST CERVICAL SPINAL FUSION: Primary | ICD-10-CM

## 2022-05-19 DIAGNOSIS — M25.512 CHRONIC LEFT SHOULDER PAIN: ICD-10-CM

## 2022-05-19 DIAGNOSIS — M79.18 MYOFASCIAL PAIN SYNDROME, CERVICAL: ICD-10-CM

## 2022-05-19 DIAGNOSIS — G89.29 CHRONIC LEFT SHOULDER PAIN: ICD-10-CM

## 2022-05-19 PROCEDURE — 97110 THERAPEUTIC EXERCISES: CPT

## 2022-05-19 NOTE — PROGRESS NOTES
Daily Note     Today's date: 2022  Patient name: Sae Leger  : 1945  MRN: [id number]  Referring provider: ROYA Shelton  Dx:   Encounter Diagnosis     ICD-10-CM    1  Status post cervical spinal fusion  Z98 1    2  Chronic left shoulder pain  M25 512     G89 29    3  Myofascial pain syndrome, cervical  M79 18        Start Time: 1050  Stop Time: 1135  Total time in clinic (min): 45 minutes    Subjective: Pt feels overall her neck is doing much better than it had been  Objective: See treatment diary below      Assessment: Tolerated treatment well  Patient would benefit from continued PT  Performed exercises prior to session this morning and was able to tolerate all exercises today      Plan: Continue per plan of care  Precautions: Standard        Visits 5 6 7 8 9 10    5/3 5/5 5/10/22 5/12/22 5/17/22 5/19                              Neuro Re-Ed         CS Rep Ret 10x3 20 20x 20x 20x 20   Posture Edu         CS AROM 5' total all directions 5' total 5' 5' 5'  5'                                       Ther Ex         B shldr flex 2x10 30 20x 20x 20x 20x   TS Ext over fulcrum 10x 20 10x 10x 10x 10x   Rows 3x10 20  YTB  20 YTB 30x 20x YTB   Shrugs/Scap ret 20x2 ea 30 30x 30x 20x 20x   Bicep curls 3lbs 15x2 3#  12x  3# 10x     Shld Ext  20  YTB  20 YTB  20 YTB                              B LE therex Seated ad sq, x25    -Seated h/s curl, RTB, x20 ea        -Seated hip abd, RTB, 30x reps  Seated ad sq, x25    Seated h/s curl, RTB, x20 ea    Seated hip abd, RTB, 30x reps  3x20 ea Seated ad sq, x30    Seated h/s curl, RTB, x30 ea    Seated hip abd, RTB, 30x reps  x30 ea Seated ad sq, x30    Seated h/s curl, RTB, x30 ea    Seated hip abd, BTB, 30x reps   STS 10x, 5x 10x 5x 2x (knee pain) deferred x10 10x                              Modalities

## 2022-05-24 ENCOUNTER — OFFICE VISIT (OUTPATIENT)
Dept: PHYSICAL THERAPY | Facility: CLINIC | Age: 77
End: 2022-05-24
Payer: MEDICARE

## 2022-05-24 DIAGNOSIS — G89.29 CHRONIC LEFT SHOULDER PAIN: ICD-10-CM

## 2022-05-24 DIAGNOSIS — Z98.1 STATUS POST CERVICAL SPINAL FUSION: Primary | ICD-10-CM

## 2022-05-24 DIAGNOSIS — M79.18 MYOFASCIAL PAIN SYNDROME, CERVICAL: ICD-10-CM

## 2022-05-24 DIAGNOSIS — M25.512 CHRONIC LEFT SHOULDER PAIN: ICD-10-CM

## 2022-05-24 PROCEDURE — 97110 THERAPEUTIC EXERCISES: CPT | Performed by: PHYSICAL THERAPIST

## 2022-05-24 PROCEDURE — 97112 NEUROMUSCULAR REEDUCATION: CPT | Performed by: PHYSICAL THERAPIST

## 2022-05-24 NOTE — PROGRESS NOTES
Daily Note     Today's date: 2022  Patient name: Gini aBch  : 1945  MRN: [id number]  Referring provider: ROYA Valdivia  Dx:   Encounter Diagnosis     ICD-10-CM    1  Status post cervical spinal fusion  Z98 1    2  Chronic left shoulder pain  M25 512     G89 29    3  Myofascial pain syndrome, cervical  M79 18                   Subjective: Pt reports overall doing fine  She is concerned that her upcoming visit to see her son she will have to sleep in a bed which she has not done yet, requesting we trial today  Objective: See treatment diary below  Attempted lying supine, pt unable to lie comfortably without 2 pillow prop, Juanjo for R LE, Mod A supine > sit, dizzy lying down, needs knees bent when supine  Assessment: Tolerated treatment well  Patient demo good indep with est POC and demo good potenital for further I with self management upon PT DC  PT does howeever demo deficits related to positional dizziness, will retry mnext session with inc HOB  Plan: Continue per plan of care  Precautions: Standard        Visits 7 8 9 10 11    5/10/22 5/12/22 5/17/22 5/19 5/24                           Neuro Re-Ed        CS Rep Ret 20x 20x 20x 20 10   Posture Edu        CS AROM 5' 5' 5'  5' 5'                                   Ther Ex        B shldr flex 20x 20x 20x 20x 20x   TS Ext over fulcrum 10x 10x 10x 10x 10x   Rows  20 YTB 30x 20x YTB 30x GTB   Shrugs/Scap ret 30x 30x 20x 20x 20x   Bicep curls  3# 10x      Shld Ext  20 YTB  20 YTB 20x                           B LE therex 3x20 ea Seated ad sq, x30    Seated h/s curl, RTB, x30 ea    Seated hip abd, RTB, 30x reps  x30 ea Seated ad sq, x30    Seated h/s curl, RTB, x30 ea    Seated hip abd, BTB, 30x reps x30   STS 5x 2x (knee pain) deferred x10 10x 10x                           Modalities

## 2022-05-26 ENCOUNTER — OFFICE VISIT (OUTPATIENT)
Dept: PHYSICAL THERAPY | Facility: CLINIC | Age: 77
End: 2022-05-26
Payer: MEDICARE

## 2022-05-26 DIAGNOSIS — G89.29 CHRONIC LEFT SHOULDER PAIN: ICD-10-CM

## 2022-05-26 DIAGNOSIS — M79.18 MYOFASCIAL PAIN SYNDROME, CERVICAL: ICD-10-CM

## 2022-05-26 DIAGNOSIS — Z98.1 STATUS POST CERVICAL SPINAL FUSION: Primary | ICD-10-CM

## 2022-05-26 DIAGNOSIS — M25.512 CHRONIC LEFT SHOULDER PAIN: ICD-10-CM

## 2022-05-26 PROCEDURE — 97112 NEUROMUSCULAR REEDUCATION: CPT

## 2022-05-26 PROCEDURE — 97110 THERAPEUTIC EXERCISES: CPT

## 2022-05-26 NOTE — PROGRESS NOTES
Daily Note     Today's date: 2022  Patient name: Larose Lennox  : 1945  MRN: [id number]  Referring provider: ROYA Art  Dx:   Encounter Diagnosis     ICD-10-CM    1  Status post cervical spinal fusion  Z98 1    2  Chronic left shoulder pain  M25 512     G89 29    3  Myofascial pain syndrome, cervical  M79 18                   Subjective: Pt reports she is feeling well and denies any pain  States that she has some discomfort when she is laying on her recliner playing her ipad  Objective: See treatment diary below      Assessment: Tolerated treatment well  Patient demonstrated fatigue post treatment, exhibited good technique with therapeutic exercises and would benefit from continued PT      Plan: Continue per plan of care  Precautions: Standard  Visits 7 8 9 10 11 12 13    5/10/22 5/12/22 5/17/22 5/19 5/24 5/26/22 5/27/22                                 Neuro Re-Ed          CS Rep Ret 20x 20x 20x 20 10 10 10    Posture Edu          CS AROM 5' 5' 5'  5' 5' 5  5                                           Ther Ex          B shldr flex 20x 20x 20x 20x 20x 20x  20x    TS Ext over fulcrum 10x 10x 10x 10x 10x 10x  10x    Rows  20 YTB 30x 20x YTB 30x GTB 30x GTB  30x GTB    Shrugs/Scap ret 30x 30x 20x 20x 20x 20x  20x    Bicep curls  3# 10x        Shld Ext  20 YTB  20 YTB 20x 20x  20x                                  B LE therex 3x20 ea Seated ad sq, x30    Seated h/s curl, RTB, x30 ea    Seated hip abd, RTB, 30x reps  x30 ea Seated ad sq, x30    Seated h/s curl, RTB, x30 ea    Seated hip abd, BTB, 30x reps x30 X 30 ea  X 30 ea      STS 5x 2x (knee pain) deferred x10 10x 10x 16x  16x                                  Modalities

## 2022-05-31 ENCOUNTER — OFFICE VISIT (OUTPATIENT)
Dept: PHYSICAL THERAPY | Facility: CLINIC | Age: 77
End: 2022-05-31
Payer: MEDICARE

## 2022-05-31 DIAGNOSIS — G89.29 CHRONIC LEFT SHOULDER PAIN: ICD-10-CM

## 2022-05-31 DIAGNOSIS — Z98.1 STATUS POST CERVICAL SPINAL FUSION: Primary | ICD-10-CM

## 2022-05-31 DIAGNOSIS — M79.18 MYOFASCIAL PAIN SYNDROME, CERVICAL: ICD-10-CM

## 2022-05-31 DIAGNOSIS — M25.512 CHRONIC LEFT SHOULDER PAIN: ICD-10-CM

## 2022-05-31 PROCEDURE — 97110 THERAPEUTIC EXERCISES: CPT | Performed by: PHYSICAL THERAPIST

## 2022-06-09 DIAGNOSIS — J30.9 ALLERGIC RHINITIS, UNSPECIFIED SEASONALITY, UNSPECIFIED TRIGGER: ICD-10-CM

## 2022-06-09 RX ORDER — LORATADINE 10 MG/1
10 TABLET ORAL DAILY
Qty: 90 TABLET | Refills: 0 | Status: SHIPPED | OUTPATIENT
Start: 2022-06-09

## 2022-07-08 DIAGNOSIS — N18.2 STAGE 2 CHRONIC KIDNEY DISEASE: ICD-10-CM

## 2022-07-08 RX ORDER — FOLIC ACID/VIT B COMPLEX AND C 0.8 MG
1 TABLET ORAL DAILY
Qty: 100 TABLET | Refills: 1 | Status: SHIPPED | OUTPATIENT
Start: 2022-07-08 | End: 2022-07-25 | Stop reason: SDUPTHER

## 2022-07-25 DIAGNOSIS — N18.2 STAGE 2 CHRONIC KIDNEY DISEASE: ICD-10-CM

## 2022-07-25 DIAGNOSIS — E78.5 HYPERLIPIDEMIA, UNSPECIFIED HYPERLIPIDEMIA TYPE: ICD-10-CM

## 2022-07-25 RX ORDER — ATORVASTATIN CALCIUM 20 MG/1
20 TABLET, FILM COATED ORAL
Qty: 90 TABLET | Refills: 0 | Status: SHIPPED | OUTPATIENT
Start: 2022-07-25 | End: 2022-10-13 | Stop reason: SDUPTHER

## 2022-07-25 RX ORDER — FOLIC ACID/VIT B COMPLEX AND C 0.8 MG
1 TABLET ORAL DAILY
Qty: 100 TABLET | Refills: 0 | Status: SHIPPED | OUTPATIENT
Start: 2022-07-25

## 2022-08-26 ENCOUNTER — HOSPITAL ENCOUNTER (OUTPATIENT)
Dept: RADIOLOGY | Facility: HOSPITAL | Age: 77
Discharge: HOME | End: 2022-08-26
Payer: MEDICARE

## 2022-08-26 ENCOUNTER — OFFICE VISIT (OUTPATIENT)
Dept: NEUROSURGERY | Facility: CLINIC | Age: 77
End: 2022-08-26
Payer: MEDICARE

## 2022-08-26 VITALS
HEART RATE: 124 BPM | DIASTOLIC BLOOD PRESSURE: 70 MMHG | SYSTOLIC BLOOD PRESSURE: 112 MMHG | TEMPERATURE: 98.4 F | BODY MASS INDEX: 24.48 KG/M2 | HEIGHT: 62 IN | WEIGHT: 133 LBS

## 2022-08-26 DIAGNOSIS — Z98.1 STATUS POST CERVICAL SPINAL FUSION: Primary | ICD-10-CM

## 2022-08-26 DIAGNOSIS — Z98.1 STATUS POST CERVICAL SPINAL FUSION: ICD-10-CM

## 2022-08-26 DIAGNOSIS — S14.129A CENTRAL CORD SYNDROME, INITIAL ENCOUNTER (HCC): ICD-10-CM

## 2022-08-26 PROCEDURE — 99213 OFFICE O/P EST LOW 20 MIN: CPT | Performed by: PHYSICIAN ASSISTANT

## 2022-08-26 PROCEDURE — 72052 X-RAY EXAM NECK SPINE 6/>VWS: CPT

## 2022-08-26 NOTE — ASSESSMENT & PLAN NOTE
Patient presents today for 1 year follow-up s/p ACDF C4/5 and PCDF C2-T2 for central cord syndrome   Preoperatively, patient p/w neck pain and BUE pain and paresthesias  Imaging demonstrated cord compression with signal abnormality following a fall  Imaging:    Cervical spine Xrays flex/ext 8/26/22: Final report pending  Hardware appears acceptable  No instability appreciated  Plan:    Continue to monitor neurological symptoms   Xray imaging reviewed in detail with patient and her son  Demonstrating grossly stable alignment and hardware without evidence of instability   Patient remains improved from her preoperative state   We discussed signs and symptoms of cervical radiculopathy and myelopathy   Given 1 year since surgery and improved neurologically exam and stable Xrays, discussed consideration for follow-up as needed   Patient requested ongoing follow-up given concerns that she has hardware in her neck  Offered follow-up in 6-12 month with repeat Xrays   Patient/family to call the office with any questions/concerns or new symptoms

## 2022-08-26 NOTE — PROGRESS NOTES
Neurosurgery Office Note  Reema Salter 64 y o  female MRN: [id number]      Assessment/Plan     Status post cervical spinal fusion  Patient presents today for 1 year follow-up s/p ACDF C4/5 and PCDF C2-T2 for central cord syndrome   Preoperatively, patient p/w neck pain and BUE pain and paresthesias  Imaging demonstrated cord compression with signal abnormality following a fall  Imaging:    Cervical spine Xrays flex/ext 8/26/22: Final report pending  Hardware appears acceptable  No instability appreciated  Plan:    Continue to monitor neurological symptoms   Xray imaging reviewed in detail with patient and her son  Demonstrating grossly stable alignment and hardware without evidence of instability   Patient remains improved from her preoperative state   We discussed signs and symptoms of cervical radiculopathy and myelopathy   Given 1 year since surgery and improved neurologically exam and stable Xrays, discussed consideration for follow-up as needed   Patient requested ongoing follow-up given concerns that she has hardware in her neck  Offered follow-up in 6-12 month with repeat Xrays   Patient/family to call the office with any questions/concerns or new symptoms  Diagnoses and all orders for this visit:    Status post cervical spinal fusion  -     XR spine cervical complete 4 or 5 vw non injury; Future          I spent 25 minutes with the patient today in which >50% of the time was spent counseling/coordination of care regarding diagnosis, imaging review, symptoms and treatment plan  CHIEF COMPLAINT    1 year postoperative follow-up    HISTORY    History of Present Illness     This is a 69-year-old female past medical history significant for history central cord syndrome status post anterior/posterior cervical decompression fixation fusion July 2021, cholelithiasis, migraine headaches and GERD who presents today for one year postoperative follow-up with repeat x-rays  Patient originally presented to the hospital July 18, 2021 with complaints of neck pain and bilateral arm numbness and pain after falling forward striking her face  MRI imaging demonstrated severe cervical stenosis with cord compression and signal abnormality at C4-5  Patient underwent anterior cervical diskectomy fixation and fusion C4-5 with a posterior cervical decompression fixation and fusion from C2-T2 on July 20, 2021 with Dr Micheline Dickson  Patient overall made good recovery  Today, patient does continue with some neck pain and crackling at times with movement  She admits to diffuse back pain  Overall her gait has been stable with intermittent use of a cane  She is independent of ADLs  The significant preoperative hyperesthesias in her hands has resolved now with only intermittent numbness  She endorses difficulties with sleep and lack of appetite  Endorses low back pain as well as focal right knee pain  REVIEW OF SYSTEMS    Review of Systems   Constitutional: Positive for appetite change (no appetite)  HENT: Positive for hearing loss  Eyes: Negative  Respiratory: Positive for shortness of breath  Cardiovascular: Negative  Gastrointestinal: Positive for nausea  Dark stool  Infrequent BMs   Endocrine: Positive for heat intolerance  Genitourinary: Negative  Musculoskeletal: Positive for arthralgias, gait problem ( with cane), myalgias (shoulders), neck pain (c/o mid to neck pain into shoulders) and neck stiffness  Skin: Negative  Allergic/Immunologic: Positive for environmental allergies  Neurological: Positive for weakness (per pt right knee), numbness (once in a while in fingers, N&Burning in bi/feet worse on right) and headaches (constant left back head pain)  Right leg, burning sensation   Hematological: Negative  Psychiatric/Behavioral: Positive for sleep disturbance (wakes up every 2 hours)  The patient is nervous/anxious  DONN obtained by MA  Reviewed  Meds/Allergies     Current Outpatient Medications   Medication Sig Dispense Refill    ALPRAZolam (XANAX) 0 25 mg tablet Take 1 tablet (0 25 mg total) by mouth daily at bedtime as needed for anxiety 30 tablet 1    atorvastatin (LIPITOR) 20 mg tablet Take 1 tablet (20 mg total) by mouth daily with dinner 90 tablet 0    B Complex-C-Folic Acid (Ayanna-Dylan) TABS Take 1 tablet by mouth daily Dispense one bottle, regardless of size and quantity  Patient takes daily  100 tablet 0    CRANBERRY PO Take by mouth      pantoprazole (PROTONIX) 40 mg tablet Take 1 tablet (40 mg total) by mouth daily 90 tablet 1    Docusate Sodium (COLACE PO) Take by mouth      lidocaine (Lidoderm) 5 % Apply 1 patch topically daily Remove & Discard patch within 12 hours or as directed by MD 30 patch 1    loratadine (CLARITIN) 10 mg tablet Take 1 tablet (10 mg total) by mouth daily 90 tablet 0    ondansetron (Zofran ODT) 4 mg disintegrating tablet Take 1 tablet (4 mg total) by mouth every 6 (six) hours as needed for nausea or vomiting (Patient not taking: Reported on 8/26/2022) 10 tablet 0    ondansetron (ZOFRAN-ODT) 4 mg disintegrating tablet Take 1 tablet (4 mg total) by mouth every 6 (six) hours as needed for nausea or vomiting (Patient not taking: Reported on 8/26/2022) 30 tablet 0     No current facility-administered medications for this visit         Allergies   Allergen Reactions    Bee Venom Anaphylaxis       PAST HISTORY    Past Medical History:   Diagnosis Date    Cholelithiasis     COVID-19 12/22/2021    GERD (gastroesophageal reflux disease)     Influenza 1/29/2019    Migraine headache     Nasal bone fractures 7/25/2021    Orthostatic hypotension 7/25/2021    Pneumonia     Urinary tract infection        Past Surgical History:   Procedure Laterality Date    NO PAST SURGERIES      TX ARTHRODESIS ANT INTERBODY MIN DISCECTOMY, CERVICAL BELOW C2 Bilateral 7/20/2021    Procedure: Anterior cervical discectomy and fusion C4-5, posterior cervical decompresion and fusion C2-T2;  Surgeon: Ron Garrett MD;  Location: [facility];  Service: Neurosurgery       Social History     Tobacco Use    Smoking status: Former Smoker     Packs/day: 1 50     Years: 10 00     Pack years: 15 00     Types: Cigarettes    Smokeless tobacco: Never Used    Tobacco comment: quit age 20   Vaping Use    Vaping Use: Never used   Substance Use Topics    Alcohol use: Never     Alcohol/week: 0 0 standard drinks    Drug use: Never       Family History   Problem Relation Age of Onset    Heart disease Mother     No Known Problems Father     No Known Problems Son     No Known Problems Sister     No Known Problems Daughter     No Known Problems Sister     No Known Problems Sister     No Known Problems Sister          Above history personally reviewed  EXAM    Vitals:Blood pressure 112/70, pulse (!) 124, temperature 98 4 °F (36 9 °C), temperature source Tympanic, height 5' 2" (1 575 m), weight 60 3 kg (133 lb), not currently breastfeeding  ,Body mass index is 24 33 kg/m²  Physical Exam  Constitutional:       General: She is not in acute distress  Appearance: Normal appearance  She is well-developed  She is not ill-appearing  HENT:      Head: Normocephalic and atraumatic  Right Ear: External ear normal       Left Ear: External ear normal       Nose: Nose normal    Eyes:      General: No scleral icterus  Right eye: No discharge  Left eye: No discharge  Conjunctiva/sclera: Conjunctivae normal    Neck:      Comments: Well healed anterior and posterior scars c/w with cervical surgeries  Muscle atrophy over posterior surgical site  Cardiovascular:      Rate and Rhythm: Normal rate  Pulmonary:      Effort: Pulmonary effort is normal  No respiratory distress  Abdominal:      General: There is no distension  Musculoskeletal:      Cervical back: No tenderness  Skin:     General: Skin is warm and dry  Findings: No rash  Neurological:      Mental Status: She is alert  Deep Tendon Reflexes:      Reflex Scores:       Bicep reflexes are 2+ on the right side and 3+ on the left side  Brachioradialis reflexes are 2+ on the right side and 3+ on the left side  Psychiatric:         Mood and Affect: Mood normal          Speech: Speech normal          Behavior: Behavior normal          Thought Content: Thought content normal          Judgment: Judgment normal          Neurologic Exam     Mental Status   Follows 2 step commands  Attention: normal  Concentration: normal    Speech: speech is normal   Level of consciousness: alert  Knowledge: good  Normal comprehension  Cranial Nerves     CN III, IV, VI   Conjugate gaze: present    CN VII   Facial expression full, symmetric  CN VIII   Hearing: impaired    Motor Exam   Muscle bulk: normal  Overall muscle tone: normal    Strength   Strength 5/5 except as noted   Mild wrist weakness     Sensory Exam   Light touch normal      Gait, Coordination, and Reflexes     Reflexes   Right brachioradialis: 2+  Left brachioradialis: 3+  Right biceps: 2+  Left biceps: 3+  Right Varela: absent  Left Varela: present  Right ankle clonus: absent  Left ankle clonus: absent        MEDICAL DECISION MAKING    Imaging Studies:     Cervical spine Xr 8/2022    I have personally reviewed pertinent films in PACS

## 2022-08-31 DIAGNOSIS — J30.9 ALLERGIC RHINITIS, UNSPECIFIED SEASONALITY, UNSPECIFIED TRIGGER: ICD-10-CM

## 2022-08-31 RX ORDER — LORATADINE 10 MG/1
10 TABLET ORAL DAILY
Qty: 90 TABLET | Refills: 0 | Status: SHIPPED | OUTPATIENT
Start: 2022-08-31 | End: 2022-10-13 | Stop reason: SDUPTHER

## 2022-10-12 ENCOUNTER — OFFICE VISIT (OUTPATIENT)
Dept: FAMILY MEDICINE CLINIC | Facility: CLINIC | Age: 77
End: 2022-10-12
Payer: MEDICARE

## 2022-10-12 VITALS
BODY MASS INDEX: 29.08 KG/M2 | DIASTOLIC BLOOD PRESSURE: 68 MMHG | HEIGHT: 62 IN | OXYGEN SATURATION: 98 % | RESPIRATION RATE: 16 BRPM | WEIGHT: 158 LBS | SYSTOLIC BLOOD PRESSURE: 124 MMHG | HEART RATE: 94 BPM

## 2022-10-12 DIAGNOSIS — I10 HYPERTENSION, UNSPECIFIED TYPE: ICD-10-CM

## 2022-10-12 DIAGNOSIS — Z23 NEED FOR PNEUMOCOCCAL VACCINATION: ICD-10-CM

## 2022-10-12 DIAGNOSIS — E78.5 HYPERLIPIDEMIA, UNSPECIFIED HYPERLIPIDEMIA TYPE: ICD-10-CM

## 2022-10-12 DIAGNOSIS — Z12.31 ENCOUNTER FOR SCREENING MAMMOGRAM FOR MALIGNANT NEOPLASM OF BREAST: ICD-10-CM

## 2022-10-12 DIAGNOSIS — I34.0 MODERATE MITRAL REGURGITATION: ICD-10-CM

## 2022-10-12 DIAGNOSIS — Z23 NEED FOR INFLUENZA VACCINATION: ICD-10-CM

## 2022-10-12 DIAGNOSIS — Z53.20 COLON CANCER SCREENING DECLINED: ICD-10-CM

## 2022-10-12 DIAGNOSIS — Z97.3 WEARS GLASSES: ICD-10-CM

## 2022-10-12 DIAGNOSIS — D69.6 THROMBOCYTOPENIA (HCC): ICD-10-CM

## 2022-10-12 DIAGNOSIS — N18.2 STAGE 2 CHRONIC KIDNEY DISEASE: ICD-10-CM

## 2022-10-12 DIAGNOSIS — Z76.89 ENCOUNTER TO ESTABLISH CARE: Primary | ICD-10-CM

## 2022-10-12 DIAGNOSIS — Z71.85 VACCINE COUNSELING: ICD-10-CM

## 2022-10-12 DIAGNOSIS — M81.0 AGE RELATED OSTEOPOROSIS, UNSPECIFIED PATHOLOGICAL FRACTURE PRESENCE: ICD-10-CM

## 2022-10-12 PROBLEM — Z00.00 MEDICARE ANNUAL WELLNESS VISIT, SUBSEQUENT: Status: RESOLVED | Noted: 2020-02-17 | Resolved: 2022-10-12

## 2022-10-12 PROCEDURE — G0009 ADMIN PNEUMOCOCCAL VACCINE: HCPCS | Performed by: FAMILY MEDICINE

## 2022-10-12 PROCEDURE — 99204 OFFICE O/P NEW MOD 45 MIN: CPT | Performed by: FAMILY MEDICINE

## 2022-10-12 PROCEDURE — 90677 PCV20 VACCINE IM: CPT | Performed by: FAMILY MEDICINE

## 2022-10-12 PROCEDURE — 90662 IIV NO PRSV INCREASED AG IM: CPT | Performed by: FAMILY MEDICINE

## 2022-10-12 PROCEDURE — G0008 ADMIN INFLUENZA VIRUS VAC: HCPCS | Performed by: FAMILY MEDICINE

## 2022-10-12 NOTE — PROGRESS NOTES
Assessment/Plan:   Diagnoses and all orders for this visit:    Encounter to establish care   - reviewed PMHx, PSHx, meds, allergies, FHx, Soc Hx   - UTD with COVID IMMs but no Booster - advised to schedule  - interested in annual Flu vaccine and PCV20 and received both in office today  - reviewed labs done 4/2022  - declined Colon Ca screening   - script given for annual Mammo   - RTO in 6months, with labs, for f/u and AWV - pt and Daughter aware and agreeable     Need for influenza vaccination  -     influenza vaccine, high-dose, PF 0 7 mL (FLUZONE HIGH-DOSE)  Need for pneumococcal vaccination  -     Pneumococcal Conjugate Vaccine 20-valent (Pcv20)  Vaccine counseling    Age related osteoporosis, unspecified pathological fracture presence  -     Ambulatory Referral to Rheumatology; Future  - pt with severe osteoporosis per DXA scan in 2020  - will refer to Rheum to evaluate and treat     Encounter for screening mammogram for malignant neoplasm of breast  -     Mammo screening bilateral w 3d & cad; Future    Colon cancer screening declined    Hyperlipidemia, unspecified hyperlipidemia type  -     Lipid panel; Future  - cont Statin   - The 10-year ASCVD risk score (Naye Etienne et al , 2013) is: 21 2%    Values used to calculate the score:      Age: 64 years      Sex: Female      Is Non- : No      Diabetic: No      Tobacco smoker: No      Systolic Blood Pressure: 753 mmHg      Is BP treated: No      HDL Cholesterol: 66 mg/dL      Total Cholesterol: 165 mg/dL    Stage 2 chronic kidney disease  -     Comprehensive metabolic panel; Future    Moderate mitral regurgitation  Hypertension, unspecified type  -     Ambulatory Referral to Cardiology;  Future  - BP stable in the office  - not on any antihypertensives   - of note, overdue for Cardio f/u - advised to schedule     Thrombocytopenia (HCC)  -     CBC and differential; Future    Wears glasses  -     Ambulatory Referral to Ophthalmology; Future          Subjective:    Patient ID: Chacho Calix is a 64 y o  female  Chacho Calix is a 64 y o  female who presents to the office with her Daughter to establish care  - pt lives with her Daughter   - prior PCP: Dr Cosme Muniz (retired)   - Specialists: Cardio, NeuroSurg, Urology, Audiologist    - PMHx: GERD, Gallstones, Moderate Mitral Regurg, HTN, CHF, central cord syndrome, Osteoporosis, Thrombocytopenia, CKD, HL, abnml LFTs, JARET, Anemia, Cervical Stenosis, h/o COVID (12/2021)    - allergies: Bee Venom - anaphylaxis -- unable to tolerate EpiPen   - Meds: see med rec   - PSHx: see list   - FHx: M (HD)  - Immunizations: UTD with COVID IMMs, interested in annual Flu vaccine and PCV20 and will get both in office today   - Hm: declined Colon Ca screening   - social: former smoker,    - last vision: >1yr ago   - ROS: today in the office pt denies F/C/N/V/HA/visual changes/CP/palpitations/SOB/wheezing/abd pain/D/LE edema      The following portions of the patient's history were reviewed and updated as appropriate: allergies, current medications, past family history, past medical history, past social history, past surgical history and problem list     Review of Systems  as per HPI    Objective:  /68   Pulse 94   Resp 16   Ht 5' 2" (1 575 m)   Wt 71 7 kg (158 lb)   SpO2 98%   BMI 28 90 kg/m²    Physical Exam  Vitals reviewed  Constitutional:       General: She is not in acute distress  Appearance: Normal appearance  She is not ill-appearing, toxic-appearing or diaphoretic  HENT:      Head: Normocephalic and atraumatic  Right Ear: External ear normal       Left Ear: External ear normal       Nose: Nose normal    Eyes:      General: No scleral icterus  Right eye: No discharge  Left eye: No discharge  Extraocular Movements: Extraocular movements intact  Conjunctiva/sclera: Conjunctivae normal    Cardiovascular:      Rate and Rhythm: Normal rate and regular rhythm  Pulmonary:      Effort: Pulmonary effort is normal       Breath sounds: Normal breath sounds  Abdominal:      Palpations: Abdomen is soft  Musculoskeletal:         General: Normal range of motion  Cervical back: Normal range of motion  Right lower leg: No edema  Left lower leg: No edema  Skin:     General: Skin is warm  Neurological:      General: No focal deficit present  Mental Status: She is alert and oriented to person, place, and time  Psychiatric:         Mood and Affect: Mood normal          Behavior: Behavior normal          BMI Counseling: Body mass index is 28 9 kg/m²  The BMI is above normal  Nutrition recommendations include 3-5 servings of fruits/vegetables daily  Exercise recommendations include exercising 3-5 times per week  Depression Screening Follow-up Plan: Patient's depression screening was positive with a PHQ-2 score of 0  Their PHQ-9 score was   Clinically patient does not have depression  No treatment is required

## 2022-10-13 DIAGNOSIS — J30.9 ALLERGIC RHINITIS, UNSPECIFIED SEASONALITY, UNSPECIFIED TRIGGER: ICD-10-CM

## 2022-10-13 DIAGNOSIS — E78.5 HYPERLIPIDEMIA, UNSPECIFIED HYPERLIPIDEMIA TYPE: ICD-10-CM

## 2022-10-13 RX ORDER — ATORVASTATIN CALCIUM 20 MG/1
20 TABLET, FILM COATED ORAL
Qty: 90 TABLET | Refills: 0 | Status: SHIPPED | OUTPATIENT
Start: 2022-10-13

## 2022-10-13 RX ORDER — LORATADINE 10 MG/1
10 TABLET ORAL DAILY
Qty: 90 TABLET | Refills: 0 | Status: SHIPPED | OUTPATIENT
Start: 2022-10-13

## 2022-11-07 DIAGNOSIS — K21.9 GASTROESOPHAGEAL REFLUX DISEASE: ICD-10-CM

## 2022-11-09 RX ORDER — PANTOPRAZOLE SODIUM 40 MG/1
40 TABLET, DELAYED RELEASE ORAL DAILY
Qty: 90 TABLET | Refills: 1 | Status: SHIPPED | OUTPATIENT
Start: 2022-11-09

## 2023-01-16 DIAGNOSIS — J30.9 ALLERGIC RHINITIS, UNSPECIFIED SEASONALITY, UNSPECIFIED TRIGGER: ICD-10-CM

## 2023-01-16 DIAGNOSIS — N18.2 STAGE 2 CHRONIC KIDNEY DISEASE: ICD-10-CM

## 2023-01-16 DIAGNOSIS — E78.5 HYPERLIPIDEMIA, UNSPECIFIED HYPERLIPIDEMIA TYPE: ICD-10-CM

## 2023-01-16 RX ORDER — ATORVASTATIN CALCIUM 20 MG/1
20 TABLET, FILM COATED ORAL
Qty: 90 TABLET | Refills: 0 | Status: SHIPPED | OUTPATIENT
Start: 2023-01-16

## 2023-01-16 RX ORDER — LORATADINE 10 MG/1
10 TABLET ORAL DAILY
Qty: 90 TABLET | Refills: 0 | Status: SHIPPED | OUTPATIENT
Start: 2023-01-16

## 2023-01-16 RX ORDER — FOLIC ACID/VIT B COMPLEX AND C 0.8 MG
1 TABLET ORAL DAILY
Qty: 100 TABLET | Refills: 0 | Status: SHIPPED | OUTPATIENT
Start: 2023-01-16

## 2023-01-26 ENCOUNTER — HOSPITAL ENCOUNTER (OUTPATIENT)
Dept: RADIOLOGY | Facility: HOSPITAL | Age: 78
Discharge: HOME | End: 2023-01-26

## 2023-01-26 VITALS — HEIGHT: 62 IN | BODY MASS INDEX: 24.48 KG/M2 | WEIGHT: 133 LBS

## 2023-01-26 DIAGNOSIS — Z12.31 ENCOUNTER FOR SCREENING MAMMOGRAM FOR MALIGNANT NEOPLASM OF BREAST: ICD-10-CM

## 2023-01-27 ENCOUNTER — TELEPHONE (OUTPATIENT)
Dept: FAMILY MEDICINE CLINIC | Facility: CLINIC | Age: 78
End: 2023-01-27

## 2023-01-27 NOTE — TELEPHONE ENCOUNTER
----- Message from Mitch Gomez DO sent at 1/27/2023  8:34 AM EST -----  Nml Mammo - repeat annually   Please schedule pt for her AWV

## 2023-02-17 NOTE — PROGRESS NOTES
Cardiology Follow Up    Tyrone Ma  1945  [id number]  [address] [id number] [phone]    1  Pure hypercholesterolemia  Echo complete w/ contrast if indicated      2  Family history of cardiac disorder  Echo complete w/ contrast if indicated      3  Hypertension, unspecified type  Echo complete w/ contrast if indicated    Ambulatory Referral to Cardiology      4  Nonrheumatic mitral valve regurgitation            Interval History: Patient is here for cardiology follow-up  Patient has HLD and FH of heart disease  Pharmacologic nuclear stress test done December 2021 was negative for ischemia     Echocardiogram done December 2021 demonstrated LVEF of 55% with mild LVH and mild LAE with mild to moderate MR  Patient with history of cervical spine fusion 2021  The patient has had no chest pain or significant dyspnea  She is here today with her son  She uses a walker to ambulate  She is going to be [de-identified] in November of this year      Patient Active Problem List   Diagnosis   • GERD (gastroesophageal reflux disease)   • History of asthma   • Hypertension   • Cholelithiasis   • Moderate mitral regurgitation   • Vertigo   • Recurrent falls   • Gait disturbance   • Hyperlipidemia   • Generalized weakness   • Abnormal LFTs   • Anxiety   • Age related osteoporosis   • Thrombocytopenia ([address] )   • Fall against object   • Central cord syndrome ([address] )   • History of recurrent UTIs   • Dyslipidemia   • Anemia   • CKD (chronic kidney disease)   • CHF (congestive heart failure) ([facility])   • Cervical stenosis of spinal canal   • Overgrown toenails   • Status post cervical spinal fusion   • Intertriginous dermatitis associated with moisture     Past Medical History:   Diagnosis Date   • Cholelithiasis    • COVID-19 12/22/2021   • GERD (gastroesophageal reflux disease)    • Influenza 1/29/2019   • Migraine headache    • Nasal bone fractures 7/25/2021   • Orthostatic hypotension 7/25/2021   • Pneumonia    • Urinary tract infection      Social History     Socioeconomic History   • Marital status: /Civil Union     Spouse name: Not on file   • Number of children: Not on file   • Years of education: Not on file   • Highest education level: Not on file   Occupational History   • Not on file   Tobacco Use   • Smoking status: Former     Packs/day: 1 50     Years: 10 00     Pack years: 15 00     Types: Cigarettes   • Smokeless tobacco: Never   • Tobacco comments:     quit age 20   Vaping Use   • Vaping Use: Never used   Substance and Sexual Activity   • Alcohol use: Never     Alcohol/week: 0 0 standard drinks   • Drug use: Never   • Sexual activity: Not on file   Other Topics Concern   • Not on file   Social History Narrative    Lives with      Social Determinants of Health     Financial Resource Strain: Not on file   Food Insecurity: Not on file   Transportation Needs: Not on file   Physical Activity: Not on file   Stress: Not on file   Social Connections: Not on file   Intimate Partner Violence: Not on file   Housing Stability: Not on file      Family History   Problem Relation Age of Onset   • Heart disease Mother    • No Known Problems Father    • No Known Problems Son    • No Known Problems Sister    • No Known Problems Daughter    • No Known Problems Sister    • No Known Problems Sister    • No Known Problems Sister      Past Surgical History:   Procedure Laterality Date   • NO PAST SURGERIES     • [place] ARTHRD ANT INTERBODY  [address] CRV BELOW C2 Bilateral 7/20/2021    Procedure: Anterior cervical discectomy and fusion C4-5, posterior cervical decompresion and fusion C2-T2;  Surgeon: Ubaldo Calderon MD;  Location:  MAIN OR;  Service: Neurosurgery       Current Outpatient Medications:   •  ALPRAZolam (XANAX) 0 25 mg tablet, Take 1 tablet (0 25 mg total) by mouth daily at bedtime as needed for anxiety, Disp: 30 tablet, Rfl: 1  •  atorvastatin (LIPITOR) 20 mg tablet, Take 1 tablet (20 mg total) by mouth daily with dinner, Disp: 90 tablet, Rfl: 0  •  B Complex-C-Folic Acid (Ayanna-Dylan) TABS, Take 1 tablet by mouth daily Dispense one bottle, regardless of size and quantity  Patient takes daily  , Disp: 100 tablet, Rfl: 0  •  cephalexin (KEFLEX) 250 mg capsule, Take 250 mg by mouth daily, Disp: , Rfl:   •  CRANBERRY PO, Take by mouth, Disp: , Rfl:   •  Docusate Sodium (COLACE PO), Take by mouth, Disp: , Rfl:   •  loratadine (CLARITIN) 10 mg tablet, Take 1 tablet (10 mg total) by mouth daily, Disp: 90 tablet, Rfl: 0  •  ondansetron (Zofran ODT) 4 mg disintegrating tablet, Take 1 tablet (4 mg total) by mouth every 6 (six) hours as needed for nausea or vomiting, Disp: 10 tablet, Rfl: 0  •  lidocaine (Lidoderm) 5 %, Apply 1 patch topically daily Remove & Discard patch within 12 hours or as directed by MD, Disp: 30 patch, Rfl: 1  •  pantoprazole (PROTONIX) 40 mg tablet, Take 1 tablet (40 mg total) by mouth daily (Patient not taking: Reported on 2/27/2023), Disp: 90 tablet, Rfl: 1  Allergies   Allergen Reactions   • Bee Venom Anaphylaxis       Labs:not applicable  Imaging: Mammo screening bilateral w 3d & cad    Result Date: 1/26/2023  Narrative: DIAGNOSIS: Encounter for screening mammogram for malignant neoplasm of breast TECHNIQUE: Digital screening mammography was performed  Computer Aided Detection (CAD) analyzed all applicable images  COMPARISONS: Prior breast imaging dated: 10/26/2021, 07/09/2020, 07/07/2008, and 01/10/2007 RELEVANT HISTORY: Family Breast Cancer History: No known family history of breast cancer  Family Medical History: No known relevant family medical history  Personal History: No known relevant hormone history  No known relevant surgical history  No known relevant medical history  The patient is scheduled in a reminder system for screening mammography  8-10% of cancers will be missed on mammography   Management of a palpable abnormality must be based on clinical grounds  Patients will be notified of their results via letter from our facility  Accredited by [employer] of [employer] and FDA  RISK ASSESSMENT: 5 Year Tyrer-Cuzick: 0 86 % 10 Year Tyrer-Cuzick: No Score Lifetime Tyrer-Cuzick: 1 05 % TISSUE DENSITY: The breasts are heterogeneously dense, which may obscure small masses  INDICATION: Yogi Hinojosa is a 76 y o  female presenting for screening mammography  FINDINGS: There are no suspicious masses, grouped microcalcifications or areas of architectural distortion  The skin and nipple areolar complex are unremarkable  Impression: No mammographic evidence of malignancy  ASSESSMENT/BI-RADS CATEGORY: Left: 1 - Negative Right: 1 - Negative Overall: 1 - Negative RECOMMENDATION:      - Routine screening mammogram in 1 year for both breasts  Workstation ID: [id number]       Review of Systems:  Review of Systems   All other systems reviewed and are negative  Physical Exam:  /78 (BP Location: Left arm, Patient Position: Sitting, Cuff Size: Standard)   Pulse 92   Wt 58 5 kg (129 lb)   SpO2 98%   BMI 23 59 kg/m²   Physical Exam  Vitals reviewed  Constitutional:       Appearance: She is well-developed  HENT:      Head: Normocephalic and atraumatic  Eyes:      Conjunctiva/sclera: Conjunctivae normal       Pupils: Pupils are equal, round, and reactive to light  Cardiovascular:      Rate and Rhythm: Normal rate  Heart sounds: Normal heart sounds  Pulmonary:      Effort: Pulmonary effort is normal       Breath sounds: Normal breath sounds  Musculoskeletal:      Cervical back: Normal range of motion and neck supple  Skin:     General: Skin is warm and dry  Neurological:      Mental Status: She is alert and oriented to person, place, and time  Discussion/Summary:I will continue the patient's present medical regimen  The patient appears well compensated    I have asked the patient to call if there is a problem in the interim otherwise I will see the patient in one years time  Patient is due for an echocardiogram prior to the next visit to assess LV wall thickness and systolic function

## 2023-02-27 ENCOUNTER — OFFICE VISIT (OUTPATIENT)
Dept: CARDIOLOGY CLINIC | Facility: CLINIC | Age: 78
End: 2023-02-27

## 2023-02-27 VITALS
BODY MASS INDEX: 23.59 KG/M2 | OXYGEN SATURATION: 98 % | WEIGHT: 129 LBS | DIASTOLIC BLOOD PRESSURE: 78 MMHG | SYSTOLIC BLOOD PRESSURE: 110 MMHG | HEART RATE: 92 BPM

## 2023-02-27 DIAGNOSIS — E78.00 PURE HYPERCHOLESTEROLEMIA: Primary | ICD-10-CM

## 2023-02-27 DIAGNOSIS — I34.0 NONRHEUMATIC MITRAL VALVE REGURGITATION: ICD-10-CM

## 2023-02-27 DIAGNOSIS — Z82.49 FAMILY HISTORY OF CARDIAC DISORDER: ICD-10-CM

## 2023-02-27 DIAGNOSIS — I10 HYPERTENSION, UNSPECIFIED TYPE: ICD-10-CM

## 2023-02-27 RX ORDER — CEPHALEXIN 250 MG/1
250 CAPSULE ORAL DAILY
COMMUNITY
Start: 2023-01-16

## 2023-03-08 ENCOUNTER — HOSPITAL ENCOUNTER (OUTPATIENT)
Dept: NON INVASIVE DIAGNOSTICS | Facility: HOSPITAL | Age: 78
Discharge: HOME | End: 2023-03-08

## 2023-03-08 VITALS
HEART RATE: 95 BPM | HEIGHT: 62 IN | WEIGHT: 129 LBS | SYSTOLIC BLOOD PRESSURE: 110 MMHG | DIASTOLIC BLOOD PRESSURE: 78 MMHG | BODY MASS INDEX: 23.74 KG/M2

## 2023-03-08 DIAGNOSIS — E78.00 PURE HYPERCHOLESTEROLEMIA: ICD-10-CM

## 2023-03-08 DIAGNOSIS — I10 HYPERTENSION, UNSPECIFIED TYPE: ICD-10-CM

## 2023-03-08 DIAGNOSIS — F41.9 ANXIETY: ICD-10-CM

## 2023-03-08 DIAGNOSIS — Z82.49 FAMILY HISTORY OF CARDIAC DISORDER: ICD-10-CM

## 2023-03-08 LAB
AORTIC ROOT: 2.7 CM
APICAL FOUR CHAMBER EJECTION FRACTION: 60 %
E WAVE DECELERATION TIME: 188 MS
FRACTIONAL SHORTENING: 34 (ref 28–44)
INTERVENTRICULAR SEPTUM IN DIASTOLE (PARASTERNAL SHORT AXIS VIEW): 1.1 CM
INTERVENTRICULAR SEPTUM: 1.1 CM (ref 0.6–1.1)
LAAS-AP2: 13.9 CM2
LAAS-AP4: 11.4 CM2
LEFT ATRIUM SIZE: 2.9 CM
LEFT INTERNAL DIMENSION IN SYSTOLE: 2.9 CM (ref 2.1–4)
LEFT VENTRICULAR INTERNAL DIMENSION IN DIASTOLE: 4.4 CM (ref 3.5–6)
LEFT VENTRICULAR POSTERIOR WALL IN END DIASTOLE: 0.9 CM
LEFT VENTRICULAR STROKE VOLUME: 54 ML
LVSV (TEICH): 54 ML
MV E'TISSUE VEL-SEP: 7 CM/S
MV PEAK A VEL: 0.87 M/S
MV PEAK E VEL: 56 CM/S
MV STENOSIS PRESSURE HALF TIME: 54 MS
MV VALVE AREA P 1/2 METHOD: 4.07
RIGHT ATRIUM AREA SYSTOLE A4C: 10 CM2
RIGHT VENTRICLE ID DIMENSION: 2.6 CM
SL CV LEFT ATRIUM LENGTH A2C: 4.3 CM
SL CV PED ECHO LEFT VENTRICLE DIASTOLIC VOLUME (MOD BIPLANE) 2D: 85 ML
SL CV PED ECHO LEFT VENTRICLE SYSTOLIC VOLUME (MOD BIPLANE) 2D: 32 ML
TR MAX PG: 21 MMHG
TR PEAK VELOCITY: 2.3 M/S
TRICUSPID ANNULAR PLANE SYSTOLIC EXCURSION: 1.7 CM
TRICUSPID VALVE PEAK REGURGITATION VELOCITY: 2.28 M/S

## 2023-03-08 RX ORDER — ALPRAZOLAM 0.25 MG/1
0.25 TABLET ORAL
Qty: 30 TABLET | Refills: 1 | OUTPATIENT
Start: 2023-03-08

## 2023-03-15 ENCOUNTER — TELEMEDICINE (OUTPATIENT)
Dept: FAMILY MEDICINE CLINIC | Facility: CLINIC | Age: 78
End: 2023-03-15

## 2023-03-15 DIAGNOSIS — F41.9 ANXIETY: ICD-10-CM

## 2023-03-15 RX ORDER — ALPRAZOLAM 0.25 MG/1
0.25 TABLET ORAL
Qty: 30 TABLET | Refills: 0 | Status: SHIPPED | OUTPATIENT
Start: 2023-03-15

## 2023-03-15 NOTE — PROGRESS NOTES
Virtual Regular Visit    Verification of patient location:  Patient is located in the following state in which I hold an active license [place]      Assessment/Plan:  Problem List Items Addressed This Visit        Other    Anxiety    Relevant Medications    ALPRAZolam (XANAX) 0 25 mg tablet  - (+) "has had anxiety for years" and was given Xanax 0 25mg QHS PRN - as prescribed by her former PCP (Dr Radha Villarreal)   - takes it when thinks about having to go out - gets nervous and anxious re: falling, going to the bathroom, issues with claustrophobia   - takes maybe 1x/month   - erx for Xanax 0 25mg QHS PRN [id number] sent to pharmacy on file   - RTO in 1month for AWV - pt and daughter aware and agreeable             Reason for visit is   Chief Complaint   Patient presents with   • Follow-up     Discuss medication   • Anxiety   • Virtual Regular Visit        Encounter provider Rene Portillo DO    Provider located at [address] [place] [id number]      Recent Visits  No visits were found meeting these conditions  Showing recent visits within past 7 days and meeting all other requirements  Today's Visits  Date Type Provider Dept   03/15/23 Telemedicine Haylee Jeffrey DO [facility]   Showing today's visits and meeting all other requirements  Future Appointments  No visits were found meeting these conditions  Showing future appointments within next 150 days and meeting all other requirements       The patient was identified by name and date of birth  Albert Thorpe was informed that this is a telemedicine visit and that the visit is being conducted through the [address] [place] [address] Road Now platform  She agrees to proceed     My office door was closed  No one else was in the room  She acknowledged consent and understanding of privacy and security of the video platform  The patient has agreed to participate and understands they can discontinue the visit at any time      Patient is aware this is a [facility] service  Zulma Esquivel is a 76 y o  female who presents virtually with her daughter for eval of anxiety  HPI   - (+) "has had anxiety for years" and was given Xanax 0 25mg QHS PRN - as prescribed by her former PCP (Dr Yessi Akins)   - takes it when thinks about having to go out - gets nervous and anxious re: falling, going to the bathroom, issues with claustrophobia   - takes maybe 1x/month       Past Medical History:   Diagnosis Date   • Cholelithiasis    • COVID-19 12/22/2021   • GERD (gastroesophageal reflux disease)    • Influenza 1/29/2019   • Migraine headache    • Nasal bone fractures 7/25/2021   • Orthostatic hypotension 7/25/2021   • Pneumonia    • Urinary tract infection        Past Surgical History:   Procedure Laterality Date   • NO PAST SURGERIES     • IN ARTHRD ANT INTERBODY  [address] CRV BELOW C2 Bilateral 7/20/2021    Procedure: Anterior cervical discectomy and fusion C4-5, posterior cervical decompresion and fusion C2-T2;  Surgeon: Karla Garcia MD;  [facility];  Service: Neurosurgery       Current Outpatient Medications   Medication Sig Dispense Refill   • ALPRAZolam (XANAX) 0 25 mg tablet Take 1 tablet (0 25 mg total) by mouth daily at bedtime as needed for anxiety 30 tablet 0   • atorvastatin (LIPITOR) 20 mg tablet Take 1 tablet (20 mg total) by mouth daily with dinner 90 tablet 0   • B Complex-C-Folic Acid (Ayanna-Dylan) TABS Take 1 tablet by mouth daily Dispense one bottle, regardless of size and quantity  Patient takes daily   100 tablet 0   • cephalexin (KEFLEX) 250 mg capsule Take 250 mg by mouth daily     • CRANBERRY PO Take by mouth     • Docusate Sodium (COLACE PO) Take by mouth     • lidocaine (Lidoderm) 5 % Apply 1 patch topically daily Remove & Discard patch within 12 hours or as directed by MD 30 patch 1   • loratadine (CLARITIN) 10 mg tablet Take 1 tablet (10 mg total) by mouth daily 90 tablet 0   • ondansetron (Zofran ODT) 4 mg disintegrating tablet Take 1 tablet (4 mg total) by mouth every 6 (six) hours as needed for nausea or vomiting 10 tablet 0   • pantoprazole (PROTONIX) 40 mg tablet Take 1 tablet (40 mg total) by mouth daily (Patient not taking: Reported on 2/27/2023) 90 tablet 1     No current facility-administered medications for this visit  Allergies   Allergen Reactions   • Bee Venom Anaphylaxis       Review of Systems as per HPI     Video Exam  There were no vitals filed for this visit      Physical Exam   General: NAD  HEENT: NC/AT, EOMI, clear conjunctiva, nml external ear and nose   Cardio: deferred  Pulm: no acute respiratory distress, able to talk in complete sentences w/o getting short of breath   Abd: deferred   Psych: nml mood/affect/behavior       I spent 15 minutes directly with the patient during this visit unknown

## 2023-05-22 NOTE — TELEPHONE ENCOUNTER
8/13/21- PT DISCHARGED HOME  6WK POV W/ XRAYS SCHEDULED 9/3/21    8/12/21- [facility] Patient has started a diverticulitis flare- up. Symptoms started 2 days ago. Patient and wife were both on phone. Asking for RX to get started before he ends up in hospital.    The medication that was prescribed in hospital in February is below:      Asking for this ASAP so it can be started today,        amoxicillin-potassium clavulanate (AUGMENTIN) 875-125 mg tablet   Take 1 Tab by mouth two times a day for 7 days 14 Tab         Please send to :    [address]       Please advise

## 2023-08-16 ENCOUNTER — HOSPITAL ENCOUNTER (OUTPATIENT)
Dept: RADIOLOGY | Facility: HOSPITAL | Age: 78
Discharge: HOME | End: 2023-08-16
Payer: COMMERCIAL

## 2023-08-16 VITALS — BODY MASS INDEX: 24.48 KG/M2 | HEIGHT: 62 IN | WEIGHT: 133 LBS

## 2023-08-16 DIAGNOSIS — Z98.1 STATUS POST CERVICAL SPINAL FUSION: ICD-10-CM

## 2023-08-16 DIAGNOSIS — M81.0 AGE RELATED OSTEOPOROSIS, UNSPECIFIED PATHOLOGICAL FRACTURE PRESENCE: ICD-10-CM

## 2023-08-16 PROCEDURE — 77080 DXA BONE DENSITY AXIAL: CPT

## 2023-08-16 PROCEDURE — 72050 X-RAY EXAM NECK SPINE 4/5VWS: CPT

## 2023-08-22 DIAGNOSIS — M81.0 OSTEOPOROSIS, UNSPECIFIED OSTEOPOROSIS TYPE, UNSPECIFIED PATHOLOGICAL FRACTURE PRESENCE: Primary | ICD-10-CM

## 2023-08-23 ENCOUNTER — TELEPHONE (OUTPATIENT)
Dept: FAMILY MEDICINE CLINIC | Facility: CLINIC | Age: 78
End: 2023-08-23

## 2023-08-23 ENCOUNTER — OFFICE VISIT (OUTPATIENT)
Dept: NEUROSURGERY | Facility: CLINIC | Age: 78
End: 2023-08-23
Payer: COMMERCIAL

## 2023-08-23 VITALS
TEMPERATURE: 97.3 F | HEART RATE: 97 BPM | WEIGHT: 133 LBS | BODY MASS INDEX: 24.48 KG/M2 | HEIGHT: 62 IN | DIASTOLIC BLOOD PRESSURE: 80 MMHG | RESPIRATION RATE: 16 BRPM | OXYGEN SATURATION: 98 % | SYSTOLIC BLOOD PRESSURE: 122 MMHG

## 2023-08-23 DIAGNOSIS — Z09 FOLLOW-UP EXAMINATION, FOLLOWING OTHER SURGERY: ICD-10-CM

## 2023-08-23 DIAGNOSIS — Z98.1 STATUS POST CERVICAL SPINAL FUSION: Primary | ICD-10-CM

## 2023-08-23 PROCEDURE — 99213 OFFICE O/P EST LOW 20 MIN: CPT | Performed by: STUDENT IN AN ORGANIZED HEALTH CARE EDUCATION/TRAINING PROGRAM

## 2023-08-23 NOTE — PROGRESS NOTES
Office Note - Neurosurgery   [facility] 76 [facility] MRN: [id number]      Assessment and Plan: This is a 75-year-old female status post C4-5 ACDF and C2-T2 posterior cervical decompression and fusion presenting for her 2-year postsurgical follow-up visit. X-rays demonstrate hardware in place without any evidence of hardware failure. The patient also has DEXA scan which demonstrates a score of -5.2. I advised her to continue her usual activities and try to be as active as she could be. I also advised patient to follow-up with her primary care physician for osteoporosis management. From the cervical spine standpoint, the patient does not require any further treatment. I will see her on a as needed basis. History, physical examination and diagnostic tests were reviewed and questions answered. Diagnosis, care plan and treatment options were discussed. The patient and family member patient and daughter understand instructions and will follow up as directed. Follow-up: PRN    I spent approximately 20 minutes with the patient. This time was dedicated to acquiring the patient's history, performing physical examination, reviewing pertinent imaging and discussing the treatment plan. Diagnoses and all orders for this visit:    Status post cervical spinal fusion    Follow-up examination, following other surgery        Subjective/Objective     Chief Complaint    2-year postsurgical follow-up visit    HPI    This is a 75-year-old female status post C4-5 ACDF and C2-T2 decompression and fusion for central cord syndrome presenting for her to year postsurgical follow-up visit. The patient states she is doing well. She has not noted any changes since her last visit. She continues to have numbness and tingling in her hands. She does not have any new complaints. She is primarily here to discuss imaging results. DONN POP personally reviewed and updated.     Review of Systems   Constitutional: Positive for appetite change (no appetite). HENT: Positive for hearing loss. Eyes: Negative. Respiratory: Positive for shortness of breath. Cardiovascular: Negative. Gastrointestinal: Positive for nausea. Dark stool  Infrequent BMs   Endocrine: Negative for heat intolerance. Genitourinary: Negative. Musculoskeletal: Positive for arthralgias, gait problem ( with cane), myalgias (shoulders), neck pain (c/o mid to neck pain into shoulders) and neck stiffness. Skin: Negative. Allergic/Immunologic: Negative for environmental allergies. Neurological: Positive for weakness (per pt right knee), numbness (once in a while in fingers, N&Burning in bi/feet worse on right) and headaches (on & off left back head pain). Right leg, burning sensation   Hematological: Negative. Psychiatric/Behavioral: Positive for sleep disturbance (wakes up every 2 hours). The patient is nervous/anxious.         Family History    Family History   Problem Relation Age of Onset   • Heart disease Mother    • No Known Problems Father    • No Known Problems Son    • No Known Problems Sister    • No Known Problems Daughter    • No Known Problems Sister    • No Known Problems Sister    • No Known Problems Sister        Social History    Social History     Socioeconomic History   • Marital status: /Civil Union     Spouse name: Not on file   • Number of children: Not on file   • Years of education: Not on file   • Highest education level: Not on file   Occupational History   • Not on file   Tobacco Use   • Smoking status: Former     Packs/day: 1.50     Years: 10.00     Total pack years: 15.00     Types: Cigarettes   • Smokeless tobacco: Never   • Tobacco comments:     quit age 20   Vaping Use   • Vaping Use: Never used   Substance and Sexual Activity   • Alcohol use: Never     Alcohol/week: 0.0 standard drinks of alcohol   • Drug use: Never   • Sexual activity: Not on file   Other Topics Concern   • Not on file   Social History Narrative    Lives with      Social Determinants of Health     Financial Resource Strain: Low Risk  (4/19/2023)    Overall Financial Resource Strain (CARDIA)    • Difficulty of Paying Living Expenses: Not hard at all   Food Insecurity: Not on file   Transportation Needs: No Transportation Needs (4/19/2023)    PRAPARE - Transportation    • Lack of Transportation (Medical): No    • Lack of Transportation (Non-Medical): No   Physical Activity: Not on file   Stress: Not on file   Social Connections: Not on file   Intimate Partner Violence: Not on file   Housing Stability: Not on file       Past Medical History    Past Medical History:   Diagnosis Date   • Cholelithiasis    • COVID-19 12/22/2021   • GERD (gastroesophageal reflux disease)    • Influenza 1/29/2019   • Migraine headache    • Nasal bone fractures 7/25/2021   • Orthostatic hypotension 7/25/2021   • Pneumonia    • Urinary tract infection        Surgical History    Past Surgical History:   Procedure Laterality Date   • NO PAST SURGERIES     • [place] ARTHRD ANT INTERBODY MIN [address] CRV BELOW C2 Bilateral 7/20/2021    Procedure: Anterior cervical discectomy and fusion C4-5, posterior cervical decompresion and fusion C2-T2;  Surgeon: Dalton Frank MD;  [facility];  Service: Neurosurgery       Medications      Current Outpatient Medications:   •  ALPRAZolam (XANAX) 0.25 mg tablet, Take 1 tablet (0.25 mg total) by mouth daily at bedtime as needed for anxiety, Disp: 30 tablet, Rfl: 0  •  atorvastatin (LIPITOR) 20 mg tablet, Take 1 tablet (20 mg total) by mouth daily with dinner, Disp: 90 tablet, Rfl: 1  •  B Complex-C-Folic Acid (Ayanna-Dylan) TABS, Take 1 tablet by mouth daily Dispense one bottle, regardless of size and quantity. Patient takes daily. , Disp: 90 tablet, Rfl: 1  •  cephalexin (KEFLEX) 250 mg capsule, Take 250 mg by mouth daily, Disp: , Rfl:   •  CRANBERRY PO, Take by mouth, Disp: , Rfl:   •  Docusate Sodium (COLACE PO), Take by mouth, Disp: , Rfl:   •  lidocaine (Lidoderm) 5 %, Apply 1 patch topically daily Remove & Discard patch within 12 hours or as directed by MD, Disp: 30 patch, Rfl: 1  •  loratadine (CLARITIN) 10 mg tablet, Take 1 tablet (10 mg total) by mouth daily, Disp: 90 tablet, Rfl: 1  •  ondansetron (Zofran ODT) 4 mg disintegrating tablet, Take 1 tablet (4 mg total) by mouth every 6 (six) hours as needed for nausea or vomiting, Disp: 10 tablet, Rfl: 0  •  pantoprazole (PROTONIX) 40 mg tablet, Take 1 tablet (40 mg total) by mouth daily, Disp: 90 tablet, Rfl: 1    Allergies    Allergies   Allergen Reactions   • Bee Venom Anaphylaxis       The following portions of the patient's history were reviewed and updated as appropriate: allergies, current medications, past family history, past medical history, past social history, past surgical history and problem list.    Investigations    I personally reviewed the XRAY results with the patient:      Physical Exam    Vitals:  not currently breastfeeding. ,There is no height or weight on file to calculate BMI.     General:  Normal, well developed, not in distress/pain     Skin:   No issues, no rashes noted     Musculoskeletal:   5/5 strength throughout all muscle groups  No tenderness to palpation of the spine       Neurologic Exam:  Awake and alert  Oriented x3  Speech clear and fluent  CASTILLO   Sensation to light touch and pin prick intact throughout  No carver's  No clonus  2+ patellar reflexes     Gait:   In wheelchair, normal posture

## 2023-08-23 NOTE — TELEPHONE ENCOUNTER
----- Message from Dipika Asif DO sent at 8/22/2023  4:51 PM EDT -----  (+) Osteoporosis - 1200mg Ca++ and Vit D 2000IU QD and referral for Rheum placed in chart to discuss treatment options

## 2023-09-21 ENCOUNTER — TELEPHONE (OUTPATIENT)
Dept: RHEUMATOLOGY | Facility: CLINIC | Age: 78
End: 2023-09-21

## 2023-09-21 ENCOUNTER — OFFICE VISIT (OUTPATIENT)
Dept: RHEUMATOLOGY | Facility: CLINIC | Age: 78
End: 2023-09-21
Payer: COMMERCIAL

## 2023-09-21 VITALS — SYSTOLIC BLOOD PRESSURE: 120 MMHG | DIASTOLIC BLOOD PRESSURE: 84 MMHG | HEART RATE: 82 BPM

## 2023-09-21 DIAGNOSIS — E55.9 VITAMIN D DEFICIENCY: ICD-10-CM

## 2023-09-21 DIAGNOSIS — Z79.899 ENCOUNTER FOR MONITORING DENOSUMAB THERAPY: ICD-10-CM

## 2023-09-21 DIAGNOSIS — Z51.81 ENCOUNTER FOR MONITORING DENOSUMAB THERAPY: ICD-10-CM

## 2023-09-21 DIAGNOSIS — M15.0 PRIMARY GENERALIZED (OSTEO)ARTHRITIS: ICD-10-CM

## 2023-09-21 DIAGNOSIS — Z13.29 SCREENING FOR THYROID DISORDER: ICD-10-CM

## 2023-09-21 DIAGNOSIS — M81.0 AGE-RELATED OSTEOPOROSIS WITHOUT CURRENT PATHOLOGICAL FRACTURE: Primary | ICD-10-CM

## 2023-09-21 PROCEDURE — 99205 OFFICE O/P NEW HI 60 MIN: CPT | Performed by: INTERNAL MEDICINE

## 2023-09-21 RX ORDER — MELATONIN
1000 DAILY
COMMUNITY

## 2023-09-21 NOTE — PROGRESS NOTES
Assessment and Plan:   Ms. Edna Zapien is a 49-year-old female with history significant for postmenopausal osteoporosis who presents for management. She is referred by Dr. Walker Escamilla for a rheumatology consult. Ishan Coppola presents today for an evaluation and management of postmenopausal osteoporosis that was initially detected on a DEXA scan in 2020 as per the records. It is unclear why she was not started on treatment at that time. Reassuringly she denies a history of fragility fractures, but we discussed given the advanced T-scores I recommend initiating treatment at this time. As an initial option I would like to start her on subcutaneous denosumab injections 60 mg every 6 months. I reviewed the side effects with her including but not limited to risk for arthralgias, infections, electrolyte abnormalities, osteonecrosis of the jaw and atypical femur fractures. She will continue the daily calcium supplements 600 mg twice daily and vitamin D 4000 international units daily. She will attempt weightbearing exercises if possible. I will obtain metabolic labs to assess for reversible causes of the osteoporosis. Plan:  Diagnoses and all orders for this visit:    Age-related osteoporosis without current pathological fracture  Comments:  15 years ago right wrist fracture  Orders:  -     Magnesium; Future  -     Phosphorus; Future  -     Protein electrophoresis, serum; Future  -     PTH, intact; Future  -     Comprehensive metabolic panel; Future  -     Ambulatory Referral to Rheumatology    Encounter for monitoring denosumab therapy    Primary generalized (osteo)arthritis    Screening for thyroid disorder  -     TSH, 3rd generation; Future    Vitamin D deficiency  -     Vitamin D 25 hydroxy; Future    Other orders  -     cholecalciferol (VITAMIN D3) 1,000 units tablet; Take 1,000 Units by mouth daily 2,000 units daily      I have personally reviewed pertinent films in PACS of the DEXA scan which shows osteoporosis. Activities as tolerated. Exercise: try to maintain a low impact exercise regimen as much as possible. Continue other medications as prescribed by PCP and other specialists. RTC for Prolia. HPI  Ms. Leo Cooks is a 69-year-old female with history significant for postmenopausal osteoporosis who presents for management. She is referred by Dr. Clay Hansen for a rheumatology consult. Patient cannot recall when she had her initial DEXA scan done but a diagnosis of osteoporosis was evident in 2020. She had an updated DEXA scan in August 2023 which continues to show osteoporosis and compared to July 2020 there was a statistically significant decrease in the bone mineral density of the lumbar spine, hips and left radius. The 10-year risk of hip fracture was 58% with a 10-year risk of major osteoporotic fracture of 65.9%. She reports a history of a right wrist fracture following a fall approximately 15 years ago and otherwise denies a history of fractures. After her recent DEXA scan she was started on calcium 600 mg twice daily and vitamin D 2000 international units twice daily. She does not perform any type of weightbearing exercise. No history of inflammatory arthritis, chronic kidney disease, chronic steroid use, smoking or alcohol intake. She reports a history of osteoporosis in her mother and sister. The following portions of the patient's history were reviewed and updated as appropriate: allergies, current medications, past family history, past medical history, past social history, past surgical history and problem list.      Review of Systems  Constitutional: Negative for weight change, fevers, chills, night sweats, fatigue. ENT/Mouth: Negative for hearing changes, ear pain, nasal congestion, sinus pain, hoarseness, sore throat, rhinorrhea, swallowing difficulty. Eyes: Negative for pain, redness, discharge, vision changes. Cardiovascular: Negative for chest pain, SOB, palpitations. Respiratory: Negative for cough, sputum, wheezing, dyspnea. Gastrointestinal: Negative for nausea, vomiting, diarrhea, constipation, pain, heartburn. Genitourinary: Negative for dysuria, urinary frequency, hematuria. Musculoskeletal: As per HPI. Skin: Negative for skin rash, color changes. Neuro: Negative for weakness, numbness, tingling, loss of consciousness. Psych: Negative for anxiety, depression. Heme/Lymph: Negative for easy bruising, bleeding, lymphadenopathy.         Past Medical History:   Diagnosis Date   • Cholelithiasis    • COVID-19 12/22/2021   • GERD (gastroesophageal reflux disease)    • Influenza 1/29/2019   • Migraine headache    • Nasal bone fractures 7/25/2021   • Orthostatic hypotension 7/25/2021   • Pneumonia    • Urinary tract infection        Past Surgical History:   Procedure Laterality Date   • NO PAST SURGERIES     • IA ARTHRD ANT INTERBODY MIN [address] CRV BELOW C2 Bilateral 7/20/2021    Procedure: Anterior cervical discectomy and fusion C4-5, posterior cervical decompresion and fusion C2-T2;  Surgeon: Rashel Monsivais MD;  Location: [facility];  Service: Neurosurgery       Social History     Socioeconomic History   • Marital status: /Civil Union     Spouse name: Not on file   • Number of children: Not on file   • Years of education: Not on file   • Highest education level: Not on file   Occupational History   • Not on file   Tobacco Use   • Smoking status: Former     Packs/day: 1.50     Years: 10.00     Total pack years: 15.00     Types: Cigarettes   • Smokeless tobacco: Never   • Tobacco comments:     quit age 20   Vaping Use   • Vaping Use: Never used   Substance and Sexual Activity   • Alcohol use: Never     Alcohol/week: 0.0 standard drinks of alcohol   • Drug use: Never   • Sexual activity: Not on file   Other Topics Concern   • Not on file   Social History Narrative    Lives with      Social Determinants of Health     Financial Resource Strain: Low Risk (4/19/2023)    Overall Financial Resource Strain (CARDIA)    • Difficulty of Paying Living Expenses: Not hard at all   Food Insecurity: Not on file   Transportation Needs: No Transportation Needs (4/19/2023)    PRAPARE - Transportation    • Lack of Transportation (Medical): No    • Lack of Transportation (Non-Medical): No   Physical Activity: Not on file   Stress: Not on file   Social Connections: Not on file   Intimate Partner Violence: Not on file   Housing Stability: Not on file       Family History   Problem Relation Age of Onset   • Heart disease Mother    • No Known Problems Father    • No Known Problems Son    • No Known Problems Sister    • No Known Problems Daughter    • No Known Problems Sister    • No Known Problems Sister    • No Known Problems Sister        Allergies   Allergen Reactions   • Bee Venom Anaphylaxis       Current Outpatient Medications:   •  cholecalciferol (VITAMIN D3) 1,000 units tablet, Take 1,000 Units by mouth daily 2,000 units daily, Disp: , Rfl:   •  ALPRAZolam (XANAX) 0.25 mg tablet, Take 1 tablet (0.25 mg total) by mouth daily at bedtime as needed for anxiety, Disp: 30 tablet, Rfl: 0  •  atorvastatin (LIPITOR) 20 mg tablet, Take 1 tablet (20 mg total) by mouth daily with dinner, Disp: 90 tablet, Rfl: 1  •  B Complex-C-Folic Acid (Ayanna-Dylan) TABS, Take 1 tablet by mouth daily Dispense one bottle, regardless of size and quantity. Patient takes daily. , Disp: 90 tablet, Rfl: 1  •  cephalexin (KEFLEX) 250 mg capsule, Take 250 mg by mouth daily (Patient not taking: Reported on 8/23/2023), Disp: , Rfl:   •  CRANBERRY PO, Take by mouth, Disp: , Rfl:   •  Docusate Sodium (COLACE PO), Take by mouth, Disp: , Rfl:   •  lidocaine (Lidoderm) 5 %, Apply 1 patch topically daily Remove & Discard patch within 12 hours or as directed by MD, Disp: 30 patch, Rfl: 1  •  loratadine (CLARITIN) 10 mg tablet, Take 1 tablet (10 mg total) by mouth daily, Disp: 90 tablet, Rfl: 1  •  ondansetron (Zofran ODT) 4 mg disintegrating tablet, Take 1 tablet (4 mg total) by mouth every 6 (six) hours as needed for nausea or vomiting, Disp: 10 tablet, Rfl: 0  •  pantoprazole (PROTONIX) 40 mg tablet, Take 1 tablet (40 mg total) by mouth daily, Disp: 90 tablet, Rfl: 1      Objective:    Vitals:    09/21/23 1110   BP: 120/84   Pulse: 82       Physical Exam  General: Well appearing, well nourished, in no distress. Oriented x 3, normal mood and affect. Ambulating with with aid of a wheelchair. Skin: Good turgor, no rash, unusual bruising or prominent lesions. Hair: Normal texture and distribution. Nails: Normal color, no deformities. HEENT:  Head: Normocephalic, atraumatic. Eyes: Conjunctiva clear, sclera non-icteric, EOM intact. Neurologic: Alert and oriented. Psychiatric: Normal mood and affect. Ginger Pa M.D.   Rheumatology

## 2023-09-25 NOTE — TELEPHONE ENCOUNTER
Caller: Leslie  Doctor: Lesa Kevin    Reason for call: cb for Lorenzo Parker     Call back#: [phone]    Request denied,  Letter will be sent.

## 2023-09-25 NOTE — TELEPHONE ENCOUNTER
Spoke with william at patients insurance and preferred medication for patients insurance is either Fosamax or Reclast. Please advise?

## 2023-09-25 NOTE — TELEPHONE ENCOUNTER
Let's do Reclast then - once yearly infusion - please let patient know and if she is okay with this can do prior auth.

## 2023-09-28 NOTE — TELEPHONE ENCOUNTER
Caller: Daughter, Andree Ch     Doctor: Nerissa Tipton     Reason for call: Asking if the office was notified of the Prolia denial. I advised her that the doctor was going to try to get an auth for Reclast infusion.  Please call with an update    Call back#: [phone]

## 2023-09-29 NOTE — TELEPHONE ENCOUNTER
No prior auth required for Reclast. Left message for patients daughter to ask where they would like to do infusion.

## 2023-09-29 NOTE — TELEPHONE ENCOUNTER
Caller: Ariadna Garcia    Doctor: Fatoumata Shay    Reason for call: Returning Ramonita's call would like to go to infusion ctr in [place]. Does have questions re:how infusion will be done.  Please call to discuss    Call back#: [phone]

## 2023-10-05 DIAGNOSIS — N18.2 STAGE 2 CHRONIC KIDNEY DISEASE: ICD-10-CM

## 2023-10-05 DIAGNOSIS — K21.9 GASTROESOPHAGEAL REFLUX DISEASE: ICD-10-CM

## 2023-10-05 DIAGNOSIS — J30.9 ALLERGIC RHINITIS, UNSPECIFIED SEASONALITY, UNSPECIFIED TRIGGER: ICD-10-CM

## 2023-10-05 DIAGNOSIS — E78.5 HYPERLIPIDEMIA, UNSPECIFIED HYPERLIPIDEMIA TYPE: ICD-10-CM

## 2023-10-05 RX ORDER — PANTOPRAZOLE SODIUM 40 MG/1
40 TABLET, DELAYED RELEASE ORAL DAILY
Qty: 90 TABLET | Refills: 1 | Status: SHIPPED | OUTPATIENT
Start: 2023-10-05

## 2023-10-05 RX ORDER — ATORVASTATIN CALCIUM 20 MG/1
20 TABLET, FILM COATED ORAL
Qty: 90 TABLET | Refills: 1 | Status: SHIPPED | OUTPATIENT
Start: 2023-10-05

## 2023-10-05 RX ORDER — LORATADINE 10 MG/1
10 TABLET ORAL DAILY
Qty: 90 TABLET | Refills: 1 | Status: SHIPPED | OUTPATIENT
Start: 2023-10-05

## 2023-10-10 ENCOUNTER — TELEPHONE (OUTPATIENT)
Dept: INFUSION CENTER | Facility: HOSPITAL | Age: 78
End: 2023-10-10

## 2023-10-10 RX ORDER — FOLIC ACID/VIT B COMPLEX AND C 0.8 MG
1 TABLET ORAL DAILY
Qty: 100 TABLET | Refills: 1 | Status: SHIPPED | OUTPATIENT
Start: 2023-10-10

## 2023-10-11 ENCOUNTER — HOSPITAL ENCOUNTER (OUTPATIENT)
Dept: INFUSION CENTER | Facility: HOSPITAL | Age: 78
Discharge: HOME | End: 2023-10-11
Attending: INTERNAL MEDICINE
Payer: COMMERCIAL

## 2023-10-11 VITALS
WEIGHT: 136 LBS | HEART RATE: 97 BPM | TEMPERATURE: 97.2 F | SYSTOLIC BLOOD PRESSURE: 139 MMHG | OXYGEN SATURATION: 100 % | BODY MASS INDEX: 24.87 KG/M2 | DIASTOLIC BLOOD PRESSURE: 81 MMHG

## 2023-10-11 DIAGNOSIS — M81.0 AGE-RELATED OSTEOPOROSIS WITHOUT CURRENT PATHOLOGICAL FRACTURE: Primary | ICD-10-CM

## 2023-10-11 LAB
ALBUMIN SERPL BCP-MCNC: 4.2 G/DL (ref 3.5–5)
ALP SERPL-CCNC: 78 U/L (ref 34–104)
ALT SERPL W P-5'-P-CCNC: 12 U/L (ref 7–52)
ANION GAP SERPL CALCULATED.3IONS-SCNC: 7 MMOL/L
AST SERPL W P-5'-P-CCNC: 16 U/L (ref 13–39)
BILIRUB SERPL-MCNC: 0.36 MG/DL (ref 0.2–1)
BUN SERPL-MCNC: 22 MG/DL (ref 5–25)
CALCIUM SERPL-MCNC: 9.7 MG/DL (ref 8.4–10.2)
CHLORIDE SERPL-SCNC: 106 MMOL/L (ref 96–108)
CO2 SERPL-SCNC: 26 MMOL/L (ref 21–32)
CREAT SERPL-MCNC: 1.09 MG/DL (ref 0.6–1.3)
GFR SERPL CREATININE-BSD FRML MDRD: 48 ML/MIN/1.73SQ M
GLUCOSE SERPL-MCNC: 80 MG/DL (ref 65–140)
POTASSIUM SERPL-SCNC: 4.2 MMOL/L (ref 3.5–5.3)
PROT SERPL-MCNC: 7.4 G/DL (ref 6.4–8.4)
SODIUM SERPL-SCNC: 139 MMOL/L (ref 135–147)

## 2023-10-11 PROCEDURE — 80053 COMPREHEN METABOLIC PANEL: CPT | Performed by: INTERNAL MEDICINE

## 2023-10-11 PROCEDURE — 96374 THER/PROPH/DIAG INJ IV PUSH: CPT

## 2023-10-11 RX ORDER — SODIUM CHLORIDE 9 MG/ML
20 INJECTION, SOLUTION INTRAVENOUS ONCE
OUTPATIENT
Start: 2024-10-10

## 2023-10-11 RX ORDER — SODIUM CHLORIDE 9 MG/ML
20 INJECTION, SOLUTION INTRAVENOUS ONCE
Status: COMPLETED | OUTPATIENT
Start: 2023-10-11 | End: 2023-10-11

## 2023-10-11 RX ORDER — ZOLEDRONIC ACID 5 MG/100ML
5 INJECTION, SOLUTION INTRAVENOUS ONCE
Status: COMPLETED | OUTPATIENT
Start: 2023-10-11 | End: 2023-10-11

## 2023-10-11 RX ORDER — ZOLEDRONIC ACID 5 MG/100ML
5 INJECTION, SOLUTION INTRAVENOUS ONCE
OUTPATIENT
Start: 2024-10-10

## 2023-10-11 RX ADMIN — ZOLEDRONIC ACID 5 MG: 0.05 INJECTION, SOLUTION INTRAVENOUS at 13:49

## 2023-10-11 RX ADMIN — SODIUM CHLORIDE 20 ML/HR: 0.9 INJECTION, SOLUTION INTRAVENOUS at 13:48

## 2023-10-11 NOTE — PROGRESS NOTES
Pharmacy notified department patient's creatinine clearance is 29.4. Dr. Aggarwal Mess notified by [facility] . She states she used cockcroft gault calculator and got creatinine clearance of 41 and is okay to go ahead with treatment.

## 2023-10-12 ENCOUNTER — TELEPHONE (OUTPATIENT)
Age: 78
End: 2023-10-12

## 2023-10-12 NOTE — TELEPHONE ENCOUNTER
This can sometimes happen, the symptoms should improve on their own. If she does not start to feel better or has any worsening symptoms then she should call us back.

## 2023-10-12 NOTE — TELEPHONE ENCOUNTER
Caller: Patients daughter Janae Judge    Doctor: Jori Ramesh    Reason for call: Daughter is calling because her mom had an infusion yesterday and now she is nauseous and shaky. She wanted to know if this is normal after an infusion?      Call back#: [phone]

## 2023-10-19 ENCOUNTER — OFFICE VISIT (OUTPATIENT)
Dept: FAMILY MEDICINE CLINIC | Facility: CLINIC | Age: 78
End: 2023-10-19

## 2023-10-19 VITALS
HEIGHT: 62 IN | DIASTOLIC BLOOD PRESSURE: 82 MMHG | HEART RATE: 85 BPM | OXYGEN SATURATION: 96 % | BODY MASS INDEX: 25.03 KG/M2 | SYSTOLIC BLOOD PRESSURE: 126 MMHG | RESPIRATION RATE: 16 BRPM | WEIGHT: 136 LBS

## 2023-10-19 DIAGNOSIS — S14.129A CENTRAL CORD SYNDROME, INITIAL ENCOUNTER (HCC): ICD-10-CM

## 2023-10-19 DIAGNOSIS — Z28.21 INFLUENZA VACCINATION DECLINED BY PATIENT: ICD-10-CM

## 2023-10-19 DIAGNOSIS — E78.5 HYPERLIPIDEMIA, UNSPECIFIED HYPERLIPIDEMIA TYPE: ICD-10-CM

## 2023-10-19 DIAGNOSIS — I50.9 CONGESTIVE HEART FAILURE, UNSPECIFIED HF CHRONICITY, UNSPECIFIED HEART FAILURE TYPE (HCC): ICD-10-CM

## 2023-10-19 DIAGNOSIS — M81.0 AGE RELATED OSTEOPOROSIS, UNSPECIFIED PATHOLOGICAL FRACTURE PRESENCE: ICD-10-CM

## 2023-10-19 DIAGNOSIS — N18.30 STAGE 3 CHRONIC KIDNEY DISEASE, UNSPECIFIED WHETHER STAGE 3A OR 3B CKD (HCC): ICD-10-CM

## 2023-10-19 DIAGNOSIS — F41.9 ANXIETY: Primary | ICD-10-CM

## 2023-10-19 RX ORDER — ALPRAZOLAM 0.25 MG/1
0.25 TABLET ORAL
Qty: 30 TABLET | Refills: 0 | Status: SHIPPED | OUTPATIENT
Start: 2023-10-19

## 2023-10-19 NOTE — PROGRESS NOTES
Assessment/Plan:   Diagnoses and all orders for this visit:    Anxiety  -     ALPRAZolam (XANAX) 0.25 mg tablet; Take 1 tablet (0.25 mg total) by mouth daily at bedtime as needed for anxiety  - #30 tabs sent to pharmacy on file   - takes Xanax when has to go out - has a wedding tmrw   - (+) claustrophobia - have to leave door open during encounter     Influenza vaccination declined by patient  - will schedule RN visit     Age related osteoporosis, unspecified pathological fracture presence  - following with Rheum re: osteoporosis and s/p Prolia infusion     Hyperlipidemia, unspecified hyperlipidemia type  -     Lipid panel; Future  - cont statin   - The 10-year ASCVD risk score (Marilyn WAGNER, et al., 2019) is: 24.4%    Values used to calculate the score:      Age: 76 years      Sex: Female      Is Non- : No      Diabetic: No      Tobacco smoker: No      Systolic Blood Pressure: 852 mmHg      Is BP treated: No      HDL Cholesterol: 66 mg/dL      Total Cholesterol: 165 mg/dL    Central cord syndrome, initial encounter (HCC)    Congestive heart failure, unspecified HF chronicity, unspecified heart failure type ([address])  - follows annually with Cardio     Stage 3 chronic kidney disease, unspecified whether stage 3a or 3b CKD ([facility])  - GFR 48           Subjective:    Patient ID: Jessica Conde is a 76 y.o. female.   HPI  69yo F presents to office with her daughter for f/u  - going to a wedding tmrw - wants to defer Flu vaccine for another time  - reviewed CMP done 10/2023 - nml renal function   - following with Rheum re: osteoporosis and s/p Prolia infusion   - UTD with annual screening Mammo   - follows annually with Cardio       The following portions of the patient's history were reviewed and updated as appropriate: allergies, current medications, past family history, past medical history, past social history, past surgical history and problem list.    Review of Systems  as per HPI    Objective:  BP 126/82 (BP Location: Right arm, Patient Position: Sitting, Cuff Size: Standard)   Pulse 85   Resp 16   Ht 5' 2" (1.575 m)   Wt 61.7 kg (136 lb)   SpO2 96%   BMI 24.87 kg/m²    Physical Exam  Vitals reviewed. Constitutional:       General: She is not in acute distress. Appearance: Normal appearance. She is not ill-appearing, toxic-appearing or diaphoretic. HENT:      Head: Normocephalic and atraumatic. Right Ear: External ear normal.      Left Ear: External ear normal.      Nose: Nose normal.   Eyes:      General: No scleral icterus. Right eye: No discharge. Left eye: No discharge. Extraocular Movements: Extraocular movements intact. Conjunctiva/sclera: Conjunctivae normal.   Cardiovascular:      Rate and Rhythm: Normal rate and regular rhythm. Heart sounds: Normal heart sounds. Pulmonary:      Effort: Pulmonary effort is normal. No respiratory distress. Breath sounds: Normal breath sounds. No stridor. No wheezing, rhonchi or rales. Musculoskeletal:      Cervical back: Normal range of motion. Right lower leg: No edema. Left lower leg: No edema. Comments: Wheelchair bound    Neurological:      General: No focal deficit present. Mental Status: She is alert and oriented to person, place, and time. Psychiatric:         Attention and Perception: Attention normal.         Mood and Affect: Mood is anxious. Speech: Speech normal.         Behavior: Behavior normal. Behavior is cooperative.

## 2023-11-15 ENCOUNTER — APPOINTMENT (OUTPATIENT)
Dept: LAB | Facility: HOSPITAL | Age: 78
End: 2023-11-15
Attending: INTERNAL MEDICINE
Payer: COMMERCIAL

## 2023-11-15 DIAGNOSIS — M81.0 AGE-RELATED OSTEOPOROSIS WITHOUT CURRENT PATHOLOGICAL FRACTURE: ICD-10-CM

## 2023-11-15 DIAGNOSIS — E55.9 VITAMIN D DEFICIENCY: ICD-10-CM

## 2023-11-15 DIAGNOSIS — Z13.29 SCREENING FOR THYROID DISORDER: ICD-10-CM

## 2023-11-15 DIAGNOSIS — E78.5 HYPERLIPIDEMIA, UNSPECIFIED HYPERLIPIDEMIA TYPE: ICD-10-CM

## 2023-11-15 LAB
25(OH)D3 SERPL-MCNC: 41.7 NG/ML (ref 30–100)
ALBUMIN SERPL BCP-MCNC: 4 G/DL (ref 3.5–5)
ALP SERPL-CCNC: 58 U/L (ref 34–104)
ALT SERPL W P-5'-P-CCNC: 11 U/L (ref 7–52)
ANION GAP SERPL CALCULATED.3IONS-SCNC: 5 MMOL/L
AST SERPL W P-5'-P-CCNC: 15 U/L (ref 13–39)
BILIRUB SERPL-MCNC: 0.39 MG/DL (ref 0.2–1)
BUN SERPL-MCNC: 20 MG/DL (ref 5–25)
CALCIUM SERPL-MCNC: 8.9 MG/DL (ref 8.4–10.2)
CHLORIDE SERPL-SCNC: 110 MMOL/L (ref 96–108)
CHOLEST SERPL-MCNC: 150 MG/DL
CO2 SERPL-SCNC: 27 MMOL/L (ref 21–32)
CREAT SERPL-MCNC: 1.11 MG/DL (ref 0.6–1.3)
GFR SERPL CREATININE-BSD FRML MDRD: 47 ML/MIN/1.73SQ M
GLUCOSE P FAST SERPL-MCNC: 90 MG/DL (ref 65–99)
HDLC SERPL-MCNC: 64 MG/DL
LDLC SERPL CALC-MCNC: 69 MG/DL (ref 0–100)
MAGNESIUM SERPL-MCNC: 2.3 MG/DL (ref 1.9–2.7)
NONHDLC SERPL-MCNC: 86 MG/DL
PHOSPHATE SERPL-MCNC: 2.6 MG/DL (ref 2.3–4.1)
POTASSIUM SERPL-SCNC: 4.4 MMOL/L (ref 3.5–5.3)
PROT SERPL-MCNC: 6.8 G/DL (ref 6.4–8.4)
PTH-INTACT SERPL-MCNC: 133.2 PG/ML (ref 12–88)
SODIUM SERPL-SCNC: 142 MMOL/L (ref 135–147)
TRIGL SERPL-MCNC: 86 MG/DL
TSH SERPL DL<=0.05 MIU/L-ACNC: 3.53 UIU/ML (ref 0.45–4.5)

## 2023-11-15 PROCEDURE — 82306 VITAMIN D 25 HYDROXY: CPT

## 2023-11-15 PROCEDURE — 83970 ASSAY OF PARATHORMONE: CPT

## 2023-11-15 PROCEDURE — 80053 COMPREHEN METABOLIC PANEL: CPT

## 2023-11-15 PROCEDURE — 80061 LIPID PANEL: CPT

## 2023-11-15 PROCEDURE — 36415 COLL VENOUS BLD VENIPUNCTURE: CPT

## 2023-11-15 PROCEDURE — 84165 PROTEIN E-PHORESIS SERUM: CPT

## 2023-11-15 PROCEDURE — 84100 ASSAY OF PHOSPHORUS: CPT

## 2023-11-15 PROCEDURE — 83735 ASSAY OF MAGNESIUM: CPT

## 2023-11-15 PROCEDURE — 86334 IMMUNOFIX E-PHORESIS SERUM: CPT

## 2023-11-15 PROCEDURE — 84443 ASSAY THYROID STIM HORMONE: CPT

## 2023-11-16 ENCOUNTER — TELEPHONE (OUTPATIENT)
Dept: FAMILY MEDICINE CLINIC | Facility: CLINIC | Age: 78
End: 2023-11-16

## 2023-11-16 ENCOUNTER — IMMUNIZATIONS (OUTPATIENT)
Dept: FAMILY MEDICINE CLINIC | Facility: CLINIC | Age: 78
End: 2023-11-16
Payer: COMMERCIAL

## 2023-11-16 DIAGNOSIS — Z23 ENCOUNTER FOR IMMUNIZATION: Primary | ICD-10-CM

## 2023-11-16 PROCEDURE — G0008 ADMIN INFLUENZA VIRUS VAC: HCPCS

## 2023-11-16 PROCEDURE — 90662 IIV NO PRSV INCREASED AG IM: CPT

## 2023-11-16 NOTE — TELEPHONE ENCOUNTER
[facility] was here for her flu shot and her daughter Andree Ch wanted to know if you had seen her labs. She wanted to let you know that she fasted for 12 hours and did not have any water either and thought maybe she was dehydrated. She just wanted to let you know. She said she would be home if you needed to contact her.

## 2023-11-17 ENCOUNTER — TELEPHONE (OUTPATIENT)
Dept: RHEUMATOLOGY | Facility: CLINIC | Age: 78
End: 2023-11-17

## 2023-11-17 DIAGNOSIS — R79.89 HIGH SERUM PARATHYROID HORMONE (PTH): Primary | ICD-10-CM

## 2023-11-17 LAB
ALBUMIN SERPL ELPH-MCNC: 3.81 G/DL (ref 3.2–5.1)
ALBUMIN SERPL ELPH-MCNC: 57.8 % (ref 48–70)
ALPHA1 GLOB SERPL ELPH-MCNC: 0.26 G/DL (ref 0.15–0.47)
ALPHA1 GLOB SERPL ELPH-MCNC: 4 % (ref 1.8–7)
ALPHA2 GLOB SERPL ELPH-MCNC: 0.77 G/DL (ref 0.42–1.04)
ALPHA2 GLOB SERPL ELPH-MCNC: 11.6 % (ref 5.9–14.9)
BETA GLOB ABNORMAL SERPL ELPH-MCNC: 0.36 G/DL (ref 0.31–0.57)
BETA1 GLOB SERPL ELPH-MCNC: 5.4 % (ref 4.7–7.7)
BETA2 GLOB SERPL ELPH-MCNC: 6.9 % (ref 3.1–7.9)
BETA2+GAMMA GLOB SERPL ELPH-MCNC: 0.46 G/DL (ref 0.2–0.58)
GAMMA GLOB ABNORMAL SERPL ELPH-MCNC: 0.94 G/DL (ref 0.4–1.66)
GAMMA GLOB SERPL ELPH-MCNC: 14.3 % (ref 6.9–22.3)
IGG/ALB SER: 1.37 {RATIO} (ref 1.1–1.8)
INTERPRETATION UR IFE-IMP: NORMAL
PROT PATTERN SERPL ELPH-IMP: NORMAL
PROT SERPL-MCNC: 6.6 G/DL (ref 6.4–8.2)

## 2023-11-17 PROCEDURE — 86334 IMMUNOFIX E-PHORESIS SERUM: CPT | Performed by: STUDENT IN AN ORGANIZED HEALTH CARE EDUCATION/TRAINING PROGRAM

## 2023-11-17 PROCEDURE — 84165 PROTEIN E-PHORESIS SERUM: CPT | Performed by: STUDENT IN AN ORGANIZED HEALTH CARE EDUCATION/TRAINING PROGRAM

## 2023-11-17 NOTE — TELEPHONE ENCOUNTER
Please let patient know her parathyroid hormone level is high and I recommend she see Endocrinology, a referral was placed. She also needs to be scheduled for a 1 year follow up appointment with me.

## 2023-11-17 NOTE — TELEPHONE ENCOUNTER
Relayed message to pts daughter. Pts daughter stated she will call back and set up an appointment at a later time.

## 2023-11-20 ENCOUNTER — TELEPHONE (OUTPATIENT)
Dept: ENDOCRINOLOGY | Facility: CLINIC | Age: 78
End: 2023-11-20

## 2023-11-20 NOTE — TELEPHONE ENCOUNTER
Received referral for [facility] Sat. Left voicemail for patient to contact office to schedule Consult/New Patient Appointment.

## 2024-01-31 ENCOUNTER — TELEPHONE (OUTPATIENT)
Age: 79
End: 2024-01-31

## 2024-01-31 NOTE — TELEPHONE ENCOUNTER
Pt 's daughter called stating Dr. Connolly was referring her mother to Endocrinology, but unsure which doctor.  Mother would prefer seeing a female physician.    Chelsey requesting c/b

## 2024-01-31 NOTE — TELEPHONE ENCOUNTER
She can call the endo line and enquire about the female physicians, I did not make specific recommendations.

## 2024-02-09 ENCOUNTER — HOSPITAL ENCOUNTER (EMERGENCY)
Facility: HOSPITAL | Age: 79
Discharge: HOME | End: 2024-02-10
Attending: EMERGENCY MEDICINE | Admitting: EMERGENCY MEDICINE
Payer: COMMERCIAL

## 2024-02-09 DIAGNOSIS — B37.31 VAGINAL CANDIDIASIS: Primary | ICD-10-CM

## 2024-02-09 LAB
ALBUMIN SERPL BCP-MCNC: 3.9 G/DL (ref 3.5–5)
ALP SERPL-CCNC: 47 U/L (ref 34–104)
ALT SERPL W P-5'-P-CCNC: 16 U/L (ref 7–52)
ANION GAP SERPL CALCULATED.3IONS-SCNC: 8 MMOL/L
AST SERPL W P-5'-P-CCNC: 28 U/L (ref 13–39)
BASOPHILS # BLD AUTO: 0.02 THOUSANDS/ÂΜL (ref 0–0.1)
BASOPHILS NFR BLD AUTO: 1 % (ref 0–1)
BILIRUB DIRECT SERPL-MCNC: 0.08 MG/DL (ref 0–0.2)
BILIRUB SERPL-MCNC: 0.4 MG/DL (ref 0.2–1)
BILIRUB UR QL STRIP: NEGATIVE
BUN SERPL-MCNC: 14 MG/DL (ref 5–25)
CALCIUM SERPL-MCNC: 9 MG/DL (ref 8.4–10.2)
CHLORIDE SERPL-SCNC: 106 MMOL/L (ref 96–108)
CLARITY UR: CLEAR
CO2 SERPL-SCNC: 23 MMOL/L (ref 21–32)
COLOR UR: NORMAL
CREAT SERPL-MCNC: 0.98 MG/DL (ref 0.6–1.3)
EOSINOPHIL # BLD AUTO: 0.06 THOUSAND/ÂΜL (ref 0–0.61)
EOSINOPHIL NFR BLD AUTO: 1 % (ref 0–6)
ERYTHROCYTE [DISTWIDTH] IN BLOOD BY AUTOMATED COUNT: 12.6 % (ref 11.6–15.1)
GFR SERPL CREATININE-BSD FRML MDRD: 54 ML/MIN/1.73SQ M
GLUCOSE SERPL-MCNC: 100 MG/DL (ref 65–140)
GLUCOSE UR STRIP-MCNC: NEGATIVE MG/DL
HCT VFR BLD AUTO: 38 % (ref 34.8–46.1)
HGB BLD-MCNC: 13.3 G/DL (ref 11.5–15.4)
HGB UR QL STRIP.AUTO: NEGATIVE
IMM GRANULOCYTES # BLD AUTO: 0.01 THOUSAND/UL (ref 0–0.2)
IMM GRANULOCYTES NFR BLD AUTO: 0 % (ref 0–2)
KETONES UR STRIP-MCNC: NEGATIVE MG/DL
LEUKOCYTE ESTERASE UR QL STRIP: NEGATIVE
LYMPHOCYTES # BLD AUTO: 1.65 THOUSANDS/ÂΜL (ref 0.6–4.47)
LYMPHOCYTES NFR BLD AUTO: 40 % (ref 14–44)
MCH RBC QN AUTO: 33 PG (ref 26.8–34.3)
MCHC RBC AUTO-ENTMCNC: 35 G/DL (ref 31.4–37.4)
MCV RBC AUTO: 94 FL (ref 82–98)
MONOCYTES # BLD AUTO: 0.51 THOUSAND/ÂΜL (ref 0.17–1.22)
MONOCYTES NFR BLD AUTO: 12 % (ref 4–12)
NEUTROPHILS # BLD AUTO: 1.9 THOUSANDS/ÂΜL (ref 1.85–7.62)
NEUTS SEG NFR BLD AUTO: 46 % (ref 43–75)
NITRITE UR QL STRIP: NEGATIVE
NRBC BLD AUTO-RTO: 0 /100 WBCS
PH UR STRIP.AUTO: 7 [PH]
PLATELET # BLD AUTO: 130 THOUSANDS/UL (ref 149–390)
PMV BLD AUTO: 9.4 FL (ref 8.9–12.7)
POTASSIUM SERPL-SCNC: 4.4 MMOL/L (ref 3.5–5.3)
PROT SERPL-MCNC: 6.5 G/DL (ref 6.4–8.4)
PROT UR STRIP-MCNC: NEGATIVE MG/DL
RBC # BLD AUTO: 4.03 MILLION/UL (ref 3.81–5.12)
SODIUM SERPL-SCNC: 137 MMOL/L (ref 135–147)
SP GR UR STRIP.AUTO: <=1.005 (ref 1–1.03)
UROBILINOGEN UR QL STRIP.AUTO: 0.2 E.U./DL
WBC # BLD AUTO: 4.15 THOUSAND/UL (ref 4.31–10.16)

## 2024-02-09 PROCEDURE — 87086 URINE CULTURE/COLONY COUNT: CPT | Performed by: EMERGENCY MEDICINE

## 2024-02-09 PROCEDURE — 80076 HEPATIC FUNCTION PANEL: CPT | Performed by: EMERGENCY MEDICINE

## 2024-02-09 PROCEDURE — 81003 URINALYSIS AUTO W/O SCOPE: CPT | Performed by: EMERGENCY MEDICINE

## 2024-02-09 PROCEDURE — 99283 EMERGENCY DEPT VISIT LOW MDM: CPT

## 2024-02-09 PROCEDURE — 0241U HB NFCT DS VIR RESP RNA 4 TRGT: CPT | Performed by: EMERGENCY MEDICINE

## 2024-02-09 PROCEDURE — 36415 COLL VENOUS BLD VENIPUNCTURE: CPT | Performed by: EMERGENCY MEDICINE

## 2024-02-09 PROCEDURE — 96360 HYDRATION IV INFUSION INIT: CPT

## 2024-02-09 PROCEDURE — 80048 BASIC METABOLIC PNL TOTAL CA: CPT | Performed by: EMERGENCY MEDICINE

## 2024-02-09 PROCEDURE — 85025 COMPLETE CBC W/AUTO DIFF WBC: CPT | Performed by: EMERGENCY MEDICINE

## 2024-02-09 RX ORDER — LIDOCAINE HYDROCHLORIDE 20 MG/ML
1 JELLY TOPICAL ONCE
Status: DISCONTINUED | OUTPATIENT
Start: 2024-02-09 | End: 2024-02-10 | Stop reason: HOSPADM

## 2024-02-09 RX ADMIN — SODIUM CHLORIDE 500 ML: 0.9 INJECTION, SOLUTION INTRAVENOUS at 23:20

## 2024-02-10 VITALS
OXYGEN SATURATION: 98 % | HEART RATE: 75 BPM | WEIGHT: 140 LBS | DIASTOLIC BLOOD PRESSURE: 70 MMHG | TEMPERATURE: 98.8 F | RESPIRATION RATE: 18 BRPM | HEIGHT: 62 IN | SYSTOLIC BLOOD PRESSURE: 158 MMHG | BODY MASS INDEX: 25.76 KG/M2

## 2024-02-10 LAB
FLUAV RNA RESP QL NAA+PROBE: NEGATIVE
FLUBV RNA RESP QL NAA+PROBE: NEGATIVE
RSV RNA RESP QL NAA+PROBE: NEGATIVE
SARS-COV-2 RNA RESP QL NAA+PROBE: NEGATIVE

## 2024-02-10 PROCEDURE — 99284 EMERGENCY DEPT VISIT MOD MDM: CPT | Performed by: EMERGENCY MEDICINE

## 2024-02-10 RX ORDER — FLUCONAZOLE 100 MG/1
200 TABLET ORAL ONCE
Status: COMPLETED | OUTPATIENT
Start: 2024-02-10 | End: 2024-02-10

## 2024-02-10 RX ORDER — CLOTRIMAZOLE AND BETAMETHASONE DIPROPIONATE 10; .64 MG/G; MG/G
CREAM TOPICAL
Qty: 15 G | Refills: 0 | Status: SHIPPED | OUTPATIENT
Start: 2024-02-10

## 2024-02-10 RX ADMIN — FLUCONAZOLE 200 MG: 100 TABLET ORAL at 01:21

## 2024-02-10 NOTE — ED PROVIDER NOTES
Final Diagnosis:  1. Vaginal candidiasis        Chief Complaint   Patient presents with    Pain     Pt c/o burning during urination and bilateral leg discomfort since wednesday. Pt c/o Hx urinary incontinence and states she always wears a pad.        HPI  Patietn presents w/ burning in her groin while urinating. Has freq UTI and follows w/ dr lopez. This has been improved per pt family by her agreeing with him not to wear depends, but rather just a pad as she can still make it to bathroom mostly, just has some urge incontinence.     She has been URI ill for a few days. Rhinorrhea and congestion mainly. That's basically resolved. Stil feels a little achey and under the weather.     EMS reports if applicable:     - Previous charting underwent limited review with attention to last ED visits, labs, ekgs, and prior imaging.  Chart review reveals :     Appointment on 11/15/2023   Component Date Value Ref Range Status    Magnesium 11/15/2023 2.3  1.9 - 2.7 mg/dL Final    Phosphorus 11/15/2023 2.6  2.3 - 4.1 mg/dL Final    A/G Ratio 11/15/2023 1.37  1.10 - 1.80 Final    Albumin Electrophoresis 11/15/2023 57.8  48.0 - 70.0 % Final    Albumin CONC 11/15/2023 3.81  3.20 - 5.10 g/dl Final    Alpha 1 11/15/2023 4.0  1.8 - 7.0 % Final    ALPHA 1 CONC 11/15/2023 0.26  0.15 - 0.47 g/dL Final    Alpha 2 11/15/2023 11.6  5.9 - 14.9 % Final    ALPHA 2 CONC 11/15/2023 0.77  0.42 - 1.04 g/dL Final    Beta-1 11/15/2023 5.4  4.7 - 7.7 % Final    BETA 1 CONC 11/15/2023 0.36  0.31 - 0.57 g/dL Final    Beta-2 11/15/2023 6.9  3.1 - 7.9 % Final    BETA 2 CONC 11/15/2023 0.46  0.20 - 0.58 g/dL Final    Gamma Globulin 11/15/2023 14.3  6.9 - 22.3 % Final    GAMMA CONC 11/15/2023 0.94  0.40 - 1.66 g/dL Final    Total Protein 11/15/2023 6.6  6.4 - 8.2 g/dL Final    SPEP Interpretation 11/15/2023 See Comment   Final    The SPEP shows an abnormal distribution in the gamma region. Immunofixation to be performed. Reviewed by: Jose Louie,  MD **Electronic Signature**    PTH 11/15/2023 133.2 (H)  12.0 - 88.0 pg/mL Final    TSH 3RD GENERATON 11/15/2023 3.528  0.450 - 4.500 uIU/mL Final    The recommended reference ranges for TSH during pregnancy are as follows:   First trimester 0.100 to 2.500 uIU/mL   Second trimester  0.200 to 3.000 uIU/mL   Third trimester 0.300 to 3.000 uIU/m    Note: Normal ranges may not apply to patients who are transgender, non-binary, or whose legal sex, sex at birth, and gender identity differ.  Adult TSH (3rd generation) reference range follows the recommended guidelines of the American Thyroid Association, January, 2020.    Vit D, 25-Hydroxy 11/15/2023 41.7  30.0 - 100.0 ng/mL Final    Vitamin D guidelines established by Clinical Guidelines Subcommittee  of the Endocrine Society Task Force, 2011    Deficiency <20ng/ml   Insufficiency 20-30ng/ml   Sufficient  ng/ml     Sodium 11/15/2023 142  135 - 147 mmol/L Final    Potassium 11/15/2023 4.4  3.5 - 5.3 mmol/L Final    Chloride 11/15/2023 110 (H)  96 - 108 mmol/L Final    CO2 11/15/2023 27  21 - 32 mmol/L Final    ANION GAP 11/15/2023 5  mmol/L Final    BUN 11/15/2023 20  5 - 25 mg/dL Final    Creatinine 11/15/2023 1.11  0.60 - 1.30 mg/dL Final    Standardized to IDMS reference method    Glucose, Fasting 11/15/2023 90  65 - 99 mg/dL Final    Calcium 11/15/2023 8.9  8.4 - 10.2 mg/dL Final    AST 11/15/2023 15  13 - 39 U/L Final    ALT 11/15/2023 11  7 - 52 U/L Final    Specimen collection should occur prior to Sulfasalazine administration due to the potential for falsely depressed results.     Alkaline Phosphatase 11/15/2023 58  34 - 104 U/L Final    Total Protein 11/15/2023 6.8  6.4 - 8.4 g/dL Final    Albumin 11/15/2023 4.0  3.5 - 5.0 g/dL Final    Total Bilirubin 11/15/2023 0.39  0.20 - 1.00 mg/dL Final    Use of this assay is not recommended for patients undergoing treatment with eltrombopag due to the potential for falsely elevated results.  N-acetyl-p-benzoquinone  imine (metabolite of Acetaminophen) will generate erroneously low results in samples for patients that have taken an overdose of Acetaminophen.    eGFR 11/15/2023 47  ml/min/1.73sq m Final    Cholesterol 11/15/2023 150  See Comment mg/dL Final    Cholesterol:         Pediatric <18 Years        Desirable          <170 mg/dL      Borderline High    170-199 mg/dL      High               >=200 mg/dL        Adult >=18 Years            Desirable         <200 mg/dL      Borderline High   200-239 mg/dL      High              >239 mg/dL      Triglycerides 11/15/2023 86  See Comment mg/dL Final    Triglyceride:     0-9Y            <75mg/dL     10Y-17Y         <90 mg/dL       >=18Y     Normal          <150 mg/dL     Borderline High 150-199 mg/dL     High            200-499 mg/dL        Very High       >499 mg/dL    Specimen collection should occur prior to Metamizole administration due to the potential for falsely depressed results.    HDL, Direct 11/15/2023 64  >=50 mg/dL Final    LDL Calculated 11/15/2023 69  0 - 100 mg/dL Final    LDL Cholesterol:     Optimal           <100 mg/dl     Near Optimal      100-129 mg/dl     Above Optimal       Borderline High 130-159 mg/dl       High            160-189 mg/dl       Very High       >189 mg/dl         This screening LDL is a calculated result.   It does not have the accuracy of the Direct Measured LDL in the monitoring of patients with hyperlipidemia and/or statin therapy.   Direct Measure LDL (NQR832) must be ordered separately in these patients.    Non-HDL-Chol (CHOL-HDL) 11/15/2023 86  mg/dl Final    Immunofixation Interpretation 11/15/2023 See Comment   Final    Serum immunofixation shows a faint band in the gamma region that is equivocal for clonality. Recommend repeat testing with SPEP/UPEP, serum immunoglobulins (IgA, IgM, IgG) and serum and urine free light chains (kappa, lambda) in 3 to 6 months, if clinically indicated. Reviewed by: Jose Louie MD  **Electronic Signature**       - No language barrier.   - History obtained from patient    - Discuss patient's care, with patient permission or by chart review, with  daughter     PMH:   has a past medical history of Cholelithiasis, COVID-19 (12/22/2021), GERD (gastroesophageal reflux disease), Influenza (1/29/2019), Migraine headache, Nasal bone fractures (7/25/2021), Orthostatic hypotension (7/25/2021), Pneumonia, and Urinary tract infection.    PSH:   has a past surgical history that includes No past surgeries and pr arthrd ant interbody min dsc crv below c2 (Bilateral, 7/20/2021).     Social History:  Tobacco Use: Medium Risk (10/19/2023)    Patient History     Smoking Tobacco Use: Former     Smokeless Tobacco Use: Never     Passive Exposure: Not on file     Alcohol Use: Not At Risk (4/19/2023)    AUDIT-C     Frequency of Alcohol Consumption: Never     Average Number of Drinks: Patient does not drink     Frequency of Binge Drinking: Never     No illicit use       ROS:  Pertinent positives/negatives: .     Some ROS may be present in the HPI and would take precedent over these standard questions asked below.   Review of Systems   HENT:  Positive for congestion and rhinorrhea.    Respiratory:  Negative for cough, shortness of breath and wheezing.    Gastrointestinal:  Negative for abdominal pain, nausea and vomiting.   Genitourinary:  Positive for dysuria, frequency, urgency and vaginal pain. Negative for decreased urine volume, difficulty urinating, flank pain, hematuria, vaginal bleeding and vaginal discharge.        CONSTITUTIONAL:  No lethargy. No weakness. No unexpected weight loss. No appetite change.   EYES:  No pain, redness, or discharge. No loss of vision. No orbital trauma or pain.   ENT:  No tinnitus or decreased hearing. No epistaxis/purulent rhinorrhea. No voice change, airway closing, trismus.   CARDIOVASCULAR:  No chest pain. No skin mottling or pallor. No change in exertional capacity  RESPIRATORY:  " No hemoptysis. No paroxysmal nocturnal dyspnea. No stridor. No audible wheezing. No production with cough.   GASTROINTESTINAL:  Normal appetite. No vomiting, diarrhea. No pain. No bloating. No melena. No hematochezia.   GENITOURINARY:  No frequency, urgency, nocturia. No hematuria or dysuria. No discharge. No sores/adenopathy.   MUSCULOSKELETAL:  No arthralgias or myalgias that are new. No new deformity.   INTEGUMENTARY:  No swelling. No unexpected contusions. No abrasions. No lymphangitis.  NEUROLOGIC:  No meningismus. No new numbness of the extremities. No new focal weakness. No postural instability  PSYCHIATRIC:  No SI HI AVH  HEMATOLOGICAL:  No bleeding. No petechiae. No bruising.  ALLERGIES:  No urticaria. No sudden abd cramping. No stridor.    PE:     Physical exam highlights:   Physical Exam       Vitals:    02/09/24 2225 02/10/24 0030   BP: (!) 172/85 147/65   BP Location: Left arm    Pulse: 87 73   Resp: 20    Temp: 97.5 °F (36.4 °C)    TempSrc: Oral    SpO2: 99% 97%   Weight: 63.5 kg (140 lb)    Height: 5' 2\" (1.575 m)      Vitals reviewed by me.   Nursing note reviewed  Chaperone present for all sensitive exam. Edi, charge     No vaginal atrophy, but vulvar erythema and some white d/c at introitus. Examines like candidal esoph.   Const: No acute distress. Alert. Nontoxic. Not diaphoretic.    HEENT: External ears normal. No protrusion drainage swelling. Nose normal. No drainage/traumatic deformity. MMM. Mouth with baseline/symmetric movement. No trismus.   Eyes: No squinting. No icterus. No tearing/swelling/drainage. Tracks through the room with normal EOM.   Neck: ROM normal. No rigidity. No meningismus.  Cards: Rate as per vitals Compared to monitor sinus unless documented. Regular Well perfused.  Pulm: Effort and excursion normal. No distress. No audible wheezing/ stridor. Normal resp rate without retraction or change in work of breathing. CTAB  Abd: No distension beyond baseline. No fluctuant wave. " Patient without peritoneal pain with shifting/bumping the bed. Soft x 4  MSK: ROM normal baseline. No deformity. No contractures from baseline.   Skin: No new rashes visible. Well perfused. No wounds visualized on exposed skin  Neuro: Nonfocal. Baseline. CN grossly intact. Moving all four with coordination.   Psych: Normal behavior and affect.        A:  - Nursing note reviewed.    Ddx and MDM  Considered diagnoses  UTI likely  Sample clean  Send for culture    R/o electrolyte abnormality and signs of dehydration after recent illness  500cc fluids  Lytes and blood count ok    External pelvic exam reveals yeast infection and surrounding vulvar erythema  Fluconazole, then clotrimazole + steroid cream.     Doesn't examine like vaginal atrophy.               My conversation with consultant reveals:        Decision rules:                           My read of the XR/CT scan reveals:     No orders to display       Orders Placed This Encounter   Procedures    Urine culture    COVID/FLU/RSV    UA (URINE) with reflex to Scope    CBC and differential    Basic metabolic panel    Hepatic function panel    Straight cath     Labs Reviewed   CBC AND DIFFERENTIAL - Abnormal       Result Value Ref Range Status    WBC 4.15 (*) 4.31 - 10.16 Thousand/uL Final    RBC 4.03  3.81 - 5.12 Million/uL Final    Hemoglobin 13.3  11.5 - 15.4 g/dL Final    Hematocrit 38.0  34.8 - 46.1 % Final    MCV 94  82 - 98 fL Final    MCH 33.0  26.8 - 34.3 pg Final    MCHC 35.0  31.4 - 37.4 g/dL Final    RDW 12.6  11.6 - 15.1 % Final    MPV 9.4  8.9 - 12.7 fL Final    Platelets 130 (*) 149 - 390 Thousands/uL Final    nRBC 0  /100 WBCs Final    Neutrophils Relative 46  43 - 75 % Final    Immat GRANS % 0  0 - 2 % Final    Lymphocytes Relative 40  14 - 44 % Final    Monocytes Relative 12  4 - 12 % Final    Eosinophils Relative 1  0 - 6 % Final    Basophils Relative 1  0 - 1 % Final    Neutrophils Absolute 1.90  1.85 - 7.62 Thousands/µL Final    Immature Grans  Absolute 0.01  0.00 - 0.20 Thousand/uL Final    Lymphocytes Absolute 1.65  0.60 - 4.47 Thousands/µL Final    Monocytes Absolute 0.51  0.17 - 1.22 Thousand/µL Final    Eosinophils Absolute 0.06  0.00 - 0.61 Thousand/µL Final    Basophils Absolute 0.02  0.00 - 0.10 Thousands/µL Final   COVID19, INFLUENZA A/B, RSV PCR, [facility] - Normal    SARS-CoV-2 Negative  Negative Final    INFLUENZA A PCR Negative  Negative Final    INFLUENZA B PCR Negative  Negative Final    RSV PCR Negative  Negative Final    Narrative:     FOR PEDIATRIC PATIENTS - copy/paste COVID Guidelines URL to browser: [email or web address]    SARS-CoV-2 assay is a Nucleic Acid Amplification assay intended for the  qualitative detection of nucleic acid from SARS-CoV-2 in nasopharyngeal  swabs. Results are for the presumptive identification of SARS-CoV-2 RNA.    Positive results are indicative of infection with SARS-CoV-2, the virus  causing COVID-19, but do not rule out bacterial infection or co-infection  with other viruses. Laboratories within the [place] and its  territories are required to report all positive results to the appropriate  public health authorities. Negative results do not preclude SARS-CoV-2  infection and should not be used as the sole basis for treatment or other  patient management decisions. Negative results must be combined with  clinical observations, patient history, and epidemiological information.  This test has not been FDA cleared or approved.    This test has been authorized by FDA under an Emergency Use Authorization  (EUA). This test is only authorized for the duration of time the  declaration that circumstances exist justifying the authorization of the  emergency use of an in vitro diagnostic tests for detection of SARS-CoV-2  virus and/or diagnosis of COVID-19 infection under section 564(b)(1) of  the Act, 21 U.S.C. 360bbb-3(b)(1), unless the authorization is  terminated  or revoked sooner. The test has been validated but independent review by FDA  and CLIA is pending.    Test performed using [employer] GeneXpert: This RT-PCR assay targets N2,  a region unique to SARS-CoV-2. A conserved region in the E-gene was chosen  for pan-Sarbecovirus detection which includes SARS-CoV-2.    According to CMS-2020-01-R, this platform meets the definition of high-throughput technology.   HEPATIC FUNCTION PANEL - Normal    Total Bilirubin 0.40  0.20 - 1.00 mg/dL Final    Comment: Use of this assay is not recommended for patients undergoing treatment with eltrombopag due to the potential for falsely elevated results.  N-acetyl-p-benzoquinone imine (metabolite of Acetaminophen) will generate erroneously low results in samples for patients that have taken an overdose of Acetaminophen.    Bilirubin, Direct 0.08  0.00 - 0.20 mg/dL Final    Comment: Slightly Hemolyzed:Results may be affected.    Alkaline Phosphatase 47  34 - 104 U/L Final    AST 28  13 - 39 U/L Final    Comment: Slightly Hemolyzed:Results may be affected.    ALT 16  7 - 52 U/L Final    Comment: Specimen collection should occur prior to Sulfasalazine administration due to the potential for falsely depressed results.     Total Protein 6.5  6.4 - 8.4 g/dL Final    Albumin 3.9  3.5 - 5.0 g/dL Final   URINE CULTURE   URINALYSIS WITH REFLEX TO SCOPE    Color, UA Light Yellow   Final    Clarity, UA Clear   Final    Specific Gravity, UA <=1.005  1.000 - 1.030 Final    pH, UA 7.0  5.0, 5.5, 6.0, 6.5, 7.0, 7.5, 8.0, 8.5, 9.0 Final    Leukocytes, UA Negative  Negative Final    Nitrite, UA Negative  Negative Final    Protein, UA Negative  Negative mg/dl Final    Glucose, UA Negative  Negative mg/dl Final    Ketones, UA Negative  Negative mg/dl Final    Urobilinogen, UA 0.2  0.2, 1.0 E.U./dl E.U./dl Final    Bilirubin, UA Negative  Negative Final    Occult Blood, UA Negative  Negative Final   BASIC METABOLIC PANEL    Sodium 137  135 - 147  mmol/L Final    Potassium 4.4  3.5 - 5.3 mmol/L Final    Chloride 106  96 - 108 mmol/L Final    CO2 23  21 - 32 mmol/L Final    ANION GAP 8  mmol/L Final    BUN 14  5 - 25 mg/dL Final    Creatinine 0.98  0.60 - 1.30 mg/dL Final    Comment: Standardized to IDMS reference method    Glucose 100  65 - 140 mg/dL Final    Comment: If the patient is fasting, the ADA then defines impaired fasting glucose as > 100 mg/dL and diabetes as > or equal to 123 mg/dL.    Calcium 9.0  8.4 - 10.2 mg/dL Final    eGFR 54  ml/min/1.73sq m Final    Narrative:     National Kidney Disease Foundation guidelines for Chronic Kidney Disease (CKD):     Stage 1 with normal or high GFR (GFR > 90 mL/min/1.73 square meters)    Stage 2 Mild CKD (GFR = 60-89 mL/min/1.73 square meters)    Stage 3A Moderate CKD (GFR = 45-59 mL/min/1.73 square meters)    Stage 3B Moderate CKD (GFR = 30-44 mL/min/1.73 square meters)    Stage 4 Severe CKD (GFR = 15-29 mL/min/1.73 square meters)    Stage 5 End Stage CKD (GFR <15 mL/min/1.73 square meters)  Note: GFR calculation is accurate only with a steady state creatinine       *Each of these labs was reviewed. Particular standout labs will be noted in the ED Course above     Final Diagnosis:  1. Vaginal candidiasis          P:  - hospital tx includes   Medications   lidocaine (URO-JET) 2 % urethral/mucosal gel 1 Application (1 Application Urethral Not Given 2/9/24 0639)   fluconazole (DIFLUCAN) tablet 200 mg (has no administration in time range)   sodium chloride 0.9 % bolus 500 mL (0 mL Intravenous Stopped 2/10/24 0042)         - disposition  Time reflects when diagnosis was documented in both MDM as applicable and the Disposition within this note       Time User Action Codes Description Comment    2/10/2024 12:42 AM Da Ma Add [B37.31] Vaginal candidiasis           ED Disposition       ED Disposition   Discharge    Condition   Stable    Date/Time   Sat Feb 10, 2024 12:41 AM    Comment   Leah Brown  discharge to home/self care.                   Follow-up Information       Follow up With Specialties Details Why Contact Info    Haylee Puente DO Family Medicine   [address] [address]  [phone]              - patient will call their PCP to let them know they were in the emergency department. We discuss return precautions and patient is agreeable with plan and aformentioned disposition.       - additional treatment intended, if consistent with primary provider:  - patient to follow with :      Patient's Medications   Discharge Prescriptions    CLOTRIMAZOLE-BETAMETHASONE (LOTRISONE) 1-0.05 % CREAM    Apply to affected area 2 times daily prn       Start Date: 2/10/2024 End Date: --       Order Dose: --       Quantity: 15 g    Refills: 0     No discharge procedures on file.  Prior to Admission Medications   Prescriptions Last Dose Informant Patient Reported? Taking?   ALPRAZolam (XANAX) 0.25 mg tablet   No No   Sig: Take 1 tablet (0.25 mg total) by mouth daily at bedtime as needed for anxiety   B Complex-C-Folic Acid (Ayanna-Dylan) TABS   No No   Sig: TAKE ONE TABLET BY MOUTH EVERY DAY   CRANBERRY PO  Family Member Yes No   Sig: Take by mouth   Docusate Sodium (COLACE PO)  Family Member Yes No   Sig: Take by mouth   atorvastatin (LIPITOR) 20 mg tablet   No No   Sig: TAKE ONE TABLET BY MOUTH EVERY DAY WITH DINNER   cephalexin (KEFLEX) 250 mg capsule  Family Member Yes No   Sig: Take 250 mg by mouth daily   Patient not taking: Reported on 8/23/2023   cholecalciferol (VITAMIN D3) 1,000 units tablet  Child Yes No   Sig: Take 1,000 Units by mouth daily 2,000 units daily   lidocaine (Lidoderm) 5 %   No No   Sig: Apply 1 patch topically daily Remove & Discard patch within 12 hours or as directed by MD   loratadine (CLARITIN) 10 mg tablet   No No   Sig: TAKE ONE TABLET BY MOUTH EVERY DAY   ondansetron (Zofran ODT) 4 mg disintegrating tablet  Family Member No No   Sig: Take 1 tablet (4 mg total) by  "mouth every 6 (six) hours as needed for nausea or vomiting   pantoprazole (PROTONIX) 40 mg tablet   No No   Sig: TAKE ONE TABLET BY MOUTH EVERY DAY      Facility-Administered Medications: None       Portions of the record may have been created with voice recognition software. Occasional wrong word or \"sound a like\" substitutions may have occurred due to the inherent limitations of voice recognition software. Read the chart carefully and recognize, using context, where substitutions have occurred.    Electronically signed by:  MD Da Ramirez MD  02/10/24 0714    "

## 2024-02-11 LAB — BACTERIA UR CULT: NORMAL

## 2024-02-12 ENCOUNTER — VBI (OUTPATIENT)
Dept: FAMILY MEDICINE CLINIC | Facility: CLINIC | Age: 79
End: 2024-02-12

## 2024-02-12 ENCOUNTER — TELEPHONE (OUTPATIENT)
Age: 79
End: 2024-02-12

## 2024-02-12 DIAGNOSIS — B37.9 YEAST INFECTION: Primary | ICD-10-CM

## 2024-02-12 RX ORDER — FLUCONAZOLE 150 MG/1
150 TABLET ORAL ONCE
Qty: 1 TABLET | Refills: 0 | Status: SHIPPED | OUTPATIENT
Start: 2024-02-12 | End: 2024-02-12

## 2024-02-12 NOTE — TELEPHONE ENCOUNTER
02/12/24 11:59 AM    Patient contacted post ED visit, [facility] department spoke with patient/caregiver and outreach was successful.    Thank you.  Alaina Gardner  [facility]

## 2024-02-12 NOTE — TELEPHONE ENCOUNTER
Patient daughter asking if Dr. Puente can make an accepting and do a virtual today. Please call daughter back [phone] Chelsey Miner

## 2024-02-16 ENCOUNTER — NURSE TRIAGE (OUTPATIENT)
Dept: OTHER | Facility: OTHER | Age: 79
End: 2024-02-16

## 2024-02-16 NOTE — TELEPHONE ENCOUNTER
Patient's daughter Chelsey stated that symptoms continue after taking a dose of Fluconazole. Please follow up.

## 2024-02-16 NOTE — TELEPHONE ENCOUNTER
"Reason for Disposition  • Patient wants to be seen    Answer Assessment - Initial Assessment Questions  1. DISCHARGE: \"Describe the discharge.\" (e.g., white, yellow, green, gray, foamy, cottage cheese-like)      Unable to assess, patient's daughter stated that patient says that \"body is on fire\", denies fever.   2. ODOR: \"Is there a bad odor?\"      Unable to assess   3. ONSET: \"When did the discharge begin?\"      A week ago.   8. OTHER SYMPTOMS: \"Do you have any other symptoms?\" (e.g., fever, itching, vaginal bleeding, pain with urination, injury to genital area, vaginal foreign body)      Itching.   9. PREGNANCY: \"Is there any chance you are pregnant?\" \"When was your last menstrual period?\"      N/A    Protocols used: Vaginal Discharge-ADULT-[place]    "

## 2024-02-20 DIAGNOSIS — N89.8 VAGINAL IRRITATION: Primary | ICD-10-CM

## 2024-02-20 NOTE — TELEPHONE ENCOUNTER
Left message for Chelsey advising that Dr. Puente is referring Leah to OB/GYN and I gave her the number for [facility] [phone]

## 2024-02-28 ENCOUNTER — TELEPHONE (OUTPATIENT)
Age: 79
End: 2024-02-28

## 2024-02-28 NOTE — TELEPHONE ENCOUNTER
Patient's daughter called in to report patient has a yeast infection with itching. Patient will be seen by GYN tomorrow, but Chelsey wanted to know if it was alright for patient to use vagisil wipes or cream for external use relief of itching prior to GYN visit. Chelsey decided to purchase both products for patient use. Please follow up with Chelsey if PCP had additional care advise.

## 2024-02-29 ENCOUNTER — OFFICE VISIT (OUTPATIENT)
Dept: OBGYN CLINIC | Facility: CLINIC | Age: 79
End: 2024-02-29
Payer: COMMERCIAL

## 2024-02-29 VITALS — DIASTOLIC BLOOD PRESSURE: 70 MMHG | SYSTOLIC BLOOD PRESSURE: 102 MMHG

## 2024-02-29 DIAGNOSIS — N90.89 VULVAR IRRITATION: ICD-10-CM

## 2024-02-29 PROCEDURE — 99203 OFFICE O/P NEW LOW 30 MIN: CPT | Performed by: OBSTETRICS & GYNECOLOGY

## 2024-02-29 RX ORDER — NYSTATIN AND TRIAMCINOLONE ACETONIDE 100000; 1 [USP'U]/G; MG/G
OINTMENT TOPICAL 2 TIMES DAILY
Qty: 30 G | Refills: 0 | Status: SHIPPED | OUTPATIENT
Start: 2024-02-29 | End: 2024-03-21

## 2024-02-29 RX ORDER — FLUCONAZOLE 150 MG/1
150 TABLET ORAL
Qty: 3 TABLET | Refills: 0 | Status: SHIPPED | OUTPATIENT
Start: 2024-02-29 | End: 2024-03-07

## 2024-02-29 NOTE — PROGRESS NOTES
Assessment/Plan:     Diagnoses and all orders for this visit:    Vulvar irritation  -     Ambulatory Referral to Obstetrics / Gynecology  -     fluconazole (DIFLUCAN) 150 mg tablet; Take 1 tablet (150 mg total) by mouth every 3 (three) days for 3 doses  -     nystatin-triamcinolone (MYCOLOG-II) ointment; Apply topically 2 (two) times a day for 21 days        Discussed regimen of mycolog steroid cream to apply thin layer twice daily and fluconazole x3 doses to help with any fungal component that is lingering. Also explained vulvar skin regimen and methods to reduce irritation including barrier emollient, reduced use of pads throughout the day and not holding wet pads against vulva.   Follow up for re-evaluation in 3 weeks.     I have spent a total time of 30 minutes on 02/29/24 in caring for this patient including Prognosis, Risks and benefits of tx options, Instructions for management, Patient and family education, Risk factor reductions, Impressions, Counseling / Coordination of care, Documenting in the medical record, Reviewing / ordering tests, medicine, procedures  , and Obtaining or reviewing history  .      Subjective:    Patient ID: Leah Brown is a 78 y.o. female.  No chief complaint on file.      Leah is an 77 yo postmenopausal female presenting for a gyn consultation. She reports vulvar irritation occurring since the beginning of February. She was seen in the ED for vulvar irritation and concern for a yeast infection. She follows with Dr. Sullivan, Urologist, in [place] due to history of UTIs but denies frequent yeast infections occurring. She has been provided fluconazole x 2 doses but states she is still having irritation and burning occurring. She denies itching and thick white discharge.         The following portions of the patient's history were reviewed and updated as appropriate: allergies, current medications, past family history, past medical history, past social history, past surgical history, and  problem list.    Review of Systems   Genitourinary:  Positive for pelvic pain and vaginal pain. Negative for difficulty urinating, dysuria, vaginal bleeding and vaginal discharge.         Objective:  /70 (BP Location: Left arm, Patient Position: Sitting, Cuff Size: Standard)   Physical Exam  Constitutional:       General: She is not in acute distress.     Appearance: Normal appearance. She is not diaphoretic.   Genitourinary:      Genitourinary Comments: Erythema on vulva bilaterally encompassing bilateral labia majora. No evidence of lesions, ulcerations, or blisters.    HENT:      Head: Normocephalic and atraumatic.   Pulmonary:      Effort: Pulmonary effort is normal. No respiratory distress.   Musculoskeletal:      Right lower leg: No edema.      Left lower leg: No edema.   Neurological:      Mental Status: She is alert and oriented to person, place, and time.   Skin:     General: Skin is warm and dry.      Coloration: Skin is not pale.   Psychiatric:         Mood and Affect: Mood normal.         Behavior: Behavior normal.         Thought Content: Thought content normal.         Judgment: Judgment normal.   Vitals and nursing note reviewed.

## 2024-02-29 NOTE — PATIENT INSTRUCTIONS
"Guidelines for Vulvar Skin Care    NOTE:     The goal is to promote healthy vulvar skin. This is done by decreasing and/or removing any chemicals, moisture, or rubbing (friction). Any products listed below have been suggested for use because of their past success in helping to decrease or relieve vulvar/vaginal itching and burning.    LAUNDRY PRODUCTS  Use a detergent free of dyes, enzymes and perfumes (such as ALL-Free and Clear or Earth-Rite) on any clothing that comes in contact with your vulva such as your underwear, exercise clothes, towels, or pajama bottoms. Use 1/3 to 1/2 the suggested amount per load. Other clothing may be washed in the laundry soap of your choice.  Do not use a fabric softener in the washer or dryer on these articles of clothing. If you do use dryer sheets with the rest of your clothes, for any loads, you must hang dry your underwear, towels, and any other clothing that comes in contact with your vulva.  Stain Removing Products. Soak and rinse in clear water all underwear and towels on which you have used a stain removing product. Then wash in your regular washing cycle. This removes as much of the product as possible.    CLOTHING  Wear white all cotton underwear, not nylon with a cotton crotch. Cotton allows air in and moisture out.  Avoid pantyhose. If you must wear them, either cut out the marsha crotch (if you cut out the crotch be sure to leave about 1/4 to 1/2 inch of fabric from the seam to prevent running) or wear thigh high hose. Many stores now carry thigh high nylons.  Avoid tight clothing, especially clothing made of synthetic fabrics. Remove wet bathing and exercise clothing as soon as you can.      BATHING AND HYGIENE  Avoid bath soaps, lotions, gels, etc. which contain perfumes. These may smell nice but can be irritating. This includes many baby products and feminine hygiene products marked \"gentle\" or \"mild\". Dove-Hypoallergenic, Neutrogena, Basis, or Pears are the soaps " we suggest. Do not use soap directly on the vulvar skin; just warm water and your hand will keep the vulvar area clean without irritating the skin.  Avoid all bubble baths, bath salts and scented oils. You may apply a neutral (unscented, non-perfumed) oil such as Shawna Oil to damp skin after getting out of the tub or shower. Do not apply oils directly to the vulva.  Do not scrub vulvar skin with a washcloth, washing with your hand and warm water is enough for good cleaning.  Pat dry rather than rubbing with a towel. Or use a hairdryer on a cool setting to dry the vulva.  Baking Soda soaks. Soak in lukewarm (not hot) bath water with 4-5 tablespoons of baking soda to help soothe vulvar itching and burning. Soak 1 to 3 times a day for 10-15 minutes.  Cool Compresses: Apply cool compresses to the vulva for 15-20 minutes at a time, up to three times per day for burning or itching. Do not use for prolonged amounts of time as this can lead to damage to the skin.  Use white, unscented toilet paper. If paper has a perfumed scent or lotion, avoid using it.  Avoid all feminine hygiene sprays, perfumes, adult, or baby wipes. Pour lukewarm water over the vulva after urinating if urine causes burning of the skin. Pat dry rather than rubbing with a towel.  Avoid the use of deodorized pads and tampons. Tampons should be used when the blood flow is heavy enough to soak one tampon in four hours or less. Tampons are safe for most women, but wearing them too long or when the blood flow is light may result in vaginal infection, increased discharge, odor, or toxic shock syndrome. Also, use only pads that have a cotton liner that comes in contact with your skin. You can obtain all cotton pads from [employer].  Avoid all over the counter creams or ointments, except A&D Ointment. Ask your health care provider first.  Small amounts of A&D Ointment may be applied to your vulva as often as needed to protect the skin. It may also help to  decrease skin irritation during your period and when you urinate. Brands that have been helpful are the FoSaber Hacer brand, Q.ME brand, Rugby brand, or RealSelf brand.  DO NOT DOUCHE. Baking soda soaks will help rinse away extra discharge and help with odor.  DO NOT SHAVE the vulvar area.  Some women may have problems with chronic dampness. Keeping dry is important.  Choose cotton fabrics whenever you can.  Keep an extra pair of underwear with you in a small bag and change if you become damp during the day at work/school.  Gold Bond Powder or Zeosorb Powder may be applied to the vulva and groin area one to two times per day to help absorb moisture.      Dryness and irritation of the vagina and vulva may be helped by using an emoillent. Use a small amount of a pure vegetable oil such as Crisco or olive oil. The vegetable oils contain no chemicals to irritate vulvar/vaginal skin. Vegetable oils will rinse away with water and will not increase your chances of infection. You can also use Vitamin E oil or coconut oil. You can apply the emoillent as often as needed for dryness and irritation; I recommend at least once daily, but you can apply more frequently.    You can also use the emollient during intercourse. Water-based products like K-Y Jelly are helpful, but may tend to dry before intercourse is over and also contain chemicals that can irritate your vulvar skin. It may be helpful to use a non-lubricated, non-spermicidal condom, and use vegetable oil as the lubricant. This will help keep the semen off the skin which can decrease burning and irritation after intercourse.

## 2024-03-13 ENCOUNTER — CONSULT (OUTPATIENT)
Dept: ENDOCRINOLOGY | Facility: CLINIC | Age: 79
End: 2024-03-13
Payer: COMMERCIAL

## 2024-03-13 VITALS
BODY MASS INDEX: 24.66 KG/M2 | WEIGHT: 134 LBS | HEIGHT: 62 IN | OXYGEN SATURATION: 96 % | SYSTOLIC BLOOD PRESSURE: 116 MMHG | HEART RATE: 76 BPM | DIASTOLIC BLOOD PRESSURE: 78 MMHG

## 2024-03-13 DIAGNOSIS — R53.83 OTHER FATIGUE: ICD-10-CM

## 2024-03-13 DIAGNOSIS — R20.2 NUMBNESS AND TINGLING OF BOTH LEGS: ICD-10-CM

## 2024-03-13 DIAGNOSIS — D64.9 ANEMIA, UNSPECIFIED TYPE: ICD-10-CM

## 2024-03-13 DIAGNOSIS — R79.89 HIGH SERUM PARATHYROID HORMONE (PTH): Primary | ICD-10-CM

## 2024-03-13 DIAGNOSIS — R20.0 NUMBNESS AND TINGLING OF BOTH LEGS: ICD-10-CM

## 2024-03-13 DIAGNOSIS — M81.0 AGE-RELATED OSTEOPOROSIS WITHOUT CURRENT PATHOLOGICAL FRACTURE: ICD-10-CM

## 2024-03-13 PROCEDURE — 99204 OFFICE O/P NEW MOD 45 MIN: CPT | Performed by: INTERNAL MEDICINE

## 2024-03-13 NOTE — PATIENT INSTRUCTIONS
- Please have the lab work done with your PCP's labs next time and I will call you with the results and if we need to do further testing  - Take calcium 600 mg two tablets a day

## 2024-03-13 NOTE — PROGRESS NOTES
Leah Brown  78 y.o. Female MRN: [id number]  Encounter: [id number]    New Patient Consult Note       CC: Elevated PTH level    Referring Provider  Ria Connolly Md  [address]  [place] [id number]       ASSESSMENT AND PLAN  Elevated PTH level  - PTH level noted to be 133.2 on 11/15/23 with normal calcium, magnesium, phosphorus, and 25-OH vitamin D levels  - Suspect PTH level may have been elevated in the setting of a Reclast infusion which she received on 10/11/23.   - Will repeat her labs including a PTH, CMP, 25-OH vitamin D, SPEP, magnesium, phosphorus, 25-OH vitamin D, and vitamin B12  - Will defer a UPEP given her urinary incontinence  - Will consider additional testing based upon results     2.   Osteoporosis  - Continue Reclast infusions with Dr. Connolly  - Recommend she take calcium 600 mg 2 tablets a day for a total daily dose of 1200 mg   - Continue vitamin D supplementation   - Given history of radial pathologic fracture, could consider anabolic therapy if her PTH levels are normal on repeat tests      HISTORY OF PRESENT ILLNESS  Leah Brown is an 78-year-old female with a past medical history significant for osteoporosis, CKD, CHF, osteoarthritis, and GERD who presents today for an initial evaluation of her elevated PTH level. She is referred by her rheumatologist, Dr. Connolly.  She is accompanied to this visit by her daughter who provides additional history.    PTH level was 133.2 on 11/15/23 with a 25-OH vitamin D of 41.7, corrected calcium of 8.9, magnesium of 2.3, and phosphorus of 2.6. She endorses fatigue, anxiety, occasional memory disturbances, constipation, poor appetite, increased urination up to 7-8 times daily with incontinence, occasional nocturia, and back and shoulder pain.     She has a history of severe osteoporosis diagnosed first in 2020 treated with Reclast for which she follows Dr. Connolly. She received her first Reclast infusion on 10/11/23  which was before she completed her lab work. She uses a cane and rollator to ambulate. She endorses a history of a left wrist fracture after a fall 20 years ago which did not require surgery. She endorses a recent fall when she slid along her closet door when her knee buckled. She has done balance training with physical therapy in the past. She eats 8 oz of yogurt up to twice daily. She is lactose intolerance so she is unable to tolerate cheese. She takes calcium 600 mg daily with vitamin D 2000 iu twice daily. She does not exercise at home.     Family history is unremarkable for any known calcium disorders.       Review of Systems   Constitutional:  Positive for fatigue. Negative for chills and fever.   HENT:  Negative for ear pain and sore throat.    Eyes:  Negative for pain and visual disturbance.   Respiratory:  Negative for cough and shortness of breath.    Cardiovascular:  Negative for chest pain and palpitations.   Gastrointestinal:  Positive for constipation. Negative for abdominal pain and vomiting.   Genitourinary:  Positive for urgency. Negative for dysuria and hematuria.   Musculoskeletal:  Positive for back pain. Negative for arthralgias.        Shoulder pain   Skin:  Negative for color change and rash.   Neurological:  Positive for numbness. Negative for seizures and syncope.   Psychiatric/Behavioral:  The patient is nervous/anxious.         Memory disturbances   All other systems reviewed and are negative.      Patient Active Problem List   Diagnosis    GERD (gastroesophageal reflux disease)    History of asthma    Hypertension    Cholelithiasis    Moderate mitral regurgitation    Vertigo    Recurrent falls    Gait disturbance    Hyperlipidemia    Generalized weakness    Abnormal LFTs    Anxiety    Age related osteoporosis    Thrombocytopenia (HCC)    Fall against object    Central cord syndrome (HCC)    History of recurrent UTIs    Dyslipidemia    Anemia    CKD (chronic kidney disease)    CHF  (congestive heart failure) (HCC)    Cervical stenosis of spinal canal    Overgrown toenails    Status post cervical spinal fusion    Intertriginous dermatitis associated with moisture      Past Medical History:   Diagnosis Date    Cholelithiasis     COVID-19 12/22/2021    GERD (gastroesophageal reflux disease)     Influenza 1/29/2019    Migraine headache     Nasal bone fractures 7/25/2021    Orthostatic hypotension 7/25/2021    Pneumonia     Urinary tract infection       Past Surgical History:   Procedure Laterality Date    NO PAST SURGERIES      NC ARTHRD ANT INTERBODY MIN DSC CRV BELOW C2 Bilateral 7/20/2021    Procedure: Anterior cervical discectomy and fusion C4-5, posterior cervical decompresion and fusion C2-T2;  Surgeon: Ronny Juarez MD;  Location: [facility];  Service: Neurosurgery      Family History   Problem Relation Age of Onset    Osteoporosis Mother     Heart disease Mother     No Known Problems Father     Osteoporosis Sister     Osteoporosis Sister     No Known Problems Sister     No Known Problems Sister     No Known Problems Brother     No Known Problems Daughter     Diabetes type II Son     Stroke Son     Heart attack Son     Deep vein thrombosis Son      Social History     Tobacco Use    Smoking status: Former     Current packs/day: 1.50     Average packs/day: 1.5 packs/day for 10.0 years (15.0 ttl pk-yrs)     Types: Cigarettes    Smokeless tobacco: Never    Tobacco comments:     quit age 23   Substance Use Topics    Alcohol use: Never     Alcohol/week: 0.0 standard drinks of alcohol     Allergies   Allergen Reactions    Bee Venom Anaphylaxis       Current Outpatient Medications:     atorvastatin (LIPITOR) 20 mg tablet, TAKE ONE TABLET BY MOUTH EVERY DAY WITH DINNER, Disp: 90 tablet, Rfl: 1    B Complex-C-Folic Acid (Ayanna-Dylan) TABS, TAKE ONE TABLET BY MOUTH EVERY DAY, Disp: 100 tablet, Rfl: 1    cholecalciferol (VITAMIN D3) 1,000 units tablet, Take 1,000 Units by mouth daily 2,000 units daily,  "Disp: , Rfl:     clotrimazole-betamethasone (LOTRISONE) 1-0.05 % cream, Apply to affected area 2 times daily prn, Disp: 15 g, Rfl: 0    CRANBERRY PO, Take by mouth, Disp: , Rfl:     Docusate Sodium (COLACE PO), Take by mouth, Disp: , Rfl:     loratadine (CLARITIN) 10 mg tablet, TAKE ONE TABLET BY MOUTH EVERY DAY, Disp: 90 tablet, Rfl: 1    nystatin-triamcinolone (MYCOLOG-II) ointment, Apply topically 2 (two) times a day for 21 days, Disp: 30 g, Rfl: 0    pantoprazole (PROTONIX) 40 mg tablet, TAKE ONE TABLET BY MOUTH EVERY DAY, Disp: 90 tablet, Rfl: 1    ALPRAZolam (XANAX) 0.25 mg tablet, Take 1 tablet (0.25 mg total) by mouth daily at bedtime as needed for anxiety, Disp: 30 tablet, Rfl: 0    cephalexin (KEFLEX) 250 mg capsule, Take 250 mg by mouth daily (Patient not taking: Reported on 8/23/2023), Disp: , Rfl:     lidocaine (Lidoderm) 5 %, Apply 1 patch topically daily Remove & Discard patch within 12 hours or as directed by MD, Disp: 30 patch, Rfl: 1    ondansetron (Zofran ODT) 4 mg disintegrating tablet, Take 1 tablet (4 mg total) by mouth every 6 (six) hours as needed for nausea or vomiting, Disp: 10 tablet, Rfl: 0      OBJECTIVE  Visit Vitals  /78 (BP Location: Left arm, Patient Position: Sitting, Cuff Size: Standard)   Pulse 76   Ht 5' 2\" (1.575 m)   Wt 60.8 kg (134 lb)   SpO2 96%   BMI 24.51 kg/m²   OB Status Postmenopausal   Smoking Status Former   BSA 1.61 m²       Physical Exam  Constitutional:       Appearance: Normal appearance.   HENT:      Head: Normocephalic and atraumatic. Hair is normal.      Mouth/Throat:      Mouth: Mucous membranes are moist.      Pharynx: Oropharynx is clear.   Eyes:      Extraocular Movements: Extraocular movements intact.      Pupils: Pupils are equal, round, and reactive to light.   Neck:      Thyroid: No thyromegaly or thyroid tenderness.      Trachea: No tracheal tenderness or tracheal deviation.   Cardiovascular:      Rate and Rhythm: Normal rate and regular rhythm. "      Pulses: Normal pulses.      Heart sounds: S1 normal and S2 normal.   Pulmonary:      Effort: No respiratory distress.      Breath sounds: Normal breath sounds.   Abdominal:      General: Abdomen is flat. Bowel sounds are normal.      Palpations: Abdomen is soft.      Tenderness: There is no abdominal tenderness.   Musculoskeletal:      Right shoulder: Tenderness present.      Left shoulder: Tenderness present.      Cervical back: Full passive range of motion without pain, normal range of motion and neck supple. Tenderness present.      Thoracic back: Tenderness present.      Lumbar back: Tenderness present.   Lymphadenopathy:      Cervical: No cervical adenopathy.   Skin:     General: Skin is warm and dry.      Comments: Posterior neck surgical scar appreciated   Neurological:      Mental Status: She is alert and oriented to person, place, and time.      Motor: No tremor.      Deep Tendon Reflexes: Reflexes are normal and symmetric.   Psychiatric:         Mood and Affect: Mood normal.         Thought Content: Thought content normal.         Judgment: Judgment normal.          DATA:  Labs   Latest Reference Range & Units 11/15/23 09:31   Sodium 135 - 147 mmol/L 142   Potassium 3.5 - 5.3 mmol/L 4.4   Chloride 96 - 108 mmol/L 110 (H)   Carbon Dioxide 21 - 32 mmol/L 27   ANION GAP mmol/L 5   BUN 5 - 25 mg/dL 20   Creatinine 0.60 - 1.30 mg/dL 1.11   GLUCOSE, FASTING 65 - 99 mg/dL 90   Calcium 8.4 - 10.2 mg/dL 8.9   AST 13 - 39 U/L 15   ALT 7 - 52 U/L 11   ALK PHOS 34 - 104 U/L 58   Total Protein 6.4 - 8.2 g/dL  6.4 - 8.4 g/dL 6.6  6.8   Albumin 3.5 - 5.0 g/dL 4.0   Total Bilirubin 0.20 - 1.00 mg/dL 0.39   GFR, Calculated ml/min/1.73sq m 47   Phosphorus 2.3 - 4.1 mg/dL 2.6   MAGNESIUM 1.9 - 2.7 mg/dL 2.3   Albumin/Globulin Ratio 1.10 - 1.80  1.37   Cholesterol See Comment mg/dL 150   Triglycerides See Comment mg/dL 86   HDL >=50 mg/dL 64   Non-HDL Cholesterol mg/dl 86   LDL Calculated 0 - 100 mg/dL 69   VITAMIN D,  25-HYDROXY 30.0 - 100.0 ng/mL 41.7   ALBUMIN ELP 48.0 - 70.0 % 57.8   ALBUMIN CONC ELP 3.20 - 5.10 g/dl 3.81   ALPHA 1 1.8 - 7.0 % 4.0   ALPHA 1 CONC ELP 0.15 - 0.47 g/dL 0.26   ALPHA 2 5.9 - 14.9 % 11.6   ALPHA 2 CONC ELP 0.42 - 1.04 g/dL 0.77   BETA-1 4.7 - 7.7 % 5.4   BETA 1 CONC ELP 0.31 - 0.57 g/dL 0.36   BETA-2 3.1 - 7.9 % 6.9   BETA 2 CONC ELP 0.20 - 0.58 g/dL 0.46   GAMMA GLOBULIN 6.9 - 22.3 % 14.3   GAMMA CONC ELP 0.40 - 1.66 g/dL 0.94   SPEP INTERPRETATION  See Comment   Immunofixation Interpretation  See Comment   PTH 12.0 - 88.0 pg/mL 133.2 (H)   TSH 3RD GENERATON 0.450 - 4.500 uIU/mL 3.528   (H): Data is abnormally high      Radiology  Study Result    Narrative & Impression   DXA SCAN     CLINICAL HISTORY: 77 years postmenopausal female.  OTHER RISK FACTORS: Parental history of hip fracture status post minor injury.     PHARMACOLOGIC THERAPY FOR OSTEOPOROSIS:  None.     TECHNIQUE: Bone densitometry was performed using a KlipXA bone densitometer.  Regions of interest appear properly placed.     COMPARISON: 7/9/2020.     RESULTS:     LUMBAR SPINE  Level: L1-L4 :  BMD: 0.679 gm/cm2  T-score: -4.2        LEFT TOTAL HIP:  BMD: 0.553 gm/cm2  T-score: -3.6     LEFT FEMORAL NECK:  BMD: 0.592 gm/cm2  T score: -3.2     RIGHT TOTAL HIP:  BMD: 0.354 gm/cm2  T-score: -5.2     RIGHT FEMORAL NECK:  BMD: 0.496 gm/cm2  T score: -3.9     LEFT  FOREARM:  33% RADIUS BMD: 0.477 gm/cm2  T-score: -4.6        IMPRESSION:     1. Osteoporosis.     2.  Since a DXA study from 7/9/2020, there has been:  A  STATISTICALLY SIGNIFICANT DECREASE in bone mineral density of 0.096 g/cm2 (12.4%) in the lumbar spine.  A  STATISTICALLY SIGNIFICANT DECREASE in bone mineral density of 0.132 g/cm2 (22.6%) in the hips.  A  STATISTICALLY SIGNIFICANT DECREASE in bone mineral density of 0.073 g/cm2 (13.3 %) in the left radius.     3.  The 10 year risk of hip fracture is 58.0% with the 10 year risk of major osteoporotic fracture being 65.9% as  calculated by the [employer] fracture risk assessment tool (FRAX, which is based on data generated by the WHO Collaborating   [place] for Metabolic Bone Diseases).     4.  The current NOF guidelines recommend treating patients with a T-score of -2.5 or less in the lumbar spine or hips, or in post-menopausal women and men over the age of 50 with low bone mass (osteopenia) and a FRAX 10 year risk score of >3% for hip   fracture and/or >20% for major osteoporotic fracture.     5.  The NOF recommends follow-up DXA in 1-2 years after initiating therapy for osteoporosis and every 2 years thereafter. More frequent evaluation is appropriate for patients with conditions associated with rapid bone loss, such as glucocorticoid   therapy. The interval between DXA screenings may be longer for individuals without major risk factors and initial T-score in the normal or upper low bone mass range.        The FRAX algorithm has certain limitations:  -FRAX has not been validated in patients currently or previously treated with pharmacotherapy for osteoporosis.  In such patients, clinical judgment must be exercised in interpreting FRAX scores.  -Prior hip, vertebral and humeral fragility fractures appear to confer greater risk of subsequent fracture than fractures at other sites (this is especially true for individuals with severe vertebral fractures), but quantification of this incremental   risk is not possible with FRAX.  -FRAX underestimates fracture risk in patients with history of multiple fragility fractures.  -FRAX may underestimate fracture risk in patients with history of frequent falls.  -It is not appropriate to use FRAX to monitor treatment response.        WHO CLASSIFICATION:  Normal (a T-score of -1.0 or higher)  Low bone mineral density (a T-score of less than -1.0 but higher than -2.5)  Osteoporosis (a T-score of -2.5 or less)  Severe osteoporosis (a T-score of -2.5 or less with a fragility fracture)      LEAST SIGNIFICANT CHANGE AT 95% C.I:  Lumbar spine: 0.036 gm/cm2 (3.2%).  Total hip: 0.018 gm/cm2 (2.0%).  Forearm: 0.024 gm/cm2 (3.2%).        Workstation performed: [id number]       Discussed above with the patient and all questions fully answered. She will call me if any problems arise.

## 2024-03-27 ENCOUNTER — TELEPHONE (OUTPATIENT)
Age: 79
End: 2024-03-27

## 2024-03-27 NOTE — TELEPHONE ENCOUNTER
Pt daughter called stating she can not bring her mother to her appt on Friday , she said she told the person arjun that she can only do Thursdays not Friday   Patient's daughter Chelsey would like a call back , the next appt on the schedule is in June

## 2024-04-03 ENCOUNTER — OFFICE VISIT (OUTPATIENT)
Dept: OBGYN CLINIC | Facility: CLINIC | Age: 79
End: 2024-04-03
Payer: COMMERCIAL

## 2024-04-03 VITALS — DIASTOLIC BLOOD PRESSURE: 80 MMHG | SYSTOLIC BLOOD PRESSURE: 122 MMHG

## 2024-04-03 DIAGNOSIS — N90.89 VULVAR IRRITATION: Primary | ICD-10-CM

## 2024-04-03 PROCEDURE — 99212 OFFICE O/P EST SF 10 MIN: CPT | Performed by: OBSTETRICS & GYNECOLOGY

## 2024-04-03 RX ORDER — NYSTATIN AND TRIAMCINOLONE ACETONIDE 100000; 1 [USP'U]/G; MG/G
OINTMENT TOPICAL 2 TIMES DAILY PRN
Qty: 30 G | Refills: 2 | Status: SHIPPED | OUTPATIENT
Start: 2024-04-03

## 2024-04-03 NOTE — PROGRESS NOTES
Assessment/Plan:     Diagnoses and all orders for this visit:    Vulvar irritation  -     nystatin-triamcinolone (MYCOLOG-II) ointment; Apply topically 2 (two) times a day as needed (vulvar irritation)    - continue barrier emollient application to vulva  - discussed can use steroid ointment to help with vulvar irritation  - Rx provided for mycolog ointment to use prn    Will monitor her use of the ointment          Subjective:    Patient ID: Leah Brown is a 78 y.o. female.  Chief Complaint   Patient presents with    Follow-up       Leah presents for follow up of her vulvar irritation. She finished using the mycolog ointment and states that her irritation resolved very quickly after starting the steroid ointment. She is still utilizing barrier emollient, vaseline, after she urinates or has a bowel movement. She has reduced her pad usage and is spending more time without a pad on as well. Overall she is much improved.         The following portions of the patient's history were reviewed and updated as appropriate: allergies, current medications, past family history, past medical history, past social history, past surgical history, and problem list.    Review of Systems      Objective:  /80 (BP Location: Left arm, Patient Position: Sitting, Cuff Size: Standard)   Physical Exam  Constitutional:       General: She is not in acute distress.     Appearance: Normal appearance. She is normal weight. She is not diaphoretic.   HENT:      Head: Normocephalic and atraumatic.   Pulmonary:      Effort: Pulmonary effort is normal. No respiratory distress.   Musculoskeletal:      Right lower leg: No edema.      Left lower leg: No edema.   Neurological:      Mental Status: She is alert and oriented to person, place, and time.   Skin:     General: Skin is warm and dry.      Coloration: Skin is not pale.   Psychiatric:         Mood and Affect: Mood normal.         Behavior: Behavior normal.         Thought Content: Thought  content normal.         Judgment: Judgment normal.   Vitals and nursing note reviewed.

## 2024-04-05 ENCOUNTER — RA CDI HCC (OUTPATIENT)
Dept: OTHER | Facility: HOSPITAL | Age: 79
End: 2024-04-05
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
## 2024-04-09 ENCOUNTER — APPOINTMENT (EMERGENCY)
Dept: RADIOLOGY | Facility: HOSPITAL | Age: 79
End: 2024-04-09
Payer: COMMERCIAL

## 2024-04-09 ENCOUNTER — HOSPITAL ENCOUNTER (EMERGENCY)
Facility: HOSPITAL | Age: 79
Discharge: HOME | End: 2024-04-09
Attending: EMERGENCY MEDICINE
Payer: COMMERCIAL

## 2024-04-09 ENCOUNTER — NURSE TRIAGE (OUTPATIENT)
Age: 79
End: 2024-04-09

## 2024-04-09 VITALS
HEART RATE: 111 BPM | OXYGEN SATURATION: 100 % | BODY MASS INDEX: 24.66 KG/M2 | TEMPERATURE: 97.1 F | WEIGHT: 134 LBS | DIASTOLIC BLOOD PRESSURE: 77 MMHG | HEIGHT: 62 IN | RESPIRATION RATE: 18 BRPM | SYSTOLIC BLOOD PRESSURE: 168 MMHG

## 2024-04-09 DIAGNOSIS — M25.552 LEFT HIP PAIN: Primary | ICD-10-CM

## 2024-04-09 DIAGNOSIS — M79.652 LEFT THIGH PAIN: ICD-10-CM

## 2024-04-09 PROCEDURE — 73502 X-RAY EXAM HIP UNI 2-3 VIEWS: CPT

## 2024-04-09 PROCEDURE — 73552 X-RAY EXAM OF FEMUR 2/>: CPT

## 2024-04-09 PROCEDURE — 93971 EXTREMITY STUDY: CPT | Performed by: SURGERY

## 2024-04-09 PROCEDURE — 93971 EXTREMITY STUDY: CPT

## 2024-04-09 RX ORDER — OXYCODONE HYDROCHLORIDE 5 MG/1
5 TABLET ORAL 3 TIMES DAILY PRN
Qty: 10 TABLET | Refills: 0 | Status: SHIPPED | OUTPATIENT
Start: 2024-04-09 | End: 2024-04-19

## 2024-04-09 RX ORDER — HYDROMORPHONE HCL/PF 1 MG/ML
0.2 SYRINGE (ML) INJECTION ONCE
Status: COMPLETED | OUTPATIENT
Start: 2024-04-09 | End: 2024-04-09

## 2024-04-09 RX ORDER — ACETAMINOPHEN 10 MG/ML
1000 INJECTION, SOLUTION INTRAVENOUS ONCE
Status: COMPLETED | OUTPATIENT
Start: 2024-04-09 | End: 2024-04-09

## 2024-04-09 RX ADMIN — HYDROMORPHONE HYDROCHLORIDE 0.2 MG: 1 INJECTION, SOLUTION INTRAMUSCULAR; INTRAVENOUS; SUBCUTANEOUS at 11:18

## 2024-04-09 RX ADMIN — ACETAMINOPHEN 1000 MG: 10 INJECTION INTRAVENOUS at 11:19

## 2024-04-09 NOTE — TELEPHONE ENCOUNTER
"Reason for Disposition   Thigh or calf pain in only one leg and present > 1 hour    Answer Assessment - Initial Assessment Questions  1. ONSET: \"When did the pain start?\"       Two days ago   2. LOCATION: \"Where is the pain located?\"       Left inner thigh   3. PAIN: \"How bad is the pain?\"    (Scale 1-10; or mild, moderate, severe)    -  MILD (1-3): doesn't interfere with normal activities     -  MODERATE (4-7): interferes with normal activities (e.g., work or school) or awakens from sleep, limping     -  SEVERE (8-10): excruciating pain, unable to do any normal activities, unable to walk      8/10 severe   4. WORK OR EXERCISE: \"Has there been any recent work or exercise that involved this part of the body?\"       Denies   5. CAUSE: \"What do you think is causing the leg pain?\"      unknown  6. OTHER SYMPTOMS: \"Do you have any other symptoms?\" (e.g., chest pain, back pain, breathing difficulty, swelling, rash, fever, numbness, weakness)      Denies any other symptoms however she had calf muscle burning one month ago  7. PREGNANCY: \"Is there any chance you are pregnant?\" \"When was your last menstrual period?\"      N/a    Protocols used: Leg Pain-ADULT-[place]    "

## 2024-04-09 NOTE — ED PROVIDER NOTES
History  Chief Complaint   Patient presents with    Leg Pain     Left hip/thigh pain for last 2 weeks. No known injury     79 yo female sent in to ER to be tested for a blood clot.  Pt. Says she has had worsening left leg pain x 3 days.  No known injury.  Pain seemed to start in the calf but now is mainly in the left hip and front thigh.  No swelling or rash.  No back pain.  No fever, cough, vomiting, diarrhea, dysuria.  Hurts to move left hip.      History provided by:  Patient   used: No    Leg Pain  Associated symptoms: no back pain and no fever        Prior to Admission Medications   Prescriptions Last Dose Informant Patient Reported? Taking?   ALPRAZolam (XANAX) 0.25 mg tablet   No No   Sig: Take 1 tablet (0.25 mg total) by mouth daily at bedtime as needed for anxiety   B Complex-C-Folic Acid (Ayanna-Dylan) TABS   No No   Sig: TAKE ONE TABLET BY MOUTH EVERY DAY   CRANBERRY PO  Family Member Yes No   Sig: Take by mouth   Docusate Sodium (COLACE PO)  Family Member Yes No   Sig: Take by mouth   atorvastatin (LIPITOR) 20 mg tablet   No No   Sig: TAKE ONE TABLET BY MOUTH EVERY DAY WITH DINNER   cephalexin (KEFLEX) 250 mg capsule  Family Member Yes No   Sig: Take 250 mg by mouth daily   Patient not taking: Reported on 8/23/2023   cholecalciferol (VITAMIN D3) 1,000 units tablet  Child Yes No   Sig: Take 1,000 Units by mouth daily 2,000 units daily   clotrimazole-betamethasone (LOTRISONE) 1-0.05 % cream   No No   Sig: Apply to affected area 2 times daily prn   lidocaine (Lidoderm) 5 %   No No   Sig: Apply 1 patch topically daily Remove & Discard patch within 12 hours or as directed by MD   loratadine (CLARITIN) 10 mg tablet   No No   Sig: TAKE ONE TABLET BY MOUTH EVERY DAY   nystatin-triamcinolone (MYCOLOG-II) ointment   No No   Sig: Apply topically 2 (two) times a day as needed (vulvar irritation)   ondansetron (Zofran ODT) 4 mg disintegrating tablet  Family Member No No   Sig: Take 1 tablet (4 mg  total) by mouth every 6 (six) hours as needed for nausea or vomiting   pantoprazole (PROTONIX) 40 mg tablet   No No   Sig: TAKE ONE TABLET BY MOUTH EVERY DAY      Facility-Administered Medications: None       Past Medical History:   Diagnosis Date    Cholelithiasis     COVID-19 12/22/2021    Fall against object 07/18/2021    GERD (gastroesophageal reflux disease)     Influenza 01/29/2019    Migraine headache     Nasal bone fractures 07/25/2021    Orthostatic hypotension 07/25/2021    Pneumonia     Urinary tract infection        Past Surgical History:   Procedure Laterality Date    NO PAST SURGERIES      DC ARTHRD ANT INTERBODY MIN DSC CRV BELOW C2 Bilateral 7/20/2021    Procedure: Anterior cervical discectomy and fusion C4-5, posterior cervical decompresion and fusion C2-T2;  Surgeon: Ronny Juarez MD;  Location: [facility];  Service: Neurosurgery       Family History   Problem Relation Age of Onset    Osteoporosis Mother     Heart disease Mother     No Known Problems Father     Osteoporosis Sister     Osteoporosis Sister     No Known Problems Sister     No Known Problems Sister     No Known Problems Brother     No Known Problems Daughter     Diabetes type II Son     Stroke Son     Heart attack Son     Deep vein thrombosis Son      I have reviewed and agree with the history as documented.    E-Cigarette/Vaping    E-Cigarette Use Never User      E-Cigarette/Vaping Substances    Nicotine No     THC No     CBD No     Flavoring No     Other No     Unknown No      Social History     Tobacco Use    Smoking status: Former     Current packs/day: 1.50     Average packs/day: 1.5 packs/day for 10.0 years (15.0 ttl pk-yrs)     Types: Cigarettes    Smokeless tobacco: Never    Tobacco comments:     quit age 23   Vaping Use    Vaping status: Never Used   Substance Use Topics    Alcohol use: Never     Alcohol/week: 0.0 standard drinks of alcohol    Drug use: Never       Review of Systems   Constitutional:  Negative for fever.    Respiratory:  Negative for cough.    Gastrointestinal:  Negative for diarrhea and vomiting.   Genitourinary:  Negative for dysuria.   Musculoskeletal:  Negative for back pain.   Skin:  Negative for rash.   All other systems reviewed and are negative.      Physical Exam  Physical Exam  Vitals and nursing note reviewed.   Constitutional:       General: She is in acute distress.      Appearance: She is well-developed. She is not ill-appearing or diaphoretic.      Comments: Appears in pain   HENT:      Head: Normocephalic and atraumatic.   Eyes:      Conjunctiva/sclera: Conjunctivae normal.   Cardiovascular:      Rate and Rhythm: Regular rhythm. Tachycardia present.      Heart sounds: Normal heart sounds. No murmur heard.  Pulmonary:      Effort: Pulmonary effort is normal. No respiratory distress.      Breath sounds: Normal breath sounds.   Abdominal:      General: Bowel sounds are normal. There is no distension.      Palpations: Abdomen is soft.      Tenderness: There is no abdominal tenderness.   Musculoskeletal:         General: Tenderness present. No deformity. Normal range of motion.      Cervical back: Normal range of motion and neck supple.      Right lower leg: No edema.      Left lower leg: No edema.      Comments: No midline spine ttp.  + ttp left hip down to mid anterior left thigh.  Rom initact but has pain with hip flexion.  Left knee not tender, no sts.   Skin:     General: Skin is warm and dry.      Coloration: Skin is not pale.      Findings: No rash.   Neurological:      General: No focal deficit present.      Mental Status: She is alert and oriented to person, place, and time.      Cranial Nerves: No cranial nerve deficit.   Psychiatric:         Mood and Affect: Mood normal.         Behavior: Behavior normal.         Vital Signs  ED Triage Vitals   Temperature Pulse Respirations Blood Pressure SpO2   04/09/24 1030 04/09/24 1033 04/09/24 1030 04/09/24 1033 04/09/24 1033   (!) 97.1 °F (36.2 °C) (!)  111 18 168/77 100 %      Temp src Heart Rate Source Patient Position - Orthostatic VS BP Location FiO2 (%)   -- -- -- -- --             Pain Score       04/09/24 1030       8           Vitals:    04/09/24 1033   BP: 168/77   Pulse: (!) 111         Visual Acuity      ED Medications  Medications   acetaminophen (Ofirmev) injection 1,000 mg (0 mg Intravenous Stopped 4/9/24 1134)   HYDROmorphone (DILAUDID) injection 0.2 mg (0.2 mg Intravenous Given 4/9/24 1118)       Diagnostic Studies  Results Reviewed       None                   XR hip/pelv 2-3 vws left if performed   ED Interpretation by Ann Amor MD (04/09 1125)   No fracture      XR femur 2 views LEFT   ED Interpretation by Ann Amor MD (04/09 1125)   No fracture      VAS lower limb venous duplex study, unilateral/limited    (Results Pending)              Procedures  Procedures         ED Course                               SBIRT 20yo+      Flowsheet Row Most Recent Value   Initial Alcohol Screen: US AUDIT-C     1. How often do you have a drink containing alcohol? 0 Filed at: 04/09/2024 1030   2. How many drinks containing alcohol do you have on a typical day you are drinking?  0 Filed at: 04/09/2024 1030   3a. Male UNDER 65: How often do you have five or more drinks on one occasion? 0 Filed at: 04/09/2024 1030   3b. FEMALE Any Age, or MALE 65+: How often do you have 4 or more drinks on one occassion? 0 Filed at: 04/09/2024 1030   Audit-C Score 0 Filed at: 04/09/2024 1030   RAJAT: How many times in the past year have you...    Used an illegal drug or used a prescription medication for non-medical reasons? Never Filed at: 04/09/2024 1030                      Medical Decision Making  Vascular study negative for DVT.  Likely musculoskeletal/arthritis pain.  Pt. Feels better after the IV tylenol and small dose of diluadid.  Will dicharge on pain med to use prn and advised tylenol/advil prn as well, alternate ice/heat.  Pt. Lives with daughter.  She has  appointment with PCP in 3 days.    Amount and/or Complexity of Data Reviewed  Radiology: ordered and independent interpretation performed.    Risk  Prescription drug management.             Disposition  Final diagnoses:   Left hip pain   Left thigh pain     Time reflects when diagnosis was documented in both MDM as applicable and the Disposition within this note       Time User Action Codes Description Comment    4/9/2024 12:09 PM Ann Amor Add [M25.552] Left hip pain     4/9/2024 12:09 PM Ann Amor Add [M79.652] Left thigh pain           ED Disposition       ED Disposition   Discharge    Condition   Stable    Date/Time   Tue Apr 9, 2024 1209    Comment   Leah GOLDEN Stephanie discharge to home/self care.                   Follow-up Information       Follow up With Specialties Details Why Contact Info    Haylee Puente DO Family Medicine  as planned for Thursday [address] [facility] [address]  [phone]              Patient's Medications   Discharge Prescriptions    OXYCODONE (ROXICODONE) 5 IMMEDIATE RELEASE TABLET    Take 1 tablet (5 mg total) by mouth 3 (three) times a day as needed for moderate pain or severe pain for up to 10 days Max Daily Amount: 15 mg       Start Date: 4/9/2024  End Date: 4/19/2024       Order Dose: 5 mg       Quantity: 10 tablet    Refills: 0       No discharge procedures on file.    PDMP Review         Value Time User    PDMP Reviewed  Yes 11/29/2021  3:57 PM Dwain Baptiste MD            ED Provider  Electronically Signed by             Ann Amor MD  04/09/24 4139

## 2024-04-09 NOTE — DISCHARGE INSTRUCTIONS
No blood clot or broken bones.  Use tylenol and/or Aleve as needed for mild or moderate pain.   You can use the stronger pain pill as needed for more severe pain but be careful with it.  Alternate ice and heat to the area.

## 2024-04-11 ENCOUNTER — APPOINTMENT (OUTPATIENT)
Dept: LAB | Facility: CLINIC | Age: 79
End: 2024-04-11
Payer: COMMERCIAL

## 2024-04-11 ENCOUNTER — OFFICE VISIT (OUTPATIENT)
Dept: FAMILY MEDICINE CLINIC | Facility: CLINIC | Age: 79
End: 2024-04-11

## 2024-04-11 VITALS
WEIGHT: 134 LBS | DIASTOLIC BLOOD PRESSURE: 60 MMHG | HEART RATE: 93 BPM | OXYGEN SATURATION: 95 % | BODY MASS INDEX: 24.66 KG/M2 | SYSTOLIC BLOOD PRESSURE: 105 MMHG | HEIGHT: 62 IN

## 2024-04-11 DIAGNOSIS — N18.30 STAGE 3 CHRONIC KIDNEY DISEASE, UNSPECIFIED WHETHER STAGE 3A OR 3B CKD (HCC): ICD-10-CM

## 2024-04-11 DIAGNOSIS — Z00.00 ENCOUNTER FOR SUBSEQUENT ANNUAL WELLNESS VISIT (AWV) IN MEDICARE PATIENT: Primary | ICD-10-CM

## 2024-04-11 DIAGNOSIS — D69.6 THROMBOCYTOPENIA (HCC): ICD-10-CM

## 2024-04-11 DIAGNOSIS — F41.9 ANXIETY: ICD-10-CM

## 2024-04-11 DIAGNOSIS — D64.9 ANEMIA, UNSPECIFIED TYPE: ICD-10-CM

## 2024-04-11 DIAGNOSIS — K21.9 GASTROESOPHAGEAL REFLUX DISEASE: ICD-10-CM

## 2024-04-11 DIAGNOSIS — E78.5 HYPERLIPIDEMIA, UNSPECIFIED HYPERLIPIDEMIA TYPE: ICD-10-CM

## 2024-04-11 DIAGNOSIS — M81.0 AGE-RELATED OSTEOPOROSIS WITHOUT CURRENT PATHOLOGICAL FRACTURE: ICD-10-CM

## 2024-04-11 DIAGNOSIS — S14.129D CENTRAL CORD SYNDROME, SUBSEQUENT ENCOUNTER (HCC): ICD-10-CM

## 2024-04-11 DIAGNOSIS — R20.2 NUMBNESS AND TINGLING OF BOTH LEGS: ICD-10-CM

## 2024-04-11 DIAGNOSIS — M25.552 LEFT HIP PAIN: ICD-10-CM

## 2024-04-11 DIAGNOSIS — M79.18 MYOFASCIAL PAIN SYNDROME, CERVICAL: ICD-10-CM

## 2024-04-11 DIAGNOSIS — Z98.1 STATUS POST CERVICAL SPINAL FUSION: ICD-10-CM

## 2024-04-11 DIAGNOSIS — R20.0 NUMBNESS AND TINGLING OF BOTH LEGS: ICD-10-CM

## 2024-04-11 DIAGNOSIS — R53.83 OTHER FATIGUE: ICD-10-CM

## 2024-04-11 DIAGNOSIS — R79.89 HIGH SERUM PARATHYROID HORMONE (PTH): ICD-10-CM

## 2024-04-11 DIAGNOSIS — I50.9 CONGESTIVE HEART FAILURE, UNSPECIFIED HF CHRONICITY, UNSPECIFIED HEART FAILURE TYPE (HCC): ICD-10-CM

## 2024-04-11 LAB
25(OH)D3 SERPL-MCNC: 47.9 NG/ML (ref 30–100)
ALBUMIN SERPL BCP-MCNC: 4.1 G/DL (ref 3.5–5)
ALP SERPL-CCNC: 50 U/L (ref 34–104)
ALT SERPL W P-5'-P-CCNC: 11 U/L (ref 7–52)
ANION GAP SERPL CALCULATED.3IONS-SCNC: 8 MMOL/L (ref 4–13)
AST SERPL W P-5'-P-CCNC: 24 U/L (ref 13–39)
BILIRUB SERPL-MCNC: 0.47 MG/DL (ref 0.2–1)
BUN SERPL-MCNC: 18 MG/DL (ref 5–25)
CALCIUM SERPL-MCNC: 9 MG/DL (ref 8.4–10.2)
CHLORIDE SERPL-SCNC: 108 MMOL/L (ref 96–108)
CO2 SERPL-SCNC: 25 MMOL/L (ref 21–32)
CREAT SERPL-MCNC: 1.17 MG/DL (ref 0.6–1.3)
ERYTHROCYTE [DISTWIDTH] IN BLOOD BY AUTOMATED COUNT: 12.8 % (ref 11.6–15.1)
GFR SERPL CREATININE-BSD FRML MDRD: 44 ML/MIN/1.73SQ M
GLUCOSE P FAST SERPL-MCNC: 93 MG/DL (ref 65–99)
HCT VFR BLD AUTO: 39.6 % (ref 34.8–46.1)
HGB BLD-MCNC: 12.8 G/DL (ref 11.5–15.4)
MAGNESIUM SERPL-MCNC: 2.3 MG/DL (ref 1.9–2.7)
MCH RBC QN AUTO: 31.6 PG (ref 26.8–34.3)
MCHC RBC AUTO-ENTMCNC: 32.3 G/DL (ref 31.4–37.4)
MCV RBC AUTO: 98 FL (ref 82–98)
PHOSPHATE SERPL-MCNC: 2.9 MG/DL (ref 2.3–4.1)
PLATELET # BLD AUTO: 156 THOUSANDS/UL (ref 149–390)
PMV BLD AUTO: 9.8 FL (ref 8.9–12.7)
POTASSIUM SERPL-SCNC: 4.6 MMOL/L (ref 3.5–5.3)
PROT SERPL-MCNC: 6.7 G/DL (ref 6.4–8.4)
PTH-INTACT SERPL-MCNC: 116.8 PG/ML (ref 12–88)
RBC # BLD AUTO: 4.05 MILLION/UL (ref 3.81–5.12)
SODIUM SERPL-SCNC: 141 MMOL/L (ref 135–147)
TSH SERPL DL<=0.05 MIU/L-ACNC: 1.76 UIU/ML (ref 0.45–4.5)
VIT B12 SERPL-MCNC: 377 PG/ML (ref 180–914)
WBC # BLD AUTO: 4.2 THOUSAND/UL (ref 4.31–10.16)

## 2024-04-11 PROCEDURE — 82607 VITAMIN B-12: CPT

## 2024-04-11 PROCEDURE — 36415 COLL VENOUS BLD VENIPUNCTURE: CPT

## 2024-04-11 PROCEDURE — 80053 COMPREHEN METABOLIC PANEL: CPT

## 2024-04-11 PROCEDURE — 83735 ASSAY OF MAGNESIUM: CPT

## 2024-04-11 PROCEDURE — 82306 VITAMIN D 25 HYDROXY: CPT

## 2024-04-11 PROCEDURE — 84443 ASSAY THYROID STIM HORMONE: CPT

## 2024-04-11 PROCEDURE — 84165 PROTEIN E-PHORESIS SERUM: CPT

## 2024-04-11 PROCEDURE — 86334 IMMUNOFIX E-PHORESIS SERUM: CPT

## 2024-04-11 PROCEDURE — 85027 COMPLETE CBC AUTOMATED: CPT

## 2024-04-11 PROCEDURE — 83970 ASSAY OF PARATHORMONE: CPT

## 2024-04-11 PROCEDURE — 84100 ASSAY OF PHOSPHORUS: CPT

## 2024-04-11 RX ORDER — ATORVASTATIN CALCIUM 20 MG/1
20 TABLET, FILM COATED ORAL
Qty: 90 TABLET | Refills: 1 | Status: SHIPPED | OUTPATIENT
Start: 2024-04-11

## 2024-04-11 RX ORDER — ALPRAZOLAM 0.25 MG/1
0.25 TABLET ORAL
Qty: 30 TABLET | Refills: 0 | Status: SHIPPED | OUTPATIENT
Start: 2024-04-11

## 2024-04-11 RX ORDER — LIDOCAINE 50 MG/G
1 PATCH TOPICAL DAILY
Qty: 30 PATCH | Refills: 1 | Status: SHIPPED | OUTPATIENT
Start: 2024-04-11 | End: 2024-06-10

## 2024-04-11 RX ORDER — PANTOPRAZOLE SODIUM 40 MG/1
40 TABLET, DELAYED RELEASE ORAL DAILY
Qty: 90 TABLET | Refills: 1 | Status: SHIPPED | OUTPATIENT
Start: 2024-04-11

## 2024-04-11 NOTE — PROGRESS NOTES
Assessment and Plan:     Problem List Items Addressed This Visit          Cardiovascular and Mediastinum    CHF (congestive heart failure) (HCC)  - follows with Cardio - last OV was 2/27/2023 - no changes, RTO in 1yr - overdue and advised to schedule          Nervous and Auditory    Central cord syndrome (HCC)  - follows with NeuroSurg annually          Musculoskeletal and Integument    Age related osteoporosis    Relevant Medications    cholecalciferol 1,000 units tablet  - cont Vit D 2000IU QD and Ca++ 1200mg QD   - follows with Rheum        Genitourinary    Stage 3 chronic kidney disease, unspecified whether stage 3a or 3b CKD (HCC)  - labs pending        Hematopoietic and Hemostatic    Thrombocytopenia (HCC)       Behavioral Health    Anxiety    Relevant Medications    ALPRAZolam (XANAX) 0.25 mg tablet       Surgery/Wound/Pain    Status post cervical spinal fusion    Relevant Medications    lidocaine (Lidoderm) 5 %       Other    Hyperlipidemia    Relevant Medications    atorvastatin (LIPITOR) 20 mg tablet     Other Visit Diagnoses       Encounter for subsequent annual wellness visit (AWV) in Medicare patient    -  Primary  - reviewed PMHx, meds, allergies, IMMs  - UTD with COVID IMMs  - UTD with PCV20   - UTD with annual Flu vaccine  - Cervical Ca screening no longer indicated   - has Mammo scheduled  - declined Colon Ca screening   - h/o Osteoporosis - follows with Rheum   - had labs drawn earlier today   - RTO in 1yr for AWV - pt and daughter aware and agreeable       Myofascial pain syndrome, cervical        Relevant Medications    lidocaine (Lidoderm) 5 %    Gastroesophageal reflux disease        Relevant Medications    pantoprazole (PROTONIX) 40 mg tablet    Left hip pain        Relevant Orders    Ambulatory Referral to Orthopedic Surgery    Ambulatory Referral to Physical Therapy    Referral to [facility]  - was seen in ER on 4/9/2024 for eval of L-hip pain -- relief with Tylenol and  Dilaudid -- no fracture on xray -- pt was d/c'd home with Oxy - has been taking QHS to help with sleep   - minimal relief with OTC Lidoderm patches  - (+) pain relief with stretching   - referred to Ortho and PT   - order placed for home PT   - f/u PRN - pt and daughter aware and agreeable              Preventive health issues were discussed with patient, and age appropriate screening tests were ordered as noted in patient's After Visit Summary.  Personalized health advice and appropriate referrals for health education or preventive services given if needed, as noted in patient's After Visit Summary.     History of Present Illness:     Patient presents for a Medicare Wellness Visit    HPI as below     Patient Care Team:  Haylee Puente DO as PCP - General (Family Medicine)  Haylee Puente DO as PCP - PCP-[facility] ([facility])  MD René Gregory DO     Review of Systems:     Review of Systems as per HPI      Problem List:     Patient Active Problem List   Diagnosis    GERD (gastroesophageal reflux disease)    History of asthma    Hypertension    Cholelithiasis    Moderate mitral regurgitation    Vertigo    Recurrent falls    Gait disturbance    Hyperlipidemia    Other fatigue    High serum parathyroid hormone (PTH)    Anxiety    Age related osteoporosis    Thrombocytopenia (HCC)    Central cord syndrome (HCC)    History of recurrent UTIs    Dyslipidemia    Anemia    Stage 3 chronic kidney disease, unspecified whether stage 3a or 3b CKD (HCC)    CHF (congestive heart failure) (HCC)    Cervical stenosis of spinal canal    Overgrown toenails    Status post cervical spinal fusion    Intertriginous dermatitis associated with moisture    Numbness and tingling of both legs      Past Medical and Surgical History:     Past Medical History:   Diagnosis Date    Cholelithiasis     COVID-19 12/22/2021    Fall against object 07/18/2021    GERD (gastroesophageal reflux disease)     Influenza 01/29/2019     Migraine headache     Nasal bone fractures 07/25/2021    Orthostatic hypotension 07/25/2021    Pneumonia     Urinary tract infection      Past Surgical History:   Procedure Laterality Date    NO PAST SURGERIES      WI ARTHRD ANT INTERBODY MIN DSC CRV BELOW C2 Bilateral 7/20/2021    Procedure: Anterior cervical discectomy and fusion C4-5, posterior cervical decompresion and fusion C2-T2;  Surgeon: Ronny Juarez MD;  Location: [facility];  Service: Neurosurgery      Family History:     Family History   Problem Relation Age of Onset    Osteoporosis Mother     Heart disease Mother     No Known Problems Father     Osteoporosis Sister     Osteoporosis Sister     No Known Problems Sister     No Known Problems Sister     No Known Problems Brother     No Known Problems Daughter     Diabetes type II Son     Stroke Son     Heart attack Son     Deep vein thrombosis Son       Social History:     Social History     Socioeconomic History    Marital status:      Spouse name: None    Number of children: 4    Years of education: None    Highest education level: None   Occupational History    None   Tobacco Use    Smoking status: Former     Current packs/day: 1.50     Average packs/day: 1.5 packs/day for 10.0 years (15.0 ttl pk-yrs)     Types: Cigarettes    Smokeless tobacco: Never    Tobacco comments:     quit age 23   Vaping Use    Vaping status: Never Used   Substance and Sexual Activity    Alcohol use: Never     Alcohol/week: 0.0 standard drinks of alcohol    Drug use: Never    Sexual activity: Not Currently     Partners: Male   Other Topics Concern    None   Social History Narrative    Lives with      Social Determinants of Health     Financial Resource Strain: Low Risk  (4/19/2023)    Overall Financial Resource Strain (CARDIA)     Difficulty of Paying Living Expenses: Not hard at all   Food Insecurity: Patient Unable To Answer (4/11/2024)    Hunger Vital Sign     Worried About Running Out of Food in the Last Year:  Patient unable to answer     Ran Out of Food in the Last Year: Patient unable to answer   Transportation Needs: Patient Unable To Answer (4/11/2024)    PRAPARE - Transportation     Lack of Transportation (Medical): Patient unable to answer     Lack of Transportation (Non-Medical): Patient unable to answer   Physical Activity: Not on file   Stress: Not on file   Social Connections: Not on file   Intimate Partner Violence: Not on file   Housing Stability: Patient Unable To Answer (4/11/2024)    Housing Stability Vital Sign     Unable to Pay for Housing in the Last Year: Patient unable to answer     Number of Places Lived in the Last Year: Not on file     Unstable Housing in the Last Year: Patient unable to answer      Medications and Allergies:     Current Outpatient Medications   Medication Sig Dispense Refill    ALPRAZolam (XANAX) 0.25 mg tablet Take 1 tablet (0.25 mg total) by mouth daily at bedtime as needed for anxiety 30 tablet 0    atorvastatin (LIPITOR) 20 mg tablet Take 1 tablet (20 mg total) by mouth daily with dinner 90 tablet 1    B Complex-C-Folic Acid (Ayanna-Dylan) TABS TAKE ONE TABLET BY MOUTH EVERY  tablet 1    cholecalciferol 1,000 units tablet Take 1 tablet (1,000 Units total) by mouth daily 2,000 units daily 90 tablet 1    clotrimazole-betamethasone (LOTRISONE) 1-0.05 % cream Apply to affected area 2 times daily prn 15 g 0    CRANBERRY PO Take by mouth      Docusate Sodium (COLACE PO) Take by mouth      lidocaine (Lidoderm) 5 % Apply 1 patch topically over 12 hours daily Remove & Discard patch within 12 hours or as directed by MD 30 patch 1    loratadine (CLARITIN) 10 mg tablet TAKE ONE TABLET BY MOUTH EVERY DAY 90 tablet 1    nystatin-triamcinolone (MYCOLOG-II) ointment Apply topically 2 (two) times a day as needed (vulvar irritation) 30 g 2    ondansetron (Zofran ODT) 4 mg disintegrating tablet Take 1 tablet (4 mg total) by mouth every 6 (six) hours as needed for nausea or vomiting 10 tablet  0    oxyCODONE (ROXICODONE) 5 immediate release tablet Take 1 tablet (5 mg total) by mouth 3 (three) times a day as needed for moderate pain or severe pain for up to 10 days Max Daily Amount: 15 mg 10 tablet 0    pantoprazole (PROTONIX) 40 mg tablet Take 1 tablet (40 mg total) by mouth daily 90 tablet 1    cephalexin (KEFLEX) 250 mg capsule Take 250 mg by mouth daily (Patient not taking: Reported on 8/23/2023)       No current facility-administered medications for this visit.     Allergies   Allergen Reactions    Bee Venom Anaphylaxis      Immunizations:     Immunization History   Administered Date(s) Administered    COVID-19 PFIZER VACCINE 0.3 ML IM 02/13/2021, 03/06/2021    INFLUENZA 10/12/2022    Influenza, Quadrivalent (nasal) 01/09/2017    Influenza, high dose seasonal 0.7 mL 01/27/2020, 10/13/2020, 10/12/2022, 11/16/2023    Influenza, seasonal, injectable 01/10/2017    Pneumococcal Conjugate 13-Valent 01/27/2020    Pneumococcal Conjugate Vaccine 20-valent (Pcv20), Polysace 10/12/2022    Pneumococcal Polysaccharide PPV23 06/01/2016    Tdap 07/18/2021      Health Maintenance:         Topic Date Due    Hepatitis C Screening  Completed         Topic Date Due    COVID-19 Vaccine (3 - 2023-24 season) 09/01/2023      Medicare Screening Tests and Risk Assessments:     Leah is here for her Subsequent Wellness visit. Last Medicare Wellness visit information reviewed, patient interviewed and updates made to the record today.      Health Risk Assessment:   Patient rates overall health as good. Patient feels that their physical health rating is slightly better. Patient is very satisfied with their life. Eyesight was rated as same. Hearing was rated as same. Patient feels that their emotional and mental health rating is same. Patients states they are never, rarely angry. Patient states they are never, rarely unusually tired/fatigued. Pain experienced in the last 7 days has been none. Patient states that she has experienced  no weight loss or gain in last 6 months.     Depression Screening:   PHQ-2 Score: 0      Fall Risk Screening:   In the past year, patient has experienced: history of falling in past year    Number of falls: 1  Injured during fall?: No    Feels unsteady when standing or walking?: Yes    Worried about falling?: No      Urinary Incontinence Screening:   Patient has leaked urine accidently in the last six months.     Home Safety:  Patient has trouble with stairs inside or outside of their home. Patient has working smoke alarms and has working carbon monoxide detector. Home safety hazards include: none.     Nutrition:   Current diet is Regular.     Medications:   Patient is currently taking over-the-counter supplements. OTC medications include: see medication list. Patient is able to manage medications.     Activities of Daily Living (ADLs)/Instrumental Activities of Daily Living (IADLs):   Walk and transfer into and out of bed and chair?: Yes  Dress and groom yourself?: Yes    Bathe or shower yourself?: Yes    Feed yourself? Yes  Do your laundry/housekeeping?: Yes  Manage your money, pay your bills and track your expenses?: No  Make your own meals?: Yes    Do your own shopping?: No    Previous Hospitalizations:   Any hospitalizations or ED visits within the last 12 months?: No      Advance Care Planning:   Living will: Yes    Durable POA for healthcare: Yes    Advanced directive: Yes      Cognitive Screening:   Provider or family/friend/caregiver concerned regarding cognition?: No    PREVENTIVE SCREENINGS      Cardiovascular Screening:    General: History Lipid Disorder and Risks and Benefits Discussed      Diabetes Screening:     General: Risks and Benefits Discussed and Screening Current      Colorectal Cancer Screening:     General: Patient Declines      Breast Cancer Screening:     General: Screening Current    Due for: Mammogram        Cervical Cancer Screening:    General: Screening Not Indicated      Osteoporosis  Screening:    General: History Osteoporosis      Abdominal Aortic Aneurysm (AAA) Screening:        General: Screening Not Indicated      Lung Cancer Screening:     General: Screening Not Indicated      Hepatitis C Screening:    General: Screening Current      Preventive Screening Comments: Leah Brown is a 78 y.o. female who presents to the office with her Daughter for AWV and f/u   - pt lives with her Daughter   - Specialists: Cardio (last OV was 2023 - no changes, RTO in 1yr - overdue), NeuroSurg (follows annually), Urology, Audiologist    - was seen in ER on 2024 for eval of L-hip pain -- relief with Tylenol and Dilaudid -- no fracture on xray -- pt was d/c'd home with Oxy - has been taking QHS to help with sleep   - minimal relief with OTC Lidoderm patches  - (+) pain relief with stretching   - PMHx: GERD, Gallstones, Moderate Mitral Regurg, HTN, CHF, central cord syndrome, Osteoporosis, Thrombocytopenia, CKD, HL, abnml LFTs, JARET, Anemia, Cervical Stenosis, h/o COVID (2021)    - allergies: Bee Venom - anaphylaxis -- unable to tolerate EpiPen   - Meds: see med rec   - Immunizations: UTD with COVID IMMs, UTD with PCV20 and annual Flu vaccine   - UTD with annual Mammo  - declined Colon Ca screening   - labs pending   - takes Xanax 0.25mg QD PRN prior to bloodwork/imaging   - ROS: today in the office pt denies F/C/N/V/HA/visual changes/CP/palpitations/SOB/wheezing/abd pain/D/LE edema    Screening, Brief Intervention, and Referral to Treatment (SBIRT)    Screening  Typical number of drinks in a day: 0  Typical number of drinks in a week: 0  Interpretation: Low risk drinking behavior.    AUDIT-C Screenin) How often did you have a drink containing alcohol in the past year? never  2) How many drinks did you have on a typical day when you were drinking in the past year? 0  3) How often did you have 6 or more drinks on one occasion in the past year? never    AUDIT-C Score: 0  Interpretation: Score 0-2  "(female): Negative screen for alcohol misuse    Single Item Drug Screening:  How often have you used an illegal drug (including marijuana) or a prescription medication for non-medical reasons in the past year? never    Single Item Drug Screen Score: 0  Interpretation: Negative screen for possible drug use disorder    No results found.     Physical Exam:     /60   Pulse 93   Ht 5' 2\" (1.575 m)   Wt 60.8 kg (134 lb)   SpO2 95%   BMI 24.51 kg/m²     Physical Exam  Vitals reviewed.   Constitutional:       General: She is in acute distress.      Appearance: She is not toxic-appearing or diaphoretic.   HENT:      Head: Normocephalic and atraumatic.      Right Ear: External ear normal.      Left Ear: External ear normal.      Nose: Nose normal.   Eyes:      General: No scleral icterus.        Right eye: No discharge.         Left eye: No discharge.      Extraocular Movements: Extraocular movements intact.      Conjunctiva/sclera: Conjunctivae normal.   Cardiovascular:      Rate and Rhythm: Normal rate and regular rhythm.      Heart sounds: Normal heart sounds.   Pulmonary:      Effort: Pulmonary effort is normal. No respiratory distress.      Breath sounds: Normal breath sounds. No stridor. No wheezing, rhonchi or rales.   Abdominal:      Palpations: Abdomen is soft.   Musculoskeletal:      Cervical back: Normal range of motion.      Comments: In wheelchair, (+) MSK L-hip pain - some relief with stretching    Skin:     General: Skin is warm.   Neurological:      General: No focal deficit present.      Mental Status: She is alert and oriented to person, place, and time.   Psychiatric:         Mood and Affect: Mood is anxious.          Haylee Puente DO  "

## 2024-04-11 NOTE — PATIENT INSTRUCTIONS
Medicare Preventive Visit Patient Instructions  Thank you for completing your Welcome to Medicare Visit or Medicare Annual Wellness Visit today. Your next wellness visit will be due in one year (4/12/2025).  The screening/preventive services that you may require over the next 5-10 years are detailed below. Some tests may not apply to you based off risk factors and/or age. Screening tests ordered at today's visit but not completed yet may show as past due. Also, please note that scanned in results may not display below.  Preventive Screenings:  Service Recommendations Previous Testing/Comments   Colorectal Cancer Screening  * Colonoscopy    * Fecal Occult Blood Test (FOBT)/Fecal Immunochemical Test (FIT)  * Fecal DNA/Cologuard Test  * Flexible Sigmoidoscopy Age: 45-75 years old   Colonoscopy: every 10 years (may be performed more frequently if at higher risk)  OR  FOBT/FIT: every 1 year  OR  Cologuard: every 3 years  OR  Sigmoidoscopy: every 5 years  Screening may be recommended earlier than age 45 if at higher risk for colorectal cancer. Also, an individualized decision between you and your healthcare provider will decide whether screening between the ages of 76-85 would be appropriate. Colonoscopy: 07/18/2014  FOBT/FIT: Not on file  Cologuard: Not on file  Sigmoidoscopy: Not on file    Patient Declines     Breast Cancer Screening Age: 40+ years old  Frequency: every 1-2 years  Not required if history of left and right mastectomy Mammogram: 01/26/2023    Screening Current   Cervical Cancer Screening Between the ages of 21-29, pap smear recommended once every 3 years.   Between the ages of 30-65, can perform pap smear with HPV co-testing every 5 years.   Recommendations may differ for women with a history of total hysterectomy, cervical cancer, or abnormal pap smears in past. Pap Smear: Not on file    Screening Not Indicated   Hepatitis C Screening Once for adults born between 1945 and 1965  More frequently in  patients at high risk for Hepatitis C Hep C Antibody: 04/21/2022    Screening Current   Diabetes Screening 1-2 times per year if you're at risk for diabetes or have pre-diabetes Fasting glucose: 90 mg/dL (11/15/2023)  A1C: No results in last 5 years (No results in last 5 years)  Risks and Benefits Discussed  Due for Blood Glucose   Cholesterol Screening Once every 5 years if you don't have a lipid disorder. May order more often based on risk factors. Lipid panel: 11/15/2023    History Lipid Disorder  Risks and Benefits Discussed  Due for Lipid Panel     Other Preventive Screenings Covered by Medicare:  Abdominal Aortic Aneurysm (AAA) Screening: covered once if your at risk. You're considered to be at risk if you have a family history of AAA.  Lung Cancer Screening: covers low dose CT scan once per year if you meet all of the following conditions: (1) Age 55-77; (2) No signs or symptoms of lung cancer; (3) Current smoker or have quit smoking within the last 15 years; (4) You have a tobacco smoking history of at least 20 pack years (packs per day multiplied by number of years you smoked); (5) You get a written order from a healthcare provider.  Glaucoma Screening: covered annually if you're considered high risk: (1) You have diabetes OR (2) Family history of glaucoma OR (3)  aged 50 and older OR (4)  American aged 65 and older  Osteoporosis Screening: covered every 2 years if you meet one of the following conditions: (1) You're estrogen deficient and at risk for osteoporosis based off medical history and other findings; (2) Have a vertebral abnormality; (3) On glucocorticoid therapy for more than 3 months; (4) Have primary hyperparathyroidism; (5) On osteoporosis medications and need to assess response to drug therapy.   Last bone density test (DXA Scan): 08/16/2023.  HIV Screening: covered annually if you're between the age of 15-65. Also covered annually if you are younger than 15 and older  than 65 with risk factors for HIV infection. For pregnant patients, it is covered up to 3 times per pregnancy.    Immunizations:  Immunization Recommendations   Influenza Vaccine Annual influenza vaccination during flu season is recommended for all persons aged >= 6 months who do not have contraindications   Pneumococcal Vaccine   * Pneumococcal conjugate vaccine = PCV13 (Prevnar 13), PCV15 (Vaxneuvance), PCV20 (Prevnar 20)  * Pneumococcal polysaccharide vaccine = PPSV23 (Pneumovax) Adults 19-65 yo with certain risk factors or if 65+ yo  If never received any pneumonia vaccine: recommend Prevnar 20 (PCV20)  Give PCV20 if previously received 1 dose of PCV13 or PPSV23   Hepatitis B Vaccine 3 dose series if at intermediate or high risk (ex: diabetes, end stage renal disease, liver disease)   Respiratory syncytial virus (RSV) Vaccine - COVERED BY MEDICARE PART D  * RSVPreF3 (Arexvy) CDC recommends that adults 60 years of age and older may receive a single dose of RSV vaccine using shared clinical decision-making (SCDM)   Tetanus (Td) Vaccine - COST NOT COVERED BY MEDICARE PART B Following completion of primary series, a booster dose should be given every 10 years to maintain immunity against tetanus. Td may also be given as tetanus wound prophylaxis.   Tdap Vaccine - COST NOT COVERED BY MEDICARE PART B Recommended at least once for all adults. For pregnant patients, recommended with each pregnancy.   Shingles Vaccine (Shingrix) - COST NOT COVERED BY MEDICARE PART B  2 shot series recommended in those 19 years and older who have or will have weakened immune systems or those 50 years and older     Health Maintenance Due:      Topic Date Due   • Hepatitis C Screening  Completed     Immunizations Due:      Topic Date Due   • COVID-19 Vaccine (3 - 2023-24 season) 09/01/2023     Advance Directives   What are advance directives?  Advance directives are legal documents that state your wishes and plans for medical care. These  plans are made ahead of time in case you lose your ability to make decisions for yourself. Advance directives can apply to any medical decision, such as the treatments you want, and if you want to donate organs.   What are the types of advance directives?  There are many types of advance directives, and each state has rules about how to use them. You may choose a combination of any of the following:  Living will:  This is a written record of the treatment you want. You can also choose which treatments you do not want, which to limit, and which to stop at a certain time. This includes surgery, medicine, IV fluid, and tube feedings.   Durable power of  for healthcare (DPAHC):  This is a written record that states who you want to make healthcare choices for you when you are unable to make them for yourself. This person, called a proxy, is usually a family member or a friend. You may choose more than 1 proxy.  Do not resuscitate (DNR) order:  A DNR order is used in case your heart stops beating or you stop breathing. It is a request not to have certain forms of treatment, such as CPR. A DNR order may be included in other types of advance directives.  Medical directive:  This covers the care that you want if you are in a coma, near death, or unable to make decisions for yourself. You can list the treatments you want for each condition. Treatment may include pain medicine, surgery, blood transfusions, dialysis, IV or tube feedings, and a ventilator (breathing machine).  Values history:  This document has questions about your views, beliefs, and how you feel and think about life. This information can help others choose the care that you would choose.  Why are advance directives important?  An advance directive helps you control your care. Although spoken wishes may be used, it is better to have your wishes written down. Spoken wishes can be misunderstood, or not followed. Treatments may be given even if you do not  want them. An advance directive may make it easier for your family to make difficult choices about your care.   Fall Prevention    Fall prevention  includes ways to make your home and other areas safer. It also includes ways you can move more carefully to prevent a fall. Health conditions that cause changes in your blood pressure, vision, or muscle strength and coordination may increase your risk for falls. Medicines may also increase your risk for falls if they make you dizzy, weak, or sleepy.   Fall prevention tips:   Stand or sit up slowly.    Use assistive devices as directed.    Wear shoes that fit well and have soles that .    Wear a personal alarm.    Stay active.    Manage your medical conditions.    Home Safety Tips:  Add items to prevent falls in the bathroom.    Keep paths clear.    Install bright lights in your home.    Keep items you use often on shelves within reach.    Paint or place reflective tape on the edges of your stairs.    Urinary Incontinence   Urinary incontinence (UI)  is when you lose control of your bladder. UI develops because your bladder cannot store or empty urine properly. The 3 most common types of UI are stress incontinence, urge incontinence, or both.  Medicines:   May be given to help strengthen your bladder control. Report any side effects of medication to your healthcare provider.  Do pelvic muscle exercises often:  Your pelvic muscles help you stop urinating. Squeeze these muscles tight for 5 seconds, then relax for 5 seconds. Gradually work up to squeezing for 10 seconds. Do 3 sets of 15 repetitions a day, or as directed. This will help strengthen your pelvic muscles and improve bladder control.  Train your bladder:  Go to the bathroom at set times, such as every 2 hours, even if you do not feel the urge to go. You can also try to hold your urine when you feel the urge to go. For example, hold your urine for 5 minutes when you feel the urge to go. As that becomes easier,  hold your urine for 10 minutes.   Self-care:   Keep a UI record.  Write down how often you leak urine and how much you leak. Make a note of what you were doing when you leaked urine.  Drink liquids as directed. You may need to limit the amount of liquid you drink to help control your urine leakage. Do not drink any liquid right before you go to bed. Limit or do not have drinks that contain caffeine or alcohol.   Prevent constipation.  Eat a variety of high-fiber foods. Good examples are high-fiber cereals, beans, vegetables, and whole-grain breads. Walking is the best way to trigger your intestines to have a bowel movement.  Exercise regularly and maintain a healthy weight.  Weight loss and exercise will decrease pressure on your bladder and help you control your leakage.   Use a catheter as directed  to help empty your bladder. A catheter is a tiny, plastic tube that is put into your bladder to drain your urine.   Go to behavior therapy as directed.  Behavior therapy may be used to help you learn to control your urge to urinate.     © Copyright [employer] 2018 Information is for End User's use only and may not be sold, redistributed or otherwise used for commercial purposes. All illustrations and images included in CareNotes® are the copyrighted property of "[employer] or [employer]

## 2024-04-12 DIAGNOSIS — J30.9 ALLERGIC RHINITIS, UNSPECIFIED SEASONALITY, UNSPECIFIED TRIGGER: ICD-10-CM

## 2024-04-12 DIAGNOSIS — N18.2 STAGE 2 CHRONIC KIDNEY DISEASE: ICD-10-CM

## 2024-04-12 LAB
ALBUMIN SERPL ELPH-MCNC: 3.95 G/DL (ref 3.2–5.1)
ALBUMIN SERPL ELPH-MCNC: 59.8 % (ref 48–70)
ALPHA1 GLOB SERPL ELPH-MCNC: 0.28 G/DL (ref 0.15–0.47)
ALPHA1 GLOB SERPL ELPH-MCNC: 4.2 % (ref 1.8–7)
ALPHA2 GLOB SERPL ELPH-MCNC: 0.74 G/DL (ref 0.42–1.04)
ALPHA2 GLOB SERPL ELPH-MCNC: 11.2 % (ref 5.9–14.9)
BETA GLOB ABNORMAL SERPL ELPH-MCNC: 0.38 G/DL (ref 0.31–0.57)
BETA1 GLOB SERPL ELPH-MCNC: 5.8 % (ref 4.7–7.7)
BETA2 GLOB SERPL ELPH-MCNC: 5.9 % (ref 3.1–7.9)
BETA2+GAMMA GLOB SERPL ELPH-MCNC: 0.39 G/DL (ref 0.2–0.58)
GAMMA GLOB ABNORMAL SERPL ELPH-MCNC: 0.86 G/DL (ref 0.4–1.66)
GAMMA GLOB SERPL ELPH-MCNC: 13.1 % (ref 6.9–22.3)
IGG/ALB SER: 1.49 {RATIO} (ref 1.1–1.8)
INTERPRETATION UR IFE-IMP: NORMAL
PROT PATTERN SERPL ELPH-IMP: NORMAL
PROT SERPL-MCNC: 6.6 G/DL (ref 6.4–8.2)

## 2024-04-12 PROCEDURE — 84165 PROTEIN E-PHORESIS SERUM: CPT | Performed by: PATHOLOGY

## 2024-04-12 PROCEDURE — 86334 IMMUNOFIX E-PHORESIS SERUM: CPT | Performed by: PATHOLOGY

## 2024-04-12 NOTE — TELEPHONE ENCOUNTER
Pt daughter called, provider sent some refills yesterday but she forgot 2.   Loratadine and walter-shani.  Please send to [facility]

## 2024-04-15 ENCOUNTER — OFFICE VISIT (OUTPATIENT)
Dept: OBGYN CLINIC | Facility: CLINIC | Age: 79
End: 2024-04-15
Payer: COMMERCIAL

## 2024-04-15 ENCOUNTER — HOSPITAL ENCOUNTER (OUTPATIENT)
Dept: RADIOLOGY | Facility: HOSPITAL | Age: 79
Discharge: HOME | End: 2024-04-15
Payer: COMMERCIAL

## 2024-04-15 ENCOUNTER — TELEPHONE (OUTPATIENT)
Age: 79
End: 2024-04-15

## 2024-04-15 VITALS — BODY MASS INDEX: 24.51 KG/M2 | OXYGEN SATURATION: 99 % | HEIGHT: 62 IN | HEART RATE: 117 BPM

## 2024-04-15 DIAGNOSIS — M25.552 LEFT HIP PAIN: Primary | ICD-10-CM

## 2024-04-15 DIAGNOSIS — M51.36 DDD (DEGENERATIVE DISC DISEASE), LUMBAR: ICD-10-CM

## 2024-04-15 DIAGNOSIS — M41.26 OTHER IDIOPATHIC SCOLIOSIS, LUMBAR REGION: ICD-10-CM

## 2024-04-15 DIAGNOSIS — M54.16 RADICULOPATHY, LUMBAR REGION: Primary | ICD-10-CM

## 2024-04-15 DIAGNOSIS — M54.16 LUMBAR NEURITIS: ICD-10-CM

## 2024-04-15 DIAGNOSIS — M25.552 LEFT HIP PAIN: ICD-10-CM

## 2024-04-15 PROCEDURE — 72100 X-RAY EXAM L-S SPINE 2/3 VWS: CPT

## 2024-04-15 PROCEDURE — 99204 OFFICE O/P NEW MOD 45 MIN: CPT | Performed by: PHYSICIAN ASSISTANT

## 2024-04-15 RX ORDER — METHYLPREDNISOLONE 4 MG/1
TABLET ORAL
Qty: 1 EACH | Refills: 0 | Status: SHIPPED | OUTPATIENT
Start: 2024-04-15

## 2024-04-15 NOTE — TELEPHONE ENCOUNTER
Caller: Chelsey - Patient Daughter    Doctor: Joelle    Reason for call: Patient was in this evening and saw provider Alejandro Lai.  Provider stated she would send in a medication for patient, but medication is not on file and did not arrive at pharmacy.  They do not recall the medication name.    Please send RX to pharmacy and let them know when it is sent in; they are at the pharmacy presently.    Call back#: [phone]

## 2024-04-15 NOTE — TELEPHONE ENCOUNTER
Patients daughter called back to see if Loratadine and walter-shani had been ordered for patient. RN explained medications are pending.

## 2024-04-15 NOTE — TELEPHONE ENCOUNTER
Caller: Chelsey    Doctor: Joelle    Reason for call: Checking to see if her mother RX was sent to the pharmacy     Call back#:

## 2024-04-15 NOTE — PROGRESS NOTES
Assessment/Plan   Diagnoses and all orders for this visit:        Other idiopathic scoliosis, lumbar region    DDD (degenerative disc disease), lumbar    Lumbar neuritis    - Steroid taper  - Referred to Dr. Garcia, spine, for an evaluation  - [facility]      Subjective   Patient ID: Leah Brown is a 78 y.o. female.    Vitals:    04/15/24 1457   Pulse: (!) 117   SpO2: 99%     77yo female comes in with her daughter for an evaluation of her left hip.  She has been having radiating pain down the left leg for about a week.  No back pain.  The pain radiates down the lateral aspect of the left thigh to the calf.  No bowel/bladder changes.  No numbness or tingling.  The pain is significant and intermittent.  No right leg pain.  She has a history of osteoporosis and scoliosis.  Hip xrays in the ED were negative for fracture or severe OA.        The following portions of the patient's history were reviewed and updated as appropriate: allergies, current medications, past family history, past medical history, past social history, past surgical history, and problem list.    Review of Systems  Ortho Exam  Past Medical History:   Diagnosis Date    Cholelithiasis     COVID-19 12/22/2021    Fall against object 07/18/2021    GERD (gastroesophageal reflux disease)     Influenza 01/29/2019    Migraine headache     Nasal bone fractures 07/25/2021    Orthostatic hypotension 07/25/2021    Pneumonia     Urinary tract infection      Past Surgical History:   Procedure Laterality Date    NO PAST SURGERIES      GA ARTHRD ANT INTERBODY MIN DSC CRV BELOW C2 Bilateral 7/20/2021    Procedure: Anterior cervical discectomy and fusion C4-5, posterior cervical decompresion and fusion C2-T2;  Surgeon: Ronny Juarez MD;  Location: [facility];  Service: Neurosurgery     Family History   Problem Relation Age of Onset    Osteoporosis Mother     Heart disease Mother     No Known Problems Father      Osteoporosis Sister     Osteoporosis Sister     No Known Problems Sister     No Known Problems Sister     No Known Problems Brother     No Known Problems Daughter     Diabetes type II Son     Stroke Son     Heart attack Son     Deep vein thrombosis Son      Social History     Occupational History    Not on file   Tobacco Use    Smoking status: Former     Current packs/day: 1.50     Average packs/day: 1.5 packs/day for 10.0 years (15.0 ttl pk-yrs)     Types: Cigarettes    Smokeless tobacco: Never    Tobacco comments:     quit age 23   Vaping Use    Vaping status: Never Used   Substance and Sexual Activity    Alcohol use: Never     Alcohol/week: 0.0 standard drinks of alcohol    Drug use: Never    Sexual activity: Not Currently     Partners: Male       Review of Systems   Constitutional: Negative.    HENT: Negative.    Eyes: Negative.    Respiratory: Negative.    Cardiovascular: Negative.    Gastrointestinal: Negative.    Endocrine: Negative.    Genitourinary: Negative.    Musculoskeletal: As below..   Allergic/Immunologic: Negative.    Neurological: Negative.    Hematological: Negative.    Psychiatric/Behavioral: Negative.        Objective   Physical Exam      Xrays:  I have personally reviewed pertinent films in PACS and my interpretation is hip: no acute displaced fractures, no severe OA.  Lspine: significant scoliolsis and degenerative changes noted.  Unable to adequately assess for compression fractures due to scoliosis and osteoporotic bone. Radiology reading pending.    Constitutional: Awake, Alert, Oriented  Eyes: EOMI  Psych: Mood and affect appropriate  Heart: regular rate   Lungs: No audible wheezing  Abdomen: No guarding  Lymph: no lymphedema      left Hip and lspine:  Appearance  No swelling, discoloration, deformity, or ecchymosis  + scoliosis  Palpation  No tenderness about the SI joint, iliac crest, anterior hip, or greater trochanter  No tenderness of the midline lspine bony landmarks  ROM  Hip  Flexion: 90, ER: 20, and IR: 10  Special Tests  No pain with log rolling  + pain with SLR  Motor  normal 5/5 in all myotomes of the b/l LE's  NVI distally

## 2024-04-16 RX ORDER — LORATADINE 10 MG/1
10 TABLET ORAL DAILY
Qty: 90 TABLET | Refills: 1 | Status: SHIPPED | OUTPATIENT
Start: 2024-04-16

## 2024-04-16 RX ORDER — FOLIC ACID/VIT B COMPLEX AND C 0.8 MG
1 TABLET ORAL DAILY
Qty: 100 TABLET | Refills: 1 | Status: SHIPPED | OUTPATIENT
Start: 2024-04-16

## 2024-04-17 ENCOUNTER — TELEPHONE (OUTPATIENT)
Dept: OTHER | Facility: HOSPITAL | Age: 79
End: 2024-04-17

## 2024-04-17 NOTE — TELEPHONE ENCOUNTER
Discussed lab results with patient's daughter, Chelsey.     Patient has been taking 2 calcium tablets daily since late March 2024. She also eats 2 containers of yogurt daily. Discussed that since we cannot check a 24 hr urine calcium level due to her urinary incontinence, will repeat a PTH level again in 3 months' time and if it remains elevated will consider imaging for parathyroid adenoma.     Also discussed possible nephrology consultation for CKD. Chelsey is hesitant at this time as her partner passed away from CKD-related complications last year. She will discuss this with her brother and then Leah. I also advised them to discuss this with Dr. Puente. Chelsey says she will consider this referral and let me know their decision.    All questions answered to Chelsey's satisfaction.

## 2024-04-22 ENCOUNTER — OFFICE VISIT (OUTPATIENT)
Dept: PAIN MEDICINE | Facility: CLINIC | Age: 79
End: 2024-04-22
Payer: COMMERCIAL

## 2024-04-22 VITALS
DIASTOLIC BLOOD PRESSURE: 78 MMHG | HEIGHT: 62 IN | RESPIRATION RATE: 18 BRPM | BODY MASS INDEX: 24.51 KG/M2 | HEART RATE: 100 BPM | SYSTOLIC BLOOD PRESSURE: 128 MMHG

## 2024-04-22 DIAGNOSIS — M51.16 INTERVERTEBRAL DISC DISORDER WITH RADICULOPATHY OF LUMBAR REGION: Primary | ICD-10-CM

## 2024-04-22 DIAGNOSIS — M70.62 GREATER TROCHANTERIC BURSITIS OF LEFT HIP: ICD-10-CM

## 2024-04-22 PROCEDURE — 99204 OFFICE O/P NEW MOD 45 MIN: CPT | Performed by: ANESTHESIOLOGY

## 2024-04-22 RX ORDER — GABAPENTIN 100 MG/1
CAPSULE ORAL
Qty: 60 CAPSULE | Refills: 1 | Status: SHIPPED | OUTPATIENT
Start: 2024-04-22

## 2024-04-22 NOTE — PROGRESS NOTES
Assessment  1. Intervertebral disc disorder with radiculopathy of lumbar region    2. Greater trochanteric bursitis of left hip        Plan  The patient is experiencing left-sided radicular symptoms and left-sided trochanteric bursa symptoms.  At this time, I will order an MRI of the lumbar spine to better evaluate.  Advised her we will call with results and discuss treatment moving forward.    For now, I will start her on gabapentin 100 mg at bedtime.    My impressions and treatment recommendations were discussed in detail with the patient who verbalized understanding and had no further questions.  Discharge instructions were provided. I personally saw and examined the patient and I agree with the above discussed plan of care.    Orders Placed This Encounter   Procedures   • MRI lumbar spine without contrast     Standing Status:   Future     Standing Expiration Date:   4/22/2028     Scheduling Instructions:      There is no preparation for this test. Please leave your jewelry and valuables at home, wedding rings are the exception. All patients will be required to change into a hospital gown and pants.  Street clothes are not permitted in the MRI.  Magnetic nail polish must be removed prior to arrival for your test. Please bring your insurance cards, a form of photo ID and a list of your medications with you. Arrive 15 minutes prior to your appointment time in order to register. Please bring any prior CT or MRI studies of this area that were not performed at a [facility].            To schedule this appointment, please contact Central Scheduling at [phone].            Prior to your appointment, please make sure you complete the MRI Screening Form when you e-Check in for your appointment. This will be available starting 7 days before your appointment in [employer]. You may receive an e-mail with an activation code if you do not have a [employer] account. If you do not have access to a device, we will  complete your screening at your appointment.     Order Specific Question:   What is the patient's sedation requirement? If Medication for Claustrophobia is selected, order medication at this point.     Answer:   No Sedation     Order Specific Question:   Does this procedure require the 3T MRI at [place] or [place]?     Answer:   No     Order Specific Question:   Release to patient through [facility]     Answer:   Immediate     Order Specific Question:   Is order priority selected as STAT?     Answer:   No     Order Specific Question:   Reason for Exam     Answer:   left leg pain     New Medications Ordered This Visit   Medications   • gabapentin (NEURONTIN) 100 mg capsule     Sig: Take 1 tablet at bedtime.  May increase to 2 tablets after 3 days     Dispense:  60 capsule     Refill:  1       History of Present Illness    Leah Brown is a 78 y.o. female referred by Dr. Puente for severe left leg pain has been present for about a month.  She is accompanied by her daughter who helps care for her.  Symptoms are severe rated 10/10 on a numeric rating scale that is felt constantly worse in the evening described to be sharp with pins-and-needles through the bone.  She is experiencing weakness of the leg.  Pain is aggravated with turning her leg, lying down, sitting.  Treatment history is included some exercise and heat/ice which is not helping.  She was prescribed an oral steroid pack which did not help.    I have personally reviewed and/or updated the patient's past medical history, past surgical history, family history, social history, current medications, allergies, and vital signs today.     Review of Systems   Respiratory:  Negative for shortness of breath.    Cardiovascular:  Negative for chest pain.   Gastrointestinal:  Negative for constipation, diarrhea, nausea and vomiting.   Musculoskeletal:  Positive for back pain, gait problem and joint swelling. Negative for arthralgias and myalgias.   Skin:  Negative for rash.    Neurological:  Positive for weakness. Negative for dizziness and seizures.   All other systems reviewed and are negative.      Patient Active Problem List   Diagnosis   • GERD (gastroesophageal reflux disease)   • History of asthma   • Hypertension   • Cholelithiasis   • Moderate mitral regurgitation   • Vertigo   • Recurrent falls   • Gait disturbance   • Hyperlipidemia   • Other fatigue   • High serum parathyroid hormone (PTH)   • Anxiety   • Age related osteoporosis   • Thrombocytopenia ([facility])   • Central cord syndrome ([facility])   • History of recurrent UTIs   • Dyslipidemia   • Anemia   • Stage 3 chronic kidney disease, unspecified whether stage 3a or 3b CKD ([facility])   • CHF (congestive heart failure) ([facility])   • Cervical stenosis of spinal canal   • Overgrown toenails   • Status post cervical spinal fusion   • Intertriginous dermatitis associated with moisture   • Numbness and tingling of both legs       Past Medical History:   Diagnosis Date   • Cholelithiasis    • COVID-19 12/22/2021   • Fall against object 07/18/2021   • GERD (gastroesophageal reflux disease)    • Influenza 01/29/2019   • Migraine headache    • Nasal bone fractures 07/25/2021   • Orthostatic hypotension 07/25/2021   • Pneumonia    • Urinary tract infection        Past Surgical History:   Procedure Laterality Date   • NO PAST SURGERIES     • AK ARTHRD ANT INTERBODY MIN DSC CRV BELOW C2 Bilateral 7/20/2021    Procedure: Anterior cervical discectomy and fusion C4-5, posterior cervical decompresion and fusion C2-T2;  Surgeon: Ronny Juarez MD;  Location: [facility] OR;  Service: Neurosurgery       Family History   Problem Relation Age of Onset   • Osteoporosis Mother    • Heart disease Mother    • No Known Problems Father    • Osteoporosis Sister    • Osteoporosis Sister    • No Known Problems Sister    • No Known Problems Sister    • No Known Problems Brother    • No Known Problems Daughter    • Diabetes type II Son    • Stroke Son    • Heart attack Son     • Deep vein thrombosis Son        Social History     Occupational History   • Not on file   Tobacco Use   • Smoking status: Former     Current packs/day: 1.50     Average packs/day: 1.5 packs/day for 10.0 years (15.0 ttl pk-yrs)     Types: Cigarettes   • Smokeless tobacco: Never   • Tobacco comments:     quit age 23   Vaping Use   • Vaping status: Never Used   Substance and Sexual Activity   • Alcohol use: Never     Alcohol/week: 0.0 standard drinks of alcohol   • Drug use: Never   • Sexual activity: Not Currently     Partners: Male       Current Outpatient Medications on File Prior to Visit   Medication Sig   • ALPRAZolam (XANAX) 0.25 mg tablet Take 1 tablet (0.25 mg total) by mouth daily at bedtime as needed for anxiety   • atorvastatin (LIPITOR) 20 mg tablet Take 1 tablet (20 mg total) by mouth daily with dinner   • B Complex-C-Folic Acid (Ayanna-Dylan) TABS Take 1 tablet by mouth daily   • cholecalciferol 1,000 units tablet Take 1 tablet (1,000 Units total) by mouth daily 2,000 units daily   • clotrimazole-betamethasone (LOTRISONE) 1-0.05 % cream Apply to affected area 2 times daily prn   • CRANBERRY PO Take by mouth   • Docusate Sodium (COLACE PO) Take by mouth   • lidocaine (Lidoderm) 5 % Apply 1 patch topically over 12 hours daily Remove & Discard patch within 12 hours or as directed by MD   • loratadine (CLARITIN) 10 mg tablet Take 1 tablet (10 mg total) by mouth daily   • methylPREDNISolone 4 MG tablet therapy pack Use as directed on package   • nystatin-triamcinolone (MYCOLOG-II) ointment Apply topically 2 (two) times a day as needed (vulvar irritation)   • ondansetron (Zofran ODT) 4 mg disintegrating tablet Take 1 tablet (4 mg total) by mouth every 6 (six) hours as needed for nausea or vomiting   • pantoprazole (PROTONIX) 40 mg tablet Take 1 tablet (40 mg total) by mouth daily   • cephalexin (KEFLEX) 250 mg capsule Take 250 mg by mouth daily (Patient not taking: Reported on 8/23/2023)     No current  "facility-administered medications on file prior to visit.       Allergies   Allergen Reactions   • Bee Venom Anaphylaxis       Physical Exam    /78   Pulse 100   Resp 18   Ht 5' 2\" (1.575 m)   BMI 24.51 kg/m²     Constitutional: normal, well developed, well nourished, alert, in no distress and non-toxic and no overt pain behavior.  Eyes: anicteric  HEENT: grossly intact  Neck: supple, symmetric, trachea midline and no masses   Pulmonary:even and unlabored  Cardiovascular:No edema or pitting edema present  Skin:Normal without rashes or lesions and well hydrated  Psychiatric:Mood and affect appropriate  Neurologic:Cranial Nerves II-XII grossly intact  Musculoskeletal:normal    Lumbar Spine Exam  Appearance:  Normal lordosis  Palpation/Tenderness:  left lumbar paraspinal tenderness  Left trochanteric bursa tenderness  Range of Motion: deferred  Motor Strength:  Left hip flexion:  4/5  Left hip extension:  5/5  Right hip flexion:  5/5  Right hip extension:  5/5  Left knee flexion:  5/5  Left knee extension:  5/5  Right knee flexion:  5/5  Right knee extension:  5/5  Left foot dorsiflexion:  5/5  Left foot plantar flexion:  5/5  Right foot dorsiflexion:  5/5  Right foot plantar flexion:  5/5  Reflexes:  Left Patellar:  1+   Right Patellar:  1+   Left Achilles:  1+   Right Achilles:  1+     Imaging      XR LUMBAR SPINE (4/15/2024)     INDICATION:   Pain in left hip.      COMPARISON:  None.     VIEWS:  XR SPINE LUMBAR 2 OR 3 VIEWS INJURY  Images: 2     FINDINGS:     Study limited by severe osteopenia     There is loss of height of the L3 and L5 vertebral bodies.     There is dextrocurvature of the lumbar spine. No evidence of spondylolisthesis..      There is moderate multilevel disc space narrowing and spondylosis throughout the lumbar spine. There is moderate facet disease worse at L5-S1..     Soft tissues are unremarkable.     IMPRESSION:        Loss of height of the L3 and L5 vertebral bodies concerning for " compression fractures. Evaluation is limited due to the degree of dextrocurvature and due to the severe osteopenia. CT can be performed for further evaluation.  Moderate multilevel spondylosis throughout the lumbar spine. Moderate facet disease worse in the lower lumbar spine.

## 2024-04-27 ENCOUNTER — HOSPITAL ENCOUNTER (OUTPATIENT)
Dept: RADIOLOGY | Facility: HOSPITAL | Age: 79
Discharge: HOME | End: 2024-04-27
Payer: COMMERCIAL

## 2024-04-27 DIAGNOSIS — M51.16 INTERVERTEBRAL DISC DISORDER WITH RADICULOPATHY OF LUMBAR REGION: ICD-10-CM

## 2024-04-27 PROCEDURE — 72148 MRI LUMBAR SPINE W/O DYE: CPT

## 2024-05-01 ENCOUNTER — HOSPITAL ENCOUNTER (OUTPATIENT)
Dept: RADIOLOGY | Facility: HOSPITAL | Age: 79
Discharge: HOME | End: 2024-05-01
Payer: COMMERCIAL

## 2024-05-01 DIAGNOSIS — Z12.31 ENCOUNTER FOR SCREENING MAMMOGRAM FOR MALIGNANT NEOPLASM OF BREAST: ICD-10-CM

## 2024-05-01 PROCEDURE — 77067 SCR MAMMO BI INCL CAD: CPT

## 2024-05-01 PROCEDURE — 77063 BREAST TOMOSYNTHESIS BI: CPT

## 2024-05-02 NOTE — PROGRESS NOTES
Internal Medicine Progress Note  Patient: Inderjit Pa  Age/sex: 66 y o  female  Medical Record #: [id number]      ASSESSMENT/PLAN: (Interval History)  Inderjit Pa is seen and examined and management for following issues:    Central cord syndrome; s/p ACDF C4-5/PCDF C2-T2 7/20/21  · Doesn't need collar per NS (stated present in error in my consult 7/26)  ·  incisions w/o infection     Acute nasal bone fx  · OMS saw = no surgery needed  · Sinus precautions for 4 weeks     Urine retention  · Gotti reinserted  7/26  · VT per PMR     Chronic UTI  · On suppressive tx with Keflex at home  · Will continue here     ABLA  · Did not require transfusion  · Will watch     Orthostasis  · Had orthostasis on acute side and was given IVF  · On 7/27, AM mild orthostasis supine-sit-stand = 140-140-110 with lightheadedness then again with TEDS and binder on  · Got 500ml NSS 7/27 and today improved       Discharge date:  Team      The above assessment and plan was reviewed and updated as determined by my evaluation of the patient on 7/28/2021      Labs:   Results from last 7 days   Lab Units 07/27/21  0510 07/26/21  0512 07/24/21  0505   WBC Thousand/uL  --  4 74 7 47   HEMOGLOBIN g/dL 8 9* 8 2* 9 5*   HEMATOCRIT % 27 4* 25 5* 29 4*   PLATELETS Thousands/uL  --  195 196     Results from last 7 days   Lab Units 07/26/21  0512 07/24/21  0505   SODIUM mmol/L 140 141   POTASSIUM mmol/L 4 1 3 8   CHLORIDE mmol/L 112* 113*   CO2 mmol/L 23 23   BUN mg/dL 18 13   CREATININE mg/dL 0 72 0 63   CALCIUM mg/dL 9 1 8 7                   Review of Scheduled Meds:  Current Facility-Administered Medications   Medication Dose Route Frequency Provider Last Rate    acetaminophen  975 mg Oral Q8H [facility] Re Hezrog MD      ALPRAZolam  0 25 mg Oral HS PRN Re Herzog MD      atorvastatin  20 mg Oral Daily With Lesley Panda MD      bisacodyl  10 mg Rectal Daily PRN Re Herzog MD      cephalexin  250 mg Oral Daily Re Herzog MD      docusate No sodium  100 mg Oral BID Rani Tierney MD      heparin (porcine)  5,000 Units Subcutaneous [facility] Rani Tierney MD      lidocaine  2 patch Topical Daily Rani Tierney MD      meclizine  12 5 mg Oral Q8H PRN Rani Tierney MD      melatonin  3 mg Oral HS Rani Tierney MD      methocarbamol  500 mg Oral Q6H PRN Rani Tierney MD      oxyCODONE  2 5 mg Oral Q4H PRN Rani Tierney MD     Dmitry Delores oxyCODONE  5 mg Oral Q4H PRN Rani Tierney MD      pantoprazole  40 mg Oral Early Morning Rani Tierney MD      polyethylene glycol  17 g Oral Daily Rani Tierney MD      senna  1 tablet Oral BID Rani Tierney MD      simethicone  80 mg Oral 4x Daily (with meals and at bedtime) ROYA Gray      sodium chloride  1 spray Each Nare 4x Daily PRN Rani Tierney MD         Subjective/ HPI: Patient seen and examined  Patients overnight issues or events were reviewed with nursing or staff during rounds or morning huddle session  New or overnight issues include the following:     None     ROS:   Negative 12 point ROS other than denoted above in HPI or assessment/plan        Imaging:     No orders to display       *Labs /Radiology studies reviewed  *Medications reviewed and reconciled as needed  *Please refer to order section for additional ordered labs studies  *Case discussed with primary attending during morning huddle case rounds      Physical Examination:  Vitals:   Vitals:    07/27/21 1345 07/27/21 2331 07/28/21 0600 07/28/21 0748   BP: 134/72 147/73  136/77   BP Location: Right arm Left arm  Right arm   Pulse:  102  96   Resp:  17  18   Temp:  98 6 °F (37 °C)  97 6 °F (36 4 °C)   TempSrc:  Oral  Oral   SpO2:  96%  95%   Weight:   56 8 kg (125 lb 3 5 oz)    Height:           General Appearance: no distress, conversive  HEENT: PERRLA, conjuctiva normal; oropharynx clear; mucous membranes moist   Neck:  Supple, normal ROM, no JVD  Lungs: CTA, normal respiratory effort, no retractions, expiratory effort normal  CV: regular rate and rhythm; no rubs/murmurs/gallops, PMI normal   ABD: soft; ND/NT; +BS  EXT: no edema  Skin: normal turgor, normal texture, no rashes  Psych: affect normal, mood normal  Neuro: AAO      The above physical exam was reviewed and updated as determined by my evaluation of the patient on 7/28/2021  Invasive Devices     Peripheral Intravenous Line            Peripheral IV 07/24/21 Distal;Dorsal (posterior); Right Forearm 4 days          Drain            Urethral Catheter 18 Fr  1 day                   VTE Pharmacologic Prophylaxis: Heparin  Code Status: Level 1 - Full Code  Current Length of Stay: 2 day(s)      Total time spent:  30 minutes with more than 50% spent counseling/coordinating care  Counseling includes discussion with patient re: progress  and discussion with patient of his/her current medical state/information  Coordination of patient's care was performed in conjunction with primary service  Time invested included review of patient's labs, vitals, and management of their comorbidities with continued monitoring  In addition, this patient was discussed with medical team including physician and advanced extenders  The care of the patient was extensively discussed and appropriate treatment plan was formulated unique for this patient  ** Please Note:  voice to text software may have been used in the creation of this document   Although proof errors in transcription or interpretation are a potential of such software**

## 2024-05-03 ENCOUNTER — TELEPHONE (OUTPATIENT)
Dept: PAIN MEDICINE | Facility: CLINIC | Age: 79
End: 2024-05-03

## 2024-05-03 NOTE — TELEPHONE ENCOUNTER
Caller: Chelsey    Doctor: Mariana    Reason for call: patient got MRI done on 4/27 made aware no results yet but we will call as soon as we have them     Call back#: [phone]

## 2024-05-06 NOTE — TELEPHONE ENCOUNTER
Please let patient know I reviewed the MRI lumbar spine and she has worsening of spinal stenosis at L3-4 secondary to this, spondylosis and spondylolisthesis.  If she is still symptomatic, would recommend proceeding with left L3-4 transforaminal epidural steroid injection if amenable, please send to scheduling team

## 2024-05-06 NOTE — TELEPHONE ENCOUNTER
Caller: [facility]     Doctor: Mariana    Reason for call: Significant Findings of [place] spine MRI done on 4/27     Call back#: [phone] option 3

## 2024-05-06 NOTE — TELEPHONE ENCOUNTER
Caller: Leah Powell     Doctor: Mariana     Reason for call: Patient daughter calling asking for update on MRI results please advise     Call back#: [phone]

## 2024-05-06 NOTE — TELEPHONE ENCOUNTER
Daughter Chelsey given results of MRI as per task.  Chelsey said her mom has pain off and on. No pain with walking but she does with getting in and out of the car and going up and down the stairs. She has a hard time lefting the left leg up to get in car and do steps. Daughter said her mom is also c/o heaviness of her Rt leg.  Daughter would like to get the injection done for her mom before she (daughter) goes on vacation. Vac is 5/27/24-6/7/24.  Daughter said her mom does not take blood  thinners.   Told daughter our  will call to set up inj.

## 2024-05-07 ENCOUNTER — TELEPHONE (OUTPATIENT)
Dept: FAMILY MEDICINE CLINIC | Facility: CLINIC | Age: 79
End: 2024-05-07

## 2024-05-07 NOTE — TELEPHONE ENCOUNTER
Caller: Leah Sherry     Doctor: Mariana    Reason for call: Patient daughter calling asking if she can schedule procedure on 06/06 instead please advise     Call back#: [phone]

## 2024-05-16 ENCOUNTER — HOSPITAL ENCOUNTER (OUTPATIENT)
Dept: RADIOLOGY | Facility: CLINIC | Age: 79
End: 2024-05-16
Payer: COMMERCIAL

## 2024-05-16 VITALS
RESPIRATION RATE: 18 BRPM | SYSTOLIC BLOOD PRESSURE: 139 MMHG | DIASTOLIC BLOOD PRESSURE: 76 MMHG | TEMPERATURE: 98.8 F | HEART RATE: 90 BPM | OXYGEN SATURATION: 98 %

## 2024-05-16 DIAGNOSIS — M51.16 INTERVERTEBRAL DISC DISORDER WITH RADICULOPATHY OF LUMBAR REGION: ICD-10-CM

## 2024-05-16 PROCEDURE — 64483 NJX AA&/STRD TFRM EPI L/S 1: CPT | Performed by: ANESTHESIOLOGY

## 2024-05-16 PROCEDURE — 64484 NJX AA&/STRD TFRM EPI L/S EA: CPT | Performed by: ANESTHESIOLOGY

## 2024-05-16 RX ORDER — 0.9 % SODIUM CHLORIDE 0.9 %
4 VIAL (ML) INJECTION ONCE
Status: COMPLETED | OUTPATIENT
Start: 2024-05-16 | End: 2024-05-16

## 2024-05-16 RX ORDER — BUPIVACAINE HCL/PF 2.5 MG/ML
2 VIAL (ML) INJECTION ONCE
Status: COMPLETED | OUTPATIENT
Start: 2024-05-16 | End: 2024-05-16

## 2024-05-16 RX ORDER — METHYLPREDNISOLONE ACETATE 80 MG/ML
80 INJECTION, SUSPENSION INTRA-ARTICULAR; INTRALESIONAL; INTRAMUSCULAR; PARENTERAL; SOFT TISSUE ONCE
Status: COMPLETED | OUTPATIENT
Start: 2024-05-16 | End: 2024-05-16

## 2024-05-16 RX ADMIN — Medication 4 ML: at 11:39

## 2024-05-16 RX ADMIN — METHYLPREDNISOLONE ACETATE 80 MG: 80 INJECTION, SUSPENSION INTRA-ARTICULAR; INTRALESIONAL; INTRAMUSCULAR; PARENTERAL; SOFT TISSUE at 11:43

## 2024-05-16 RX ADMIN — BUPIVACAINE HYDROCHLORIDE 2 ML: 2.5 INJECTION, SOLUTION EPIDURAL; INFILTRATION; INTRACAUDAL at 11:43

## 2024-05-16 RX ADMIN — IOHEXOL 1 ML: 300 INJECTION, SOLUTION INTRAVENOUS at 11:41

## 2024-05-16 NOTE — H&P
History of Present Illness: The patient is a 78 y.o. female who presents with complaints of low back and leg pain is here today for left L3-4 transforaminal epidural steroid injection    Past Medical History:   Diagnosis Date    Cholelithiasis     COVID-19 12/22/2021    Fall against object 07/18/2021    GERD (gastroesophageal reflux disease)     Influenza 01/29/2019    Migraine headache     Nasal bone fractures 07/25/2021    Orthostatic hypotension 07/25/2021    Pneumonia     Urinary tract infection        Past Surgical History:   Procedure Laterality Date    NO PAST SURGERIES      AL ARTHRD ANT INTERBODY MIN DSC CRV BELOW C2 Bilateral 7/20/2021    Procedure: Anterior cervical discectomy and fusion C4-5, posterior cervical decompresion and fusion C2-T2;  Surgeon: Ronny Juarez MD;  Location: [facility];  Service: Neurosurgery         Current Outpatient Medications:     ALPRAZolam (XANAX) 0.25 mg tablet, Take 1 tablet (0.25 mg total) by mouth daily at bedtime as needed for anxiety, Disp: 30 tablet, Rfl: 0    atorvastatin (LIPITOR) 20 mg tablet, Take 1 tablet (20 mg total) by mouth daily with dinner, Disp: 90 tablet, Rfl: 1    B Complex-C-Folic Acid (Ayanna-Dylan) TABS, Take 1 tablet by mouth daily, Disp: 100 tablet, Rfl: 1    cephalexin (KEFLEX) 250 mg capsule, Take 250 mg by mouth daily (Patient not taking: Reported on 8/23/2023), Disp: , Rfl:     cholecalciferol 1,000 units tablet, Take 1 tablet (1,000 Units total) by mouth daily 2,000 units daily, Disp: 90 tablet, Rfl: 1    clotrimazole-betamethasone (LOTRISONE) 1-0.05 % cream, Apply to affected area 2 times daily prn, Disp: 15 g, Rfl: 0    CRANBERRY PO, Take by mouth, Disp: , Rfl:     Docusate Sodium (COLACE PO), Take by mouth, Disp: , Rfl:     gabapentin (NEURONTIN) 100 mg capsule, Take 1 tablet at bedtime.  May increase to 2 tablets after 3 days, Disp: 60 capsule, Rfl: 1    lidocaine (Lidoderm) 5 %, Apply 1 patch topically over 12 hours daily Remove & Discard patch  within 12 hours or as directed by MD, Disp: 30 patch, Rfl: 1    loratadine (CLARITIN) 10 mg tablet, Take 1 tablet (10 mg total) by mouth daily, Disp: 90 tablet, Rfl: 1    methylPREDNISolone 4 MG tablet therapy pack, Use as directed on package, Disp: 1 each, Rfl: 0    nystatin-triamcinolone (MYCOLOG-II) ointment, Apply topically 2 (two) times a day as needed (vulvar irritation), Disp: 30 g, Rfl: 2    ondansetron (Zofran ODT) 4 mg disintegrating tablet, Take 1 tablet (4 mg total) by mouth every 6 (six) hours as needed for nausea or vomiting, Disp: 10 tablet, Rfl: 0    pantoprazole (PROTONIX) 40 mg tablet, Take 1 tablet (40 mg total) by mouth daily, Disp: 90 tablet, Rfl: 1    Current Facility-Administered Medications:     bupivacaine (PF) (MARCAINE) 0.25 % injection 2 mL, 2 mL, Epidural, Once, London Peña MD    iohexol (OMNIPAQUE) 300 mg/mL injection 1 mL, 1 mL, Epidural, Once, London Peña MD    lidocaine (PF) (XYLOCAINE-MPF) 2 % injection 4 mL, 4 mL, Infiltration, Once, London Peña MD    methylPREDNISolone acetate (DEPO-MEDROL) injection 80 mg, 80 mg, Epidural, Once, London Peña MD    sodium chloride (PF) 0.9 % injection 4 mL, 4 mL, Infiltration, Once, London Peña MD    Allergies   Allergen Reactions    Bee Venom Anaphylaxis       Physical Exam:   Vitals:    05/16/24 1125   BP: 134/81   Pulse: 88   Resp: 20   Temp: 98.8 °F (37.1 °C)   SpO2: 98%     General: Awake, Alert, Oriented x 3, Mood and affect appropriate  Respiratory: Respirations even and unlabored  Cardiovascular: Peripheral pulses intact; no edema  Musculoskeletal Exam: Lower back and leg pain    ASA Score: 3    Patient/Chart Verification  Patient ID Verified: Verbal  ID Band Applied: No  Consents Confirmed: Procedural, To be obtained in the Pre-Procedure area  H&P( within 30 days) Verified: To be obtained in the Pre-Procedure area  Allergies Reviewed: Yes  Anticoag/NSAID held?: No  Currently on antibiotics?: No    Assessment:   1.  Intervertebral disc disorder with radiculopathy of lumbar region        Plan: left L3-4  TFESI

## 2024-05-16 NOTE — DISCHARGE INSTR - LAB
Epidural Steroid Injection   WHAT YOU NEED TO KNOW:   An epidural steroid injection (WANDA) is a procedure to inject steroid medicine into the epidural space. The epidural space is between your spinal cord and vertebrae. Steroids reduce inflammation and fluid buildup in your spine that may be causing pain. You may be given pain medicine along with the steroids.          ACTIVITY  Do not drive or operate machinery today.  No strenuous activity today - bending, lifting, etc.  You may resume normal activites starting tomorrow - start slowly and as tolerated.  You may shower today, but no tub baths or hot tubs.  You may have numbness for several hours from the local anesthetic. Please use caution and common sense, especially with weight-bearing activities.    CARE OF THE INJECTION SITE  If you have soreness or pain, apply ice to the area today (20 minutes on/20 minutes off).  Starting tomorrow, you may use warm, moist heat or ice if needed.  You may have an increase or change in your discomfort for 36-48 hours after your treatment.  Apply ice and continue with any pain medication you have been prescribed.  Notify the [facility] if you have any of the following: redness, drainage, swelling, headache, stiff neck or fever above 100°F.    SPECIAL INSTRUCTIONS  Our office will contact you in approximately 7 days for a progress report.    MEDICATIONS  Continue to take all routine medications.  Our office may have instructed you to hold some medications.    As no general anesthesia was used in today's procedure, you should not experience any side effects related to anesthesia.     If you are diabetic, the steroids used in today's injection may temporarily increase your blood sugar levels after the first few days after your injection. Please keep a close eye on your sugars and alert the doctor who manages your diabetes if your sugars are significantly high from your baseline or you are symptomatic.     If you have a  problem specifically related to your procedure, please call our office at [phone].  Problems not related to your procedure should be directed to your primary care physician.

## 2024-05-23 ENCOUNTER — TELEPHONE (OUTPATIENT)
Dept: PAIN MEDICINE | Facility: CLINIC | Age: 79
End: 2024-05-23

## 2024-06-08 DIAGNOSIS — U07.1 COVID: ICD-10-CM

## 2024-06-11 RX ORDER — ONDANSETRON 4 MG/1
4 TABLET, ORALLY DISINTEGRATING ORAL EVERY 6 HOURS PRN
Qty: 10 TABLET | Refills: 0 | Status: SHIPPED | OUTPATIENT
Start: 2024-06-11

## 2024-07-16 DIAGNOSIS — N90.89 VULVAR IRRITATION: ICD-10-CM

## 2024-07-16 NOTE — TELEPHONE ENCOUNTER
Reason for call:   [x] Refill   [] Prior Auth  [] Other:     Office:   [] PCP/Provider -   [x] Specialty/Provider - Fiorella Heller MD     Medication: nystatin-triamcinolone (MYCOLOG-II) ointment     Dose/Frequency: Apply topically 2 (two) times a day as needed (vulvar irritation)     Quantity: 30 g     Pharmacy: [address]     Does the patient have enough for 3 days?   [] Yes   [x] No - Send as HP to POD

## 2024-07-17 RX ORDER — NYSTATIN AND TRIAMCINOLONE ACETONIDE 100000; 1 [USP'U]/G; MG/G
OINTMENT TOPICAL 2 TIMES DAILY PRN
Qty: 30 G | Refills: 0 | Status: SHIPPED | OUTPATIENT
Start: 2024-07-17

## 2024-07-18 DIAGNOSIS — F41.9 ANXIETY: ICD-10-CM

## 2024-07-18 RX ORDER — ALPRAZOLAM 0.25 MG/1
TABLET ORAL
Qty: 30 TABLET | Refills: 0 | Status: SHIPPED | OUTPATIENT
Start: 2024-07-18

## 2024-08-23 ENCOUNTER — TELEPHONE (OUTPATIENT)
Age: 79
End: 2024-08-23

## 2024-08-23 NOTE — TELEPHONE ENCOUNTER
Patient's daughter, Chelsey called office to speak on her mother's behalf regarding vaginal symptoms, however informed Chelsey I cannot speak to her at this time regarding her mother because there isnt a recent communication consent. She verbalized understanding. Advised her to have her mother call back or for her to call back with her mother present.

## 2024-08-25 ENCOUNTER — HOSPITAL ENCOUNTER (EMERGENCY)
Facility: HOSPITAL | Age: 79
Discharge: HOME | End: 2024-08-25
Attending: EMERGENCY MEDICINE
Payer: COMMERCIAL

## 2024-08-25 ENCOUNTER — APPOINTMENT (EMERGENCY)
Dept: RADIOLOGY | Facility: HOSPITAL | Age: 79
End: 2024-08-25
Payer: COMMERCIAL

## 2024-08-25 VITALS
DIASTOLIC BLOOD PRESSURE: 74 MMHG | OXYGEN SATURATION: 96 % | HEART RATE: 100 BPM | SYSTOLIC BLOOD PRESSURE: 168 MMHG | RESPIRATION RATE: 20 BRPM | WEIGHT: 134 LBS | TEMPERATURE: 99 F | BODY MASS INDEX: 24.51 KG/M2

## 2024-08-25 DIAGNOSIS — U07.1 COVID: Primary | ICD-10-CM

## 2024-08-25 LAB
ALBUMIN SERPL BCG-MCNC: 4 G/DL (ref 3.5–5)
ALP SERPL-CCNC: 58 U/L (ref 34–104)
ALT SERPL W P-5'-P-CCNC: 21 U/L (ref 7–52)
ANION GAP SERPL CALCULATED.3IONS-SCNC: 9 MMOL/L (ref 4–13)
AST SERPL W P-5'-P-CCNC: 22 U/L (ref 13–39)
BASOPHILS # BLD AUTO: 0.03 THOUSANDS/ÂΜL (ref 0–0.1)
BASOPHILS NFR BLD AUTO: 1 % (ref 0–1)
BILIRUB SERPL-MCNC: 0.36 MG/DL (ref 0.2–1)
BILIRUB UR QL STRIP: NEGATIVE
BUN SERPL-MCNC: 13 MG/DL (ref 5–25)
CALCIUM SERPL-MCNC: 9.5 MG/DL (ref 8.4–10.2)
CHLORIDE SERPL-SCNC: 104 MMOL/L (ref 96–108)
CLARITY UR: CLEAR
CO2 SERPL-SCNC: 23 MMOL/L (ref 21–32)
COLOR UR: NORMAL
CREAT SERPL-MCNC: 1.06 MG/DL (ref 0.6–1.3)
EOSINOPHIL # BLD AUTO: 0.01 THOUSAND/ÂΜL (ref 0–0.61)
EOSINOPHIL NFR BLD AUTO: 0 % (ref 0–6)
ERYTHROCYTE [DISTWIDTH] IN BLOOD BY AUTOMATED COUNT: 12.9 % (ref 11.6–15.1)
GFR SERPL CREATININE-BSD FRML MDRD: 49 ML/MIN/1.73SQ M
GLUCOSE SERPL-MCNC: 110 MG/DL (ref 65–140)
GLUCOSE UR STRIP-MCNC: NEGATIVE MG/DL
HCT VFR BLD AUTO: 39.5 % (ref 34.8–46.1)
HGB BLD-MCNC: 13 G/DL (ref 11.5–15.4)
HGB UR QL STRIP.AUTO: NEGATIVE
IMM GRANULOCYTES # BLD AUTO: 0.02 THOUSAND/UL (ref 0–0.2)
IMM GRANULOCYTES NFR BLD AUTO: 0 % (ref 0–2)
KETONES UR STRIP-MCNC: NEGATIVE MG/DL
LEUKOCYTE ESTERASE UR QL STRIP: NEGATIVE
LYMPHOCYTES # BLD AUTO: 1.15 THOUSANDS/ÂΜL (ref 0.6–4.47)
LYMPHOCYTES NFR BLD AUTO: 21 % (ref 14–44)
MCH RBC QN AUTO: 31.6 PG (ref 26.8–34.3)
MCHC RBC AUTO-ENTMCNC: 32.9 G/DL (ref 31.4–37.4)
MCV RBC AUTO: 96 FL (ref 82–98)
MONOCYTES # BLD AUTO: 0.72 THOUSAND/ÂΜL (ref 0.17–1.22)
MONOCYTES NFR BLD AUTO: 13 % (ref 4–12)
NEUTROPHILS # BLD AUTO: 3.68 THOUSANDS/ÂΜL (ref 1.85–7.62)
NEUTS SEG NFR BLD AUTO: 65 % (ref 43–75)
NITRITE UR QL STRIP: NEGATIVE
NRBC BLD AUTO-RTO: 0 /100 WBCS
PH UR STRIP.AUTO: 6.5 [PH]
PLATELET # BLD AUTO: 156 THOUSANDS/UL (ref 149–390)
PMV BLD AUTO: 9.2 FL (ref 8.9–12.7)
POTASSIUM SERPL-SCNC: 4.2 MMOL/L (ref 3.5–5.3)
PROT SERPL-MCNC: 7.1 G/DL (ref 6.4–8.4)
PROT UR STRIP-MCNC: NEGATIVE MG/DL
RBC # BLD AUTO: 4.11 MILLION/UL (ref 3.81–5.12)
SODIUM SERPL-SCNC: 136 MMOL/L (ref 135–147)
SP GR UR STRIP.AUTO: 1.01 (ref 1–1.03)
UROBILINOGEN UR STRIP-ACNC: <2 MG/DL
WBC # BLD AUTO: 5.61 THOUSAND/UL (ref 4.31–10.16)

## 2024-08-25 PROCEDURE — 99285 EMERGENCY DEPT VISIT HI MDM: CPT

## 2024-08-25 PROCEDURE — 71045 X-RAY EXAM CHEST 1 VIEW: CPT

## 2024-08-25 PROCEDURE — 36415 COLL VENOUS BLD VENIPUNCTURE: CPT | Performed by: EMERGENCY MEDICINE

## 2024-08-25 PROCEDURE — 99284 EMERGENCY DEPT VISIT MOD MDM: CPT | Performed by: EMERGENCY MEDICINE

## 2024-08-25 PROCEDURE — 93005 ELECTROCARDIOGRAM TRACING: CPT

## 2024-08-25 PROCEDURE — 85025 COMPLETE CBC W/AUTO DIFF WBC: CPT | Performed by: EMERGENCY MEDICINE

## 2024-08-25 PROCEDURE — 80053 COMPREHEN METABOLIC PANEL: CPT | Performed by: EMERGENCY MEDICINE

## 2024-08-25 PROCEDURE — 96360 HYDRATION IV INFUSION INIT: CPT

## 2024-08-25 PROCEDURE — 81003 URINALYSIS AUTO W/O SCOPE: CPT | Performed by: EMERGENCY MEDICINE

## 2024-08-25 RX ORDER — FLUCONAZOLE 100 MG/1
200 TABLET ORAL ONCE
Status: COMPLETED | OUTPATIENT
Start: 2024-08-25 | End: 2024-08-25

## 2024-08-25 RX ORDER — NIRMATRELVIR AND RITONAVIR 150-100 MG
2 KIT ORAL 2 TIMES DAILY
Qty: 20 TABLET | Refills: 0 | Status: SHIPPED | OUTPATIENT
Start: 2024-08-25 | End: 2024-08-30

## 2024-08-25 RX ADMIN — FLUCONAZOLE 200 MG: 100 TABLET ORAL at 20:28

## 2024-08-25 RX ADMIN — SODIUM CHLORIDE 1000 ML: 0.9 INJECTION, SOLUTION INTRAVENOUS at 19:05

## 2024-08-25 NOTE — ED PROVIDER NOTES
History  Chief Complaint   Patient presents with    Weakness - Generalized     Pt brought in after c/o general weakness x 2 days. Pt tested positive for COVID yesterday.      HPI    Prior to Admission Medications   Prescriptions Last Dose Informant Patient Reported? Taking?   ALPRAZolam (XANAX) 0.25 mg tablet   No No   Sig: TAKE ONE TABLET BY MOUTH EVERY EVENING AT BEDTIME AS NEEDED FOR ANXIETY   B Complex-C-Folic Acid (Ayanna-Dylan) TABS  Family Member No No   Sig: Take 1 tablet by mouth daily   CRANBERRY PO  Family Member Yes No   Sig: Take by mouth   Docusate Sodium (COLACE PO)  Family Member Yes No   Sig: Take by mouth   atorvastatin (LIPITOR) 20 mg tablet  Family Member No No   Sig: Take 1 tablet (20 mg total) by mouth daily with dinner   cephalexin (KEFLEX) 250 mg capsule  Family Member Yes No   Sig: Take 250 mg by mouth daily   Patient not taking: Reported on 8/23/2023   cholecalciferol 1,000 units tablet  Family Member No No   Sig: Take 1 tablet (1,000 Units total) by mouth daily 2,000 units daily   clotrimazole-betamethasone (LOTRISONE) 1-0.05 % cream  Family Member No No   Sig: Apply to affected area 2 times daily prn   gabapentin (NEURONTIN) 100 mg capsule   No No   Sig: Take 1 tablet at bedtime.  May increase to 2 tablets after 3 days   lidocaine (Lidoderm) 5 %  Family Member No No   Sig: Apply 1 patch topically over 12 hours daily Remove & Discard patch within 12 hours or as directed by MD   loratadine (CLARITIN) 10 mg tablet  Family Member No No   Sig: Take 1 tablet (10 mg total) by mouth daily   methylPREDNISolone 4 MG tablet therapy pack  Family Member No No   Sig: Use as directed on package   nystatin-triamcinolone (MYCOLOG-II) ointment   No No   Sig: Apply topically 2 (two) times a day as needed (vulvar irritation)   ondansetron (Zofran ODT) 4 mg disintegrating tablet   No No   Sig: Take 1 tablet (4 mg total) by mouth every 6 (six) hours as needed for nausea or vomiting   pantoprazole (PROTONIX) 40 mg  tablet  Family Member No No   Sig: Take 1 tablet (40 mg total) by mouth daily      Facility-Administered Medications: None       Past Medical History:   Diagnosis Date    Cholelithiasis     COVID-19 12/22/2021    Fall against object 07/18/2021    GERD (gastroesophageal reflux disease)     Influenza 01/29/2019    Migraine headache     Nasal bone fractures 07/25/2021    Orthostatic hypotension 07/25/2021    Pneumonia     Urinary tract infection        Past Surgical History:   Procedure Laterality Date    NO PAST SURGERIES      WA ARTHRD ANT INTERBODY MIN DSC CRV BELOW C2 Bilateral 7/20/2021    Procedure: Anterior cervical discectomy and fusion C4-5, posterior cervical decompresion and fusion C2-T2;  Surgeon: Ronny Juarez MD;  Location: [facility];  Service: Neurosurgery       Family History   Problem Relation Age of Onset    Osteoporosis Mother     Heart disease Mother     No Known Problems Father     Osteoporosis Sister     Osteoporosis Sister     No Known Problems Sister     No Known Problems Sister     No Known Problems Brother     No Known Problems Daughter     Diabetes type II Son     Stroke Son     Heart attack Son     Deep vein thrombosis Son      I have reviewed and agree with the history as documented.    E-Cigarette/Vaping    E-Cigarette Use Never User      E-Cigarette/Vaping Substances    Nicotine No     THC No     CBD No     Flavoring No     Other No     Unknown No      Social History     Tobacco Use    Smoking status: Former     Current packs/day: 1.50     Average packs/day: 1.5 packs/day for 10.0 years (15.0 ttl pk-yrs)     Types: Cigarettes    Smokeless tobacco: Never    Tobacco comments:     quit age 23   Vaping Use    Vaping status: Never Used   Substance Use Topics    Alcohol use: Never     Alcohol/week: 0.0 standard drinks of alcohol    Drug use: Never       Review of Systems   Constitutional:  Positive for activity change and appetite change. Negative for fever.   HENT:  Positive for congestion.  Negative for sore throat.    Eyes:  Negative for visual disturbance.   Respiratory:  Negative for cough.    Cardiovascular:  Negative for chest pain.   Gastrointestinal:  Positive for diarrhea and nausea. Negative for abdominal pain.   Genitourinary:  Positive for vaginal discharge. Negative for decreased urine volume and dysuria.   Musculoskeletal:  Negative for arthralgias.   Skin:  Negative for rash.   Neurological:  Positive for weakness and light-headedness.   Hematological:  Does not bruise/bleed easily.   Psychiatric/Behavioral:  Negative for confusion.    All other systems reviewed and are negative.      Physical Exam  Physical Exam  Vitals and nursing note reviewed.   Constitutional:       Appearance: Normal appearance.   HENT:      Head: Normocephalic.      Right Ear: External ear normal.      Left Ear: External ear normal.      Nose: Nose normal.      Mouth/Throat:      Mouth: Mucous membranes are moist.   Eyes:      Conjunctiva/sclera: Conjunctivae normal.   Cardiovascular:      Rate and Rhythm: Normal rate.      Pulses: Normal pulses.   Pulmonary:      Effort: Pulmonary effort is normal.   Abdominal:      Palpations: Abdomen is soft.      Tenderness: There is no abdominal tenderness.   Musculoskeletal:         General: Normal range of motion.      Cervical back: Normal range of motion.   Skin:     General: Skin is warm and dry.      Capillary Refill: Capillary refill takes less than 2 seconds.   Neurological:      General: No focal deficit present.      Mental Status: She is alert.   Psychiatric:         Mood and Affect: Mood normal.         Behavior: Behavior normal.         Vital Signs  ED Triage Vitals [08/25/24 1827]   Temperature Pulse Respirations Blood Pressure SpO2   99 °F (37.2 °C) 100 20 168/74 96 %      Temp Source Heart Rate Source Patient Position - Orthostatic VS BP Location FiO2 (%)   Oral Monitor Sitting Right arm --      Pain Score       No Pain           Vitals:    08/25/24 1827   BP:  168/74   Pulse: 100   Patient Position - Orthostatic VS: Sitting         Visual Acuity      ED Medications  Medications   fluconazole (DIFLUCAN) tablet 200 mg (has no administration in time range)   sodium chloride 0.9 % bolus 1,000 mL (1,000 mL Intravenous New Bag 8/25/24 1905)       Diagnostic Studies  Results Reviewed       Procedure Component Value Units Date/Time    Comprehensive metabolic panel [[id number]] Collected: 08/25/24 1852    Lab Status: Final result Specimen: Blood from Arm, Left Updated: 08/25/24 1917     Sodium 136 mmol/L      Potassium 4.2 mmol/L      Chloride 104 mmol/L      CO2 23 mmol/L      ANION GAP 9 mmol/L      BUN 13 mg/dL      Creatinine 1.06 mg/dL      Glucose 110 mg/dL      Calcium 9.5 mg/dL      AST 22 U/L      ALT 21 U/L      Alkaline Phosphatase 58 U/L      Total Protein 7.1 g/dL      Albumin 4.0 g/dL      Total Bilirubin 0.36 mg/dL      eGFR 49 ml/min/1.73sq m     Narrative:      National Kidney Disease Foundation guidelines for Chronic Kidney Disease (CKD):     Stage 1 with normal or high GFR (GFR > 90 mL/min/1.73 square meters)    Stage 2 Mild CKD (GFR = 60-89 mL/min/1.73 square meters)    Stage 3A Moderate CKD (GFR = 45-59 mL/min/1.73 square meters)    Stage 3B Moderate CKD (GFR = 30-44 mL/min/1.73 square meters)    Stage 4 Severe CKD (GFR = 15-29 mL/min/1.73 square meters)    Stage 5 End Stage CKD (GFR <15 mL/min/1.73 square meters)  Note: GFR calculation is accurate only with a steady state creatinine    UA w Reflex to Microscopic w Reflex to Culture [[id number]] Collected: 08/25/24 1855    Lab Status: Final result Specimen: Urine, Clean Catch Updated: 08/25/24 1904     Color, UA Light Yellow     Clarity, UA Clear     Specific Gravity, UA 1.015     pH, UA 6.5     Leukocytes, UA Negative     Nitrite, UA Negative     Protein, UA Negative mg/dl      Glucose, UA Negative mg/dl      Ketones, UA Negative mg/dl      Urobilinogen, UA <2.0 mg/dl      Bilirubin, UA Negative     Occult  Blood, UA Negative    CBC and differential [[id number]]  (Abnormal) Collected: 08/25/24 1852    Lab Status: Final result Specimen: Blood from Arm, Left Updated: 08/25/24 1901     WBC 5.61 Thousand/uL      RBC 4.11 Million/uL      Hemoglobin 13.0 g/dL      Hematocrit 39.5 %      MCV 96 fL      MCH 31.6 pg      MCHC 32.9 g/dL      RDW 12.9 %      MPV 9.2 fL      Platelets 156 Thousands/uL      nRBC 0 /100 WBCs      Segmented % 65 %      Immature Grans % 0 %      Lymphocytes % 21 %      Monocytes % 13 %      Eosinophils Relative 0 %      Basophils Relative 1 %      Absolute Neutrophils 3.68 Thousands/µL      Absolute Immature Grans 0.02 Thousand/uL      Absolute Lymphocytes 1.15 Thousands/µL      Absolute Monocytes 0.72 Thousand/µL      Eosinophils Absolute 0.01 Thousand/µL      Basophils Absolute 0.03 Thousands/µL                    XR chest 1 view portable    (Results Pending)              Procedures  ECG 12 Lead Documentation Only    Date/Time: 8/25/2024 6:42 PM    Performed by: Derrick Buckley MD  Authorized by: Derrick Buckley MD    Indications / Diagnosis:  COVID  ECG reviewed by me, the ED Provider: yes    Patient location:  ED  Interpretation:     Interpretation: normal    Rate:     ECG rate:  100    ECG rate assessment: normal    Rhythm:     Rhythm: sinus rhythm    Ectopy:     Ectopy: PVCs      PVCs:  Infrequent  QRS:     QRS axis:  Normal    QRS intervals:  Normal  Conduction:     Conduction: normal    ST segments:     ST segments:  Normal  T waves:     T waves: normal             ED Course                                               Medical Decision Making  Elderly female with COVID and generalized weakness.  Will check electrolytes, hydrate    Amount and/or Complexity of Data Reviewed  Labs: ordered.  Radiology: ordered.    Risk  Prescription drug management.                 Disposition  Final diagnoses:   COVID     Time reflects when diagnosis was documented in both MDM as applicable and the Disposition  within this note       Time User Action Codes Description Comment    8/25/2024  8:06 PM Derrick Buckley Add [U07.1] COVID           ED Disposition       ED Disposition   Discharge    Condition   Stable    Date/Time   Sun Aug 25, 2024  8:06 PM    Comment   Leah Brown discharge to home/self care.                   Follow-up Information       Follow up With Specialties Details Why Contact Info    Haylee Puente DO Family Medicine Schedule an appointment as soon as possible for a visit   [address] [address]  [phone]              Patient's Medications   Discharge Prescriptions    NIRMATRELVIR & RITONAVIR (PAXLOVID, 150/100,) TABLET THERAPY PACK    Take 2 tablets by mouth 2 (two) times a day for 5 days Take 1 nirmatrelvir tablet + 1 ritonavir tablet together per dose       Start Date: 8/25/2024 End Date: 8/30/2024       Order Dose: 2 tablets       Quantity: 20 tablet    Refills: 0       No discharge procedures on file.    PDMP Review         Value Time User    PDMP Reviewed  Yes 11/29/2021  3:57 PM Dwain Baptiste MD            ED Provider  Electronically Signed by             Derrick Buckley MD  08/25/24 2011

## 2024-08-26 ENCOUNTER — VBI (OUTPATIENT)
Dept: FAMILY MEDICINE CLINIC | Facility: CLINIC | Age: 79
End: 2024-08-26

## 2024-08-26 LAB
ATRIAL RATE: 100 BPM
P AXIS: 61 DEGREES
PR INTERVAL: 166 MS
QRS AXIS: -17 DEGREES
QRSD INTERVAL: 72 MS
QT INTERVAL: 340 MS
QTC INTERVAL: 438 MS
T WAVE AXIS: 45 DEGREES
VENTRICULAR RATE: 100 BPM

## 2024-08-26 PROCEDURE — 93010 ELECTROCARDIOGRAM REPORT: CPT | Performed by: INTERNAL MEDICINE

## 2024-08-26 NOTE — TELEPHONE ENCOUNTER
08/26/24 8:51 AM    Patient contacted post ED visit, [facility] department spoke with patient/caregiver and outreach was successful.    Thank you.  RENETTA DORSEY MA  [facility]

## 2024-09-12 ENCOUNTER — OFFICE VISIT (OUTPATIENT)
Dept: RHEUMATOLOGY | Facility: CLINIC | Age: 79
End: 2024-09-12
Payer: COMMERCIAL

## 2024-09-12 VITALS — HEART RATE: 96 BPM | OXYGEN SATURATION: 94 % | DIASTOLIC BLOOD PRESSURE: 60 MMHG | SYSTOLIC BLOOD PRESSURE: 116 MMHG

## 2024-09-12 DIAGNOSIS — M81.0 AGE-RELATED OSTEOPOROSIS WITHOUT CURRENT PATHOLOGICAL FRACTURE: Primary | ICD-10-CM

## 2024-09-12 DIAGNOSIS — E55.9 VITAMIN D DEFICIENCY: ICD-10-CM

## 2024-09-12 DIAGNOSIS — R79.89 HIGH SERUM PARATHYROID HORMONE (PTH): ICD-10-CM

## 2024-09-12 DIAGNOSIS — Z79.83 LONG TERM USE OF BISPHOSPHONATES: ICD-10-CM

## 2024-09-12 DIAGNOSIS — M15.0 PRIMARY GENERALIZED (OSTEO)ARTHRITIS: ICD-10-CM

## 2024-09-12 PROCEDURE — 99213 OFFICE O/P EST LOW 20 MIN: CPT | Performed by: INTERNAL MEDICINE

## 2024-09-12 NOTE — PROGRESS NOTES
Assessment and Plan:   Ms. Brown is an 78-year-old female with history significant for postmenopausal osteoporosis who presents for a follow up.  She is currently receiving IV zoledronic acid infusions on an annual basis that was started in 10/23.    - Leah presents today for a follow-up of postmenopausal osteoporosis that was initially detected on a DEXA scan in 2020 as per the records.  Reassuringly she denies a history of fragility fractures.  As denosumab was denied by her insurance she was started on annual IV zoledronic acid infusions in 10/23 and I encouraged her to schedule the next appointment for 10/24.  She will continue the daily calcium supplements 600 mg twice daily and vitamin D 4000 international units daily.  She will attempt weightbearing exercises if possible.      - She will follow-up with endocrinology to monitor the hyperparathyroidism.      Plan:  Diagnoses and all orders for this visit:    Age-related osteoporosis without current pathological fracture    Long term use of bisphosphonates  -     Comprehensive metabolic panel; Future    Primary generalized (osteo)arthritis    High serum parathyroid hormone (PTH)    Vitamin D deficiency  -     Vitamin D 25 hydroxy; Future      I have personally reviewed pertinent films in PACS of the DEXA scan which shows osteoporosis.       Activities as tolerated.   Exercise: try to maintain a low impact exercise regimen as much as possible.   Continue other medications as prescribed by PCP and other specialists.       RTC in 1 year.       HPI    INITIAL VISIT NOTE (9/2023):  Ms. Brown is a 77-year-old female with history significant for postmenopausal osteoporosis who presents for management.  She is referred by Dr. Puente for a rheumatology consult.    Patient cannot recall when she had her initial DEXA scan done but a diagnosis of osteoporosis was evident in 2020.  She had an updated DEXA scan in August 2023 which continues to show osteoporosis and compared  to July 2020 there was a statistically significant decrease in the bone mineral density of the lumbar spine, hips and left radius.  The 10-year risk of hip fracture was 58% with a 10-year risk of major osteoporotic fracture of 65.9%.  She reports a history of a right wrist fracture following a fall approximately 15 years ago and otherwise denies a history of fractures.  After her recent DEXA scan she was started on calcium 600 mg twice daily and vitamin D 2000 international units twice daily.  She does not perform any type of weightbearing exercise.  No history of inflammatory arthritis, chronic kidney disease, chronic steroid use, smoking or alcohol intake.  She reports a history of osteoporosis in her mother and sister.      9/12/2024:  Patient presents for a follow-up of postmenopausal osteoporosis.  She is currently receiving IV zoledronic acid infusions on an annual basis and received the first infusion on 10/11/2023.  She reports after receiving this she did have nausea and tremors for about 1 to 2 days and this resolved spontaneously.    She denies interim fractures.  She is taking the daily calcium 600 mg twice daily and vitamin D 2000 international units twice daily supplements.  She does not perform any type of weightbearing exercise.    Of note I did plan to start her on denosumab injections but this was denied by her insurance.    She is following with endocrinology in view of the elevated PTH.    The following portions of the patient's history were reviewed and updated as appropriate: allergies, current medications, past family history, past medical history, past social history, past surgical history and problem list.      Review of Systems  Constitutional: Negative for weight change, fevers, chills, night sweats, fatigue.  ENT/Mouth: Negative for hearing changes, ear pain, nasal congestion, sinus pain, hoarseness, sore throat, rhinorrhea, swallowing difficulty.   Eyes: Negative for pain, redness,  discharge, vision changes.   Cardiovascular: Negative for chest pain, SOB, palpitations.   Respiratory: Negative for cough, sputum, wheezing, dyspnea.   Gastrointestinal: Negative for nausea, vomiting, diarrhea, constipation, pain, heartburn.  Genitourinary: Negative for dysuria, urinary frequency, hematuria.   Musculoskeletal: As per HPI.  Skin: Negative for skin rash, color changes.   Neuro: Negative for weakness, numbness, tingling, loss of consciousness.   Psych: Negative for anxiety, depression.   Heme/Lymph: Negative for easy bruising, bleeding, lymphadenopathy.        Past Medical History:   Diagnosis Date    Cholelithiasis     COVID-19 12/22/2021    Fall against object 07/18/2021    GERD (gastroesophageal reflux disease)     Influenza 01/29/2019    Migraine headache     Nasal bone fractures 07/25/2021    Orthostatic hypotension 07/25/2021    Pneumonia     Urinary tract infection        Past Surgical History:   Procedure Laterality Date    NO PAST SURGERIES      CT ARTHRD ANT INTERBODY MIN DSC CRV BELOW C2 Bilateral 7/20/2021    Procedure: Anterior cervical discectomy and fusion C4-5, posterior cervical decompresion and fusion C2-T2;  Surgeon: Ronny Juarez MD;  Location: [facility];  Service: Neurosurgery       Social History     Socioeconomic History    Marital status:      Spouse name: Not on file    Number of children: 4    Years of education: Not on file    Highest education level: Not on file   Occupational History    Not on file   Tobacco Use    Smoking status: Former     Current packs/day: 1.50     Average packs/day: 1.5 packs/day for 10.0 years (15.0 ttl pk-yrs)     Types: Cigarettes    Smokeless tobacco: Never    Tobacco comments:     quit age 23   Vaping Use    Vaping status: Never Used   Substance and Sexual Activity    Alcohol use: Never     Alcohol/week: 0.0 standard drinks of alcohol    Drug use: Never    Sexual activity: Not Currently     Partners: Male   Other Topics Concern    Not on  file   Social History Narrative    Lives with      Social Determinants of Health     Financial Resource Strain: Low Risk  (4/19/2023)    Overall Financial Resource Strain (CARDIA)     Difficulty of Paying Living Expenses: Not hard at all   Food Insecurity: Patient Unable To Answer (4/11/2024)    Hunger Vital Sign     Worried About Running Out of Food in the Last Year: Patient unable to answer     Ran Out of Food in the Last Year: Patient unable to answer   Transportation Needs: Patient Unable To Answer (4/11/2024)    PRAPARE - Transportation     Lack of Transportation (Medical): Patient unable to answer     Lack of Transportation (Non-Medical): Patient unable to answer   Physical Activity: Not on file   Stress: Not on file   Social Connections: Not on file   Intimate Partner Violence: Not on file   Housing Stability: Patient Unable To Answer (4/11/2024)    Housing Stability Vital Sign     Unable to Pay for Housing in the Last Year: Patient unable to answer     Number of Times Moved in the Last Year: Not on file     Homeless in the Last Year: Patient unable to answer       Family History   Problem Relation Age of Onset    Osteoporosis Mother     Heart disease Mother     No Known Problems Father     Osteoporosis Sister     Osteoporosis Sister     No Known Problems Sister     No Known Problems Sister     No Known Problems Brother     No Known Problems Daughter     Diabetes type II Son     Stroke Son     Heart attack Son     Deep vein thrombosis Son        Allergies   Allergen Reactions    Bee Venom Anaphylaxis       Current Outpatient Medications:     ALPRAZolam (XANAX) 0.25 mg tablet, TAKE ONE TABLET BY MOUTH EVERY EVENING AT BEDTIME AS NEEDED FOR ANXIETY, Disp: 30 tablet, Rfl: 0    atorvastatin (LIPITOR) 20 mg tablet, Take 1 tablet (20 mg total) by mouth daily with dinner, Disp: 90 tablet, Rfl: 1    B Complex-C-Folic Acid (Ayanna-Dylan) TABS, Take 1 tablet by mouth daily, Disp: 100 tablet, Rfl: 1     cholecalciferol 1,000 units tablet, Take 1 tablet (1,000 Units total) by mouth daily 2,000 units daily, Disp: 90 tablet, Rfl: 1    clotrimazole-betamethasone (LOTRISONE) 1-0.05 % cream, Apply to affected area 2 times daily prn, Disp: 15 g, Rfl: 0    CRANBERRY PO, Take by mouth, Disp: , Rfl:     Docusate Sodium (COLACE PO), Take by mouth, Disp: , Rfl:     gabapentin (NEURONTIN) 100 mg capsule, Take 1 tablet at bedtime.  May increase to 2 tablets after 3 days, Disp: 60 capsule, Rfl: 1    loratadine (CLARITIN) 10 mg tablet, Take 1 tablet (10 mg total) by mouth daily, Disp: 90 tablet, Rfl: 1    methylPREDNISolone 4 MG tablet therapy pack, Use as directed on package, Disp: 1 each, Rfl: 0    nystatin-triamcinolone (MYCOLOG-II) ointment, Apply topically 2 (two) times a day as needed (vulvar irritation), Disp: 30 g, Rfl: 0    ondansetron (Zofran ODT) 4 mg disintegrating tablet, Take 1 tablet (4 mg total) by mouth every 6 (six) hours as needed for nausea or vomiting, Disp: 10 tablet, Rfl: 0    pantoprazole (PROTONIX) 40 mg tablet, Take 1 tablet (40 mg total) by mouth daily, Disp: 90 tablet, Rfl: 1    cephalexin (KEFLEX) 250 mg capsule, Take 250 mg by mouth daily (Patient not taking: Reported on 8/23/2023), Disp: , Rfl:     lidocaine (Lidoderm) 5 %, Apply 1 patch topically over 12 hours daily Remove & Discard patch within 12 hours or as directed by MD, Disp: 30 patch, Rfl: 1      Objective:    Vitals:    09/12/24 1437   BP: 116/60   BP Location: Left arm   Patient Position: Sitting   Cuff Size: Adult   Pulse: 96   SpO2: 94%       Physical Exam  General: Well appearing, well nourished, in no distress. Oriented x 3, normal mood and affect.  Ambulating with with aid of a wheelchair.  Skin: Good turgor, no rash, unusual bruising or prominent lesions.  Hair: Normal texture and distribution.  Nails: Normal color, no deformities.  HEENT:  Head: Normocephalic, atraumatic.  Eyes: Conjunctiva clear, sclera non-icteric, EOM  intact.  Neurologic: Alert and oriented.   Psychiatric: Normal mood and affect.       Ria Connolly M.D.  Rheumatology

## 2024-10-02 ENCOUNTER — TELEPHONE (OUTPATIENT)
Dept: ENDOCRINOLOGY | Facility: CLINIC | Age: 79
End: 2024-10-02

## 2024-10-02 ENCOUNTER — OFFICE VISIT (OUTPATIENT)
Dept: ENDOCRINOLOGY | Facility: CLINIC | Age: 79
End: 2024-10-02
Payer: COMMERCIAL

## 2024-10-02 VITALS — DIASTOLIC BLOOD PRESSURE: 70 MMHG | SYSTOLIC BLOOD PRESSURE: 114 MMHG

## 2024-10-02 DIAGNOSIS — R79.89 HIGH SERUM PARATHYROID HORMONE (PTH): ICD-10-CM

## 2024-10-02 DIAGNOSIS — E55.9 VITAMIN D DEFICIENCY: ICD-10-CM

## 2024-10-02 DIAGNOSIS — M81.0 AGE-RELATED OSTEOPOROSIS WITHOUT CURRENT PATHOLOGICAL FRACTURE: Primary | ICD-10-CM

## 2024-10-02 PROCEDURE — 99204 OFFICE O/P NEW MOD 45 MIN: CPT | Performed by: NURSE PRACTITIONER

## 2024-10-02 NOTE — TELEPHONE ENCOUNTER
Called patients daughter to let her know co-pay was reversed and that she would be receiving a bill due to this action. She was understanding.

## 2024-10-02 NOTE — PROGRESS NOTES
Ambulatory Visit  Name: Leah Brown      : 1945      MRN: [id number]  Encounter Provider: ROYA Tang  Encounter Date: 10/2/2024   Encounter department: [facility]    Assessment & Plan  Age-related osteoporosis without current pathological fracture  Last Reclast 2023.   Last DXA scan 2023  Repeat CMP within a month of Reclast. Managed by rheumatology  Continue calcium supplementation and vitamin D  Continue weight bearing exercise  Call for any falls, fractures           High serum parathyroid hormone (PTH)  Recommend repeat CMP and vitamin D 25 hydroxy ordered.  Will add PTH level.   Orders:    Comprehensive metabolic panel; Future    Vitamin D 25 hydroxy; Future    PTH, intact; Future    Vitamin D deficiency  Recommend continue supplementation and recheck vitamin D 25 hydroxy level.   Orders:    Vitamin D 25 hydroxy; Future      History of Present Illness     Leah Brown is a 78 y.o. female who presents for follow up of osteoporosis and hyperparathyroidism.     For osteoporosis, history of menopause at age 38 years and history of fracture in 60s. Diagnosed with osteoporosis in  with noted worsening in BMD on DXA scan in 2023 with lowest T-Score -5.2 at right hip and -4.6 in the left forearm. Last Reclast from 2023.     Denies recent falls or fractures.   Denies bone pain/back pain.     Continues taking vitamin D3 4000 IU daily  Continues taking calcium 600 mg daily with 1-2 servings of dietary calcium.     No weight bearing exercise of note.     History obtained from : patient  Review of Systems  Medical History Reviewed by provider this encounter:       Current Outpatient Medications on File Prior to Visit   Medication Sig Dispense Refill    ALPRAZolam (XANAX) 0.25 mg tablet TAKE ONE TABLET BY MOUTH EVERY EVENING AT BEDTIME AS NEEDED FOR ANXIETY 30 tablet 0    atorvastatin (LIPITOR) 20 mg tablet Take 1 tablet (20 mg  total) by mouth daily with dinner 90 tablet 1    B Complex-C-Folic Acid (Ayanna-Dylan) TABS Take 1 tablet by mouth daily 100 tablet 1    cholecalciferol 1,000 units tablet Take 1 tablet (1,000 Units total) by mouth daily 2,000 units daily 90 tablet 1    clotrimazole-betamethasone (LOTRISONE) 1-0.05 % cream Apply to affected area 2 times daily prn 15 g 0    CRANBERRY PO Take by mouth      Docusate Sodium (COLACE PO) Take by mouth      gabapentin (NEURONTIN) 100 mg capsule Take 1 tablet at bedtime.  May increase to 2 tablets after 3 days 60 capsule 1    lidocaine (Lidoderm) 5 % Apply 1 patch topically over 12 hours daily Remove & Discard patch within 12 hours or as directed by MD 30 patch 1    loratadine (CLARITIN) 10 mg tablet Take 1 tablet (10 mg total) by mouth daily 90 tablet 1    methylPREDNISolone 4 MG tablet therapy pack Use as directed on package 1 each 0    nystatin-triamcinolone (MYCOLOG-II) ointment Apply topically 2 (two) times a day as needed (vulvar irritation) 30 g 0    ondansetron (Zofran ODT) 4 mg disintegrating tablet Take 1 tablet (4 mg total) by mouth every 6 (six) hours as needed for nausea or vomiting 10 tablet 0    pantoprazole (PROTONIX) 40 mg tablet Take 1 tablet (40 mg total) by mouth daily 90 tablet 1    cephalexin (KEFLEX) 250 mg capsule Take 250 mg by mouth daily (Patient not taking: Reported on 8/23/2023)       No current facility-administered medications on file prior to visit.          Objective     /70 (BP Location: Right arm, Patient Position: Sitting, Cuff Size: Standard)        Physical Exam  Vitals reviewed.   Constitutional:       Appearance: Normal appearance.   Cardiovascular:      Rate and Rhythm: Normal rate and regular rhythm.   Pulmonary:      Effort: Pulmonary effort is normal.   Skin:     General: Skin is warm and dry.      Capillary Refill: Capillary refill takes less than 2 seconds.   Neurological:      General: No focal deficit present.      Mental Status: She is  alert and oriented to person, place, and time.   Psychiatric:         Mood and Affect: Mood normal.         Behavior: Behavior normal.     Labs:   Component      Latest Ref Rng 4/11/2024   WBC      4.31 - 10.16 Thousand/uL 4.20 (L)    RBC      3.81 - 5.12 Million/uL 4.05    Hemoglobin      11.5 - 15.4 g/dL 12.8    Hematocrit      34.8 - 46.1 % 39.6    MCV      82 - 98 fL 98    MCH      26.8 - 34.3 pg 31.6    MCHC      31.4 - 37.4 g/dL 32.3    RDW      11.6 - 15.1 % 12.8    Platelet Count      149 - 390 Thousands/uL 156    MPV      8.9 - 12.7 fL 9.8    TSH 3RD GENERATON      0.450 - 4.500 uIU/mL 1.757    VITAMIN D, 25-HYDROXY      30.0 - 100.0 ng/mL 47.9    Vitamin B-12      180 - 914 pg/mL 377    Phosphorus      2.3 - 4.1 mg/dL 2.9    MAGNESIUM      1.9 - 2.7 mg/dL 2.3        Administrative Statements

## 2024-10-04 PROBLEM — E21.3 HYPERPARATHYROIDISM (HCC): Status: ACTIVE | Noted: 2024-10-04

## 2024-10-04 NOTE — ASSESSMENT & PLAN NOTE
Recommend repeat CMP and vitamin D 25 hydroxy ordered.  Will add PTH level.   Orders:    Comprehensive metabolic panel; Future    Vitamin D 25 hydroxy; Future    PTH, intact; Future

## 2024-10-04 NOTE — ASSESSMENT & PLAN NOTE
Last Reclast October 2023.   Last DXA scan August 2023  Repeat CMP within a month of Reclast. Managed by rheumatology  Continue calcium supplementation and vitamin D  Continue weight bearing exercise  Call for any falls, fractures

## 2024-10-17 ENCOUNTER — OFFICE VISIT (OUTPATIENT)
Dept: FAMILY MEDICINE CLINIC | Facility: CLINIC | Age: 79
End: 2024-10-17

## 2024-10-17 ENCOUNTER — APPOINTMENT (OUTPATIENT)
Dept: LAB | Facility: CLINIC | Age: 79
End: 2024-10-17
Payer: COMMERCIAL

## 2024-10-17 VITALS
HEIGHT: 62 IN | SYSTOLIC BLOOD PRESSURE: 108 MMHG | WEIGHT: 140 LBS | DIASTOLIC BLOOD PRESSURE: 62 MMHG | BODY MASS INDEX: 25.76 KG/M2

## 2024-10-17 DIAGNOSIS — K21.9 GASTROESOPHAGEAL REFLUX DISEASE: ICD-10-CM

## 2024-10-17 DIAGNOSIS — E78.5 HYPERLIPIDEMIA, UNSPECIFIED HYPERLIPIDEMIA TYPE: ICD-10-CM

## 2024-10-17 DIAGNOSIS — Z79.83 LONG TERM USE OF BISPHOSPHONATES: ICD-10-CM

## 2024-10-17 DIAGNOSIS — M81.0 AGE-RELATED OSTEOPOROSIS WITHOUT CURRENT PATHOLOGICAL FRACTURE: ICD-10-CM

## 2024-10-17 DIAGNOSIS — E55.9 VITAMIN D DEFICIENCY: ICD-10-CM

## 2024-10-17 DIAGNOSIS — R79.89 HIGH SERUM PARATHYROID HORMONE (PTH): ICD-10-CM

## 2024-10-17 DIAGNOSIS — Z23 ENCOUNTER FOR IMMUNIZATION: Primary | ICD-10-CM

## 2024-10-17 DIAGNOSIS — F41.9 ANXIETY: ICD-10-CM

## 2024-10-17 DIAGNOSIS — E21.3 HYPERPARATHYROIDISM (HCC): ICD-10-CM

## 2024-10-17 LAB
25(OH)D3 SERPL-MCNC: 91.5 NG/ML (ref 30–100)
ALBUMIN SERPL BCG-MCNC: 3.9 G/DL (ref 3.5–5)
ALP SERPL-CCNC: 49 U/L (ref 34–104)
ALT SERPL W P-5'-P-CCNC: 16 U/L (ref 7–52)
ANION GAP SERPL CALCULATED.3IONS-SCNC: 7 MMOL/L (ref 4–13)
AST SERPL W P-5'-P-CCNC: 20 U/L (ref 13–39)
BILIRUB SERPL-MCNC: 0.34 MG/DL (ref 0.2–1)
BUN SERPL-MCNC: 19 MG/DL (ref 5–25)
CALCIUM SERPL-MCNC: 9 MG/DL (ref 8.4–10.2)
CHLORIDE SERPL-SCNC: 106 MMOL/L (ref 96–108)
CO2 SERPL-SCNC: 26 MMOL/L (ref 21–32)
CREAT SERPL-MCNC: 1.01 MG/DL (ref 0.6–1.3)
GFR SERPL CREATININE-BSD FRML MDRD: 52 ML/MIN/1.73SQ M
GLUCOSE P FAST SERPL-MCNC: 98 MG/DL (ref 65–99)
POTASSIUM SERPL-SCNC: 4.6 MMOL/L (ref 3.5–5.3)
PROT SERPL-MCNC: 6.8 G/DL (ref 6.4–8.4)
PTH-INTACT SERPL-MCNC: 89.5 PG/ML (ref 12–88)
SODIUM SERPL-SCNC: 139 MMOL/L (ref 135–147)

## 2024-10-17 PROCEDURE — 36415 COLL VENOUS BLD VENIPUNCTURE: CPT

## 2024-10-17 PROCEDURE — 82306 VITAMIN D 25 HYDROXY: CPT

## 2024-10-17 PROCEDURE — 83970 ASSAY OF PARATHORMONE: CPT

## 2024-10-17 RX ORDER — PANTOPRAZOLE SODIUM 40 MG/1
40 TABLET, DELAYED RELEASE ORAL DAILY
Qty: 90 TABLET | Refills: 1 | Status: SHIPPED | OUTPATIENT
Start: 2024-10-17

## 2024-10-17 RX ORDER — ATORVASTATIN CALCIUM 20 MG/1
20 TABLET, FILM COATED ORAL
Qty: 90 TABLET | Refills: 1 | Status: SHIPPED | OUTPATIENT
Start: 2024-10-17

## 2024-10-17 NOTE — PROGRESS NOTES
"Assessment/Plan:   Diagnoses and all orders for this visit:    Encounter for immunization  -     influenza vaccine, high-dose, PF 0.5 mL (Fluzone High Dose)    Hyperparathyroidism (HCC)  - follows with Endo and had labs drawn earlier today     Anxiety  - stable   - does not need Xanax RF     Hyperlipidemia, unspecified hyperlipidemia type  -     atorvastatin (LIPITOR) 20 mg tablet; Take 1 tablet (20 mg total) by mouth daily with dinner  - LDL 69   - cont statin     Gastroesophageal reflux disease  -     pantoprazole (PROTONIX) 40 mg tablet; Take 1 tablet (40 mg total) by mouth daily          Subjective:    Patient ID: Leah Brown is a 78 y.o. female.  HPI  Leah Brown is a 78 y.o. female who presents to the office with her Daughter for 6month f/u   - pt lives with her Daughter   - Specialists: Cardio (last OV was 2/27/2023 - no changes, RTO in 1yr - overdue), NeuroSurg (follows annually), Urology, Audiologist    - Immunizations: UTD with COVID IMMs but no Booster - had recent COVID infection in 8/2024 - did OK with Paxlovid, UTD with PCV20, interested in annual Flu vaccine and will get in office today   - UTD with annual Mammo (5/2024)   - declined Colon Ca screening   - labs pending   - takes Xanax 0.25mg QD PRN prior to bloodwork/imaging - does not need RF   - ROS: today in the office pt denies F/C/N/V/HA/visual changes/CP/palpitations/SOB/wheezing/abd pain/D/LE edema        The following portions of the patient's history were reviewed and updated as appropriate: allergies, current medications, past family history, past medical history, past social history, past surgical history and problem list.    Review of Systems  as per HPI    Objective:  /62   Ht 5' 2\" (1.575 m)   Wt 63.5 kg (140 lb) Comment: patient provided  BMI 25.61 kg/m²    Physical Exam  Vitals reviewed.   Constitutional:       General: She is not in acute distress.     Appearance: Normal appearance. She is not ill-appearing, toxic-appearing " or diaphoretic.   HENT:      Head: Normocephalic and atraumatic.      Right Ear: External ear normal.      Left Ear: External ear normal.      Nose: Nose normal.   Eyes:      General: No scleral icterus.        Right eye: No discharge.         Left eye: No discharge.      Extraocular Movements: Extraocular movements intact.      Conjunctiva/sclera: Conjunctivae normal.   Cardiovascular:      Rate and Rhythm: Normal rate and regular rhythm.      Heart sounds: Normal heart sounds.   Pulmonary:      Effort: Pulmonary effort is normal. No respiratory distress.      Breath sounds: Normal breath sounds. No stridor. No wheezing, rhonchi or rales.   Abdominal:      Palpations: Abdomen is soft.   Musculoskeletal:      Cervical back: Normal range of motion.      Right lower leg: No edema.      Left lower leg: No edema.      Comments: In wheelchair    Skin:     General: Skin is warm.   Neurological:      General: No focal deficit present.      Mental Status: She is oriented to person, place, and time.   Psychiatric:         Mood and Affect: Mood normal.         Behavior: Behavior normal.

## 2024-10-18 ENCOUNTER — TELEPHONE (OUTPATIENT)
Dept: ENDOCRINOLOGY | Facility: CLINIC | Age: 79
End: 2024-10-18

## 2024-10-30 DIAGNOSIS — U07.1 COVID: ICD-10-CM

## 2024-10-30 DIAGNOSIS — F41.9 ANXIETY: ICD-10-CM

## 2024-10-30 DIAGNOSIS — J30.9 ALLERGIC RHINITIS, UNSPECIFIED SEASONALITY, UNSPECIFIED TRIGGER: ICD-10-CM

## 2024-10-30 RX ORDER — ONDANSETRON 4 MG/1
4 TABLET, ORALLY DISINTEGRATING ORAL EVERY 6 HOURS PRN
Qty: 10 TABLET | Refills: 0 | Status: SHIPPED | OUTPATIENT
Start: 2024-10-30

## 2024-10-30 RX ORDER — LORATADINE 10 MG/1
10 TABLET ORAL DAILY
Qty: 90 TABLET | Refills: 1 | Status: SHIPPED | OUTPATIENT
Start: 2024-10-30

## 2024-10-30 NOTE — TELEPHONE ENCOUNTER
Reason for call:   [x] Refill   [] Prior Auth  [] Other:     Office:   [x] PCP/Provider -   [] Specialty/Provider -     Medication:   ALPRAZolam (XANAX) 0.25 mg/TAKE ONE TABLET BY MOUTH EVERY EVENING AT BEDTIME AS NEEDED     ondansetron (Zofran ODT) 4 mg/Take 1 tablet (4 mg total) by mouth every 6 (six) hours as needed     loratadine (CLARITIN) 10 mg/Take 1 tablet (10 mg total) by mouth daily      Quantity:     Pharmacy: [address]     Does the patient have enough for 3 days?   [x] Yes   [] No - Send as HP to POD

## 2024-10-31 RX ORDER — ALPRAZOLAM 0.25 MG/1
0.25 TABLET ORAL
Qty: 30 TABLET | Refills: 0 | Status: SHIPPED | OUTPATIENT
Start: 2024-10-31

## 2024-11-12 ENCOUNTER — TELEPHONE (OUTPATIENT)
Dept: RHEUMATOLOGY | Facility: CLINIC | Age: 79
End: 2024-11-12

## 2024-11-12 ENCOUNTER — TELEPHONE (OUTPATIENT)
Age: 79
End: 2024-11-12

## 2024-11-12 NOTE — TELEPHONE ENCOUNTER
Pt daughter calling to advise pt should be having a reclast  infusion soon. She would like acall back to schedule.

## 2024-11-12 NOTE — TELEPHONE ENCOUNTER
The patient's daughter was called and was given the [facility]'s Phone number so she could schedule her mothers Reclast infusion.

## 2024-11-20 ENCOUNTER — HOSPITAL ENCOUNTER (OUTPATIENT)
Dept: INFUSION CENTER | Facility: HOSPITAL | Age: 79
Discharge: HOME | End: 2024-11-20
Payer: COMMERCIAL

## 2024-11-20 VITALS
OXYGEN SATURATION: 100 % | WEIGHT: 137.79 LBS | DIASTOLIC BLOOD PRESSURE: 73 MMHG | HEIGHT: 62 IN | TEMPERATURE: 97.2 F | HEART RATE: 96 BPM | BODY MASS INDEX: 25.36 KG/M2 | RESPIRATION RATE: 18 BRPM | SYSTOLIC BLOOD PRESSURE: 123 MMHG

## 2024-11-20 DIAGNOSIS — M81.0 AGE-RELATED OSTEOPOROSIS WITHOUT CURRENT PATHOLOGICAL FRACTURE: Primary | ICD-10-CM

## 2024-11-20 PROCEDURE — 96374 THER/PROPH/DIAG INJ IV PUSH: CPT

## 2024-11-20 RX ORDER — SODIUM CHLORIDE 9 MG/ML
20 INJECTION, SOLUTION INTRAVENOUS ONCE
OUTPATIENT
Start: 2025-10-10

## 2024-11-20 RX ORDER — ZOLEDRONIC ACID 0.05 MG/ML
5 INJECTION, SOLUTION INTRAVENOUS ONCE
Status: COMPLETED | OUTPATIENT
Start: 2024-11-20 | End: 2024-11-20

## 2024-11-20 RX ORDER — ZOLEDRONIC ACID 0.05 MG/ML
5 INJECTION, SOLUTION INTRAVENOUS ONCE
OUTPATIENT
Start: 2025-10-10

## 2024-11-20 RX ORDER — SODIUM CHLORIDE 9 MG/ML
20 INJECTION, SOLUTION INTRAVENOUS ONCE
Status: COMPLETED | OUTPATIENT
Start: 2024-11-20 | End: 2024-11-20

## 2024-11-20 RX ADMIN — ZOLEDRONIC ACID 5 MG: 0.05 INJECTION, SOLUTION INTRAVENOUS at 14:01

## 2024-11-20 RX ADMIN — SODIUM CHLORIDE 20 ML/HR: 0.9 INJECTION, SOLUTION INTRAVENOUS at 14:01

## 2024-11-20 NOTE — PROGRESS NOTES
Leah Brown  tolerated treatment well with no complications.      Leah Brown is aware of future appt on 11/20/2025 at 1pm.     AVS printed and given to Leah Brown:  Yes

## 2024-11-20 NOTE — PLAN OF CARE
Problem: Potential for Falls  Goal: Patient will remain free of falls  Description: INTERVENTIONS:  - Educate patient/family on patient safety including physical limitations  - Instruct patient to call for assistance with activity   - Keep Call bell within reach  -Outcome: Progressing

## 2024-12-20 DIAGNOSIS — N18.2 STAGE 2 CHRONIC KIDNEY DISEASE: ICD-10-CM

## 2024-12-20 RX ORDER — FOLIC ACID/VIT B COMPLEX AND C 0.8 MG
1 TABLET ORAL DAILY
Qty: 100 TABLET | Refills: 1 | Status: SHIPPED | OUTPATIENT
Start: 2024-12-20

## 2025-03-24 DIAGNOSIS — K21.9 GASTROESOPHAGEAL REFLUX DISEASE: ICD-10-CM

## 2025-03-24 NOTE — TELEPHONE ENCOUNTER
Reason for call:   [x] Refill   [] Prior Auth  [] Other:     Office:   [x] PCP/Provider - Dr Puente   [] Specialty/Provider -     Medication: pantoprazole     Dose/Frequency: 40 mg take daily     Quantity: 90    Pharmacy: [facility]    Local Pharmacy   Does the patient have enough for 3 days?   [x] Yes   [] No - Send as HP to POD    Mail Away Pharmacy   Does the patient have enough for 10 days?   [] Yes   [] No - Send as HP to POD

## 2025-03-25 RX ORDER — PANTOPRAZOLE SODIUM 40 MG/1
40 TABLET, DELAYED RELEASE ORAL DAILY
Qty: 90 TABLET | Refills: 1 | Status: SHIPPED | OUTPATIENT
Start: 2025-03-25

## 2025-04-07 DIAGNOSIS — J30.9 ALLERGIC RHINITIS, UNSPECIFIED SEASONALITY, UNSPECIFIED TRIGGER: ICD-10-CM

## 2025-04-07 RX ORDER — LORATADINE 10 MG/1
10 TABLET ORAL DAILY
Qty: 90 TABLET | Refills: 1 | Status: SHIPPED | OUTPATIENT
Start: 2025-04-07

## 2025-04-07 NOTE — TELEPHONE ENCOUNTER
Reason for call:   [x] Refill   [] Prior Auth  [] Other:     Office:   [x] PCP/Provider - : [facility]  Authorized By: Haylee Puente DO  [] Specialty/Provider -     Medication: loratadine (CLARITIN) 10 mg tablet       [address] [phone]     Local Pharmacy   Does the patient have enough for 3 days?   [] Yes   [x] No - Send as HP to POD    Mail Away Pharmacy   Does the patient have enough for 10 days?   [] Yes   [] No - Send as HP to POD

## 2025-04-14 DIAGNOSIS — U07.1 COVID: ICD-10-CM

## 2025-04-14 RX ORDER — ONDANSETRON 4 MG/1
4 TABLET, ORALLY DISINTEGRATING ORAL EVERY 6 HOURS PRN
Qty: 30 TABLET | Refills: 0 | Status: SHIPPED | OUTPATIENT
Start: 2025-04-14

## 2025-04-14 NOTE — TELEPHONE ENCOUNTER
Reason for call:   [x] Refill   [] Prior Auth  [] Other:     Office:   [x] PCP/Provider - Kwame  [] Specialty/Provider -     Medication: Ondansetron 4mg ODT    Dose/Frequency: 1 tab q6h prn    Quantity: 10    Pharmacy: [id number] - [place], [place] - [address] [place], [place] [address]. [phone]     Local Pharmacy   Does the patient have enough for 3 days?   [] Yes   [x] No - no meds left

## 2025-04-14 NOTE — TELEPHONE ENCOUNTER
I spoke with patient's daughter and apparently patient takes this as needed. Her last refill was in October snd she just finished her last pill.   Patient would like a refill prior to Dr Puente returning next week

## 2025-05-13 DIAGNOSIS — E78.5 HYPERLIPIDEMIA, UNSPECIFIED HYPERLIPIDEMIA TYPE: ICD-10-CM

## 2025-05-14 RX ORDER — ATORVASTATIN CALCIUM 20 MG/1
20 TABLET, FILM COATED ORAL
Qty: 30 TABLET | Refills: 0 | Status: SHIPPED | OUTPATIENT
Start: 2025-05-14 | End: 2025-05-21 | Stop reason: SDUPTHER

## 2025-05-21 ENCOUNTER — OFFICE VISIT (OUTPATIENT)
Dept: FAMILY MEDICINE CLINIC | Facility: CLINIC | Age: 80
End: 2025-05-21

## 2025-05-21 VITALS
OXYGEN SATURATION: 96 % | HEART RATE: 86 BPM | RESPIRATION RATE: 16 BRPM | DIASTOLIC BLOOD PRESSURE: 70 MMHG | SYSTOLIC BLOOD PRESSURE: 118 MMHG | WEIGHT: 146 LBS | HEIGHT: 62 IN | BODY MASS INDEX: 26.87 KG/M2

## 2025-05-21 DIAGNOSIS — Z53.20 BREAST CANCER SCREENING DECLINED: ICD-10-CM

## 2025-05-21 DIAGNOSIS — N18.30 STAGE 3 CHRONIC KIDNEY DISEASE, UNSPECIFIED WHETHER STAGE 3A OR 3B CKD (HCC): ICD-10-CM

## 2025-05-21 DIAGNOSIS — Z53.20 COLON CANCER SCREENING DECLINED: ICD-10-CM

## 2025-05-21 DIAGNOSIS — I50.9 CONGESTIVE HEART FAILURE, UNSPECIFIED HF CHRONICITY, UNSPECIFIED HEART FAILURE TYPE (HCC): ICD-10-CM

## 2025-05-21 DIAGNOSIS — S14.129D CENTRAL CORD SYNDROME, SUBSEQUENT ENCOUNTER (HCC): ICD-10-CM

## 2025-05-21 DIAGNOSIS — Z00.00 ENCOUNTER FOR SUBSEQUENT ANNUAL WELLNESS VISIT (AWV) IN MEDICARE PATIENT: Primary | ICD-10-CM

## 2025-05-21 DIAGNOSIS — Z98.1 STATUS POST CERVICAL SPINAL FUSION: ICD-10-CM

## 2025-05-21 DIAGNOSIS — D69.6 THROMBOCYTOPENIA (HCC): ICD-10-CM

## 2025-05-21 DIAGNOSIS — F41.9 ANXIETY: ICD-10-CM

## 2025-05-21 DIAGNOSIS — E78.5 HYPERLIPIDEMIA, UNSPECIFIED HYPERLIPIDEMIA TYPE: ICD-10-CM

## 2025-05-21 RX ORDER — ALPRAZOLAM 0.25 MG
0.25 TABLET ORAL
Qty: 30 TABLET | Refills: 0 | Status: SHIPPED | OUTPATIENT
Start: 2025-05-21

## 2025-05-21 RX ORDER — ATORVASTATIN CALCIUM 20 MG/1
20 TABLET, FILM COATED ORAL
Qty: 100 TABLET | Refills: 1 | Status: SHIPPED | OUTPATIENT
Start: 2025-05-21

## 2025-05-21 NOTE — ASSESSMENT & PLAN NOTE
Lab Results   Component Value Date    EGFR 52 10/17/2024    EGFR 49 08/25/2024    EGFR 44 04/11/2024    CREATININE 1.01 10/17/2024    CREATININE 1.06 08/25/2024    CREATININE 1.17 04/11/2024     Orders:    Comprehensive metabolic panel

## 2025-05-21 NOTE — ASSESSMENT & PLAN NOTE
Wt Readings from Last 3 Encounters:   05/21/25 66.2 kg (146 lb)   11/20/24 62.5 kg (137 lb 12.6 oz)   10/17/24 63.5 kg (140 lb)     - follows with Cardio   - last OV was 2/27/2023 - no changes, RTO in 1yr - overdue - advised to schedule

## 2025-05-21 NOTE — ASSESSMENT & PLAN NOTE
- no recent labs   - cont Lipitor 20mg QHS   - RTO in 6months for f/u - pt and daughter aware and agreeable   Orders:    Lipid panel; Future    atorvastatin (LIPITOR) 20 mg tablet; Take 1 tablet (20 mg total) by mouth daily with dinner

## 2025-05-21 NOTE — PATIENT INSTRUCTIONS
Medicare Preventive Visit Patient Instructions  Thank you for completing your Welcome to Medicare Visit or Medicare Annual Wellness Visit today. Your next wellness visit will be due in one year (5/22/2026).  The screening/preventive services that you may require over the next 5-10 years are detailed below. Some tests may not apply to you based off risk factors and/or age. Screening tests ordered at today's visit but not completed yet may show as past due. Also, please note that scanned in results may not display below.  Preventive Screenings:  Service Recommendations Previous Testing/Comments   Colorectal Cancer Screening  * Colonoscopy    * Fecal Occult Blood Test (FOBT)/Fecal Immunochemical Test (FIT)  * Fecal DNA/Cologuard Test  * Flexible Sigmoidoscopy Age: 45-75 years old   Colonoscopy: every 10 years (may be performed more frequently if at higher risk)  OR  FOBT/FIT: every 1 year  OR  Cologuard: every 3 years  OR  Sigmoidoscopy: every 5 years  Screening may be recommended earlier than age 45 if at higher risk for colorectal cancer. Also, an individualized decision between you and your healthcare provider will decide whether screening between the ages of 76-85 would be appropriate. Colonoscopy: 07/18/2014  FOBT/FIT: Not on file  Cologuard: Not on file  Sigmoidoscopy: Not on file    Patient Declines     Breast Cancer Screening Age: 40+ years old  Frequency: every 1-2 years  Not required if history of left and right mastectomy Mammogram: 05/01/2024    Screening Current  Due for Mammogram   Cervical Cancer Screening Between the ages of 21-29, pap smear recommended once every 3 years.   Between the ages of 30-65, can perform pap smear with HPV co-testing every 5 years.   Recommendations may differ for women with a history of total hysterectomy, cervical cancer, or abnormal pap smears in past. Pap Smear: Not on file    Screening Not Indicated   Hepatitis C Screening Once for adults born between 1945 and 1965  More  frequently in patients at high risk for Hepatitis C Hep C Antibody: 04/21/2022    Screening Current   Diabetes Screening 1-2 times per year if you're at risk for diabetes or have pre-diabetes Fasting glucose: 98 mg/dL (10/17/2024)  A1C: No results in last 5 years (No results in last 5 years)  Screening Current   Cholesterol Screening Once every 5 years if you don't have a lipid disorder. May order more often based on risk factors. Lipid panel: 11/15/2023    Screening Not Indicated  History Lipid Disorder     Other Preventive Screenings Covered by Medicare:  Abdominal Aortic Aneurysm (AAA) Screening: covered once if your at risk. You're considered to be at risk if you have a family history of AAA.  Lung Cancer Screening: covers low dose CT scan once per year if you meet all of the following conditions: (1) Age 55-77; (2) No signs or symptoms of lung cancer; (3) Current smoker or have quit smoking within the last 15 years; (4) You have a tobacco smoking history of at least 20 pack years (packs per day multiplied by number of years you smoked); (5) You get a written order from a healthcare provider.  Glaucoma Screening: covered annually if you're considered high risk: (1) You have diabetes OR (2) Family history of glaucoma OR (3)  aged 50 and older OR (4)  American aged 65 and older  Osteoporosis Screening: covered every 2 years if you meet one of the following conditions: (1) You're estrogen deficient and at risk for osteoporosis based off medical history and other findings; (2) Have a vertebral abnormality; (3) On glucocorticoid therapy for more than 3 months; (4) Have primary hyperparathyroidism; (5) On osteoporosis medications and need to assess response to drug therapy.   Last bone density test (DXA Scan): 08/16/2023.  HIV Screening: covered annually if you're between the age of 15-65. Also covered annually if you are younger than 15 and older than 65 with risk factors for HIV infection.  For pregnant patients, it is covered up to 3 times per pregnancy.    Immunizations:  Immunization Recommendations   Influenza Vaccine Annual influenza vaccination during flu season is recommended for all persons aged >= 6 months who do not have contraindications   Pneumococcal Vaccine   * Pneumococcal conjugate vaccine = PCV13 (Prevnar 13), PCV15 (Vaxneuvance), PCV20 (Prevnar 20)  * Pneumococcal polysaccharide vaccine = PPSV23 (Pneumovax) Adults 19-65 yo with certain risk factors or if 65+ yo  If never received any pneumonia vaccine: recommend Prevnar 20 (PCV20)  Give PCV20 if previously received 1 dose of PCV13 or PPSV23   Hepatitis B Vaccine 3 dose series if at intermediate or high risk (ex: diabetes, end stage renal disease, liver disease)   Respiratory syncytial virus (RSV) Vaccine - COVERED BY MEDICARE PART D  * RSVPreF3 (Arexvy) CDC recommends that adults 60 years of age and older may receive a single dose of RSV vaccine using shared clinical decision-making (SCDM)   Tetanus (Td) Vaccine - COST NOT COVERED BY MEDICARE PART B Following completion of primary series, a booster dose should be given every 10 years to maintain immunity against tetanus. Td may also be given as tetanus wound prophylaxis.   Tdap Vaccine - COST NOT COVERED BY MEDICARE PART B Recommended at least once for all adults. For pregnant patients, recommended with each pregnancy.   Shingles Vaccine (Shingrix) - COST NOT COVERED BY MEDICARE PART B  2 shot series recommended in those 19 years and older who have or will have weakened immune systems or those 50 years and older     Health Maintenance Due:      Topic Date Due   • Hepatitis C Screening  Completed     Immunizations Due:      Topic Date Due   • COVID-19 Vaccine (3 - 2024-25 season) 09/01/2024     Advance Directives   What are advance directives?  Advance directives are legal documents that state your wishes and plans for medical care. These plans are made ahead of time in case you lose  your ability to make decisions for yourself. Advance directives can apply to any medical decision, such as the treatments you want, and if you want to donate organs.   What are the types of advance directives?  There are many types of advance directives, and each state has rules about how to use them. You may choose a combination of any of the following:  Living will:  This is a written record of the treatment you want. You can also choose which treatments you do not want, which to limit, and which to stop at a certain time. This includes surgery, medicine, IV fluid, and tube feedings.   Durable power of  for healthcare (DPAHC):  This is a written record that states who you want to make healthcare choices for you when you are unable to make them for yourself. This person, called a proxy, is usually a family member or a friend. You may choose more than 1 proxy.  Do not resuscitate (DNR) order:  A DNR order is used in case your heart stops beating or you stop breathing. It is a request not to have certain forms of treatment, such as CPR. A DNR order may be included in other types of advance directives.  Medical directive:  This covers the care that you want if you are in a coma, near death, or unable to make decisions for yourself. You can list the treatments you want for each condition. Treatment may include pain medicine, surgery, blood transfusions, dialysis, IV or tube feedings, and a ventilator (breathing machine).  Values history:  This document has questions about your views, beliefs, and how you feel and think about life. This information can help others choose the care that you would choose.  Why are advance directives important?  An advance directive helps you control your care. Although spoken wishes may be used, it is better to have your wishes written down. Spoken wishes can be misunderstood, or not followed. Treatments may be given even if you do not want them. An advance directive may make it  easier for your family to make difficult choices about your care.   Urinary Incontinence   Urinary incontinence (UI)  is when you lose control of your bladder. UI develops because your bladder cannot store or empty urine properly. The 3 most common types of UI are stress incontinence, urge incontinence, or both.  Medicines:   May be given to help strengthen your bladder control. Report any side effects of medication to your healthcare provider.  Do pelvic muscle exercises often:  Your pelvic muscles help you stop urinating. Squeeze these muscles tight for 5 seconds, then relax for 5 seconds. Gradually work up to squeezing for 10 seconds. Do 3 sets of 15 repetitions a day, or as directed. This will help strengthen your pelvic muscles and improve bladder control.  Train your bladder:  Go to the bathroom at set times, such as every 2 hours, even if you do not feel the urge to go. You can also try to hold your urine when you feel the urge to go. For example, hold your urine for 5 minutes when you feel the urge to go. As that becomes easier, hold your urine for 10 minutes.   Self-care:   Keep a UI record.  Write down how often you leak urine and how much you leak. Make a note of what you were doing when you leaked urine.  Drink liquids as directed. You may need to limit the amount of liquid you drink to help control your urine leakage. Do not drink any liquid right before you go to bed. Limit or do not have drinks that contain caffeine or alcohol.   Prevent constipation.  Eat a variety of high-fiber foods. Good examples are high-fiber cereals, beans, vegetables, and whole-grain breads. Walking is the best way to trigger your intestines to have a bowel movement.  Exercise regularly and maintain a healthy weight.  Weight loss and exercise will decrease pressure on your bladder and help you control your leakage.   Use a catheter as directed  to help empty your bladder. A catheter is a tiny, plastic tube that is put into  your bladder to drain your urine.   Go to behavior therapy as directed.  Behavior therapy may be used to help you learn to control your urge to urinate.    Weight Management   Why it is important to manage your weight:  Being overweight increases your risk of health conditions such as heart disease, high blood pressure, type 2 diabetes, and certain types of cancer. It can also increase your risk for osteoarthritis, sleep apnea, and other respiratory problems. Aim for a slow, steady weight loss. Even a small amount of weight loss can lower your risk of health problems.  How to lose weight safely:  A safe and healthy way to lose weight is to eat fewer calories and get regular exercise. You can lose up about 1 pound a week by decreasing the number of calories you eat by 500 calories each day.   Healthy meal plan for weight management:  A healthy meal plan includes a variety of foods, contains fewer calories, and helps you stay healthy. A healthy meal plan includes the following:  Eat whole-grain foods more often.  A healthy meal plan should contain fiber. Fiber is the part of grains, fruits, and vegetables that is not broken down by your body. Whole-grain foods are healthy and provide extra fiber in your diet. Some examples of whole-grain foods are whole-wheat breads and pastas, oatmeal, brown rice, and bulgur.  Eat a variety of vegetables every day.  Include dark, leafy greens such as spinach, kale, eh greens, and mustard greens. Eat yellow and orange vegetables such as carrots, sweet potatoes, and winter squash.   Eat a variety of fruits every day.  Choose fresh or canned fruit (canned in its own juice or light syrup) instead of juice. Fruit juice has very little or no fiber.  Eat low-fat dairy foods.  Drink fat-free (skim) milk or 1% milk. Eat fat-free yogurt and low-fat cottage cheese. Try low-fat cheeses such as mozzarella and other reduced-fat cheeses.  Choose meat and other protein foods that are low in  fat.  Choose beans or other legumes such as split peas or lentils. Choose fish, skinless poultry (chicken or turkey), or lean cuts of red meat (beef or pork). Before you cook meat or poultry, cut off any visible fat.   Use less fat and oil.  Try baking foods instead of frying them. Add less fat, such as margarine, sour cream, regular salad dressing and mayonnaise to foods. Eat fewer high-fat foods. Some examples of high-fat foods include french fries, doughnuts, ice cream, and cakes.  Eat fewer sweets.  Limit foods and drinks that are high in sugar. This includes candy, cookies, regular soda, and sweetened drinks.  Exercise:  Exercise at least 30 minutes per day on most days of the week. Some examples of exercise include walking, biking, dancing, and swimming. You can also fit in more physical activity by taking the stairs instead of the elevator or parking farther away from stores. Ask your healthcare provider about the best exercise plan for you.    © Copyright [employer] 2018 Information is for End User's use only and may not be sold, redistributed or otherwise used for commercial purposes. All illustrations and images included in CareNotes® are the copyrighted property of A.D.A.M., Inc. or [employer]

## 2025-05-21 NOTE — PROGRESS NOTES
Name: Leah Brown      : 1945      MRN: [id number]  Encounter Provider: Haylee Puente DO  Encounter Date: 2025   Encounter department: [facility]  :  Assessment & Plan  Encounter for subsequent annual wellness visit (AWV) in Medicare patient  - reviewed PMHx, meds, allergies, IMMs  - UTD with COVID IMMs  - UTD with PCV20   - UTD with annual Flu vaccine  - Cervical Ca screening no longer indicated   - declined Breast Ca screening   - declined Colon Ca screening   - h/o Osteoporosis - follows with Rheum   - script given for screening labs   - RTO in 1yr for AWV - pt and daughter aware and agreeable        Anxiety    Orders:    ALPRAZolam (XANAX) 0.25 mg tablet; Take 1 tablet (0.25 mg total) by mouth daily at bedtime as needed for anxiety    TSH, 3rd generation with Free T4 reflex    Hyperlipidemia, unspecified hyperlipidemia type  - no recent labs   - cont Lipitor 20mg QHS   - RTO in 6months for f/u - pt and daughter aware and agreeable   Orders:    Lipid panel; Future    atorvastatin (LIPITOR) 20 mg tablet; Take 1 tablet (20 mg total) by mouth daily with dinner    Thrombocytopenia (HCC)    Orders:    CBC and differential    Central cord syndrome, subsequent encounter (HCC)  - follows with NeuroSurg who recommended PRN f/u        Congestive heart failure, unspecified HF chronicity, unspecified heart failure type (HCC)  Wt Readings from Last 3 Encounters:   25 66.2 kg (146 lb)   24 62.5 kg (137 lb 12.6 oz)   10/17/24 63.5 kg (140 lb)     - follows with Cardio   - last OV was 2023 - no changes, RTO in 1yr - overdue - advised to schedule        Stage 3 chronic kidney disease, unspecified whether stage 3a or 3b CKD (HCC)  Lab Results   Component Value Date    EGFR 52 10/17/2024    EGFR 49 2024    EGFR 44 2024    CREATININE 1.01 10/17/2024    CREATININE 1.06 2024    CREATININE 1.17 2024     Orders:    Comprehensive metabolic panel    Colon cancer  screening declined         Breast cancer screening declined         Status post cervical spinal fusion            Preventive health issues were discussed with patient, and age appropriate screening tests were ordered as noted in patient's After Visit Summary. Personalized health advice and appropriate referrals for health education or preventive services given if needed, as noted in patient's After Visit Summary.    History of Present Illness     HPI as below     Patient Care Team:  Haylee Puente DO as PCP - General (Family Medicine)  Haylee Puente DO as PCP - [facility]  MD René Gregory DO Emily Meyers, CRNP (Endocrinology)    Review of Systems as per HPI     Medical History Reviewed by provider this encounter:  Tobacco  Allergies  Meds  Problems  Med Hx  Surg Hx  Fam Hx       Annual Wellness Visit Questionnaire   Leah is here for her Subsequent Wellness visit. Last Medicare Wellness visit information reviewed, patient interviewed and updates made to the record today.      Health Risk Assessment:   Patient rates overall health as good. Patient feels that their physical health rating is slightly better. Patient is very satisfied with their life. Eyesight was rated as same. Hearing was rated as same. Patient feels that their emotional and mental health rating is same. Patients states they are never, rarely angry. Patient states they are never, rarely unusually tired/fatigued. Pain experienced in the last 7 days has been none. Patient states that she has experienced no weight loss or gain in last 6 months.     Depression Screening:   PHQ-2 Score: 0      Fall Risk Screening:   In the past year, patient has experienced: no history of falling in past year      Urinary Incontinence Screening:   Patient has leaked urine accidently in the last six months.     Home Safety:  Patient has trouble with stairs inside or outside of their home. Patient has working smoke alarms and  has working carbon monoxide detector. Home safety hazards include: none.     Nutrition:   Current diet is Regular.     Medications:   Patient is currently taking over-the-counter supplements. OTC medications include: see medication list. Patient is able to manage medications.     Activities of Daily Living (ADLs)/Instrumental Activities of Daily Living (IADLs):   Walk and transfer into and out of bed and chair?: Yes  Dress and groom yourself?: Yes    Bathe or shower yourself?: Yes    Feed yourself? Yes  Do your laundry/housekeeping?: Yes  Manage your money, pay your bills and track your expenses?: No  Make your own meals?: Yes    Do your own shopping?: No    Previous Hospitalizations:   Any hospitalizations or ED visits within the last 12 months?: Yes    How many hospitalizations have you had in the last year?: 1-2    Advance Care Planning:   Living will: Yes    Durable POA for healthcare: Yes    Advanced directive: Yes      Cognitive Screening:   Provider or family/friend/caregiver concerned regarding cognition?: No    Preventive Screenings      Cardiovascular Screening:    General: History Lipid Disorder and Risks and Benefits Discussed    Due for: Lipid Panel      Diabetes Screening:     General: Screening Current    Due for: Blood Glucose      Colorectal Cancer Screening:     General: Patient Declines      Breast Cancer Screening:     General: Screening Current and Patient Declines      Cervical Cancer Screening:    General: Screening Not Indicated      Osteoporosis Screening:    General: Screening Not Indicated and History Osteoporosis      Abdominal Aortic Aneurysm (AAA) Screening:        General: Screening Not Indicated      Lung Cancer Screening:     General: Screening Not Indicated      Hepatitis C Screening:    General: Screening Current      Preventive Screening Comments: Leah Brown is a 79 y.o. female who presents to the office with her Daughter for AWV and f/u   - pt lives with her Daughter   -  Specialists: Cardio (last OV was 2023 - no changes, RTO in 1yr - overdue), NeuroSurg (follows PRN), Urology, Audiologist, Rheum   - PMHx: GERD, Gallstones, Moderate Mitral Regurg, HTN, CHF, central cord syndrome, Osteoporosis, Thrombocytopenia, CKD, HL, abnml LFTs, JARET, Anemia, Cervical Stenosis, h/o COVID (2021)    - allergies: Bee Venom - anaphylaxis -- unable to tolerate EpiPen   - Meds: see med rec   - Immunizations: UTD with COVID IMMs, UTD with PCV20 and annual Flu vaccine   - declined Breast Ca screening   - declined Colon Ca screening   - takes Xanax 0.25mg QD PRN prior to bloodwork/imaging   - ROS: today in the office pt denies F/C/N/V/HA/visual changes/CP/palpitations/SOB/wheezing/abd pain/D/LE edema    Immunizations:  - Immunizations due: Zoster (Shingrix)    Screening, Brief Intervention, and Referral to Treatment (SBIRT)     Screening  Typical number of drinks in a day: 0  Typical number of drinks in a week: 0  Interpretation: Low risk drinking behavior.    AUDIT-C Screenin) How often did you have a drink containing alcohol in the past year? never  2) How many drinks did you have on a typical day when you were drinking in the past year? 0  3) How often did you have 6 or more drinks on one occasion in the past year? never    AUDIT-C Score: 0  Interpretation: Score 0-2 (female): Negative screen for alcohol misuse    Single Item Drug Screening:  How often have you used an illegal drug (including marijuana) or a prescription medication for non-medical reasons in the past year? never    Single Item Drug Screen Score: 0  Interpretation: Negative screen for possible drug use disorder    Social Drivers of Health     Financial Resource Strain: Low Risk  (2023)    Overall Financial Resource Strain (CARDIA)     Difficulty of Paying Living Expenses: Not hard at all   Food Insecurity: No Food Insecurity (2025)    Nursing - Inadequate Food Risk Classification     Worried About Running Out of  "Food in the Last Year: Never true     Ran Out of Food in the Last Year: Never true   Transportation Needs: No Transportation Needs (5/21/2025)    PRAPARE - Transportation     Lack of Transportation (Medical): No     Lack of Transportation (Non-Medical): No   Housing Stability: Low Risk  (5/21/2025)    Housing Stability Vital Sign     Unable to Pay for Housing in the Last Year: No     Number of Times Moved in the Last Year: 0     Homeless in the Last Year: No   Utilities: Not At Risk (5/21/2025)    [facility] Utilities     Threatened with loss of utilities: No     No results found.    Objective   /70   Pulse 86   Resp 16   Ht 5' 2\" (1.575 m)   Wt 66.2 kg (146 lb)   SpO2 96%   BMI 26.70 kg/m²     Physical Exam  Vitals reviewed.   Constitutional:       General: She is not in acute distress.     Appearance: Normal appearance. She is not ill-appearing, toxic-appearing or diaphoretic.   HENT:      Head: Normocephalic and atraumatic.      Right Ear: External ear normal.      Left Ear: External ear normal.      Nose: Nose normal.     Eyes:      General: No scleral icterus.        Right eye: No discharge.         Left eye: No discharge.      Extraocular Movements: Extraocular movements intact.      Conjunctiva/sclera: Conjunctivae normal.       Cardiovascular:      Rate and Rhythm: Normal rate and regular rhythm.      Heart sounds: Normal heart sounds.   Pulmonary:      Effort: Pulmonary effort is normal. No respiratory distress.      Breath sounds: Normal breath sounds. No stridor. No wheezing, rhonchi or rales.   Abdominal:      Palpations: Abdomen is soft.      Tenderness: There is no abdominal tenderness.     Musculoskeletal:      Right lower leg: No edema.      Left lower leg: No edema.      Comments: In a wheelchair      Skin:     General: Skin is warm.     Neurological:      General: No focal deficit present.      Mental Status: She is alert and oriented to person, place, and time.     Psychiatric:         Mood " and Affect: Mood normal.         Behavior: Behavior normal.

## 2025-05-21 NOTE — ASSESSMENT & PLAN NOTE
Orders:    ALPRAZolam (XANAX) 0.25 mg tablet; Take 1 tablet (0.25 mg total) by mouth daily at bedtime as needed for anxiety    TSH, 3rd generation with Free T4 reflex

## 2025-07-02 DIAGNOSIS — N18.2 STAGE 2 CHRONIC KIDNEY DISEASE: ICD-10-CM

## 2025-07-02 NOTE — TELEPHONE ENCOUNTER
Reason for call:   [x] Refill   [] Prior Auth  [] Other:     Office:   [x] PCP/Provider - Haylee Puente,    [] Specialty/Provider -     Medication: B Complex-C-Folic Acid (Ayanna-Dylan)    Dose/Frequency: 1 tab daily    Quantity: 100 tabs    Pharmacy:   [facility] [id number]        Local Pharmacy   Does the patient have enough for 3 days?   [x] Yes   [] No - Send as HP to POD    Mail Away Pharmacy   Does the patient have enough for 10 days?   [] Yes   [] No - Send as HP to POD

## 2025-07-07 RX ORDER — FOLIC ACID/VIT B COMPLEX AND C 0.8 MG
1 TABLET ORAL DAILY
Qty: 100 TABLET | Refills: 0 | Status: SHIPPED | OUTPATIENT
Start: 2025-07-07

## 2025-07-23 ENCOUNTER — TELEPHONE (OUTPATIENT)
Age: 80
End: 2025-07-23

## 2025-07-23 NOTE — TELEPHONE ENCOUNTER
Daughter called, she believes her mother has toenail fungus on both big toes and middle toe as well.  All 3 nails are brown.  She bought toenail treatment on [employer] and has been using it since 07/08/25 but it has not helped at all.    Asking if provider can send Rx, recommend something or does she need to see podiatrist.

## 2025-07-24 DIAGNOSIS — B35.1 NAIL FUNGAL INFECTION: Primary | ICD-10-CM

## 2025-07-24 RX ORDER — TERBINAFINE HYDROCHLORIDE 250 MG/1
250 TABLET ORAL DAILY
Qty: 84 TABLET | Refills: 0 | Status: SHIPPED | OUTPATIENT
Start: 2025-07-24 | End: 2025-10-16

## 2025-08-15 ENCOUNTER — TELEPHONE (OUTPATIENT)
Dept: RHEUMATOLOGY | Facility: CLINIC | Age: 80
End: 2025-08-15

## (undated) DEVICE — ADHESIVE SKIN HIGH VISCOSITY EXOFIN 1ML

## (undated) DEVICE — 3M™ IOBAN™ 2 ANTIMICROBIAL INCISE DRAPE 6650EZ: Brand: IOBAN™ 2

## (undated) DEVICE — PROBE MARKER: Brand: XIA

## (undated) DEVICE — PROXIMATE PLUS MD MULTI-DIRECTIONAL RELEASE SKIN STAPLERS CONTAINS 35 STAINLESS STEEL STAPLES APPROXIMATE CLOSED DIMENSIONS: 6.9MM X 3.9MM WIDE: Brand: PROXIMATE

## (undated) DEVICE — TOOL 14BA20 LEGEND 14CM 2MM BA: Brand: MIDAS REX ™

## (undated) DEVICE — GLOVE SRG BIOGEL ECLIPSE 7

## (undated) DEVICE — SPONGE SCRUB 4 PCT CHLORHEXIDINE

## (undated) DEVICE — CHLORAPREP HI-LITE 26ML ORANGE

## (undated) DEVICE — TAP 3MM YUKON

## (undated) DEVICE — BIPOLAR SEALER 23-113-1 AQM 2.3: Brand: AQUAMANTYS™

## (undated) DEVICE — ELECTRODE BLADE MOD E-Z CLEAN 2.5IN 6.4CM -0012M

## (undated) DEVICE — DRILL 2.3MM ADJ YUKON

## (undated) DEVICE — SUT MONOCRYL PLUS 4-0 PS-2 18 IN MCP496G

## (undated) DEVICE — DRAPE SURGIKIT SADDLE BAG

## (undated) DEVICE — ANTIBACTERIAL VIOLET BRAIDED (POLYGLACTIN 910), SYNTHETIC ABSORBABLE SUTURE: Brand: COATED VICRYL

## (undated) DEVICE — NEURO SURGICAL CLIPPER BLADE

## (undated) DEVICE — SUPPLY FEE STD

## (undated) DEVICE — SUT VICRYL PLUS 3-0 RB-1 CR/8 18 IN VCP713D

## (undated) DEVICE — DRAPE ADOLESCENT LAPAROTOMY

## (undated) DEVICE — BETADINE OINTMENT FOIL PACK

## (undated) DEVICE — DRAPE SHEET X-LG

## (undated) DEVICE — DRESSING MEPILEX AG BORDER 4 X 10 IN

## (undated) DEVICE — BOWL ASSY BM210 DUAL BLADE DISPOSABLE: Brand: MIDAS REX™

## (undated) DEVICE — BIPOLAR CORD DISP

## (undated) DEVICE — SPECIMEN CONTAINER STERILE PEEL PACK

## (undated) DEVICE — HEMOSTATIC MATRIX SURGIFLO 8ML W/THROMBIN

## (undated) DEVICE — DRAPE SHEET THREE QUARTER

## (undated) DEVICE — INTENDED FOR TISSUE SEPARATION, AND OTHER PROCEDURES THAT REQUIRE A SHARP SURGICAL BLADE TO PUNCTURE OR CUT.: Brand: BARD-PARKER SAFETY BLADES SIZE 10, STERILE

## (undated) DEVICE — ROSEBUD DISSECTORS: Brand: DEROYAL

## (undated) DEVICE — MINOR PROCEDURE DRAPE: Brand: CONVERTORS

## (undated) DEVICE — 3M™ TEGADERM™ TRANSPARENT FILM DRESSING FRAME STYLE, 1626W, 4 IN X 4-3/4 IN (10 CM X 12 CM), 50/CT 4CT/CASE: Brand: 3M™ TEGADERM™

## (undated) DEVICE — BETHLEHEM UNIVERSAL SPINE, KIT: Brand: CARDINAL HEALTH

## (undated) DEVICE — DRAPE TOWEL: Brand: CONVERTORS

## (undated) DEVICE — 1840 FOAM BLOCK NEEDLE COUNTER: Brand: DEVON

## (undated) DEVICE — TELFA NON-ADHERENT ABSORBENT DRESSING: Brand: TELFA

## (undated) DEVICE — MONITORING SPINAL IMPULSE CASE FEE

## (undated) DEVICE — MAYFIELD® DISPOSABLE ADULT SKULL PIN (PLASTIC BASE): Brand: MAYFIELD®

## (undated) DEVICE — NEEDLE 22 G X 1 1/2 SAFETY

## (undated) DEVICE — PENCIL ELECTROSURG E-Z CLEAN -0035H

## (undated) DEVICE — NEEDLE SPINAL18G X 3.5 IN QUINCKE

## (undated) DEVICE — DISTRACTION SCREW 12MM DISP

## (undated) DEVICE — TUBING SUCTION 5MM X 12 FT

## (undated) DEVICE — TOOL 14MH30 LEGEND 14CM 3MM: Brand: MIDAS REX ™

## (undated) DEVICE — TRAY FOLEY 16FR URIMETER SURESTEP

## (undated) DEVICE — DRAPE C-ARMOUR

## (undated) DEVICE — SNAP KOVER: Brand: UNBRANDED

## (undated) DEVICE — INTENDED FOR TISSUE SEPARATION, AND OTHER PROCEDURES THAT REQUIRE A SHARP SURGICAL BLADE TO PUNCTURE OR CUT.: Brand: BARD-PARKER ® CARBON RIB-BACK BLADES

## (undated) DEVICE — GLOVE INDICATOR PI UNDERGLOVE SZ 7.5 BLUE